# Patient Record
Sex: MALE | Race: WHITE | NOT HISPANIC OR LATINO | Employment: PART TIME | URBAN - METROPOLITAN AREA
[De-identification: names, ages, dates, MRNs, and addresses within clinical notes are randomized per-mention and may not be internally consistent; named-entity substitution may affect disease eponyms.]

---

## 2017-01-02 ENCOUNTER — APPOINTMENT (OUTPATIENT)
Dept: LAB | Facility: MEDICAL CENTER | Age: 67
End: 2017-01-02
Payer: MEDICARE

## 2017-01-02 DIAGNOSIS — C61 MALIGNANT NEOPLASM OF PROSTATE (HCC): ICD-10-CM

## 2017-01-02 DIAGNOSIS — C79.51 SECONDARY MALIGNANT NEOPLASM OF BONE (HCC): ICD-10-CM

## 2017-01-02 DIAGNOSIS — C64.2 MALIGNANT NEOPLASM OF LEFT KIDNEY, EXCEPT RENAL PELVIS (HCC): ICD-10-CM

## 2017-01-02 DIAGNOSIS — C78.00 SECONDARY MALIGNANT NEOPLASM OF LUNG (HCC): ICD-10-CM

## 2017-01-02 LAB
ALBUMIN SERPL BCP-MCNC: 3.2 G/DL (ref 3.5–5)
ALP SERPL-CCNC: 75 U/L (ref 46–116)
ALT SERPL W P-5'-P-CCNC: 24 U/L (ref 12–78)
ANION GAP SERPL CALCULATED.3IONS-SCNC: 6 MMOL/L (ref 4–13)
AST SERPL W P-5'-P-CCNC: 16 U/L (ref 5–45)
BASOPHILS # BLD AUTO: 0.01 THOUSANDS/ΜL (ref 0–0.1)
BASOPHILS NFR BLD AUTO: 0 % (ref 0–1)
BILIRUB SERPL-MCNC: 0.49 MG/DL (ref 0.2–1)
BUN SERPL-MCNC: 16 MG/DL (ref 5–25)
CALCIUM SERPL-MCNC: 9.8 MG/DL (ref 8.3–10.1)
CHLORIDE SERPL-SCNC: 107 MMOL/L (ref 100–108)
CO2 SERPL-SCNC: 30 MMOL/L (ref 21–32)
CREAT SERPL-MCNC: 1.25 MG/DL (ref 0.6–1.3)
EOSINOPHIL # BLD AUTO: 0.17 THOUSAND/ΜL (ref 0–0.61)
EOSINOPHIL NFR BLD AUTO: 2 % (ref 0–6)
ERYTHROCYTE [DISTWIDTH] IN BLOOD BY AUTOMATED COUNT: 16.4 % (ref 11.6–15.1)
GFR SERPL CREATININE-BSD FRML MDRD: 57.6 ML/MIN/1.73SQ M
GLUCOSE SERPL-MCNC: 102 MG/DL (ref 65–140)
HCT VFR BLD AUTO: 45 % (ref 36.5–49.3)
HGB BLD-MCNC: 14.6 G/DL (ref 12–17)
LYMPHOCYTES # BLD AUTO: 4.74 THOUSANDS/ΜL (ref 0.6–4.47)
LYMPHOCYTES NFR BLD AUTO: 49 % (ref 14–44)
MCH RBC QN AUTO: 31.8 PG (ref 26.8–34.3)
MCHC RBC AUTO-ENTMCNC: 32.4 G/DL (ref 31.4–37.4)
MCV RBC AUTO: 98 FL (ref 82–98)
MONOCYTES # BLD AUTO: 0.54 THOUSAND/ΜL (ref 0.17–1.22)
MONOCYTES NFR BLD AUTO: 6 % (ref 4–12)
NEUTROPHILS # BLD AUTO: 4.06 THOUSANDS/ΜL (ref 1.85–7.62)
NEUTS SEG NFR BLD AUTO: 43 % (ref 43–75)
NRBC BLD AUTO-RTO: 0 /100 WBCS
PLATELET # BLD AUTO: 186 THOUSANDS/UL (ref 149–390)
PMV BLD AUTO: 10.2 FL (ref 8.9–12.7)
POTASSIUM SERPL-SCNC: 3.6 MMOL/L (ref 3.5–5.3)
PROT SERPL-MCNC: 6.9 G/DL (ref 6.4–8.2)
RBC # BLD AUTO: 4.59 MILLION/UL (ref 3.88–5.62)
SODIUM SERPL-SCNC: 143 MMOL/L (ref 136–145)
WBC # BLD AUTO: 9.53 THOUSAND/UL (ref 4.31–10.16)

## 2017-01-02 PROCEDURE — 80053 COMPREHEN METABOLIC PANEL: CPT

## 2017-01-02 PROCEDURE — 36415 COLL VENOUS BLD VENIPUNCTURE: CPT

## 2017-01-02 PROCEDURE — 85025 COMPLETE CBC W/AUTO DIFF WBC: CPT

## 2017-01-26 ENCOUNTER — APPOINTMENT (OUTPATIENT)
Dept: LAB | Facility: MEDICAL CENTER | Age: 67
End: 2017-01-26
Payer: MEDICARE

## 2017-01-26 ENCOUNTER — TRANSCRIBE ORDERS (OUTPATIENT)
Dept: ADMINISTRATIVE | Facility: HOSPITAL | Age: 67
End: 2017-01-26

## 2017-01-26 DIAGNOSIS — C78.00 SECONDARY MALIGNANT NEOPLASM OF LUNG (HCC): ICD-10-CM

## 2017-01-26 DIAGNOSIS — C79.51 SECONDARY MALIGNANT NEOPLASM OF BONE (HCC): ICD-10-CM

## 2017-01-26 DIAGNOSIS — C64.2 MALIGNANT NEOPLASM OF LEFT KIDNEY, EXCEPT RENAL PELVIS (HCC): ICD-10-CM

## 2017-01-26 DIAGNOSIS — C61 MALIGNANT NEOPLASM OF PROSTATE (HCC): ICD-10-CM

## 2017-01-26 LAB
ALBUMIN SERPL BCP-MCNC: 3.3 G/DL (ref 3.5–5)
ALP SERPL-CCNC: 85 U/L (ref 46–116)
ALT SERPL W P-5'-P-CCNC: 23 U/L (ref 12–78)
ANION GAP SERPL CALCULATED.3IONS-SCNC: 5 MMOL/L (ref 4–13)
AST SERPL W P-5'-P-CCNC: 13 U/L (ref 5–45)
BASOPHILS # BLD AUTO: 0.03 THOUSANDS/ΜL (ref 0–0.1)
BASOPHILS NFR BLD AUTO: 0 % (ref 0–1)
BILIRUB SERPL-MCNC: 0.37 MG/DL (ref 0.2–1)
BUN SERPL-MCNC: 14 MG/DL (ref 5–25)
CALCIUM SERPL-MCNC: 9.9 MG/DL (ref 8.3–10.1)
CHLORIDE SERPL-SCNC: 106 MMOL/L (ref 100–108)
CO2 SERPL-SCNC: 32 MMOL/L (ref 21–32)
CREAT SERPL-MCNC: 1.17 MG/DL (ref 0.6–1.3)
EOSINOPHIL # BLD AUTO: 0.25 THOUSAND/ΜL (ref 0–0.61)
EOSINOPHIL NFR BLD AUTO: 2 % (ref 0–6)
ERYTHROCYTE [DISTWIDTH] IN BLOOD BY AUTOMATED COUNT: 15.4 % (ref 11.6–15.1)
GFR SERPL CREATININE-BSD FRML MDRD: >60 ML/MIN/1.73SQ M
GLUCOSE SERPL-MCNC: 82 MG/DL (ref 65–140)
HCT VFR BLD AUTO: 46.2 % (ref 36.5–49.3)
HGB BLD-MCNC: 14.8 G/DL (ref 12–17)
LYMPHOCYTES # BLD AUTO: 6.02 THOUSANDS/ΜL (ref 0.6–4.47)
LYMPHOCYTES NFR BLD AUTO: 45 % (ref 14–44)
MCH RBC QN AUTO: 31.8 PG (ref 26.8–34.3)
MCHC RBC AUTO-ENTMCNC: 32 G/DL (ref 31.4–37.4)
MCV RBC AUTO: 99 FL (ref 82–98)
MONOCYTES # BLD AUTO: 0.99 THOUSAND/ΜL (ref 0.17–1.22)
MONOCYTES NFR BLD AUTO: 7 % (ref 4–12)
NEUTROPHILS # BLD AUTO: 6.13 THOUSANDS/ΜL (ref 1.85–7.62)
NEUTS SEG NFR BLD AUTO: 46 % (ref 43–75)
NRBC BLD AUTO-RTO: 0 /100 WBCS
PLATELET # BLD AUTO: 221 THOUSANDS/UL (ref 149–390)
PMV BLD AUTO: 10.3 FL (ref 8.9–12.7)
POTASSIUM SERPL-SCNC: 4.4 MMOL/L (ref 3.5–5.3)
PROT SERPL-MCNC: 7.4 G/DL (ref 6.4–8.2)
RBC # BLD AUTO: 4.66 MILLION/UL (ref 3.88–5.62)
SODIUM SERPL-SCNC: 143 MMOL/L (ref 136–145)
WBC # BLD AUTO: 13.46 THOUSAND/UL (ref 4.31–10.16)

## 2017-01-26 PROCEDURE — 80053 COMPREHEN METABOLIC PANEL: CPT

## 2017-01-26 PROCEDURE — 36415 COLL VENOUS BLD VENIPUNCTURE: CPT

## 2017-01-26 PROCEDURE — 85025 COMPLETE CBC W/AUTO DIFF WBC: CPT

## 2017-02-03 ENCOUNTER — ALLSCRIPTS OFFICE VISIT (OUTPATIENT)
Dept: OTHER | Facility: OTHER | Age: 67
End: 2017-02-03

## 2017-02-08 ENCOUNTER — GENERIC CONVERSION - ENCOUNTER (OUTPATIENT)
Dept: OTHER | Facility: OTHER | Age: 67
End: 2017-02-08

## 2017-02-27 DIAGNOSIS — C79.51 SECONDARY MALIGNANT NEOPLASM OF BONE (HCC): ICD-10-CM

## 2017-02-27 DIAGNOSIS — C64.2 MALIGNANT NEOPLASM OF LEFT KIDNEY, EXCEPT RENAL PELVIS (HCC): ICD-10-CM

## 2017-02-27 DIAGNOSIS — C78.00 SECONDARY MALIGNANT NEOPLASM OF LUNG (HCC): ICD-10-CM

## 2017-02-27 DIAGNOSIS — C61 MALIGNANT NEOPLASM OF PROSTATE (HCC): ICD-10-CM

## 2017-03-01 ENCOUNTER — GENERIC CONVERSION - ENCOUNTER (OUTPATIENT)
Dept: OTHER | Facility: OTHER | Age: 67
End: 2017-03-01

## 2017-03-23 ENCOUNTER — TRANSCRIBE ORDERS (OUTPATIENT)
Dept: ADMINISTRATIVE | Facility: HOSPITAL | Age: 67
End: 2017-03-23

## 2017-03-23 ENCOUNTER — LAB (OUTPATIENT)
Dept: LAB | Facility: MEDICAL CENTER | Age: 67
End: 2017-03-23
Payer: MEDICARE

## 2017-03-23 DIAGNOSIS — Z11.59 SCREENING EXAMINATION FOR POLIOMYELITIS: ICD-10-CM

## 2017-03-23 DIAGNOSIS — C64.2 MALIGNANT NEOPLASM OF LEFT KIDNEY, EXCEPT RENAL PELVIS (HCC): ICD-10-CM

## 2017-03-23 DIAGNOSIS — C61 MALIGNANT NEOPLASM OF PROSTATE (HCC): ICD-10-CM

## 2017-03-23 DIAGNOSIS — C78.00 SECONDARY MALIGNANT NEOPLASM OF LUNG (HCC): ICD-10-CM

## 2017-03-23 DIAGNOSIS — C79.51 SECONDARY MALIGNANT NEOPLASM OF BONE (HCC): ICD-10-CM

## 2017-03-23 DIAGNOSIS — Z11.59 SCREENING EXAMINATION FOR POLIOMYELITIS: Primary | ICD-10-CM

## 2017-03-23 LAB
ALBUMIN SERPL BCP-MCNC: 3.2 G/DL (ref 3.5–5)
ALP SERPL-CCNC: 110 U/L (ref 46–116)
ALT SERPL W P-5'-P-CCNC: 22 U/L (ref 12–78)
ANION GAP SERPL CALCULATED.3IONS-SCNC: 8 MMOL/L (ref 4–13)
AST SERPL W P-5'-P-CCNC: 17 U/L (ref 5–45)
BASOPHILS # BLD AUTO: 0.02 THOUSANDS/ΜL (ref 0–0.1)
BASOPHILS NFR BLD AUTO: 0 % (ref 0–1)
BILIRUB SERPL-MCNC: 0.89 MG/DL (ref 0.2–1)
BUN SERPL-MCNC: 19 MG/DL (ref 5–25)
CALCIUM SERPL-MCNC: 9.6 MG/DL (ref 8.3–10.1)
CHLORIDE SERPL-SCNC: 107 MMOL/L (ref 100–108)
CO2 SERPL-SCNC: 28 MMOL/L (ref 21–32)
CREAT SERPL-MCNC: 1.22 MG/DL (ref 0.6–1.3)
EOSINOPHIL # BLD AUTO: 0.15 THOUSAND/ΜL (ref 0–0.61)
EOSINOPHIL NFR BLD AUTO: 1 % (ref 0–6)
ERYTHROCYTE [DISTWIDTH] IN BLOOD BY AUTOMATED COUNT: 13.9 % (ref 11.6–15.1)
GFR SERPL CREATININE-BSD FRML MDRD: 59.2 ML/MIN/1.73SQ M
GLUCOSE P FAST SERPL-MCNC: 63 MG/DL (ref 65–99)
HCT VFR BLD AUTO: 47.9 % (ref 36.5–49.3)
HGB BLD-MCNC: 15.5 G/DL (ref 12–17)
LYMPHOCYTES # BLD AUTO: 5.3 THOUSANDS/ΜL (ref 0.6–4.47)
LYMPHOCYTES NFR BLD AUTO: 40 % (ref 14–44)
MCH RBC QN AUTO: 29.4 PG (ref 26.8–34.3)
MCHC RBC AUTO-ENTMCNC: 32.4 G/DL (ref 31.4–37.4)
MCV RBC AUTO: 91 FL (ref 82–98)
MONOCYTES # BLD AUTO: 0.91 THOUSAND/ΜL (ref 0.17–1.22)
MONOCYTES NFR BLD AUTO: 7 % (ref 4–12)
NEUTROPHILS # BLD AUTO: 6.92 THOUSANDS/ΜL (ref 1.85–7.62)
NEUTS SEG NFR BLD AUTO: 52 % (ref 43–75)
NRBC BLD AUTO-RTO: 0 /100 WBCS
PLATELET # BLD AUTO: 177 THOUSANDS/UL (ref 149–390)
PMV BLD AUTO: 10.8 FL (ref 8.9–12.7)
POTASSIUM SERPL-SCNC: 3.3 MMOL/L (ref 3.5–5.3)
PROT SERPL-MCNC: 7.3 G/DL (ref 6.4–8.2)
RBC # BLD AUTO: 5.28 MILLION/UL (ref 3.88–5.62)
SODIUM SERPL-SCNC: 143 MMOL/L (ref 136–145)
WBC # BLD AUTO: 13.32 THOUSAND/UL (ref 4.31–10.16)

## 2017-03-23 PROCEDURE — 80053 COMPREHEN METABOLIC PANEL: CPT

## 2017-03-23 PROCEDURE — 85025 COMPLETE CBC W/AUTO DIFF WBC: CPT

## 2017-03-23 PROCEDURE — 36415 COLL VENOUS BLD VENIPUNCTURE: CPT

## 2017-03-23 PROCEDURE — 86803 HEPATITIS C AB TEST: CPT

## 2017-03-24 ENCOUNTER — ALLSCRIPTS OFFICE VISIT (OUTPATIENT)
Dept: OTHER | Facility: OTHER | Age: 67
End: 2017-03-24

## 2017-03-24 LAB — HCV AB SER QL: NORMAL

## 2017-03-27 ENCOUNTER — APPOINTMENT (EMERGENCY)
Dept: RADIOLOGY | Facility: HOSPITAL | Age: 67
End: 2017-03-27
Payer: MEDICARE

## 2017-03-27 ENCOUNTER — HOSPITAL ENCOUNTER (EMERGENCY)
Facility: HOSPITAL | Age: 67
Discharge: HOME | End: 2017-03-27
Attending: EMERGENCY MEDICINE
Payer: MEDICARE

## 2017-03-27 VITALS
HEART RATE: 105 BPM | TEMPERATURE: 99.4 F | DIASTOLIC BLOOD PRESSURE: 88 MMHG | WEIGHT: 315 LBS | SYSTOLIC BLOOD PRESSURE: 172 MMHG | RESPIRATION RATE: 18 BRPM | OXYGEN SATURATION: 95 %

## 2017-03-27 DIAGNOSIS — M79.631 RIGHT FOREARM PAIN: Primary | ICD-10-CM

## 2017-03-27 DIAGNOSIS — S52.209A ULNAR SHAFT FRACTURE: ICD-10-CM

## 2017-03-27 DIAGNOSIS — M89.9 BONE LESION: ICD-10-CM

## 2017-03-27 PROCEDURE — 73110 X-RAY EXAM OF WRIST: CPT

## 2017-03-27 PROCEDURE — 99283 EMERGENCY DEPT VISIT LOW MDM: CPT

## 2017-03-27 RX ORDER — ACETAMINOPHEN 325 MG/1
650 TABLET ORAL ONCE
Status: COMPLETED | OUTPATIENT
Start: 2017-03-27 | End: 2017-03-27

## 2017-03-27 RX ORDER — OXYCODONE HYDROCHLORIDE AND ACETAMINOPHEN 5; 325 MG/1; MG/1
1 TABLET ORAL EVERY 8 HOURS PRN
Qty: 15 TABLET | Refills: 0 | Status: SHIPPED | OUTPATIENT
Start: 2017-03-27 | End: 2017-04-01

## 2017-03-27 RX ORDER — IBUPROFEN 200 MG
400 TABLET ORAL ONCE
Status: DISCONTINUED | OUTPATIENT
Start: 2017-03-27 | End: 2017-03-27

## 2017-03-27 RX ORDER — OXYCODONE HYDROCHLORIDE AND ACETAMINOPHEN 5; 325 MG/1; MG/1
1 TABLET ORAL ONCE
Status: COMPLETED | OUTPATIENT
Start: 2017-03-27 | End: 2017-03-27

## 2017-03-27 RX ADMIN — ACETAMINOPHEN 650 MG: 325 TABLET, FILM COATED ORAL at 20:28

## 2017-03-27 RX ADMIN — OXYCODONE HYDROCHLORIDE AND ACETAMINOPHEN 1 TABLET: 5; 325 TABLET ORAL at 23:16

## 2017-03-28 ENCOUNTER — GENERIC CONVERSION - ENCOUNTER (OUTPATIENT)
Dept: OTHER | Facility: OTHER | Age: 67
End: 2017-03-28

## 2017-03-29 ENCOUNTER — GENERIC CONVERSION - ENCOUNTER (OUTPATIENT)
Dept: OTHER | Facility: OTHER | Age: 67
End: 2017-03-29

## 2017-03-30 ENCOUNTER — GENERIC CONVERSION - ENCOUNTER (OUTPATIENT)
Dept: OTHER | Facility: OTHER | Age: 67
End: 2017-03-30

## 2017-03-30 ENCOUNTER — HOSPITAL ENCOUNTER (OUTPATIENT)
Dept: RADIOLOGY | Facility: HOSPITAL | Age: 67
Discharge: HOME | End: 2017-03-30
Attending: INTERNAL MEDICINE
Payer: MEDICARE

## 2017-03-30 DIAGNOSIS — C64.2 MALIGNANT NEOPLASM OF LEFT KIDNEY, EXCEPT RENAL PELVIS (HCC): ICD-10-CM

## 2017-03-30 DIAGNOSIS — C78.00 SECONDARY MALIGNANT NEOPLASM OF LUNG (HCC): ICD-10-CM

## 2017-03-30 DIAGNOSIS — C61 MALIGNANT NEOPLASM OF PROSTATE (HCC): ICD-10-CM

## 2017-03-30 DIAGNOSIS — C79.51 SECONDARY MALIGNANT NEOPLASM OF BONE (HCC): ICD-10-CM

## 2017-03-30 PROCEDURE — 73090 X-RAY EXAM OF FOREARM: CPT

## 2017-03-30 PROCEDURE — 73552 X-RAY EXAM OF FEMUR 2/>: CPT

## 2017-03-30 PROCEDURE — 73060 X-RAY EXAM OF HUMERUS: CPT

## 2017-03-30 PROCEDURE — 73590 X-RAY EXAM OF LOWER LEG: CPT

## 2017-04-03 ENCOUNTER — GENERIC CONVERSION - ENCOUNTER (OUTPATIENT)
Dept: OTHER | Facility: OTHER | Age: 67
End: 2017-04-03

## 2017-04-10 ENCOUNTER — APPOINTMENT (OUTPATIENT)
Dept: RADIATION ONCOLOGY | Facility: CLINIC | Age: 67
End: 2017-04-10
Attending: RADIOLOGY
Payer: MEDICARE

## 2017-04-10 ENCOUNTER — APPOINTMENT (OUTPATIENT)
Dept: LAB | Facility: MEDICAL CENTER | Age: 67
End: 2017-04-10
Payer: MEDICARE

## 2017-04-10 ENCOUNTER — HOSPITAL ENCOUNTER (OUTPATIENT)
Dept: RADIOLOGY | Facility: MEDICAL CENTER | Age: 67
Discharge: HOME | End: 2017-04-10
Payer: MEDICARE

## 2017-04-10 ENCOUNTER — TRANSCRIBE ORDERS (OUTPATIENT)
Dept: ADMINISTRATIVE | Facility: HOSPITAL | Age: 67
End: 2017-04-10

## 2017-04-10 ENCOUNTER — GENERIC CONVERSION - ENCOUNTER (OUTPATIENT)
Dept: OTHER | Facility: OTHER | Age: 67
End: 2017-04-10

## 2017-04-10 DIAGNOSIS — C64.2 MALIGNANT NEOPLASM OF LEFT KIDNEY, EXCEPT RENAL PELVIS (HCC): ICD-10-CM

## 2017-04-10 DIAGNOSIS — M84.531G: Primary | ICD-10-CM

## 2017-04-10 DIAGNOSIS — M84.531G: ICD-10-CM

## 2017-04-10 DIAGNOSIS — C61 MALIGNANT NEOPLASM OF PROSTATE (HCC): ICD-10-CM

## 2017-04-10 LAB
ALBUMIN SERPL BCP-MCNC: 3.2 G/DL (ref 3.5–5)
ALP SERPL-CCNC: 101 U/L (ref 46–116)
ALT SERPL W P-5'-P-CCNC: 21 U/L (ref 12–78)
ANION GAP SERPL CALCULATED.3IONS-SCNC: 7 MMOL/L (ref 4–13)
APTT PPP: 33 SECONDS (ref 24–36)
AST SERPL W P-5'-P-CCNC: 15 U/L (ref 5–45)
BASOPHILS # BLD AUTO: 0.01 THOUSANDS/ΜL (ref 0–0.1)
BASOPHILS NFR BLD AUTO: 0 % (ref 0–1)
BILIRUB SERPL-MCNC: 0.84 MG/DL (ref 0.2–1)
BUN SERPL-MCNC: 15 MG/DL (ref 5–25)
CALCIUM SERPL-MCNC: 9.7 MG/DL (ref 8.3–10.1)
CHLORIDE SERPL-SCNC: 103 MMOL/L (ref 100–108)
CO2 SERPL-SCNC: 29 MMOL/L (ref 21–32)
CREAT SERPL-MCNC: 1.37 MG/DL (ref 0.6–1.3)
EOSINOPHIL # BLD AUTO: 0.14 THOUSAND/ΜL (ref 0–0.61)
EOSINOPHIL NFR BLD AUTO: 1 % (ref 0–6)
ERYTHROCYTE [DISTWIDTH] IN BLOOD BY AUTOMATED COUNT: 13.7 % (ref 11.6–15.1)
GFR SERPL CREATININE-BSD FRML MDRD: 51.8 ML/MIN/1.73SQ M
GLUCOSE P FAST SERPL-MCNC: 102 MG/DL (ref 65–99)
HCT VFR BLD AUTO: 47.3 % (ref 36.5–49.3)
HGB BLD-MCNC: 15.4 G/DL (ref 12–17)
INR PPP: 0.95 (ref 0.86–1.16)
LYMPHOCYTES # BLD AUTO: 3.62 THOUSANDS/ΜL (ref 0.6–4.47)
LYMPHOCYTES NFR BLD AUTO: 33 % (ref 14–44)
MCH RBC QN AUTO: 29.1 PG (ref 26.8–34.3)
MCHC RBC AUTO-ENTMCNC: 32.6 G/DL (ref 31.4–37.4)
MCV RBC AUTO: 89 FL (ref 82–98)
MONOCYTES # BLD AUTO: 0.72 THOUSAND/ΜL (ref 0.17–1.22)
MONOCYTES NFR BLD AUTO: 7 % (ref 4–12)
NEUTROPHILS # BLD AUTO: 6.34 THOUSANDS/ΜL (ref 1.85–7.62)
NEUTS SEG NFR BLD AUTO: 59 % (ref 43–75)
NRBC BLD AUTO-RTO: 0 /100 WBCS
PLATELET # BLD AUTO: 228 THOUSANDS/UL (ref 149–390)
PMV BLD AUTO: 11 FL (ref 8.9–12.7)
POTASSIUM SERPL-SCNC: 3.9 MMOL/L (ref 3.5–5.3)
PROT SERPL-MCNC: 7.5 G/DL (ref 6.4–8.2)
PROTHROMBIN TIME: 12.8 SECONDS (ref 12–14.3)
RBC # BLD AUTO: 5.3 MILLION/UL (ref 3.88–5.62)
SODIUM SERPL-SCNC: 139 MMOL/L (ref 136–145)
WBC # BLD AUTO: 10.86 THOUSAND/UL (ref 4.31–10.16)

## 2017-04-10 PROCEDURE — 85025 COMPLETE CBC W/AUTO DIFF WBC: CPT

## 2017-04-10 PROCEDURE — 85730 THROMBOPLASTIN TIME PARTIAL: CPT

## 2017-04-10 PROCEDURE — 36415 COLL VENOUS BLD VENIPUNCTURE: CPT

## 2017-04-10 PROCEDURE — 99214 OFFICE O/P EST MOD 30 MIN: CPT | Performed by: RADIOLOGY

## 2017-04-10 PROCEDURE — 85610 PROTHROMBIN TIME: CPT

## 2017-04-10 PROCEDURE — 71020 HB CHEST X-RAY 2VW FRONTAL&LATL: CPT

## 2017-04-10 PROCEDURE — 80053 COMPREHEN METABOLIC PANEL: CPT

## 2017-04-11 ENCOUNTER — GENERIC CONVERSION - ENCOUNTER (OUTPATIENT)
Dept: OTHER | Facility: OTHER | Age: 67
End: 2017-04-11

## 2017-04-14 ENCOUNTER — LAB CONVERSION - ENCOUNTER (OUTPATIENT)
Dept: OTHER | Facility: OTHER | Age: 67
End: 2017-04-14

## 2017-04-17 DIAGNOSIS — C64.2 MALIGNANT NEOPLASM OF LEFT KIDNEY, EXCEPT RENAL PELVIS (HCC): ICD-10-CM

## 2017-04-17 DIAGNOSIS — C78.00 SECONDARY MALIGNANT NEOPLASM OF LUNG (HCC): ICD-10-CM

## 2017-04-17 DIAGNOSIS — C61 MALIGNANT NEOPLASM OF PROSTATE (HCC): ICD-10-CM

## 2017-04-17 DIAGNOSIS — R53.83 OTHER FATIGUE: ICD-10-CM

## 2017-04-17 DIAGNOSIS — C79.51 SECONDARY MALIGNANT NEOPLASM OF BONE (HCC): ICD-10-CM

## 2017-04-17 DIAGNOSIS — M79.10 MYALGIA: ICD-10-CM

## 2017-04-24 ENCOUNTER — GENERIC CONVERSION - ENCOUNTER (OUTPATIENT)
Dept: OTHER | Facility: OTHER | Age: 67
End: 2017-04-24

## 2017-05-01 ENCOUNTER — GENERIC CONVERSION - ENCOUNTER (OUTPATIENT)
Dept: OTHER | Facility: OTHER | Age: 67
End: 2017-05-01

## 2017-05-05 ENCOUNTER — ALLSCRIPTS OFFICE VISIT (OUTPATIENT)
Dept: OTHER | Facility: OTHER | Age: 67
End: 2017-05-05

## 2017-05-05 ENCOUNTER — TRANSCRIBE ORDERS (OUTPATIENT)
Dept: ADMINISTRATIVE | Facility: HOSPITAL | Age: 67
End: 2017-05-05

## 2017-05-05 DIAGNOSIS — C78.00 MALIGNANT NEOPLASM METASTATIC TO LUNG, UNSPECIFIED LATERALITY (HCC): Primary | ICD-10-CM

## 2017-05-08 ENCOUNTER — APPOINTMENT (OUTPATIENT)
Dept: RADIATION ONCOLOGY | Facility: CLINIC | Age: 67
End: 2017-05-08
Attending: RADIOLOGY
Payer: MEDICARE

## 2017-05-08 PROCEDURE — 77334 RADIATION TREATMENT AID(S): CPT | Performed by: RADIOLOGY

## 2017-05-08 PROCEDURE — 77290 THER RAD SIMULAJ FIELD CPLX: CPT | Performed by: RADIOLOGY

## 2017-05-11 ENCOUNTER — GENERIC CONVERSION - ENCOUNTER (OUTPATIENT)
Dept: OTHER | Facility: OTHER | Age: 67
End: 2017-05-11

## 2017-05-11 PROCEDURE — 77307 TELETHX ISODOSE PLAN CPLX: CPT | Performed by: RADIOLOGY

## 2017-05-11 PROCEDURE — 77334 RADIATION TREATMENT AID(S): CPT | Performed by: RADIOLOGY

## 2017-05-11 PROCEDURE — 77300 RADIATION THERAPY DOSE PLAN: CPT | Performed by: RADIOLOGY

## 2017-05-16 PROCEDURE — 77280 THER RAD SIMULAJ FIELD SMPL: CPT | Performed by: RADIOLOGY

## 2017-05-17 PROCEDURE — 77331 SPECIAL RADIATION DOSIMETRY: CPT | Performed by: RADIOLOGY

## 2017-05-17 PROCEDURE — 77412 RADIATION TX DELIVERY LVL 3: CPT | Performed by: RADIOLOGY

## 2017-05-17 PROCEDURE — 77417 THER RADIOLOGY PORT IMAGE(S): CPT | Performed by: RADIOLOGY

## 2017-05-18 PROCEDURE — 77412 RADIATION TX DELIVERY LVL 3: CPT | Performed by: RADIOLOGY

## 2017-05-18 PROCEDURE — 77417 THER RADIOLOGY PORT IMAGE(S): CPT | Performed by: RADIOLOGY

## 2017-05-19 PROCEDURE — 77387 GUIDANCE FOR RADJ TX DLVR: CPT | Performed by: RADIOLOGY

## 2017-05-19 PROCEDURE — 77412 RADIATION TX DELIVERY LVL 3: CPT | Performed by: RADIOLOGY

## 2017-05-22 PROCEDURE — 77387 GUIDANCE FOR RADJ TX DLVR: CPT | Performed by: RADIOLOGY

## 2017-05-22 PROCEDURE — 77412 RADIATION TX DELIVERY LVL 3: CPT | Performed by: RADIOLOGY

## 2017-05-23 PROCEDURE — 77387 GUIDANCE FOR RADJ TX DLVR: CPT | Performed by: RADIOLOGY

## 2017-05-23 PROCEDURE — 77336 RADIATION PHYSICS CONSULT: CPT | Performed by: RADIOLOGY

## 2017-05-23 PROCEDURE — 77412 RADIATION TX DELIVERY LVL 3: CPT | Performed by: RADIOLOGY

## 2017-05-24 ENCOUNTER — TRANSCRIBE ORDERS (OUTPATIENT)
Dept: RADIATION ONCOLOGY | Facility: CLINIC | Age: 67
End: 2017-05-24

## 2017-05-24 ENCOUNTER — APPOINTMENT (OUTPATIENT)
Dept: LAB | Facility: CLINIC | Age: 67
End: 2017-05-24
Attending: RADIOLOGY
Payer: MEDICARE

## 2017-05-24 DIAGNOSIS — C64.2 MALIGNANT NEOPLASM OF LEFT KIDNEY, EXCEPT RENAL PELVIS (HCC): Primary | ICD-10-CM

## 2017-05-24 DIAGNOSIS — C64.2 MALIGNANT NEOPLASM OF LEFT KIDNEY, EXCEPT RENAL PELVIS (HCC): ICD-10-CM

## 2017-05-24 LAB
ERYTHROCYTE [DISTWIDTH] IN BLOOD BY AUTOMATED COUNT: 17.3 % (ref 11.6–15.1)
GRANULOCYTES NFR BLD AUTO: 70.2 % (ref 47–80)
GRANULOCYTES NFR BLD: 7.9 THOUSAND/ΜL (ref 1.85–7.82)
HCT VFR BLD AUTO: 45.2 % (ref 36.5–49.3)
HGB BLD-MCNC: 14.7 G/DL (ref 12–17)
LYMPHOCYTES # BLD AUTO: 3.1 THOUSANDS/ΜL (ref 0.6–4.47)
LYMPHOCYTES NFR BLD AUTO: 28 % (ref 14–44)
MCH RBC QN AUTO: 27.5 PG (ref 26.8–34.3)
MCHC RBC AUTO-ENTMCNC: 32.6 G/DL (ref 31.4–37.4)
MCV RBC AUTO: 85 FL (ref 82–98)
MONOCYTES # BLD AUTO: 0.2 THOUSAND/ΜL (ref 0.17–1.22)
MONOCYTES NFR BLD AUTO: 2 % (ref 4–12)
PLATELET # BLD AUTO: 215 THOUSANDS/UL (ref 149–390)
PMV BLD AUTO: 7.7 FL (ref 8.9–12.7)
RBC # BLD AUTO: 5.35 MILLION/UL (ref 3.88–5.62)
WBC # BLD AUTO: 11.3 THOUSAND/UL (ref 4.31–10.16)
WBC NRBC COR # BLD: 11.3 THOUSAND/UL (ref 4.31–10.16)

## 2017-05-24 PROCEDURE — 77412 RADIATION TX DELIVERY LVL 3: CPT | Performed by: RADIOLOGY

## 2017-05-24 PROCEDURE — 36415 COLL VENOUS BLD VENIPUNCTURE: CPT

## 2017-05-24 PROCEDURE — 77387 GUIDANCE FOR RADJ TX DLVR: CPT | Performed by: RADIOLOGY

## 2017-05-24 PROCEDURE — 85025 COMPLETE CBC W/AUTO DIFF WBC: CPT

## 2017-05-24 PROCEDURE — 77417 THER RADIOLOGY PORT IMAGE(S): CPT | Performed by: RADIOLOGY

## 2017-05-25 PROCEDURE — 77412 RADIATION TX DELIVERY LVL 3: CPT | Performed by: RADIOLOGY

## 2017-05-25 PROCEDURE — 77387 GUIDANCE FOR RADJ TX DLVR: CPT | Performed by: RADIOLOGY

## 2017-05-26 PROCEDURE — 77387 GUIDANCE FOR RADJ TX DLVR: CPT | Performed by: RADIOLOGY

## 2017-05-26 PROCEDURE — 77412 RADIATION TX DELIVERY LVL 3: CPT | Performed by: RADIOLOGY

## 2017-05-30 PROCEDURE — 77412 RADIATION TX DELIVERY LVL 3: CPT | Performed by: RADIOLOGY

## 2017-05-30 PROCEDURE — 77387 GUIDANCE FOR RADJ TX DLVR: CPT | Performed by: RADIOLOGY

## 2017-05-31 PROCEDURE — 77387 GUIDANCE FOR RADJ TX DLVR: CPT | Performed by: RADIOLOGY

## 2017-05-31 PROCEDURE — 77336 RADIATION PHYSICS CONSULT: CPT | Performed by: RADIOLOGY

## 2017-05-31 PROCEDURE — 77412 RADIATION TX DELIVERY LVL 3: CPT | Performed by: RADIOLOGY

## 2017-06-09 ENCOUNTER — HOSPITAL ENCOUNTER (OUTPATIENT)
Dept: CT IMAGING | Facility: HOSPITAL | Age: 67
Discharge: HOME | End: 2017-06-09
Attending: INTERNAL MEDICINE
Payer: MEDICARE

## 2017-06-09 DIAGNOSIS — C79.51 SECONDARY MALIGNANT NEOPLASM OF BONE (HCC): ICD-10-CM

## 2017-06-09 DIAGNOSIS — C78.00 SECONDARY MALIGNANT NEOPLASM OF LUNG (HCC): ICD-10-CM

## 2017-06-09 DIAGNOSIS — C61 MALIGNANT NEOPLASM OF PROSTATE (HCC): ICD-10-CM

## 2017-06-09 DIAGNOSIS — C64.2 MALIGNANT NEOPLASM OF LEFT KIDNEY, EXCEPT RENAL PELVIS (HCC): ICD-10-CM

## 2017-06-09 DIAGNOSIS — D49.2 NEOPLASM OF UNSPECIFIED BEHAVIOR OF BONE, SOFT TISSUE, AND SKIN: ICD-10-CM

## 2017-06-09 PROCEDURE — 71250 CT THORAX DX C-: CPT

## 2017-06-12 DIAGNOSIS — C78.00 SECONDARY MALIGNANT NEOPLASM OF LUNG (HCC): ICD-10-CM

## 2017-06-12 DIAGNOSIS — D49.2 NEOPLASM OF UNSPECIFIED BEHAVIOR OF BONE, SOFT TISSUE, AND SKIN: ICD-10-CM

## 2017-06-12 DIAGNOSIS — C61 MALIGNANT NEOPLASM OF PROSTATE (HCC): ICD-10-CM

## 2017-06-12 DIAGNOSIS — C79.51 SECONDARY MALIGNANT NEOPLASM OF BONE (HCC): ICD-10-CM

## 2017-06-12 DIAGNOSIS — C64.2 MALIGNANT NEOPLASM OF LEFT KIDNEY, EXCEPT RENAL PELVIS (HCC): ICD-10-CM

## 2017-06-15 ENCOUNTER — TRANSCRIBE ORDERS (OUTPATIENT)
Dept: ADMINISTRATIVE | Facility: HOSPITAL | Age: 67
End: 2017-06-15

## 2017-06-15 ENCOUNTER — APPOINTMENT (OUTPATIENT)
Dept: RADIOLOGY | Facility: MEDICAL CENTER | Age: 67
End: 2017-06-15
Payer: MEDICARE

## 2017-06-15 ENCOUNTER — APPOINTMENT (OUTPATIENT)
Dept: LAB | Facility: MEDICAL CENTER | Age: 67
End: 2017-06-15
Payer: MEDICARE

## 2017-06-15 DIAGNOSIS — M84.531G: ICD-10-CM

## 2017-06-15 DIAGNOSIS — M84.531G: Primary | ICD-10-CM

## 2017-06-15 PROCEDURE — 73090 X-RAY EXAM OF FOREARM: CPT

## 2017-06-15 PROCEDURE — 36415 COLL VENOUS BLD VENIPUNCTURE: CPT | Performed by: INTERNAL MEDICINE

## 2017-06-15 PROCEDURE — 80053 COMPREHEN METABOLIC PANEL: CPT | Performed by: INTERNAL MEDICINE

## 2017-06-15 PROCEDURE — 83615 LACTATE (LD) (LDH) ENZYME: CPT | Performed by: INTERNAL MEDICINE

## 2017-06-15 PROCEDURE — 85025 COMPLETE CBC W/AUTO DIFF WBC: CPT | Performed by: INTERNAL MEDICINE

## 2017-06-16 ENCOUNTER — ALLSCRIPTS OFFICE VISIT (OUTPATIENT)
Dept: OTHER | Facility: OTHER | Age: 67
End: 2017-06-16

## 2017-07-06 ENCOUNTER — APPOINTMENT (OUTPATIENT)
Dept: LAB | Facility: MEDICAL CENTER | Age: 67
End: 2017-07-06
Payer: MEDICARE

## 2017-07-06 DIAGNOSIS — C78.00 SECONDARY MALIGNANT NEOPLASM OF LUNG (HCC): ICD-10-CM

## 2017-07-06 DIAGNOSIS — D49.2 NEOPLASM OF UNSPECIFIED BEHAVIOR OF BONE, SOFT TISSUE, AND SKIN: ICD-10-CM

## 2017-07-06 DIAGNOSIS — C64.2 MALIGNANT NEOPLASM OF LEFT KIDNEY, EXCEPT RENAL PELVIS (HCC): ICD-10-CM

## 2017-07-06 DIAGNOSIS — C61 MALIGNANT NEOPLASM OF PROSTATE (HCC): ICD-10-CM

## 2017-07-06 DIAGNOSIS — C79.51 SECONDARY MALIGNANT NEOPLASM OF BONE (HCC): ICD-10-CM

## 2017-07-06 LAB
ALBUMIN SERPL BCP-MCNC: 3.4 G/DL (ref 3.5–5)
ALP SERPL-CCNC: 89 U/L (ref 46–116)
ALT SERPL W P-5'-P-CCNC: 29 U/L (ref 12–78)
ANION GAP SERPL CALCULATED.3IONS-SCNC: 5 MMOL/L (ref 4–13)
AST SERPL W P-5'-P-CCNC: 38 U/L (ref 5–45)
BASOPHILS # BLD AUTO: 0.02 THOUSANDS/ΜL (ref 0–0.1)
BASOPHILS NFR BLD AUTO: 0 % (ref 0–1)
BILIRUB SERPL-MCNC: 1.02 MG/DL (ref 0.2–1)
BUN SERPL-MCNC: 19 MG/DL (ref 5–25)
CALCIUM SERPL-MCNC: 9.2 MG/DL (ref 8.3–10.1)
CHLORIDE SERPL-SCNC: 107 MMOL/L (ref 100–108)
CO2 SERPL-SCNC: 28 MMOL/L (ref 21–32)
CREAT SERPL-MCNC: 1.19 MG/DL (ref 0.6–1.3)
EOSINOPHIL # BLD AUTO: 0.14 THOUSAND/ΜL (ref 0–0.61)
EOSINOPHIL NFR BLD AUTO: 1 % (ref 0–6)
ERYTHROCYTE [DISTWIDTH] IN BLOOD BY AUTOMATED COUNT: 16.8 % (ref 11.6–15.1)
GFR SERPL CREATININE-BSD FRML MDRD: >60 ML/MIN/1.73SQ M
GLUCOSE P FAST SERPL-MCNC: 79 MG/DL (ref 65–99)
HCT VFR BLD AUTO: 47.6 % (ref 36.5–49.3)
HGB BLD-MCNC: 15.5 G/DL (ref 12–17)
LYMPHOCYTES # BLD AUTO: 4.31 THOUSANDS/ΜL (ref 0.6–4.47)
LYMPHOCYTES NFR BLD AUTO: 36 % (ref 14–44)
MCH RBC QN AUTO: 27.6 PG (ref 26.8–34.3)
MCHC RBC AUTO-ENTMCNC: 32.6 G/DL (ref 31.4–37.4)
MCV RBC AUTO: 85 FL (ref 82–98)
MONOCYTES # BLD AUTO: 0.7 THOUSAND/ΜL (ref 0.17–1.22)
MONOCYTES NFR BLD AUTO: 6 % (ref 4–12)
NEUTROPHILS # BLD AUTO: 6.75 THOUSANDS/ΜL (ref 1.85–7.62)
NEUTS SEG NFR BLD AUTO: 57 % (ref 43–75)
NRBC BLD AUTO-RTO: 0 /100 WBCS
PLATELET # BLD AUTO: 170 THOUSANDS/UL (ref 149–390)
PMV BLD AUTO: 10.4 FL (ref 8.9–12.7)
POTASSIUM SERPL-SCNC: 3.6 MMOL/L (ref 3.5–5.3)
PROT SERPL-MCNC: 7 G/DL (ref 6.4–8.2)
RBC # BLD AUTO: 5.62 MILLION/UL (ref 3.88–5.62)
SODIUM SERPL-SCNC: 140 MMOL/L (ref 136–145)
WBC # BLD AUTO: 11.94 THOUSAND/UL (ref 4.31–10.16)

## 2017-07-06 PROCEDURE — 85025 COMPLETE CBC W/AUTO DIFF WBC: CPT

## 2017-07-06 PROCEDURE — 80053 COMPREHEN METABOLIC PANEL: CPT

## 2017-07-06 PROCEDURE — 36415 COLL VENOUS BLD VENIPUNCTURE: CPT

## 2017-07-10 ENCOUNTER — GENERIC CONVERSION - ENCOUNTER (OUTPATIENT)
Dept: OTHER | Facility: OTHER | Age: 67
End: 2017-07-10

## 2017-07-12 ENCOUNTER — APPOINTMENT (OUTPATIENT)
Dept: RADIATION ONCOLOGY | Facility: CLINIC | Age: 67
End: 2017-07-12
Attending: RADIOLOGY
Payer: MEDICARE

## 2017-07-12 PROCEDURE — 99214 OFFICE O/P EST MOD 30 MIN: CPT | Performed by: RADIOLOGY

## 2017-07-19 ENCOUNTER — APPOINTMENT (OUTPATIENT)
Dept: RADIATION ONCOLOGY | Facility: CLINIC | Age: 67
End: 2017-07-19
Attending: RADIOLOGY
Payer: MEDICARE

## 2017-07-19 PROCEDURE — 77290 THER RAD SIMULAJ FIELD CPLX: CPT | Performed by: RADIOLOGY

## 2017-07-24 DIAGNOSIS — C78.00 SECONDARY MALIGNANT NEOPLASM OF LUNG (HCC): ICD-10-CM

## 2017-07-24 DIAGNOSIS — C61 MALIGNANT NEOPLASM OF PROSTATE (HCC): ICD-10-CM

## 2017-07-24 DIAGNOSIS — C64.2 MALIGNANT NEOPLASM OF LEFT KIDNEY, EXCEPT RENAL PELVIS (HCC): ICD-10-CM

## 2017-07-24 DIAGNOSIS — D49.2 NEOPLASM OF UNSPECIFIED BEHAVIOR OF BONE, SOFT TISSUE, AND SKIN: ICD-10-CM

## 2017-07-24 DIAGNOSIS — C79.51 SECONDARY MALIGNANT NEOPLASM OF BONE (HCC): ICD-10-CM

## 2017-07-26 PROCEDURE — 77387 GUIDANCE FOR RADJ TX DLVR: CPT | Performed by: RADIOLOGY

## 2017-07-26 PROCEDURE — 77295 3-D RADIOTHERAPY PLAN: CPT | Performed by: RADIOLOGY

## 2017-07-26 PROCEDURE — 77300 RADIATION THERAPY DOSE PLAN: CPT | Performed by: RADIOLOGY

## 2017-07-26 PROCEDURE — 77334 RADIATION TREATMENT AID(S): CPT | Performed by: RADIOLOGY

## 2017-07-26 PROCEDURE — 77412 RADIATION TX DELIVERY LVL 3: CPT | Performed by: RADIOLOGY

## 2017-07-27 PROCEDURE — 77412 RADIATION TX DELIVERY LVL 3: CPT | Performed by: RADIOLOGY

## 2017-07-27 PROCEDURE — 77387 GUIDANCE FOR RADJ TX DLVR: CPT | Performed by: RADIOLOGY

## 2017-07-27 PROCEDURE — 77331 SPECIAL RADIATION DOSIMETRY: CPT | Performed by: RADIOLOGY

## 2017-07-28 ENCOUNTER — ALLSCRIPTS OFFICE VISIT (OUTPATIENT)
Dept: OTHER | Facility: OTHER | Age: 67
End: 2017-07-28

## 2017-07-28 ENCOUNTER — TRANSCRIBE ORDERS (OUTPATIENT)
Dept: ADMINISTRATIVE | Facility: HOSPITAL | Age: 67
End: 2017-07-28

## 2017-07-28 DIAGNOSIS — C61 MALIGNANT NEOPLASM OF PROSTATE (HCC): Primary | ICD-10-CM

## 2017-07-28 PROCEDURE — 77387 GUIDANCE FOR RADJ TX DLVR: CPT | Performed by: RADIOLOGY

## 2017-07-28 PROCEDURE — 77412 RADIATION TX DELIVERY LVL 3: CPT | Performed by: RADIOLOGY

## 2017-07-31 PROCEDURE — 77412 RADIATION TX DELIVERY LVL 3: CPT | Performed by: RADIOLOGY

## 2017-07-31 PROCEDURE — 77387 GUIDANCE FOR RADJ TX DLVR: CPT | Performed by: RADIOLOGY

## 2017-08-01 ENCOUNTER — APPOINTMENT (OUTPATIENT)
Dept: RADIATION ONCOLOGY | Facility: CLINIC | Age: 67
End: 2017-08-01
Attending: RADIOLOGY
Payer: MEDICARE

## 2017-08-01 PROCEDURE — 77412 RADIATION TX DELIVERY LVL 3: CPT | Performed by: RADIOLOGY

## 2017-08-01 PROCEDURE — 77387 GUIDANCE FOR RADJ TX DLVR: CPT | Performed by: RADIOLOGY

## 2017-08-01 PROCEDURE — 77336 RADIATION PHYSICS CONSULT: CPT | Performed by: RADIOLOGY

## 2017-08-02 PROCEDURE — 77387 GUIDANCE FOR RADJ TX DLVR: CPT | Performed by: RADIOLOGY

## 2017-08-02 PROCEDURE — 77412 RADIATION TX DELIVERY LVL 3: CPT | Performed by: RADIOLOGY

## 2017-08-03 PROCEDURE — 77412 RADIATION TX DELIVERY LVL 3: CPT | Performed by: RADIOLOGY

## 2017-08-03 PROCEDURE — 77387 GUIDANCE FOR RADJ TX DLVR: CPT | Performed by: RADIOLOGY

## 2017-08-15 ENCOUNTER — APPOINTMENT (OUTPATIENT)
Dept: LAB | Facility: MEDICAL CENTER | Age: 67
End: 2017-08-15
Payer: MEDICARE

## 2017-08-15 DIAGNOSIS — C79.51 SECONDARY MALIGNANT NEOPLASM OF BONE (HCC): ICD-10-CM

## 2017-08-15 DIAGNOSIS — D49.2 NEOPLASM OF UNSPECIFIED BEHAVIOR OF BONE, SOFT TISSUE, AND SKIN: ICD-10-CM

## 2017-08-15 DIAGNOSIS — C64.2 MALIGNANT NEOPLASM OF LEFT KIDNEY, EXCEPT RENAL PELVIS (HCC): ICD-10-CM

## 2017-08-15 DIAGNOSIS — C78.00 SECONDARY MALIGNANT NEOPLASM OF LUNG (HCC): ICD-10-CM

## 2017-08-15 DIAGNOSIS — C61 MALIGNANT NEOPLASM OF PROSTATE (HCC): ICD-10-CM

## 2017-08-15 LAB
ALBUMIN SERPL BCP-MCNC: 3.2 G/DL (ref 3.5–5)
ALP SERPL-CCNC: 94 U/L (ref 46–116)
ALT SERPL W P-5'-P-CCNC: 17 U/L (ref 12–78)
ANION GAP SERPL CALCULATED.3IONS-SCNC: 7 MMOL/L (ref 4–13)
AST SERPL W P-5'-P-CCNC: 18 U/L (ref 5–45)
BASOPHILS # BLD AUTO: 0.01 THOUSANDS/ΜL (ref 0–0.1)
BASOPHILS NFR BLD AUTO: 0 % (ref 0–1)
BILIRUB SERPL-MCNC: 0.71 MG/DL (ref 0.2–1)
BUN SERPL-MCNC: 16 MG/DL (ref 5–25)
CALCIUM SERPL-MCNC: 9.8 MG/DL (ref 8.3–10.1)
CHLORIDE SERPL-SCNC: 106 MMOL/L (ref 100–108)
CO2 SERPL-SCNC: 28 MMOL/L (ref 21–32)
CREAT SERPL-MCNC: 1.17 MG/DL (ref 0.6–1.3)
EOSINOPHIL # BLD AUTO: 0.12 THOUSAND/ΜL (ref 0–0.61)
EOSINOPHIL NFR BLD AUTO: 1 % (ref 0–6)
ERYTHROCYTE [DISTWIDTH] IN BLOOD BY AUTOMATED COUNT: 15.9 % (ref 11.6–15.1)
GFR SERPL CREATININE-BSD FRML MDRD: 64 ML/MIN/1.73SQ M
GLUCOSE SERPL-MCNC: 91 MG/DL (ref 65–140)
HCT VFR BLD AUTO: 47.4 % (ref 36.5–49.3)
HGB BLD-MCNC: 15.8 G/DL (ref 12–17)
LDH SERPL-CCNC: 187 U/L (ref 81–234)
LYMPHOCYTES # BLD AUTO: 3.17 THOUSANDS/ΜL (ref 0.6–4.47)
LYMPHOCYTES NFR BLD AUTO: 30 % (ref 14–44)
MCH RBC QN AUTO: 28.8 PG (ref 26.8–34.3)
MCHC RBC AUTO-ENTMCNC: 33.3 G/DL (ref 31.4–37.4)
MCV RBC AUTO: 87 FL (ref 82–98)
MONOCYTES # BLD AUTO: 0.77 THOUSAND/ΜL (ref 0.17–1.22)
MONOCYTES NFR BLD AUTO: 7 % (ref 4–12)
NEUTROPHILS # BLD AUTO: 6.43 THOUSANDS/ΜL (ref 1.85–7.62)
NEUTS SEG NFR BLD AUTO: 62 % (ref 43–75)
NRBC BLD AUTO-RTO: 0 /100 WBCS
PLATELET # BLD AUTO: 165 THOUSANDS/UL (ref 149–390)
PMV BLD AUTO: 10.7 FL (ref 8.9–12.7)
POTASSIUM SERPL-SCNC: 3.6 MMOL/L (ref 3.5–5.3)
PROT SERPL-MCNC: 7.2 G/DL (ref 6.4–8.2)
RBC # BLD AUTO: 5.48 MILLION/UL (ref 3.88–5.62)
SODIUM SERPL-SCNC: 141 MMOL/L (ref 136–145)
WBC # BLD AUTO: 10.52 THOUSAND/UL (ref 4.31–10.16)

## 2017-08-15 PROCEDURE — 83615 LACTATE (LD) (LDH) ENZYME: CPT

## 2017-08-15 PROCEDURE — 36415 COLL VENOUS BLD VENIPUNCTURE: CPT

## 2017-08-15 PROCEDURE — 80053 COMPREHEN METABOLIC PANEL: CPT

## 2017-08-15 PROCEDURE — 85025 COMPLETE CBC W/AUTO DIFF WBC: CPT

## 2017-08-28 DIAGNOSIS — C78.00 SECONDARY MALIGNANT NEOPLASM OF LUNG (HCC): ICD-10-CM

## 2017-08-28 DIAGNOSIS — C61 MALIGNANT NEOPLASM OF PROSTATE (HCC): ICD-10-CM

## 2017-08-28 DIAGNOSIS — C64.2 MALIGNANT NEOPLASM OF LEFT KIDNEY, EXCEPT RENAL PELVIS (HCC): ICD-10-CM

## 2017-08-28 DIAGNOSIS — D49.2 NEOPLASM OF UNSPECIFIED BEHAVIOR OF BONE, SOFT TISSUE, AND SKIN: ICD-10-CM

## 2017-08-28 DIAGNOSIS — C79.51 SECONDARY MALIGNANT NEOPLASM OF BONE (HCC): ICD-10-CM

## 2017-09-05 ENCOUNTER — HOSPITAL ENCOUNTER (OUTPATIENT)
Dept: CT IMAGING | Facility: HOSPITAL | Age: 67
Discharge: HOME | End: 2017-09-05
Attending: INTERNAL MEDICINE
Payer: MEDICARE

## 2017-09-05 DIAGNOSIS — C79.51 SECONDARY MALIGNANT NEOPLASM OF BONE (HCC): ICD-10-CM

## 2017-09-05 DIAGNOSIS — C61 MALIGNANT NEOPLASM OF PROSTATE (HCC): ICD-10-CM

## 2017-09-05 DIAGNOSIS — D49.2 NEOPLASM OF UNSPECIFIED BEHAVIOR OF BONE, SOFT TISSUE, AND SKIN: ICD-10-CM

## 2017-09-05 DIAGNOSIS — C78.00 SECONDARY MALIGNANT NEOPLASM OF LUNG (HCC): ICD-10-CM

## 2017-09-05 DIAGNOSIS — C64.2 MALIGNANT NEOPLASM OF LEFT KIDNEY, EXCEPT RENAL PELVIS (HCC): ICD-10-CM

## 2017-09-05 PROCEDURE — 74177 CT ABD & PELVIS W/CONTRAST: CPT

## 2017-09-05 PROCEDURE — 71260 CT THORAX DX C+: CPT

## 2017-09-05 RX ADMIN — IODIXANOL 100 ML: 320 INJECTION, SOLUTION INTRAVASCULAR at 18:56

## 2017-09-12 ENCOUNTER — ALLSCRIPTS OFFICE VISIT (OUTPATIENT)
Dept: OTHER | Facility: OTHER | Age: 67
End: 2017-09-12

## 2017-09-13 ENCOUNTER — APPOINTMENT (OUTPATIENT)
Dept: RADIATION ONCOLOGY | Facility: CLINIC | Age: 67
End: 2017-09-13
Attending: RADIOLOGY
Payer: MEDICARE

## 2017-09-13 ENCOUNTER — GENERIC CONVERSION - ENCOUNTER (OUTPATIENT)
Dept: OTHER | Facility: OTHER | Age: 67
End: 2017-09-13

## 2017-09-13 PROCEDURE — 99214 OFFICE O/P EST MOD 30 MIN: CPT | Performed by: RADIOLOGY

## 2017-09-27 ENCOUNTER — GENERIC CONVERSION - ENCOUNTER (OUTPATIENT)
Dept: OTHER | Facility: OTHER | Age: 67
End: 2017-09-27

## 2017-09-27 ENCOUNTER — TRANSCRIBE ORDERS (OUTPATIENT)
Dept: ADMINISTRATIVE | Facility: HOSPITAL | Age: 67
End: 2017-09-27

## 2017-09-27 ENCOUNTER — ALLSCRIPTS OFFICE VISIT (OUTPATIENT)
Dept: OTHER | Facility: OTHER | Age: 67
End: 2017-09-27

## 2017-09-27 ENCOUNTER — TRANSCRIBE ORDERS (OUTPATIENT)
Dept: RADIATION ONCOLOGY | Facility: HOSPITAL | Age: 67
End: 2017-09-27

## 2017-09-27 ENCOUNTER — APPOINTMENT (OUTPATIENT)
Dept: RADIATION ONCOLOGY | Facility: HOSPITAL | Age: 67
End: 2017-09-27
Attending: RADIOLOGY
Payer: MEDICARE

## 2017-09-27 DIAGNOSIS — C79.52 SECONDARY MALIGNANT NEOPLASM OF BONE AND BONE MARROW (HCC): Primary | ICD-10-CM

## 2017-09-27 DIAGNOSIS — C79.51 SECONDARY MALIGNANT NEOPLASM OF BONE AND BONE MARROW (HCC): Primary | ICD-10-CM

## 2017-09-27 PROCEDURE — 99215 OFFICE O/P EST HI 40 MIN: CPT | Performed by: RADIOLOGY

## 2017-10-03 ENCOUNTER — APPOINTMENT (OUTPATIENT)
Dept: RADIATION ONCOLOGY | Facility: HOSPITAL | Age: 67
End: 2017-10-03
Attending: RADIOLOGY
Payer: MEDICARE

## 2017-10-03 ENCOUNTER — GENERIC CONVERSION - ENCOUNTER (OUTPATIENT)
Dept: OTHER | Facility: OTHER | Age: 67
End: 2017-10-03

## 2017-10-03 PROCEDURE — 77334 RADIATION TREATMENT AID(S): CPT | Performed by: RADIOLOGY

## 2017-10-04 ENCOUNTER — HOSPITAL ENCOUNTER (OUTPATIENT)
Dept: RADIOLOGY | Facility: HOSPITAL | Age: 67
Discharge: HOME | End: 2017-10-04
Attending: RADIOLOGY
Payer: MEDICARE

## 2017-10-04 ENCOUNTER — HOSPITAL ENCOUNTER (OUTPATIENT)
Dept: RADIOLOGY | Facility: HOSPITAL | Age: 67
Setting detail: RADIATION/ONCOLOGY SERIES
Discharge: HOME | End: 2017-10-04
Attending: RADIOLOGY
Payer: MEDICARE

## 2017-10-04 ENCOUNTER — HOSPITAL ENCOUNTER (OUTPATIENT)
Dept: RADIOLOGY | Facility: HOSPITAL | Age: 67
Discharge: HOME | End: 2017-10-04
Attending: RADIOLOGY | Admitting: RADIOLOGY
Payer: MEDICARE

## 2017-10-04 VITALS
SYSTOLIC BLOOD PRESSURE: 153 MMHG | HEART RATE: 86 BPM | RESPIRATION RATE: 20 BRPM | DIASTOLIC BLOOD PRESSURE: 93 MMHG | BODY MASS INDEX: 43.67 KG/M2 | TEMPERATURE: 99.4 F | OXYGEN SATURATION: 93 % | WEIGHT: 305 LBS | HEIGHT: 70 IN

## 2017-10-04 DIAGNOSIS — C79.51 SECONDARY MALIGNANT NEOPLASM OF BONE AND BONE MARROW (HCC): ICD-10-CM

## 2017-10-04 DIAGNOSIS — C79.52 SECONDARY MALIGNANT NEOPLASM OF BONE AND BONE MARROW (HCC): ICD-10-CM

## 2017-10-04 DIAGNOSIS — C79.51 SECONDARY MALIGNANT NEOPLASM OF BONE (HCC): ICD-10-CM

## 2017-10-04 LAB
APTT PPP: 32 SECONDS (ref 23–35)
INR PPP: 0.91 (ref 0.86–1.16)
PLATELET # BLD AUTO: 172 THOUSANDS/UL (ref 149–390)
PMV BLD AUTO: 10.3 FL (ref 8.9–12.7)
PROTHROMBIN TIME: 12.2 SECONDS (ref 12.1–14.4)

## 2017-10-04 PROCEDURE — 77014 HB CT SCAN FOR THERAPY GUIDE: CPT

## 2017-10-04 PROCEDURE — 72128 CT CHEST SPINE W/O DYE: CPT

## 2017-10-04 PROCEDURE — 85049 AUTOMATED PLATELET COUNT: CPT | Performed by: PHYSICIAN ASSISTANT

## 2017-10-04 PROCEDURE — 85730 THROMBOPLASTIN TIME PARTIAL: CPT | Performed by: PHYSICIAN ASSISTANT

## 2017-10-04 PROCEDURE — 62303 MYELOGRAPHY LUMBAR INJECTION: CPT

## 2017-10-04 PROCEDURE — 85610 PROTHROMBIN TIME: CPT | Performed by: PHYSICIAN ASSISTANT

## 2017-10-04 RX ORDER — CHOLECALCIFEROL (VITAMIN D3) 125 MCG
2000 CAPSULE ORAL DAILY
COMMUNITY
End: 2018-01-12

## 2017-10-04 RX ORDER — ACETAMINOPHEN 325 MG/1
650 TABLET ORAL EVERY 4 HOURS PRN
Status: DISCONTINUED | OUTPATIENT
Start: 2017-10-04 | End: 2017-10-05 | Stop reason: HOSPADM

## 2017-10-04 RX ORDER — TERAZOSIN 5 MG/1
5 CAPSULE ORAL
COMMUNITY
End: 2018-01-12

## 2017-10-04 RX ORDER — AMLODIPINE BESYLATE 5 MG/1
5 TABLET ORAL DAILY
COMMUNITY

## 2017-10-04 RX ORDER — SODIUM CHLORIDE 9 MG/ML
25 INJECTION, SOLUTION INTRAVENOUS CONTINUOUS
Status: DISCONTINUED | OUTPATIENT
Start: 2017-10-04 | End: 2017-10-05 | Stop reason: HOSPADM

## 2017-10-04 RX ORDER — LISINOPRIL AND HYDROCHLOROTHIAZIDE 20; 12.5 MG/1; MG/1
1 TABLET ORAL 2 TIMES DAILY
COMMUNITY

## 2017-10-04 RX ORDER — GABAPENTIN 100 MG/1
300 CAPSULE ORAL 3 TIMES DAILY
COMMUNITY

## 2017-10-04 RX ADMIN — SODIUM CHLORIDE 25 ML/HR: 0.9 INJECTION, SOLUTION INTRAVENOUS at 12:59

## 2017-10-04 RX ADMIN — IOHEXOL 13 ML: 240 INJECTION, SOLUTION INTRATHECAL; INTRAVASCULAR; INTRAVENOUS; ORAL at 14:00

## 2017-10-04 NOTE — DISCHARGE INSTRUCTIONS
Myelogram   WHAT YOU NEED TO KNOW:   Myelogram, also called myelography, is a procedure that uses an x-ray to examine your spinal canal  Contrast dye is used to help healthcare providers see your nerves, bones, or spinal cord more clearly  DISCHARGE INSTRUCTIONS:   Follow up with your healthcare provider or specialist as directed:  Write down your questions so you remember to ask them during your visits  Drink liquids as directed:  Liquids will help flush the contrast dye out of your body  Ask how much liquid to drink each day, and which liquids to drink  Some foods, such as soup and fruit, also provide liquid  Contact your healthcare provider or specialist if:   · You have bleeding or a discharge coming from where the needle was put into your back  · You have severe neck or back pain  · You have a headache or nausea that does not go away with rest and medicine  · You feel anxious or irritable  · You have questions or concerns about your condition or care  Seek care immediately or call 911 if:   · You have a stiff neck or have trouble thinking clearly  · You have numbness, tingling, or weakness anywhere below your waist     · You have a severe headache that does not get better  · You have a fever  © 2017 [employer]  Information is for End User's use only and may not be sold, redistributed or otherwise used for commercial purposes  All illustrations and images included in CareNotes® are the copyrighted property of [employer]  or Isaiah Sánchez  The above information is an  only  It is not intended as medical advice for individual conditions or treatments  Talk to your doctor, nurse or pharmacist before following any medical regimen to see if it is safe and effective for you

## 2017-10-05 ENCOUNTER — TRANSCRIBE ORDERS (OUTPATIENT)
Dept: ADMINISTRATIVE | Facility: HOSPITAL | Age: 67
End: 2017-10-05

## 2017-10-05 ENCOUNTER — HOSPITAL ENCOUNTER (OUTPATIENT)
Dept: MRI IMAGING | Facility: HOSPITAL | Age: 67
Discharge: HOME | End: 2017-10-05
Attending: RADIOLOGY
Payer: MEDICARE

## 2017-10-05 DIAGNOSIS — C79.51 SECONDARY MALIGNANT NEOPLASM OF BONE AND BONE MARROW (HCC): Primary | ICD-10-CM

## 2017-10-05 DIAGNOSIS — C79.52 SECONDARY MALIGNANT NEOPLASM OF BONE AND BONE MARROW (HCC): ICD-10-CM

## 2017-10-05 DIAGNOSIS — C79.52 SECONDARY MALIGNANT NEOPLASM OF BONE AND BONE MARROW (HCC): Primary | ICD-10-CM

## 2017-10-05 DIAGNOSIS — C79.51 SECONDARY MALIGNANT NEOPLASM OF BONE AND BONE MARROW (HCC): ICD-10-CM

## 2017-10-09 ENCOUNTER — HOSPITAL ENCOUNTER (OUTPATIENT)
Dept: RADIOLOGY | Facility: HOSPITAL | Age: 67
Discharge: HOME | End: 2017-10-09
Attending: RADIOLOGY
Payer: MEDICARE

## 2017-10-09 DIAGNOSIS — C61 MALIGNANT NEOPLASM OF PROSTATE (HCC): ICD-10-CM

## 2017-10-09 DIAGNOSIS — C64.2 MALIGNANT NEOPLASM OF LEFT KIDNEY, EXCEPT RENAL PELVIS (HCC): ICD-10-CM

## 2017-10-09 DIAGNOSIS — C79.51 SECONDARY MALIGNANT NEOPLASM OF BONE AND BONE MARROW (HCC): ICD-10-CM

## 2017-10-09 DIAGNOSIS — C79.52 SECONDARY MALIGNANT NEOPLASM OF BONE AND BONE MARROW (HCC): ICD-10-CM

## 2017-10-09 DIAGNOSIS — C78.00 SECONDARY MALIGNANT NEOPLASM OF LUNG (HCC): ICD-10-CM

## 2017-10-09 DIAGNOSIS — C79.51 SECONDARY MALIGNANT NEOPLASM OF BONE (HCC): ICD-10-CM

## 2017-10-09 DIAGNOSIS — D49.2 NEOPLASM OF UNSPECIFIED BEHAVIOR OF BONE, SOFT TISSUE, AND SKIN: ICD-10-CM

## 2017-10-09 PROCEDURE — 72146 MRI CHEST SPINE W/O DYE: CPT

## 2017-10-10 PROCEDURE — 77370 RADIATION PHYSICS CONSULT: CPT | Performed by: RADIOLOGY

## 2017-10-16 PROCEDURE — 77334 RADIATION TREATMENT AID(S): CPT | Performed by: RADIOLOGY

## 2017-10-16 PROCEDURE — 77370 RADIATION PHYSICS CONSULT: CPT | Performed by: RADIOLOGY

## 2017-10-16 PROCEDURE — 77300 RADIATION THERAPY DOSE PLAN: CPT | Performed by: RADIOLOGY

## 2017-10-16 PROCEDURE — 77295 3-D RADIOTHERAPY PLAN: CPT | Performed by: RADIOLOGY

## 2017-10-18 ENCOUNTER — OFFICE VISIT (OUTPATIENT)
Dept: RADIATION ONCOLOGY | Facility: HOSPITAL | Age: 67
End: 2017-10-18
Attending: RADIOLOGY
Payer: MEDICARE

## 2017-10-18 DIAGNOSIS — C79.51 SECONDARY MALIGNANT NEOPLASM OF BONE (HCC): ICD-10-CM

## 2017-10-18 PROCEDURE — 77373 STRTCTC BDY RAD THER TX DLVR: CPT | Performed by: RADIOLOGY

## 2017-10-18 NOTE — PROGRESS NOTES
27-year-old gentleman with known metastatic renal cell CA  Serial imaging demonstrates enlarging T6 metastatic lesion despite systemic therapy  Patient continues to deny any significant back pain  He denies pain, weakness in his legs and no change in his longstanding peripheral neuropathy  He denies any difficulties with bowel bladder function  On examination he has full power in lower extremities  No tenderness to palpation along midline thoracic and lumbar spine  Full range of motion on flexion-extension without pain  Explained to patient that while the T6 lesion appears to be asymptomatic at present, it could result in neurological compromise and mechanical back pain should continue to progress  Risk benefits and alternatives to spinal stereotactic radiotherapy were reviewed in detail with the patient  The goal is to prevent progression of disease at T6 sub does not become symptomatic  Risk of the procedure including, but not limited to vertebral body fracture and spinal myelopathy were reviewed in detail  All his questions were answered to his satisfaction  He would like to proceed with planned SBRT to T6 vertebral body

## 2017-10-18 NOTE — OP NOTE
OPERATIVE REPORT    SURGERY DATE: Oct 18 2017    PATIENT NAME: Kellee Amin  : 1950  MRN: [id number]  PROCEDURE DATE: 10/18/2017    Stereotactic Body Radiotherapy (SBRT) Operative Note    Preop Diagnosis: T6 metastasis from renal cell CA    Postop Diagnosis: Same    Procedure Details: Stereotactic Body Radiotherapy for T6 metastasis    Surgeon: Yokasta Allen MD     Assistants: none    No qualified resident was available to assist with this case  Radiation Oncologist(s): Barbra Quiroz MD    Estimated Blood Loss:  None           Specimens: None    Drains: None           Total IV Fluids: None              Findings: As above  Complications:  None    Anesthesia: None      INDICATIONS    75 y o  male with a history of metastatic renal cell CA  I personally had a discussion of risks, benefits, and alternatives of various treatment options with the patient  Options discussed included but were not limited to surgery, radiation therapy chemotherapy alone, and combinations of the above  Risks specific to SBRT were reviewed, including but not limited to cord myelopathy, vertebral body fracture/collapse, etc  The patient wished to proceed with SBRT, and consent was obtained  DETAILS OF PROCEDURE    The patient presented to the outpatient area of the Department of Radiation Oncology where a body fix immobilization device was created  The patient then underwent a CT with myelogram while immobilized in the body fix device  The patient was then released from the Department      The patients stereotactic CT and preoperative MRI scans were fused in the SBRT planning software, which was used to develop the SBRT plan  Inverse planning was used with certain organs at risk (OARs), such as the spinal cord      The SBRT prescription called for a dose of 30 Gray to be delivered to the PTV in 5 fractions   The PTV was created by expanding the CTV (volume of the T6 vertebral body) by 2 mm except where it incurred organs at risk  The CTV was generated by expanding the GTV, to include contiguous bony structures, utilizing spine SRT guidelines  The GTV and CTV were contoured by myself and the Radiation Oncologist  The SBRT plan utilized the mini-multileaf columnator on the TrueBeam machine at [facility]       When the final treatment plan had been developed and approved by myself, the radiation oncologist and physicist, the patient returned to the Department for their first frameless SBRT treatment       The patient was positioned on the treatment couch  The patient was immobilized in their body fix device  kV and then cone beam CT imaging was used to align the patient  Once the radiation oncologist, physicist and I agreed the patient was in correct position, the fields were treated sequentially without complications       When the first SBRT treatment had been delivered, the patient was recovered from the treatment room, scheduled for their remaining SBRT treatments, and was discharged from the department  There was no blood loss and no specimen        SIGNATURE: Yasmani Medrano MD  DATE: 10/18/2017   TIME: 12:40 PM

## 2017-10-20 PROCEDURE — 77373 STRTCTC BDY RAD THER TX DLVR: CPT | Performed by: RADIOLOGY

## 2017-10-23 PROCEDURE — 77373 STRTCTC BDY RAD THER TX DLVR: CPT | Performed by: RADIOLOGY

## 2017-10-25 PROCEDURE — 77373 STRTCTC BDY RAD THER TX DLVR: CPT | Performed by: RADIOLOGY

## 2017-10-26 ENCOUNTER — TRANSCRIBE ORDERS (OUTPATIENT)
Dept: ADMINISTRATIVE | Facility: HOSPITAL | Age: 67
End: 2017-10-26

## 2017-10-26 ENCOUNTER — APPOINTMENT (OUTPATIENT)
Dept: LAB | Facility: MEDICAL CENTER | Age: 67
End: 2017-10-26
Payer: MEDICARE

## 2017-10-26 DIAGNOSIS — C61 MALIGNANT NEOPLASM OF PROSTATE (HCC): ICD-10-CM

## 2017-10-26 DIAGNOSIS — C79.51 SECONDARY MALIGNANT NEOPLASM OF BONE (HCC): ICD-10-CM

## 2017-10-26 DIAGNOSIS — C64.2 MALIGNANT NEOPLASM OF LEFT KIDNEY, EXCEPT RENAL PELVIS (HCC): ICD-10-CM

## 2017-10-26 DIAGNOSIS — C78.00 SECONDARY MALIGNANT NEOPLASM OF LUNG (HCC): ICD-10-CM

## 2017-10-26 DIAGNOSIS — D49.2 NEOPLASM OF UNSPECIFIED BEHAVIOR OF BONE, SOFT TISSUE, AND SKIN: ICD-10-CM

## 2017-10-26 DIAGNOSIS — C61 MALIGNANT NEOPLASM OF PROSTATE (HCC): Primary | ICD-10-CM

## 2017-10-26 LAB
ALBUMIN SERPL BCP-MCNC: 3.1 G/DL (ref 3.5–5)
ALP SERPL-CCNC: 88 U/L (ref 46–116)
ALT SERPL W P-5'-P-CCNC: 17 U/L (ref 12–78)
ANION GAP SERPL CALCULATED.3IONS-SCNC: 6 MMOL/L (ref 4–13)
AST SERPL W P-5'-P-CCNC: 17 U/L (ref 5–45)
BASOPHILS # BLD AUTO: 0.01 THOUSANDS/ΜL (ref 0–0.1)
BASOPHILS NFR BLD AUTO: 0 % (ref 0–1)
BILIRUB SERPL-MCNC: 0.61 MG/DL (ref 0.2–1)
BUN SERPL-MCNC: 12 MG/DL (ref 5–25)
CALCIUM SERPL-MCNC: 9.8 MG/DL (ref 8.3–10.1)
CHLORIDE SERPL-SCNC: 103 MMOL/L (ref 100–108)
CO2 SERPL-SCNC: 33 MMOL/L (ref 21–32)
CREAT SERPL-MCNC: 1.28 MG/DL (ref 0.6–1.3)
EOSINOPHIL # BLD AUTO: 0.15 THOUSAND/ΜL (ref 0–0.61)
EOSINOPHIL NFR BLD AUTO: 2 % (ref 0–6)
ERYTHROCYTE [DISTWIDTH] IN BLOOD BY AUTOMATED COUNT: 15.4 % (ref 11.6–15.1)
GFR SERPL CREATININE-BSD FRML MDRD: 57 ML/MIN/1.73SQ M
GLUCOSE SERPL-MCNC: 89 MG/DL (ref 65–140)
HCT VFR BLD AUTO: 50.4 % (ref 36.5–49.3)
HGB BLD-MCNC: 16.8 G/DL (ref 12–17)
LYMPHOCYTES # BLD AUTO: 3.18 THOUSANDS/ΜL (ref 0.6–4.47)
LYMPHOCYTES NFR BLD AUTO: 35 % (ref 14–44)
MCH RBC QN AUTO: 28.6 PG (ref 26.8–34.3)
MCHC RBC AUTO-ENTMCNC: 33.3 G/DL (ref 31.4–37.4)
MCV RBC AUTO: 86 FL (ref 82–98)
MONOCYTES # BLD AUTO: 0.62 THOUSAND/ΜL (ref 0.17–1.22)
MONOCYTES NFR BLD AUTO: 7 % (ref 4–12)
NEUTROPHILS # BLD AUTO: 5.13 THOUSANDS/ΜL (ref 1.85–7.62)
NEUTS SEG NFR BLD AUTO: 56 % (ref 43–75)
NRBC BLD AUTO-RTO: 0 /100 WBCS
PLATELET # BLD AUTO: 160 THOUSANDS/UL (ref 149–390)
PMV BLD AUTO: 10.5 FL (ref 8.9–12.7)
POTASSIUM SERPL-SCNC: 4.1 MMOL/L (ref 3.5–5.3)
PROT SERPL-MCNC: 7.5 G/DL (ref 6.4–8.2)
RBC # BLD AUTO: 5.87 MILLION/UL (ref 3.88–5.62)
SODIUM SERPL-SCNC: 142 MMOL/L (ref 136–145)
WBC # BLD AUTO: 9.11 THOUSAND/UL (ref 4.31–10.16)

## 2017-10-26 PROCEDURE — 85025 COMPLETE CBC W/AUTO DIFF WBC: CPT

## 2017-10-26 PROCEDURE — 36415 COLL VENOUS BLD VENIPUNCTURE: CPT

## 2017-10-26 PROCEDURE — 80053 COMPREHEN METABOLIC PANEL: CPT

## 2017-10-26 NOTE — PROGRESS NOTES
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)    Plan  Adenocarcinoma of prostate, Bone metastasis, Clear cell adenocarcinoma of left kidney,  Metastatic malignant neoplasm to lung    · From  Inlyta 5 MG Oral Tablet Take 1 tablet twice daily To Inlyta 1 MG Oral  Tablet TAKE 4 TABLET Twice daily   Rx By: Iman Stewart; Dispense: 30 Days ; #:240 Tablet; Refill: 1;For: Adenocarcinoma of prostate, Bone metastasis, Clear cell adenocarcinoma of left kidney, Metastatic malignant neoplasm to lung; NINA = N; Print Rx  Adenocarcinoma of prostate, Bone metastasis, Clear cell adenocarcinoma of left kidney,  Metastatic malignant neoplasm to lung, Neoplasm of right ulna    · (1) CBC/PLT/DIFF; Status:Active; Requested NMF:[id number];    Perform:[facility]; ENM:[id number]; Ordered; For:Adenocarcinoma of prostate, Bone metastasis, Clear cell adenocarcinoma of left kidney, Metastatic malignant neoplasm to lung, Neoplasm of right ulna; Ordered By:Ashli Sheldon Muse;   · (1) CBC/PLT/DIFF; Status:Active; Requested for:[id number];    Perform:[facility]; Due:60Ado3577; Ordered; For:Adenocarcinoma of prostate, Bone metastasis, Clear cell adenocarcinoma of left kidney, Metastatic malignant neoplasm to lung, Neoplasm of right ulna; Ordered By:Ashli Sheldon Muse;   · (1) COMPREHENSIVE METABOLIC PANEL; Status:Active; Requested TMS:[id number];    Perform:[facility]; BSP:[id number]; Ordered; For:Adenocarcinoma of prostate, Bone metastasis, Clear cell adenocarcinoma of left kidney, Metastatic malignant neoplasm to lung, Neoplasm of right ulna; Ordered By:GiAshli burnham Muse;   · (1) COMPREHENSIVE METABOLIC PANEL; Status:Active;  Requested for:92Aka6857;    Perform:[facility]; Due:25Tvj8749; Ordered; For:Adenocarcinoma of prostate, Bone metastasis, Clear cell adenocarcinoma of left kidney, Metastatic malignant neoplasm to lung, Neoplasm of right ulna; Ordered By:Alexis Sheldon;   · Follow-up visit in 6 weeks Evaluation and Treatment  Follow-up  Status: Complete  Done:  17IVA3786 10:36AM   Ordered; For: Adenocarcinoma of prostate, Bone metastasis, Clear cell adenocarcinoma of left kidney, Metastatic malignant neoplasm to lung, Neoplasm of right ulna; Ordered By: Celso Mckeon Performed:  Due: 63CCO1336; Last Updated By: Cecy Norris; 9/12/2017 10:38:05 AM    Discussion/Summary  Discussion Summary:   Harshad Huffman remains clinically asymptomatic of bilateral lung metastasis from metastatic clear-cell carcinoma of left kidney origin  He received 13 courses of Sutent from March 22, 2015 until December 2016  However, progression in size of lung nodules was noted on CT scan of the chest December 2, 2016 and enlargement of destructive lesion of the posterior right seventh rib  Axitinib 5 mg twice daily was initiated on March 8, 2017 and then the patient decreased the axitinib to 5 mg once daily on his own on March 19, 2017 in view of diffuse myalgias and excessive fatigue  Axitinib 3 mg twice a day was resumed on March 24, 2017 but then withheld for surgery of a pathologic fracture involving the mid right ulna on April 14, 2017  Axitinib was resumed on May 16, 2017 and then increased back to 5 mg twice daily as of June 16, 2017  In view of mild abdominal discomfort and intermittent semi-formed movement bowel movements about 2 per day, the axitinib is to be decreased to 4 mg twice a day  patient is to follow-up with Dr Carmen Ruvalcaba for potential SBRT to the new 1 8 cm lytic metastasis of the T6 vertebral body    and his son and daughter-in-law who were with him in the office today are aware to seek medical attention for cough, shortness of breath, nosebleeds, easy bruising, reduced appetite, loose bowel movements, progressive posterior neck pain, progressive right mid posterior chest pain, paresthesias of the upper extremities, difficulty with ambulation, easy fatigue, or if other new problems arise  Otherwise, I plan to see him again in 6 weeks  Counseling Documentation With Imm: The patient was counseled regarding diagnostic results,-- instructions for management,-- impressions  total time of encounter was 25 minutes-- and-- 12 5 minutes was spent counseling  Chief Complaint  Chief Complaint Free Text Note Form: Chelle Ceja was seen in followup today in regards to advanced kidney cancer with lung metastasis first noted in July 2012  History of Present Illness  HPI: He has been taking axitinib 5 mg twice daily since June 16, 2017  He has noted mid abdominal discomfort and rumbling intermittently and about 2 semi-formed bowel movements per day  SBRT to the T7 metastatic lesion is being considered  Previous Therapy:   1  Bilateral lung nodules, which have increased in size over the past 19 months compared to July 2012 and not present on CT scan in 2009, 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 noted on wedge resection on November 18, 2013  Sutent 13 courses from March 22, 2015 until December 2016  Pathologic fracture to the mid right ulna on April 3, 2017 and then surgery with Dr Iglesia Knight, orthopedic oncologist at [facility] on April 14, 2017 and then postoperative radiotherapy to the right ulna 3000 cGy in 10 fractions from May 17, 2017 to May 31, 2017  XRT to the right posterior ribs 2800 cGy in 7 fractions from July 26, 2017 to August 3, 2017  Left nephrectomy in August 2007 for renal cell carcinoma clear cell type, grade II-III, T1Nx  Prostate adenocarcinoma, Clarkton score 3+4=7, involving both lobes of the prostate gland, pT2c pN0 , no metastasis to three left pelvic lymph nodes  Current Therapy: Axitinib initiated March 8, 2017     Interval History: history: 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   Review of Systems  Complete-Male:   Constitutional: He has been feeling well  ENT: no nosebleeds  Cardiovascular: no chest pain-- and-- no extremity edema  Respiratory: no shortness of breath-- and-- no cough  Gastrointestinal: His appetite has been good  , but-- no abdominal pain,-- no constipation-- and-- no diarrhea  Musculoskeletal: There is arthritis of the knees bilaterally  He denies back pain  , but-- as noted in HPI  Integumentary: no rashes  Neurological: There is a chronic left foot drop , but-- no headache  Psychiatric: no emotional problems  Hematologic/Lymphatic: no tendency for easy bruising  Active Problems  1  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) (C61)   2  Arthritis of left knee (716 96) (M17 12)   3  Bone metastasis (198 5) (C79 51)   4  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)   5  Fatigue (780 79) (R53 83)   6  Metastatic malignant neoplasm to lung (197 0) (C78 00)   7  Multinodular goiter (nontoxic) (241 1) (E04 2)   8  Myalgia (729 1) (M79 1)   9  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)   10  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)    Past Medical History  1  History of radiation therapy (V15 3) (Z92 3)   2  History of Posterior neck pain (723 1) (M54 2)    Surgical History  1  History of Knee Replacement   2  History of Lung Surgery   3  History of Nephrectomy Left   4  History of Prostatectomy Radical    Family History  Mother    1  Family history of Borderline diabetes (790 29) (R73 03)    Social History   · Alcohol use   · Alcohol use   · Former smoker (K36 99) (L70 763)   · Former smoker (V15 82) (S17 7)    Current Meds   1  AmLODIPine Besylate 5 MG Oral Tablet; Therapy: 49SIN3236 to (Evaluate:17Nov2014) Recorded   2  Gabapentin 300 MG Oral Capsule; Therapy: 42SPE4278 to (Evaluate:17Nov2014) Recorded   3   Inlyta 5 MG Oral Tablet; Take 1 tablet twice daily; Therapy: 69Zgt6366 to (Evaluate:07Cms0024); Last Rx:20Zov8571 Ordered   4  Klor-Con M20 20 MEQ Oral Tablet Extended Release; TAKE 1 TABLET DAILY; Therapy: (Recorded:10Apr2017) to Recorded   5  Lisinopril-Hydrochlorothiazide 20-12 5 MG Oral Tablet; Therapy: 14LOP8874 to (Evaluate:17Nov2014) Recorded   6  Oxycodone-Acetaminophen 5-325 MG Oral Tablet; TAKE 1 TABLET EVERY 6 HOURS AS   NEEDED FOR PAIN;   Therapy: 90BYR4524 to (Evaluate:29Apr2017); Last Rx:30Mar2017 Ordered   7  Terazosin HCl - 5 MG Oral Capsule; TAKE 1 CAPSULE AT BEDTIME NIGHTLY; Therapy: (Recorded:10Apr2017) to Recorded   8  Vitamin D3 2000 UNIT Oral Capsule; TAKE 1 CAPSULE Daily Recorded    Allergies  1  No Known Drug Allergies    Vitals  Vital Signs    Recorded: 12Sep2017 09:52AM   Temperature 97 9 F   Heart Rate 102   Respiration 18   Systolic 046   Diastolic 86   Height 5 ft 9 in   Weight 307 lb    BMI Calculated 45 34   BSA Calculated 2 48   O2 Saturation 93   Pain Scale 0     Physical Exam    Constitutional He appears well  Eyes EOMI  Ears, Nose, Mouth, and Throat Oropharynx is clear  Pulmonary   Auscultation of lungs: Clear to auscultation, equal breath sounds bilaterally, no wheezes, no rales, no rhonci  Cardiovascular Heart has regular rate and rhythm  -- No lower extremity edema  Abdomen   Abdomen: Non-tender, no masses  Liver and spleen: No hepatomegaly or splenomegaly  Lymphatic   Palpation of lymph nodes in neck: No lymphadenopathy  -- No axillary or inguinal lymphadenopathy  Musculoskeletal   Gait and station: Normal  -- The right forearm surgical site is well-healed  Skin No skin rash  Neurologic Neurological is grossly intact other than left foot drop     Psychiatric   Orientation to person, place and time: Normal     Mood and affect: Normal         ECOG 1       Results/Data  * CT CHEST ABDOMEN PELVIS W CONTRAST 05Sep2017 05:59PM Nafisa Cure Order Number: [id number] - Patient Instructions: ASHWIN JULIENMIKETIGRE   [address]     Test Name Result Flag Reference   CT CHEST ABDOMEN PELVIS W CONTRAST (Report)     CT CHEST, ABDOMEN AND PELVIS WITH IV CONTRAST     INDICATION: History of left renal cancer, prostate cancer, lung and bone metastasis     COMPARISON: 6/9/2017, 12/2/2016, and 6/22/2016     TECHNIQUE: CT examination of the chest, abdomen and pelvis was performed  Reformatted images were created in axial, sagittal, and coronal planes  Radiation dose length product (DLP) for this visit: 2348 mGy-cm   This examination, like all CT scans performed in the [facility], was performed utilizing techniques to minimize radiation dose exposure, including the use of iterative    reconstruction and automated exposure control  IV Contrast: 100 mL of iodixanol (VISIPAQUE)      Enteric Contrast: Enteric contrast was administered  FINDINGS:     CHEST     LUNGS: There is a right lower lobe metastasis measuring 1 1 x 1 3 cm, previously 1 6 x 2 0 cm  There is a left lower lobe nodule measuring 8 mm, previously 14 mm  There is a right upper lobe nodule measuring 1 cm on series 3, image 19, previously 13    mm  PLEURA: There is a pleural-based mass in the medial right upper lobe measuring 1 9 x 2 0 cm, previously 2 4 x 2 5 cm  There is a posterior right apical pleural-based nodule measuring 0 9 x 1 1 cm, previously 1 0 x 1 1 cm  There is an adjacent    irregular lung density measuring 6 mm, stable  HEART/GREAT VESSELS: Unremarkable for patient's age  MEDIASTINUM AND CHERRIE: Unremarkable  CHEST WALL AND LOWER NECK:    Multiple thyroid nodules are identified bilaterally  ABDOMEN     LIVER/BILIARY TREE: There is hepatic steatosis  GALLBLADDER: No calcified gallstones  No pericholecystic inflammatory change  SPLEEN: Unremarkable  PANCREAS: There is a stable subcentimeter pancreatic cyst along the pancreatic tail       ADRENAL GLANDS: There is stable thickening of the left adrenal gland  The right adrenal gland is unremarkable  KIDNEYS/URETERS: The patient is status post left nephrectomy  The right kidney is unremarkable  STOMACH AND BOWEL: Unremarkable  APPENDIX: No findings to suggest appendicitis  ABDOMINOPELVIC CAVITY: No ascites or free intraperitoneal air  No lymphadenopathy  VESSELS: Unremarkable for patient's age  PELVIS     REPRODUCTIVE ORGANS: Unremarkable for patient's age  URINARY BLADDER: Unremarkable  ABDOMINAL WALL/INGUINAL REGIONS: Unremarkable  OSSEOUS STRUCTURES: There is an expansile, destructive lesion in the right posterior 7th rib with soft tissue lesion measuring 2 9 x 4 1 cm, previously 3 3 x 4 4 cm  A smaller lesion is seen in the lateral 8th rib measuring 1 4 x 2 2 cm, previously 1 2   x 1 9 cm  There is a lytic lesion in the T6 vertebral body, increased in size from the prior study measuring 1 3 x 1 8 cm, previously 0 9 x 1 3 cm per There is a lytic lesion in the right iliac bone, new from the previous CT of the abdomen and pelvis    in 2016  IMPRESSION:     1  Improved pulmonary metastasis and expansile lesion in the posterior right 7th rib  2  Lytic lesions in the right lateral 8th rib in T6 vertebral body have mildly increased in size  A right iliac lytic lesion is from the previous CT in 2016  3  Multiple thyroid nodules       4  Stable subcentimeter pancreatic tail cyst              Workstation performed: [id number]     Signed by:   Martha Benoit MD   9/6/17     (1) CBC/PLT/DIFF [id number] 05:55PM Dipti Wilson Order Number: [id number]     Test Name Result Flag Reference   WBC COUNT 10 52 Thousand/uL H 4 31-10 16   RBC COUNT 5 48 Million/uL  3 88-5 62   HEMOGLOBIN 15 8 g/dL  12 0-17 0   HEMATOCRIT 47 4 %  36 5-49 3   MCV 87 fL  82-98   MCH 28 8 pg  26 8-34 3   MCHC 33 3 g/dL  31 4-37 4   RDW 15 9 % H 11 6-15 1   MPV 10 7 fL  8 9-12 7 PLATELET COUNT 233 Thousands/uL  149-390   nRBC AUTOMATED 0 /100 WBCs     NEUTROPHILS RELATIVE PERCENT 62 %  43-75   LYMPHOCYTES RELATIVE PERCENT 30 %  14-44   MONOCYTES RELATIVE PERCENT 7 %  4-12   EOSINOPHILS RELATIVE PERCENT 1 %  0-6   BASOPHILS RELATIVE PERCENT 0 %  0-1   NEUTROPHILS ABSOLUTE COUNT 6 43 Thousands/? ??L  1 85-7 62   LYMPHOCYTES ABSOLUTE COUNT 3 17 Thousands/? ??L  0 60-4 47   MONOCYTES ABSOLUTE COUNT 0 77 Thousand/? ??L  0 17-1 22   EOSINOPHILS ABSOLUTE COUNT 0 12 Thousand/? ??L  0 00-0 61   BASOPHILS ABSOLUTE COUNT 0 01 Thousands/? ??L  0 00-0 10     (1) COMPREHENSIVE METABOLIC PANEL [id number] 65:45XD Dipti Salvador Order Number: [id number]     Test Name Result Flag Reference   GLUCOSE,RANDM 91 mg/dL     If the patient is fasting, the ADA then defines impaired fasting glucose as > 100 mg/dL and diabetes as > or equal to 123 mg/dL  Specimen collection should occur prior to Sulfasalazine administration due to the potential for falsely depressed results  Specimen collection should occur prior to Sulfapyridine administration due to the potential for falsely elevated results  SODIUM 141 mmol/L  136-145   POTASSIUM 3 6 mmol/L  3 5-5 3   CHLORIDE 106 mmol/L  100-108   CARBON DIOXIDE 28 mmol/L  21-32   ANION GAP (CALC) 7 mmol/L  4-13   BLOOD UREA NITROGEN 16 mg/dL  5-25   CREATININE 1 17 mg/dL  0 60-1 30   Standardized to IDMS reference method   CALCIUM 9 8 mg/dL  8 3-10 1   BILI, TOTAL 0 71 mg/dL  0 20-1 00   ALK PHOSPHATAS 94 U/L     ALT (SGPT) 17 U/L  12-78   Specimen collection should occur prior to Sulfasalazine and/or Sulfapyridine administration due to the potential for falsely depressed results  AST(SGOT) 18 U/L  5-45   Specimen collection should occur prior to Sulfasalazine administration due to the potential for falsely depressed results     ALBUMIN 3 2 g/dL L 3 5-5 0   TOTAL PROTEIN 7 2 g/dL  6 4-8 2   eGFR 64 ml/min/1 73sq m     National Kidney Disease Education Program recommendations are as follows:  GFR calculation is accurate only with a steady state creatinine  Chronic Kidney disease less than 60 ml/min/1 73 sq  meters  Kidney failure less than 15 ml/min/1 73 sq  meters  (1) LD (LDH) [id number] 05:55PM Li Pham Order Number: [id number]     Test Name Result Flag Reference   LD (LDH) 187 U/L       (1) COMPREHENSIVE METABOLIC PANEL [id number] 12:62GK Li Pham Order Number: [id number]     Test Name Result Flag Reference   SODIUM 140 mmol/L  136-145   POTASSIUM 3 6 mmol/L  3 5-5 3   CHLORIDE 107 mmol/L  100-108   CARBON DIOXIDE 28 mmol/L  21-32   ANION GAP (CALC) 5 mmol/L  4-13   BLOOD UREA NITROGEN 19 mg/dL  5-25   CREATININE 1 19 mg/dL  0 60-1 30   Standardized to IDMS reference method   CALCIUM 9 2 mg/dL  8 3-10 1   BILI, TOTAL 1 02 mg/dL H 0 20-1 00   ALK PHOSPHATAS 89 U/L     ALT (SGPT) 29 U/L  12-78   AST(SGOT) 38 U/L  5-45   ALBUMIN 3 4 g/dL L 3 5-5 0   TOTAL PROTEIN 7 0 g/dL  6 4-8 2   eGFR Non-African American      >60 0 ml/min/1 73sq m   [facility] Disease Education Program recommendations are as follows:  GFR calculation is accurate only with a steady state creatinine  Chronic Kidney disease less than 60 ml/min/1 73 sq  meters  Kidney failure less than 15 ml/min/1 73 sq  meters     GLUCOSE FASTING 79 mg/dL  65-99     (1) CBC/PLT/DIFF [id number] 09:52AM Sathish Petties     Test Name Result Flag Reference   WBC COUNT 11 94 Thousand/uL H 4 31-10 16   RBC COUNT 5 62 Million/uL  3 88-5 62   HEMOGLOBIN 15 5 g/dL  12 0-17 0   HEMATOCRIT 47 6 %  36 5-49 3   MCV 85 fL  82-98   MCH 27 6 pg  26 8-34 3   MCHC 32 6 g/dL  31 4-37 4   RDW 16 8 % H 11 6-15 1   MPV 10 4 fL  8 9-12 7   PLATELET COUNT 377 Thousands/uL  149-390   nRBC AUTOMATED 0 /100 WBCs     NEUTROPHILS RELATIVE PERCENT 57 %  43-75   LYMPHOCYTES RELATIVE PERCENT 36 %  14-44   MONOCYTES RELATIVE PERCENT 6 %  4-12   EOSINOPHILS RELATIVE PERCENT 1 %  0-6   BASOPHILS RELATIVE PERCENT 0 %  0-1   NEUTROPHILS ABSOLUTE COUNT 6 75 Thousands/? ??L  1 85-7 62   LYMPHOCYTES ABSOLUTE COUNT 4 31 Thousands/? ??L  0 60-4 47   MONOCYTES ABSOLUTE COUNT 0 70 Thousand/? ??L  0 17-1 22   EOSINOPHILS ABSOLUTE COUNT 0 14 Thousand/? ??L  0 00-0 61   BASOPHILS ABSOLUTE COUNT 0 02 Thousands/? ??L  0 00-0 10     (1) CBC/PLT/DIFF [id number] 08:09AM Nafisa Cure Order Number: [id number]     Test Name Result Flag Reference   WBC COUNT 9 63 Thousand/uL  4 31-10 16   RBC COUNT 5 40 Million/uL  3 88-5 62   HEMOGLOBIN 14 8 g/dL  12 0-17 0   HEMATOCRIT 46 0 %  36 5-49 3   MCV 85 fL  82-98   MCH 27 4 pg  26 8-34 3   MCHC 32 2 g/dL  31 4-37 4   RDW 16 1 % H 11 6-15 1   MPV 10 7 fL  8 9-12 7   PLATELET COUNT 643 Thousands/uL  149-390   nRBC AUTOMATED 0 /100 WBCs     NEUTROPHILS RELATIVE PERCENT 55 %  43-75   LYMPHOCYTES RELATIVE PERCENT 38 %  14-44   MONOCYTES RELATIVE PERCENT 5 %  4-12   EOSINOPHILS RELATIVE PERCENT 2 %  0-6   BASOPHILS RELATIVE PERCENT 0 %  0-1   NEUTROPHILS ABSOLUTE COUNT 5 22 Thousands/? ??L  1 85-7 62   LYMPHOCYTES ABSOLUTE COUNT 3 70 Thousands/? ??L  0 60-4 47   MONOCYTES ABSOLUTE COUNT 0 50 Thousand/? ??L  0 17-1 22   EOSINOPHILS ABSOLUTE COUNT 0 17 Thousand/? ??L  0 00-0 61   BASOPHILS ABSOLUTE COUNT 0 02 Thousands/? ??L  0 00-0 10     (1) COMPREHENSIVE METABOLIC PANEL [id number] 85:89MD Nafisa Cure Order Number: [id number]     Test Name Result Flag Reference   SODIUM 142 mmol/L  136-145   POTASSIUM 3 5 mmol/L  3 5-5 3   CHLORIDE 106 mmol/L  100-108   CARBON DIOXIDE 28 mmol/L  21-32   ANION GAP (CALC) 8 mmol/L  4-13   BLOOD UREA NITROGEN 13 mg/dL  5-25   CREATININE 1 12 mg/dL  0 60-1 30   Standardized to IDMS reference method   CALCIUM 9 7 mg/dL  8 3-10 1   BILI, TOTAL 0 70 mg/dL  0 20-1 00   ALK PHOSPHATAS 88 U/L     ALT (SGPT) 18 U/L  12-78   AST(SGOT) 20 U/L  5-45   ALBUMIN 3 4 g/dL L 3 5-5 0   TOTAL PROTEIN 6 9 g/dL 6  4-8 2   eGFR Non-African American      >60 0 ml/min/1 73sq m   [facility] Disease Education Program recommendations are as follows:  GFR calculation is accurate only with a steady state creatinine  Chronic Kidney disease less than 60 ml/min/1 73 sq  meters  Kidney failure less than 15 ml/min/1 73 sq  meters  GLUCOSE FASTING 118 mg/dL H 65-99     (1) LD (LDH) [id number] 08:09AM Don Lather Order Number: [id number]     Test Name Result Flag Reference   LD (LDH) 189 U/L       * CT CHEST WO CONTRAST [id number] 07:40AM Don Lather Order Number: [id number]    - Patient Instructions: [place]   [address]     Test Name Result Flag Reference   CT CHEST WO CONTRAST (Report)     CT CHEST WITHOUT IV CONTRAST     INDICATION: C64 2: Malignant neoplasm of left kidney, except renal pelvis   C61: Malignant neoplasm of prostate   C78 00: Secondary malignant neoplasm of unspecified lung   C79 51: Secondary malignant neoplasm of bone   D49 2: Neoplasm of unspecified behavior of bone, soft tissue, and skin  History taken directly from the electronic ordering system  COMPARISON: Chest CT from 12/2/2016  TECHNIQUE: CT examination of the chest was performed without intravenous contrast  Reformatted images were created in axial, sagittal, and coronal planes  Radiation dose length product (DLP) for this visit: 339 mGy-cm   This examination, like all CT scans performed in the [facility], was performed utilizing techniques to minimize radiation dose exposure, including the use of iterative    reconstruction and automated exposure control  FINDINGS:     LUNGS: Again seen are multiple pulmonary nodules consistent with metastatic disease  Largest is in the right upper lobe, currently measuring 2 7 x 2 3 x 3 1 cm, unchanged  All of the other smaller nodules are also unchanged  There are no new nodules  PLEURA: Unremarkable  HEART/GREAT VESSELS: Unremarkable for patient's age  MEDIASTINUM AND CHERRIE: Unremarkable  CHEST WALL AND LOWER NECK: Unchanged small densely calcified right thyroid nodule and diffuse enlargement of right thyroid gland  VISUALIZED STRUCTURES IN THE UPPER ABDOMEN: Diffuse thickening of the left adrenal gland is also unchanged  OSSEOUS STRUCTURES: Again seen is a lytic metastasis of the right 7th rib, with cortical destruction  This has enlarged, currently 4 9 x 3 2 x 3 1 cm, previously 3 1 x 2 6 x 2 3 cm  (Series 2 image 30 )      There is also an enlarging right lateral 8th rib lytic metastasis with pathologic fracture, currently 2 1 x 1 1 x 1 7 cm (series 2 image 46) previously barely visible at 1 cm in diameter  There is also a new 1 3 cm diameter lytic metastasis in the T6 vertebral body (series 2 image 27  IMPRESSION:     Compared to 12/2/2016, the pulmonary nodular metastatic disease is unchanged  There has been, however progression of bony metastasis, with pre-existing right 7th rib fracture much bigger, a now much more noticeable right lateral 8th rib fracture with pathologic fracture, and also a new T6 vertebral body metastasis  Workstation performed: [id number]     Signed by:   Cherie Osullivan MD   6/12/17     Results Form:   Results   Radiology CT scan of the chest, abdomen and pelvis without contrast from June 22, 2016: Multiple lung nodules are again seen with mild interval progression compared to October 22, 2015, there is a new lytic lesion of the right posterior seventh rib with healing pathologic fracture, no definite evidence of metastasis in the abdomen or pelvis  of the chest without contrast from December 2, 2016: There are stable number but increase in size of bilateral tumor nodules compared to June 22, 2016, the largest in the superior segment of the right lower lobe measures 2 9 x 2 7 x 3 3 cm, previously 2 5 x 2 2 x 2 8 cm, in the right lower lobe 1 6 x 1 5 cm, previously 1 4 x 1 5 cm and in the left lower lobe 1 5 x 1 2 cm, previously 1 4 x 1 1 cm, there is enlargement of the destructive lesion in the posterior right seventh rib, and stable nodular hyperplasia of the left adrenal gland  enhanced CT of the cervical spine from November 16, 2016: There is multilevel cervical spondylitic degenerative change most pronounced at C5-C6 and C6-C7  of the right wrist from March 28, 2017: There is a 25 x 9 mm osteolytic lesion in the cortex of the distal ulna diaphysis bridging a long segment of cortex consistent with a pathologic fracture  of the chest without contrast from June 9, 2017: There are pulmonary nodules consistent with metastatic disease, in the right upper lobe 2 7 x 2 3 x 3 1 cm is unchanged and all other smaller nodules are unchanged, mediastinum and blanca are unremarkable, a lytic metastasis of the right seventh rib with cortical destruction has enlarged to 4 9 x 3 2 x 3 1 cm, previously 3 1 x 2 6 x 2 3 cm in December 2016, an enlarging right lateral eighth rib lytic metastasis with pathologic fracture 2 1 x 1 1 x 1 7 centimeters was barely visible at 1 cm previously, and there is a new 1 3 cm lytic metastasis of T6 vertebral body  CT scan of the chest, abdomen and pelvis from July 5, 2017: Right lower lobe lung metastasis 1 1 x 1 3 cm, previously 1 6 x 2 0 cm, left lower lobe nodule 8 mm, previously 14 mm, right upper lobe nodule 1 cm, previously 13 mm, right upper lobe pleural based mass 1 9 x 2 0 cm, previously 2 4 x 2 5 cm, right apical nodule 0 9 x 1 1 cm, previously 1 0 x 1 1 cm, and adjacent lung density 6 mm is stable, mediastinum and blanca are unremarkable, there is expansile just Dr  lesion in the right posterior seventh rib the soft tissue lesion measuring 2 9 x 4 1 cm, previously 3 3 x 4 4 cm, and in the lateral eighth rib 1 4 x 2 2 cm previously 1 2 x 1 9 cm, lytic lesion in the sixth vertebral body has increased to 1 3 x 1 8 cm's, previously 0 9 x 1 3 cm, lytic lesion in the right iliac bone is new compared to CT scan of 2016  Lab Results From August 15, 2017: WBC is 10 52, hemoglobin 15 8, platelets 434, creatinine 1 17, AST 18, ALT 17, alkaline phosphatase 94, bili 0 71,   Future Appointments    Date/Time Provider Specialty Site   10/27/2017 09:40 AM MONA Galicia   Hematology Oncology [facility]   09/27/2017 09:00 AM Juanjose Mckeon MD PhD Neurosurgery [address] [place] [address]     Signatures   Electronically signed by : MONA Valente ; Sep 17 2017  5:11PM EST                       (Author)

## 2017-10-27 ENCOUNTER — GENERIC CONVERSION - ENCOUNTER (OUTPATIENT)
Dept: OTHER | Facility: OTHER | Age: 67
End: 2017-10-27

## 2017-10-27 PROCEDURE — 77373 STRTCTC BDY RAD THER TX DLVR: CPT | Performed by: RADIOLOGY

## 2017-10-27 PROCEDURE — 77336 RADIATION PHYSICS CONSULT: CPT | Performed by: RADIOLOGY

## 2017-11-01 ENCOUNTER — HOSPITAL ENCOUNTER (OUTPATIENT)
Dept: RADIOLOGY | Facility: HOSPITAL | Age: 67
Discharge: HOME | End: 2017-11-01
Attending: RADIOLOGY
Payer: MEDICARE

## 2017-11-01 DIAGNOSIS — C79.51 SECONDARY MALIGNANT NEOPLASM OF BONE (HCC): ICD-10-CM

## 2017-11-01 PROCEDURE — 73090 X-RAY EXAM OF FOREARM: CPT

## 2017-11-23 DIAGNOSIS — D49.2 NEOPLASM OF UNSPECIFIED BEHAVIOR OF BONE, SOFT TISSUE, AND SKIN: ICD-10-CM

## 2017-11-23 DIAGNOSIS — C64.2 MALIGNANT NEOPLASM OF LEFT KIDNEY, EXCEPT RENAL PELVIS (HCC): ICD-10-CM

## 2017-11-23 DIAGNOSIS — C78.00 SECONDARY MALIGNANT NEOPLASM OF LUNG (HCC): ICD-10-CM

## 2017-11-23 DIAGNOSIS — C61 MALIGNANT NEOPLASM OF PROSTATE (HCC): ICD-10-CM

## 2017-11-23 DIAGNOSIS — C79.51 SECONDARY MALIGNANT NEOPLASM OF BONE (HCC): ICD-10-CM

## 2017-12-01 ENCOUNTER — APPOINTMENT (OUTPATIENT)
Dept: LAB | Facility: MEDICAL CENTER | Age: 67
End: 2017-12-01
Payer: MEDICARE

## 2017-12-01 ENCOUNTER — TRANSCRIBE ORDERS (OUTPATIENT)
Dept: ADMINISTRATIVE | Facility: HOSPITAL | Age: 67
End: 2017-12-01

## 2017-12-01 DIAGNOSIS — C61 MALIGNANT NEOPLASM OF PROSTATE (HCC): ICD-10-CM

## 2017-12-01 DIAGNOSIS — C79.51 SECONDARY MALIGNANT NEOPLASM OF BONE (HCC): ICD-10-CM

## 2017-12-01 DIAGNOSIS — C78.00 SECONDARY MALIGNANT NEOPLASM OF LUNG (HCC): ICD-10-CM

## 2017-12-01 DIAGNOSIS — D49.2 NEOPLASM OF UNSPECIFIED BEHAVIOR OF BONE, SOFT TISSUE, AND SKIN: ICD-10-CM

## 2017-12-01 DIAGNOSIS — C64.2 MALIGNANT NEOPLASM OF LEFT KIDNEY, EXCEPT RENAL PELVIS (HCC): ICD-10-CM

## 2017-12-01 LAB
BASOPHILS # BLD AUTO: 0.01 THOUSANDS/ΜL (ref 0–0.1)
BASOPHILS NFR BLD AUTO: 0 % (ref 0–1)
EOSINOPHIL # BLD AUTO: 0.21 THOUSAND/ΜL (ref 0–0.61)
EOSINOPHIL NFR BLD AUTO: 2 % (ref 0–6)
ERYTHROCYTE [DISTWIDTH] IN BLOOD BY AUTOMATED COUNT: 16.2 % (ref 11.6–15.1)
HCT VFR BLD AUTO: 50.1 % (ref 36.5–49.3)
HGB BLD-MCNC: 16.5 G/DL (ref 12–17)
LYMPHOCYTES # BLD AUTO: 3.71 THOUSANDS/ΜL (ref 0.6–4.47)
LYMPHOCYTES NFR BLD AUTO: 36 % (ref 14–44)
MCH RBC QN AUTO: 28.7 PG (ref 26.8–34.3)
MCHC RBC AUTO-ENTMCNC: 32.9 G/DL (ref 31.4–37.4)
MCV RBC AUTO: 87 FL (ref 82–98)
MONOCYTES # BLD AUTO: 0.56 THOUSAND/ΜL (ref 0.17–1.22)
MONOCYTES NFR BLD AUTO: 6 % (ref 4–12)
NEUTROPHILS # BLD AUTO: 5.72 THOUSANDS/ΜL (ref 1.85–7.62)
NEUTS SEG NFR BLD AUTO: 56 % (ref 43–75)
NRBC BLD AUTO-RTO: 0 /100 WBCS
PLATELET # BLD AUTO: 210 THOUSANDS/UL (ref 149–390)
PMV BLD AUTO: 11.1 FL (ref 8.9–12.7)
RBC # BLD AUTO: 5.74 MILLION/UL (ref 3.88–5.62)
WBC # BLD AUTO: 10.23 THOUSAND/UL (ref 4.31–10.16)

## 2017-12-01 PROCEDURE — 85025 COMPLETE CBC W/AUTO DIFF WBC: CPT

## 2017-12-01 PROCEDURE — 36415 COLL VENOUS BLD VENIPUNCTURE: CPT

## 2017-12-20 ENCOUNTER — HOSPITAL ENCOUNTER (OUTPATIENT)
Dept: CT IMAGING | Facility: HOSPITAL | Age: 67
Discharge: HOME | End: 2017-12-20
Attending: INTERNAL MEDICINE
Payer: MEDICARE

## 2017-12-20 DIAGNOSIS — C79.51 SECONDARY MALIGNANT NEOPLASM OF BONE (HCC): ICD-10-CM

## 2017-12-20 DIAGNOSIS — C64.2 MALIGNANT NEOPLASM OF LEFT KIDNEY, EXCEPT RENAL PELVIS (HCC): ICD-10-CM

## 2017-12-20 DIAGNOSIS — D49.2 NEOPLASM OF UNSPECIFIED BEHAVIOR OF BONE, SOFT TISSUE, AND SKIN: ICD-10-CM

## 2017-12-20 DIAGNOSIS — C61 MALIGNANT NEOPLASM OF PROSTATE (HCC): ICD-10-CM

## 2017-12-20 DIAGNOSIS — C78.00 SECONDARY MALIGNANT NEOPLASM OF LUNG (HCC): ICD-10-CM

## 2017-12-20 PROCEDURE — 71250 CT THORAX DX C-: CPT

## 2017-12-20 PROCEDURE — 74176 CT ABD & PELVIS W/O CONTRAST: CPT

## 2017-12-21 ENCOUNTER — HOSPITAL ENCOUNTER (OUTPATIENT)
Dept: RADIOLOGY | Facility: HOSPITAL | Age: 67
Discharge: HOME | End: 2017-12-21
Attending: RADIOLOGY
Payer: MEDICARE

## 2017-12-21 DIAGNOSIS — C79.51 SECONDARY MALIGNANT NEOPLASM OF BONE (HCC): ICD-10-CM

## 2017-12-21 PROCEDURE — 72146 MRI CHEST SPINE W/O DYE: CPT

## 2017-12-22 ENCOUNTER — APPOINTMENT (OUTPATIENT)
Dept: LAB | Facility: MEDICAL CENTER | Age: 67
End: 2017-12-22
Payer: MEDICARE

## 2017-12-22 DIAGNOSIS — C61 MALIGNANT NEOPLASM OF PROSTATE (HCC): ICD-10-CM

## 2017-12-22 DIAGNOSIS — C64.2 MALIGNANT NEOPLASM OF LEFT KIDNEY, EXCEPT RENAL PELVIS (HCC): ICD-10-CM

## 2017-12-22 DIAGNOSIS — D49.2 NEOPLASM OF UNSPECIFIED BEHAVIOR OF BONE, SOFT TISSUE, AND SKIN: ICD-10-CM

## 2017-12-22 DIAGNOSIS — C78.00 SECONDARY MALIGNANT NEOPLASM OF LUNG (HCC): ICD-10-CM

## 2017-12-22 DIAGNOSIS — C79.51 SECONDARY MALIGNANT NEOPLASM OF BONE (HCC): ICD-10-CM

## 2017-12-22 LAB
ALBUMIN SERPL BCP-MCNC: 2.9 G/DL (ref 3.5–5)
ALP SERPL-CCNC: 71 U/L (ref 46–116)
ALT SERPL W P-5'-P-CCNC: 11 U/L (ref 12–78)
ANION GAP SERPL CALCULATED.3IONS-SCNC: 4 MMOL/L (ref 4–13)
AST SERPL W P-5'-P-CCNC: 13 U/L (ref 5–45)
BASOPHILS # BLD AUTO: 0.01 THOUSANDS/ΜL (ref 0–0.1)
BASOPHILS NFR BLD AUTO: 0 % (ref 0–1)
BILIRUB SERPL-MCNC: 0.92 MG/DL (ref 0.2–1)
BUN SERPL-MCNC: 19 MG/DL (ref 5–25)
CALCIUM ALBUM COR SERPL-MCNC: 11.2 MG/DL (ref 8.3–10.1)
CALCIUM SERPL-MCNC: 10.3 MG/DL (ref 8.3–10.1)
CHLORIDE SERPL-SCNC: 104 MMOL/L (ref 100–108)
CO2 SERPL-SCNC: 32 MMOL/L (ref 21–32)
CREAT SERPL-MCNC: 1.42 MG/DL (ref 0.6–1.3)
EOSINOPHIL # BLD AUTO: 0.16 THOUSAND/ΜL (ref 0–0.61)
EOSINOPHIL NFR BLD AUTO: 1 % (ref 0–6)
ERYTHROCYTE [DISTWIDTH] IN BLOOD BY AUTOMATED COUNT: 16.9 % (ref 11.6–15.1)
GFR SERPL CREATININE-BSD FRML MDRD: 50 ML/MIN/1.73SQ M
GLUCOSE P FAST SERPL-MCNC: 90 MG/DL (ref 65–99)
HCT VFR BLD AUTO: 49.4 % (ref 36.5–49.3)
HGB BLD-MCNC: 15.8 G/DL (ref 12–17)
LDH SERPL-CCNC: 143 U/L (ref 81–234)
LYMPHOCYTES # BLD AUTO: 3.8 THOUSANDS/ΜL (ref 0.6–4.47)
LYMPHOCYTES NFR BLD AUTO: 33 % (ref 14–44)
MCH RBC QN AUTO: 28.8 PG (ref 26.8–34.3)
MCHC RBC AUTO-ENTMCNC: 32 G/DL (ref 31.4–37.4)
MCV RBC AUTO: 90 FL (ref 82–98)
MONOCYTES # BLD AUTO: 0.6 THOUSAND/ΜL (ref 0.17–1.22)
MONOCYTES NFR BLD AUTO: 5 % (ref 4–12)
NEUTROPHILS # BLD AUTO: 6.86 THOUSANDS/ΜL (ref 1.85–7.62)
NEUTS SEG NFR BLD AUTO: 61 % (ref 43–75)
NRBC BLD AUTO-RTO: 0 /100 WBCS
PLATELET # BLD AUTO: 215 THOUSANDS/UL (ref 149–390)
PMV BLD AUTO: 10.9 FL (ref 8.9–12.7)
POTASSIUM SERPL-SCNC: 3.6 MMOL/L (ref 3.5–5.3)
PROT SERPL-MCNC: 7 G/DL (ref 6.4–8.2)
PSA SERPL-MCNC: <0.1 NG/ML (ref 0–4)
RBC # BLD AUTO: 5.48 MILLION/UL (ref 3.88–5.62)
SODIUM SERPL-SCNC: 140 MMOL/L (ref 136–145)
WBC # BLD AUTO: 11.46 THOUSAND/UL (ref 4.31–10.16)

## 2017-12-22 PROCEDURE — 80053 COMPREHEN METABOLIC PANEL: CPT

## 2017-12-22 PROCEDURE — 83615 LACTATE (LD) (LDH) ENZYME: CPT

## 2017-12-22 PROCEDURE — 84153 ASSAY OF PSA TOTAL: CPT

## 2017-12-22 PROCEDURE — 85025 COMPLETE CBC W/AUTO DIFF WBC: CPT

## 2017-12-22 PROCEDURE — 36415 COLL VENOUS BLD VENIPUNCTURE: CPT

## 2017-12-27 ENCOUNTER — APPOINTMENT (OUTPATIENT)
Dept: RADIATION ONCOLOGY | Facility: HOSPITAL | Age: 67
End: 2017-12-27
Attending: RADIOLOGY
Payer: MEDICARE

## 2017-12-27 ENCOUNTER — ALLSCRIPTS OFFICE VISIT (OUTPATIENT)
Dept: OTHER | Facility: OTHER | Age: 67
End: 2017-12-27

## 2017-12-27 ENCOUNTER — TRANSCRIBE ORDERS (OUTPATIENT)
Dept: ADMINISTRATIVE | Facility: HOSPITAL | Age: 67
End: 2017-12-27

## 2017-12-27 ENCOUNTER — GENERIC CONVERSION - ENCOUNTER (OUTPATIENT)
Dept: OTHER | Facility: OTHER | Age: 67
End: 2017-12-27

## 2017-12-27 DIAGNOSIS — C79.51 SECONDARY MALIGNANT NEOPLASM OF BONE AND BONE MARROW (HCC): Primary | ICD-10-CM

## 2017-12-27 DIAGNOSIS — C79.51 SECONDARY MALIGNANT NEOPLASM OF BONE (HCC): ICD-10-CM

## 2017-12-27 DIAGNOSIS — C78.00 SECONDARY MALIGNANT NEOPLASM OF LUNG (HCC): ICD-10-CM

## 2017-12-27 DIAGNOSIS — C61 MALIGNANT NEOPLASM OF PROSTATE (HCC): ICD-10-CM

## 2017-12-27 DIAGNOSIS — C79.52 SECONDARY MALIGNANT NEOPLASM OF BONE AND BONE MARROW (HCC): Primary | ICD-10-CM

## 2017-12-27 DIAGNOSIS — C64.2 MALIGNANT NEOPLASM OF LEFT KIDNEY, EXCEPT RENAL PELVIS (HCC): ICD-10-CM

## 2017-12-27 PROCEDURE — 99214 OFFICE O/P EST MOD 30 MIN: CPT | Performed by: RADIOLOGY

## 2017-12-28 NOTE — PROGRESS NOTES
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)    Plan   Spine metastasis    · (1) BUN; Status:Active; Requested for:18Bfk9994;    Perform:Located within [facility]; CVV:29TOC0220; Ordered; For:Spine metastasis; Ordered By:Sukh Benites;   · (1) CREATININE; Status:Active; Requested for:24Eay0085;    Perform:Located within [facility]; OWS:50PRE2599; Ordered; For:Spine metastasis; Ordered By:Sukh Benites;   · * MRI THORACIC SPINE W WO CONTRAST; Status:Need Information - Financial    Authorization; Requested for:[id number];    Perform:[facility]; RMT:[id number]; Ordered; For:Spine metastasis; Ordered By:Sukh Benites;   · Follow-up visit in 3 months Evaluation and Treatment  Follow-up  Status: Hold For -    Scheduling  Requested for: 86Xlm2142   Ordered; For: Spine metastasis; Ordered By: Kristy Duggan Performed:  Due: 10DMP0607    Discussion/Summary      78year old male with metastatic renal cell CA  Has followed with [facility], with some RT treatments (e g  to a rib lesion) with Dr Richard Scott  CT imaging showed a progressive lesion at T6, despite systemic therapy  2 months s/p SBRT to T6  MRI shows stability  denies profound interscapular back pain  seen in conjunction with Dr Gonzalez Boehringer  is for f/u MRI Tspine in 3 months for surveillance  I have ordered this study  1 000, VAS 80; KPS 80, ECOG 0-1,  Chief Complaint   Patient presents today for 2 month POV s/p SBRT with new MRI T-spine      Post-Op   Tumors of the Spinal Column: The patient is being seen for a routine clinic follow-up of a tumor of the spinal column  He was referred by an oncologist  (Dr Richard Scott and Dr Catalina Barraza) The tumor is located at an extradural site, in the vertebral body  The tumor is presumed to be a metastasis from renal  Past treatment has included radiation therapy to the spinal site and chemotherapy   (SBRT to T6 10/17/17) Symptoms:  urinary incontinence, but-- no neck pain,-- no upper extremity pain,-- no lower extremity pain,-- no urinary retention-- and-- no bowel incontinence--       The patient presents with complaints of occasional episodes of bilateral lower back pain, described as aching, non-radiating Episodes started about 30 years ago (only after sleeping, lower back pain/discomfort until moving around for a while)  The patient presents with complaints of sensory changes (neuropathy in feet, hands , and finger tips)      The patient presents with complaints of bilateral lower extremitiy localized weakness  The patient presents with complaints of gait disturbance (uses cane, has drop foot L) Current treatment includes chemotherapy  (axxitinib 5 mg 2 daily will reduce to 4 mg once he runs out 5 mg pills) Pertinent medical history:  known metastatic disease-- and-- renal , lung mets bone mets  The patient is currently able to do activities of daily living without limitations  (renal cell CA)      Review of Systems        Constitutional: decreased appetite, but-- no fever,-- not feeling poorly,-- no recent weight gain,-- no chills-- and-- no recent weight loss--       The patient presents with complaints of not feeling tired (resolved)  Eyes: No complaints of eye pain, no red eyes, no discharge from eyes, no itchy eyes  ENT: L ear blocks up, comes and goes, started after RT  Cardiovascular: lower extremity edema-- and-- HTN, but-- the heart rate was not slow,-- no chest pain,-- the heart rate was not fast-- and-- no palpitations  Respiratory: lung  The patient presents with complaints of diarrhea (uses ammonium prn)  Genitourinary: incontinence-- and-- renal mets , wears pads--       The patient presents with complaints of nocturia (Q 2-3 hrs)  Musculoskeletal: limb swelling-- and-- right rib pain s/p RT , bl knee replacements--       The patient presents with complaints of bilateral hand arthralgias  The patient presents with complaints of a rash (skin CA)  Neurological: neuropathy in feet , hands and fingertips, some unstable balance, but-- no headache,-- no confusion,-- no dizziness,-- no convulsions-- and-- no fainting  Endocrine: thyroid nodules, but-- No complaints of proptosis, no hot flashes, no muscle weakness, no erectile dysfunction, no deepening of the voice, no feelings of weakness  Hematologic/Lymphatic: skin tears easily, but-- no tendency for easy bleeding-- and-- no tendency for easy bruising  Active Problems   1  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) (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)   2  Arthritis of left knee (716 96) (M17 12)   3  Bone metastasis (198 5) (C79 51)   4  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)   5  Fatigue (780 79) (R53 83)   6  Metastatic malignant neoplasm to lung (197 0) (C78 00)   7  Multinodular goiter (nontoxic) (241 1) (E04 2)   8  Myalgia (729 1) (M79 1)   9  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)   10  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)   11  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) (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 721)    Social History    · Alcohol use   · Alcohol use   · Former smoker (U78 67) (Q79 557)   · Former smoker (V15 82) (L85 663)    Current Meds    1  AmLODIPine Besylate 5 MG Oral Tablet; TAKE 1 TABLET DAILY; Therapy: 53POJ2229 to (Evaluate:90Kge4985) Recorded   2  Gabapentin 300 MG Oral Capsule; TAKE 1 CAPSULE 3 TIMES DAILY; Therapy: 92XVA0172 to Recorded   3  Inlyta 1 MG Oral Tablet; TAKE 4 TABLET Twice daily; Therapy: 07Zgx5242 to (Evaluate:31Rnn4437); Last Rx:33Jsl6494 Ordered   4  Klor-Con M20 20 MEQ Oral Tablet Extended Release; TAKE 1 TABLET DAILY; Therapy: (Recorded:27Hid6296) to Recorded   5  Lisinopril-Hydrochlorothiazide 20-12 5 MG Oral Tablet; TAKE 1 TABLET DAILY; Therapy: 11VAU0178 to (Evaluate:26Lck4321) Recorded   6  Oxycodone-Acetaminophen 5-325 MG Oral Tablet; TAKE 1 TABLET EVERY 6 HOURS AS     NEEDED FOR PAIN;     Therapy: 82QAJ4113 to (Evaluate:29Apr2017);  Last Rx:30Mar2017 Ordered   7  Terazosin HCl - 5 MG Oral Capsule; TAKE 1 CAPSULE AT BEDTIME NIGHTLY; Therapy: (Recorded:26Iwh3173) to Recorded   8  Tylenol 325 MG Oral Tablet; TAKE 1 TO 2 TABLETS EVERY 6 HOURS AS NEEDED; Therapy: (Recorded:31Hsu7452) to Recorded   9  Vitamin D3 2000 UNIT Oral Capsule; TAKE 1 CAPSULE Daily Recorded    Allergies   1  No Known Drug Allergies    Vitals    Recorded: 29Lbo4527 09:25AM   Temperature 97 7 F   Heart Rate 100   Respiration 20   Systolic 539   Diastolic 72   Height 5 ft 10 in   Weight 288 lb 6 oz   BMI Calculated 41 38   BSA Calculated 2 44   Pain Scale 0     Results/Data        MRI Review MRI TSpine as below personally reviewed, as was report  * MRI THORACIC SPINE WO CONTRAST 21Dec2017 08:47AM [facility] Order Number: [id number]      Performing Comments: Please include stacked axial T2   - Patient Instructions: To schedule this appointment, please contact Central Scheduling at [phone]  Test Name Result Flag Reference   MRI THORACIC SPINE [address] (Report)     MRI THORACIC SPINE WITHOUT CONTRAST           INDICATION: Osseous metastasis  COMPARISON: 10/9/2017  TECHNIQUE: Sagittal T1, sagittal T2, sagittal inversion recovery, axial T2, axial 2D MERGE  IMAGE QUALITY: Diagnostic  FINDINGS:           ALIGNMENT: No subluxation  No scoliosis  Slight smooth exaggeration of the thoracic kyphosis  No acute compression fracture  MARROW SIGNAL: Heterogeneous marrow signal within the mid thoracic spine  There are multiple hyperintensities on T1-weighted imaging consistent with hemangiomas including T2 and T3 hemangioma  The T4, T5 and T6 vertebral bodies demonstrate more       heterogeneous signal which is hypointense on T1-weighted imaging and hyperintense on inversion recovery imaging consistent with osseous metastasis   The majority of the T4 vertebral body is involved anteriorly with marrow edema extending into the pedicles, right greater than left  At T5 there is marrow edema within the left posterior lateral aspect of the vertebral body extending into the pedicle and facet  At T6 there is marrow edema within the posterior aspect of the vertebral body extending       into the right pedicle  These findings are similar to the prior examination  There is also a hyperintense heterogeneous lesion within the right posterior lateral aspect of T8 and a lesion within the T10 transverse process on the right, both of which       are grossly unchanged  THORACIC CORD: Normal signal within the thoracic cord  PARAVERTEBRAL SOFT TISSUES: Right paraspinal mass at the T4 level grossly unchanged in size and configuration best seen on series 9 image 13 measuring approximately 2 5 x 2 5 cm without extension into the neural foramen  Partially visualized expansile       mass involving the 6th posterior lateral rib  THORACIC DEGENERATIVE CHANGE: No disc herniation  No canal stenosis or foraminal narrowing  No cord compression  IMPRESSION:           Stable osseous metastatic disease within the thoracic spine without pathologic compression fracture or extension of tumor into the epidural space  Stable paraspinal metastasis  Workstation performed: [id number]           Signed by:      Edel Sebastian DO      12/22/17      * CT CHEST ABDOMEN PELVIS WO CONTRAST 43Rqg1688 09:14AM [facility] Order Number: [id number]       - Patient Instructions: ASHWIN MCKAY      [address]      Test Name Result Flag Reference   CT CHEST ABDOMEN PELVIS WO CONTRAST (Report)     CT CHEST, ABDOMEN AND PELVIS WITH IV CONTRAST           INDICATION: History of left renal cancer, prostate cancer, lung and bone metastasis  Status post left defect  COMPARISON: 9/5/2017           TECHNIQUE: CT examination of the chest, abdomen and pelvis was performed  Reformatted images were created in axial, sagittal, and coronal planes  Radiation dose length product (DLP) for this visit: [phone] mGy-cm   This examination, like all CT scans performed in the [facility], was performed utilizing techniques to minimize radiation dose exposure, including the use of iterative       reconstruction and automated exposure control  IV Contrast: None  Enteric Contrast: Enteric contrast was administered  FINDINGS:           CHEST           LUNGS: Stable right lower lobe metastasis measuring 1 2 x 1 1 cm, previously 1 3 x 1 1 cm  Stable left lower lobe nodule measuring 8 mm  Stable right upper lobe nodule measuring 1 cm on image 3/23  No new nodule  PLEURA: There is a pleural-based mass in the medial right upper lobe measuring 2 4 x 2 2 cm, previously 1 9 x 2 0 cm  Stable posterior left apical pleural-based nodule measuring 0 9 x 1 1 cm and a stable 6 mm adjacent nodule  HEART/GREAT VESSELS: Unremarkable for patient's age  MEDIASTINUM AND CHERRIE: Unremarkable  CHEST WALL AND LOWER NECK:    Multiple stable thyroid nodules are identified bilaterally  ABDOMEN           LIVER/BILIARY TREE: Unremarkable  GALLBLADDER: No calcified gallstones  No pericholecystic inflammatory change  SPLEEN: Unremarkable  PANCREAS: There is a stable subcentimeter pancreatic cyst along the pancreatic tail  ADRENAL GLANDS: Stable thickening of the left adrenal gland  The right adrenal gland is unremarkable  KIDNEYS/URETERS: The patient is status post left nephrectomy  The right kidney is stable with a large interpolar simple renal cyst measuring 7 5 cm  STOMACH AND BOWEL: There is colonic diverticulosis without evidence of acute diverticulitis  APPENDIX: No findings to suggest appendicitis             ABDOMINOPELVIC CAVITY: No ascites or free intraperitoneal air  No lymphadenopathy  VESSELS: Unremarkable for patient's age  PELVIS           REPRODUCTIVE ORGANS: Unremarkable for patient's age  URINARY BLADDER: Unremarkable  ABDOMINAL WALL/INGUINAL REGIONS: Unremarkable  OSSEOUS STRUCTURES: Stable expansile, destructive lesion in the right posterior 7th rib with soft tissue lesion measuring 2 9 x 4 1 cm  A stable smaller lesion is seen in the lateral 8th rib measuring 1 4 x 2 2 cm  Stable lytic lesion in the T6       vertebral body  Enlarging lytic lesion in the right iliac bone measuring 4 2 x 2 1 cm (previous 3 8 x 2 1 cm with destruction of the anterior cortex on the current study  IMPRESSION:           1  Pulmonary nodules are stable with the exception of a pleural-based right upper lobe nodule which now measures 2 4 x 2 2 cm (previous 1 9 x 2 0 cm)  2  Stable lytic bone lesions involving the right 7th and 8th ribs as well as the T6 vertebral body  The right iliac lesion has increased in size  3  Stable bilateral thyroid nodules  4  Stable subcentimeter pancreatic tail cyst            5  Stable thickening of the left adrenal gland  Workstation performed: [id number]           Signed by: Gorge Valdivia MD      12/26/17      Future Appointments      Date/Time Provider Specialty Site   12/29/2017 09:40 AM MONA Lott   Hematology Oncology [facility]     Signatures    Electronically signed by : Ezio Toribio MD PhD; Dec 27 2017 10:02AM EST                       (Author)

## 2017-12-29 ENCOUNTER — ALLSCRIPTS OFFICE VISIT (OUTPATIENT)
Dept: OTHER | Facility: OTHER | Age: 67
End: 2017-12-29

## 2018-01-01 ENCOUNTER — GENERIC CONVERSION - ENCOUNTER (OUTPATIENT)
Dept: OTHER | Facility: OTHER | Age: 68
End: 2018-01-01

## 2018-01-01 NOTE — PROGRESS NOTES
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)   2  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) (C61)   3  Bone metastasis (198 5) (C79 51)   4  Metastatic malignant neoplasm to lung (197 0) (C78 00)    Plan   Adenocarcinoma of prostate, Bone metastasis, Clear cell adenocarcinoma of left kidney,    Metastatic malignant neoplasm to lung    · Inlyta 1 MG Oral Tablet; TAKE 4 TABLET Twice daily   Rx By: Raza Croft; Dispense: 30 Days ; #:240 Tablet; Refill: 1;For: Adenocarcinoma of prostate, Bone metastasis, Clear cell adenocarcinoma of left kidney, Metastatic malignant neoplasm to lung; NINA = N; Print Rx; Last Updated By: Rosalind Irvin; 12/29/2017 3:34:03 PM   · (1) CBC/PLT/DIFF; Status:Active; Requested for:19Feb2018; Perform:[facility]; ZMY:[id number];ECXFQID; For:Adenocarcinoma of prostate, Bone metastasis, Clear cell adenocarcinoma of left kidney, Metastatic malignant neoplasm to lung; Ordered By:Anand Sheldon;   · (1) CBC/PLT/DIFF; Status:Active; Requested for:22Jan2018; Perform:[facility]; CYS:[id number];CFFECWZ; For:Adenocarcinoma of prostate, Bone metastasis, Clear cell adenocarcinoma of left kidney, Metastatic malignant neoplasm to lung; Ordered By:Anand Sheldon;   · (1) COMPREHENSIVE METABOLIC PANEL; Status:Active; Requested for:19Feb2018; Perform:[facility]; IMB:[id number];JGWQRWR; For:Adenocarcinoma of prostate, Bone metastasis, Clear cell adenocarcinoma of left kidney, Metastatic malignant neoplasm to lung; Ordered By:Anand Sheldon;   · (1) COMPREHENSIVE METABOLIC PANEL; Status:Active; Requested for:22Jan2018; Perform:[facility]; DEE:[id number];GFYHHOB; For:Adenocarcinoma of prostate, Bone metastasis, Clear cell adenocarcinoma of left kidney, Metastatic malignant neoplasm to lung; Ordered By:Garth Sheldon;   · Follow-up visit in 2 months Evaluation and Treatment  Follow-up  Status: Complete     Done: 54EMQ5019 09: 20AM   Ordered; For: Adenocarcinoma of prostate, Bone metastasis, Clear cell adenocarcinoma of left kidney, Metastatic malignant neoplasm to lung; Ordered By: Myesha Mckoy Performed:  Due: 24TPJ3242; Last Updated By: Durwin Kanner; 12/29/2017 11:11:14 AM    Discussion/Summary   Discussion Summary:    Garlan Osgood remains clinically asymptomatic of bilateral lung metastasis from metastatic clear-cell carcinoma of left kidney origin  He received 13 courses of Sutent from March 22, 2015 until December 2016  However, progression in size of lung nodules was noted on CT scan of the chest December 2, 2016 and enlargement of destructive lesion of the posterior right seventh rib  Axitinib was initiated on March 8, 2017, currently at 4 mg twice daily  On CT of the chest, abdomen and pelvis without contrast from December 20, 2017 there is evidence of disease progression i e  a right upper lobe pleural based nodule 2 4 x 2 2 cm, previously 1 9 x 2 0 cm, and the right iliac lesion has increased in size to 4 2 x 2 1 cm, previously 3 8 x 2 1 cm  Furthermore, there is concern regarding mild hypercalcemia i e  calcium 10 3 and mild increase in azotemia i e  creatinine 1 42  Accordingly, a change in his systemic therapy is recommended  I recommended a change to nivolumab (NCCN category 1, preferred), the purpose, means administration, potential risks were reviewed and the patient was given information regarding nivolumab  Other options include cabozantinib (category 1, preferred), lenvatinib plus everolimus (category 1) or single agent everolimus (category 2A)  After discussion the patient indicated that he will think over the nivolumab and other options, however, at this time he prefers to remain on axitinib  Furthermore, the patient declines increase in the axitinib dose  left a message to speak with Dr Fabi Jacobs as to potential radiation therapy to the asymptomatic progressive right iliac metastasis  reabsorption inhibitor therapy is recommended to decrease risk of bone pain and fracture related to bone metastasis as well as to prevent progression of hypercalcemia  This patient is not an ideal candidate for zoledronic acid, and therefore, denosumab is recommended  patient and his daughter-in-law Gwyn Oneil who was with him in the office today are aware to seek medical attention for cough, shortness of breath, nosebleeds, easy bruising, reduced appetite, loose bowel movements, progressive posterior neck pain, progressive right mid posterior chest pain, paresthesias of the upper extremities, difficulty with ambulation, easy fatigue, or if other new problems arise  Otherwise, I plan to see him again in 2 months  Counseling Documentation With Imm: The patient was counseled regarding diagnostic results,-- instructions for management,-- impressions  total time of encounter was 25 minutes-- and-- 12 5 minutes was spent counseling  Chief Complaint   Chief Complaint Free Text Note Form: Marquis Quiñonez was seen in followup today in regards to advanced kidney cancer with lung metastasis first noted in July 2012  History of Present Illness   HPI: He has been taking axitinib 4 mg twice daily  He is feeling well  He denies mouth soreness or loose bowel movements  He denies back pain other than mild chronic lower back stiffness on 1st arising  Previous Therapy:    1  Bilateral lung nodules, which have increased in size over the past 19 months compared to July 2012 and not present on CT scan in 2009, 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 noted on wedge resection on November 18, 2013  Sutent 13 courses from March 22, 2015 until December 2016   Pathologic fracture to the mid right ulna on April 3, 2017 and then surgery with Dr Paul Venegas, orthopedic oncologist at [facility] on April 14, 2017 and then postoperative radiotherapy to the right ulna 3000 cGy in 10 fractions from May 17, 2017 to May 31, 2017  XRT to the right posterior ribs 2800 cGy in 7 fractions from July 26, 2017 to August 3, 2017  SBRT to the T6 metastatic lesion completed October 27, 2017  Left nephrectomy in August 2007 for renal cell carcinoma clear cell type, grade II-III, T1Nx  Prostate adenocarcinoma, Russ score 3+4=7, involving both lobes of the prostate gland, pT2c pN0 , no metastasis to three left pelvic lymph nodes  Current Therapy: Axitinib initiated March 8, 2017, currently 4 mg b i d  Interval History:  history: 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   Review of Systems   Complete-Male:      Constitutional: He has been feeling well  ENT: no nosebleeds  Cardiovascular: no chest pain-- and-- no extremity edema  Respiratory: no shortness of breath-- and-- no cough  Gastrointestinal: His appetite is good  , but-- no abdominal pain,-- no constipation-- and-- no diarrhea  Musculoskeletal: There is arthritis of the knees bilaterally  He denies back pain  , but-- as noted in HPI  Integumentary: no rashes  Neurological: There is a chronic left foot drop , but-- no headache  Psychiatric: no emotional problems  Hematologic/Lymphatic: no tendency for easy bruising  Active Problems   1  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) (C61)   2  Arthritis of left knee (716 96) (M17 12)   3  Bone metastasis (198 5) (C79 51)   4  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)   5  Fatigue (780 79) (R53 83)   6  Metastatic malignant neoplasm to lung (197 0) (C78 00)   7  Multinodular goiter (nontoxic) (241 1) (E04 2)   8  Myalgia (729 1) (M79 1)   9  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)   10  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)   11  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)    Past Medical History   1  History of radiation therapy (V15 3) (Z92 3)   2   History of Posterior neck pain (723 1) (M54 2)    Surgical History 1  History of Knee Replacement   2  History of Lung Surgery   3  History of Nephrectomy Left   4  History of Prostatectomy Radical    Family History   Mother    1  Family history of Borderline diabetes (790 29) (R73 03)    Social History    · Alcohol use   · Alcohol use   · Former smoker (T80 88) (F50 736)   · Former smoker (V15 82) (D15 213)    Current Meds    1  AmLODIPine Besylate 5 MG Oral Tablet; TAKE 1 TABLET DAILY; Therapy: 96HVD3118 to (Evaluate:71Xqr5687) Recorded   2  Gabapentin 300 MG Oral Capsule; TAKE 1 CAPSULE 3 TIMES DAILY; Therapy: 61OFD9821 to Recorded   3  Inlyta 1 MG Oral Tablet; TAKE 4 TABLET Twice daily; Therapy: 41Bbt5928 to (Evaluate:12Nov2017); Last Rx:55Oqy4489 Ordered   4  Lisinopril-Hydrochlorothiazide 20-12 5 MG Oral Tablet; TAKE 1 TABLET DAILY; Therapy: 99FFF0581 to (Evaluate:12Edp6322) Recorded   5  Tylenol 325 MG Oral Tablet; TAKE 1 TO 2 TABLETS EVERY 6 HOURS AS NEEDED; Therapy: (Recorded:75Ckj1228) to Recorded    Allergies   1  No Known Drug Allergies    Vitals   Vital Signs    Recorded: 23CZG2117 10:08AM   Temperature 97 1 F   Heart Rate 91   Respiration 20   Systolic 499   Diastolic 98   Height 5 ft 10 in   Weight 289 lb    BMI Calculated 41 47   BSA Calculated 2 44   O2 Saturation 92     Physical Exam        Constitutional He appears well  Eyes EOMI  Ears, Nose, Mouth, and Throat Oropharynx is clear  Pulmonary      Auscultation of lungs: Clear to auscultation, equal breath sounds bilaterally, no wheezes, no rales, no rhonci  Cardiovascular Heart has regular rate and rhythm  -- No lower extremity edema  Abdomen      Abdomen: Non-tender, no masses  Liver and spleen: No hepatomegaly or splenomegaly  Lymphatic      Palpation of lymph nodes in neck: No lymphadenopathy  -- No axillary or inguinal lymphadenopathy  Musculoskeletal He has been using a cane to assist in balance  Skin No skin rash        Neurologic Neurological is grossly intact other than left foot drop  Psychiatric      Orientation to person, place and time: Normal        Mood and affect: Normal            ECOG 1       Results/Data   Results Form:    Results      Pathology       Radiology CT scan of the chest, abdomen and pelvis without contrast from June 22, 2016: Multiple lung nodules are again seen with mild interval progression compared to October 22, 2015, there is a new lytic lesion of the right posterior seventh rib with healing pathologic fracture, no definite evidence of metastasis in the abdomen or pelvis  of the chest without contrast from December 2, 2016: There are stable number but increase in size of bilateral tumor nodules compared to June 22, 2016, the largest in the superior segment of the right lower lobe measures 2 9 x 2 7 x 3 3 cm, previously 2 5 x 2 2 x 2 8 cm, in the right lower lobe 1 6 x 1 5 cm, previously 1 4 x 1 5 cm and in the left lower lobe 1 5 x 1 2 cm, previously 1 4 x 1 1 cm, there is enlargement of the destructive lesion in the posterior right seventh rib, and stable nodular hyperplasia of the left adrenal gland  enhanced CT of the cervical spine from November 16, 2016: There is multilevel cervical spondylitic degenerative change most pronounced at C5-C6 and C6-C7  of the right wrist from March 28, 2017: There is a 25 x 9 mm osteolytic lesion in the cortex of the distal ulna diaphysis bridging a long segment of cortex consistent with a pathologic fracture  of the chest without contrast from June 9, 2017:  There are pulmonary nodules consistent with metastatic disease, in the right upper lobe 2 7 x 2 3 x 3 1 cm is unchanged and all other smaller nodules are unchanged, mediastinum and blanca are unremarkable, a lytic metastasis of the right seventh rib with cortical destruction has enlarged to 4 9 x 3 2 x 3 1 cm, previously 3 1 x 2 6 x 2 3 cm in December 2016, an enlarging right lateral eighth rib lytic metastasis with pathologic fracture 2 1 x 1 1 x 1 7 centimeters was barely visible at 1 cm previously, and there is a new 1 3 cm lytic metastasis of T6 vertebral body  CT scan of the chest, abdomen and pelvis from July 5, 2017: Right lower lobe lung metastasis 1 1 x 1 3 cm, previously 1 6 x 2 0 cm, left lower lobe nodule 8 mm, previously 14 mm, right upper lobe nodule 1 cm, previously 13 mm, right upper lobe pleural based mass 1 9 x 2 0 cm, previously 2 4 x 2 5 cm, right apical nodule 0 9 x 1 1 cm, previously 1 0 x 1 1 cm, and adjacent lung density 6 mm is stable, mediastinum and blanca are unremarkable, there is expansile just Dr  lesion in the right posterior seventh rib the soft tissue lesion measuring 2 9 x 4 1 cm, previously 3 3 x 4 4 cm, and in the lateral eighth rib 1 4 x 2 2 cm previously 1 2 x 1 9 cm, lytic lesion in the sixth vertebral body has increased to 1 3 x 1 8 cm's, previously 0 9 x 1 3 cm, lytic lesion in the right iliac bone is new compared to CT scan of 2016  of the thoracic spine without contrast from October 9, 2017: There is a 3 cm right T4 paraspinal mass and a 5 cm destructive lesion at the right T8 posterior rib, metastasis affecting T4, T6 vertebral bodies and right T8 and T9 transverse process  of the thoracic spine without contrast from October 4, 2017: There are lytic lesions involving inferior margin at T4 and posterior margin at T6  of the right forearm from November 1, 2017: Unchanged alignment of the ulnar shaft fracture post ORIF compared to Corinne 15, 2017   of the chest, abdomen and pelvis without contrast from December 20, 2017: Pulmonary nodules are stable with the exception of a right upper lobe pleural based nodule 2 4 x 2 2 cm, previously 1 9 x 2 0 cm, there is a stable 7 5 cm right kidney interpolar simple renal cyst, stable lytic bone lesions of the right T7 and T8 ribs T6 vertebral body, the right iliac lesion has increased in size to 4 2 x 2 1 cm, previously 3 8 x 2 1 cm, stable subcentimeter pancreatic tail cyst and stable thickening of the left adrenal gland compared to September 5, 2017  of the thoracic spine without contrast from December 21, 2017: There is stable osseous metastatic disease at T4, T5, T6, T a and T10 vertebral bodies without pathologic compression fracture or extension of tumor into the epidural space  Lab Results From October 4, 2017: PT INR is 0 91 and APTT is 32 seconds December 22, 2017: WBC 11 46, hemoglobin 15 8, platelets 631, creatinine 1 42, calcium 10 3, bili 0 92, alk-phos 71, AST 13, ALT 11, PSA < 0 1, LD is 143 ()  Future Appointments      Date/Time Provider Specialty Site   03/02/2018 09:20 AM Roddy Goodell, M D   Hematology Oncology [facility]   03/28/2018 09:00 AM Ishmael Miner MD PhD Neurosurgery [address] [place] [address]     Signatures    Electronically signed by : MONA Layton ; Dec 31 2017 11:51PM EST                       (Author)

## 2018-01-10 ENCOUNTER — APPOINTMENT (OUTPATIENT)
Dept: LAB | Facility: MEDICAL CENTER | Age: 68
End: 2018-01-10
Payer: MEDICARE

## 2018-01-10 ENCOUNTER — TRANSCRIBE ORDERS (OUTPATIENT)
Dept: ADMINISTRATIVE | Facility: HOSPITAL | Age: 68
End: 2018-01-10

## 2018-01-10 DIAGNOSIS — C79.51 SECONDARY MALIGNANT NEOPLASM OF BONE (HCC): ICD-10-CM

## 2018-01-10 DIAGNOSIS — C64.2 MALIGNANT NEOPLASM OF LEFT KIDNEY, EXCEPT RENAL PELVIS (HCC): ICD-10-CM

## 2018-01-10 DIAGNOSIS — C61 MALIGNANT NEOPLASM OF PROSTATE (HCC): ICD-10-CM

## 2018-01-10 DIAGNOSIS — D49.2 NEOPLASM OF UNSPECIFIED BEHAVIOR OF BONE, SOFT TISSUE, AND SKIN: ICD-10-CM

## 2018-01-10 DIAGNOSIS — C78.00 SECONDARY MALIGNANT NEOPLASM OF LUNG (HCC): ICD-10-CM

## 2018-01-10 LAB
ALBUMIN SERPL BCP-MCNC: 3.2 G/DL (ref 3.5–5)
ALP SERPL-CCNC: 82 U/L (ref 46–116)
ALT SERPL W P-5'-P-CCNC: 14 U/L (ref 12–78)
ANION GAP SERPL CALCULATED.3IONS-SCNC: 7 MMOL/L (ref 4–13)
AST SERPL W P-5'-P-CCNC: 10 U/L (ref 5–45)
BASOPHILS # BLD AUTO: 0.01 THOUSANDS/ΜL (ref 0–0.1)
BASOPHILS NFR BLD AUTO: 0 % (ref 0–1)
BILIRUB SERPL-MCNC: 0.63 MG/DL (ref 0.2–1)
BUN SERPL-MCNC: 15 MG/DL (ref 5–25)
CALCIUM ALBUM COR SERPL-MCNC: 11.1 MG/DL (ref 8.3–10.1)
CALCIUM SERPL-MCNC: 10.5 MG/DL (ref 8.3–10.1)
CHLORIDE SERPL-SCNC: 105 MMOL/L (ref 100–108)
CO2 SERPL-SCNC: 29 MMOL/L (ref 21–32)
CREAT SERPL-MCNC: 1.14 MG/DL (ref 0.6–1.3)
EOSINOPHIL # BLD AUTO: 0.17 THOUSAND/ΜL (ref 0–0.61)
EOSINOPHIL NFR BLD AUTO: 2 % (ref 0–6)
ERYTHROCYTE [DISTWIDTH] IN BLOOD BY AUTOMATED COUNT: 15.5 % (ref 11.6–15.1)
GFR SERPL CREATININE-BSD FRML MDRD: 66 ML/MIN/1.73SQ M
GLUCOSE SERPL-MCNC: 69 MG/DL (ref 65–140)
HCT VFR BLD AUTO: 48 % (ref 36.5–49.3)
HGB BLD-MCNC: 15.9 G/DL (ref 12–17)
LYMPHOCYTES # BLD AUTO: 3.79 THOUSANDS/ΜL (ref 0.6–4.47)
LYMPHOCYTES NFR BLD AUTO: 33 % (ref 14–44)
MCH RBC QN AUTO: 29.4 PG (ref 26.8–34.3)
MCHC RBC AUTO-ENTMCNC: 33.1 G/DL (ref 31.4–37.4)
MCV RBC AUTO: 89 FL (ref 82–98)
MONOCYTES # BLD AUTO: 0.72 THOUSAND/ΜL (ref 0.17–1.22)
MONOCYTES NFR BLD AUTO: 6 % (ref 4–12)
NEUTROPHILS # BLD AUTO: 6.71 THOUSANDS/ΜL (ref 1.85–7.62)
NEUTS SEG NFR BLD AUTO: 59 % (ref 43–75)
NRBC BLD AUTO-RTO: 0 /100 WBCS
PLATELET # BLD AUTO: 207 THOUSANDS/UL (ref 149–390)
PMV BLD AUTO: 10.4 FL (ref 8.9–12.7)
POTASSIUM SERPL-SCNC: 3.8 MMOL/L (ref 3.5–5.3)
PROT SERPL-MCNC: 7.7 G/DL (ref 6.4–8.2)
RBC # BLD AUTO: 5.4 MILLION/UL (ref 3.88–5.62)
SODIUM SERPL-SCNC: 141 MMOL/L (ref 136–145)
WBC # BLD AUTO: 11.44 THOUSAND/UL (ref 4.31–10.16)

## 2018-01-10 PROCEDURE — 80053 COMPREHEN METABOLIC PANEL: CPT

## 2018-01-10 PROCEDURE — 85025 COMPLETE CBC W/AUTO DIFF WBC: CPT

## 2018-01-10 PROCEDURE — 36415 COLL VENOUS BLD VENIPUNCTURE: CPT

## 2018-01-10 NOTE — MISCELLANEOUS
Message     Recorded as Task   Date: 03/30/2017 11:20 AM, Created By: Amara Almazan   Task Name: Call Back   Assigned To: Elsi Douglas   Regarding Patient: Lisle Frankel, Status: Active   Comment:    Alina Adams - 30 Mar 2017 11:20 AM     TASK CREATED  Patient called is asking will Dr Monique Richardson prescribe pain medication for him  Elsi Douglas - 30 Mar 2017 11:57 AM     TASK EDITED  Called and spoke with patient regarding his recent pathological fracture and need for prescribed pain medication  Patient made aware that Dr Monique Richardson is out the office today but would be willing to prescribe him Percocet 5-325mg on Friday  Patient is going to try his PCP and if they won't prescribe he will let our office know  Active Problems    1  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) (C61)   2  Arthritis of left knee (716 96) (M17 12)   3  Bone metastasis (198 5) (C79 51)   4  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)   5  Fatigue (780 79) (R53 83)   6  Metastatic malignant neoplasm to lung (197 0) (C78 00)   7  Multinodular goiter (nontoxic) (241 1) (E04 2)   8  Myalgia (729 1) (M79 1)   9  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) (V18 859)    Current Meds   1  AmLODIPine Besylate 5 MG Oral Tablet; Therapy: 92YIW8703 to (Evaluate:17Nov2014) Recorded   2  Gabapentin 300 MG Oral Capsule; Therapy: 45XDM2772 to (Evaluate:17Nov2014) Recorded   3  Inlyta 1 MG Oral Tablet; TAKE 3 TABLET Twice daily; Therapy: 04Apq8341 to (Evaluate:99Cdt2116); Last Rx:24Mar2017 Ordered   4  Lisinopril-Hydrochlorothiazide 20-12 5 MG Oral Tablet; Therapy: 54PBM2474 to (Evaluate:17Nov2014) Recorded   5  Oxycodone-Acetaminophen 5-325 MG Oral Tablet; TAKE 1 TABLET EVERY 6 HOURS   AS NEEDED FOR PAIN;   Therapy: 33PCR7730 to (Evaluate:08Jan2017); Last Rx:42Uvc6213 Ordered    Allergies    1  No Known Drug Allergies    Signatures   Electronically signed by :  Reuben Keane RN; Mar 30 2017 11:58AM EST (Author)

## 2018-01-10 NOTE — MISCELLANEOUS
Message     Recorded as Task   Date: 04/11/2017 12:27 PM, Created By: Kriss Snyder   Task Name: Follow Up   Assigned To: Gabo Abebe   Regarding Patient: Tari Hylton, Status: Active   CommentRobert Mendosa - 11 Apr 2017 12:27 PM     TASK CREATED  Caller: Self; General Medical Question; [phone] (Home); [phone] (Work)  SHOULD PT STOP HIS AXITINIB BEFORE HIS SURG THIS [address] [place] [address] 3 - 11 Apr 2017 1:09 PM     TASK EDITED  Called and left a voice message on patient's cell phone in regards to stopping his axitinib before his ulna fixation surgery on Friday  Per Dr Gio Peguero, patient should hold his axitinib 36 hrs before his surgery  Active Problems    1  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) (C61)   2  Arthritis of left knee (716 96) (M17 12)   3  Bone metastasis (198 5) (C79 51)   4  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)   5  Fatigue (780 79) (R53 83)   6  Metastatic malignant neoplasm to lung (197 0) (C78 00)   7  Multinodular goiter (nontoxic) (241 1) (E04 2)   8  Myalgia (729 1) (M79 1)   9  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) (C68 536)    Current Meds   1  AmLODIPine Besylate 5 MG Oral Tablet; Therapy: 78LTK9710 to (Evaluate:17Nov2014) Recorded   2  Gabapentin 300 MG Oral Capsule; Therapy: 22SPK9010 to (Evaluate:17Nov2014) Recorded   3  Inlyta 1 MG Oral Tablet; TAKE 3 TABLET Twice daily; Therapy: 55Ybf2149 to (Evaluate:46Xym4271); Last Rx:24Mar2017 Ordered   4  Klor-Con M20 20 MEQ Oral Tablet Extended Release; TAKE 1 TABLET DAILY; Therapy: (Recorded:48Fvo3648) to Recorded   5  Lisinopril-Hydrochlorothiazide 20-12 5 MG Oral Tablet; Therapy: 03ZVF1223 to (Evaluate:17Nov2014) Recorded   6  Oxycodone-Acetaminophen 5-325 MG Oral Tablet; TAKE 1 TABLET EVERY 6 HOURS   AS NEEDED FOR PAIN;   Therapy: 31BWO5496 to (Evaluate:29Apr2017); Last Rx:30Mar2017 Ordered   7   Terazosin HCl - 5 MG Oral Capsule; TAKE 1 CAPSULE AT BEDTIME NIGHTLY; Therapy: (Recorded:31Ixs3601) to Recorded    Allergies    1  No Known Drug Allergies    Signatures   Electronically signed by :  Maria De Jesus Martinez RN; Apr 11 2017  1:09PM EST                       (Author)

## 2018-01-12 ENCOUNTER — HOSPITAL ENCOUNTER (OUTPATIENT)
Dept: INFUSION CENTER | Facility: CLINIC | Age: 68
Discharge: HOME | End: 2018-01-14
Payer: MEDICARE

## 2018-01-12 VITALS
RESPIRATION RATE: 18 BRPM | DIASTOLIC BLOOD PRESSURE: 88 MMHG | HEART RATE: 84 BPM | SYSTOLIC BLOOD PRESSURE: 157 MMHG | TEMPERATURE: 96.9 F

## 2018-01-12 PROCEDURE — 96401 CHEMO ANTI-NEOPL SQ/IM: CPT

## 2018-01-12 RX ADMIN — DENOSUMAB 120 MG: 120 INJECTION SUBCUTANEOUS at 14:16

## 2018-01-12 NOTE — MISCELLANEOUS
Message   Recorded as Task   Date: 03/29/2017 09:59 AM, Created By: Robby Riley   Task Name: Call Back   Assigned To: Gabo Abebe   Regarding Patient: Jose De Jesus Sosa, Status: Active   CommentAlto Sheridan - 29 Mar 2017 9:59 AM     TASK CREATED  Caller: Self; Personal; [phone] (Home); [phone] (Work)  patient would like to know if Dr Mark Busch recommend for him to have any other testing or scanning done to detect cancer in his bones  If you have any further questions or need to reach the patient you can reach him at the number provided  Mary Arreola - 29 Mar 2017 1:33 PM     TASK EDITED  Dr Mark Busch prefers the patient have x-rays of B/L upper and lower extremities instead of a NM bone scan  Discussed this with patient  Patient will be going to [facility] for the x-rays  Scripts were faxed  Active Problems    1  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) (C61)   2  Arthritis of left knee (716 96) (M17 12)   3  Bone metastasis (198 5) (C79 51)   4  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)   5  Fatigue (780 79) (R53 83)   6  Metastatic malignant neoplasm to lung (197 0) (C78 00)   7  Multinodular goiter (nontoxic) (241 1) (E04 2)   8  Myalgia (729 1) (M79 1)   9  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) (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 163)    Current Meds   1  AmLODIPine Besylate 5 MG Oral Tablet; Therapy: 71TQP7904 to (Evaluate:17Nov2014) Recorded   2  Gabapentin 300 MG Oral Capsule; Therapy: 06EDR8258 to (Evaluate:17Nov2014) Recorded   3  Inlyta 1 MG Oral Tablet; TAKE 3 TABLET Twice daily; Therapy: 57Icx3373 to (Evaluate:07Pqf0574); Last Rx:24Mar2017 Ordered   4  Lisinopril-Hydrochlorothiazide 20-12 5 MG Oral Tablet; Therapy: 26SPW6739 to (Evaluate:17Nov2014) Recorded   5  Oxycodone-Acetaminophen 5-325 MG Oral Tablet; TAKE 1 TABLET EVERY 6 HOURS   AS NEEDED FOR PAIN;   Therapy: 05SOC8752 to (Evaluate:08Jan2017);  Last Rx:20Vci4065 Ordered    Allergies    1  No Known Drug Allergies    Plan  Adenocarcinoma of prostate, Bone metastasis, Clear cell adenocarcinoma of left kidney,  Metastatic malignant neoplasm to lung    · * XR FOREARM 2 VIEW LEFT; Status:Active - Retrospective By Protocol Authorization; Requested for:29Mar2017;    · * XR FOREARM 2 VIEW RIGHT; Status:Active - Retrospective By Protocol Authorization; Requested for:29Mar2017;     Signatures   Electronically signed by :  Hiren Jorge RN; Mar 29 2017  1:34PM EST                       (Author)

## 2018-01-12 NOTE — PROGRESS NOTES
Discussion/Summary  Social Work-Discussion Summary [facility]: Patient is being seen for a distress screenning assessment  LSW reviewed pt's distress thermometer completed by pt on 10/03/2017 in rad onc  Pt rated their distress a 0/10 and denied psychosocial problems  Based on pt's score, a social work follow up would not be indicated  If pt expresses psychosocial needs with their oncology care, LSW is available to provide cancer care counseling        Signatures   Electronically signed by : DAVIS Chan; Oct  9 2017  2:17PM EST                       (Author)

## 2018-01-13 VITALS
BODY MASS INDEX: 45.32 KG/M2 | OXYGEN SATURATION: 92 % | DIASTOLIC BLOOD PRESSURE: 110 MMHG | RESPIRATION RATE: 18 BRPM | WEIGHT: 306 LBS | HEIGHT: 69 IN | TEMPERATURE: 98 F | SYSTOLIC BLOOD PRESSURE: 170 MMHG | HEART RATE: 100 BPM

## 2018-01-13 VITALS
SYSTOLIC BLOOD PRESSURE: 146 MMHG | HEIGHT: 69 IN | WEIGHT: 309.01 LBS | TEMPERATURE: 96.3 F | OXYGEN SATURATION: 92 % | HEART RATE: 92 BPM | BODY MASS INDEX: 45.77 KG/M2 | DIASTOLIC BLOOD PRESSURE: 90 MMHG | RESPIRATION RATE: 16 BRPM

## 2018-01-13 VITALS
DIASTOLIC BLOOD PRESSURE: 94 MMHG | HEART RATE: 111 BPM | BODY MASS INDEX: 46.65 KG/M2 | SYSTOLIC BLOOD PRESSURE: 160 MMHG | WEIGHT: 315 LBS | OXYGEN SATURATION: 93 % | RESPIRATION RATE: 18 BRPM | HEIGHT: 69 IN | TEMPERATURE: 98.2 F

## 2018-01-13 VITALS
SYSTOLIC BLOOD PRESSURE: 148 MMHG | OXYGEN SATURATION: 93 % | HEART RATE: 92 BPM | BODY MASS INDEX: 45.65 KG/M2 | WEIGHT: 308.2 LBS | HEIGHT: 69 IN | RESPIRATION RATE: 18 BRPM | DIASTOLIC BLOOD PRESSURE: 84 MMHG

## 2018-01-13 NOTE — PROGRESS NOTES
Discussion/Summary  Social Work-Discussion Summary [facility]: Patient is being seen for a distress screenning assessment  Received and reviewed Pt's Distress Thermometer completed by Pt on 5/8/17 in the [address] [place] [address] Floor Oncology office  Pt rated his distress at a 1/10 and named nervousness and worry as emotional problems  Based upon Pt's low distress score, a social work follow up is not indicated  If Pt expresses psychosocial needs, Cancer Counselor is available to provide counseling and intervention         Signatures   Electronically signed by : Latha Avendano, [address]; May 11 2017  9:43AM EST                       (Author)

## 2018-01-13 NOTE — MISCELLANEOUS
Message  I spoke to Dr Gerry Collins by telephone today [phone] and reviewed Rosario Jimenez's case  A pathologic fracture of the distal right ulna diaphysis is noted on right wrist x-rays of March 27, 2017, an osteolytic lesion measures 25 x 9 mm bridging a long segment of the cortex  The patient has been managed with a soft cast thus far  Dr Victoria Pantoja noted that a plate could be utilized for orthopedic stabilization but questioned whether the patient would be better served by an orthopedic oncologist who might debulk the lesion and replace the defect with bone cement  I also reviewed this case with Dr Lillie Osler by telephone who recommended radiotherapy to the osteolytic lesion  I spoke to the patient and reviewed my discussions with Dr Blas Zimmerman and with Dr Subhash Singh  The patient continues to have pain of the right wrist aggravated by movement  I suggested that he follow-up with Dr Blas Zimmerman in regards to potential orthopedic stabilization or else referral to an orthopedic oncologist at the recommendation of Dr Blas Zimmerman  Also, he is to be evaluated by Dr Subhash Singh or associate for potential palliative radiotherapy, however, radiation therapy is not to be issued until a decision is made as to orthopedic stabilization  In addition he is to eventually be placed on zoledronic acid or denosumab to reduce risk of bone pain and fracture  The patient is to call for progressive right wrist/forearm pain, difficulty with movements of the right hand, or if other new problems arise  Plan  Bone metastasis, Clear cell adenocarcinoma of left kidney    · 2 - Kimberly GREY, [facility]  Radiation Oncology Physician Referral  25 x 9 mm osteolytic  lesion in the cortex of the distal right ulna diaphysis    Status: Active  Requested for:  [id number]  Care Summary provided  : Yes    Signatures   Electronically signed by : MONA Valente ; Mar 28 2017  6:00PM EST                       (Author)

## 2018-01-13 NOTE — MISCELLANEOUS
Message   Recorded as Task   Date: 02/08/2017 04:19 PM, Created By: Wally Joya   Task Name: Miscellaneous   Assigned To: Lalla Seip   Regarding Patient: Malka Carver, Status: Active   CommentHailee Arnett - 08 Feb 2017 4:19 PM     Leatha Garcia is calling to update you about Inlyta they received everything they are going to forward it to [employer] to check if there is funding  if so they will contact the patient and let him know  If not they send it back to [facility] [employer] and they will put him in the patient assistant program  just an Sumit Sovereign  If you have any questions please call [phone]        Active Problems    1  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) (C61)   2  Arthritis of left knee (716 96) (M19 90)   3  Bone metastasis (198 5) (C79 51)   4  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)   5  Metastatic malignant neoplasm to lung (197 0) (C78 00)   6  Multinodular goiter (nontoxic) (241 1) (E04 2)   7  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)    Current Meds   1  AmLODIPine Besylate 5 MG Oral Tablet; Therapy: 48DPD9821 to (Evaluate:17Nov2014) Recorded   2  Gabapentin 300 MG Oral Capsule; Therapy: 23ODL6608 to (Evaluate:17Nov2014) Recorded   3  Inlyta 5 MG Oral Tablet; Take 1 tablet twice daily; Therapy: 68Fqs6890 to (Evaluate:22Jan2017); Last Rx:00Jnt8521 Ordered   4  Lisinopril-Hydrochlorothiazide 20-12 5 MG Oral Tablet; Therapy: 21KZW3685 to (Evaluate:17Nov2014) Recorded   5  Oxycodone-Acetaminophen 5-325 MG Oral Tablet; TAKE 1 TABLET EVERY 6 HOURS   AS NEEDED FOR PAIN;   Therapy: 52OTL1229 to (Evaluate:08Jan2017); Last Rx:26Chs6623 Ordered    Allergies    1  No Known Drug Allergies    Signatures   Electronically signed by :  Cole Cortes RN; Feb 8 2017  4:23PM EST                       (Author)

## 2018-01-13 NOTE — MISCELLANEOUS
Message   Recorded as Task   Date: 04/14/2016 11:05 AM, Created By: Deborah Cisneros   Task Name: Call Back   Assigned To: Danielle Power   Regarding Patient: Andry Weinberg, Status: Active   Comment:    Gaye Fall - 14 Apr 2016 11:05 AM     TASK CREATED  Caller: Heather Mercer, Other; General Medical Question; [phone]  Heather Mercer called from [facility]  about the clearance for pt's knee surgery  She needs more information, please call her back  Danielle Power - 14 Apr 2016 11:35 AM     TASK EDITED  Called and obtained lab work from 4/5/16 from [facility]  Dr Ki Gordon to review tomorrow  Danielle Power - 14 Apr 2016 11:36 AM     TASK EDITED  Medical clearance needs to be faxed to [phone]  Active Problems    1  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) (C61)   2  Arthritis of left knee (716 96) (M19 90)   3  Cancer with pulmonary metastases (197 0) (C78 00)   4  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)    Current Meds   1  AmLODIPine Besylate 5 MG Oral Tablet; Therapy: 97MLZ7782 to (Evaluate:17Nov2014) Recorded   2  Gabapentin 300 MG Oral Capsule; Therapy: 71MEZ6300 to (Evaluate:17Nov2014) Recorded   3  Lisinopril-Hydrochlorothiazide 20-12 5 MG Oral Tablet; Therapy: 51CDC6469 to (Evaluate:17Nov2014) Recorded   4  Sutent 25 MG Oral Capsule; TAKE 1 CAPSULE DAILY FOR 4 WEEKS, THEN OFF 2   WEEKS; Therapy: 21Apr2015 to (Evaluate:03Jun2016); Last Rx:08Apr2016 Ordered    Allergies    1  No Known Drug Allergies    Signatures   Electronically signed by :  Sukhwinder Lynch RN; Apr 14 2016 11:36AM EST                       (Author)

## 2018-01-14 VITALS
DIASTOLIC BLOOD PRESSURE: 96 MMHG | BODY MASS INDEX: 46.65 KG/M2 | SYSTOLIC BLOOD PRESSURE: 146 MMHG | OXYGEN SATURATION: 94 % | HEART RATE: 96 BPM | WEIGHT: 315 LBS | RESPIRATION RATE: 18 BRPM | HEIGHT: 69 IN | TEMPERATURE: 97.1 F

## 2018-01-14 VITALS
HEIGHT: 69 IN | HEART RATE: 102 BPM | DIASTOLIC BLOOD PRESSURE: 86 MMHG | OXYGEN SATURATION: 93 % | WEIGHT: 307 LBS | SYSTOLIC BLOOD PRESSURE: 142 MMHG | RESPIRATION RATE: 18 BRPM | BODY MASS INDEX: 45.47 KG/M2 | TEMPERATURE: 97.9 F

## 2018-01-14 VITALS
OXYGEN SATURATION: 94 % | SYSTOLIC BLOOD PRESSURE: 150 MMHG | TEMPERATURE: 97 F | HEART RATE: 97 BPM | BODY MASS INDEX: 43.25 KG/M2 | RESPIRATION RATE: 16 BRPM | DIASTOLIC BLOOD PRESSURE: 80 MMHG | HEIGHT: 70 IN | WEIGHT: 302.13 LBS

## 2018-01-14 VITALS
HEART RATE: 97 BPM | TEMPERATURE: 97 F | OXYGEN SATURATION: 94 % | DIASTOLIC BLOOD PRESSURE: 80 MMHG | BODY MASS INDEX: 43.25 KG/M2 | SYSTOLIC BLOOD PRESSURE: 150 MMHG | RESPIRATION RATE: 16 BRPM | HEIGHT: 70 IN | WEIGHT: 302.13 LBS

## 2018-01-14 VITALS
OXYGEN SATURATION: 90 % | HEART RATE: 11 BPM | RESPIRATION RATE: 18 BRPM | WEIGHT: 315 LBS | DIASTOLIC BLOOD PRESSURE: 80 MMHG | BODY MASS INDEX: 46.65 KG/M2 | HEIGHT: 69 IN | SYSTOLIC BLOOD PRESSURE: 142 MMHG

## 2018-01-14 VITALS
HEIGHT: 69 IN | BODY MASS INDEX: 46.3 KG/M2 | OXYGEN SATURATION: 96 % | HEART RATE: 92 BPM | SYSTOLIC BLOOD PRESSURE: 142 MMHG | WEIGHT: 312.6 LBS | DIASTOLIC BLOOD PRESSURE: 90 MMHG

## 2018-01-14 VITALS
OXYGEN SATURATION: 94 % | RESPIRATION RATE: 18 BRPM | TEMPERATURE: 97.3 F | DIASTOLIC BLOOD PRESSURE: 100 MMHG | SYSTOLIC BLOOD PRESSURE: 144 MMHG | BODY MASS INDEX: 46.21 KG/M2 | HEIGHT: 69 IN | WEIGHT: 312 LBS | HEART RATE: 96 BPM

## 2018-01-15 NOTE — MISCELLANEOUS
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)   5  Metastatic malignant neoplasm to lung (197 0) (C78 00)   6  Multinodular goiter (nontoxic) (241 1) (E04 2)   7  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) (J78 022)    Current Meds   1  AmLODIPine Besylate 5 MG Oral Tablet; Therapy: 79WEU1829 to (Evaluate:17Nov2014) Recorded   2  Gabapentin 300 MG Oral Capsule; Therapy: 96YVO2972 to (Evaluate:17Nov2014) Recorded   3  Inlyta 5 MG Oral Tablet; Take 1 tablet twice daily; Therapy: 95Zuy8009 to (Evaluate:22Jan2017); Last Rx:00San8152 Ordered   4  Lisinopril-Hydrochlorothiazide 20-12 5 MG Oral Tablet; Therapy: 31PBF2178 to (Evaluate:17Nov2014) Recorded   5  Oxycodone-Acetaminophen 5-325 MG Oral Tablet; TAKE 1 TABLET EVERY 6 HOURS   AS NEEDED FOR PAIN;   Therapy: 30YPX2664 to (Evaluate:08Jan2017); Last Rx:48Bcz9020 Ordered    Allergies    1  No Known Drug Allergies    Signatures   Electronically signed by :  Carolyn Tipton RN; Mar  1 2017  9:18AM EST                       (Author)

## 2018-01-15 NOTE — RESULT NOTES
Message  A note to Dr Johanna Hernández: Laboratory reports from April 5, 2016 were reviewed  Specifically, WBC 8 7, hemoglobin 15 3, platelets 439, creatinine 1 39, calcium 10 5, PT INR is 0 895, APTT is 29 7 seconds  At this time there is no contraindication from a hematological viewpoint for proceeding with left knee surgical revision as previously planned for April 19, 2016 , However, I would recommend repeating BMP preoperatively to be certain that the serum calcium is not rising noting that previously the serum calcium was in normal range  Thank you      Plan  Arthritis of left knee, Cancer with pulmonary metastases, Clear cell adenocarcinoma of  left kidney    · (1) BASIC METABOLIC PROFILE; Status:Active;  Requested for:15Apr2016;

## 2018-01-16 NOTE — MISCELLANEOUS
Message   Recorded as Task   Date: 03/24/2016 10:30 AM, Created By: Sunday Silence   Task Name: Call Back   Assigned To: Marylou Cooks   Regarding Patient: Ashkan Hoover, Status: Active   Comment:   Renetta Flower - 24 Mar 2016 10:30 AM    TASK Brook Craven from Dr Deandre Patel office called asking if this patient can have a Total knee revision  patient currently taking Sutent  His last day before his rest is april 7th  They want to do the surgery on April 19th  Is 12 days long enough to do the surgery after stopping chemo  please call Dr Deandre Patel office back at [phone]  Mary Arreola - 25 Mar 2016 8:48 AM    TASK EDITED  Lina Allen and spoke with RN from Dr Deandre Patel office  Pt is to discontinue his sutent this Sunday, 3/27/16  Pt is to not resume taking his sutent till a month following the surgery (which would currently be May 19, 2016)  Dr Deandre hwang requires a medical clearance for the surgery from Dr Catalina Barraza  Pt was scheduled for a surgical clearance appointment on April 8, 2016 at 2pm  Called pt and informed him about stopping the sutent on Sunday and about his upcoming appt with Dr Catalina Barraza  Fax for Dr Deandre Patel office is [phone]        1 Amended By: Marylou Cooks; Mar 28 2016 4:18 PM EST    Active Problems   1  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) (C61)  2  Arthritis of left knee (716 96) (M19 90)  3  Cancer with pulmonary metastases (197 0) (C78 00)  4  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)    Current Meds  1  AmLODIPine Besylate 5 MG Oral Tablet; Therapy: 64LHZ1819 to (Evaluate:17Nov2014) Recorded  2  Gabapentin 300 MG Oral Capsule; Therapy: 72LXI4110 to (Evaluate:17Nov2014) Recorded  3  Lisinopril-Hydrochlorothiazide 20-12 5 MG Oral Tablet; Therapy: 40DXI9004 to (Evaluate:17Nov2014) Recorded  4  Sutent 25 MG Oral Capsule; TAKE 1 CAPSULE DAILY FOR 4 WEEKS, THEN OFF 2   WEEKS; Therapy: 96Ziz7321 to (Evaluate:29Apr2016);  Last SV:68ZKM0793 Ordered    Allergies   1  No Known Drug Allergies    Signatures   Electronically signed by :  Dion Reza RN; Mar 28 2016  4:18PM EST                       (Author)

## 2018-01-22 VITALS
WEIGHT: 298.38 LBS | BODY MASS INDEX: 42.72 KG/M2 | TEMPERATURE: 97.1 F | HEART RATE: 101 BPM | DIASTOLIC BLOOD PRESSURE: 100 MMHG | HEIGHT: 70 IN | SYSTOLIC BLOOD PRESSURE: 184 MMHG | OXYGEN SATURATION: 94 % | RESPIRATION RATE: 16 BRPM

## 2018-01-22 VITALS
RESPIRATION RATE: 20 BRPM | HEART RATE: 100 BPM | HEIGHT: 70 IN | BODY MASS INDEX: 41.29 KG/M2 | SYSTOLIC BLOOD PRESSURE: 140 MMHG | WEIGHT: 288.38 LBS | TEMPERATURE: 97.7 F | DIASTOLIC BLOOD PRESSURE: 72 MMHG

## 2018-01-23 VITALS
HEIGHT: 70 IN | HEART RATE: 91 BPM | RESPIRATION RATE: 20 BRPM | WEIGHT: 289 LBS | DIASTOLIC BLOOD PRESSURE: 98 MMHG | TEMPERATURE: 97.1 F | SYSTOLIC BLOOD PRESSURE: 146 MMHG | BODY MASS INDEX: 41.37 KG/M2 | OXYGEN SATURATION: 92 %

## 2018-01-23 VITALS
WEIGHT: 288.38 LBS | HEIGHT: 70 IN | BODY MASS INDEX: 41.29 KG/M2 | OXYGEN SATURATION: 96 % | RESPIRATION RATE: 20 BRPM | TEMPERATURE: 97.7 F | HEART RATE: 100 BPM

## 2018-01-23 NOTE — MISCELLANEOUS
Message   Recorded as Task   Date: 01/08/2018 10:29 AM, Created By: Rosemarie Hare   Task Name: Call Back   Assigned To: Mahi Lynn   Regarding Patient: Gianni Leigh, Status: Active   CommentJohannyazar Pa - 08 Jan 2018 10:29 AM     TASK CREATED  Caller: Self; [phone] (Home)  Pt has questions about his xjeva - does not think he got b/w scripts when he saw Dr Katarzyna Guan last    Call from pt   injection scheduled on 1 12 18 Lab work to be done 1 10 18  Active Problems    1  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) (C61)   2  Arthritis of left knee (716 96) (M17 12)   3  Bone metastasis (198 5) (C79 51)   4  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)   5  Fatigue (780 79) (R53 83)   6  Metastatic malignant neoplasm to lung (197 0) (C78 00)   7  Multinodular goiter (nontoxic) (241 1) (E04 2)   8  Myalgia (729 1) (M79 1)   9  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)   10  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)   11  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) (K41 895)    Current Meds   1  AmLODIPine Besylate 5 MG Oral Tablet; TAKE 1 TABLET DAILY; Therapy: 84SAZ4802 to (Evaluate:71Ceb5867) Recorded   2  Gabapentin 300 MG Oral Capsule; TAKE 1 CAPSULE 3 TIMES DAILY; Therapy: 82VSK9589 to Recorded   3  Inlyta 1 MG Oral Tablet; TAKE 4 TABLET Twice daily; Therapy: 97Ggi0862 to (Evaluate:12Jxf1321); Last Rx:32Scz9925; Status: ACTIVE -   Retrospective By Protocol Authorization Ordered   4  Lisinopril-Hydrochlorothiazide 20-12 5 MG Oral Tablet; TAKE 1 TABLET DAILY; Therapy: 27DIB0729 to (Evaluate:72Eyh5731) Recorded   5  Tylenol 325 MG Oral Tablet; TAKE 1 TO 2 TABLETS EVERY 6 HOURS AS NEEDED; Therapy: (Recorded:48Hyk3198) to Recorded    Allergies    1   No Known Drug Allergies    Signatures   Electronically signed by : Mariah Nair, ; Jan 8 2018 10:58AM EST                       (Author)

## 2018-01-23 NOTE — MISCELLANEOUS
Message   Recorded as Task   Date: 12/27/2017 09:19 AM, Created By: Willie Chang   Task Name: Call Back   Assigned To: Mahi Lynn   Regarding Patient: Rosena Saint, Status: Active   Comment:    Sheyla Galeas - 27 Dec 2359 2:30 AM     TASK CREATED  significant findings    ct of chest abd and pelvis from 12/20     reta [phone]   [facility] - 27 Dec 2017 10:16 AM     TASK REASSIGNED: Previously Assigned To Mary Arreola   Pt has Dr Grace Serrano appt  12 29 2017      Active Problems    1  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) (C61)   2  Arthritis of left knee (716 96) (M17 12)   3  Bone metastasis (198 5) (C79 51)   4  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)   5  Fatigue (780 79) (R53 83)   6  Metastatic malignant neoplasm to lung (197 0) (C78 00)   7  Multinodular goiter (nontoxic) (241 1) (E04 2)   8  Myalgia (729 1) (M79 1)   9  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)   10  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)   11  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) (X25 132)    Current Meds   1  AmLODIPine Besylate 5 MG Oral Tablet; TAKE 1 TABLET DAILY; Therapy: 35GJE5473 to (Evaluate:26Ejw6634) Recorded   2  Gabapentin 300 MG Oral Capsule; TAKE 1 CAPSULE 3 TIMES DAILY; Therapy: 27LSJ2558 to Recorded   3  Inlyta 1 MG Oral Tablet; TAKE 4 TABLET Twice daily; Therapy: 36Bxe0599 to (Evaluate:76Xif2718); Last Rx:67Kgv3909 Ordered   4  Lisinopril-Hydrochlorothiazide 20-12 5 MG Oral Tablet; TAKE 1 TABLET DAILY; Therapy: 82ASA4301 to (Evaluate:79Icq2743) Recorded   5  Tylenol 325 MG Oral Tablet; TAKE 1 TO 2 TABLETS EVERY 6 HOURS AS NEEDED; Therapy: (Recorded:94Drn6777) to Recorded    Allergies    1  No Known Drug Allergies    Plan  Adenocarcinoma of prostate, Bone metastasis, Clear cell adenocarcinoma of left kidney,  Metastatic malignant neoplasm to lung    · (1) CBC/PLT/DIFF; Status:Active; Requested for:00Ltz8460;    · (1) CBC/PLT/DIFF; Status:Active;  Requested for:22Jan2018;    · (1) COMPREHENSIVE METABOLIC PANEL; Status:Active; Requested for:17Kix0619;    · (1) COMPREHENSIVE METABOLIC PANEL; Status:Active;  Requested for:69Tpk3595;    · Follow-up visit in 2 months Evaluation and Treatment  Follow-up  Status: Complete   Done: 83RIL4429 09:20AM    Signatures   Electronically signed by : Gibson Armijo, ; Dec 29 2017 11:25AM EST                       (Author)

## 2018-02-07 ENCOUNTER — APPOINTMENT (OUTPATIENT)
Dept: LAB | Facility: MEDICAL CENTER | Age: 68
End: 2018-02-07
Payer: MEDICARE

## 2018-02-07 DIAGNOSIS — C79.51 SECONDARY MALIGNANT NEOPLASM OF BONE (HCC): ICD-10-CM

## 2018-02-07 DIAGNOSIS — C64.2 MALIGNANT NEOPLASM OF LEFT KIDNEY, EXCEPT RENAL PELVIS (HCC): ICD-10-CM

## 2018-02-07 DIAGNOSIS — C61 MALIGNANT NEOPLASM OF PROSTATE (HCC): ICD-10-CM

## 2018-02-07 DIAGNOSIS — C78.00 SECONDARY MALIGNANT NEOPLASM OF LUNG (HCC): ICD-10-CM

## 2018-02-07 LAB
ALBUMIN SERPL BCP-MCNC: 3.1 G/DL (ref 3.5–5)
ALP SERPL-CCNC: 80 U/L (ref 46–116)
ALT SERPL W P-5'-P-CCNC: 14 U/L (ref 12–78)
ANION GAP SERPL CALCULATED.3IONS-SCNC: 5 MMOL/L (ref 4–13)
AST SERPL W P-5'-P-CCNC: 15 U/L (ref 5–45)
BASOPHILS # BLD AUTO: 0.02 THOUSANDS/ΜL (ref 0–0.1)
BASOPHILS NFR BLD AUTO: 0 % (ref 0–1)
BILIRUB SERPL-MCNC: 0.68 MG/DL (ref 0.2–1)
BUN SERPL-MCNC: 21 MG/DL (ref 5–25)
CALCIUM SERPL-MCNC: 10 MG/DL (ref 8.3–10.1)
CHLORIDE SERPL-SCNC: 102 MMOL/L (ref 100–108)
CO2 SERPL-SCNC: 30 MMOL/L (ref 21–32)
CREAT SERPL-MCNC: 1.42 MG/DL (ref 0.6–1.3)
EOSINOPHIL # BLD AUTO: 0.25 THOUSAND/ΜL (ref 0–0.61)
EOSINOPHIL NFR BLD AUTO: 2 % (ref 0–6)
ERYTHROCYTE [DISTWIDTH] IN BLOOD BY AUTOMATED COUNT: 15.5 % (ref 11.6–15.1)
GFR SERPL CREATININE-BSD FRML MDRD: 50 ML/MIN/1.73SQ M
GLUCOSE SERPL-MCNC: 85 MG/DL (ref 65–140)
HCT VFR BLD AUTO: 49.6 % (ref 36.5–49.3)
HGB BLD-MCNC: 16.2 G/DL (ref 12–17)
LYMPHOCYTES # BLD AUTO: 4.5 THOUSANDS/ΜL (ref 0.6–4.47)
LYMPHOCYTES NFR BLD AUTO: 35 % (ref 14–44)
MCH RBC QN AUTO: 29.3 PG (ref 26.8–34.3)
MCHC RBC AUTO-ENTMCNC: 32.7 G/DL (ref 31.4–37.4)
MCV RBC AUTO: 90 FL (ref 82–98)
MONOCYTES # BLD AUTO: 0.82 THOUSAND/ΜL (ref 0.17–1.22)
MONOCYTES NFR BLD AUTO: 6 % (ref 4–12)
NEUTROPHILS # BLD AUTO: 7.32 THOUSANDS/ΜL (ref 1.85–7.62)
NEUTS SEG NFR BLD AUTO: 57 % (ref 43–75)
NRBC BLD AUTO-RTO: 0 /100 WBCS
PLATELET # BLD AUTO: 232 THOUSANDS/UL (ref 149–390)
PMV BLD AUTO: 10.5 FL (ref 8.9–12.7)
POTASSIUM SERPL-SCNC: 3.5 MMOL/L (ref 3.5–5.3)
PROT SERPL-MCNC: 7.5 G/DL (ref 6.4–8.2)
RBC # BLD AUTO: 5.53 MILLION/UL (ref 3.88–5.62)
SODIUM SERPL-SCNC: 137 MMOL/L (ref 136–145)
WBC # BLD AUTO: 12.93 THOUSAND/UL (ref 4.31–10.16)

## 2018-02-07 PROCEDURE — 36415 COLL VENOUS BLD VENIPUNCTURE: CPT

## 2018-02-07 PROCEDURE — 85025 COMPLETE CBC W/AUTO DIFF WBC: CPT

## 2018-02-07 PROCEDURE — 80053 COMPREHEN METABOLIC PANEL: CPT

## 2018-02-09 ENCOUNTER — HOSPITAL ENCOUNTER (OUTPATIENT)
Dept: INFUSION CENTER | Facility: CLINIC | Age: 68
Discharge: HOME | End: 2018-02-09
Payer: MEDICARE

## 2018-02-09 DIAGNOSIS — C79.51 BONE METASTASIS (HCC): Primary | ICD-10-CM

## 2018-02-09 PROCEDURE — 96401 CHEMO ANTI-NEOPL SQ/IM: CPT

## 2018-02-09 RX ADMIN — DENOSUMAB 120 MG: 120 INJECTION SUBCUTANEOUS at 10:59

## 2018-02-09 NOTE — PROGRESS NOTES
Pt without complaint, denies any jaw discomfort, any recent or upcoming dental proceudures  Pt's serum Ca=10 0  Xgeva admininstered as ordered, pt tolerated well, left unit in stable condition, AVS provided

## 2018-02-19 DIAGNOSIS — C78.00 SECONDARY MALIGNANT NEOPLASM OF LUNG (HCC): ICD-10-CM

## 2018-02-19 DIAGNOSIS — C61 MALIGNANT NEOPLASM OF PROSTATE (HCC): ICD-10-CM

## 2018-02-19 DIAGNOSIS — C64.2 MALIGNANT NEOPLASM OF LEFT KIDNEY, EXCEPT RENAL PELVIS (HCC): ICD-10-CM

## 2018-02-19 DIAGNOSIS — C79.51 SECONDARY MALIGNANT NEOPLASM OF BONE (HCC): ICD-10-CM

## 2018-03-02 ENCOUNTER — OFFICE VISIT (OUTPATIENT)
Dept: HEMATOLOGY ONCOLOGY | Facility: CLINIC | Age: 68
End: 2018-03-02
Payer: MEDICARE

## 2018-03-02 VITALS
BODY MASS INDEX: 41.49 KG/M2 | WEIGHT: 289.8 LBS | OXYGEN SATURATION: 97 % | HEART RATE: 92 BPM | DIASTOLIC BLOOD PRESSURE: 100 MMHG | TEMPERATURE: 97.3 F | SYSTOLIC BLOOD PRESSURE: 150 MMHG | HEIGHT: 70 IN

## 2018-03-02 DIAGNOSIS — C78.00 MALIGNANT NEOPLASM METASTATIC TO LUNG, UNSPECIFIED LATERALITY (HCC): ICD-10-CM

## 2018-03-02 DIAGNOSIS — C64.2 CLEAR CELL ADENOCARCINOMA OF KIDNEY, LEFT (HCC): ICD-10-CM

## 2018-03-02 DIAGNOSIS — C79.51 BONE METASTASES (HCC): Primary | ICD-10-CM

## 2018-03-02 PROBLEM — C64.9 CLEAR CELL CARCINOMA OF KIDNEY (HCC): Status: ACTIVE | Noted: 2018-03-02

## 2018-03-02 PROBLEM — C64.9 CLEAR CELL CARCINOMA OF KIDNEY (HCC): Status: RESOLVED | Noted: 2018-03-02 | Resolved: 2018-03-02

## 2018-03-02 PROCEDURE — 99214 OFFICE O/P EST MOD 30 MIN: CPT | Performed by: INTERNAL MEDICINE

## 2018-03-02 NOTE — PROGRESS NOTES
March 2, 2018    Kojo Romero    He has been feeling well  He notes runny nose frequently and bothersome ear stuffiness intermittently and has been using pseudoephedrine with an antihistamine p r n  he has about 2-3 loose bowel movements per day after meals and uses about 1 dose of loperamide per day  He has mild low back pain  MRI scan of the lumbar spine has been scheduled for March 23, 2018 as per [address]  He has  long-term arthritic discomfort of the knees  He has chronic left footdrop  Hematology/Oncology History:    Previous Therapy:      1  Bilateral lung nodules or 1st noted July 2012 (not present on CT scan in 2009), 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 noted on wedge resection on November 18, 2013  Sutent 13 courses from March 22, 2015 until December 2016  Pathologic fracture to the mid right ulna on April 3, 2017 and then surgery with Dr Coral Roth, orthopedic oncologist at [facility] on April 14, 2017 and then postoperative radiotherapy to the right ulna 3000 cGy in 10 fractions from May 17, 2017 to May 31, 2017  XRT to the right posterior ribs 2800 cGy in 7 fractions from July 26, 2017 to August 3, 2017  SBRT to the T6 metastatic lesion completed October 27, 2017      2  Left nephrectomy in August 2007 for renal cell carcinoma clear cell type, grade II-III, T1Nx  3  Prostate adenocarcinoma, Russ score 3+4=7, involving both lobes of the prostate gland, pT2c pN0 , no metastasis to three left pelvic lymph nodes  Current Therapy:     Axitinib initiated March 8, 2017, currently 4 mg b i d  Denosumab 60 mg subcutaneously monthly initiated January 12, 2018, 2 doses thus far  Physical Examination:    Blood pressure 150/100, pulse 92, temperature (!) 97 3 °F (36 3 °C), temperature source Tympanic, height 5' 10" (1 778 m), weight 131 kg (289 lb 12 8 oz), SpO2 97 %  Body surface area is 2 44 meters squared  He appears well  EOMI    Oropharynx is clear   No lymphadenopathy of the neck or axilla  Lungs are clear bilaterally  Heart has regular rate and rhythm  Abdomen is obese  No inguinal lymphadenopathy  No lower extremity edema  No skin rash  Neurological is grossly intact other than left foot drop  The patient uses a cane to assist in balance  Oriented x3, normal mood and affect  ECOG 1    Laboratory:    From February 7, 2018:  Creatinine 1 42, calcium 10 0, AST 15, ALT 14, alk-phos 80, bili 0 68, WBC 12 93, hemoglobin 16 2, platelets 615  Assessment/Plan:    Clemente Ochoa remains clinically asymptomatic of bilateral lung metastasis from metastatic clear-cell carcinoma of left kidney origin  For progression of lung nodules on CT scan of the chest December 2, 2016 and enlargement of destructive lesion of the posterior right seventh rib, axitinib was initiated on March 8, 2017, currently at 4 mg twice daily  On CT of the chest, abdomen and pelvis without contrast of December 20, 2017 there is evidence of further disease progression i e  a right upper lobe pleural based nodule 2 4 x 2 2 cm, previously 1 9 x 2 0 cm, and the right iliac lesion has increased in size to 4 2 x 2 1 cm, previously 3 8 x 2 1 cm  Furthermore, there is concern regarding mild hypercalcemia i e  calcium 10 3 and mild increase in azotemia  Accordingly, a change in systemic therapy is recommended  Specifically, I recommended nivolumab (NCCN category 1, preferred), the purpose, means administration, potential risks were reviewed and the patient was given information regarding nivolumab  Other options include cabozantinib (category 1, preferred), lenvatinib plus everolimus (category 1) or single agent everolimus (category 2A)  After discussion the patient indicated that he will think over the nivolumab and other options, however, at this time he prefers to remain on axitinib  Furthermore, the patient declines increase in the axitinib dose    A follow-up CT scan of the chest, abdomen pelvis without contrast is planned in 2 months  In view of intermittent bothersome ear stuffiness (which is probably unrelated to the advanced renal cell carcinoma) CT of the sinuses is to be done at the same time at the patient's preference  Calcium reabsorption inhibitor therapy is recommended to decrease risk of bone pain and fracture related to bone metastasis as well as to prevent progression of hypercalcemia  As patient was not an ideal candidate for zoledronic acid, denosumab was recommended, he has received 2 doses of denosumab 60 mg subcutaneously monthly thus far, the 3rd dose is scheduled for March 9, 2018 and the 4th dose for April 6, 2018  The patient and his son and daughter-in-law Ashley Lu who was with him in the office today are aware to seek medical attention for cough, shortness of breath, nosebleeds, easy bruising, reduced appetite, loose bowel movements, progressive posterior neck pain, progressive right mid posterior chest pain, paresthesias of the upper extremities, difficulty with ambulation, easy fatigue, or if other new problems arise  Otherwise, I plan to see him again in 2 months

## 2018-03-02 NOTE — PATIENT INSTRUCTIONS
The patient and his son and daughter-in-law Kaci Corral who was with him in the office today are aware to seek medical attention for cough, shortness of breath, nosebleeds, easy bruising, reduced appetite, loose bowel movements, progressive posterior neck pain, progressive right mid posterior chest pain, paresthesias of the upper extremities, difficulty with ambulation, easy fatigue, or if other new problems arise

## 2018-03-08 ENCOUNTER — APPOINTMENT (OUTPATIENT)
Dept: LAB | Facility: MEDICAL CENTER | Age: 68
End: 2018-03-08
Payer: MEDICARE

## 2018-03-08 LAB
ALBUMIN SERPL BCP-MCNC: 3.4 G/DL (ref 3.5–5)
ALP SERPL-CCNC: 77 U/L (ref 46–116)
ALT SERPL W P-5'-P-CCNC: 16 U/L (ref 12–78)
ANION GAP SERPL CALCULATED.3IONS-SCNC: 5 MMOL/L (ref 4–13)
AST SERPL W P-5'-P-CCNC: 15 U/L (ref 5–45)
BASOPHILS # BLD AUTO: 0.02 THOUSANDS/ΜL (ref 0–0.1)
BASOPHILS NFR BLD AUTO: 0 % (ref 0–1)
BILIRUB SERPL-MCNC: 0.9 MG/DL (ref 0.2–1)
BUN SERPL-MCNC: 18 MG/DL (ref 5–25)
CALCIUM SERPL-MCNC: 9.7 MG/DL (ref 8.3–10.1)
CHLORIDE SERPL-SCNC: 103 MMOL/L (ref 100–108)
CO2 SERPL-SCNC: 32 MMOL/L (ref 21–32)
CREAT SERPL-MCNC: 1.19 MG/DL (ref 0.6–1.3)
EOSINOPHIL # BLD AUTO: 0.15 THOUSAND/ΜL (ref 0–0.61)
EOSINOPHIL NFR BLD AUTO: 1 % (ref 0–6)
ERYTHROCYTE [DISTWIDTH] IN BLOOD BY AUTOMATED COUNT: 14.9 % (ref 11.6–15.1)
GFR SERPL CREATININE-BSD FRML MDRD: 62 ML/MIN/1.73SQ M
GLUCOSE P FAST SERPL-MCNC: 66 MG/DL (ref 65–99)
HCT VFR BLD AUTO: 49.9 % (ref 36.5–49.3)
HGB BLD-MCNC: 16.6 G/DL (ref 12–17)
LYMPHOCYTES # BLD AUTO: 4.32 THOUSANDS/ΜL (ref 0.6–4.47)
LYMPHOCYTES NFR BLD AUTO: 36 % (ref 14–44)
MCH RBC QN AUTO: 29.3 PG (ref 26.8–34.3)
MCHC RBC AUTO-ENTMCNC: 33.3 G/DL (ref 31.4–37.4)
MCV RBC AUTO: 88 FL (ref 82–98)
MONOCYTES # BLD AUTO: 0.84 THOUSAND/ΜL (ref 0.17–1.22)
MONOCYTES NFR BLD AUTO: 7 % (ref 4–12)
NEUTROPHILS # BLD AUTO: 6.5 THOUSANDS/ΜL (ref 1.85–7.62)
NEUTS SEG NFR BLD AUTO: 56 % (ref 43–75)
NRBC BLD AUTO-RTO: 0 /100 WBCS
PLATELET # BLD AUTO: 204 THOUSANDS/UL (ref 149–390)
PMV BLD AUTO: 10.1 FL (ref 8.9–12.7)
POTASSIUM SERPL-SCNC: 3.5 MMOL/L (ref 3.5–5.3)
PROT SERPL-MCNC: 7.8 G/DL (ref 6.4–8.2)
RBC # BLD AUTO: 5.66 MILLION/UL (ref 3.88–5.62)
SODIUM SERPL-SCNC: 140 MMOL/L (ref 136–145)
WBC # BLD AUTO: 11.86 THOUSAND/UL (ref 4.31–10.16)

## 2018-03-08 PROCEDURE — 85025 COMPLETE CBC W/AUTO DIFF WBC: CPT | Performed by: INTERNAL MEDICINE

## 2018-03-08 PROCEDURE — 36415 COLL VENOUS BLD VENIPUNCTURE: CPT | Performed by: INTERNAL MEDICINE

## 2018-03-08 PROCEDURE — 80053 COMPREHEN METABOLIC PANEL: CPT | Performed by: INTERNAL MEDICINE

## 2018-03-09 ENCOUNTER — HOSPITAL ENCOUNTER (OUTPATIENT)
Dept: INFUSION CENTER | Facility: CLINIC | Age: 68
Discharge: HOME | End: 2018-03-09
Payer: MEDICARE

## 2018-03-09 VITALS
HEART RATE: 95 BPM | RESPIRATION RATE: 18 BRPM | SYSTOLIC BLOOD PRESSURE: 176 MMHG | DIASTOLIC BLOOD PRESSURE: 103 MMHG | TEMPERATURE: 96.8 F

## 2018-03-09 PROCEDURE — 96401 CHEMO ANTI-NEOPL SQ/IM: CPT

## 2018-03-09 RX ADMIN — DENOSUMAB 120 MG: 120 INJECTION SUBCUTANEOUS at 10:45

## 2018-03-09 NOTE — PROGRESS NOTES
Confirmed with Shanel Chaidez RN/Dr Sheldon that pt is to receive 120mg Xgeva monthly  Pt's calcium level 9 7 on 3/8/18  Pt received xgeva injection in left arm without complications  Pt's blood pressure is elevated, but pt reports that he did not take his blood pressure medications yet this morning  Pt asymptomatic and discharged in stable condition  AVS provided

## 2018-03-15 ENCOUNTER — HOSPITAL ENCOUNTER (OUTPATIENT)
Dept: RADIOLOGY | Facility: HOSPITAL | Age: 68
Discharge: HOME | End: 2018-03-15
Attending: RADIOLOGY
Payer: MEDICARE

## 2018-03-15 PROCEDURE — 73090 X-RAY EXAM OF FOREARM: CPT

## 2018-03-23 ENCOUNTER — HOSPITAL ENCOUNTER (OUTPATIENT)
Dept: RADIOLOGY | Facility: HOSPITAL | Age: 68
Discharge: HOME | End: 2018-03-23
Attending: NEUROLOGICAL SURGERY
Payer: MEDICARE

## 2018-03-23 DIAGNOSIS — C79.51 SECONDARY MALIGNANT NEOPLASM OF BONE (HCC): ICD-10-CM

## 2018-03-23 PROCEDURE — 72157 MRI CHEST SPINE W/O & W/DYE: CPT

## 2018-03-23 PROCEDURE — A9585 GADOBUTROL INJECTION: HCPCS | Performed by: NEUROLOGICAL SURGERY

## 2018-03-23 RX ADMIN — GADOBUTROL 10 ML: 604.72 INJECTION INTRAVENOUS at 12:07

## 2018-03-28 ENCOUNTER — RADIATION ONCOLOGY FOLLOW-UP (OUTPATIENT)
Dept: RADIATION ONCOLOGY | Facility: HOSPITAL | Age: 68
End: 2018-03-28

## 2018-03-28 ENCOUNTER — RADIATION ONCOLOGY FOLLOW-UP (OUTPATIENT)
Dept: RADIATION ONCOLOGY | Facility: MEDICAL CENTER | Age: 68
End: 2018-03-28

## 2018-03-28 ENCOUNTER — RADIATION ONCOLOGY FOLLOW-UP (OUTPATIENT)
Dept: RADIATION ONCOLOGY | Facility: HOSPITAL | Age: 68
End: 2018-03-28
Attending: RADIOLOGY
Payer: MEDICARE

## 2018-03-28 VITALS
WEIGHT: 295.8 LBS | DIASTOLIC BLOOD PRESSURE: 80 MMHG | HEART RATE: 89 BPM | SYSTOLIC BLOOD PRESSURE: 125 MMHG | BODY MASS INDEX: 42.44 KG/M2 | TEMPERATURE: 98.7 F | RESPIRATION RATE: 16 BRPM

## 2018-03-28 DIAGNOSIS — C79.51 BONE METASTASES (HCC): Primary | ICD-10-CM

## 2018-03-28 PROCEDURE — 99214 OFFICE O/P EST MOD 30 MIN: CPT | Performed by: RADIOLOGY

## 2018-03-28 NOTE — PROGRESS NOTES
Follow-Up - Radiation Oncology   John Paul Joseph 1950 75 y o  male [id number]      History of Present Illness   No matching staging information was found for the patient  HPI: John Paul Joseph returns today for routine scheduled follow-up visit approximately 5 months since completion of stereotactic radiotherapy to his T6 vertebral body  He remains on systemic therapy with axitinib and denosumab  Overall he feels well and denies any progressive back pain or new focal musculoskeletal aches or pains  No new neurologic symptoms  Historical Information   Previous Oncology History:   Oncology History    Last seen 12/27/17    3/2/18 Dr Bennie Klein  Continue Axitinib (initiated March 8, 2017), currently 4 mg b i d     Denosumab 60 mg subcutaneously monthly initiated January 12, 2018, 2 doses thus far    3/23/18 MRI thoracic spine  MARROW SIGNAL:    Again noted are foci of marrow replacement involving T4, T5 and T6  Notably, there has been interval increase T1 hypointensities hyperintensity within the left lateral aspect of T6 as it extends into the pedicle, see series 6 image 5 of today's examination versus series 6 image 5 of the prior examination  No extra osseous extension is appreciated  There remains metastatic lesions involving T8 and the posterior elements of T10, these are unchanged from prior examination          Bone metastases ([address] )    6/22/2016 Initial Diagnosis     Bone metastases ([address] )         3/8/2017 -  Chemotherapy     Axitinib 4 mg b i d        Denosumab 60 mg subcutaneously monthly initiated January 12, 2018 5/17/2017 - 5/31/2017 Radiation     palliative radiation therapy following ORIF of right ulna for metastatic renal cell carcinoma to 3000cGy         7/26/2017 - 8/3/2017 Radiation     palliative radiation therapy for metastatic renal carcinoma to the right posterior seventh rib with bone and soft tissue metastases to 2800cGy         10/18/2017 - 10/27/2017 Radiation stereotactic radiation therapy to a T6 vertebral body metastasis to 3000cGy              Past Medical History:   Diagnosis Date    Arthritis     Cancer ([address] )     prostate - mets to bone and lung    Hyperlipidemia     Hypertension       Past Surgical History:   Procedure Laterality Date    DISTAL ULNA EXCISION Right     excision of tumor and orif with cement and screws    JOINT REPLACEMENT Bilateral     tkr's    LUNG SURGERY Right     exc of nodules    NEPHRECTOMY Left        Social History   History   Alcohol Use    Yes     Comment: occasional use     History   Drug Use No     History   Smoking Status    Former Smoker    Packs/day: 1 00    Types: Cigarettes    Quit date: 3/28/2003   Smokeless Tobacco    Never Used         Meds/Allergies     Current Outpatient Prescriptions:     amLODIPine (NORVASC) 5 mg tablet, Take 5 mg by mouth 2 (two) times a day  , Disp: , Rfl:     Axitinib (INLYTA) 1 MG TABS, Take 4 tablets by mouth 2 (two) times a day, Disp: , Rfl:     gabapentin (NEURONTIN) 300 mg capsule, Take 100 mg by mouth 3 (three) times a day, Disp: , Rfl:     lisinopril-hydrochlorothiazide (PRINZIDE,ZESTORETIC) 20-12 5 MG per tablet, Take 1 tablet by mouth 2 (two) times a day  , Disp: , Rfl:   No Known Allergies      Review of Systems   Constitutional: Negative  HENT: Positive for congestion  Occasional clogging of his left ear  Eyes: Negative  Respiratory: Negative  Cardiovascular: Negative  Gastrointestinal: Negative  Genitourinary: Negative  Musculoskeletal: Positive for back pain  Skin: Negative  Neurological: Negative  Hematological: Negative  Psychiatric/Behavioral: Negative  Objective   There were no vitals taken for this visit  Karnofsky: 80 - Normal activity with effort; some signs or symptoms of disease     Physical Exam  The patient presents today no apparent distress  Sclera anicteric  Cranial nerves grossly within normal limits  Normal respiratory effort  Ambulates with the use of a cane  Slight limp  Recent Results (from the past 672 hour(s))   CBC and differential    Collection Time: 03/08/18 12:27 PM   Result Value Ref Range    WBC 11 86 (H) 4 31 - 10 16 Thousand/uL    RBC 5 66 (H) 3 88 - 5 62 Million/uL    Hemoglobin 16 6 12 0 - 17 0 g/dL    Hematocrit 49 9 (H) 36 5 - 49 3 %    MCV 88 82 - 98 fL    MCH 29 3 26 8 - 34 3 pg    MCHC 33 3 31 4 - 37 4 g/dL    RDW 14 9 11 6 - 15 1 %    MPV 10 1 8 9 - 12 7 fL    Platelets 479 909 - 854 Thousands/uL    nRBC 0 /100 WBCs    Neutrophils Relative 56 43 - 75 %    Lymphocytes Relative 36 14 - 44 %    Monocytes Relative 7 4 - 12 %    Eosinophils Relative 1 0 - 6 %    Basophils Relative 0 0 - 1 %    Neutrophils Absolute 6 50 1 85 - 7 62 Thousands/µL    Lymphocytes Absolute 4 32 0 60 - 4 47 Thousands/µL    Monocytes Absolute 0 84 0 17 - 1 22 Thousand/µL    Eosinophils Absolute 0 15 0 00 - 0 61 Thousand/µL    Basophils Absolute 0 02 0 00 - 0 10 Thousands/µL   Comprehensive metabolic panel    Collection Time: 03/08/18 12:27 PM   Result Value Ref Range    Sodium 140 136 - 145 mmol/L    Potassium 3 5 3 5 - 5 3 mmol/L    Chloride 103 100 - 108 mmol/L    CO2 32 21 - 32 mmol/L    Anion Gap 5 4 - 13 mmol/L    BUN 18 5 - 25 mg/dL    Creatinine 1 19 0 60 - 1 30 mg/dL    Glucose, Fasting 66 65 - 99 mg/dL    Calcium 9 7 8 3 - 10 1 mg/dL    AST 15 5 - 45 U/L    ALT 16 12 - 78 U/L    Alkaline Phosphatase 77 46 - 116 U/L    Total Protein 7 8 6 4 - 8 2 g/dL    Albumin 3 4 (L) 3 5 - 5 0 g/dL    Total Bilirubin 0 90 0 20 - 1 00 mg/dL    eGFR 62 ml/min/1 73sq m         Xr Forearm 2 Vw Right    Result Date: 3/20/2018  Narrative: RIGHT FOREARM INDICATION:   C79 51: Secondary malignant neoplasm of bone  COMPARISON:  Right forearm radiographs 11/1/2017 VIEWS:  2 IMAGES:  2 FINDINGS: Prior open reduction internal fixation of a mid ulnar pathologic fracture  Minimal progressive callus formation    Fracture line still visible though less distinct  Hardware is intact  No new fracture  Mild radiocarpal degenerative osteoarthritis  Posterior olecranon insertional enthesopathy or calcific tendinitis  Mild dorsal dorsal ulnar soft tissue swelling unchanged  No lytic or blastic lesions are seen  Impression: Prior ORIF of mid ulnar pathologic fracture  Minimal progressive callus formation  Fracture line still visible though less distinct  Hardware is intact  No new fracture  Workstation performed: [id number]     Mri Thoracic Spine W Wo Contrast    Result Date: 3/23/2018  Narrative: MRI THORACIC SPINE WITH AND WITHOUT CONTRAST INDICATION: C79 51: Secondary malignant neoplasm of bone  History taken directly from the electronic ordering system  COMPARISON:  MRI performed on 12/21/2017 TECHNIQUE:  Sagittal T1, sagittal T2, sagittal inversion recovery, axial T2,  axial 2D MERGE  Sagittal and axial T1 postcontrast  IV Contrast:  10 mL of gadobutrol injection (MULTI-DOSE) IMAGE QUALITY:  Diagnostic  FINDINGS: ALIGNMENT:  The overall alignment appears maintained  MARROW SIGNAL:  Again noted are foci of marrow replacement involving T4, T5 and T6  Notably, there has been interval increase T1 hypointensities hyperintensity within the left lateral aspect of T6 as it extends into the pedicle, see series 6 image 5 of today's examination versus series 6 image 5 of the prior examination  No extra osseous extension is appreciated  There remains metastatic lesions involving T8 and the posterior elements of T10, these are unchanged from prior examination  THORACIC CORD:  No cord signal abnormality or pathologic enhancement  PREVERTEBRAL AND PARASPINAL SOFT TISSUES:   Unchanged is the right paraspinal mass at the level of T3-T4 as well as right ribs  THORACIC DEGENERATIVE CHANGE:  No osseous canal foraminal stenosis  POSTCONTRAST:  No abnormal enhancement       Impression: Slight progression of the T6 metastasis along the left lateral aspect (series 6 image 5) and compared to the prior  Remainder of metastatic lesions are stable  No extraosseous extension  Workstation performed: [id number]           Assessment/Plan      Delmy Rucker has done well in follow-up  His most recent MRI is suggestion of slight progression of disease in the T6 vertebral body and left pedicle  This was reviewed earlier today at the Neuro-Oncology working group with both Radiology and Neurosurgery  It is possible that these changes are treatment related but progression remains a possibility  There are no new lesions and his non treated vertebral body lesions all appear grossly stable as well  There is no impending nerve compression or cord compression  No pathologic fracture  At this time the plan will be to simply repeat an MRI of the thoracic spine in 3 months  Of course, should the patient develop new symptoms in the mid back, he will contact us sooner and the MRI would be performed sooner  He will return to Neuro-Oncology clinic following his next MRI

## 2018-03-28 NOTE — PROGRESS NOTES
Evan Manrique  1950  [id number]    Radiation Oncology Follow Up  [facility]      Oncology History    Last seen 12/27/17    3/2/18 Dr Lenny Menezes  Continue Axitinib (initiated March 8, 2017), currently 4 mg b i d     Denosumab 60 mg subcutaneously monthly initiated January 12, 2018, 2 doses thus far    3/23/18 MRI thoracic spine  MARROW SIGNAL:    Again noted are foci of marrow replacement involving T4, T5 and T6  Notably, there has been interval increase T1 hypointensities hyperintensity within the left lateral aspect of T6 as it extends into the pedicle, see series 6 image 5 of today's examination versus series 6 image 5 of the prior examination  No extra osseous extension is appreciated  There remains metastatic lesions involving T8 and the posterior elements of T10, these are unchanged from prior examination  Bone metastases ([address] )    6/22/2016 Initial Diagnosis     Bone metastases ([address] )         3/8/2017 -  Chemotherapy     Axitinib 4 mg b i d        Denosumab 60 mg subcutaneously monthly initiated January 12, 2018 5/17/2017 - 5/31/2017 Radiation     palliative radiation therapy following ORIF of right ulna for metastatic renal cell carcinoma to 3000cGy         7/26/2017 - 8/3/2017 Radiation     palliative radiation therapy for metastatic renal carcinoma to the right posterior seventh rib with bone and soft tissue metastases to 2800cGy         10/18/2017 - 10/27/2017 Radiation     stereotactic radiation therapy to a T6 vertebral body metastasis to 3000cGy            Patient Active Problem List   Diagnosis    Clear cell adenocarcinoma of kidney, left (HCC)    Bone metastases ([address] )    Lung metastases ([address] )    No matching staging information was found for the patient  Past Medical History:   Diagnosis Date    Arthritis     Cancer [facility])     prostate - mets to bone and lung    Hyperlipidemia     Hypertension      Social History     Social History    Marital status:       Spouse name: N/A    Number of children: N/A    Years of education: N/A     Occupational History    Not on file  Social History Main Topics    Smoking status: Former Smoker     Packs/day: 1 00     Types: Cigarettes     Quit date: 3/28/2003    Smokeless tobacco: Never Used    Alcohol use Yes      Comment: occasional use    Drug use: No    Sexual activity: Not on file     Other Topics Concern    Not on file     Social History Narrative    No narrative on file     No family history on file  Past Surgical History:   Procedure Laterality Date    DISTAL ULNA EXCISION Right     excision of tumor and orif with cement and screws    JOINT REPLACEMENT Bilateral     tkr's    LUNG SURGERY Right     exc of nodules    NEPHRECTOMY Left        Current Outpatient Prescriptions:     amLODIPine (NORVASC) 5 mg tablet, Take 5 mg by mouth 2 (two) times a day  , Disp: , Rfl:     Axitinib (INLYTA) 1 MG TABS, Take 4 tablets by mouth 2 (two) times a day, Disp: , Rfl:     gabapentin (NEURONTIN) 300 mg capsule, Take 100 mg by mouth 3 (three) times a day, Disp: , Rfl:     lisinopril-hydrochlorothiazide (PRINZIDE,ZESTORETIC) 20-12 5 MG per tablet, Take 1 tablet by mouth 2 (two) times a day  , Disp: , Rfl:   No Known Allergies    Review of Systems:  Review of Systems   Constitutional: Negative  HENT: Positive for postnasal drip  Left ear feels like it's not clearing/popping   Eyes: Negative  Respiratory: Negative  Cardiovascular: Leg swelling: Since knee surgery approx  2 years ago  Gastrointestinal: Positive for diarrhea  Gas pains  Endocrine: Negative  Genitourinary: Negative  Musculoskeletal: Positive for back pain (stiffness)  Skin: Positive for rash (Skin cancer, on arms, per patient  )  Allergic/Immunologic: Negative  Neurological: Positive for numbness (Feet)  Psychiatric/Behavioral: Negative          Physical Exam:  Physical Exam    Imaging:Xr Forearm 2 Vw Right    Result Date: 3/20/2018  Narrative: RIGHT FOREARM INDICATION:   C79 51: Secondary malignant neoplasm of bone  COMPARISON:  Right forearm radiographs 11/1/2017 VIEWS:  2 IMAGES:  2 FINDINGS: Prior open reduction internal fixation of a mid ulnar pathologic fracture  Minimal progressive callus formation  Fracture line still visible though less distinct  Hardware is intact  No new fracture  Mild radiocarpal degenerative osteoarthritis  Posterior olecranon insertional enthesopathy or calcific tendinitis  Mild dorsal dorsal ulnar soft tissue swelling unchanged  No lytic or blastic lesions are seen  Impression: Prior ORIF of mid ulnar pathologic fracture  Minimal progressive callus formation  Fracture line still visible though less distinct  Hardware is intact  No new fracture  Workstation performed: [id number]     Mri Thoracic Spine W Wo Contrast    Result Date: 3/23/2018  Narrative: MRI THORACIC SPINE WITH AND WITHOUT CONTRAST INDICATION: C79 51: Secondary malignant neoplasm of bone  History taken directly from the electronic ordering system  COMPARISON:  MRI performed on 12/21/2017 TECHNIQUE:  Sagittal T1, sagittal T2, sagittal inversion recovery, axial T2,  axial 2D MERGE  Sagittal and axial T1 postcontrast  IV Contrast:  10 mL of gadobutrol injection (MULTI-DOSE) IMAGE QUALITY:  Diagnostic  FINDINGS: ALIGNMENT:  The overall alignment appears maintained  MARROW SIGNAL:  Again noted are foci of marrow replacement involving T4, T5 and T6  Notably, there has been interval increase T1 hypointensities hyperintensity within the left lateral aspect of T6 as it extends into the pedicle, see series 6 image 5 of today's examination versus series 6 image 5 of the prior examination  No extra osseous extension is appreciated  There remains metastatic lesions involving T8 and the posterior elements of T10, these are unchanged from prior examination  THORACIC CORD:  No cord signal abnormality or pathologic enhancement   PREVERTEBRAL AND PARASPINAL SOFT TISSUES:   Unchanged is the right paraspinal mass at the level of T3-T4 as well as right ribs  THORACIC DEGENERATIVE CHANGE:  No osseous canal foraminal stenosis  POSTCONTRAST:  No abnormal enhancement  Impression: Slight progression of the T6 metastasis along the left lateral aspect (series 6 image 5) and compared to the prior  Remainder of metastatic lesions are stable  No extraosseous extension   Workstation performed: [id number]         LABS:    CBC  Diff   Lab Results   Component Value Date/Time    WBC 11 86 (H) 03/08/2018 12:27 PM    WBC 8 90 12/10/2015 02:58 PM    HGB 16 6 03/08/2018 12:27 PM    HGB 13 6 12/10/2015 02:58 PM    HCT 49 9 (H) 03/08/2018 12:27 PM    HCT 41 6 12/10/2015 02:58 PM    RBC 5 66 (H) 03/08/2018 12:27 PM    RBC 4 16 12/10/2015 02:58 PM    MCV 88 03/08/2018 12:27 PM     (H) 12/10/2015 02:58 PM    MCHC 33 3 03/08/2018 12:27 PM    MCHC 32 7 12/10/2015 02:58 PM    MCH 29 3 03/08/2018 12:27 PM    MCH 32 7 12/10/2015 02:58 PM    RDW 14 9 03/08/2018 12:27 PM    RDW 15 3 (H) 12/10/2015 02:58 PM    MPV 10 1 03/08/2018 12:27 PM    MPV 10 2 12/10/2015 02:58 PM    Lab Results   Component Value Date/Time    LYMPHSABS 4 32 03/08/2018 12:27 PM    LYMPHSABS 3 92 12/10/2015 02:58 PM    EOSABS 0 15 03/08/2018 12:27 PM    EOSABS 0 18 12/10/2015 02:58 PM    MONOSABS 0 84 03/08/2018 12:27 PM    MONOSABS 0 62 12/10/2015 02:58 PM    BASOSABS 0 02 03/08/2018 12:27 PM    BASOSABS 0 01 12/10/2015 02:58 PM        Basic Metabolic Profile    Lab Results   Component Value Date/Time    CO2 32 03/08/2018 12:27 PM    CO2 28 12/10/2015 02:58 PM    ANIONGAP 5 03/08/2018 12:27 PM    ANIONGAP 7 12/10/2015 02:58 PM    Lab Results   Component Value Date/Time    BUN 18 03/08/2018 12:27 PM    BUN 19 12/10/2015 02:58 PM    CREATININE 1 19 03/08/2018 12:27 PM    CREATININE 1 25 12/10/2015 02:58 PM    GLUCOSE 85 02/07/2018 02:03 PM    GLUCOSE 102 12/10/2015 02:58 PM        Other Labs: Discussion/Summary:

## 2018-04-04 ENCOUNTER — TRANSCRIBE ORDERS (OUTPATIENT)
Dept: ADMINISTRATIVE | Facility: HOSPITAL | Age: 68
End: 2018-04-04

## 2018-04-04 ENCOUNTER — APPOINTMENT (OUTPATIENT)
Dept: LAB | Facility: MEDICAL CENTER | Age: 68
End: 2018-04-04
Payer: MEDICARE

## 2018-04-04 PROCEDURE — 85025 COMPLETE CBC W/AUTO DIFF WBC: CPT | Performed by: INTERNAL MEDICINE

## 2018-04-04 PROCEDURE — 36415 COLL VENOUS BLD VENIPUNCTURE: CPT | Performed by: INTERNAL MEDICINE

## 2018-04-04 PROCEDURE — 80053 COMPREHEN METABOLIC PANEL: CPT | Performed by: INTERNAL MEDICINE

## 2018-04-06 ENCOUNTER — HOSPITAL ENCOUNTER (OUTPATIENT)
Dept: INFUSION CENTER | Facility: CLINIC | Age: 68
Discharge: HOME | End: 2018-04-06
Payer: MEDICARE

## 2018-04-06 VITALS
TEMPERATURE: 96.7 F | SYSTOLIC BLOOD PRESSURE: 150 MMHG | DIASTOLIC BLOOD PRESSURE: 97 MMHG | HEART RATE: 72 BPM | RESPIRATION RATE: 18 BRPM

## 2018-04-06 PROCEDURE — 96401 CHEMO ANTI-NEOPL SQ/IM: CPT

## 2018-04-06 RX ADMIN — DENOSUMAB 120 MG: 120 INJECTION SUBCUTANEOUS at 11:08

## 2018-04-06 NOTE — PLAN OF CARE

## 2018-05-01 ENCOUNTER — APPOINTMENT (OUTPATIENT)
Dept: LAB | Facility: MEDICAL CENTER | Age: 68
End: 2018-05-01
Payer: MEDICARE

## 2018-05-04 ENCOUNTER — HOSPITAL ENCOUNTER (OUTPATIENT)
Dept: INFUSION CENTER | Facility: CLINIC | Age: 68
Discharge: HOME | End: 2018-05-04
Payer: MEDICARE

## 2018-05-04 ENCOUNTER — HOSPITAL ENCOUNTER (OUTPATIENT)
Dept: CT IMAGING | Facility: HOSPITAL | Age: 68
Discharge: HOME | End: 2018-05-04
Attending: INTERNAL MEDICINE
Payer: MEDICARE

## 2018-05-04 VITALS
RESPIRATION RATE: 18 BRPM | DIASTOLIC BLOOD PRESSURE: 84 MMHG | SYSTOLIC BLOOD PRESSURE: 153 MMHG | TEMPERATURE: 97.3 F | HEART RATE: 80 BPM

## 2018-05-04 DIAGNOSIS — C79.51 BONE METASTASES (HCC): ICD-10-CM

## 2018-05-04 DIAGNOSIS — C78.00 MALIGNANT NEOPLASM METASTATIC TO LUNG, UNSPECIFIED LATERALITY (HCC): ICD-10-CM

## 2018-05-04 DIAGNOSIS — C64.2 CLEAR CELL ADENOCARCINOMA OF KIDNEY, LEFT (HCC): ICD-10-CM

## 2018-05-04 PROCEDURE — 71250 CT THORAX DX C-: CPT

## 2018-05-04 PROCEDURE — 74176 CT ABD & PELVIS W/O CONTRAST: CPT

## 2018-05-04 PROCEDURE — 70486 CT MAXILLOFACIAL W/O DYE: CPT

## 2018-05-04 PROCEDURE — 96402 CHEMO HORMON ANTINEOPL SQ/IM: CPT

## 2018-05-04 RX ADMIN — DENOSUMAB 120 MG: 120 INJECTION SUBCUTANEOUS at 10:17

## 2018-05-04 NOTE — PLAN OF CARE
Problem: Potential for Falls  Goal: Patient will remain free of falls  INTERVENTIONS:  - Assess patient frequently for physical needs  -  Identify cognitive and physical deficits and behaviors that affect risk of falls    -  [place] fall precautions as indicated by assessment   - Educate patient/family on patient safety including physical limitations  - Instruct patient to call for assistance with activity based on assessment  - Modify environment to reduce risk of injury  - Consider OT/PT consult to assist with strengthening/mobility   Outcome: Progressing

## 2018-05-04 NOTE — PROGRESS NOTES
Calcium level 10 1  Pt tolerated xgeva injection in left arm without complications  Discharged in stable condition and is aware of next appointment  AVS provided

## 2018-05-11 ENCOUNTER — OFFICE VISIT (OUTPATIENT)
Dept: HEMATOLOGY ONCOLOGY | Facility: CLINIC | Age: 68
End: 2018-05-11
Payer: MEDICARE

## 2018-05-11 VITALS
SYSTOLIC BLOOD PRESSURE: 142 MMHG | HEART RATE: 78 BPM | WEIGHT: 292 LBS | BODY MASS INDEX: 41.8 KG/M2 | RESPIRATION RATE: 20 BRPM | HEIGHT: 70 IN | DIASTOLIC BLOOD PRESSURE: 96 MMHG | OXYGEN SATURATION: 98 % | TEMPERATURE: 97.9 F

## 2018-05-11 DIAGNOSIS — C64.2 CLEAR CELL ADENOCARCINOMA OF KIDNEY, LEFT (HCC): ICD-10-CM

## 2018-05-11 DIAGNOSIS — C79.51 BONE METASTASES (HCC): ICD-10-CM

## 2018-05-11 DIAGNOSIS — C78.00 MALIGNANT NEOPLASM METASTATIC TO LUNG, UNSPECIFIED LATERALITY (HCC): Primary | ICD-10-CM

## 2018-05-11 PROCEDURE — 99214 OFFICE O/P EST MOD 30 MIN: CPT | Performed by: INTERNAL MEDICINE

## 2018-05-11 RX ORDER — MELATONIN
1000 DAILY
COMMUNITY

## 2018-05-11 RX ORDER — TRAMADOL HYDROCHLORIDE 50 MG/1
50 TABLET ORAL EVERY 6 HOURS PRN
Qty: 30 TABLET | Refills: 2 | Status: SHIPPED | OUTPATIENT
Start: 2018-05-11 | End: 2018-08-24 | Stop reason: SDUPTHER

## 2018-05-11 NOTE — PROGRESS NOTES
May 11, 2018    Kaycee Bess    He has been feeling well  He experiences about 1-2 loose bowel movements per day usually after meals and has been using 1 dose of loperamide per day  Posterior neck discomfort has been less bothersome in the last 2 months  He notes mild mid to lower back discomfort  He is scheduled to follow-up with Dr Padilla Oreilly with MRI of the thoracic spine in June 2018  ROS:  He has chronic runny nose and years stuffiness bilaterally  He denies nose bleeds  He denies cough or dyspnea  He denies abdominal pain  He has long-term chronic arthritic discomfort of the knees  He denies rash  He denies headache  He has chronic left footdrop  Hematology/Oncology History:    Previous Therapy:       1  Bilateral lung nodules or 1st noted July 2012 (not present on CT scan in 2009), 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 noted on wedge resection on November 18, 2013  Sutent 13 courses from March 22, 2015 until December 2016  Pathologic fracture to the mid right ulna on April 3, 2017 and then surgery with Dr Paul Venegas, orthopedic oncologist at [facility] on April 14, 2017 and then postoperative radiotherapy to the right ulna 3000 cGy in 10 fractions from May 17, 2017 to May 31, 2017  XRT to the right posterior ribs 2800 cGy in 7 fractions from July 26, 2017 to August 3, 2017  SBRT to the T6 metastatic lesion completed October 27, 2017       2  Left nephrectomy in August 2007 for renal cell carcinoma clear cell type, grade II-III, T1Nx        3  Prostate adenocarcinoma, Russ score 3+4=7, involving both lobes of the prostate gland, pT2c pN0 , no metastasis to three left pelvic lymph nodes        Current Therapy:      Axitinib initiated March 8, 2017, currently 4 mg b i d        Denosumab 60 mg subcutaneously monthly initiated January 12, 2018, 4 doses thus far  Physical Examination:    Blood pressure 142/96, pulse 78, temperature 97 9 °F (36 6 °C), resp   rate 20, height 5' 9 6" (1 768 m), weight 132 kg (292 lb), SpO2 98 %  Body surface area is 2 44 meters squared  He appears well  EOMI  Oropharynx is clear  No lymphadenopathy of the neck or axilla  Lungs are clear bilaterally  Heart has regular rate and rhythm  Abdomen is obese  No inguinal lymphadenopathy  No lower extremity edema  No skin rash  Neurological is grossly intact other than left foot drop  The patient uses a cane to assist in balance  Oriented x3, normal mood and affect  ECOG 1    Laboratory:    From May 1, 2018:  Creatinine 1 19, calcium is 10 1, AST 16, ALT 13, alk-phos 67, bili 0 59, WBC 10 58, hemoglobin 15 8, platelets 206  CT of the sinuses without contrast from May 4, 2018: There is mild nonobstructive mucosal thickening of bilateral ethmoid cells and right maxillary sinus  CT of the chest, abdomen pelvis without contrast from May 4, 2018: The right lower lobe nodule 2 0 x 2 1 cm, previously 1 2 x 1 1 cm, left lower lobe 8-9 mm nodule and left upper lobe 9 mm nodule are stable compared to December 20, 2017, the right 7th and 8th rib lesions, right iliac bone and T6 vertebral body metastatic lesions are stable  Assessment/Plan:    Alex Rivera remains clinically asymptomatic of bilateral lung metastasis from metastatic clear-cell carcinoma of left kidney origin  For progression of lung nodules on CT scan of the chest December 2, 2016 and enlargement of destructive bone metastasis, axitinib was initiated on March 8, 2017  On CT scan of the chest, abdomen pelvis May 4, 2017 there are stable lung nodules and bone metastasis compared to December 20, 2017 other than an enlarging right lower lobe nodule, 2 0 x 1 0 cm, previously 1 2 x 1 1 cm  Furthermore, there is concern regarding mild hypercalcemia i e  calcium 10 1  Accordingly, a change in systemic therapy is recommended    Specifically, I recommended nivolumab (NCCN category 1, preferred), the purpose, means administration, potential risks were again reviewed and the patient was given information regarding nivolumab  Other options include cabozantinib (category 1, preferred), lenvatinib plus everolimus (category 1) or single agent everolimus (category 2A)  After discussion the patient indicated that he prefers to remain on axitinib  He is agreeable to increasing the accident of dose to 5 mg twice daily       Calcium reabsorption inhibitor therapy is recommended to decrease risk of bone pain and fracture related to bone metastasis as well as to prevent progressive hypercalcemia  Denosumab 60 mg subcutaneously monthly is to be continued      The patient and his son and daughter-in-law Med Stephenson who was with him in the office today are aware to seek medical attention for cough, shortness of breath, nosebleeds, easy bruising, reduced appetite, loose bowel movements, progressive posterior neck pain, progressive right mid posterior chest pain, paresthesias of the upper extremities, difficulty with ambulation, easy fatigue, or if other new problems arise    Otherwise, I plan to see him again in 2 months

## 2018-05-13 NOTE — PATIENT INSTRUCTIONS
The patient and his son and daughter-in-law Bobo Roth who was with him in the office today are aware to seek medical attention for cough, shortness of breath, nosebleeds, easy bruising, reduced appetite, loose bowel movements, progressive posterior neck pain, progressive right mid posterior chest pain, paresthesias of the upper extremities, difficulty with ambulation, easy fatigue, or if other new problems arise

## 2018-05-14 ENCOUNTER — DOCUMENTATION (OUTPATIENT)
Dept: HEMATOLOGY ONCOLOGY | Facility: CLINIC | Age: 68
End: 2018-05-14

## 2018-05-14 NOTE — PROGRESS NOTES
Received notification from clinical on 5/11/2018 for Inlyta 1 mg 4 tabs twice daily  Submitted for authorization through cover my meds  5/14/2018 received approval and letter asking for more information  Called [facility] @ 8:16 ([phone]) spoke with annette FELIX who stated that since there is an rPabhakar Navarro (# [id number]) on file, we don't need to worry about the letter asking for more information  She stated auth number is the same as the call ref #  [facility] @ 8:21 spoke with [facility] who stated that only 180 tablets per month are approved and the letter is still needed because they need to know why the pt needs 240 per month  She transferred me over to [address] [place] [address] who asked why the pt needs 8 tablets per day  I explained that he has been on this medication and taking 8 tablets per day since March 2017  She stated since the pt has been on the quanity since March 2017, this has been approved  She stated a new letter will be sent    Received updated approval letter from [facility]  Called them @ 9:07 ([phone]) spoke with Jonh Oscar stated Prabhakar Navarro #[id number] is valid from 2/13/2018 through 5/14/2019  She then transferred me to Eliot Cole who transferred me to Bridgette PATEL  Per Bridgette, pt can use any in network retail pharmacy if they carry this drug  If it needs to be mailed to his house or if it can only be obtained by a specialty pharmacy, then he must use [facility]      Notified clinical and financial

## 2018-05-31 ENCOUNTER — APPOINTMENT (OUTPATIENT)
Dept: LAB | Facility: MEDICAL CENTER | Age: 68
End: 2018-05-31
Payer: MEDICARE

## 2018-06-01 ENCOUNTER — HOSPITAL ENCOUNTER (OUTPATIENT)
Dept: INFUSION CENTER | Facility: CLINIC | Age: 68
Discharge: HOME | End: 2018-06-01
Payer: MEDICARE

## 2018-06-01 VITALS
SYSTOLIC BLOOD PRESSURE: 157 MMHG | RESPIRATION RATE: 18 BRPM | TEMPERATURE: 98.8 F | HEART RATE: 84 BPM | DIASTOLIC BLOOD PRESSURE: 97 MMHG

## 2018-06-01 PROCEDURE — 96401 CHEMO ANTI-NEOPL SQ/IM: CPT

## 2018-06-01 RX ADMIN — DENOSUMAB 120 MG: 120 INJECTION SUBCUTANEOUS at 10:52

## 2018-06-11 ENCOUNTER — TELEPHONE (OUTPATIENT)
Dept: HEMATOLOGY ONCOLOGY | Facility: CLINIC | Age: 68
End: 2018-06-11

## 2018-06-11 NOTE — TELEPHONE ENCOUNTER
PT STILL HASN'T HEARD FROM [facility] OR OUR OFFICE ABOUT HIS MEDICATION AND HOW MUCH IT WILL COST HIM AND HE SAID IT IS DUE TO BE DELIVERD TOMORROW   WOULD LIKE TO SPEAK TO YOU

## 2018-06-11 NOTE — TELEPHONE ENCOUNTER
Spoke w/Dieter  Medication shipment was canceled per pt  call to [facility] today  He is aware that [place] is out of the office today  He has left another voicemail for [place] to call him asap  My understanding is [place] is out of the office until 6 12 18  Pt will call if med issue is not resolved by 1200 tomorrow

## 2018-06-11 NOTE — TELEPHONE ENCOUNTER
Sent second email today to Padmini/Suzi post my 6 1 18 email following pt's office visit  Left pt a voicemail w/information above

## 2018-06-12 ENCOUNTER — DOCUMENTATION (OUTPATIENT)
Dept: HEMATOLOGY ONCOLOGY | Facility: CLINIC | Age: 68
End: 2018-06-12

## 2018-06-12 NOTE — PROGRESS NOTES
Received notification from clinical on 6/11/2018 that the order for Inlyta was cancelled  Called [facility] specialty pharmacy @ 74 :97 ([phone]) spoke with Kinjal Erazo who stated on 6/4/2018 the pt cancelled the order due to the copay being $3341 00  If we want them to supply the medication, we would have to send a new rx (fax number [phone]) attention pharmacy  Received notification from financial the pt is choosing to use [facility] because the copay is only $996 96  Pt is also receiving assistance  Clinical notified

## 2018-06-25 ENCOUNTER — HOSPITAL ENCOUNTER (OUTPATIENT)
Dept: RADIOLOGY | Facility: HOSPITAL | Age: 68
Discharge: HOME | End: 2018-06-25
Attending: RADIOLOGY
Payer: MEDICARE

## 2018-06-25 DIAGNOSIS — C79.51 BONE METASTASES (HCC): ICD-10-CM

## 2018-06-25 PROCEDURE — 72146 MRI CHEST SPINE W/O DYE: CPT

## 2018-06-27 ENCOUNTER — APPOINTMENT (OUTPATIENT)
Dept: LAB | Facility: MEDICAL CENTER | Age: 68
End: 2018-06-27
Payer: MEDICARE

## 2018-06-28 ENCOUNTER — HOSPITAL ENCOUNTER (OUTPATIENT)
Dept: INFUSION CENTER | Facility: CLINIC | Age: 68
Discharge: HOME | End: 2018-06-28
Payer: MEDICARE

## 2018-06-28 VITALS
HEART RATE: 82 BPM | TEMPERATURE: 98.8 F | SYSTOLIC BLOOD PRESSURE: 155 MMHG | DIASTOLIC BLOOD PRESSURE: 102 MMHG | RESPIRATION RATE: 18 BRPM

## 2018-06-28 PROCEDURE — 96401 CHEMO ANTI-NEOPL SQ/IM: CPT

## 2018-06-28 RX ADMIN — DENOSUMAB 120 MG: 120 INJECTION SUBCUTANEOUS at 13:32

## 2018-07-09 DIAGNOSIS — C64.2 CLEAR CELL ADENOCARCINOMA OF KIDNEY, LEFT (HCC): ICD-10-CM

## 2018-07-09 DIAGNOSIS — C78.00 MALIGNANT NEOPLASM METASTATIC TO LUNG, UNSPECIFIED LATERALITY (HCC): ICD-10-CM

## 2018-07-09 DIAGNOSIS — C79.51 BONE METASTASES (HCC): ICD-10-CM

## 2018-07-09 RX ORDER — AXITINIB 1 MG/1
TABLET, FILM COATED ORAL
Qty: 240 TABLET | Refills: 0 | Status: CANCELLED | OUTPATIENT
Start: 2018-07-09

## 2018-07-11 ENCOUNTER — RADIATION ONCOLOGY FOLLOW-UP (OUTPATIENT)
Dept: RADIATION ONCOLOGY | Facility: HOSPITAL | Age: 68
End: 2018-07-11
Attending: RADIOLOGY

## 2018-07-11 ENCOUNTER — OFFICE VISIT (OUTPATIENT)
Dept: NEUROSURGERY | Facility: CLINIC | Age: 68
End: 2018-07-11
Payer: MEDICARE

## 2018-07-11 VITALS
HEART RATE: 96 BPM | TEMPERATURE: 98.3 F | OXYGEN SATURATION: 91 % | RESPIRATION RATE: 16 BRPM | SYSTOLIC BLOOD PRESSURE: 130 MMHG | DIASTOLIC BLOOD PRESSURE: 72 MMHG | WEIGHT: 299 LBS | BODY MASS INDEX: 44.28 KG/M2 | HEIGHT: 69 IN

## 2018-07-11 DIAGNOSIS — C79.51 BONE METASTASES (HCC): Primary | ICD-10-CM

## 2018-07-11 DIAGNOSIS — C79.40: Primary | ICD-10-CM

## 2018-07-11 DIAGNOSIS — C79.51 SPINE METASTASIS (HCC): ICD-10-CM

## 2018-07-11 PROCEDURE — 99213 OFFICE O/P EST LOW 20 MIN: CPT | Performed by: NEUROLOGICAL SURGERY

## 2018-07-11 RX ORDER — LOPERAMIDE HYDROCHLORIDE 2 MG/1
2 CAPSULE ORAL 4 TIMES DAILY PRN
COMMUNITY

## 2018-07-11 RX ORDER — TROLAMINE SALICYLATE 10 G/100G
1 CREAM TOPICAL AS NEEDED
COMMUNITY

## 2018-07-11 RX ORDER — DIPHENHYDRAMINE HCL 25 MG
25 TABLET ORAL
COMMUNITY

## 2018-07-11 NOTE — PROGRESS NOTES
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  Grade 2-3/4  T1      / Prostate biopsies - Russ 3+4=7       Radical prostatectomy - NO RADIATION or HRT 3+4=7; 50% of gland; perineural invasion present  pT2N0      / Wedge resection right lower lobe of lung   nodules of clear cell carcinoma      / Started Sutent       CT chest, abdomen and pelvis :     Mild interval progression of pulmonary metastasis    New lytic lesion in the right posterior 7th rib with healing pathologic fracture    Stable bilateral thyroid nodules    No definite evidence of metastasis in the abdomen or pelvis  Â 5  Stable left adrenal gland nodular thickening   CT chest :   Â Â      Stable number but increase in size of bilateral pulmonary nodules consistent with metastatic disease, the largest in the superior segment right lower lobe measuring 2 9 x 2 7 x 3 3 cm    Enlargement of destructive lesion within the posterior right 7th rib    Stable nodular hyperplasia of the left adrenal gland       / Follow up with Dr Ki Gordon on axitinib          To ER after reaching in pocket for phone and feeling a crack in right forearm      / X ray right wrist :   Â  25 x 9 mm osteolytic lesion is noted in the cortex of the distal ulna diaphysis bridging a long segment of the cortex (pathological fracture)  :   Â  fracture of ulnar osteolytic metastasis     / X ray left tibia and fibula :     Lucency at the bone cement interface of the tibial plateau   This may be due to hardware loosening versus less likely infection    Additional lucency along the anterior aspect of the tibial long stem component may be due to similar etiology  However, this lucency is more rounded in appearance, an therefore underlying metastatic lesion is not entirely excluded       Â 3/28/17 Dr Carley Sandoval spoke with Dr Cesario Weaver stabilization or orthopedic oncologist might debulk and replace defect with bone cement  Dr El Gisel recommended RT  Dr Reina Alvarado at [facility] last Monday  Plan is surgery, support and cementing on 4/14/17  Bone scan ordered  RT after surgery  Will see Dr Reina Alvarado prior to and after RT      /27/17 completed SBRT T6    Interval History:  Last seen 3/28/18      5/4/18 CT Sinuses: Mild nonobstructive mucosal thickening bilateral ethmoid cells and right maxillary sinus  Mild posterior nasopharyngeal soft tissue asymmetry      5/4/18 CT C/A/P:   1   Lung and pleural nodules appear stable with the exception of a right lower lobe nodule now measuring 2 0 x 1 0 cm, previously 1 2 x 1 1 cm   2   Stable osseous metastatic lesions right 7th and 8th ribs, right iliac bone and T6 vertebral body  3   Stable postoperative change after left nephrectomy without residual recurrent reniform tissue in this region  4   Stable left adrenal nodular thickening      5/11/18 Dr Carley Sandoval  Continue Axitinib (initiated March 8, 2017), currently 4 mg b i d     Denosumab 60 mg subcutaneously monthly initiated January 12, 2018, 2 doses thus far  He notes mild mid to lower back discomfort       6/25/18 MRI T-spine: Persistent metastatic foci at T4, T5, T6, T8 and T10, essentially unchanged  Right paraspinal mass at T3-4 and right rib lesion stable    He states he has lower back pain which is intermittent    He feels this primarily on the on awakening in the morning however can happen intermittently during the course of the day      Screening  Tobacco  Current tobacco user: no  If yes, brief counseling provided: No     Hypertension  Hypertension screening performed: yes  Normotensive:  no  If no, referred to PCP: yes     Depression Screening  Screened for depression using PHQ-2: yes     Screened for depression using PHQ-9:  no  Screening positive or negative:  negative  If score >4, was any of the following actions taken? Additional evaluation for depression, suicide risk assesment, referral to PCP or psychiatry, medication started:  no     Advanced Care Planning for Patients >65 years  Advanced Care Planning Discussed:  no  Patient named surrogate decision maker or care plan in chart: no    Historical Information      Bone metastases ([facility] )    6/22/2016 Initial Diagnosis     Bone metastases ([facility] )         3/8/2017 -  Chemotherapy     Axitinib 4 mg b i d        Denosumab 60 mg subcutaneously monthly initiated January 12, 2018 5/17/2017 - 5/31/2017 Radiation     palliative radiation therapy following ORIF of right ulna for metastatic renal cell carcinoma to 3000cGy         7/26/2017 - 8/3/2017 Radiation     palliative radiation therapy for metastatic renal carcinoma to the right posterior seventh rib with bone and soft tissue metastases to 2800cGy         10/18/2017 - 10/27/2017 Radiation     stereotactic radiation therapy to a T6 vertebral body metastasis to 3000cGy              Past Medical History:   Diagnosis Date    Arthritis     Cancer ([facility]     prostate - mets to bone and lung    Hyperlipidemia     Hypertension      Past Surgical History:   Procedure Laterality Date    DISTAL ULNA EXCISION Right     excision of tumor and orif with cement and screws    JOINT REPLACEMENT Bilateral     tkr's    LUNG SURGERY Right     exc of nodules    NEPHRECTOMY Left        History reviewed  No pertinent family history      Social History   History   Alcohol Use    Yes     Comment: occasional use     History   Drug Use No     History   Smoking Status    Former Smoker    Packs/day: 1 00    Types: Cigarettes    Quit date: 3/28/2003   Smokeless Tobacco    Never Used         Meds/Allergies     Current Outpatient Prescriptions:     amLODIPine (NORVASC) 5 mg tablet, Take 5 mg by mouth 2 (two) times a day  , Disp: , Rfl:     Axitinib (INLYTA) 1 MG TABS, Take 4 tablets (4 mg total) by mouth 2 (two) times a day, Disp: 240 tablet, Rfl: 0    cholecalciferol (VITAMIN D3) 1,000 units tablet, Take 1,000 Units by mouth daily, Disp: , Rfl:     diphenhydrAMINE (BENADRYL) 25 mg tablet, Take 25 mg by mouth daily at bedtime as needed for itching, Disp: , Rfl:     gabapentin (NEURONTIN) 300 mg capsule, Take 100 mg by mouth 3 (three) times a day, Disp: , Rfl:     lisinopril-hydrochlorothiazide (PRINZIDE,ZESTORETIC) 20-12 5 MG per tablet, Take 1 tablet by mouth 2 (two) times a day  , Disp: , Rfl:     loperamide (IMODIUM) 2 mg capsule, Take 2 mg by mouth 4 (four) times a day as needed for diarrhea, Disp: , Rfl:     traMADol (ULTRAM) 50 mg tablet, Take 1 tablet (50 mg total) by mouth every 6 (six) hours as needed for moderate pain, Disp: 30 tablet, Rfl: 2    trolamine salicylate (ASPERCREME) 10 % cream, Apply 1 application topically as needed for muscle/joint pain, Disp: , Rfl:   No Known Allergies      Review of Systems  Constitutional: Positive for fatigue (rated 3-5 varies)  HENT: Positive for postnasal drip (benadryl helps, this causes coughing hs)  Eyes: Negative  Respiratory: Positive for cough (cough due to PND)  Cardiovascular: Negative  Gastrointestinal: Positive for diarrhea (diet related due top Inylata  )  Endocrine: Negative  Genitourinary:        Nocturia 2-4   Musculoskeletal: Positive for back pain  Skin:        eczema   Allergic/Immunologic: Negative  Neurological: Negative  Hematological: Negative  Psychiatric/Behavioral: Positive for sleep disturbance (per pt is due to his mattress, and post his wife's death in the past 4 yrs)               OBJECTIVE:   /72 (BP Location: Left arm) Pulse 96   Temp 98 3 °F (36 8 °C)   Resp 16   Ht 5' 9" (1 753 m)   Wt 136 kg (299 lb)   SpO2 91%   BMI 44 15 kg/m²   Pain Assessment:  2  Performance Status: ECOG/Zubrod/WHO: 1 - Symptomatic but completely ambulatory    Physical Exam   He is conversing appropriately  Cardiac regular rhythm  Lungs clear  Abdomen soft nontender obese  He has no spinal tenderness to palpation  His muscle strength symmetric bilaterally  His bilateral lower extremity edema (chronic) he is ambulating independently without assistive device  RESULTS  Lab Results    Chemistry        Component Value Date/Time     06/27/2018 0940     12/10/2015 1458    K 4 2 06/27/2018 0940    K 3 7 12/10/2015 1458     06/27/2018 0940     (H) 12/10/2015 1458    CO2 24 06/27/2018 0940    CO2 28 12/10/2015 1458    BUN 14 06/27/2018 0940    BUN 19 12/10/2015 1458    CREATININE 1 05 06/27/2018 0940    CREATININE 1 25 12/10/2015 1458        Component Value Date/Time    CALCIUM 9 5 06/27/2018 0940    CALCIUM 9 9 12/10/2015 1458    ALKPHOS 65 06/27/2018 0940    ALKPHOS 66 12/10/2015 1458    AST 24 06/27/2018 0940    AST 20 12/10/2015 1458    ALT 20 06/27/2018 0940    ALT 28 12/10/2015 1458    BILITOT 0 90 06/27/2018 0940    BILITOT 0 71 12/10/2015 1458            Lab Results   Component Value Date    WBC 7 65 06/27/2018    HGB 16 1 06/27/2018    HCT 51 3 (H) 06/27/2018    MCV 89 06/27/2018     06/27/2018         Imaging Studies  Mri Thoracic Spine Wo Contrast    Addendum Date: 7/11/2018 Addendum:   ADDENDUM: Slight enlargement of right transverse process metastatic lesion at the T10 level measuring approximately 1 7 cm compared with approximately 1 5 cm on the prior study   *    Result Date: 7/11/2018  Narrative: MRI THORACIC SPINE WITHOUT CONTRAST INDICATION: 53-year-old male, renal cell malignancy, bone metastases COMPARISON:  3/23/2018 MRI TECHNIQUE:  Sagittal T1, sagittal T2, sagittal inversion recovery, axial T2, axial 2D MERGE  IMAGE QUALITY: Diagnostic  FINDINGS: ALIGNMENT: Normal alignment of the thoracic spine  No compression fracture  No subluxation  No scoliosis  MARROW SIGNAL:  Multiple foci of abnormal marrow signal intensity consistent with metastatic tumor appear unchanged as follows: Lesions are present at T4, T5 and T6  Lesions at T8 and posterior elements T10 again noted  Findings appear stable  No new metastases are confirmed  THORACIC CORD: Normal signal within the thoracic cord  PARAVERTEBRAL SOFT TISSUES: Right paraspinal mass at the T3-4 level and right rib lesion again evident and unchanged  Partially visualized multiple right renal cysts  Status post left nephrectomy  THORACIC DEGENERATIVE CHANGE: Multilevel degenerative spondylosis  No evidence of spinal canal encroachment     Impression: Persistent metastatic foci at T4, T5, T6, T8 and T10, essentially unchanged Right paraspinal mass at T3-4 and right rib lesion stable Workstation performed: [id number]         Pathology:    Final Diagnosis   A  Lung, right lower lobe of lung wedge [facility], [id number] on [facility]18/2013): - Metastatic renal cell carcinoma (two foci)      B  Prostate, radical prostectomy with left pelvic lymph nodes, (31 slides [facility]; collected on       10/12/2013): - Prostatic adenocarcinoma, acinar, not otherwise specified      - Please see synoptic report below       1  Specimen identification:       - Procedure: prostatectomy and left pelvic lymph nodes     - Prostate size and weight: 4 5 x 4 0 x 3 9 cm, 33 grams     - Lymph node sampling: yes  2    Tumor     - Histologic type: prostatic adenocarcinoma, acinar, not otherwise specified     - Histologic Grade: [place] Pattern:     * primary pattern: 3     * secondary pattern: 4     * tertiary pattern:  N/A     * total Russ score: 7    - Tumor quantitation: 15-20% of prostatic tissue   -  Tumor involves both right and left lobes, anterior and posterior    - Extraprostatic extension (pT3a):  Present, right and left posterior    - Seminal vesicle invasion (invasion of muscular wall required): not identified  3  Margins:     - Apical: negative     - Bladder neck: positive (left base)     - Anterior: negative     - Lateral: positive (left and right)     - Postero-lateral (neurovascular bundle): negative     - Posterior:  positive (left posterior)  4  Treatment effect on carcinoma: N/A  5  Lymph-vascular invasion: not identified  6  Perineural invasion: present  7  Regional lymph nodes (pN0): Three benign left pelvic lymph nodes negative for metastatic carcinoma  8  Additional pathologic findings: high-grade prostatic intraepithelial neoplasia (PIN):  9  Ancillary studies: none  10  7th Ed AJCC tumor stage/prognostic group:  Stage III  -pT3a, pN0, pMX, Berino's score 7         ASSESSMENT  1  Bone metastases [facility])  Radiation Simulation Treatment     Cancer Staging  No matching staging information was found for the patient  PLAN/DISCUSSION  Orders Placed This Encounter   Procedures    Radiation Simulation Treatment          Nayan Fine is a 75y o  year old male with metastatic renal cell carcinoma  He returns to Neuro-Oncology Clinic today  We have reviewed his MRI of the spine in our Neuro-Oncology rounds along with the neuroradiologist today  He has several lesions in the thoracic spine which have remained stable however the T10 transverse process shows progression  We discussed either close observation versus treatment utilizing SBRT approach  In light of its location as well as disruption of the cortex it was elected for him to pursue treatment  We reviewed SBRT with body fix immobilization  The plan will be to treat over 5 treatment fractions total dose 3000 cGy given at 600 cGy per fraction on an every-other-day basis    The possible acute as well as long-term side effects including effects to his surrounding structures including esophagus and spinal cord were reviewed with him  He is agreeable to the above outlined plan  Ela Malik MD  6/78/6458,6:29 PM      Portions of the record may have been created with voice recognition software   Occasional wrong word or "sound a like" substitutions may have occurred due to the inherent limitations of voice recognition software   Read the chart carefully and recognize, using context, where substitutions have occurred

## 2018-07-11 NOTE — PROGRESS NOTES
[place]   1950   Mr Evie Matthews is a 75 y o  male       Chief Complaint   Patient presents with    Follow-up       Cancer Staging  No matching staging information was found for the patient  Oncology History    Last seen 3/28/18     5/4/18 CT Sinuses: Mild nonobstructive mucosal thickening bilateral ethmoid cells and right maxillary sinus  Mild posterior nasopharyngeal soft tissue asymmetry  5/4/18 CT C/A/P:   1  Lung and pleural nodules appear stable with the exception of a right lower lobe nodule now measuring 2 0 x 1 0 cm, previously 1 2 x 1 1 cm   2   Stable osseous metastatic lesions right 7th and 8th ribs, right iliac bone and T6 vertebral body  3   Stable postoperative change after left nephrectomy without residual recurrent reniform tissue in this region  4   Stable left adrenal nodular thickening     5/11/18 Dr Donell Banegas  Continue Axitinib (initiated March 8, 2017), currently 4 mg b i d     Denosumab 60 mg subcutaneously monthly initiated January 12, 2018, 2 doses thus far  He notes mild mid to lower back discomfort  6/25/18 MRI T-spine: Persistent metastatic foci at T4, T5, T6, T8 and T10, essentially unchanged  Right paraspinal mass at T3-4 and right rib lesion stable            Bone metastases ([address] )    6/22/2016 Initial Diagnosis     Bone metastases ([address] )         3/8/2017 -  Chemotherapy     Axitinib 4 mg b i d        Denosumab 60 mg subcutaneously monthly initiated January 12, 2018 5/17/2017 - 5/31/2017 Radiation     palliative radiation therapy following ORIF of right ulna for metastatic renal cell carcinoma to 3000cGy         7/26/2017 - 8/3/2017 Radiation     palliative radiation therapy for metastatic renal carcinoma to the right posterior seventh rib with bone and soft tissue metastases to 2800cGy         10/18/2017 - 10/27/2017 Radiation     stereotactic radiation therapy to a T6 vertebral body metastasis to 3000cGy        Last seen 3/28/18     5/4/18 CT Sinuses: Mild nonobstructive mucosal thickening bilateral ethmoid cells and right maxillary sinus  Mild posterior nasopharyngeal soft tissue asymmetry  5/4/18 CT C/A/P:   1  Lung and pleural nodules appear stable with the exception of a right lower lobe nodule now measuring 2 0 x 1 0 cm, previously 1 2 x 1 1 cm   2   Stable osseous metastatic lesions right 7th and 8th ribs, right iliac bone and T6 vertebral body  3   Stable postoperative change after left nephrectomy without residual recurrent reniform tissue in this region  4   Stable left adrenal nodular thickening     5/11/18 Dr Monique Richardson  Continue Axitinib (initiated March 8, 2017), currently 4 mg b i d     Denosumab 60 mg subcutaneously monthly initiated January 12, 2018, 2 doses thus far  He notes mild mid to lower back discomfort  6/25/18 MRI T-spine: Persistent metastatic foci at T4, T5, T6, T8 and T10, essentially unchanged  Right paraspinal mass at T3-4 and right rib lesion stable  Screening  Tobacco  Current tobacco user: no  If yes, brief counseling provided: No    Hypertension  Hypertension screening performed: yes  Normotensive:  no  If no, referred to PCP: yes    Depression Screening  Screened for depression using PHQ-2: yes    Screened for depression using PHQ-9:  no  Screening positive or negative:  negative  If score >4, was any of the following actions taken?    Additional evaluation for depression, suicide risk assesment, referral to PCP or psychiatry, medication started:  no    Advanced Care Planning for Patients >65 years  Advanced Care Planning Discussed:  no  Patient named surrogate decision maker or care plan in chart: no      Health Maintenance   Topic Date Due    Depression Screening PHQ-9  1950    CRC Screening: Colonoscopy  1950    DTaP,Tdap,and Td Vaccines (1 - Tdap) 10/06/1970    Fall Risk  10/06/2014    ABDOMINAL AORTIC ANEURYSM (AAA) SCREEN  10/06/2014    PNEUMOCOCCAL POLYSACCHARIDE VACCINE AGE 72 AND OVER  10/06/2014    GLAUCOMA SCREENING 72 + YR  10/06/2016    INFLUENZA VACCINE  09/01/2018    Hepatitis C Screening  Completed       Patient Active Problem List   Diagnosis    [facility] cell adenocarcinoma [facility]    [facility]    Lung metastases ([facility] )     Past Medical History:   Diagnosis Date    Arthritis     Cancer [facility]     prostate - mets to bone and lung    Hyperlipidemia     Hypertension      Past Surgical History:   Procedure Laterality Date    DISTAL ULNA EXCISION Right     excision of tumor and orif with cement and screws    JOINT REPLACEMENT Bilateral     tkr's    LUNG SURGERY Right     exc of nodules    NEPHRECTOMY Left      History reviewed  No pertinent family history  Social History     Social History    Marital status:      Spouse name: N/A    Number of children: N/A    Years of education: N/A     Occupational History    Not on file       Social History Main Topics    Smoking status: Former Smoker     Packs/day: 1 00     Types: Cigarettes     Quit date: 3/28/2003    Smokeless tobacco: Never Used    Alcohol use Yes      Comment: occasional use    Drug use: No    Sexual activity: Not on file     Other Topics Concern    Not on file     Social History Narrative    No narrative on file       Current Outpatient Prescriptions:     amLODIPine (NORVASC) 5 mg tablet, Take 5 mg by mouth 2 (two) times a day  , Disp: , Rfl:     Axitinib (INLYTA) 1 MG TABS, Take 4 tablets (4 mg total) by mouth 2 (two) times a day, Disp: 240 tablet, Rfl: 0    cholecalciferol (VITAMIN D3) 1,000 units tablet, Take 1,000 Units by mouth daily, Disp: , Rfl:     diphenhydrAMINE (BENADRYL) 25 mg tablet, Take 25 mg by mouth daily at bedtime as needed for itching, Disp: , Rfl:     gabapentin (NEURONTIN) 300 mg capsule, Take 100 mg by mouth 3 (three) times a day, Disp: , Rfl:     lisinopril-hydrochlorothiazide (PRINZIDE,ZESTORETIC) 20-12 5 MG per tablet, Take 1 tablet by mouth 2 (two) times a day  , Disp: , Rfl:     loperamide (IMODIUM) 2 mg capsule, Take 2 mg by mouth 4 (four) times a day as needed for diarrhea, Disp: , Rfl:     traMADol (ULTRAM) 50 mg tablet, Take 1 tablet (50 mg total) by mouth every 6 (six) hours as needed for moderate pain, Disp: 30 tablet, Rfl: 2    trolamine salicylate (ASPERCREME) 10 % cream, Apply 1 application topically as needed for muscle/joint pain, Disp: , Rfl:   No Known Allergies    Review of Systems:  Review of Systems   Constitutional: Positive for fatigue (rated 3-5 varies)  HENT: Positive for postnasal drip (benadryl helps, this causes coughing hs)  Eyes: Negative  Respiratory: Positive for cough (cough due to PND)  Cardiovascular: Negative  Gastrointestinal: Positive for diarrhea (diet related due top Inylata  )  Endocrine: Negative  Genitourinary:        Nocturia 2-4   Musculoskeletal: Positive for back pain  Skin:        eczema   Allergic/Immunologic: Negative  Neurological: Negative  Hematological: Negative  Psychiatric/Behavioral: Positive for sleep disturbance (per pt is due to his mattress, and post his wife's death in the past 4 yrs)  Vitals:    07/11/18 0900   BP: 130/72   BP Location: Left arm   Pulse: 96   Resp: 16   Temp: 98 3 °F (36 8 °C)   SpO2: 91%   Weight: 136 kg (299 lb)   Height: 5' 9" (1 753 m)            Imaging:Mri Thoracic Spine Wo Contrast    Result Date: 6/26/2018  Narrative: MRI THORACIC SPINE WITHOUT CONTRAST INDICATION: 59-year-old male, renal cell malignancy, bone metastases COMPARISON:  3/23/2018 MRI TECHNIQUE:  Sagittal T1, sagittal T2, sagittal inversion recovery, axial T2,  axial 2D MERGE  IMAGE QUALITY: Diagnostic  FINDINGS: ALIGNMENT: Normal alignment of the thoracic spine  No compression fracture  No subluxation  No scoliosis   MARROW SIGNAL:  Multiple foci of abnormal marrow signal intensity consistent with metastatic tumor appear unchanged as follows: Lesions are present at T4, T5 and T6  Lesions at T8 and posterior elements T10 again noted  Findings appear stable  No new metastases are confirmed  THORACIC CORD: Normal signal within the thoracic cord  PARAVERTEBRAL SOFT TISSUES: Right paraspinal mass at the T3-4 level and right rib lesion again evident and unchanged  Partially visualized multiple right renal cysts  Status post left nephrectomy  THORACIC DEGENERATIVE CHANGE: Multilevel degenerative spondylosis   No evidence of spinal canal encroachment     Impression: Persistent metastatic foci at T4, T5, T6, T8 and T10, essentially unchanged Right paraspinal mass at T3-4 and right rib lesion stable Workstation performed: [id number]

## 2018-07-11 NOTE — PROGRESS NOTES
Shannan Jimenez    No diagnosis found  Cancer Staging  No matching staging information was found for the patient  Oncology History    Last seen 3/28/18     5/4/18 CT Sinuses: Mild nonobstructive mucosal thickening bilateral ethmoid cells and right maxillary sinus  Mild posterior nasopharyngeal soft tissue asymmetry  5/4/18 CT C/A/P:   1  Lung and pleural nodules appear stable with the exception of a right lower lobe nodule now measuring 2 0 x 1 0 cm, previously 1 2 x 1 1 cm   2   Stable osseous metastatic lesions right 7th and 8th ribs, right iliac bone and T6 vertebral body  3   Stable postoperative change after left nephrectomy without residual recurrent reniform tissue in this region  4   Stable left adrenal nodular thickening     5/11/18 Dr Marisela Landin  Continue Axitinib (initiated March 8, 2017), currently 4 mg b i d     Denosumab 60 mg subcutaneously monthly initiated January 12, 2018, 2 doses thus far  He notes mild mid to lower back discomfort  6/25/18 MRI T-spine: Persistent metastatic foci at T4, T5, T6, T8 and T10, essentially unchanged  Right paraspinal mass at T3-4 and right rib lesion stable            Bone metastases ([address] )    6/22/2016 Initial Diagnosis     Bone metastases ([address] )         3/8/2017 -  Chemotherapy     Axitinib 4 mg b i d        Denosumab 60 mg subcutaneously monthly initiated January 12, 2018 5/17/2017 - 5/31/2017 Radiation     palliative radiation therapy following ORIF of right ulna for metastatic renal cell carcinoma to 3000cGy         7/26/2017 - 8/3/2017 Radiation     palliative radiation therapy for metastatic renal carcinoma to the right posterior seventh rib with bone and soft tissue metastases to 2800cGy         10/18/2017 - 10/27/2017 Radiation     stereotactic radiation therapy to a T6 vertebral body metastasis to 3000cGy            Interval History:***    GYN History:***    Patient Active Problem List   Diagnosis    [facility]    Bone metastases [facility] )    Lung metastases ([facility] )     Past Medical History:   Diagnosis Date    Arthritis     Cancer ([facility] )     prostate - mets to bone and lung    Hyperlipidemia     Hypertension      Past Surgical History:   Procedure Laterality Date    DISTAL ULNA EXCISION Right     excision of tumor and orif with cement and screws    JOINT REPLACEMENT Bilateral     tkr's    LUNG SURGERY Right     exc of nodules    NEPHRECTOMY Left      History reviewed  No pertinent family history  Social History     Social History    Marital status:      Spouse name: N/A    Number of children: N/A    Years of education: N/A     Occupational History    Not on file       Social History Main Topics    Smoking status: Former Smoker     Packs/day: 1 00     Types: Cigarettes     Quit date: 3/28/2003    Smokeless tobacco: Never Used    Alcohol use Yes      Comment: occasional use    Drug use: No    Sexual activity: Not on file     Other Topics Concern    Not on file     Social History Narrative    No narrative on file       Current Outpatient Prescriptions:     amLODIPine (NORVASC) 5 mg tablet, Take 5 mg by mouth 2 (two) times a day  , Disp: , Rfl:     Axitinib (INLYTA) 1 MG TABS, Take 4 tablets (4 mg total) by mouth 2 (two) times a day, Disp: 240 tablet, Rfl: 0    cholecalciferol (VITAMIN D3) 1,000 units tablet, Take 1,000 Units by mouth daily, Disp: , Rfl:     gabapentin (NEURONTIN) 300 mg capsule, Take 100 mg by mouth 3 (three) times a day, Disp: , Rfl:     lisinopril-hydrochlorothiazide (PRINZIDE,ZESTORETIC) 20-12 5 MG per tablet, Take 1 tablet by mouth 2 (two) times a day  , Disp: , Rfl:     traMADol (ULTRAM) 50 mg tablet, Take 1 tablet (50 mg total) by mouth every 6 (six) hours as needed for moderate pain, Disp: 30 tablet, Rfl: 2    trolamine salicylate (ASPERCREME) 10 % cream, Apply 1 application topically as needed for muscle/joint pain, Disp: , Rfl:   No Known Allergies    Review of Systems:  Review of Systems    Vitals:    07/11/18 0900   BP: 130/72   BP Location: Left arm   Pulse: 96   Resp: 16   Temp: 98 3 °F (36 8 °C)   SpO2: 91%   Weight: 136 kg (299 lb)   Height: 5' 9" (1 753 m)       Imaging:Mri Thoracic Spine Wo Contrast    Result Date: 6/26/2018  Narrative: MRI THORACIC SPINE WITHOUT CONTRAST INDICATION: 74-year-old male, renal cell malignancy, bone metastases COMPARISON:  3/23/2018 MRI TECHNIQUE:  Sagittal T1, sagittal T2, sagittal inversion recovery, axial T2,  axial 2D MERGE  IMAGE QUALITY: Diagnostic  FINDINGS: ALIGNMENT: Normal alignment of the thoracic spine  No compression fracture  No subluxation  No scoliosis  MARROW SIGNAL:  Multiple foci of abnormal marrow signal intensity consistent with metastatic tumor appear unchanged as follows: Lesions are present at T4, T5 and T6  Lesions at T8 and posterior elements T10 again noted  Findings appear stable  No new metastases are confirmed  THORACIC CORD: Normal signal within the thoracic cord  PARAVERTEBRAL SOFT TISSUES: Right paraspinal mass at the T3-4 level and right rib lesion again evident and unchanged  Partially visualized multiple right renal cysts  Status post left nephrectomy  THORACIC DEGENERATIVE CHANGE: Multilevel degenerative spondylosis   No evidence of spinal canal encroachment     Impression: Persistent metastatic foci at T4, T5, T6, T8 and T10, essentially unchanged Right paraspinal mass at T3-4 and right rib lesion stable Workstation performed: [id number]       Teaching:***

## 2018-07-11 NOTE — PROGRESS NOTES
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, metastatic to unspecified nervous system structure [facility])    Spine metastasis [facility])          Discussion:    68-year-old male with history of metastatic renal cell carcinoma  Follows with Dr Carmel Jackson  Had previous RT with Dr Arely Rose to remove lesion etc     CT imaging demonstrated progressive lesion at T6 despite systemic therapy  T6 lesion was ES cc grade 0  Was treated with S RT 10/2017  He has done well and had surveillance imaging since  His new MRI scan of the thoracic spine done 6/25/18 was initially read as stable  However, on review at [address] [place] [address] today, with neuro Radiology, the right T10 pedicle and transverse process disease is thought to be progressive  Dr Luiz Menezes will add an addendum to the report to that effect etc     Dr Hilario Mom saw the patient and offered SBR T  The patient would like to proceed  I saw the patient as well  We discussed risks benefits alternatives of this approach  He would like to proceed  I obtained consent  He will undergo simulation on Friday, and plan for SBRT on 7/18/18  CHIEF COMPLAINT    No chief complaint on file  Metastatic Renal Cell CA  HISTORY    History of Present Illness     75y o  year old male      HPI    See Discussion    REVIEW OF SYSTEMS    Review of Systems      Meds/Allergies     Current Outpatient Prescriptions   Medication Sig Dispense Refill    amLODIPine (NORVASC) 5 mg tablet Take 5 mg by mouth 2 (two) times a day        Axitinib (INLYTA) 1 MG TABS Take 4 tablets (4 mg total) by mouth 2 (two) times a day 240 tablet 0    cholecalciferol (VITAMIN D3) 1,000 units tablet Take 1,000 Units by mouth daily      diphenhydrAMINE (BENADRYL) 25 mg tablet Take 25 mg by mouth daily at bedtime as needed for itching      gabapentin (NEURONTIN) 300 mg capsule Take 100 mg by mouth 3 (three) times a day      lisinopril-hydrochlorothiazide (PRINZIDE,ZESTORETIC) 20-12 5 MG per tablet Take 1 tablet by mouth 2 (two) times a day        loperamide (IMODIUM) 2 mg capsule Take 2 mg by mouth 4 (four) times a day as needed for diarrhea      traMADol (ULTRAM) 50 mg tablet Take 1 tablet (50 mg total) by mouth every 6 (six) hours as needed for moderate pain 30 tablet 2    trolamine salicylate (ASPERCREME) 10 % cream Apply 1 application topically as needed for muscle/joint pain       No current facility-administered medications for this visit  No Known Allergies    PAST HISTORY    Past Medical History:   Diagnosis Date    Arthritis     Cancer [facility] )     prostate - mets to bone and lung    Hyperlipidemia     Hypertension        Past Surgical History:   Procedure Laterality Date    DISTAL ULNA EXCISION Right     excision of tumor and orif with cement and screws    JOINT REPLACEMENT Bilateral     tkr's    LUNG SURGERY Right     exc of nodules    NEPHRECTOMY Left        Social History   Substance Use Topics    Smoking status: Former Smoker     Packs/day: 1 00     Types: Cigarettes     Quit date: 3/28/2003    Smokeless tobacco: Never Used    Alcohol use Yes      Comment: occasional use       No family history on file  Above history personally reviewed  EXAM    Vitals: There were no vitals taken for this visit  ,There is no height or weight on file to calculate BMI  Physical Exam   Constitutional: He is oriented to person, place, and time  He appears well-developed  HENT:   Head: Normocephalic and atraumatic  Eyes: No scleral icterus  Neck: Neck supple  Cardiovascular: Normal rate  Pulmonary/Chest: Effort normal    Abdominal: Normal appearance  Neurological: He is alert and oriented to person, place, and time  He has normal strength  No sensory deficit  Skin: Skin is warm and dry  Psychiatric: He has a normal mood and affect   His speech is normal and behavior is normal        Neurologic Exam     Mental Status   Oriented to person, place, and time  Attention: normal    Speech: speech is normal   Level of consciousness: alert    Cranial Nerves     CN VII   Facial expression full, symmetric  Motor Exam   Muscle bulk: normal  Overall muscle tone: normal    Strength   Strength 5/5 throughout  Moves all extremities, grossly normal     Gait, Coordination, and Reflexes     Tremor   Resting tremor: absent  Intention tremor: absent  Action tremor: absent  No aids, favors left leg,         MEDICAL DECISION MAKING    Imaging Studies:     Mri Thoracic Spine Wo Contrast    Result Date: 6/26/2018  Narrative: MRI THORACIC SPINE WITHOUT CONTRAST INDICATION: 59-year-old male, renal cell malignancy, bone metastases COMPARISON:  3/23/2018 MRI TECHNIQUE:  Sagittal T1, sagittal T2, sagittal inversion recovery, axial T2,  axial 2D MERGE  IMAGE QUALITY: Diagnostic  FINDINGS: ALIGNMENT: Normal alignment of the thoracic spine  No compression fracture  No subluxation  No scoliosis  MARROW SIGNAL:  Multiple foci of abnormal marrow signal intensity consistent with metastatic tumor appear unchanged as follows: Lesions are present at T4, T5 and T6  Lesions at T8 and posterior elements T10 again noted  Findings appear stable  No new metastases are confirmed  THORACIC CORD: Normal signal within the thoracic cord  PARAVERTEBRAL SOFT TISSUES: Right paraspinal mass at the T3-4 level and right rib lesion again evident and unchanged  Partially visualized multiple right renal cysts  Status post left nephrectomy  THORACIC DEGENERATIVE CHANGE: Multilevel degenerative spondylosis   No evidence of spinal canal encroachment     Impression: Persistent metastatic foci at T4, T5, T6, T8 and T10, essentially unchanged Right paraspinal mass at T3-4 and right rib lesion stable Workstation performed: [id number]     Note:  Review in [facility] decided that right T10 transverse process and pedicle involvement has progressed  Dr Rivero Minus will place an addendum on the report  I have personally reviewed pertinent reports  and I have personally reviewed pertinent films in PACS with a Radiologist  at [facility], with Dr Kirstin Treviño

## 2018-07-13 ENCOUNTER — APPOINTMENT (OUTPATIENT)
Dept: RADIATION ONCOLOGY | Facility: HOSPITAL | Age: 68
End: 2018-07-13
Attending: RADIOLOGY
Payer: MEDICARE

## 2018-07-13 PROCEDURE — 77370 RADIATION PHYSICS CONSULT: CPT | Performed by: RADIOLOGY

## 2018-07-13 PROCEDURE — 77470 SPECIAL RADIATION TREATMENT: CPT | Performed by: RADIOLOGY

## 2018-07-13 PROCEDURE — 77334 RADIATION TREATMENT AID(S): CPT | Performed by: RADIOLOGY

## 2018-07-16 ENCOUNTER — TELEPHONE (OUTPATIENT)
Dept: HEMATOLOGY ONCOLOGY | Facility: CLINIC | Age: 68
End: 2018-07-16

## 2018-07-16 ENCOUNTER — DOCUMENTATION (OUTPATIENT)
Dept: RADIATION ONCOLOGY | Facility: HOSPITAL | Age: 68
End: 2018-07-16

## 2018-07-16 DIAGNOSIS — C79.51 SECONDARY MALIGNANT NEOPLASM OF BONE AND BONE MARROW (HCC): Primary | ICD-10-CM

## 2018-07-16 DIAGNOSIS — C79.52 SECONDARY MALIGNANT NEOPLASM OF BONE AND BONE MARROW (HCC): Primary | ICD-10-CM

## 2018-07-16 NOTE — PROGRESS NOTES
Spoke with the pt after reviewing his medical record and Distress Thermometer upon which he rated his distress level of 0 with loss of interest in usual activities, pain and tingling in hands/feet  Pt discussed his HPI including his pain situation which he is unsure as to whether it is attributed to a spinal lesion  He lost his wife to breast cancer 4 years ago and this seems to have influenced his approach devorah self-advocacy  I reviewed my role with him and provided  Him with my contact info  Offered ongoing support to pt

## 2018-07-17 PROCEDURE — 77334 RADIATION TREATMENT AID(S): CPT | Performed by: RADIOLOGY

## 2018-07-17 PROCEDURE — 77295 3-D RADIOTHERAPY PLAN: CPT | Performed by: RADIOLOGY

## 2018-07-17 PROCEDURE — 77370 RADIATION PHYSICS CONSULT: CPT | Performed by: RADIOLOGY

## 2018-07-17 PROCEDURE — 77300 RADIATION THERAPY DOSE PLAN: CPT | Performed by: RADIOLOGY

## 2018-07-17 NOTE — TELEPHONE ENCOUNTER
Spoke w/pt on 7 10/has OV on 7 20 18  Review medication plan at [address] [place] [address] Rd -renew Rx accordingly

## 2018-07-18 ENCOUNTER — OFFICE VISIT (OUTPATIENT)
Dept: NEUROSURGERY | Facility: CLINIC | Age: 68
End: 2018-07-18
Payer: MEDICARE

## 2018-07-18 DIAGNOSIS — C79.51 SPINE METASTASIS (HCC): ICD-10-CM

## 2018-07-18 DIAGNOSIS — C79.40: Primary | ICD-10-CM

## 2018-07-18 PROCEDURE — 63620 SRS SPINAL LESION: CPT | Performed by: NEUROLOGICAL SURGERY

## 2018-07-18 PROCEDURE — 77373 STRTCTC BDY RAD THER TX DLVR: CPT | Performed by: RADIOLOGY

## 2018-07-18 NOTE — PROGRESS NOTES
PATIENT NAME: Tina High  : 1950  MRN: [id number]  PROCEDURE DATE: 2018    Stereotactic Body Radiotherapy (SBRT) Operative Note    Preop Diagnosis: spine metastasis    Postop Diagnosis: same    Procedure Details: Stereotactic Body Radiotherapy for T10 metastasis    Surgeon: Maria Del Carmen Harris MD, PhD     Assistants: none    No qualified resident was available to assist with this case  Radiation Oncologist(s): Erica Harris    Estimated Blood Loss:  None           Specimens: None    Drains: None           Total IV Fluids: None              Findings: As above  Complications:  None    Anesthesia: None      INDICATIONS    50-year-old male with history of metastatic renal cell carcinoma  Follows with Dr Natanael Fowler  Had previous RT with Dr Keith Pac      CT imaging demonstrated progressive lesion at T6 despite systemic therapy  T6 lesion was ES cc grade 0  Was treated with S RT 10/2017      He has done well and had surveillance imaging since  His new MRI scan of the thoracic spine done 18 was initially read as stable  However, on review at [facility] , with neuro Radiology, the right T10 pedicle and transverse process disease is thought to be progressive  Dr Marta Claudio will add an addendum to the report to that effect etc      Dr Sobeida Rowell saw the patient and offered SBR T  The patient would like to proceed  I saw the patient as well  We discussed risks benefits alternatives of this approach  He would like to proceed  I obtained consent  DETAILS OF PROCEDURE    The patient presented to the outpatient area of the Department of Radiation Oncology where a body fix immobilization device was created  The patient then underwent a CT without myelogram while immobilized in the body fix device  The patient was then released from the Department      The patients stereotactic CT and preoperative MRI scans were fused in the SBRT planning software, which was used to develop the SBRT plan    Inverse planning was used with certain organs at risk (OARs), such as the spinal cord      The SBRT prescription called for a dose of 30 Gray to be delivered to the PTV in 5 fractions  The PTV was created by expanding the CTV by 2 mm except where it incurred organs at risk  The CTV was generated by expanding the GTV, to include contiguous bony structures, utilizing spine SRT guidelines  The GTV and CTV were contoured by myself and the Radiation Oncologist  The SBRT plan utilized the mini-multileaf columnator on the [employer] machine at the [facility]       When the final treatment plan had been developed and approved by myself, the radiation oncologist and physicist, the patient returned to the Department for their first frameless SBRT treatment       The patient was positioned on the treatment couch  The patient was immobilized in their body fix device  kV and then cone beam CT imaging was used to align the patient  Once the radiation oncologist, physicist and I agreed the patient was in correct position, the fields were treated sequentially without complications       When the first SBRT treatment had been delivered, the patient was recovered from the treatment room, scheduled for their remaining SBRT treatments, and was discharged from the department  There was no blood loss and no specimen        SIGNATURE: Jaya Franz MD, PhD  DATE: 7/18/2018   TIME: 3:07 PM

## 2018-07-20 ENCOUNTER — OFFICE VISIT (OUTPATIENT)
Dept: HEMATOLOGY ONCOLOGY | Facility: CLINIC | Age: 68
End: 2018-07-20
Payer: MEDICARE

## 2018-07-20 ENCOUNTER — DOCUMENTATION (OUTPATIENT)
Dept: HEMATOLOGY ONCOLOGY | Facility: CLINIC | Age: 68
End: 2018-07-20

## 2018-07-20 VITALS
RESPIRATION RATE: 17 BRPM | SYSTOLIC BLOOD PRESSURE: 144 MMHG | HEART RATE: 80 BPM | WEIGHT: 285.4 LBS | BODY MASS INDEX: 42.27 KG/M2 | OXYGEN SATURATION: 93 % | DIASTOLIC BLOOD PRESSURE: 82 MMHG | TEMPERATURE: 97.2 F | HEIGHT: 69 IN

## 2018-07-20 DIAGNOSIS — C79.51 SPINE METASTASIS (HCC): ICD-10-CM

## 2018-07-20 DIAGNOSIS — C78.00 MALIGNANT NEOPLASM METASTATIC TO LUNG, UNSPECIFIED LATERALITY (HCC): ICD-10-CM

## 2018-07-20 DIAGNOSIS — C64.2 CLEAR CELL ADENOCARCINOMA OF KIDNEY, LEFT (HCC): Primary | ICD-10-CM

## 2018-07-20 DIAGNOSIS — C79.51 BONE METASTASES (HCC): ICD-10-CM

## 2018-07-20 PROCEDURE — 77373 STRTCTC BDY RAD THER TX DLVR: CPT | Performed by: RADIOLOGY

## 2018-07-20 PROCEDURE — 99214 OFFICE O/P EST MOD 30 MIN: CPT | Performed by: INTERNAL MEDICINE

## 2018-07-20 NOTE — PROGRESS NOTES
Received notification from clinical on 7/120/2018 that the dosage for inlyta will be changed to 5 mg  The pt has Silver script rx insurance  ID #[id number]    Submitted for auth through cover my  Meds  Received response back stating no Mandy Mixer is needed since the pt already has access to the medication  Notified clinical and [facility]  Requested to be notified if there is an issue with a claim going through for the 5 mg

## 2018-07-20 NOTE — PROGRESS NOTES
7/20/2018    Ed Reasons    He is currently undergoing Stereotactic Body Radiotherapy for T10 metastasis, 600 centigray every other day x5 fractions, he has received 2 fractions thus far  He has been feeling well  He experiences about 1-2 loose bowel movements per day usually after meals and has been using 1 dose of loperamide per day  Posterior neck discomfort has resolved in the past 2 months  He notes mild mid to lower back discomfort      Review of systems:      He has chronic runny nose and years stuffiness bilaterally  He denies nose bleeds  He denies exertional chest pain  He denies cough or dyspnea  He denies abdominal pain  He has long-term chronic arthritic discomfort of the knees  He denies rash  He denies headache  He has chronic left footdrop  He denies emotional problems  Hematology/Oncology History:     1  Bilateral lung nodules or 1st noted July 2012 (not present on CT scan in 2009), 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 noted on wedge resection on November 18, 2013  Sutent 13 courses from March 22, 2015 until December 2016  Pathologic fracture to the mid right ulna on April 3, 2017 and then surgery with Dr Adryan Strange, orthopedic oncologist at [facility] on April 14, 2017 and then postoperative radiotherapy to the right ulna 3000 cGy in 10 fractions from May 17, 2017 to May 31, 2017  XRT to the right posterior ribs 2800 cGy in 7 fractions from July 26, 2017 to August 3, 2017  SBRT to the T6 metastatic lesion completed October 27, 2017       2  Left nephrectomy in August 2007 for renal cell carcinoma clear cell type, grade II-III, T1Nx        3   Prostate adenocarcinoma, Somerset score 3+4=7, involving both lobes of the prostate gland, pT2c pN0 , no metastasis to three left pelvic lymph nodes        Current Therapy:      Axitinib initiated March 8, 2017, currently 4 mg b i d        Denosumab 60 mg subcutaneously monthly initiated January 12, 2018, 4 doses thus far  Physical Examination:    Blood pressure 144/82, pulse 80, temperature (!) 97 2 °F (36 2 °C), temperature source Tympanic, resp  rate 17, height 5' 9" (1 753 m), weight 129 kg (285 lb 6 4 oz), SpO2 93 %  Body surface area is 2 4 meters squared  He appears well  EOMI   Oropharynx is clear   No lymphadenopathy of the neck  No axillary lymphadenopathy     Lungs are clear bilaterally     Heart has regular rate and rhythm  Liver and spleen are not palpable due to body habitus  No inguinal lymphadenopathy     No lower extremity edema  No skin rash     Neurological is grossly intact other than left foot drop   The patient uses a cane to assist in balance     Oriented x3, normal mood and affect      ECOG 1    Laboratory:    From June 27, 2018:  Creatinine 1 05, calcium 9 5, bili 0 9, alk-phos 65, AST 24, ALT 20, WBC 7 65, hemoglobin 16 1, platelets 691  MRI of the thoracic spine without contrast from June 25, 2018:  Metastatic phos a at T4, T5, T6, T8, and T10 are unchanged, the right paraspinal mass at T3-4 and right rib lesion are stable, there is normal signal the within the thoracic cord  Assessment/Plan:    Niki West remains clinically asymptomatic of bilateral lung metastasis from metastatic clear-cell carcinoma of left kidney origin  For progression of lung nodules on CT scan of the chest December 2, 2016 and enlargement of destructive bone metastasis, axitinib was initiated on March 8, 2017  On CT scan of the chest, abdomen pelvis May 4, 2017 there are stable lung nodules and bone metastasis compared to December 20, 2017 other than an enlarging right lower lobe nodule, 2 0 x 1 0 cm, previously 1 2 x 1 1 cm  Furthermore, there was concern regarding mild hypercalcemia i e  calcium 10 1  Accordingly, a change in systemic therapy to nivolumab (NCCN category 1, preferred) was recommended   Other options include cabozantinib (category 1, preferred), lenvatinib plus everolimus (category 1) or single agent everolimus (category 2A)  After discussion the patient indicated that he prefers to remain on axitinib  He is agreeable to increasing the accident of dose to 5 mg twice daily  A follow-up noncontrast enhanced CT scan of the chest, abdome, and pelvis in 2 months is planned      Calcium reabsorption inhibitor therapy is recommended to decrease risk of bone pain and fracture related to bone metastasis as well as to prevent recurrence hypercalcemia   Denosumab 60 mg subcutaneously monthly is to be continued      The patient and his son Cheko Cloud who was with him in the office today are aware to seek medical attention for cough, shortness of breath, nosebleeds, easy bruising, reduced appetite, loose bowel movements, recurrence of posterior neck pain, progressive right mid posterior chest pain, paresthesias of the upper extremities, difficulty with ambulation, easy fatigue, or if other new problems arise   Otherwise, I plan to see him again in 2 months

## 2018-07-23 PROCEDURE — 77373 STRTCTC BDY RAD THER TX DLVR: CPT | Performed by: RADIOLOGY

## 2018-07-25 ENCOUNTER — APPOINTMENT (OUTPATIENT)
Dept: LAB | Facility: MEDICAL CENTER | Age: 68
End: 2018-07-25
Payer: MEDICARE

## 2018-07-25 DIAGNOSIS — C79.51 SECONDARY MALIGNANT NEOPLASM OF BONE AND BONE MARROW (HCC): ICD-10-CM

## 2018-07-25 DIAGNOSIS — C64.2 CLEAR CELL ADENOCARCINOMA OF KIDNEY, LEFT (HCC): ICD-10-CM

## 2018-07-25 DIAGNOSIS — C79.52 SECONDARY MALIGNANT NEOPLASM OF BONE AND BONE MARROW (HCC): ICD-10-CM

## 2018-07-25 LAB
ALBUMIN SERPL BCP-MCNC: 3.3 G/DL (ref 3.5–5)
ALP SERPL-CCNC: 69 U/L (ref 46–116)
ALT SERPL W P-5'-P-CCNC: 19 U/L (ref 12–78)
ANION GAP SERPL CALCULATED.3IONS-SCNC: 5 MMOL/L (ref 4–13)
AST SERPL W P-5'-P-CCNC: 18 U/L (ref 5–45)
BASOPHILS # BLD AUTO: 0.02 THOUSANDS/ΜL (ref 0–0.1)
BASOPHILS NFR BLD AUTO: 0 % (ref 0–1)
BILIRUB SERPL-MCNC: 0.92 MG/DL (ref 0.2–1)
BUN SERPL-MCNC: 14 MG/DL (ref 5–25)
CALCIUM SERPL-MCNC: 9.8 MG/DL (ref 8.3–10.1)
CHLORIDE SERPL-SCNC: 102 MMOL/L (ref 100–108)
CO2 SERPL-SCNC: 30 MMOL/L (ref 21–32)
CREAT SERPL-MCNC: 1.26 MG/DL (ref 0.6–1.3)
EOSINOPHIL # BLD AUTO: 0.18 THOUSAND/ΜL (ref 0–0.61)
EOSINOPHIL NFR BLD AUTO: 2 % (ref 0–6)
ERYTHROCYTE [DISTWIDTH] IN BLOOD BY AUTOMATED COUNT: 16.2 % (ref 11.6–15.1)
GFR SERPL CREATININE-BSD FRML MDRD: 58 ML/MIN/1.73SQ M
GLUCOSE SERPL-MCNC: 108 MG/DL (ref 65–140)
HCT VFR BLD AUTO: 51.5 % (ref 36.5–49.3)
HGB BLD-MCNC: 16 G/DL (ref 12–17)
IMM GRANULOCYTES # BLD AUTO: 0.01 THOUSAND/UL (ref 0–0.2)
IMM GRANULOCYTES NFR BLD AUTO: 0 % (ref 0–2)
LYMPHOCYTES # BLD AUTO: 2.34 THOUSANDS/ΜL (ref 0.6–4.47)
LYMPHOCYTES NFR BLD AUTO: 28 % (ref 14–44)
MCH RBC QN AUTO: 27.7 PG (ref 26.8–34.3)
MCHC RBC AUTO-ENTMCNC: 31.1 G/DL (ref 31.4–37.4)
MCV RBC AUTO: 89 FL (ref 82–98)
MONOCYTES # BLD AUTO: 0.49 THOUSAND/ΜL (ref 0.17–1.22)
MONOCYTES NFR BLD AUTO: 6 % (ref 4–12)
NEUTROPHILS # BLD AUTO: 5.2 THOUSANDS/ΜL (ref 1.85–7.62)
NEUTS SEG NFR BLD AUTO: 64 % (ref 43–75)
NRBC BLD AUTO-RTO: 0 /100 WBCS
PLATELET # BLD AUTO: 210 THOUSANDS/UL (ref 149–390)
PMV BLD AUTO: 10.2 FL (ref 8.9–12.7)
POTASSIUM SERPL-SCNC: 3.6 MMOL/L (ref 3.5–5.3)
PROT SERPL-MCNC: 7.7 G/DL (ref 6.4–8.2)
RBC # BLD AUTO: 5.78 MILLION/UL (ref 3.88–5.62)
SODIUM SERPL-SCNC: 137 MMOL/L (ref 136–145)
WBC # BLD AUTO: 8.24 THOUSAND/UL (ref 4.31–10.16)

## 2018-07-25 PROCEDURE — 77373 STRTCTC BDY RAD THER TX DLVR: CPT | Performed by: RADIOLOGY

## 2018-07-25 PROCEDURE — 85025 COMPLETE CBC W/AUTO DIFF WBC: CPT

## 2018-07-25 PROCEDURE — 80053 COMPREHEN METABOLIC PANEL: CPT

## 2018-07-25 PROCEDURE — 36415 COLL VENOUS BLD VENIPUNCTURE: CPT

## 2018-07-26 ENCOUNTER — HOSPITAL ENCOUNTER (OUTPATIENT)
Dept: INFUSION CENTER | Facility: CLINIC | Age: 68
Discharge: HOME | End: 2018-07-26
Payer: MEDICARE

## 2018-07-26 VITALS
DIASTOLIC BLOOD PRESSURE: 87 MMHG | HEART RATE: 85 BPM | TEMPERATURE: 97.4 F | RESPIRATION RATE: 18 BRPM | SYSTOLIC BLOOD PRESSURE: 146 MMHG

## 2018-07-26 PROCEDURE — 96401 CHEMO ANTI-NEOPL SQ/IM: CPT

## 2018-07-26 RX ADMIN — DENOSUMAB 120 MG: 120 INJECTION SUBCUTANEOUS at 13:49

## 2018-07-27 PROCEDURE — 77373 STRTCTC BDY RAD THER TX DLVR: CPT | Performed by: RADIOLOGY

## 2018-07-27 PROCEDURE — 77336 RADIATION PHYSICS CONSULT: CPT | Performed by: RADIOLOGY

## 2018-08-22 ENCOUNTER — APPOINTMENT (OUTPATIENT)
Dept: LAB | Facility: MEDICAL CENTER | Age: 68
End: 2018-08-22
Payer: MEDICARE

## 2018-08-22 ENCOUNTER — TELEPHONE (OUTPATIENT)
Dept: HEMATOLOGY ONCOLOGY | Facility: CLINIC | Age: 68
End: 2018-08-22

## 2018-08-22 DIAGNOSIS — C79.51 BONE METASTASES (HCC): ICD-10-CM

## 2018-08-22 DIAGNOSIS — C64.2 CLEAR CELL ADENOCARCINOMA OF KIDNEY, LEFT (HCC): ICD-10-CM

## 2018-08-22 DIAGNOSIS — C78.00 MALIGNANT NEOPLASM METASTATIC TO LUNG, UNSPECIFIED LATERALITY (HCC): ICD-10-CM

## 2018-08-22 LAB
ALBUMIN SERPL BCP-MCNC: 3.3 G/DL (ref 3.5–5)
ALP SERPL-CCNC: 65 U/L (ref 46–116)
ALT SERPL W P-5'-P-CCNC: 20 U/L (ref 12–78)
ANION GAP SERPL CALCULATED.3IONS-SCNC: 6 MMOL/L (ref 4–13)
AST SERPL W P-5'-P-CCNC: 19 U/L (ref 5–45)
BASOPHILS # BLD AUTO: 0.03 THOUSANDS/ΜL (ref 0–0.1)
BASOPHILS NFR BLD AUTO: 0 % (ref 0–1)
BILIRUB SERPL-MCNC: 0.96 MG/DL (ref 0.2–1)
BUN SERPL-MCNC: 12 MG/DL (ref 5–25)
CALCIUM ALBUM COR SERPL-MCNC: 10.8 MG/DL (ref 8.3–10.1)
CALCIUM SERPL-MCNC: 10.2 MG/DL (ref 8.3–10.1)
CHLORIDE SERPL-SCNC: 102 MMOL/L (ref 100–108)
CO2 SERPL-SCNC: 31 MMOL/L (ref 21–32)
CREAT SERPL-MCNC: 1.15 MG/DL (ref 0.6–1.3)
EOSINOPHIL # BLD AUTO: 0.12 THOUSAND/ΜL (ref 0–0.61)
EOSINOPHIL NFR BLD AUTO: 1 % (ref 0–6)
ERYTHROCYTE [DISTWIDTH] IN BLOOD BY AUTOMATED COUNT: 17.3 % (ref 11.6–15.1)
GFR SERPL CREATININE-BSD FRML MDRD: 65 ML/MIN/1.73SQ M
GLUCOSE SERPL-MCNC: 77 MG/DL (ref 65–140)
HCT VFR BLD AUTO: 52 % (ref 36.5–49.3)
HGB BLD-MCNC: 16.1 G/DL (ref 12–17)
IMM GRANULOCYTES # BLD AUTO: 0.02 THOUSAND/UL (ref 0–0.2)
IMM GRANULOCYTES NFR BLD AUTO: 0 % (ref 0–2)
LDH SERPL-CCNC: 168 U/L (ref 81–234)
LYMPHOCYTES # BLD AUTO: 2.46 THOUSANDS/ΜL (ref 0.6–4.47)
LYMPHOCYTES NFR BLD AUTO: 27 % (ref 14–44)
MCH RBC QN AUTO: 28 PG (ref 26.8–34.3)
MCHC RBC AUTO-ENTMCNC: 31 G/DL (ref 31.4–37.4)
MCV RBC AUTO: 90 FL (ref 82–98)
MONOCYTES # BLD AUTO: 0.7 THOUSAND/ΜL (ref 0.17–1.22)
MONOCYTES NFR BLD AUTO: 8 % (ref 4–12)
NEUTROPHILS # BLD AUTO: 5.7 THOUSANDS/ΜL (ref 1.85–7.62)
NEUTS SEG NFR BLD AUTO: 64 % (ref 43–75)
NRBC BLD AUTO-RTO: 0 /100 WBCS
PLATELET # BLD AUTO: 216 THOUSANDS/UL (ref 149–390)
PMV BLD AUTO: 11.1 FL (ref 8.9–12.7)
POTASSIUM SERPL-SCNC: 4.2 MMOL/L (ref 3.5–5.3)
PROT SERPL-MCNC: 7.6 G/DL (ref 6.4–8.2)
RBC # BLD AUTO: 5.75 MILLION/UL (ref 3.88–5.62)
SODIUM SERPL-SCNC: 139 MMOL/L (ref 136–145)
WBC # BLD AUTO: 9.03 THOUSAND/UL (ref 4.31–10.16)

## 2018-08-22 PROCEDURE — 85025 COMPLETE CBC W/AUTO DIFF WBC: CPT

## 2018-08-22 PROCEDURE — 36415 COLL VENOUS BLD VENIPUNCTURE: CPT

## 2018-08-22 PROCEDURE — 83615 LACTATE (LD) (LDH) ENZYME: CPT

## 2018-08-22 PROCEDURE — 80053 COMPREHEN METABOLIC PANEL: CPT

## 2018-08-22 RX ORDER — TRAMADOL HYDROCHLORIDE 50 MG/1
50 TABLET ORAL EVERY 6 HOURS PRN
Qty: 30 TABLET | Refills: 0 | Status: CANCELLED | OUTPATIENT
Start: 2018-08-22

## 2018-08-23 ENCOUNTER — HOSPITAL ENCOUNTER (OUTPATIENT)
Dept: INFUSION CENTER | Facility: CLINIC | Age: 68
Discharge: HOME | End: 2018-08-23
Payer: MEDICARE

## 2018-08-23 PROCEDURE — 96401 CHEMO ANTI-NEOPL SQ/IM: CPT

## 2018-08-23 RX ADMIN — DENOSUMAB 120 MG: 120 INJECTION SUBCUTANEOUS at 13:43

## 2018-08-23 NOTE — PROGRESS NOTES
April 1, 2018      Josh Ferrara MD  [address] [facility] [address] [place] [address]           [facility]  [address] [facility] [address] [place] [address]  Phone: [phone]  Fax: [phone]          Patient: Cindy Escobedo   MR Number: [id number]   YOB: 1951   Date of Visit: 3/7/2018       Dear Dr. Josh Ferrara:    Thank you for referring Cindy Escobedo to me for evaluation. Attached you will find relevant portions of my assessment and plan of care.    If you have questions, please do not hesitate to call me. I look forward to following Cindy Escobedo along with you.    Sincerely,    Wesley Ernst, DO    Enclosure  CC:  No Recipients    If you would like to receive this communication electronically, please contact [email or web address] or [phone] to request more information on [facility] access.    For providers and/or their staff who would like to refer a patient to [facility], please contact us through our one-stop-shop provider referral line, [facility], at [phone].    If you feel you have received this communication in error or would no longer like to receive these types of communications, please e-mail [email or web address]          Calcium 10 2 on 8/22/18  Patient offers no complaints  Xgeva given as ordered  Pt is aware of all future appointments   Refused AVS

## 2018-08-24 DIAGNOSIS — C78.00 MALIGNANT NEOPLASM METASTATIC TO LUNG, UNSPECIFIED LATERALITY (HCC): ICD-10-CM

## 2018-08-24 DIAGNOSIS — C64.2 CLEAR CELL ADENOCARCINOMA OF KIDNEY, LEFT (HCC): ICD-10-CM

## 2018-08-24 DIAGNOSIS — C79.51 BONE METASTASES (HCC): ICD-10-CM

## 2018-08-24 RX ORDER — TRAMADOL HYDROCHLORIDE 50 MG/1
50 TABLET ORAL EVERY 6 HOURS PRN
Qty: 30 TABLET | Refills: 0 | Status: SHIPPED | OUTPATIENT
Start: 2018-08-24 | End: 2018-08-24 | Stop reason: SDUPTHER

## 2018-08-24 RX ORDER — TRAMADOL HYDROCHLORIDE 50 MG/1
TABLET ORAL
Qty: 30 TABLET | Refills: 2 | Status: CANCELLED | OUTPATIENT
Start: 2018-08-24

## 2018-08-24 RX ORDER — TRAMADOL HYDROCHLORIDE 50 MG/1
50 TABLET ORAL EVERY 6 HOURS PRN
Qty: 30 TABLET | Refills: 0 | Status: SHIPPED | OUTPATIENT
Start: 2018-08-24 | End: 2019-01-18 | Stop reason: SDUPTHER

## 2018-09-14 ENCOUNTER — HOSPITAL ENCOUNTER (OUTPATIENT)
Dept: CT IMAGING | Facility: HOSPITAL | Age: 68
Discharge: HOME | End: 2018-09-14
Attending: INTERNAL MEDICINE
Payer: MEDICARE

## 2018-09-14 DIAGNOSIS — C79.51 SPINE METASTASIS (HCC): ICD-10-CM

## 2018-09-14 DIAGNOSIS — C78.00 MALIGNANT NEOPLASM METASTATIC TO LUNG, UNSPECIFIED LATERALITY (HCC): ICD-10-CM

## 2018-09-14 DIAGNOSIS — C64.2 CLEAR CELL ADENOCARCINOMA OF KIDNEY, LEFT (HCC): ICD-10-CM

## 2018-09-14 DIAGNOSIS — C79.51 BONE METASTASES (HCC): ICD-10-CM

## 2018-09-14 PROCEDURE — 74176 CT ABD & PELVIS W/O CONTRAST: CPT

## 2018-09-14 PROCEDURE — 71250 CT THORAX DX C-: CPT

## 2018-09-18 ENCOUNTER — APPOINTMENT (OUTPATIENT)
Dept: LAB | Facility: MEDICAL CENTER | Age: 68
End: 2018-09-18
Payer: MEDICARE

## 2018-09-18 DIAGNOSIS — C61 MALIGNANT NEOPLASM OF PROSTATE (HCC): ICD-10-CM

## 2018-09-18 DIAGNOSIS — C79.51 BONE METASTASES (HCC): ICD-10-CM

## 2018-09-18 DIAGNOSIS — C78.00 SECONDARY MALIGNANT NEOPLASM OF LUNG (HCC): ICD-10-CM

## 2018-09-18 DIAGNOSIS — C79.51 SECONDARY MALIGNANT NEOPLASM OF BONE (HCC): ICD-10-CM

## 2018-09-18 DIAGNOSIS — C64.2 MALIGNANT NEOPLASM OF LEFT KIDNEY, EXCEPT RENAL PELVIS (HCC): ICD-10-CM

## 2018-09-18 LAB
ALBUMIN SERPL BCP-MCNC: 3 G/DL (ref 3.5–5)
ALP SERPL-CCNC: 66 U/L (ref 46–116)
ALT SERPL W P-5'-P-CCNC: 15 U/L (ref 12–78)
ANION GAP SERPL CALCULATED.3IONS-SCNC: 5 MMOL/L (ref 4–13)
AST SERPL W P-5'-P-CCNC: 17 U/L (ref 5–45)
BASOPHILS # BLD AUTO: 0.03 THOUSANDS/ΜL (ref 0–0.1)
BASOPHILS NFR BLD AUTO: 0 % (ref 0–1)
BILIRUB SERPL-MCNC: 1.01 MG/DL (ref 0.2–1)
BUN SERPL-MCNC: 17 MG/DL (ref 5–25)
CALCIUM SERPL-MCNC: 9.9 MG/DL (ref 8.3–10.1)
CHLORIDE SERPL-SCNC: 102 MMOL/L (ref 100–108)
CO2 SERPL-SCNC: 32 MMOL/L (ref 21–32)
CREAT SERPL-MCNC: 1.13 MG/DL (ref 0.6–1.3)
EOSINOPHIL # BLD AUTO: 0.14 THOUSAND/ΜL (ref 0–0.61)
EOSINOPHIL NFR BLD AUTO: 2 % (ref 0–6)
ERYTHROCYTE [DISTWIDTH] IN BLOOD BY AUTOMATED COUNT: 16.4 % (ref 11.6–15.1)
GFR SERPL CREATININE-BSD FRML MDRD: 66 ML/MIN/1.73SQ M
GLUCOSE P FAST SERPL-MCNC: 86 MG/DL (ref 65–99)
HCT VFR BLD AUTO: 51.2 % (ref 36.5–49.3)
HGB BLD-MCNC: 15.8 G/DL (ref 12–17)
IMM GRANULOCYTES # BLD AUTO: 0.03 THOUSAND/UL (ref 0–0.2)
IMM GRANULOCYTES NFR BLD AUTO: 0 % (ref 0–2)
LYMPHOCYTES # BLD AUTO: 2.41 THOUSANDS/ΜL (ref 0.6–4.47)
LYMPHOCYTES NFR BLD AUTO: 26 % (ref 14–44)
MCH RBC QN AUTO: 28.3 PG (ref 26.8–34.3)
MCHC RBC AUTO-ENTMCNC: 30.9 G/DL (ref 31.4–37.4)
MCV RBC AUTO: 92 FL (ref 82–98)
MONOCYTES # BLD AUTO: 0.51 THOUSAND/ΜL (ref 0.17–1.22)
MONOCYTES NFR BLD AUTO: 6 % (ref 4–12)
NEUTROPHILS # BLD AUTO: 6.12 THOUSANDS/ΜL (ref 1.85–7.62)
NEUTS SEG NFR BLD AUTO: 66 % (ref 43–75)
NRBC BLD AUTO-RTO: 0 /100 WBCS
PLATELET # BLD AUTO: 214 THOUSANDS/UL (ref 149–390)
PMV BLD AUTO: 10.7 FL (ref 8.9–12.7)
POTASSIUM SERPL-SCNC: 4.4 MMOL/L (ref 3.5–5.3)
PROT SERPL-MCNC: 7.4 G/DL (ref 6.4–8.2)
RBC # BLD AUTO: 5.58 MILLION/UL (ref 3.88–5.62)
SODIUM SERPL-SCNC: 139 MMOL/L (ref 136–145)
WBC # BLD AUTO: 9.24 THOUSAND/UL (ref 4.31–10.16)

## 2018-09-18 PROCEDURE — 80053 COMPREHEN METABOLIC PANEL: CPT

## 2018-09-18 PROCEDURE — 85025 COMPLETE CBC W/AUTO DIFF WBC: CPT

## 2018-09-18 PROCEDURE — 36415 COLL VENOUS BLD VENIPUNCTURE: CPT

## 2018-09-21 ENCOUNTER — OFFICE VISIT (OUTPATIENT)
Dept: HEMATOLOGY ONCOLOGY | Facility: CLINIC | Age: 68
End: 2018-09-21
Payer: MEDICARE

## 2018-09-21 VITALS
OXYGEN SATURATION: 96 % | BODY MASS INDEX: 42.36 KG/M2 | RESPIRATION RATE: 18 BRPM | HEART RATE: 82 BPM | WEIGHT: 286 LBS | DIASTOLIC BLOOD PRESSURE: 96 MMHG | SYSTOLIC BLOOD PRESSURE: 142 MMHG | TEMPERATURE: 97.6 F | HEIGHT: 69 IN

## 2018-09-21 DIAGNOSIS — C79.51 SPINE METASTASIS (HCC): ICD-10-CM

## 2018-09-21 DIAGNOSIS — C78.00 MALIGNANT NEOPLASM METASTATIC TO LUNG, UNSPECIFIED LATERALITY (HCC): Primary | ICD-10-CM

## 2018-09-21 DIAGNOSIS — C64.2 CLEAR CELL ADENOCARCINOMA OF KIDNEY, LEFT (HCC): ICD-10-CM

## 2018-09-21 DIAGNOSIS — C79.51 BONE METASTASES (HCC): ICD-10-CM

## 2018-09-21 PROCEDURE — 99214 OFFICE O/P EST MOD 30 MIN: CPT | Performed by: INTERNAL MEDICINE

## 2018-09-21 NOTE — PROGRESS NOTES
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 noted on wedge resection on November 18, 2013  Sutent 13 courses from March 22, 2015 until December 2016       Pathologic fracture to the mid right ulna on April 3, 2017 and then surgery with Dr Antonio Palomino, orthopedic oncologist at [facility] on April 14, 2017 and then postoperative radiotherapy to the right ulna 3000 cGy in 10 fractions from May 17, 2017 to May 31, 2017       XRT to the right posterior ribs 2800 cGy in 7 fractions from July 26, 2017 to August 3, 2017       SBRT to the T6 metastatic lesion completed October 27, 2017       2  Left nephrectomy in August 2007 for renal cell carcinoma clear cell type, grade II-III, T1Nx        3  Prostate adenocarcinoma, Russ score 3+4=7, involving both lobes of the prostate gland, pT2c pN0 , no metastasis to three left pelvic lymph nodes        Current Therapy:      Axitinib initiated March 8, 2017, currently 5 mg b i d        Denosumab 60 mg subcutaneously monthly initiated January 12, 2018, 6 doses thus far  Review of systems:      He has chronic runny nose and sinus stuffiness bilaterally   He denies nose bleeds  He denies exertional chest pain     He denies cough or dyspnea     He denies abdominal pain  He has mid to lower back discomfort     He denies rash     He denies headache   He has chronic numbness of the feet  He has chronic left footdrop  He denies emotional problems  Physical Examination:    Blood pressure 142/96, pulse 82, temperature 97 6 °F (36 4 °C), resp   rate 18, height 5' 9 2" (1 758 m), weight 130 kg (286 lb), SpO2 96 %  Body surface area is 2 41 meters squared  He appears well  EOMI   Oropharynx is clear   No lymphadenopathy of the neck  No axillary lymphadenopathy     Lungs are clear bilaterally     Heart has regular rate and rhythm  Liver and spleen are not palpable due to body habitus  No inguinal lymphadenopathy     No lower extremity edema  No skin rash     Neurological is grossly intact other than left foot drop   The patient uses a cane to assist in balance     Oriented x3, normal mood and affect      ECOG 1     Laboratory:    From September 18, 2018:  Creatinine 1 13, calcium 9 9, AST 17, ALT 15, alk-phos 66, bili 1 01, WBC 9 24, hemoglobin 15 8, platelets 629  CT scan of the chest, abdomen pelvis without contrast from September 19, 2018: There are several pulmonary metastasis, most are unchanged compared to May 4, 2018, the largest in the right upper lobe is 2 8 x 2 8 cm and in the right lower lobe 1 5 x 2 4 cm, some are mildly increased in size, multiple bone metastasis are stable, no evidence of new metastasis in the chest, abdomen pelvis or included skeleton  Assessment/Plan:    Garlan Osgood remains clinically asymptomatic of bilateral lung metastasis from metastatic clear-cell carcinoma of left kidney origin  Axitinib was initiated on March 8, 2017 for progression of lung nodules on CT scan of the chest December 2, 2016 and enlargement of destructive bone metastasis,    On CT of the chest, abdomen pelvis of September 19, 2018 there is further progression of several lung nodules and stable bone metastasis  Furthermore, there was concern regarding mild hypercalcemia i e  calcium 10 1, calcium has normalized on the denosumab   Accordingly, a change in systemic therapy to nivolumab (NCCN category 1, preferred) is recommended   Other options include cabozantinib (category 1, preferred), lenvatinib plus everolimus (category 1) or single agent everolimus (category 2A)  After discussion the patient indicated that he prefers to remain on axitinib 5 mg b i d  noting that the accident and has been fairly well tolerated and disease progression has been modest      Calcium reabsorption inhibitor therapy is recommended to decrease risk of bone pain and fracture related to bone metastasis as well as to prevent recurrence hypercalcemia  Denosumab 60 mg subcutaneously monthly is to be continued  The patient and his son Pita Acuna who was with him in the office today are aware to seek medical attention for cough, shortness of breath, nosebleeds, easy bruising, reduced appetite, loose bowel movements, recurrence of posterior neck pain, progressive right mid posterior chest pain, paresthesias of the upper extremities, difficulty with ambulation, easy fatigue, or if other new problems arise   Otherwise, plan to see him again in 8 weeks

## 2018-09-26 ENCOUNTER — RADIATION ONCOLOGY FOLLOW-UP (OUTPATIENT)
Dept: RADIATION ONCOLOGY | Facility: HOSPITAL | Age: 68
End: 2018-09-26
Attending: STUDENT IN AN ORGANIZED HEALTH CARE EDUCATION/TRAINING PROGRAM

## 2018-09-26 ENCOUNTER — OFFICE VISIT (OUTPATIENT)
Dept: NEUROSURGERY | Facility: CLINIC | Age: 68
End: 2018-09-26

## 2018-09-26 VITALS
SYSTOLIC BLOOD PRESSURE: 143 MMHG | TEMPERATURE: 97.6 F | HEIGHT: 69 IN | WEIGHT: 289.4 LBS | BODY MASS INDEX: 42.86 KG/M2 | RESPIRATION RATE: 18 BRPM | DIASTOLIC BLOOD PRESSURE: 77 MMHG | HEART RATE: 70 BPM

## 2018-09-26 VITALS
RESPIRATION RATE: 18 BRPM | WEIGHT: 289.4 LBS | TEMPERATURE: 97.6 F | BODY MASS INDEX: 42.86 KG/M2 | HEIGHT: 69 IN | HEART RATE: 70 BPM | DIASTOLIC BLOOD PRESSURE: 77 MMHG | SYSTOLIC BLOOD PRESSURE: 143 MMHG

## 2018-09-26 DIAGNOSIS — C79.40: ICD-10-CM

## 2018-09-26 DIAGNOSIS — C79.51 SPINE METASTASIS (HCC): Primary | ICD-10-CM

## 2018-09-26 DIAGNOSIS — C79.40: Primary | ICD-10-CM

## 2018-09-26 DIAGNOSIS — C64.9 METASTATIC RENAL CELL CARCINOMA, UNSPECIFIED LATERALITY (HCC): ICD-10-CM

## 2018-09-26 PROCEDURE — 99024 POSTOP FOLLOW-UP VISIT: CPT | Performed by: NEUROLOGICAL SURGERY

## 2018-09-26 RX ORDER — NAPROXEN SODIUM 220 MG
220 TABLET ORAL AS NEEDED
COMMUNITY

## 2018-09-26 NOTE — PROGRESS NOTES
Follow-up - Radiation Oncology   Karma Silva 1950 75 y o  male [id number]      History of Present Illness   Cancer Staging  No matching staging information was found for the patient  Karma Silva is a 75y o  year old male with a history of of metastatic renal cell carcinoma, who returns for routine follow-up of SB RT to T10 vertebral body completed on July 27, 2018  Interval History:    7/11/18 Last seen      9/19/18 CT C/A/P- 1   Several pulmonary metastases as described in detail above   Most unchanged since 5/4/2018, some mildly increased in size  2   Multiple bone metastases, unchanged  3   No evidence of new metastasis in the chest, abdomen, pelvis or included portions of the skeleton      9/21/18 Xgeva injection      9/21/18 Med Onc Dr Cole Cheung- axitinib 5 mg b i d  and he has vague abdominal discomfort intermittently which has been relieved with Pepto-Bismol   He notes mid to lower back discomfort for which he has been taking naproxen 220 mg 2 tablets daily or tramadol 50 mg daily      Overall patient states that he has been feeling well, denies any weakness, numbness, tingling, or new onset pain               Historical Information      Bone metastases ([address] )    6/22/2016 Initial Diagnosis     Bone metastases ([address] )         3/8/2017 -  Chemotherapy     Axitinib 4 mg b i d        Denosumab 60 mg subcutaneously monthly initiated January 12, 2018 5/17/2017 - 5/31/2017 Radiation     palliative radiation therapy following ORIF of right ulna for metastatic renal cell carcinoma to 3000cGy         7/26/2017 - 8/3/2017 Radiation     palliative radiation therapy for metastatic renal carcinoma to the right posterior seventh rib with bone and soft tissue metastases to 2800cGy         10/18/2017 - 10/27/2017 Radiation     stereotactic radiation therapy to a T6 vertebral body metastasis to 3000cGy         7/18/2018 - 7/27/2018 Radiation     Plan ID Energy Fractions Dose per Fraction (cGy) Dose Correction (cGy) Total Dose Delivered (cGy) Elapsed Days   SBRT T10 6X 5 / 5 600 0 3,000 9                 Past Medical History:   Diagnosis Date    Arthritis     Cancer ([facility] [address] )     prostate - mets to bone and lung    History of radiation therapy 07/27/2018    SBRT to T10 7/18-7/27/2018    Hyperlipidemia     Hypertension      Past Surgical History:   Procedure Laterality Date    DISTAL ULNA EXCISION Right     excision of tumor and orif with cement and screws    JOINT REPLACEMENT Bilateral     tkr's    LUNG SURGERY Right     exc of nodules    NEPHRECTOMY Left        Social History   History   Alcohol Use    Yes     Comment: occasional use     History   Drug Use No     History   Smoking Status    Former Smoker    Packs/day: 1 00    Types: Cigarettes    Quit date: 3/28/2003   Smokeless Tobacco    Never Used         Meds/Allergies     Current Outpatient Prescriptions:     amLODIPine (NORVASC) 5 mg tablet, Take 5 mg by mouth daily  , Disp: , Rfl:     Axitinib 1 MG TABS, Take 5 tablets (5 mg total) by mouth 2 (two) times a day, Disp: 300 tablet, Rfl: 1    cholecalciferol (VITAMIN D3) 1,000 units tablet, Take 1,000 Units by mouth daily, Disp: , Rfl:     diphenhydrAMINE (BENADRYL) 25 mg tablet, Take 25 mg by mouth daily at bedtime as needed for itching, Disp: , Rfl:     gabapentin (NEURONTIN) 300 mg capsule, Take 100 mg by mouth 3 (three) times a day, Disp: , Rfl:     lisinopril-hydrochlorothiazide (PRINZIDE,ZESTORETIC) 20-12 5 MG per tablet, Take 1 tablet by mouth 2 (two) times a day  , Disp: , Rfl:     loperamide (IMODIUM) 2 mg capsule, Take 2 mg by mouth 4 (four) times a day as needed for diarrhea, Disp: , Rfl:     naproxen sodium (ALEVE) 220 MG tablet, Take 220 mg by mouth as needed for mild pain, Disp: , Rfl:     traMADol (ULTRAM) 50 mg tablet, Take 1 tablet (50 mg total) by mouth every 6 (six) hours as needed for moderate pain, Disp: 30 tablet, Rfl: 0    trolamine salicylate (ASPERCREME) 10 % cream, Apply 1 application topically as needed for muscle/joint pain, Disp: , Rfl:   No Known Allergies      Review of Systems  Constitutional: Positive for fatigue (Chronic, moderate)  HENT: Negative  Eyes: Negative  Respiratory: Negative  Cardiovascular: Negative  Gastrointestinal: Positive for diarrhea (Loose stool due to medication)  Genitourinary:        Nocturia 2x/night   Musculoskeletal: Positive for back pain (Lower back) and gait problem (Uses cane for stabilitiy)  Left drop foot   Skin: Negative  Allergic/Immunologic: Negative  Neurological: Positive for numbness (Finger tips and feet)  Negative for dizziness and headaches  Hematological: Bruises/bleeds easily  Psychiatric/Behavioral: Negative  OBJECTIVE:   /77 (BP Location: Left arm, Patient Position: Sitting, Cuff Size: Standard)   Pulse 70   Temp 97 6 °F (36 4 °C) (Tympanic)   Resp 18   Ht 5' 9" (1 753 m)   Wt 131 kg (289 lb 6 4 oz)   BMI 42 74 kg/m²   Pain Assessment:  1  ECOG/Zubrod/WHO: 0 - Asymptomatic    Physical Exam     GENERAL:  Appears stated age, in no apparent distress  Alert and oriented  HEENT:  Normocephalic, atraumatic   extraocular muscles intact  Oral mucosa moist   PULMONARY:  Respirations unlabored  CARDIOVASCULAR:  Regular rate and rhythm, no murmurs gallops or rubs  ABDOMEN:  Soft, nondistended, nontender to palpation  No hepatosplenomegaly    NEUROLOGIC:  No focal deficits noted            RESULTS    Lab Results:   Recent Results (from the past 672 hour(s))   CBC and differential    Collection Time: 09/18/18  9:20 AM   Result Value Ref Range    WBC 9 24 4 31 - 10 16 Thousand/uL    RBC 5 58 3 88 - 5 62 Million/uL    Hemoglobin 15 8 12 0 - 17 0 g/dL    Hematocrit 51 2 (H) 36 5 - 49 3 %    MCV 92 82 - 98 fL    MCH 28 3 26 8 - 34 3 pg    MCHC 30 9 (L) 31 4 - 37 4 g/dL    RDW 16 4 (H) 11 6 - 15 1 %    MPV 10 7 8 9 - 12 7 fL    Platelets 958 538 - 912 Thousands/uL    nRBC 0 /100 WBCs    Neutrophils Relative 66 43 - 75 %    Immat GRANS % 0 0 - 2 %    Lymphocytes Relative 26 14 - 44 %    Monocytes Relative 6 4 - 12 %    Eosinophils Relative 2 0 - 6 %    Basophils Relative 0 0 - 1 %    Neutrophils Absolute 6 12 1 85 - 7 62 Thousands/µL    Immature Grans Absolute 0 03 0 00 - 0 20 Thousand/uL    Lymphocytes Absolute 2 41 0 60 - 4 47 Thousands/µL    Monocytes Absolute 0 51 0 17 - 1 22 Thousand/µL    Eosinophils Absolute 0 14 0 00 - 0 61 Thousand/µL    Basophils Absolute 0 03 0 00 - 0 10 Thousands/µL   Comprehensive metabolic panel    Collection Time: 09/18/18  9:20 AM   Result Value Ref Range    Sodium 139 136 - 145 mmol/L    Potassium 4 4 3 5 - 5 3 mmol/L    Chloride 102 100 - 108 mmol/L    CO2 32 21 - 32 mmol/L    ANION GAP 5 4 - 13 mmol/L    BUN 17 5 - 25 mg/dL    Creatinine 1 13 0 60 - 1 30 mg/dL    Glucose, Fasting 86 65 - 99 mg/dL    Calcium 9 9 8 3 - 10 1 mg/dL    AST 17 5 - 45 U/L    ALT 15 12 - 78 U/L    Alkaline Phosphatase 66 46 - 116 U/L    Total Protein 7 4 6 4 - 8 2 g/dL    Albumin 3 0 (L) 3 5 - 5 0 g/dL    Total Bilirubin 1 01 (H) 0 20 - 1 00 mg/dL    eGFR 66 ml/min/1 73sq m       Imaging Studies:Ct Chest Abdomen Pelvis Wo Contrast    Result Date: 9/19/2018  Narrative: CT CHEST, ABDOMEN AND PELVIS WITHOUT IV CONTRAST INDICATION:   C78 00: Secondary malignant neoplasm of unspecified lung C79 51: Secondary malignant neoplasm of bone C79 51: Secondary malignant neoplasm of bone C64 2: Malignant neoplasm of left kidney, except renal pelvis  Renal cell carcinoma, metastatic to lung and bone  History of prostate cancer  Recently completed stereotactic radiotherapy to T10 metastasis COMPARISON:  CT from May 4, 2018 and earlier CTs dating back to June 22, 2016  TECHNIQUE: CT examination of the chest, abdomen and pelvis was performed without intravenous contrast   Axial, sagittal, and coronal 2D reformatted images were created from the source data and submitted for interpretation  Radiation dose length product (DLP) for this visit:  2003 mGy-cm   This examination, like all CT scans performed in the [facility], was performed utilizing techniques to minimize radiation dose exposure, including the use of iterative reconstruction and automated exposure control  Enteric contrast was administered  The absence of intravenous contrast limits assessment of the thoracic and abdominal pelvic viscera  FINDINGS:   CHEST LUNGS:  Several pulmonary nodules, presumably metastases: 1  Series 5, image 19: Left upper lobe apex, 1 0 x 1 4 cm, unchanged  2   Image 21: Left upper lobe apex, 0 6 x 0 9 cm, unchanged  3   Image 33: Left upper lobe, 0 4 x 0 5, unchanged  4   Image 39: Right upper lobe, 2 8 x 2 8 cm pleural-based, with adjacent pleural thickening, slightly enlarged  5   Image 44: Left upper lobe, 1 2 x 1 5 cm, enlarged  6   Image 80: Left lower lobe, 1 1 x 1 3 cm, unchanged  7   Image 87: Right lower lobe, 1 5 x 2 4 cm, enlarged  No new nodules  Stable patchy consolidation and fibrosis with traction bronchiectasis in the right lower lobe, at the site of prior surgery  PLEURA:  Localized pleural thickening, presumably tumor involvement, adjacent to the right upper lobe nodule described above  No pleural effusions  HEART/GREAT VESSELS:  Heart mildly enlarged  Central pulmonary arteries dilated with a main pulmonary artery diameter 3 4 cm  Ascending aorta top normal in size with a diameter 3 9 cm  MEDIASTINUM AND CHERRIE: No lymphadenopathy or mass  Esophagus unremarkable  Trachea and main stem bronchi normal  CHEST WALL AND LOWER NECK:   No lymphadenopathy or mass  Stable calcified nodule in the right thyroid lobe  ABDOMEN LIVER/BILIARY TREE:  Unremarkable  GALLBLADDER:  No calcified gallstones  No pericholecystic inflammatory change  SPLEEN:  Unremarkable  PANCREAS:  Unremarkable  ADRENAL GLANDS:  2 0 x 3 4 cm left adrenal mass, unchanged    Small right adrenal nodules unchanged since 2016  KIDNEYS/URETERS:  Prior left nephrectomy  Right renal cysts unchanged  No evidence of new right renal mass  No hydronephrosis or calculus  STOMACH AND BOWEL:  Stomach and small intestine unremarkable  Diverticulosis in the descending colon and sigmoid without evidence of diverticulitis  APPENDIX:  No findings to suggest appendicitis  ABDOMINOPELVIC CAVITY: No lymphadenopathy or mass  No ascites  No extraluminal gas  VESSELS:  Unremarkable for patient's age  PELVIS REPRODUCTIVE ORGANS:  Post prostatectomy  URINARY BLADDER:  Unremarkable  ABDOMINAL WALL/INGUINAL REGIONS:  Small fat-containing umbilical hernia  OSSEOUS STRUCTURES:  Multiple osteolytic metastases, the largest involving the right 7th rib additional metastases in the right 8th rib, T4 and T6 vertebral bodies right transverse process of T10 and right ilium (with extension into the right iliacus muscle), little change since the last CT  Prior surgery at L3-L4 and L4-L5 with what appear to be X-stop devices  Impression: m 1  Several pulmonary metastases as described in detail above  Most unchanged since 5/4/2018, some mildly increased in size  2   Multiple bone metastases, unchanged  3   No evidence of new metastasis in the chest, abdomen, pelvis or included portions of the skeleton  Workstation performed: [id number]           Assessment/Plan:  No orders of the defined types were placed in this encounter  Britany Bond is a 75y o  year old male with metastatic renal cancer status post recent treatment with SBRT to T10 completed July 27, 2018  This patient was seen in conjunction with Dr Santiago Gaytan  It was advised that as patient is not symptomatic at this time, we would follow up with the patient in 1 month time with MRI of the spine, for routine follow-up  In the interim, patient was advised of symptoms of fracture for which he should contact us immediately            Duane Yates MD  9/26/2018,2:09 PM    Portions of the record may have been created with voice recognition software   Occasional wrong word or "sound a like" substitutions may have occurred due to the inherent limitations of voice recognition software   Read the chart carefully and recognize, using context, where substitutions have occurred

## 2018-09-26 NOTE — PROGRESS NOTES
Imelda Vásquez  1950   Mr Azam Choi is a 75 y o  male       Chief Complaint   Patient presents with    Follow-up     Cancer Staging  No matching staging information was found for the patient  Bone [address] ([facility] [address] )    6/22/2016 Initial Diagnosis     Bone metastases ([facility] [address] )         3/8/2017 -  Chemotherapy     Axitinib 4 mg b i d        Denosumab 60 mg subcutaneously monthly initiated January 12, 2018 5/17/2017 - 5/31/2017 Radiation     palliative radiation therapy following ORIF of right ulna for metastatic renal cell carcinoma to 3000cGy         7/26/2017 - 8/3/2017 Radiation     palliative radiation therapy for metastatic renal carcinoma to the right posterior seventh rib with bone and soft tissue metastases to 2800cGy         10/18/2017 - 10/27/2017 Radiation     stereotactic radiation therapy to a T6 vertebral body metastasis to 3000cGy         7/18/2018 - 7/27/2018 Radiation     Plan ID Energy Fractions Dose per Fraction (cGy) Dose Correction (cGy) Total Dose Delivered (cGy) Elapsed Days   SBRT T10 6X 5 / 5 600 0 3,000 9             Clinical Trial: no    Interval History  7/11/18 Last seen  9/19/18 CT C/A/P- 1  Several pulmonary metastases as described in detail above  Most unchanged since 5/4/2018, some mildly increased in size  2   Multiple bone metastases, unchanged  3   No evidence of new metastasis in the chest, abdomen, pelvis or included portions of the skeleton  9/21/18 Xgeva injection  9/21/18 Med Onc Dr Marian Dallas- axitinib 5 mg b i d  and he has vague abdominal discomfort intermittently which has been relieved with Pepto-Bismol  He notes mid to lower back discomfort for which he has been taking naproxen 220 mg 2 tablets daily or tramadol 50 mg daily      Screening  Tobacco  Current tobacco user: no  If yes, brief counseling provided: NA    Hypertension  Hypertension screening performed: yes  Normotensive:  no  If no, referred to PCP: n/a    Depression Screening  Screened for depression using PHQ-2: yes    Screened for depression using PHQ-9:  no  Screening positive or negative:  negative  If score >4, was any of the following actions taken? Additional evaluation for depression, suicide risk assesment, referral to PCP or psychiatry, medication started:  n/a    Advanced Care Planning for Patients >65 years  Advanced Care Planning Discussed:  yes  Patient named surrogate decision maker or care plan in chart: yes    [unfilled]  Health Maintenance   Topic Date Due    CRC Screening: Colonoscopy  1950    DTaP,Tdap,and Td Vaccines (1 - Tdap) 10/06/1970    Fall Risk  10/06/2014    Pneumococcal PPSV23/PCV13 65+ Years / High and Highest Risk (2 of 2 - PCV13) 08/15/2017    INFLUENZA VACCINE  09/01/2018    Depression Screening PHQ  07/11/2019       Patient Active Problem List   Diagnosis    Clear cell adenocarcinoma of kidney, left (HCC)    [facility] ([facility] [address] [facility]    [facility]    Spine metastasis ([facility] [address] )    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 [facility])     Past Medical History:   Diagnosis Date    Arthritis     Cancer ([facility] [address] )     prostate - mets to bone and lung    History of radiation therapy 07/27/2018    SBRT to T10 7/18-7/27/2018    Hyperlipidemia     Hypertension      Past Surgical History:   Procedure Laterality Date    DISTAL ULNA EXCISION Right     excision of tumor and orif with cement and screws    JOINT REPLACEMENT Bilateral     tkr's    LUNG SURGERY Right     exc of nodules    NEPHRECTOMY Left      Family History   Problem Relation Age of Onset    No Known Problems Mother     No Known Problems Father      Social History     Social History    Marital status:       Spouse name: N/A    Number of children: 1    Years of education: 15     Occupational History    retired      P/T      Social History Main Topics    Smoking status: Former Smoker     Packs/day: 1 00     Types: Cigarettes     Quit date: 3/28/2003    Smokeless tobacco: Never Used    Alcohol use Yes      Comment: occasional use    Drug use: No    Sexual activity: Not on file     Other Topics Concern    Not on file     Social History Narrative    Lives at home alone       Current Outpatient Prescriptions:     amLODIPine (NORVASC) 5 mg tablet, Take 5 mg by mouth daily  , Disp: , Rfl:     Axitinib 1 MG TABS, Take 5 tablets (5 mg total) by mouth 2 (two) times a day, Disp: 300 tablet, Rfl: 1    cholecalciferol (VITAMIN D3) 1,000 units tablet, Take 1,000 Units by mouth daily, Disp: , Rfl:     diphenhydrAMINE (BENADRYL) 25 mg tablet, Take 25 mg by mouth daily at bedtime as needed for itching, Disp: , Rfl:     gabapentin (NEURONTIN) 300 mg capsule, Take 100 mg by mouth 3 (three) times a day, Disp: , Rfl:     lisinopril-hydrochlorothiazide (PRINZIDE,ZESTORETIC) 20-12 5 MG per tablet, Take 1 tablet by mouth 2 (two) times a day  , Disp: , Rfl:     loperamide (IMODIUM) 2 mg capsule, Take 2 mg by mouth 4 (four) times a day as needed for diarrhea, Disp: , Rfl:     traMADol (ULTRAM) 50 mg tablet, Take 1 tablet (50 mg total) by mouth every 6 (six) hours as needed for moderate pain, Disp: 30 tablet, Rfl: 0    trolamine salicylate (ASPERCREME) 10 % cream, Apply 1 application topically as needed for muscle/joint pain, Disp: , Rfl:   No Known Allergies    Review of Systems:  Review of Systems   Constitutional: Positive for fatigue (Chronic, moderate)  HENT: Negative  Eyes: Negative  Respiratory: Negative  Cardiovascular: Negative  Gastrointestinal: Positive for diarrhea (Loose stool due to medication)  Genitourinary:        Nocturia 2x/night   Musculoskeletal: Positive for back pain (Lower back) and gait problem (Uses cane for stabilitiy)  Left drop foot   Skin: Negative  Allergic/Immunologic: Negative  Neurological: Positive for numbness (Finger tips and feet)  Negative for dizziness and headaches  Hematological: Bruises/bleeds easily  Psychiatric/Behavioral: Negative  Vitals:    09/26/18 1309   BP: 143/77   BP Location: Left arm   Patient Position: Sitting   Cuff Size: Standard   Pulse: 70   Resp: 18   Temp: 97 6 °F (36 4 °C)   TempSrc: Tympanic   Weight: 131 kg (289 lb 6 4 oz)   Height: 5' 9" (1 753 m)       Pain Score:   1    Imaging:Ct Chest Abdomen Pelvis Wo Contrast    Result Date: 9/19/2018  Narrative: CT CHEST, ABDOMEN AND PELVIS WITHOUT IV CONTRAST INDICATION:   C78 00: Secondary malignant neoplasm of unspecified lung C79 51: Secondary malignant neoplasm of bone C79 51: Secondary malignant neoplasm of bone C64 2: Malignant neoplasm of left kidney, except renal pelvis  Renal cell carcinoma, metastatic to lung and bone  History of prostate cancer  Recently completed stereotactic radiotherapy to T10 metastasis COMPARISON:  CT from May 4, 2018 and earlier CTs dating back to June 22, 2016  TECHNIQUE: CT examination of the chest, abdomen and pelvis was performed without intravenous contrast   Axial, sagittal, and coronal 2D reformatted images were created from the source data and submitted for interpretation  Radiation dose length product (DLP) for this visit:  9141 mGy-cm   This examination, like all CT scans performed in the [facility], was performed utilizing techniques to minimize radiation dose exposure, including the use of iterative reconstruction and automated exposure control  Enteric contrast was administered  The absence of intravenous contrast limits assessment of the thoracic and abdominal pelvic viscera  FINDINGS:   CHEST LUNGS:  Several pulmonary nodules, presumably metastases: 1  Series 5, image 19: Left upper lobe apex, 1 0 x 1 4 cm, unchanged  2   Image 21: Left upper lobe apex, 0 6 x 0 9 cm, unchanged  3   Image 33: Left upper lobe, 0 4 x 0 5, unchanged   4   Image 39: Right upper lobe, 2 8 x 2 8 cm pleural-based, with adjacent pleural thickening, slightly enlarged  5   Image 44: Left upper lobe, 1 2 x 1 5 cm, enlarged  6   Image 80: Left lower lobe, 1 1 x 1 3 cm, unchanged  7   Image 87: Right lower lobe, 1 5 x 2 4 cm, enlarged  No new nodules  Stable patchy consolidation and fibrosis with traction bronchiectasis in the right lower lobe, at the site of prior surgery  PLEURA:  Localized pleural thickening, presumably tumor involvement, adjacent to the right upper lobe nodule described above  No pleural effusions  HEART/GREAT VESSELS:  Heart mildly enlarged  Central pulmonary arteries dilated with a main pulmonary artery diameter 3 4 cm  Ascending aorta top normal in size with a diameter 3 9 cm  MEDIASTINUM AND CHERRIE: No lymphadenopathy or mass  Esophagus unremarkable  Trachea and main stem bronchi normal  CHEST WALL AND LOWER NECK:   No lymphadenopathy or mass  Stable calcified nodule in the right thyroid lobe  ABDOMEN LIVER/BILIARY TREE:  Unremarkable  GALLBLADDER:  No calcified gallstones  No pericholecystic inflammatory change  SPLEEN:  Unremarkable  PANCREAS:  Unremarkable  ADRENAL GLANDS:  2 0 x 3 4 cm left adrenal mass, unchanged  Small right adrenal nodules unchanged since 2016  KIDNEYS/URETERS:  Prior left nephrectomy  Right renal cysts unchanged  No evidence of new right renal mass  No hydronephrosis or calculus  STOMACH AND BOWEL:  Stomach and small intestine unremarkable  Diverticulosis in the descending colon and sigmoid without evidence of diverticulitis  APPENDIX:  No findings to suggest appendicitis  ABDOMINOPELVIC CAVITY: No lymphadenopathy or mass  No ascites  No extraluminal gas  VESSELS:  Unremarkable for patient's age  PELVIS REPRODUCTIVE ORGANS:  Post prostatectomy  URINARY BLADDER:  Unremarkable  ABDOMINAL WALL/INGUINAL REGIONS:  Small fat-containing umbilical hernia   OSSEOUS STRUCTURES:  Multiple osteolytic metastases, the largest involving the right 7th rib additional metastases in the right 8th rib, T4 and T6 vertebral bodies right transverse process of T10 and right ilium (with extension into the right iliacus muscle), little change since the last CT  Prior surgery at L3-L4 and L4-L5 with what appear to be X-stop devices  Impression: m 1  Several pulmonary metastases as described in detail above  Most unchanged since 5/4/2018, some mildly increased in size  2   Multiple bone metastases, unchanged  3   No evidence of new metastasis in the chest, abdomen, pelvis or included portions of the skeleton   Workstation performed: [id number]

## 2018-09-26 NOTE — PROGRESS NOTES
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, metastatic to unspecified nervous system structure [facility])    Metastatic renal cell carcinoma, unspecified laterality [facility])          Discussion:    75year old male with metastatic renal cell CA  Underwent SBRT to T6 lesion 10/2017  On follow-up surveillance imaging, his T10 lesion increased in size and began to approach the spinal canal, so this was treated with SBRT 7/2018  He presents today for his 2 month POV with no new imaging  States he has minimal LBP, 1/10  Uses a stand cane at times - has a left foot drop of long standing and is s/p X-Stop in 2 lumbar locations  We discussed F/U, and NCCN guidelines for that - f/u TSpine MRI 1-3 months from tx, so we'll plan for an MRI in 1 month and see him back after that  CHIEF COMPLAINT    Chief Complaint   Patient presents with    Post-op     2 month POV s/p SBRT to T10       HISTORY    History of Present Illness     75y o  year old male     HPI    See Discussion    REVIEW OF SYSTEMS    Review of Systems   Constitutional: Positive for fatigue (moderate chronic)  HENT: Negative  Eyes: Negative  Respiratory: Negative  Cardiovascular: Negative  Gastrointestinal: Positive for diarrhea (loose stools most of the time)  Endocrine: Negative  Genitourinary:        Nocturia 2x   Musculoskeletal: Positive for back pain (LBP) and gait problem (uses standard cane for stability, left drop foot)  Skin: Positive for wound (hands)  Allergic/Immunologic: Negative  Neurological: Positive for numbness (hands and feet)  Negative for dizziness and headaches  Hematological: Bruises/bleeds easily  Psychiatric/Behavioral: Negative            Meds/Allergies Current Outpatient Prescriptions   Medication Sig Dispense Refill    amLODIPine (NORVASC) 5 mg tablet Take 5 mg by mouth daily        Axitinib 1 MG TABS Take 5 tablets (5 mg total) by mouth 2 (two) times a day 300 tablet 1    cholecalciferol (VITAMIN D3) 1,000 units tablet Take 1,000 Units by mouth daily      diphenhydrAMINE (BENADRYL) 25 mg tablet Take 25 mg by mouth daily at bedtime as needed for itching      gabapentin (NEURONTIN) 300 mg capsule Take 100 mg by mouth 3 (three) times a day      lisinopril-hydrochlorothiazide (PRINZIDE,ZESTORETIC) 20-12 5 MG per tablet Take 1 tablet by mouth 2 (two) times a day        loperamide (IMODIUM) 2 mg capsule Take 2 mg by mouth 4 (four) times a day as needed for diarrhea      traMADol (ULTRAM) 50 mg tablet Take 1 tablet (50 mg total) by mouth every 6 (six) hours as needed for moderate pain 30 tablet 0    trolamine salicylate (ASPERCREME) 10 % cream Apply 1 application topically as needed for muscle/joint pain      naproxen sodium (ALEVE) 220 MG tablet Take 220 mg by mouth as needed for mild pain       No current facility-administered medications for this visit          No Known Allergies    PAST HISTORY    Past Medical History:   Diagnosis Date    Arthritis     Cancer ([facility] [address] )     prostate - mets to bone and lung    History of radiation therapy 07/27/2018    SBRT to T10 7/18-7/27/2018    Hyperlipidemia     Hypertension        Past Surgical History:   Procedure Laterality Date    DISTAL ULNA EXCISION Right     excision of tumor and orif with cement and screws    JOINT REPLACEMENT Bilateral     tkr's    LUNG SURGERY Right     exc of nodules    NEPHRECTOMY Left        Social History   Substance Use Topics    Smoking status: Former Smoker     Packs/day: 1 00     Types: Cigarettes     Quit date: 3/28/2003    Smokeless tobacco: Never Used    Alcohol use Yes      Comment: occasional use       Family History   Problem Relation Age of Onset    No Known Problems Mother     No Known Problems Father          Above history personally reviewed  EXAM    Vitals:Blood pressure 143/77, pulse 70, temperature 97 6 °F (36 4 °C), temperature source Tympanic, resp  rate 18, height 5' 9" (1 753 m), weight 131 kg (289 lb 6 4 oz)  ,Body mass index is 42 74 kg/m²  Physical Exam    Neurologic Exam      MEDICAL DECISION MAKING    Imaging Studies:     Ct Chest Abdomen Pelvis Wo Contrast    Result Date: 9/19/2018  Narrative: CT CHEST, ABDOMEN AND PELVIS WITHOUT IV CONTRAST INDICATION:   C78 00: Secondary malignant neoplasm of unspecified lung C79 51: Secondary malignant neoplasm of bone C79 51: Secondary malignant neoplasm of bone C64 2: Malignant neoplasm of left kidney, except renal pelvis  Renal cell carcinoma, metastatic to lung and bone  History of prostate cancer  Recently completed stereotactic radiotherapy to T10 metastasis COMPARISON:  CT from May 4, 2018 and earlier CTs dating back to June 22, 2016  TECHNIQUE: CT examination of the chest, abdomen and pelvis was performed without intravenous contrast   Axial, sagittal, and coronal 2D reformatted images were created from the source data and submitted for interpretation  Radiation dose length product (DLP) for this visit:  6008 mGy-cm   This examination, like all CT scans performed in the [facility], was performed utilizing techniques to minimize radiation dose exposure, including the use of iterative reconstruction and automated exposure control  Enteric contrast was administered  The absence of intravenous contrast limits assessment of the thoracic and abdominal pelvic viscera  FINDINGS:   CHEST LUNGS:  Several pulmonary nodules, presumably metastases: 1  Series 5, image 19: Left upper lobe apex, 1 0 x 1 4 cm, unchanged  2   Image 21: Left upper lobe apex, 0 6 x 0 9 cm, unchanged  3   Image 33: Left upper lobe, 0 4 x 0 5, unchanged   4   Image 39: Right upper lobe, 2 8 x 2 8 cm pleural-based, with adjacent pleural thickening, slightly enlarged  5   Image 44: Left upper lobe, 1 2 x 1 5 cm, enlarged  6   Image 80: Left lower lobe, 1 1 x 1 3 cm, unchanged  7   Image 87: Right lower lobe, 1 5 x 2 4 cm, enlarged  No new nodules  Stable patchy consolidation and fibrosis with traction bronchiectasis in the right lower lobe, at the site of prior surgery  PLEURA:  Localized pleural thickening, presumably tumor involvement, adjacent to the right upper lobe nodule described above  No pleural effusions  HEART/GREAT VESSELS:  Heart mildly enlarged  Central pulmonary arteries dilated with a main pulmonary artery diameter 3 4 cm  Ascending aorta top normal in size with a diameter 3 9 cm  MEDIASTINUM AND CHERRIE: No lymphadenopathy or mass  Esophagus unremarkable  Trachea and main stem bronchi normal  CHEST WALL AND LOWER NECK:   No lymphadenopathy or mass  Stable calcified nodule in the right thyroid lobe  ABDOMEN LIVER/BILIARY TREE:  Unremarkable  GALLBLADDER:  No calcified gallstones  No pericholecystic inflammatory change  SPLEEN:  Unremarkable  PANCREAS:  Unremarkable  ADRENAL GLANDS:  2 0 x 3 4 cm left adrenal mass, unchanged  Small right adrenal nodules unchanged since 2016  KIDNEYS/URETERS:  Prior left nephrectomy  Right renal cysts unchanged  No evidence of new right renal mass  No hydronephrosis or calculus  STOMACH AND BOWEL:  Stomach and small intestine unremarkable  Diverticulosis in the descending colon and sigmoid without evidence of diverticulitis  APPENDIX:  No findings to suggest appendicitis  ABDOMINOPELVIC CAVITY: No lymphadenopathy or mass  No ascites  No extraluminal gas  VESSELS:  Unremarkable for patient's age  PELVIS REPRODUCTIVE ORGANS:  Post prostatectomy  URINARY BLADDER:  Unremarkable  ABDOMINAL WALL/INGUINAL REGIONS:  Small fat-containing umbilical hernia   OSSEOUS STRUCTURES:  Multiple osteolytic metastases, the largest involving the right 7th rib additional metastases in the right 8th rib, T4 and T6 vertebral bodies right transverse process of T10 and right ilium (with extension into the right iliacus muscle), little change since the last CT  Prior surgery at L3-L4 and L4-L5 with what appear to be X-stop devices  Impression: m 1  Several pulmonary metastases as described in detail above  Most unchanged since 5/4/2018, some mildly increased in size  2   Multiple bone metastases, unchanged  3   No evidence of new metastasis in the chest, abdomen, pelvis or included portions of the skeleton  Workstation performed: [id number]       I have personally reviewed pertinent reports     and I have personally reviewed pertinent films in PACS

## 2018-10-01 ENCOUNTER — HOSPITAL ENCOUNTER (OUTPATIENT)
Dept: INFUSION CENTER | Facility: CLINIC | Age: 68
Discharge: HOME | End: 2018-10-01
Payer: MEDICARE

## 2018-10-01 VITALS — TEMPERATURE: 96.9 F | HEART RATE: 76 BPM

## 2018-10-01 PROCEDURE — 96401 CHEMO ANTI-NEOPL SQ/IM: CPT

## 2018-10-01 RX ADMIN — DENOSUMAB 120 MG: 120 INJECTION SUBCUTANEOUS at 13:27

## 2018-10-01 NOTE — PLAN OF CARE
Problem: Potential for Falls  Goal: Patient will remain free of falls  INTERVENTIONS:  - Assess patient frequently for physical needs  -  Identify cognitive and physical deficits and behaviors that affect risk of falls    -  Noble fall precautions as indicated by assessment   - Educate patient/family on patient safety including physical limitations  - Instruct patient to call for assistance with activity based on assessment  - Modify environment to reduce risk of injury  - Consider OT/PT consult to assist with strengthening/mobility   Outcome: Progressing

## 2018-10-01 NOTE — PROGRESS NOTES
Xgeva 120mg given as ordered in R arm, tolerated well, Ca 9 9, will return for next xgeva as scheduled

## 2018-10-18 ENCOUNTER — HOSPITAL ENCOUNTER (OUTPATIENT)
Dept: INFUSION CENTER | Facility: CLINIC | Age: 68
End: 2018-10-18

## 2018-10-19 ENCOUNTER — HOSPITAL ENCOUNTER (OUTPATIENT)
Dept: RADIOLOGY | Facility: HOSPITAL | Age: 68
Discharge: HOME | End: 2018-10-19
Attending: NEUROLOGICAL SURGERY
Payer: MEDICARE

## 2018-10-19 DIAGNOSIS — C79.51 SPINE METASTASIS (HCC): ICD-10-CM

## 2018-10-19 PROCEDURE — 72157 MRI CHEST SPINE W/O & W/DYE: CPT

## 2018-10-19 PROCEDURE — A9585 GADOBUTROL INJECTION: HCPCS | Performed by: NEUROLOGICAL SURGERY

## 2018-10-19 RX ADMIN — GADOBUTROL 13 ML: 604.72 INJECTION INTRAVENOUS at 10:41

## 2018-10-24 ENCOUNTER — APPOINTMENT (OUTPATIENT)
Dept: LAB | Facility: MEDICAL CENTER | Age: 68
End: 2018-10-24
Payer: MEDICARE

## 2018-10-24 ENCOUNTER — OFFICE VISIT (OUTPATIENT)
Dept: NEUROSURGERY | Facility: CLINIC | Age: 68
End: 2018-10-24
Payer: MEDICARE

## 2018-10-24 ENCOUNTER — RADIATION ONCOLOGY FOLLOW-UP (OUTPATIENT)
Dept: RADIATION ONCOLOGY | Facility: HOSPITAL | Age: 68
End: 2018-10-24
Attending: STUDENT IN AN ORGANIZED HEALTH CARE EDUCATION/TRAINING PROGRAM

## 2018-10-24 VITALS
SYSTOLIC BLOOD PRESSURE: 160 MMHG | RESPIRATION RATE: 16 BRPM | HEIGHT: 69 IN | TEMPERATURE: 97.8 F | DIASTOLIC BLOOD PRESSURE: 96 MMHG | OXYGEN SATURATION: 97 % | BODY MASS INDEX: 42.86 KG/M2 | WEIGHT: 289.4 LBS | HEART RATE: 75 BPM

## 2018-10-24 VITALS
TEMPERATURE: 97.8 F | RESPIRATION RATE: 16 BRPM | HEART RATE: 75 BPM | BODY MASS INDEX: 42.86 KG/M2 | SYSTOLIC BLOOD PRESSURE: 160 MMHG | HEIGHT: 69 IN | DIASTOLIC BLOOD PRESSURE: 96 MMHG | WEIGHT: 289.4 LBS

## 2018-10-24 DIAGNOSIS — C78.00 MALIGNANT NEOPLASM METASTATIC TO LUNG, UNSPECIFIED LATERALITY (HCC): ICD-10-CM

## 2018-10-24 DIAGNOSIS — C79.40: Primary | ICD-10-CM

## 2018-10-24 DIAGNOSIS — C79.51 SPINE METASTASIS (HCC): ICD-10-CM

## 2018-10-24 DIAGNOSIS — C79.51 SECONDARY MALIGNANT NEOPLASM OF BONE (HCC): ICD-10-CM

## 2018-10-24 DIAGNOSIS — C79.51 BONE METASTASES (HCC): ICD-10-CM

## 2018-10-24 DIAGNOSIS — C64.2 CLEAR CELL ADENOCARCINOMA OF KIDNEY, LEFT (HCC): ICD-10-CM

## 2018-10-24 LAB
ALBUMIN SERPL BCP-MCNC: 3 G/DL (ref 3.5–5)
ALP SERPL-CCNC: 63 U/L (ref 46–116)
ALT SERPL W P-5'-P-CCNC: 20 U/L (ref 12–78)
ANION GAP SERPL CALCULATED.3IONS-SCNC: 7 MMOL/L (ref 4–13)
AST SERPL W P-5'-P-CCNC: 20 U/L (ref 5–45)
BASOPHILS # BLD AUTO: 0.01 THOUSANDS/ΜL (ref 0–0.1)
BASOPHILS NFR BLD AUTO: 0 % (ref 0–1)
BILIRUB SERPL-MCNC: 0.7 MG/DL (ref 0.2–1)
BUN SERPL-MCNC: 13 MG/DL (ref 5–25)
CALCIUM SERPL-MCNC: 9 MG/DL (ref 8.3–10.1)
CHLORIDE SERPL-SCNC: 103 MMOL/L (ref 100–108)
CO2 SERPL-SCNC: 27 MMOL/L (ref 21–32)
CREAT SERPL-MCNC: 1.18 MG/DL (ref 0.6–1.3)
EOSINOPHIL # BLD AUTO: 0.13 THOUSAND/ΜL (ref 0–0.61)
EOSINOPHIL NFR BLD AUTO: 2 % (ref 0–6)
ERYTHROCYTE [DISTWIDTH] IN BLOOD BY AUTOMATED COUNT: 15.5 % (ref 11.6–15.1)
GFR SERPL CREATININE-BSD FRML MDRD: 63 ML/MIN/1.73SQ M
GLUCOSE SERPL-MCNC: 107 MG/DL (ref 65–140)
HCT VFR BLD AUTO: 50.5 % (ref 36.5–49.3)
HGB BLD-MCNC: 15.5 G/DL (ref 12–17)
IMM GRANULOCYTES # BLD AUTO: 0.01 THOUSAND/UL (ref 0–0.2)
IMM GRANULOCYTES NFR BLD AUTO: 0 % (ref 0–2)
LDH SERPL-CCNC: 194 U/L (ref 81–234)
LYMPHOCYTES # BLD AUTO: 2.02 THOUSANDS/ΜL (ref 0.6–4.47)
LYMPHOCYTES NFR BLD AUTO: 25 % (ref 14–44)
MCH RBC QN AUTO: 27.9 PG (ref 26.8–34.3)
MCHC RBC AUTO-ENTMCNC: 30.7 G/DL (ref 31.4–37.4)
MCV RBC AUTO: 91 FL (ref 82–98)
MONOCYTES # BLD AUTO: 0.53 THOUSAND/ΜL (ref 0.17–1.22)
MONOCYTES NFR BLD AUTO: 7 % (ref 4–12)
NEUTROPHILS # BLD AUTO: 5.32 THOUSANDS/ΜL (ref 1.85–7.62)
NEUTS SEG NFR BLD AUTO: 66 % (ref 43–75)
NRBC BLD AUTO-RTO: 0 /100 WBCS
PLATELET # BLD AUTO: 196 THOUSANDS/UL (ref 149–390)
PMV BLD AUTO: 10.3 FL (ref 8.9–12.7)
POTASSIUM SERPL-SCNC: 3.5 MMOL/L (ref 3.5–5.3)
PROT SERPL-MCNC: 6.7 G/DL (ref 6.4–8.2)
RBC # BLD AUTO: 5.55 MILLION/UL (ref 3.88–5.62)
SODIUM SERPL-SCNC: 137 MMOL/L (ref 136–145)
WBC # BLD AUTO: 8.02 THOUSAND/UL (ref 4.31–10.16)

## 2018-10-24 PROCEDURE — 85025 COMPLETE CBC W/AUTO DIFF WBC: CPT

## 2018-10-24 PROCEDURE — 80053 COMPREHEN METABOLIC PANEL: CPT

## 2018-10-24 PROCEDURE — 36415 COLL VENOUS BLD VENIPUNCTURE: CPT

## 2018-10-24 PROCEDURE — 99212 OFFICE O/P EST SF 10 MIN: CPT | Performed by: NEUROLOGICAL SURGERY

## 2018-10-24 PROCEDURE — 83615 LACTATE (LD) (LDH) ENZYME: CPT

## 2018-10-24 NOTE — PROGRESS NOTES
Brain Sean  1950   Mr Arti Hernandez is a 70 y o  male       Chief Complaint   Patient presents with    Follow-up   Cancer Staging  No matching staging information was found for the patient  Bone metastases ([address] )    6/22/2016 Initial Diagnosis     Bone metastases ([address] )         3/8/2017 -  Chemotherapy     Axitinib 4 mg b i d        Denosumab 60 mg subcutaneously monthly initiated January 12, 2018 5/17/2017 - 5/31/2017 Radiation     palliative radiation therapy following ORIF of right ulna for metastatic renal cell carcinoma to 3000cGy         7/26/2017 - 8/3/2017 Radiation     palliative radiation therapy for metastatic renal carcinoma to the right posterior seventh rib with bone and soft tissue metastases to 2800cGy         10/18/2017 - 10/27/2017 Radiation     stereotactic radiation therapy to a T6 vertebral body metastasis to 3000cGy         7/18/2018 - 7/27/2018 Radiation     Stereotactic radiation to T10 metastasis to 3000 cGy in 5 fractions            Clinical Trial: no    Interval History  Last seen on 9/26/18    10/1/18 Xgeva    10/19/18 MRI thoracic spine  PREVERTEBRAL AND PARASPINAL SOFT TISSUES:   There is a lobulated right paraspinal mass identified at the T4 level without extension into the neural foramen  This mass currently measures 3 4 x 3 2 cm on series 8 image 14, increasing in size compared to the prior examination where it measured 2 8 x 2 8 cm  IMPRESSION:  Stable metastatic lesions within T4 and T6  The other thoracic lesions are suggestive of hemangiomas and also unchanged      There is a paraspinal mass at the T4 level which is increasing in size and demonstrates homogeneous enhancement      Expansile lytic and destructive lesion involving the right posterior lateral 7th rib again identified       11/23/18 Dr Raina Leyden follow up    Screening  Tobacco  Current tobacco user: no  If yes, brief counseling provided: No    Hypertension  Hypertension screening performed: yes  Normotensive:  no  If no, referred to PCP: no    Depression Screening  Screened for depression using PHQ-2: yes    Screened for depression using PHQ-9:  no  Screening positive or negative:  negative  If score >4, was any of the following actions taken? Additional evaluation for depression, suicide risk assesment, referral to PCP or psychiatry, medication started:  no    Advanced Care Planning for Patients >65 years  Advanced Care Planning Discussed:  yes  Patient named surrogate decision maker or care plan in chart: yes    [unfilled]  Health Maintenance   Topic Date Due    CRC Screening: Colonoscopy  1950    DTaP,Tdap,and Td Vaccines (1 - Tdap) 10/06/1970    Fall Risk  10/06/2014    Pneumococcal PPSV23/PCV13 65+ Years / High and Highest Risk (2 of 2 - PCV13) 08/15/2017    INFLUENZA VACCINE  07/01/2018    Depression Screening PHQ  09/26/2019       Patient Active Problem List   Diagnosis    Clear cell adenocarcinoma of kidney, left (HCC)    Bone metastases ([address] )    Lung metastases ([address] )    Spine metastasis ([address] )    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 ([address] )    Metastatic renal cell carcinoma ([address] )     Past Medical History:   Diagnosis Date    Arthritis     Cancer ([address] )     prostate - mets to bone and lung    History of radiation therapy 07/27/2018    SBRT to T10 7/18-7/27/2018    Hyperlipidemia     Hypertension      Past Surgical History:   Procedure Laterality Date    DISTAL ULNA EXCISION Right     excision of tumor and orif with cement and screws    JOINT REPLACEMENT Bilateral     tkr's    LUNG SURGERY Right     exc of nodules    NEPHRECTOMY Left      Family History   Problem Relation Age of Onset    No Known Problems Mother     No Known Problems Father      Social History     Social History    Marital status:       Spouse name: N/A    Number of children: 1    Years of education: 15     Occupational History    retired      P/T  Social History Main Topics    Smoking status: Former Smoker     Packs/day: 1 00     Types: Cigarettes     Quit date: 3/28/2003    Smokeless tobacco: Never Used    Alcohol use Yes      Comment: occasional use    Drug use: No    Sexual activity: Not on file     Other Topics Concern    Not on file     Social History Narrative    Lives at home alone       Current Outpatient Prescriptions:     amLODIPine (NORVASC) 5 mg tablet, Take 5 mg by mouth daily  , Disp: , Rfl:     Axitinib 1 MG TABS, Take 5 tablets (5 mg total) by mouth 2 (two) times a day, Disp: 300 tablet, Rfl: 1    cholecalciferol (VITAMIN D3) 1,000 units tablet, Take 1,000 Units by mouth daily, Disp: , Rfl:     diphenhydrAMINE (BENADRYL) 25 mg tablet, Take 25 mg by mouth daily at bedtime as needed for itching, Disp: , Rfl:     gabapentin (NEURONTIN) 100 mg capsule, Take 200 mg by mouth 3 (three) times a day  , Disp: , Rfl:     lisinopril-hydrochlorothiazide (PRINZIDE,ZESTORETIC) 20-12 5 MG per tablet, Take 1 tablet by mouth 2 (two) times a day  , Disp: , Rfl:     loperamide (IMODIUM) 2 mg capsule, Take 2 mg by mouth 4 (four) times a day as needed for diarrhea, Disp: , Rfl:     naproxen sodium (ALEVE) 220 MG tablet, Take 220 mg by mouth as needed for mild pain, Disp: , Rfl:     traMADol (ULTRAM) 50 mg tablet, Take 1 tablet (50 mg total) by mouth every 6 (six) hours as needed for moderate pain, Disp: 30 tablet, Rfl: 0    trolamine salicylate (ASPERCREME) 10 % cream, Apply 1 application topically as needed for muscle/joint pain, Disp: , Rfl:   No Known Allergies    Review of Systems:  Review of Systems   Constitutional: Positive for fatigue (Mild)  HENT: Positive for postnasal drip  Eyes: Negative  Respiratory: Negative  Cardiovascular: Positive for leg swelling  Gastrointestinal: Negative  Endocrine: Negative      Genitourinary:        Nocturia 2x/night   Musculoskeletal: Positive for arthralgias, back pain (Lower, stable), gait problem (Left drop foot), neck pain (Intermittent) and neck stiffness  Skin: Positive for rash (Hands)  Allergic/Immunologic: Negative  Neurological: Positive for numbness (Feet and finger tips)  Hematological: Bruises/bleeds easily  Psychiatric/Behavioral: Negative  Vitals:    10/24/18 0942   BP: 160/96   BP Location: Left arm   Patient Position: Sitting   Cuff Size: Large   Pulse: 75   Resp: 16   Temp: 97 8 °F (36 6 °C)   TempSrc: Oral   SpO2: 97%   Weight: 131 kg (289 lb 6 4 oz)   Height: 5' 9" (1 753 m)       Pain Score: 0-No pain    Imaging:Mri Thoracic Spine With And Without Contrast    Result Date: 10/19/2018  Narrative: MRI THORACIC SPINE WITH AND WITHOUT CONTRAST INDICATION: C79 51: Secondary malignant neoplasm of bone  COMPARISON:  6/25/2018 is the most recent of several prior examinations  TECHNIQUE:  Sagittal T1, sagittal T2, sagittal inversion recovery, axial T2,  axial 2D MERGE  Sagittal and axial T1 postcontrast  IV Contrast:  13 mL of gadobutrol injection (MULTI-DOSE) IMAGE QUALITY:  Diagnostic  FINDINGS: ALIGNMENT:  No subluxation or significant scoliosis  Slight loss of height of T4 is unchanged compared to prior examination  No acute compression fracture  MARROW SIGNAL:  There are numerous marrow replacing lesions identified  Stable lesion within the T3 vertebral body inferiorly which is primarily hyperintense on T1 and T2-weighted imaging suggesting hemangioma  A similar lesion is identified within T8  The T4 and T6 lesions seen on the prior examination are hypointense on T1-weighted imaging  There is some sclerosis peripheral to the lesion at the T4 level  These are unchanged in size and configuration without extraosseous extension  THORACIC CORD:  Normal signal within the thoracic cord  PREVERTEBRAL AND PARASPINAL SOFT TISSUES:   There is a lobulated right paraspinal mass identified at the T4 level without extension into the neural foramen    This mass currently measures 3 4 x 3 2 cm on series 8 image 14, increasing in size compared to the prior examination where it measured 2 8 x 2 8 cm  Partially visualized expansile lytic and destructive lesion involving the right posterior lateral 7th rib with adjacent consolidation in the right lung  THORACIC DEGENERATIVE CHANGE:  No discrete disc herniation, canal stenosis or foraminal narrowing  POSTCONTRAST:  No abnormal enhancement within the central canal   Both T4 and T6 lesions demonstrate enhancement centrally  The paraspinal mass at the T4 level demonstrates diffuse homogeneous enhancement  Impression: Stable metastatic lesions within T4 and T6  The other thoracic lesions are suggestive of hemangiomas and also unchanged  There is a paraspinal mass at the T4 level which is increasing in size and demonstrates homogeneous enhancement  Expansile lytic and destructive lesion involving the right posterior lateral 7th rib again identified   Workstation performed: [id number]     Teaching

## 2018-10-24 NOTE — PROGRESS NOTES
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, metastatic to unspecified nervous system structure [facility])    Bone metastases ([facility] [address] )    Spine metastasis ([facility] [address] )    Clear cell adenocarcinoma of kidney, left ([facility] [address] )          Discussion:    S/p SBRT T6 lesion 10/2017, then SRBR to T10 lesion 7/2018 for renal cell CA  Both remain stable  However, paraspinal mass at T4 is progressing  10' specifically spent discussing, counselling on this lesion  Patient seen in conjunction with Dr Allison Downey  We offered SBRT to this lesion  R/B/A discussed  He would like to proceed, written consent obtained  Metrics:     [id number] 1 000, VAS 90, KPS , ECOG 0-1  CHIEF COMPLAINT    Chief Complaint   Patient presents with    Follow-up     1 month f/u with new MRI       HISTORY    History of Present Illness     70y o  year old male     HPI    See Discussion    REVIEW OF SYSTEMS    Review of Systems   Constitutional: Positive for fatigue (mild)  HENT: Positive for postnasal drip  Eyes:        Eye surgery scheduled   Respiratory: Negative  Cardiovascular: Positive for leg swelling  Gastrointestinal: Negative  Endocrine: Negative  Genitourinary:        Nocturia 2x   Musculoskeletal: Positive for arthralgias, back pain (LBP), gait problem (standard cane, left drop foot), neck pain (occasional) and neck stiffness (occasional)  Skin: Positive for wound (hands)  Allergic/Immunologic: Negative  Neurological: Positive for numbness (hands and feet)  Hematological: Negative  Psychiatric/Behavioral: Negative            Meds/Allergies     Current Outpatient Prescriptions   Medication Sig Dispense Refill    amLODIPine (NORVASC) 5 mg tablet Take 5 mg by mouth daily        Axitinib 1 MG TABS Take 5 tablets (5 mg total) by mouth 2 (two) times a day 300 tablet 1    cholecalciferol (VITAMIN D3) 1,000 units tablet Take 1,000 Units by mouth daily      diphenhydrAMINE (BENADRYL) 25 mg tablet Take 25 mg by mouth daily at bedtime as needed for itching      gabapentin (NEURONTIN) 100 mg capsule Take 200 mg by mouth 3 (three) times a day        lisinopril-hydrochlorothiazide (PRINZIDE,ZESTORETIC) 20-12 5 MG per tablet Take 1 tablet by mouth 2 (two) times a day        loperamide (IMODIUM) 2 mg capsule Take 2 mg by mouth 4 (four) times a day as needed for diarrhea      naproxen sodium (ALEVE) 220 MG tablet Take 220 mg by mouth as needed for mild pain      traMADol (ULTRAM) 50 mg tablet Take 1 tablet (50 mg total) by mouth every 6 (six) hours as needed for moderate pain 30 tablet 0    trolamine salicylate (ASPERCREME) 10 % cream Apply 1 application topically as needed for muscle/joint pain       No current facility-administered medications for this visit  No Known Allergies    PAST HISTORY    Past Medical History:   Diagnosis Date    Arthritis     Cancer ([facility] [address] )     prostate - mets to bone and lung    History of radiation therapy 07/27/2018    SBRT to T10 7/18-7/27/2018    Hyperlipidemia     Hypertension        Past Surgical History:   Procedure Laterality Date    DISTAL ULNA EXCISION Right     excision of tumor and orif with cement and screws    JOINT REPLACEMENT Bilateral     tkr's    LUNG SURGERY Right     exc of nodules    NEPHRECTOMY Left        Social History   Substance Use Topics    Smoking status: Former Smoker     Packs/day: 1 00     Types: Cigarettes     Quit date: 3/28/2003    Smokeless tobacco: Never Used    Alcohol use Yes      Comment: occasional use       Family History   Problem Relation Age of Onset    No Known Problems Mother     No Known Problems Father          Above history personally reviewed  EXAM    Vitals: There were no vitals taken for this visit  ,There is no height or weight on file to calculate BMI       Physical Exam    Neurologic Exam      MEDICAL DECISION MAKING    Imaging Studies:     Mri Thoracic Spine With And Without Contrast    Result Date: 10/19/2018  Narrative: MRI THORACIC SPINE WITH AND WITHOUT CONTRAST INDICATION: C79 51: Secondary malignant neoplasm of bone  COMPARISON:  6/25/2018 is the most recent of several prior examinations  TECHNIQUE:  Sagittal T1, sagittal T2, sagittal inversion recovery, axial T2,  axial 2D MERGE  Sagittal and axial T1 postcontrast  IV Contrast:  13 mL of gadobutrol injection (MULTI-DOSE) IMAGE QUALITY:  Diagnostic  FINDINGS: ALIGNMENT:  No subluxation or significant scoliosis  Slight loss of height of T4 is unchanged compared to prior examination  No acute compression fracture  MARROW SIGNAL:  There are numerous marrow replacing lesions identified  Stable lesion within the T3 vertebral body inferiorly which is primarily hyperintense on T1 and T2-weighted imaging suggesting hemangioma  A similar lesion is identified within T8  The T4 and T6 lesions seen on the prior examination are hypointense on T1-weighted imaging  There is some sclerosis peripheral to the lesion at the T4 level  These are unchanged in size and configuration without extraosseous extension  THORACIC CORD:  Normal signal within the thoracic cord  PREVERTEBRAL AND PARASPINAL SOFT TISSUES:   There is a lobulated right paraspinal mass identified at the T4 level without extension into the neural foramen  This mass currently measures 3 4 x 3 2 cm on series 8 image 14, increasing in size compared to the prior examination where it measured 2 8 x 2 8 cm  Partially visualized expansile lytic and destructive lesion involving the right posterior lateral 7th rib with adjacent consolidation in the right lung  THORACIC DEGENERATIVE CHANGE:  No discrete disc herniation, canal stenosis or foraminal narrowing   POSTCONTRAST:  No abnormal enhancement within the central canal   Both T4 and T6 lesions demonstrate enhancement centrally  The paraspinal mass at the T4 level demonstrates diffuse homogeneous enhancement  Impression: Stable metastatic lesions within T4 and T6  The other thoracic lesions are suggestive of hemangiomas and also unchanged  There is a paraspinal mass at the T4 level which is increasing in size and demonstrates homogeneous enhancement  Expansile lytic and destructive lesion involving the right posterior lateral 7th rib again identified  Workstation performed: [id number]       I have personally reviewed pertinent reports     and I have personally reviewed pertinent films in PACS

## 2018-10-24 NOTE — PROGRESS NOTES
Follow-up - Radiation Oncology   Claire Taylor 1950 70 y o  male [id number]      History of Present Illness   Cancer Staging  No matching staging information was found for the patient      Claire Taylor is a 70y o  year old male       Interval History:  Last seen on 9/26/18     10/1/18 [facility]     10/19/18 MRI thoracic spine  PREVERTEBRAL AND PARASPINAL SOFT TISSUES:   There is a lobulated right paraspinal mass identified at the T4 level without extension into the neural foramen   This mass currently measures 3 4 x 3 2 cm on series 8 image 14, increasing in size compared to the prior examination where it measured 2 8 x 2 8 cm       IMPRESSION:  Stable metastatic lesions within T4 and T6   The other thoracic lesions are suggestive of hemangiomas and also unchanged      There is a paraspinal mass at the T4 level which is increasing in size and demonstrates homogeneous enhancement      Expansile lytic and destructive lesion involving the right posterior lateral 7th rib again identified       11/23/18 Dr Waldemar Gama follow up        Historical Information      Bone metastases ([address] )    6/22/2016 Initial Diagnosis     Bone metastases ([address] )         3/8/2017 -  Chemotherapy     Axitinib 4 mg b i d        Denosumab 60 mg subcutaneously monthly initiated January 12, 2018 5/17/2017 - 5/31/2017 Radiation     palliative radiation therapy following ORIF of right ulna for metastatic renal cell carcinoma to 3000cGy         7/26/2017 - 8/3/2017 Radiation     palliative radiation therapy for metastatic renal carcinoma to the right posterior seventh rib with bone and soft tissue metastases to 2800cGy         10/18/2017 - 10/27/2017 Radiation     stereotactic radiation therapy to a T6 vertebral body metastasis to 3000cGy         7/18/2018 - 7/27/2018 Radiation     Stereotactic radiation to T10 metastasis to 3000 cGy in 5 fractions            Past Medical History:   Diagnosis Date    Arthritis     Cancer ([address] ) prostate - mets to bone and lung    History of radiation therapy 07/27/2018    SBRT to T10 7/18-7/27/2018    Hyperlipidemia     Hypertension      Past Surgical History:   Procedure Laterality Date    DISTAL ULNA EXCISION Right     excision of tumor and orif with cement and screws    JOINT REPLACEMENT Bilateral     tkr's    LUNG SURGERY Right     exc of nodules    NEPHRECTOMY Left        Social History   History   Alcohol Use    Yes     Comment: occasional use     History   Drug Use No     History   Smoking Status    Former Smoker    Packs/day: 1 00    Types: Cigarettes    Quit date: 3/28/2003   Smokeless Tobacco    Never Used         Meds/Allergies     Current Outpatient Prescriptions:     amLODIPine (NORVASC) 5 mg tablet, Take 5 mg by mouth daily  , Disp: , Rfl:     Axitinib 1 MG TABS, Take 5 tablets (5 mg total) by mouth 2 (two) times a day, Disp: 300 tablet, Rfl: 1    cholecalciferol (VITAMIN D3) 1,000 units tablet, Take 1,000 Units by mouth daily, Disp: , Rfl:     diphenhydrAMINE (BENADRYL) 25 mg tablet, Take 25 mg by mouth daily at bedtime as needed for itching, Disp: , Rfl:     gabapentin (NEURONTIN) 100 mg capsule, Take 200 mg by mouth 3 (three) times a day  , Disp: , Rfl:     lisinopril-hydrochlorothiazide (PRINZIDE,ZESTORETIC) 20-12 5 MG per tablet, Take 1 tablet by mouth 2 (two) times a day  , Disp: , Rfl:     loperamide (IMODIUM) 2 mg capsule, Take 2 mg by mouth 4 (four) times a day as needed for diarrhea, Disp: , Rfl:     naproxen sodium (ALEVE) 220 MG tablet, Take 220 mg by mouth as needed for mild pain, Disp: , Rfl:     traMADol (ULTRAM) 50 mg tablet, Take 1 tablet (50 mg total) by mouth every 6 (six) hours as needed for moderate pain, Disp: 30 tablet, Rfl: 0    trolamine salicylate (ASPERCREME) 10 % cream, Apply 1 application topically as needed for muscle/joint pain, Disp: , Rfl:   No Known Allergies      Review of Systems   Constitutional: Positive for fatigue (Mild)     HENT: Positive for postnasal drip  Eyes: Negative  Respiratory: Negative  Cardiovascular: Positive for leg swelling  Gastrointestinal: Negative  Endocrine: Negative  Genitourinary:        Nocturia 2x/night   Musculoskeletal: Positive for arthralgias, back pain (Lower, stable), gait problem (Left drop foot), neck pain (Intermittent) and neck stiffness  Skin: Positive for rash (Hands)  Allergic/Immunologic: Negative  Neurological: Positive for numbness (Feet and finger tips)  Hematological: Bruises/bleeds easily  Psychiatric/Behavioral: Negative              OBJECTIVE:   /96 (BP Location: Left arm, Patient Position: Sitting, Cuff Size: Large)   Pulse 75   Temp 97 8 °F (36 6 °C) (Oral)   Resp 16   Ht 5' 9" (1 753 m)   Wt 131 kg (289 lb 6 4 oz)   SpO2 97%   BMI 42 74 kg/m²   Pain Assessment:  0  ECOG/Zubrod/WHO: 0 - Asymptomatic    Physical Exam   GENERAL:  Appears stated age, in no apparent distress  Alert and oriented  HEENT:  Normocephalic, atraumatic   extraocular muscles intact  Oral mucosa moist   LYMPHATICS:  No cervical, supraclavicular, or infraclavicular lymphadenopathy noted bilaterally  PULMONARY:  Respirations unlabored  CARDIOVASCULAR:  Regular rate  ABDOMEN:  Soft, nondistended, nontender to palpation  No hepatosplenomegaly  NEUROLOGIC:  No focal deficits noted            RESULTS    Lab Results: No results found for this or any previous visit (from the past 672 hour(s))  Imaging Studies:Mri Thoracic Spine With And Without Contrast    Result Date: 10/19/2018  Narrative: MRI THORACIC SPINE WITH AND WITHOUT CONTRAST INDICATION: C79 51: Secondary malignant neoplasm of bone  COMPARISON:  6/25/2018 is the most recent of several prior examinations  TECHNIQUE:  Sagittal T1, sagittal T2, sagittal inversion recovery, axial T2,  axial 2D MERGE  Sagittal and axial T1 postcontrast  IV Contrast:  13 mL of gadobutrol injection (MULTI-DOSE) IMAGE QUALITY:  Diagnostic   FINDINGS: ALIGNMENT:  No subluxation or significant scoliosis  Slight loss of height of T4 is unchanged compared to prior examination  No acute compression fracture  MARROW SIGNAL:  There are numerous marrow replacing lesions identified  Stable lesion within the T3 vertebral body inferiorly which is primarily hyperintense on T1 and T2-weighted imaging suggesting hemangioma  A similar lesion is identified within T8  The T4 and T6 lesions seen on the prior examination are hypointense on T1-weighted imaging  There is some sclerosis peripheral to the lesion at the T4 level  These are unchanged in size and configuration without extraosseous extension  THORACIC CORD:  Normal signal within the thoracic cord  PREVERTEBRAL AND PARASPINAL SOFT TISSUES:   There is a lobulated right paraspinal mass identified at the T4 level without extension into the neural foramen  This mass currently measures 3 4 x 3 2 cm on series 8 image 14, increasing in size compared to the prior examination where it measured 2 8 x 2 8 cm  Partially visualized expansile lytic and destructive lesion involving the right posterior lateral 7th rib with adjacent consolidation in the right lung  THORACIC DEGENERATIVE CHANGE:  No discrete disc herniation, canal stenosis or foraminal narrowing  POSTCONTRAST:  No abnormal enhancement within the central canal   Both T4 and T6 lesions demonstrate enhancement centrally  The paraspinal mass at the T4 level demonstrates diffuse homogeneous enhancement  Impression: Stable metastatic lesions within T4 and T6  The other thoracic lesions are suggestive of hemangiomas and also unchanged  There is a paraspinal mass at the T4 level which is increasing in size and demonstrates homogeneous enhancement  Expansile lytic and destructive lesion involving the right posterior lateral 7th rib again identified   Workstation performed: [id number]           Assessment/Plan:  No orders of the defined types were placed in this encounter  Lori Murray is a 70y o  year old male with metastatic renal cancer, status post SB RT to T6 in October 2017, followed by SB RT to T10 in July 2018, which are both stable  However paraspinal mass at T4 shows progression  Patient was seen in conjunction with Dr Sofia Liriano, with the discussion for possible treatment with SB RT, given disease elsewhere is stable, and further progression could lead to neurologic compromise  I reviewed the previous treatment plans, and explained that I could likely treat with the same dose and fractionation from previous SB RT treatment, however would need to determine the overlap at time of planning, and may need to decrease dose per fraction if there is significant overlap  The logistics of treatment and expected side effects were reviewed and after this discussion we agreed to the following:    PLAN:  SB RT TO T4 PARASPINAL MASS, informed consent obtained with plan for patient to return for CT simulation  Bradley Richter MD  10/24/2018,10:45 AM    Portions of the record may have been created with voice recognition software   Occasional wrong word or "sound a like" substitutions may have occurred due to the inherent limitations of voice recognition software   Read the chart carefully and recognize, using context, where substitutions have occurred

## 2018-11-01 ENCOUNTER — APPOINTMENT (OUTPATIENT)
Dept: RADIATION ONCOLOGY | Facility: HOSPITAL | Age: 68
End: 2018-11-01
Attending: STUDENT IN AN ORGANIZED HEALTH CARE EDUCATION/TRAINING PROGRAM
Payer: MEDICARE

## 2018-11-06 ENCOUNTER — RADIATION THERAPY TREATMENT (OUTPATIENT)
Dept: RADIATION ONCOLOGY | Facility: HOSPITAL | Age: 68
End: 2018-11-06
Attending: STUDENT IN AN ORGANIZED HEALTH CARE EDUCATION/TRAINING PROGRAM
Payer: MEDICARE

## 2018-11-06 PROCEDURE — 77370 RADIATION PHYSICS CONSULT: CPT | Performed by: STUDENT IN AN ORGANIZED HEALTH CARE EDUCATION/TRAINING PROGRAM

## 2018-11-06 PROCEDURE — 77334 RADIATION TREATMENT AID(S): CPT | Performed by: STUDENT IN AN ORGANIZED HEALTH CARE EDUCATION/TRAINING PROGRAM

## 2018-11-06 PROCEDURE — 77470 SPECIAL RADIATION TREATMENT: CPT | Performed by: STUDENT IN AN ORGANIZED HEALTH CARE EDUCATION/TRAINING PROGRAM

## 2018-11-12 PROCEDURE — 77334 RADIATION TREATMENT AID(S): CPT | Performed by: STUDENT IN AN ORGANIZED HEALTH CARE EDUCATION/TRAINING PROGRAM

## 2018-11-12 PROCEDURE — 77295 3-D RADIOTHERAPY PLAN: CPT | Performed by: STUDENT IN AN ORGANIZED HEALTH CARE EDUCATION/TRAINING PROGRAM

## 2018-11-12 PROCEDURE — 77300 RADIATION THERAPY DOSE PLAN: CPT | Performed by: STUDENT IN AN ORGANIZED HEALTH CARE EDUCATION/TRAINING PROGRAM

## 2018-11-13 ENCOUNTER — APPOINTMENT (OUTPATIENT)
Dept: LAB | Facility: MEDICAL CENTER | Age: 68
End: 2018-11-13
Payer: MEDICARE

## 2018-11-13 DIAGNOSIS — C34.90 MALIGNANT NEOPLASM OF LUNG, UNSPECIFIED LATERALITY, UNSPECIFIED PART OF LUNG (HCC): Primary | ICD-10-CM

## 2018-11-13 DIAGNOSIS — C34.90 MALIGNANT NEOPLASM OF LUNG, UNSPECIFIED LATERALITY, UNSPECIFIED PART OF LUNG (HCC): ICD-10-CM

## 2018-11-13 LAB
ALBUMIN SERPL BCP-MCNC: 3.1 G/DL (ref 3.5–5)
ALP SERPL-CCNC: 64 U/L (ref 46–116)
ALT SERPL W P-5'-P-CCNC: 16 U/L (ref 12–78)
ANION GAP SERPL CALCULATED.3IONS-SCNC: 5 MMOL/L (ref 4–13)
AST SERPL W P-5'-P-CCNC: 17 U/L (ref 5–45)
BASOPHILS # BLD AUTO: 0.02 THOUSANDS/ΜL (ref 0–0.1)
BASOPHILS NFR BLD AUTO: 0 % (ref 0–1)
BILIRUB SERPL-MCNC: 0.82 MG/DL (ref 0.2–1)
BUN SERPL-MCNC: 15 MG/DL (ref 5–25)
CALCIUM SERPL-MCNC: 9 MG/DL (ref 8.3–10.1)
CHLORIDE SERPL-SCNC: 102 MMOL/L (ref 100–108)
CO2 SERPL-SCNC: 29 MMOL/L (ref 21–32)
CREAT SERPL-MCNC: 1.15 MG/DL (ref 0.6–1.3)
EOSINOPHIL # BLD AUTO: 0.12 THOUSAND/ΜL (ref 0–0.61)
EOSINOPHIL NFR BLD AUTO: 1 % (ref 0–6)
ERYTHROCYTE [DISTWIDTH] IN BLOOD BY AUTOMATED COUNT: 16 % (ref 11.6–15.1)
GFR SERPL CREATININE-BSD FRML MDRD: 65 ML/MIN/1.73SQ M
GLUCOSE SERPL-MCNC: 69 MG/DL (ref 65–140)
HCT VFR BLD AUTO: 52.1 % (ref 36.5–49.3)
HGB BLD-MCNC: 16 G/DL (ref 12–17)
IMM GRANULOCYTES # BLD AUTO: 0.03 THOUSAND/UL (ref 0–0.2)
IMM GRANULOCYTES NFR BLD AUTO: 0 % (ref 0–2)
LYMPHOCYTES # BLD AUTO: 2.9 THOUSANDS/ΜL (ref 0.6–4.47)
LYMPHOCYTES NFR BLD AUTO: 32 % (ref 14–44)
MCH RBC QN AUTO: 27.6 PG (ref 26.8–34.3)
MCHC RBC AUTO-ENTMCNC: 30.7 G/DL (ref 31.4–37.4)
MCV RBC AUTO: 90 FL (ref 82–98)
MONOCYTES # BLD AUTO: 0.65 THOUSAND/ΜL (ref 0.17–1.22)
MONOCYTES NFR BLD AUTO: 7 % (ref 4–12)
NEUTROPHILS # BLD AUTO: 5.27 THOUSANDS/ΜL (ref 1.85–7.62)
NEUTS SEG NFR BLD AUTO: 60 % (ref 43–75)
NRBC BLD AUTO-RTO: 0 /100 WBCS
PLATELET # BLD AUTO: 213 THOUSANDS/UL (ref 149–390)
PMV BLD AUTO: 11 FL (ref 8.9–12.7)
POTASSIUM SERPL-SCNC: 3.5 MMOL/L (ref 3.5–5.3)
PROT SERPL-MCNC: 6.9 G/DL (ref 6.4–8.2)
RBC # BLD AUTO: 5.8 MILLION/UL (ref 3.88–5.62)
SODIUM SERPL-SCNC: 136 MMOL/L (ref 136–145)
WBC # BLD AUTO: 8.99 THOUSAND/UL (ref 4.31–10.16)

## 2018-11-13 PROCEDURE — 85025 COMPLETE CBC W/AUTO DIFF WBC: CPT

## 2018-11-13 PROCEDURE — 36415 COLL VENOUS BLD VENIPUNCTURE: CPT

## 2018-11-13 PROCEDURE — 80053 COMPREHEN METABOLIC PANEL: CPT

## 2018-11-14 ENCOUNTER — PROCEDURE VISIT (OUTPATIENT)
Dept: NEUROSURGERY | Facility: CLINIC | Age: 68
End: 2018-11-14
Payer: MEDICARE

## 2018-11-14 DIAGNOSIS — C79.40: Primary | ICD-10-CM

## 2018-11-14 DIAGNOSIS — C79.51 SPINE METASTASIS (HCC): ICD-10-CM

## 2018-11-14 PROCEDURE — 77280 THER RAD SIMULAJ FIELD SMPL: CPT | Performed by: STUDENT IN AN ORGANIZED HEALTH CARE EDUCATION/TRAINING PROGRAM

## 2018-11-14 PROCEDURE — 63620 SRS SPINAL LESION: CPT | Performed by: NEUROLOGICAL SURGERY

## 2018-11-14 PROCEDURE — 77373 STRTCTC BDY RAD THER TX DLVR: CPT | Performed by: STUDENT IN AN ORGANIZED HEALTH CARE EDUCATION/TRAINING PROGRAM

## 2018-11-14 NOTE — PROGRESS NOTES
PATIENT NAME: Wendy Ortiz  : 1950  MRN: [id number]  PROCEDURE DATE: 2018    Stereotactic Body Radiotherapy (SBRT) Operative Note    Preop Diagnosis: T4 paraspinal metastasis    Postop Diagnosis: same    Procedure Details: Stereotactic Body Radiotherapy for T4 paraspinal metastasis from  [address]    Surgeon: Ariane Sargent MD, PhD     Assistants: none    No qualified resident was available to assist with this case  Radiation Oncologist(s): Dr Pricilla Dawn    Estimated Blood Loss:  None           Specimens: None    Drains: None           Total IV Fluids: None              Findings: As above  Complications:  None    Anesthesia: None      INDICATIONS    70 y o  male with a history of RCC  S/p SBRT T6 lesion 10/2017, then SRBT to T10 lesion 2018 for renal cell CA mets  Both remain stable       However, paraspinal mass at T4 is progressing  Patient seen in conjunction with Dr Pricilla Dawn  We offered SBRT to this lesion  R/B/A discussed  He would like to proceed, written consent obtained  I personally had a discussion of risks, benefits, and alternatives of various treatment options with the patient  Options discussed included but were not limited to surgery, radiation therapy including variations thereof, such as a cEBRT, SBRT, etc , chemotherapy alone, and combinations of the above  Risks specific to SBRT were reviewed, including but not limited to cord myelopathy, vertebral body fracture/collapse, etc  The patient wished to proceed with SBRT, and consent was obtained  DETAILS OF PROCEDURE    The patient presented to the outpatient area of the Department of Radiation Oncology where a body fix immobilization device was created  The patient then underwent a CT without myelogram while immobilized in the body fix device   The patient was then released from the Department      The patients stereotactic CT and preoperative MRI scans were fused in the SBRT planning software, which was used to develop the SBRT plan  Inverse planning was used with certain organs at risk (OARs), such as the spinal cord      The SBRT prescription called for a dose of 30 Gray to be delivered to the PTV in 5 fractions  The PTV was created by expanding the CTV by 2 mm except where it incurred organs at risk  The CTV was generated by expanding the GTV, to include contiguous bony structures, utilizing spine SRT guidelines  The GTV and CTV were contoured by myself and the Radiation Oncologist  The SBRT plan utilized the mini-multileaf columnator on the [employer] machine at the [facility]       When the final treatment plan had been developed and approved by myself, the radiation oncologist and physicist, the patient returned to the Department for their first frameless SBRT treatment       The patient was positioned on the treatment couch  The patient was immobilized in their body fix device  kV and then cone beam CT imaging was used to align the patient  Once the radiation oncologist, physicist and I agreed the patient was in correct position, the fields were treated sequentially without complications       When the first SBRT treatment had been delivered, the patient was recovered from the treatment room, scheduled for their remaining SBRT treatments, and was discharged from the department  There was no blood loss and no specimen        SIGNATURE: Tamiko Swann MD, PhD  DATE: 11/14/2018   TIME: 12:35 PM

## 2018-11-16 PROCEDURE — 77373 STRTCTC BDY RAD THER TX DLVR: CPT | Performed by: STUDENT IN AN ORGANIZED HEALTH CARE EDUCATION/TRAINING PROGRAM

## 2018-11-19 PROCEDURE — 77373 STRTCTC BDY RAD THER TX DLVR: CPT | Performed by: STUDENT IN AN ORGANIZED HEALTH CARE EDUCATION/TRAINING PROGRAM

## 2018-11-21 DIAGNOSIS — C78.00 MALIGNANT NEOPLASM METASTATIC TO LUNG, UNSPECIFIED LATERALITY (HCC): ICD-10-CM

## 2018-11-21 PROCEDURE — 77373 STRTCTC BDY RAD THER TX DLVR: CPT | Performed by: STUDENT IN AN ORGANIZED HEALTH CARE EDUCATION/TRAINING PROGRAM

## 2018-11-23 ENCOUNTER — OFFICE VISIT (OUTPATIENT)
Dept: HEMATOLOGY ONCOLOGY | Facility: CLINIC | Age: 68
End: 2018-11-23
Payer: MEDICARE

## 2018-11-23 VITALS
SYSTOLIC BLOOD PRESSURE: 170 MMHG | DIASTOLIC BLOOD PRESSURE: 104 MMHG | HEIGHT: 69 IN | OXYGEN SATURATION: 97 % | RESPIRATION RATE: 20 BRPM | WEIGHT: 291 LBS | HEART RATE: 95 BPM | BODY MASS INDEX: 43.1 KG/M2 | TEMPERATURE: 97.4 F

## 2018-11-23 DIAGNOSIS — C78.00 MALIGNANT NEOPLASM METASTATIC TO LUNG, UNSPECIFIED LATERALITY (HCC): ICD-10-CM

## 2018-11-23 DIAGNOSIS — C79.51 SPINE METASTASIS (HCC): ICD-10-CM

## 2018-11-23 DIAGNOSIS — C64.9 METASTATIC RENAL CELL CARCINOMA, UNSPECIFIED LATERALITY (HCC): Primary | ICD-10-CM

## 2018-11-23 DIAGNOSIS — C79.51 SPINE METASTASIS (HCC): Primary | ICD-10-CM

## 2018-11-23 DIAGNOSIS — C79.51 BONE METASTASES (HCC): ICD-10-CM

## 2018-11-23 PROCEDURE — 77373 STRTCTC BDY RAD THER TX DLVR: CPT | Performed by: STUDENT IN AN ORGANIZED HEALTH CARE EDUCATION/TRAINING PROGRAM

## 2018-11-23 PROCEDURE — 99214 OFFICE O/P EST MOD 30 MIN: CPT | Performed by: INTERNAL MEDICINE

## 2018-11-23 PROCEDURE — 77336 RADIATION PHYSICS CONSULT: CPT | Performed by: STUDENT IN AN ORGANIZED HEALTH CARE EDUCATION/TRAINING PROGRAM

## 2018-11-23 NOTE — PROGRESS NOTES
11/23/2018    Lizet Richardson    He has been undergoing stereotactic body radiotherapy to the T4 paraspinal mass every other day for 5 fractions at the direction of Dr Michelle Mg and Dr Julissa Turner, the last fraction is planned for later today  Axitinib 5 mg b i d  has been well tolerated  He has vague abdominal discomfort intermittently and infrequent loose bowel movements  Hematology/Oncology History:    1  Bilateral lung nodules or 1st noted July 2012 (not present on CT scan in 2009), 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 noted on wedge resection on November 18, 2013  Sutent 13 courses from March 22, 2015 until December 2016       Pathologic fracture to the mid right ulna on April 3, 2017 and then surgery with Dr Ritika Freeman, orthopedic oncologist at [facility] on April 14, 2017 and then postoperative radiotherapy to the right ulna 3000 cGy in 10 fractions from May 17, 2017 to May 31, 2017       XRT to the right posterior ribs 2800 cGy in 7 fractions from July 26, 2017 to August 3, 2017       SBRT to the T6 metastatic lesion completed October 27, 2017       2  Left nephrectomy in August 2007 for renal cell carcinoma clear cell type, grade II-III, T1Nx        3  Prostate adenocarcinoma, Geronimo score 3+4=7, involving both lobes of the prostate gland, pT2c pN0 , no metastasis to three left pelvic lymph nodes        Current Therapy:      Axitinib initiated March 8, 2017, currently 5 mg b i d        Denosumab 120 mg subcutaneously monthly initiated January 12, 2018, 10 doses thus far  SP RT to T4 paraspinal mass is be completed today  Review of systems:      He has chronic runny nose and sinus stuffiness bilaterally   He denies nose bleeds  He denies exertional chest pain     He denies cough or dyspnea     He denies abdominal pain  He has mid to lower back discomfort     He denies rash     He denies headache   He has chronic numbness of the feet   He has chronic left footdrop  He denies emotional problems  Physical Examination:    Blood pressure (!) 170/104, pulse 95, temperature (!) 97 4 °F (36 3 °C), resp  rate 20, height 5' 9 2" (1 758 m), weight 132 kg (291 lb), SpO2 97 %  Body surface area is 2 43 meters squared         He appears well  EOMI   Oropharynx is clear   No lymphadenopathy of the neck  No axillary lymphadenopathy     Lungs are clear bilaterally     Heart has regular rate and rhythm  Liver and spleen are not palpable due to body habitus   No inguinal lymphadenopathy     No lower extremity edema  No skin rash     Neurological is grossly intact other than left foot drop   The patient uses a cane to assist in balance     Oriented x3, normal mood and affect      ECOG 1    Laboratory:    From October 24, 2018:   ()    From November 13, 2018:  Creatinine is 1 15, calcium 9 0, AST 17, ALT 16, bili 0 82, alk-phos 64, WBC 8 99, hemoglobin 16 0, platelets 965    MRI of the thoracic spine with and without contrast from October 19, 2018: There are stable lesions in T3, T4 T6, and T8 compared to June 25, 2018, however, the right paraspinal mass at T4 measures 3 4 x 3 2 cm, previously 2 8 x 2 8 cm, expansile lytic lesion of right posterior lateral 7th rib is again identified  Assessment/Plan:    Fely Najera remains clinically asymptomatic of bilateral lung metastasis from metastatic clear-cell carcinoma of left kidney origin  Axitinib was initiated on March 8, 2017 for progression of lung nodules on CT scan of the chest December 2, 2016 and enlargement of destructive bone metastasis  On CT of the chest, abdomen pelvis of September 19, 2018 there was further progression of several lung nodules and stable bone metastasis   Accordingly, a change in systemic therapy to nivolumab (NCCN category 1, preferred) was recommended   Other options include cabozantinib (category 1, preferred), lenvatinib plus everolimus (category 1) or single agent everolimus (category 2A)  After discussion the patient indicated that he prefers to remain on axitinib 5 mg b i d  noting that the axitinib has been fairly well tolerated and disease progression has been modest   The patient is in agreement as to close monitoring with a contrast enhanced CT scan of the chest, abdomen and pelvis in 8 weeks      Calcium reabsorption inhibitor therapy is recommended to decrease risk of bone pain and fracture related to bone metastasis as well as to prevent recurrence hypercalcemia  Denosumab 120 mg subcutaneously monthly is to be continued, the 11th dose is scheduled for November 26, 2018     The patient is aware to seek medical attention for cough, shortness of breath, nosebleeds, easy bruising, reduced appetite, loose bowel movements, recurrence of posterior neck pain, progressive right mid posterior chest pain, paresthesias of the upper extremities, difficulty with ambulation, easy fatigue, or if other new problems arise   Otherwise, plan to see him again in 8 weeks

## 2018-11-26 ENCOUNTER — HOSPITAL ENCOUNTER (OUTPATIENT)
Dept: INFUSION CENTER | Facility: CLINIC | Age: 68
Discharge: HOME | End: 2018-11-26
Payer: MEDICARE

## 2018-11-26 VITALS
RESPIRATION RATE: 18 BRPM | HEART RATE: 96 BPM | SYSTOLIC BLOOD PRESSURE: 184 MMHG | DIASTOLIC BLOOD PRESSURE: 123 MMHG | TEMPERATURE: 98.4 F

## 2018-11-26 PROCEDURE — 96401 CHEMO ANTI-NEOPL SQ/IM: CPT

## 2018-11-26 RX ADMIN — DENOSUMAB 120 MG: 120 INJECTION SUBCUTANEOUS at 09:46

## 2018-11-26 NOTE — PROGRESS NOTES
Presented today for xgeva injection, xgeva 120mg given as ordered in L arm, tolerated well  Will return in 4 weeks for next injection

## 2018-12-21 ENCOUNTER — APPOINTMENT (OUTPATIENT)
Dept: LAB | Facility: MEDICAL CENTER | Age: 68
End: 2018-12-21
Payer: MEDICARE

## 2018-12-21 LAB
ALBUMIN SERPL BCP-MCNC: 3.3 G/DL (ref 3.5–5)
ALP SERPL-CCNC: 67 U/L (ref 46–116)
ALT SERPL W P-5'-P-CCNC: 17 U/L (ref 12–78)
ANION GAP SERPL CALCULATED.3IONS-SCNC: 6 MMOL/L (ref 4–13)
AST SERPL W P-5'-P-CCNC: 18 U/L (ref 5–45)
BASOPHILS # BLD AUTO: 0.02 THOUSANDS/ΜL (ref 0–0.1)
BASOPHILS NFR BLD AUTO: 0 % (ref 0–1)
BILIRUB SERPL-MCNC: 0.71 MG/DL (ref 0.2–1)
BUN SERPL-MCNC: 22 MG/DL (ref 5–25)
CALCIUM SERPL-MCNC: 10.1 MG/DL (ref 8.3–10.1)
CHLORIDE SERPL-SCNC: 102 MMOL/L (ref 100–108)
CO2 SERPL-SCNC: 30 MMOL/L (ref 21–32)
CREAT SERPL-MCNC: 1.26 MG/DL (ref 0.6–1.3)
EOSINOPHIL # BLD AUTO: 0.18 THOUSAND/ΜL (ref 0–0.61)
EOSINOPHIL NFR BLD AUTO: 3 % (ref 0–6)
ERYTHROCYTE [DISTWIDTH] IN BLOOD BY AUTOMATED COUNT: 16.5 % (ref 11.6–15.1)
GFR SERPL CREATININE-BSD FRML MDRD: 58 ML/MIN/1.73SQ M
GLUCOSE P FAST SERPL-MCNC: 88 MG/DL (ref 65–99)
HCT VFR BLD AUTO: 54.9 % (ref 36.5–49.3)
HGB BLD-MCNC: 17.1 G/DL (ref 12–17)
IMM GRANULOCYTES # BLD AUTO: 0.01 THOUSAND/UL (ref 0–0.2)
IMM GRANULOCYTES NFR BLD AUTO: 0 % (ref 0–2)
LDH SERPL-CCNC: 208 U/L (ref 81–234)
LYMPHOCYTES # BLD AUTO: 1.77 THOUSANDS/ΜL (ref 0.6–4.47)
LYMPHOCYTES NFR BLD AUTO: 25 % (ref 14–44)
MCH RBC QN AUTO: 27.4 PG (ref 26.8–34.3)
MCHC RBC AUTO-ENTMCNC: 31.1 G/DL (ref 31.4–37.4)
MCV RBC AUTO: 88 FL (ref 82–98)
MONOCYTES # BLD AUTO: 0.46 THOUSAND/ΜL (ref 0.17–1.22)
MONOCYTES NFR BLD AUTO: 7 % (ref 4–12)
NEUTROPHILS # BLD AUTO: 4.57 THOUSANDS/ΜL (ref 1.85–7.62)
NEUTS SEG NFR BLD AUTO: 65 % (ref 43–75)
NRBC BLD AUTO-RTO: 0 /100 WBCS
PLATELET # BLD AUTO: 168 THOUSANDS/UL (ref 149–390)
PMV BLD AUTO: 11 FL (ref 8.9–12.7)
POTASSIUM SERPL-SCNC: 4.1 MMOL/L (ref 3.5–5.3)
PROT SERPL-MCNC: 7.3 G/DL (ref 6.4–8.2)
RBC # BLD AUTO: 6.23 MILLION/UL (ref 3.88–5.62)
SODIUM SERPL-SCNC: 138 MMOL/L (ref 136–145)
WBC # BLD AUTO: 7.01 THOUSAND/UL (ref 4.31–10.16)

## 2018-12-21 PROCEDURE — 85025 COMPLETE CBC W/AUTO DIFF WBC: CPT | Performed by: INTERNAL MEDICINE

## 2018-12-21 PROCEDURE — 80053 COMPREHEN METABOLIC PANEL: CPT | Performed by: INTERNAL MEDICINE

## 2018-12-21 PROCEDURE — 83615 LACTATE (LD) (LDH) ENZYME: CPT | Performed by: INTERNAL MEDICINE

## 2018-12-21 PROCEDURE — 36415 COLL VENOUS BLD VENIPUNCTURE: CPT | Performed by: INTERNAL MEDICINE

## 2019-01-07 ENCOUNTER — HOSPITAL ENCOUNTER (OUTPATIENT)
Dept: INFUSION CENTER | Facility: CLINIC | Age: 69
Discharge: HOME | End: 2019-01-07
Payer: MEDICARE

## 2019-01-07 VITALS
SYSTOLIC BLOOD PRESSURE: 170 MMHG | RESPIRATION RATE: 18 BRPM | DIASTOLIC BLOOD PRESSURE: 90 MMHG | HEART RATE: 68 BPM | TEMPERATURE: 95.8 F

## 2019-01-07 PROCEDURE — 96401 CHEMO ANTI-NEOPL SQ/IM: CPT

## 2019-01-07 RX ADMIN — DENOSUMAB 120 MG: 120 INJECTION SUBCUTANEOUS at 11:12

## 2019-01-11 ENCOUNTER — HOSPITAL ENCOUNTER (OUTPATIENT)
Dept: CT IMAGING | Facility: HOSPITAL | Age: 69
Discharge: HOME | End: 2019-01-11
Attending: INTERNAL MEDICINE
Payer: MEDICARE

## 2019-01-11 DIAGNOSIS — C64.9 METASTATIC RENAL CELL CARCINOMA, UNSPECIFIED LATERALITY (HCC): ICD-10-CM

## 2019-01-11 DIAGNOSIS — C78.00 MALIGNANT NEOPLASM METASTATIC TO LUNG, UNSPECIFIED LATERALITY (HCC): ICD-10-CM

## 2019-01-11 DIAGNOSIS — C79.51 SPINE METASTASIS (HCC): ICD-10-CM

## 2019-01-11 DIAGNOSIS — C79.51 BONE METASTASES (HCC): ICD-10-CM

## 2019-01-11 PROCEDURE — 74176 CT ABD & PELVIS W/O CONTRAST: CPT

## 2019-01-11 PROCEDURE — 71250 CT THORAX DX C-: CPT

## 2019-01-18 ENCOUNTER — OFFICE VISIT (OUTPATIENT)
Dept: HEMATOLOGY ONCOLOGY | Facility: CLINIC | Age: 69
End: 2019-01-18
Payer: MEDICARE

## 2019-01-18 VITALS
DIASTOLIC BLOOD PRESSURE: 90 MMHG | WEIGHT: 286 LBS | RESPIRATION RATE: 18 BRPM | HEART RATE: 83 BPM | HEIGHT: 69 IN | BODY MASS INDEX: 42.36 KG/M2 | TEMPERATURE: 97 F | SYSTOLIC BLOOD PRESSURE: 140 MMHG | OXYGEN SATURATION: 95 %

## 2019-01-18 DIAGNOSIS — C64.2 CLEAR CELL ADENOCARCINOMA OF KIDNEY, LEFT (HCC): ICD-10-CM

## 2019-01-18 DIAGNOSIS — C78.00 MALIGNANT NEOPLASM METASTATIC TO LUNG, UNSPECIFIED LATERALITY (HCC): ICD-10-CM

## 2019-01-18 DIAGNOSIS — C79.51 SPINE METASTASIS (HCC): ICD-10-CM

## 2019-01-18 DIAGNOSIS — C64.9 METASTATIC RENAL CELL CARCINOMA, UNSPECIFIED LATERALITY (HCC): Primary | ICD-10-CM

## 2019-01-18 DIAGNOSIS — C79.51 BONE METASTASES (HCC): ICD-10-CM

## 2019-01-18 PROCEDURE — 99214 OFFICE O/P EST MOD 30 MIN: CPT | Performed by: INTERNAL MEDICINE

## 2019-01-18 RX ORDER — TRAMADOL HYDROCHLORIDE 50 MG/1
50 TABLET ORAL EVERY 6 HOURS PRN
Qty: 60 TABLET | Refills: 2 | Status: SHIPPED | OUTPATIENT
Start: 2019-01-18 | End: 2019-05-10 | Stop reason: SDUPTHER

## 2019-01-18 NOTE — PROGRESS NOTES
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 noted on wedge resection on November 18, 2013  Sutent 13 courses from March 22, 2015 until December 2016       Pathologic fracture to the mid right ulna on April 3, 2017 and then surgery with Dr Alex Spivey, orthopedic oncologist at [facility] on April 14, 2017 and then postoperative radiotherapy to the right ulna 3000 cGy in 10 fractions from May 17, 2017 to May 31, 2017       XRT to the right posterior ribs 2800 cGy in 7 fractions from July 26, 2017 to August 3, 2017       SBRT to the T6 metastatic lesion completed October 27, 2017  SBRT to the T4 paraspinal mass 3000 cGy in 5 fractions completed November 23, 2018       2  Left nephrectomy in August 2007 for renal cell carcinoma clear cell type, grade II-III, T1Nx        3  Prostate adenocarcinoma, [place] score 3+4=7, involving both lobes of the prostate gland, pT2c pN0 , no metastasis to three left pelvic lymph nodes        Current Therapy:      Axitinib initiated March 8, 2017, currently 5 mg b i d       Denosumab 120 mg SQ monthly initiated January 12, 2018, 12 doses thus far, most recently on January 7, 2019    Review of systems:      He has chronic runny nose and sinus stuffiness bilaterally   He denies nose bleeds  He denies exertional chest pain     He denies cough or dyspnea     He denies abdominal pain  He has mid to lower back discomfort     He denies rash     He denies headache   He has chronic numbness of the feet  He has chronic left footdrop  He denies emotional problems      Physical Examination:    Blood pressure 140/90, pulse 83, temperature (!) 97 °F (36 1 °C), resp  rate 18, height 5' 9" (1 753 m), weight 130 kg (286 lb), SpO2 95 %  Body surface area is 2 41 meters squared  He appears well  EOMI   Oropharynx is clear   No lymphadenopathy of the neck  No axillary lymphadenopathy     Lungs are clear bilaterally     Heart has regular rate and rhythm  Liver and spleen are not palpable due to body habitus   No inguinal lymphadenopathy     No lower extremity edema  There is vitiligo and dry scaly skin  Neurological is grossly intact other than left foot drop   The patient uses a cane to assist in balance     Oriented x3, normal mood and affect      ECOG 1    Laboratory:    From December 21, 2018:  Creatinine 1 26, calcium 10 1, AST 18, ALT 17, alk-phos 67, bili 0 71,  (), WBC 7 01, hemoglobin 17 1 with MCV 88, platelets 366  Noncontrast enhanced CT scan of the chest, abdomen and pelvis from January 15, 2019: There is a stable right thyroid nodule, multiple pulmonary nodules, a few are slightly smaller compared to the prior study of September 14, 2018, no new lung nodules, liver, spleen, pancreas are unremarkable, there is a stable 3 4 cm left adrenal mass and stable subcentimeter nodularity of the right adrenal, no abdominal pelvic lymphadenopathy, mixed lytic and sclerotic expansile bone lesions of right 7th rib, 8th rib, T4, T6, T10, left ilium, and right ilium are grossly stable  Assessment/Plan:    Valiant Shoulder remains clinically asymptomatic of bilateral lung metastasis from metastatic clear-cell carcinoma of left kidney origin  Axitinib was initiated on March 8, 2017 for progression of lung nodules and enlargement of destructive bone metastasis   On CT of the chest, abdomen pelvis of September 19, 2018 there was further progression of several lung nodules and stable bone metastasis   Accordingly, a change in systemic therapy to nivolumab (NCCN category 1, preferred) was recommended  Other options include cabozantinib (category 1, preferred), lenvatinib plus everolimus (category 1) or single agent everolimus (category 2A)  After discussion at that time the patient indicated that he prefers to remain on axitinib noting that the axitinib had been fairly well tolerated and disease progression was modest  On follow-up CT scan of chest abdomen pelvis of January 15, 2019 the lung and bone metastasis remain stable, and again, the patient prefers to remain axitinib currently at 5 mg b i d      Calcium reabsorption inhibitor therapy is recommended to decrease risk of bone pain and fracture related to bone metastasis as well as to prevent recurrence hypercalcemia  Denosumab 120 mg subcutaneously monthly      The patient and his son Shashank Santos who was with him in the office today are aware to seek medical attention for cough, shortness of breath, nosebleeds, easy bruising, reduced appetite, loose bowel movements, recurrence of posterior neck pain, right mid posterior chest pain, low back pain, paresthesias of the upper extremities, difficulty with ambulation, easy fatigue, or if other new problems arise    Otherwise, plan to see him again in 8 weeks

## 2019-01-18 NOTE — PATIENT INSTRUCTIONS
The patient and his son who was with him in the office today are aware to seek medical attention for cough, shortness of breath, nosebleeds, easy bruising, reduced appetite, loose bowel movements, recurrence of posterior neck pain, right mid posterior chest pain, low back pain, paresthesias of the upper extremities, difficulty with ambulation, easy fatigue, or if other new problems arise

## 2019-01-25 ENCOUNTER — HOSPITAL ENCOUNTER (OUTPATIENT)
Dept: RADIOLOGY | Facility: HOSPITAL | Age: 69
Discharge: HOME | End: 2019-01-25
Attending: RADIOLOGY
Payer: MEDICARE

## 2019-01-25 DIAGNOSIS — C79.51 SPINE METASTASIS (HCC): ICD-10-CM

## 2019-01-25 PROCEDURE — 72146 MRI CHEST SPINE W/O DYE: CPT

## 2019-01-30 ENCOUNTER — APPOINTMENT (OUTPATIENT)
Dept: LAB | Facility: MEDICAL CENTER | Age: 69
End: 2019-01-30
Payer: MEDICARE

## 2019-01-30 ENCOUNTER — RADIATION ONCOLOGY FOLLOW-UP (OUTPATIENT)
Dept: RADIATION ONCOLOGY | Facility: HOSPITAL | Age: 69
End: 2019-01-30
Attending: RADIOLOGY

## 2019-01-30 ENCOUNTER — OFFICE VISIT (OUTPATIENT)
Dept: NEUROSURGERY | Facility: CLINIC | Age: 69
End: 2019-01-30

## 2019-01-30 VITALS
WEIGHT: 283.6 LBS | SYSTOLIC BLOOD PRESSURE: 150 MMHG | HEIGHT: 69 IN | HEART RATE: 94 BPM | DIASTOLIC BLOOD PRESSURE: 80 MMHG | RESPIRATION RATE: 16 BRPM | TEMPERATURE: 97.7 F | BODY MASS INDEX: 42 KG/M2

## 2019-01-30 VITALS
WEIGHT: 283.6 LBS | BODY MASS INDEX: 42 KG/M2 | TEMPERATURE: 97.7 F | DIASTOLIC BLOOD PRESSURE: 80 MMHG | OXYGEN SATURATION: 93 % | SYSTOLIC BLOOD PRESSURE: 150 MMHG | RESPIRATION RATE: 16 BRPM | HEART RATE: 94 BPM | HEIGHT: 69 IN

## 2019-01-30 DIAGNOSIS — C79.51 SPINE METASTASIS (HCC): Primary | ICD-10-CM

## 2019-01-30 DIAGNOSIS — C64.9 METASTATIC RENAL CELL CARCINOMA, UNSPECIFIED LATERALITY (HCC): ICD-10-CM

## 2019-01-30 PROCEDURE — 99024 POSTOP FOLLOW-UP VISIT: CPT | Performed by: NEUROLOGICAL SURGERY

## 2019-01-30 NOTE — PROGRESS NOTES
Follow-up - Radiation Oncology   Horacio Belcher 1950 70 y o  male [id number]      History of Present Illness   Cancer Staging  No matching staging information was found for the patient  Horacio Belcher is a 70y o  year old male with a history of metastatic renal cell carcinoma      Interval History:  Janett Pavon a 75 y  o  year old male with a history of of metastatic renal cell carcinoma  Patient presents today at [facility] for his 2 month follow up post completion of radiation 11/23/18 1/11/19 CT C/A/P- Multiple pulmonary nodules, a few of which are slightly smaller when compared to previous exam - as detailed above    Multiple stable bone metastases    No new metastatic lesions are seen   "  1/18/19 [facility] Dr Kwaku Martell- On follow-up CT scan of chest abdomen pelvis of January 15, 2019 the lung and bone metastasis remain stable, and again, the patient prefers to remain axitinib currently at 5 mg b i d    Denosumab 120 mg subcutaneously monthly "     1/26/19 MRI Thoracic Spine-Stable MR appearance of the thoracic spine   Multifocal metastatic disease is stable, affecting the T4 and T6 vertebral bodies   Right T10 transverse process   Right T4 paraspinal mass stable   Known right T6 rib lesion is not completely evaluated on   this study   No intraspinal tumor or pathologic fracture          He denies any pain or difficulty swallowing      Historical Information      Bone metastases ([address] )    6/22/2016 Initial Diagnosis     Bone metastases ([address] )         3/8/2017 -  Chemotherapy     Axitinib 4 mg b i d        Denosumab 60 mg subcutaneously monthly initiated January 12, 2018 5/17/2017 - 5/31/2017 Radiation     palliative radiation therapy following ORIF of right ulna for metastatic renal cell carcinoma to 3000cGy         7/26/2017 - 8/3/2017 Radiation     palliative radiation therapy for metastatic renal carcinoma to the right posterior seventh rib with bone and soft tissue metastases to 2800cGy         10/18/2017 - 10/27/2017 Radiation     stereotactic radiation therapy to a T6 vertebral body metastasis to 3000cGy         7/18/2018 - 7/27/2018 Radiation     Stereotactic radiation to T10 metastasis to 3000 cGy in 5 fractions         11/14/2018 - 11/23/2018 Radiation     Treatments:  Course: C5 SBRT    Plan ID Energy Fractions Dose per Fraction (cGy) Dose Correction (cGy) Total Dose Delivered (cGy) Elapsed Days   SBRT Para T4 6X 5 / 5 600 0 3,000 9      Treatment Dates:  11/14/2018 - 11/23/2018                 Past Medical History:   Diagnosis Date    Arthritis     Cancer [facility])     prostate - mets to bone and lung    History of radiation therapy 07/27/2018    SBRT to T10 7/18-7/27/2018    Hyperlipidemia     Hypertension      Past Surgical History:   Procedure Laterality Date    DISTAL ULNA EXCISION Right     excision of tumor and orif with cement and screws    JOINT REPLACEMENT Bilateral     tkr's    LUNG SURGERY Right     exc of nodules    NEPHRECTOMY Left        Social History   History   Alcohol Use    Yes     Comment: occasional use     History   Drug Use No     History   Smoking Status    Former Smoker    Packs/day: 1 00    Types: Cigarettes    Quit date: 3/28/2003   Smokeless Tobacco    Never Used         Meds/Allergies     Current Outpatient Prescriptions:     amLODIPine (NORVASC) 5 mg tablet, Take 5 mg by mouth daily  , Disp: , Rfl:     Axitinib 1 MG TABS, Take 5 tablets (5 mg total) by mouth 2 (two) times a day, Disp: 300 tablet, Rfl: 1    cholecalciferol (VITAMIN D3) 1,000 units tablet, Take 1,000 Units by mouth daily, Disp: , Rfl:     diphenhydrAMINE (BENADRYL) 25 mg tablet, Take 25 mg by mouth daily at bedtime as needed for itching, Disp: , Rfl:     gabapentin (NEURONTIN) 100 mg capsule, Take 200 mg by mouth 3 (three) times a day  , Disp: , Rfl:     lisinopril-hydrochlorothiazide (PRINZIDE,ZESTORETIC) 20-12 5 MG per tablet, Take 1 tablet by mouth 2 (two) times a day  , Disp: , Rfl:     loperamide (IMODIUM) 2 mg capsule, Take 2 mg by mouth 4 (four) times a day as needed for diarrhea, Disp: , Rfl:     naproxen sodium (ALEVE) 220 MG tablet, Take 220 mg by mouth as needed for mild pain, Disp: , Rfl:     traMADol (ULTRAM) 50 mg tablet, Take 1 tablet (50 mg total) by mouth every 6 (six) hours as needed for moderate pain, Disp: 60 tablet, Rfl: 2    trolamine salicylate (ASPERCREME) 10 % cream, Apply 1 application topically as needed for muscle/joint pain, Disp: , Rfl:   No Known Allergies      Review of Systems     Constitutional: Positive for fatigue (5/10 )  HENT: Negative  Eyes: Negative  Respiratory: Negative  Cardiovascular: Negative  Gastrointestinal: Positive for diarrhea (Daily  2 episodes daily  )  Endocrine: Negative  Genitourinary:        Nocturia x 1  Musculoskeletal: Positive for back pain (Middle to lower back pain 5/10 in the AM  and throughout the day  Denies pain at present time ) and gait problem (Single point cane  Balance remains the same  )  Left foot drop  Reports he started with mid back pain after his second treatment to T4  Prior to that he only had lower back pain  Skin:        Psoriasis  Allergic/Immunologic: Negative  Neurological: Positive for numbness (Bilateral finger tips and feet  )  Hematological: Bruises/bleeds easily  Psychiatric/Behavioral: Negative  OBJECTIVE:   /80 (BP Location: Left arm, Patient Position: Sitting, Cuff Size: Standard)   Pulse 94   Temp 97 7 °F (36 5 °C) (Temporal)   Resp 16   Ht 5' 9" (1 753 m)   Wt 129 kg (283 lb 9 6 oz)   SpO2 93%   BMI 41 88 kg/m²   Pain Assessment:  0  ECOG/Zubrod/WHO: 1 - Symptomatic but completely ambulatory    Physical Exam   The skin in the radiated field on his back is well-healed no significant erythema or desquamation noted  No spinal tenderness  He is ambulating independently  He is conversing appropriately          RESULTS    Lab Results: No results found for this or any previous visit (from the past 672 hour(s))  Imaging Studies:Ct Chest Abdomen Pelvis Wo Contrast    Result Date: 1/15/2019  Narrative: CT CHEST, ABDOMEN AND PELVIS WITHOUT IV CONTRAST INDICATION:   C78 00: Secondary malignant neoplasm of unspecified lung C79 51: Secondary malignant neoplasm of bone C79 51: Secondary malignant neoplasm of bone C64 9: Malignant neoplasm of unspecified kidney, except renal pelvis  COMPARISON:  September 14, 2018 TECHNIQUE: CT examination of the chest, abdomen and pelvis was performed without intravenous contrast   Axial, sagittal, and coronal 2D reformatted images were created from the source data and submitted for interpretation  Radiation dose length product (DLP) for this visit:  1477 mGy-cm   This examination, like all CT scans performed in the [place], was performed utilizing techniques to minimize radiation dose exposure, including the use of iterative reconstruction and automated exposure control  Enteric contrast was administered  FINDINGS: CHEST LUNGS:  There is no tracheal or endobronchial lesion  Numerous Pulmonary metastases, representative examples as follows: 1  Left upper lobe apex: 1 0 x 1 4 cm image 3/13  Previously 1 0 x 1 4 cm Image 5/19  2   Left upper lobe apex: 0 6 x 0 9 cm image 3/14  Previously 0 6 x 0 9 cm image 5/21  3   Left upper lobe: 0 4 x 0 5 cm image 3/27  Previously 0 4 x 0 5 cm image 5/33  4   Right upper lobe pleural-based: 2 8 x 2 6 cm image 3/33  Previously 2 8 x 2 8 cm image 5/39 5  Left upper lobe: 1 1 x 1 3 cm image 3/38  Previously 1 2 x 1 5 cm image 5/44  6   Left lower lobe: 0 8 x 1 1 cm image 3/68  Previously 1 1 x 1 3 cm image 5/80  7   Right lower lobe: 1 5 x 2 4 cm image 9/14  Previously 1 5 x 2 4 cm image 5/87  No new nodules  Stable patchy consolidation and fibrosis with traction bronchiectasis in the right lower lobe, at the site of prior surgery   PLEURA: Unchanged pleural thickening right upper lobe posteromedially adjacent to a large nodule, presumably related to tumor invasion  No significant pleural effusion  HEART/GREAT VESSELS:  Cardiac size within normal limits  No pericardial effusion  MEDIASTINUM AND CHERRIE:  Stable  Gastroesophageal reflux  CHEST WALL AND LOWER NECK:   Stable right thyroid nodule  ABDOMEN LIVER/BILIARY TREE:  Unremarkable  GALLBLADDER:  No calcified gallstones  No pericholecystic inflammatory change  SPLEEN:  Unremarkable  PANCREAS:  Unremarkable  ADRENAL GLANDS:  Stable 3 4 cm left adrenal mass  Stable subcentimeter nodularity right adrenal  KIDNEYS/URETERS:  Prior left nephrectomy  Right side renal cysts appear stable  STOMACH AND BOWEL:  Unremarkable  APPENDIX:  No findings to suggest appendicitis  ABDOMINOPELVIC CAVITY:  No ascites or free intraperitoneal air  No lymphadenopathy  VESSELS:  Unremarkable for patient's age  PELVIS REPRODUCTIVE ORGANS:  Unremarkable for patient's age  URINARY BLADDER:  Unremarkable  ABDOMINAL WALL/INGUINAL REGIONS:  Bilateral fat-containing inguinal canal hernias  Small fat-containing umbilical hernia  Small fat-containing ventral hernia left of midline just above the level the umbilicus  Small fat-containing ventral hernia at the level of the anterior abdominal wall incision  OSSEOUS STRUCTURES:  Mixed lytic and sclerotic expansile bone metastases, largest in the right 7th rib are grossly stable (8th rib, T4, T6, T10, left ilium, and right ilium)  Impression: Multiple pulmonary nodules, a few of which are slightly smaller when compared to previous exam - as detailed above  Multiple stable bone metastases  No new metastatic lesions are seen  Workstation performed: [id number]     Mri Thoracic Spine Wo Contrast    Result Date: 1/26/2019  Narrative: MRI THORACIC SPINE WITHOUT CONTRAST INDICATION: C79 51: Secondary malignant neoplasm of bone    Renal cell, metastatic disease COMPARISON:  MR 10/19/2018 TECHNIQUE:  Sagittal T1, sagittal T2, sagittal inversion recovery, axial T2,  axial 2D MERGE  IMAGE QUALITY: Diagnostic  FINDINGS: ALIGNMENT: Normal alignment of the thoracic spine  No compression fracture  No subluxation  No scoliosis  MARROW SIGNAL:  Marrow signal changes in T4, T6, right T7 rib and right T8 transverse process, consistent with metastatic disease are reidentified, stable in appearance  No pathologic fracture, no extraosseous intraspinal tumor  THORACIC CORD: Normal signal within the thoracic cord  PARAVERTEBRAL SOFT TISSUES:  Stable right T4 level paraspinal mass, approximately 32 mm in greatest linear dimension  T7 THORACIC DEGENERATIVE CHANGE: No disc herniation, canal stenosis or foraminal narrowing  No degenerative changes  Impression: Stable MR appearance of the thoracic spine  Multifocal metastatic disease is stable, affecting the T4 and T6 vertebral bodies  Right T10 transverse process  Right T4 paraspinal mass stable  Known right T6 rib lesion is not completely evaluated on this study  No intraspinal tumor or pathologic fracture  Workstation performed: [id number]           Assessment/Plan:  No orders of the defined types were placed in this encounter  Evan Manrique is a 70y o  year old male is 2 months status post SRT to the T4 region  Overall he has tolerated treatment well  He was seen in conjunction with Dr Mix Ink today in Neuro-Oncology Clinic  We have ordered follow-up MRI of the thoracic spine in 3 months and he will return for follow-up visit thereafter    He remains on his systemic regimen under Dr Agustin Baker MD  1/30/2019,11:31 AM    Portions of the record may have been created with voice recognition software   Occasional wrong word or "sound a like" substitutions may have occurred due to the inherent limitations of voice recognition software   Read the chart carefully and recognize, using context, where substitutions have occurred

## 2019-01-30 NOTE — PROGRESS NOTES
Natali Sol  1950   Mr Electa Kehr is a 70 y o  male       Chief Complaint   Patient presents with    Follow-up       Cancer Staging  No matching staging information was found for the patient  Bone metastases ([address] )    6/22/2016 Initial Diagnosis     Bone metastases ([address] )         3/8/2017 -  Chemotherapy     Axitinib 4 mg b i d        Denosumab 60 mg subcutaneously monthly initiated January 12, 2018 5/17/2017 - 5/31/2017 Radiation     palliative radiation therapy following ORIF of right ulna for metastatic renal cell carcinoma to 3000cGy         7/26/2017 - 8/3/2017 Radiation     palliative radiation therapy for metastatic renal carcinoma to the right posterior seventh rib with bone and soft tissue metastases to 2800cGy         10/18/2017 - 10/27/2017 Radiation     stereotactic radiation therapy to a T6 vertebral body metastasis to 3000cGy         7/18/2018 - 7/27/2018 Radiation     Stereotactic radiation to T10 metastasis to 3000 cGy in 5 fractions         11/14/2018 - 11/23/2018 Radiation     Treatments:  Course: C5 SBRT    Plan ID Energy Fractions Dose per Fraction (cGy) Dose Correction (cGy) Total Dose Delivered (cGy) Elapsed Days   SBRT Para T4 6X 5 / 5 600 0 3,000 9      Treatment Dates:  11/14/2018 - 11/23/2018  Clinical Trial: no        Interval History: Natali Sol is a 75y o  year old male with a history of of metastatic renal cell carcinoma  Patient presents today at [facility] for his 2 month follow up post completion of radiation 11/23/18 1/11/19 CT C/A/P- Multiple pulmonary nodules, a few of which are slightly smaller when compared to previous exam - as detailed above    Multiple stable bone metastases    No new metastatic lesions are seen   "  1/18/19 [facility] Dr Rina Benavides- On follow-up CT scan of chest abdomen pelvis of January 15, 2019 the lung and bone metastasis remain stable, and again, the patient prefers to remain axitinib currently at 5 mg b i d  Denosumab 120 mg subcutaneously monthly "    1/26/19 MRI Thoracic Spine-Stable MR appearance of the thoracic spine  Multifocal metastatic disease is stable, affecting the T4 and T6 vertebral bodies  Right T10 transverse process  Right T4 paraspinal mass stable  Known right T6 rib lesion is not completely evaluated on   this study  No intraspinal tumor or pathologic fracture  Screening  Tobacco  Current tobacco user: no  If yes, brief counseling provided: NA    Hypertension  Hypertension screening performed: yes  Normotensive:  no  If no, referred to PCP: yes    Depression Screening  Screened for depression using PHQ-2: yes    Screened for depression using PHQ-9:  no  Screening positive or negative:  negative  If score >4, was any of the following actions taken?    Additional evaluation for depression, suicide risk assesment, referral to PCP or psychiatry, medication started:  [id number] [facility] for Patients >65 years  Advanced Care Planning Discussed:  no  Patient named surrogate decision maker or care plan in chart: no      Health Maintenance   Topic Date Due    Medicare Annual Wellness Visit (AWV)  1950    CRC Screening: Colonoscopy  1950    DTaP,Tdap,and Td Vaccines (1 - Tdap) 10/06/1970    Fall Risk  10/06/2014    Pneumococcal PPSV23/PCV13 65+ Years / High and Highest Risk (2 of 2 - PCV13) 08/15/2017    INFLUENZA VACCINE  07/01/2018    Depression Screening PHQ  10/24/2019    Hepatitis C Screening  Completed       Patient Active Problem List   Diagnosis    Clear cell adenocarcinoma of kidney, left ([address] )    Bone metastases ([address] )    Lung metastases ([address] )    Spine metastasis ([address] )    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 ([address] )    Metastatic renal cell carcinoma ([address] )     Past Medical History:   Diagnosis Date    Arthritis     Cancer ([address] )     prostate - mets to bone and lung    History of radiation therapy 07/27/2018    SBRT to T10 7/18-7/27/2018  Hyperlipidemia     Hypertension      Past Surgical History:   Procedure Laterality Date    DISTAL ULNA EXCISION Right     excision of tumor and orif with cement and screws    JOINT REPLACEMENT Bilateral     tkr's    LUNG SURGERY Right     exc of nodules    NEPHRECTOMY Left      Family History   Problem Relation Age of Onset    No Known Problems Mother     No Known Problems Father      Social History     Social History    Marital status:       Spouse name: N/A    Number of children: 1    Years of education: 15     Occupational History    retired      P/T      Social History Main Topics    Smoking status: Former Smoker     Packs/day: 1 00     Types: Cigarettes     Quit date: 3/28/2003    Smokeless tobacco: Never Used    Alcohol use Yes      Comment: occasional use    Drug use: No    Sexual activity: Not on file     Other Topics Concern    Not on file     Social History Narrative    Lives at home alone       Current Outpatient Prescriptions:     amLODIPine (NORVASC) 5 mg tablet, Take 5 mg by mouth daily  , Disp: , Rfl:     Axitinib 1 MG TABS, Take 5 tablets (5 mg total) by mouth 2 (two) times a day, Disp: 300 tablet, Rfl: 1    cholecalciferol (VITAMIN D3) 1,000 units tablet, Take 1,000 Units by mouth daily, Disp: , Rfl:     diphenhydrAMINE (BENADRYL) 25 mg tablet, Take 25 mg by mouth daily at bedtime as needed for itching, Disp: , Rfl:     gabapentin (NEURONTIN) 100 mg capsule, Take 200 mg by mouth 3 (three) times a day  , Disp: , Rfl:     lisinopril-hydrochlorothiazide (PRINZIDE,ZESTORETIC) 20-12 5 MG per tablet, Take 1 tablet by mouth 2 (two) times a day  , Disp: , Rfl:     loperamide (IMODIUM) 2 mg capsule, Take 2 mg by mouth 4 (four) times a day as needed for diarrhea, Disp: , Rfl:     naproxen sodium (ALEVE) 220 MG tablet, Take 220 mg by mouth as needed for mild pain, Disp: , Rfl:     traMADol (ULTRAM) 50 mg tablet, Take 1 tablet (50 mg total) by mouth every 6 (six) hours as needed for moderate pain, Disp: 60 tablet, Rfl: 2    trolamine salicylate (ASPERCREME) 10 % cream, Apply 1 application topically as needed for muscle/joint pain, Disp: , Rfl:   No Known Allergies    Review of Systems:  Review of Systems   Constitutional: Positive for fatigue (5/10 )  HENT: Negative  Eyes: Negative  Respiratory: Negative  Cardiovascular: Negative  Gastrointestinal: Positive for diarrhea (Daily  2 episodes daily  )  Endocrine: Negative  Genitourinary:        Nocturia x 1  Musculoskeletal: Positive for back pain (Middle to lower back pain 5/10 in the AM  and throughout the day  Denies pain at present time ) and gait problem (Single point cane  Balance remains the same  )  Left foot drop  Reports he started with mid back pain after his second treatment to T4  Prior to that he only had lower back pain  Skin:        Psoriasis  Allergic/Immunologic: Negative  Neurological: Positive for numbness (Bilateral finger tips and feet  )  Hematological: Bruises/bleeds easily  Psychiatric/Behavioral: Negative  Vitals:    01/30/19 0939   BP: 150/80   BP Location: Left arm   Patient Position: Sitting   Cuff Size: Standard   Pulse: 94   Resp: 16   Temp: 97 7 °F (36 5 °C)   TempSrc: Temporal   SpO2: 93%   Weight: 129 kg (283 lb 9 6 oz)   Height: 5' 9" (1 753 m)       Pain Score:   5    Imaging:Ct Chest Abdomen Pelvis Wo Contrast    Result Date: 1/15/2019  Narrative: CT CHEST, ABDOMEN AND PELVIS WITHOUT IV CONTRAST INDICATION:   C78 00: Secondary malignant neoplasm of unspecified lung C79 51: Secondary malignant neoplasm of bone C79 51: Secondary malignant neoplasm of bone C64 9: Malignant neoplasm of unspecified kidney, except renal pelvis   COMPARISON:  September 14, 2018 TECHNIQUE: CT examination of the chest, abdomen and pelvis was performed without intravenous contrast   Axial, sagittal, and coronal 2D reformatted images were created from the source data and submitted for interpretation  Radiation dose length product (DLP) for this visit:  1477 mGy-cm   This examination, like all CT scans performed in the [facility], was performed utilizing techniques to minimize radiation dose exposure, including the use of iterative reconstruction and automated exposure control  Enteric contrast was administered  FINDINGS: CHEST LUNGS:  There is no tracheal or endobronchial lesion  Numerous Pulmonary metastases, representative examples as follows: 1  Left upper lobe apex: 1 0 x 1 4 cm image 3/13  Previously 1 0 x 1 4 cm Image 5/19  2   Left upper lobe apex: 0 6 x 0 9 cm image 3/14  Previously 0 6 x 0 9 cm image 5/21  3   Left upper lobe: 0 4 x 0 5 cm image 3/27  Previously 0 4 x 0 5 cm image 5/33  4   Right upper lobe pleural-based: 2 8 x 2 6 cm image 3/33  Previously 2 8 x 2 8 cm image 5/39 5  Left upper lobe: 1 1 x 1 3 cm image 3/38  Previously 1 2 x 1 5 cm image 5/44  6   Left lower lobe: 0 8 x 1 1 cm image 3/68  Previously 1 1 x 1 3 cm image 5/80  7   Right lower lobe: 1 5 x 2 4 cm image 9/14  Previously 1 5 x 2 4 cm image 5/87  No new nodules  Stable patchy consolidation and fibrosis with traction bronchiectasis in the right lower lobe, at the site of prior surgery  PLEURA:  Unchanged pleural thickening right upper lobe posteromedially adjacent to a large nodule, presumably related to tumor invasion  No significant pleural effusion  HEART/GREAT VESSELS:  Cardiac size within normal limits  No pericardial effusion  MEDIASTINUM AND CHERRIE:  Stable  Gastroesophageal reflux  CHEST WALL AND LOWER NECK:   Stable right thyroid nodule  ABDOMEN LIVER/BILIARY TREE:  Unremarkable  GALLBLADDER:  No calcified gallstones  No pericholecystic inflammatory change  SPLEEN:  Unremarkable  PANCREAS:  Unremarkable  ADRENAL GLANDS:  Stable 3 4 cm left adrenal mass  Stable subcentimeter nodularity right adrenal  KIDNEYS/URETERS:  Prior left nephrectomy    Right side renal cysts appear stable  STOMACH AND BOWEL:  Unremarkable  APPENDIX:  No findings to suggest appendicitis  ABDOMINOPELVIC CAVITY:  No ascites or free intraperitoneal air  No lymphadenopathy  VESSELS:  Unremarkable for patient's age  PELVIS REPRODUCTIVE ORGANS:  Unremarkable for patient's age  URINARY BLADDER:  Unremarkable  ABDOMINAL WALL/INGUINAL REGIONS:  Bilateral fat-containing inguinal canal hernias  Small fat-containing umbilical hernia  Small fat-containing ventral hernia left of midline just above the level the umbilicus  Small fat-containing ventral hernia at the level of the anterior abdominal wall incision  OSSEOUS STRUCTURES:  Mixed lytic and sclerotic expansile bone metastases, largest in the right 7th rib are grossly stable (8th rib, T4, T6, T10, left ilium, and right ilium)  Impression: Multiple pulmonary nodules, a few of which are slightly smaller when compared to previous exam - as detailed above  Multiple stable bone metastases  No new metastatic lesions are seen  Workstation performed: [id number]     Mri Thoracic Spine Wo Contrast    Result Date: 1/26/2019  Narrative: MRI THORACIC SPINE WITHOUT CONTRAST INDICATION: C79 51: Secondary malignant neoplasm of bone  Renal cell, metastatic disease COMPARISON:  MR 10/19/2018 TECHNIQUE:  Sagittal T1, sagittal T2, sagittal inversion recovery, axial T2,  axial 2D MERGE  IMAGE QUALITY: Diagnostic  FINDINGS: ALIGNMENT: Normal alignment of the thoracic spine  No compression fracture  No subluxation  No scoliosis  MARROW SIGNAL:  Marrow signal changes in T4, T6, right T7 rib and right T8 transverse process, consistent with metastatic disease are reidentified, stable in appearance  No pathologic fracture, no extraosseous intraspinal tumor  THORACIC CORD: Normal signal within the thoracic cord  PARAVERTEBRAL SOFT TISSUES:  Stable right T4 level paraspinal mass, approximately 32 mm in greatest linear dimension    T7 THORACIC DEGENERATIVE CHANGE: No disc herniation, canal stenosis or foraminal narrowing  No degenerative changes  Impression: Stable MR appearance of the thoracic spine  Multifocal metastatic disease is stable, affecting the T4 and T6 vertebral bodies  Right T10 transverse process  Right T4 paraspinal mass stable  Known right T6 rib lesion is not completely evaluated on this study  No intraspinal tumor or pathologic fracture   Workstation performed: [id number]       Teaching

## 2019-01-30 NOTE — PROGRESS NOTES
Neurosurgery Office Note  Reba Jacobs 70 y o  male MRN: [id number]      Assessment/Plan      Diagnoses and all orders for this visit:    Spine metastasis [facility])  -     MRI thoracic spine with and without contrast; Future    Metastatic renal cell carcinoma, unspecified laterality ([address] )  -     MRI thoracic spine with and without contrast; Future          Discussion:    70 y o  male with a history of RCC       S/p SBRT T6 lesion 10/2017, then SRBT to T10 lesion 7/2018, and most recently SBRT to T4 lesion 11/2018 for renal cell CA mets  All are stable on most recent MRI 1/2019  No issues with dysphagea/swalllowing, or skin issues after RT  Suggest f/u per [facility] guidelines with MRI TSpine in 3-4 months - ordered  Intermittent low to mid back pain  None today  Takes prn Alleve or tramadol  Metrics: EQ5D5L 1 000, VAS 85, KPS , ECOG 0-1  CHIEF COMPLAINT    Chief Complaint   Patient presents with    Post-op     2 month POV s/p SBRT with new MRI       HISTORY    History of Present Illness     70y o  year old male     HPI    See Discussion    REVIEW OF SYSTEMS    Review of Systems   Constitutional: Positive for fatigue  HENT: Negative  Eyes: Negative  Respiratory: Negative  Cardiovascular: Negative  Gastrointestinal: Positive for diarrhea  Endocrine: Negative  Genitourinary:        Nocturia 1x   Musculoskeletal: Positive for back pain (mid to low) and gait problem (using standard cane, left drop foot)  Skin:        Psoriasis   Allergic/Immunologic: Negative  Neurological: Positive for numbness (B/L hands and feet)  Hematological: Bruises/bleeds easily  Psychiatric/Behavioral: Negative            Meds/Allergies     Current Outpatient Prescriptions   Medication Sig Dispense Refill    amLODIPine (NORVASC) 5 mg tablet Take 5 mg by mouth daily        Axitinib 1 MG TABS Take 5 tablets (5 mg total) by mouth 2 (two) times a day 300 tablet 1    cholecalciferol (VITAMIN D3) 1,000 units tablet Take 1,000 Units by mouth daily      diphenhydrAMINE (BENADRYL) 25 mg tablet Take 25 mg by mouth daily at bedtime as needed for itching      gabapentin (NEURONTIN) 100 mg capsule Take 200 mg by mouth 3 (three) times a day        lisinopril-hydrochlorothiazide (PRINZIDE,ZESTORETIC) 20-12 5 MG per tablet Take 1 tablet by mouth 2 (two) times a day        loperamide (IMODIUM) 2 mg capsule Take 2 mg by mouth 4 (four) times a day as needed for diarrhea      naproxen sodium (ALEVE) 220 MG tablet Take 220 mg by mouth as needed for mild pain      traMADol (ULTRAM) 50 mg tablet Take 1 tablet (50 mg total) by mouth every 6 (six) hours as needed for moderate pain 60 tablet 2    trolamine salicylate (ASPERCREME) 10 % cream Apply 1 application topically as needed for muscle/joint pain       No current facility-administered medications for this visit  No Known Allergies    PAST HISTORY    Past Medical History:   Diagnosis Date    Arthritis     Cancer ([facility] [address] )     prostate - mets to bone and lung    History of radiation therapy 07/27/2018    SBRT to T10 7/18-7/27/2018    Hyperlipidemia     Hypertension        Past Surgical History:   Procedure Laterality Date    DISTAL ULNA EXCISION Right     excision of tumor and orif with cement and screws    JOINT REPLACEMENT Bilateral     tkr's    LUNG SURGERY Right     exc of nodules    NEPHRECTOMY Left        Social History   Substance Use Topics    Smoking status: Former Smoker     Packs/day: 1 00     Types: Cigarettes     Quit date: 3/28/2003    Smokeless tobacco: Never Used    Alcohol use Yes      Comment: occasional use       Family History   Problem Relation Age of Onset    No Known Problems Mother     No Known Problems Father          Above history personally reviewed  EXAM    Vitals:Blood pressure 150/80, pulse 94, temperature 97 7 °F (36 5 °C), temperature source Temporal, resp   rate 16, height 5' 9" (1 753 m), weight 129 kg (283 lb 9 6 oz)  ,Body mass index is 41 88 kg/m²  Physical Exam    Neurologic Exam      MEDICAL DECISION MAKING    Imaging Studies:     Ct Chest Abdomen Pelvis Wo Contrast    Result Date: 1/15/2019  Narrative: CT CHEST, ABDOMEN AND PELVIS WITHOUT IV CONTRAST INDICATION:   C78 00: Secondary malignant neoplasm of unspecified lung C79 51: Secondary malignant neoplasm of bone C79 51: Secondary malignant neoplasm of bone C64 9: Malignant neoplasm of unspecified kidney, except renal pelvis  COMPARISON:  September 14, 2018 TECHNIQUE: CT examination of the chest, abdomen and pelvis was performed without intravenous contrast   Axial, sagittal, and coronal 2D reformatted images were created from the source data and submitted for interpretation  Radiation dose length product (DLP) for this visit:  1477 mGy-cm   This examination, like all CT scans performed in the [facility], was performed utilizing techniques to minimize radiation dose exposure, including the use of iterative reconstruction and automated exposure control  Enteric contrast was administered  FINDINGS: CHEST LUNGS:  There is no tracheal or endobronchial lesion  Numerous Pulmonary metastases, representative examples as follows: 1  Left upper lobe apex: 1 0 x 1 4 cm image 3/13  Previously 1 0 x 1 4 cm Image 5/19  2   Left upper lobe apex: 0 6 x 0 9 cm image 3/14  Previously 0 6 x 0 9 cm image 5/21  3   Left upper lobe: 0 4 x 0 5 cm image 3/27  Previously 0 4 x 0 5 cm image 5/33  4   Right upper lobe pleural-based: 2 8 x 2 6 cm image 3/33  Previously 2 8 x 2 8 cm image 5/39 5  Left upper lobe: 1 1 x 1 3 cm image 3/38  Previously 1 2 x 1 5 cm image 5/44  6   Left lower lobe: 0 8 x 1 1 cm image 3/68  Previously 1 1 x 1 3 cm image 5/80  7   Right lower lobe: 1 5 x 2 4 cm image 9/14  Previously 1 5 x 2 4 cm image 5/87  No new nodules    Stable patchy consolidation and fibrosis with traction bronchiectasis in the right lower lobe, at the site of prior surgery  PLEURA:  Unchanged pleural thickening right upper lobe posteromedially adjacent to a large nodule, presumably related to tumor invasion  No significant pleural effusion  HEART/GREAT VESSELS:  Cardiac size within normal limits  No pericardial effusion  MEDIASTINUM AND CHERRIE:  Stable  Gastroesophageal reflux  CHEST WALL AND LOWER NECK:   Stable right thyroid nodule  ABDOMEN LIVER/BILIARY TREE:  Unremarkable  GALLBLADDER:  No calcified gallstones  No pericholecystic inflammatory change  SPLEEN:  Unremarkable  PANCREAS:  Unremarkable  ADRENAL GLANDS:  Stable 3 4 cm left adrenal mass  Stable subcentimeter nodularity right adrenal  KIDNEYS/URETERS:  Prior left nephrectomy  Right side renal cysts appear stable  STOMACH AND BOWEL:  Unremarkable  APPENDIX:  No findings to suggest appendicitis  ABDOMINOPELVIC CAVITY:  No ascites or free intraperitoneal air  No lymphadenopathy  VESSELS:  Unremarkable for patient's age  PELVIS REPRODUCTIVE ORGANS:  Unremarkable for patient's age  URINARY BLADDER:  Unremarkable  ABDOMINAL WALL/INGUINAL REGIONS:  Bilateral fat-containing inguinal canal hernias  Small fat-containing umbilical hernia  Small fat-containing ventral hernia left of midline just above the level the umbilicus  Small fat-containing ventral hernia at the level of the anterior abdominal wall incision  OSSEOUS STRUCTURES:  Mixed lytic and sclerotic expansile bone metastases, largest in the right 7th rib are grossly stable (8th rib, T4, T6, T10, left ilium, and right ilium)  Impression: Multiple pulmonary nodules, a few of which are slightly smaller when compared to previous exam - as detailed above  Multiple stable bone metastases  No new metastatic lesions are seen  Workstation performed: [id number]     Mri Thoracic Spine Wo Contrast    Result Date: 1/26/2019  Narrative: MRI THORACIC SPINE WITHOUT CONTRAST INDICATION: C79 51: Secondary malignant neoplasm of bone    Renal cell, metastatic disease COMPARISON:  MR 10/19/2018 TECHNIQUE:  Sagittal T1, sagittal T2, sagittal inversion recovery, axial T2,  axial 2D MERGE  IMAGE QUALITY: Diagnostic  FINDINGS: ALIGNMENT: Normal alignment of the thoracic spine  No compression fracture  No subluxation  No scoliosis  MARROW SIGNAL:  Marrow signal changes in T4, T6, right T7 rib and right T8 transverse process, consistent with metastatic disease are reidentified, stable in appearance  No pathologic fracture, no extraosseous intraspinal tumor  THORACIC CORD: Normal signal within the thoracic cord  PARAVERTEBRAL SOFT TISSUES:  Stable right T4 level paraspinal mass, approximately 32 mm in greatest linear dimension  T7 THORACIC DEGENERATIVE CHANGE: No disc herniation, canal stenosis or foraminal narrowing  No degenerative changes  Impression: Stable MR appearance of the thoracic spine  Multifocal metastatic disease is stable, affecting the T4 and T6 vertebral bodies  Right T10 transverse process  Right T4 paraspinal mass stable  Known right T6 rib lesion is not completely evaluated on this study  No intraspinal tumor or pathologic fracture  Workstation performed: [id number]       I have personally reviewed pertinent reports     and I have personally reviewed pertinent films in PACS

## 2019-02-11 ENCOUNTER — HOSPITAL ENCOUNTER (OUTPATIENT)
Dept: INFUSION CENTER | Facility: CLINIC | Age: 69
Discharge: HOME | End: 2019-02-11
Payer: MEDICARE

## 2019-02-11 PROCEDURE — 96401 CHEMO ANTI-NEOPL SQ/IM: CPT

## 2019-02-11 RX ADMIN — DENOSUMAB 120 MG: 120 INJECTION SUBCUTANEOUS at 10:26

## 2019-02-11 NOTE — PLAN OF CARE
Problem: Knowledge Deficit  Goal: Patient/family/caregiver demonstrates understanding of disease process, treatment plan, medications, and discharge instructions  Description  Complete learning assessment and assess knowledge base    Interventions:  - Provide teaching at level of understanding  - Provide teaching via preferred learning methods  Outcome: Progressing

## 2019-02-11 NOTE — PROGRESS NOTES
Presented today for xgeva injection, xgeva 120mg given as ordered in R arm, tolerated well  Serum Ca=9 4 on 1/30/18  Will return in 4 weeks for next injection

## 2019-03-06 ENCOUNTER — APPOINTMENT (OUTPATIENT)
Dept: LAB | Facility: MEDICAL CENTER | Age: 69
End: 2019-03-06
Payer: MEDICARE

## 2019-03-06 ENCOUNTER — TRANSCRIBE ORDERS (OUTPATIENT)
Dept: ADMINISTRATIVE | Facility: HOSPITAL | Age: 69
End: 2019-03-06

## 2019-03-06 DIAGNOSIS — E78.5 HYPERLIPIDEMIA, UNSPECIFIED HYPERLIPIDEMIA TYPE: ICD-10-CM

## 2019-03-06 DIAGNOSIS — I10 ESSENTIAL HYPERTENSION, MALIGNANT: ICD-10-CM

## 2019-03-06 DIAGNOSIS — I10 ESSENTIAL HYPERTENSION, MALIGNANT: Primary | ICD-10-CM

## 2019-03-06 LAB
CHOLEST SERPL-MCNC: 223 MG/DL (ref 50–200)
HDLC SERPL-MCNC: 39 MG/DL (ref 40–60)
LDLC SERPL CALC-MCNC: 151 MG/DL (ref 0–100)
NONHDLC SERPL-MCNC: 184 MG/DL
TRIGL SERPL-MCNC: 165 MG/DL
TSH SERPL DL<=0.05 MIU/L-ACNC: 3.88 UIU/ML (ref 0.36–3.74)

## 2019-03-06 PROCEDURE — 84443 ASSAY THYROID STIM HORMONE: CPT

## 2019-03-06 PROCEDURE — 80061 LIPID PANEL: CPT

## 2019-03-11 ENCOUNTER — HOSPITAL ENCOUNTER (OUTPATIENT)
Dept: INFUSION CENTER | Facility: CLINIC | Age: 69
Discharge: HOME | End: 2019-03-11
Payer: MEDICARE

## 2019-03-11 VITALS — DIASTOLIC BLOOD PRESSURE: 81 MMHG | HEART RATE: 90 BPM | RESPIRATION RATE: 22 BRPM | SYSTOLIC BLOOD PRESSURE: 128 MMHG

## 2019-03-11 PROCEDURE — 96401 CHEMO ANTI-NEOPL SQ/IM: CPT

## 2019-03-11 RX ADMIN — DENOSUMAB 120 MG: 120 INJECTION SUBCUTANEOUS at 11:36

## 2019-03-11 NOTE — PROGRESS NOTES
Calcium = 9 3  Pt was given xgeva as ordered in the left upper arm    Pt was also provided with future appt and AVS

## 2019-03-15 ENCOUNTER — OFFICE VISIT (OUTPATIENT)
Dept: HEMATOLOGY ONCOLOGY | Facility: CLINIC | Age: 69
End: 2019-03-15
Payer: MEDICARE

## 2019-03-15 VITALS
HEART RATE: 84 BPM | HEIGHT: 69 IN | WEIGHT: 281.8 LBS | OXYGEN SATURATION: 96 % | DIASTOLIC BLOOD PRESSURE: 86 MMHG | BODY MASS INDEX: 41.74 KG/M2 | TEMPERATURE: 97 F | RESPIRATION RATE: 18 BRPM | SYSTOLIC BLOOD PRESSURE: 136 MMHG

## 2019-03-15 DIAGNOSIS — C79.51 BONE METASTASES (HCC): ICD-10-CM

## 2019-03-15 DIAGNOSIS — C79.51 SPINE METASTASIS (HCC): ICD-10-CM

## 2019-03-15 DIAGNOSIS — C78.00 MALIGNANT NEOPLASM METASTATIC TO LUNG, UNSPECIFIED LATERALITY (HCC): Primary | ICD-10-CM

## 2019-03-15 PROCEDURE — 99214 OFFICE O/P EST MOD 30 MIN: CPT | Performed by: INTERNAL MEDICINE

## 2019-03-15 NOTE — PROGRESS NOTES
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 noted on wedge resection on November 18, 2013  Sutent 13 courses from March 22, 2015 until December 2016       Pathologic fracture to the mid right ulna on April 3, 2017 and then surgery with Dr Angy Peterson, orthopedic oncologist at [facility] on April 14, 2017 and then postoperative radiotherapy to the right ulna 3000 cGy in 10 fractions from May 17, 2017 to May 31, 2017       XRT to the right posterior ribs 2800 cGy in 7 fractions from July 26, 2017 to August 3, 2017       SBRT to the T6 metastatic lesion completed October 27, 2017       SBRT to the T4 paraspinal mass 3000 cGy in 5 fractions completed November 23, 2018       2  Left nephrectomy in August 2007 for renal cell carcinoma clear cell type, grade II-III, T1Nx        3  Prostate adenocarcinoma, [place] score 3+4=7, involving both lobes of the prostate gland, pT2c pN0 , no metastasis to three left pelvic lymph nodes        Current Therapy:      Axitinib initiated March 8, 2017, currently 5 mg b i d       Review of systems:      He feels well  He denies chills or sweats  He has chronic runny nose and sinus stuffiness bilaterally   He denies nose bleeds  He denies exertional chest pain     He denies cough or dyspnea     He denies abdominal pain  He has mid to lower back discomfort     He denies rash     He denies headache   He has chronic numbness of the feet  He has chronic left footdrop  He denies emotional problems  Physical Examination:    Blood pressure 136/86, pulse 84, temperature (!) 97 °F (36 1 °C), resp  rate 18, height 5' 9" (1 753 m), weight 128 kg (281 lb 12 8 oz), SpO2 96 %  Body surface area is 2 39 meters squared  He appears well  EOMI   Oropharynx is clear   No lymphadenopathy of the neck  No axillary lymphadenopathy     Lungs are clear bilaterally     Heart has regular rate and rhythm  Liver and spleen are not palpable due to body habitus   No inguinal lymphadenopathy     There is mild chronic distal left lower extremity edema, no right lower extremity edema  There is vitiligo and dry scaly skin  Neurological is grossly intact other than left foot drop   The patient uses a cane to assist in balance     Oriented x3, normal mood and affect      ECOG 1    Laboratory:    From March 6, 2019:  Creatinine 1 13, calcium 9 3, AST 20, ALT 15, alk-phos 73, bili 1 03, WBC 8 36, hemoglobin 13 6, platelets 519,  (), TSH is 3 880 (0 358-3 740)  MRI of the thoracic spine without contrast from January 25, 2019:  Multifocal metastatic disease is stable affecting T4, T6, right T7 and right T8 transverse process, no pathologic fracture or extraosseous intraspinal tumor  Assessment/Plan:    Thomasina Mcardle remains clinically asymptomatic of bilateral lung metastasis from metastatic clear-cell carcinoma of left kidney origin  Axitinib was initiated on March 8, 2017 for progression of lung nodules and enlargement of destructive bone metastasis  On CT of the chest, abdomen pelvis of September 19, 2018 there was further progression of several lung nodules and stable bone metastasis   Accordingly, a change in systemic therapy to nivolumab (NCCN category 1, preferred) was recommended  Other options include cabozantinib (category 1, preferred), lenvatinib plus everolimus (category 1) or single agent everolimus (category 2A)  At that time the patient preferred to remain on the axitinib  Subsequently, on follow-up CT scan of chest abdomen pelvis of January 15, 2019 the lung and bone metastasis remained stable  The patient prefers to remain on axitinib noting that the axitinib had been fairly well tolerated and disease progression has been modest  The plan is accident in 5 mg b i d  A follow-up MRI of the thoracic spine on April 26, 2019 is planned, A follow-up noncontrast enhanced CT of the chest abdomen pelvis in 2 months is planned      Calcium reabsorption inhibitor therapy is recommended to decrease risk of bone pain and fracture related to bone metastasis as well as to prevent recurrence hypercalcemia  Denosumab 120 mg subcutaneously monthly is to be continued  The patient is aware to seek medical attention for cough, shortness of breath, nosebleeds, easy bruising, reduced appetite, loose bowel movements, recurrence of posterior neck pain, right mid posterior chest pain, low back pain, paresthesias of the upper extremities, difficulty with ambulation, easy fatigue, or if other new problems arise     Otherwise, plan to see him again in 8 weeks

## 2019-03-15 NOTE — PATIENT INSTRUCTIONS
The patient is aware to seek medical attention for cough, shortness of breath, nosebleeds, easy bruising, reduced appetite, loose bowel movements, recurrence of posterior neck pain, right mid posterior chest pain, low back pain, paresthesias of the upper extremities, difficulty with ambulation, easy fatigue, or if other new problems arise

## 2019-04-05 ENCOUNTER — APPOINTMENT (OUTPATIENT)
Dept: LAB | Facility: MEDICAL CENTER | Age: 69
End: 2019-04-05
Payer: MEDICARE

## 2019-04-08 ENCOUNTER — HOSPITAL ENCOUNTER (OUTPATIENT)
Dept: INFUSION CENTER | Facility: CLINIC | Age: 69
Discharge: HOME | End: 2019-04-08
Payer: MEDICARE

## 2019-04-08 VITALS
RESPIRATION RATE: 18 BRPM | SYSTOLIC BLOOD PRESSURE: 122 MMHG | TEMPERATURE: 96.8 F | HEART RATE: 84 BPM | DIASTOLIC BLOOD PRESSURE: 74 MMHG

## 2019-04-08 PROCEDURE — 96401 CHEMO ANTI-NEOPL SQ/IM: CPT

## 2019-04-08 RX ADMIN — DENOSUMAB 120 MG: 120 INJECTION SUBCUTANEOUS at 12:20

## 2019-04-08 NOTE — PROGRESS NOTES
Patient arrived on unit for Xgeva  Denies any discomforts  Denies any dental issues  Calcium- 9 1  Tolerated Xgeva injection in RA  Aware of next appointment   AVS given to patient

## 2019-04-10 ENCOUNTER — TELEPHONE (OUTPATIENT)
Dept: HEMATOLOGY ONCOLOGY | Facility: CLINIC | Age: 69
End: 2019-04-10

## 2019-04-12 ENCOUNTER — DOCUMENTATION (OUTPATIENT)
Dept: HEMATOLOGY ONCOLOGY | Facility: CLINIC | Age: 69
End: 2019-04-12

## 2019-04-26 ENCOUNTER — HOSPITAL ENCOUNTER (OUTPATIENT)
Dept: RADIOLOGY | Facility: HOSPITAL | Age: 69
Discharge: HOME | End: 2019-04-26
Attending: NEUROLOGICAL SURGERY
Payer: MEDICARE

## 2019-04-26 DIAGNOSIS — C64.9 METASTATIC RENAL CELL CARCINOMA, UNSPECIFIED LATERALITY (HCC): ICD-10-CM

## 2019-04-26 DIAGNOSIS — C79.51 SPINE METASTASIS (HCC): ICD-10-CM

## 2019-04-26 PROCEDURE — 72157 MRI CHEST SPINE W/O & W/DYE: CPT

## 2019-04-26 PROCEDURE — A9585 GADOBUTROL INJECTION: HCPCS | Performed by: NEUROLOGICAL SURGERY

## 2019-04-26 RX ADMIN — GADOBUTROL 12 ML: 604.72 INJECTION INTRAVENOUS at 09:42

## 2019-05-01 ENCOUNTER — OFFICE VISIT (OUTPATIENT)
Dept: NEUROSURGERY | Facility: CLINIC | Age: 69
End: 2019-05-01
Payer: MEDICARE

## 2019-05-01 ENCOUNTER — APPOINTMENT (OUTPATIENT)
Dept: RADIATION ONCOLOGY | Facility: HOSPITAL | Age: 69
End: 2019-05-01
Attending: RADIOLOGY
Payer: MEDICARE

## 2019-05-01 ENCOUNTER — HOSPITAL ENCOUNTER (OUTPATIENT)
Dept: CT IMAGING | Facility: HOSPITAL | Age: 69
Discharge: HOME | End: 2019-05-01
Attending: INTERNAL MEDICINE
Payer: MEDICARE

## 2019-05-01 VITALS
SYSTOLIC BLOOD PRESSURE: 154 MMHG | HEIGHT: 69 IN | RESPIRATION RATE: 16 BRPM | DIASTOLIC BLOOD PRESSURE: 90 MMHG | HEART RATE: 80 BPM | BODY MASS INDEX: 41.26 KG/M2 | TEMPERATURE: 97.4 F | WEIGHT: 278.6 LBS

## 2019-05-01 DIAGNOSIS — C64.9 METASTATIC RENAL CELL CARCINOMA, UNSPECIFIED LATERALITY (HCC): ICD-10-CM

## 2019-05-01 DIAGNOSIS — C79.51 SPINE METASTASIS (HCC): Primary | ICD-10-CM

## 2019-05-01 DIAGNOSIS — C79.51 SPINE METASTASIS (HCC): ICD-10-CM

## 2019-05-01 DIAGNOSIS — C78.00 MALIGNANT NEOPLASM METASTATIC TO LUNG, UNSPECIFIED LATERALITY (HCC): ICD-10-CM

## 2019-05-01 DIAGNOSIS — C79.51 BONE METASTASES (HCC): ICD-10-CM

## 2019-05-01 PROCEDURE — 71250 CT THORAX DX C-: CPT

## 2019-05-01 PROCEDURE — 99214 OFFICE O/P EST MOD 30 MIN: CPT | Performed by: NEUROLOGICAL SURGERY

## 2019-05-01 PROCEDURE — 74176 CT ABD & PELVIS W/O CONTRAST: CPT

## 2019-05-06 ENCOUNTER — DOCUMENTATION (OUTPATIENT)
Dept: HEMATOLOGY ONCOLOGY | Facility: CLINIC | Age: 69
End: 2019-05-06

## 2019-05-08 ENCOUNTER — APPOINTMENT (OUTPATIENT)
Dept: LAB | Facility: MEDICAL CENTER | Age: 69
End: 2019-05-08
Payer: MEDICARE

## 2019-05-08 DIAGNOSIS — C78.00 MALIGNANT NEOPLASM METASTATIC TO LUNG, UNSPECIFIED LATERALITY (HCC): ICD-10-CM

## 2019-05-08 DIAGNOSIS — C79.51 BONE METASTASES (HCC): ICD-10-CM

## 2019-05-08 LAB — LDH SERPL-CCNC: 229 U/L (ref 81–234)

## 2019-05-08 PROCEDURE — 83615 LACTATE (LD) (LDH) ENZYME: CPT

## 2019-05-10 ENCOUNTER — OFFICE VISIT (OUTPATIENT)
Dept: HEMATOLOGY ONCOLOGY | Facility: CLINIC | Age: 69
End: 2019-05-10
Payer: MEDICARE

## 2019-05-10 ENCOUNTER — HOSPITAL ENCOUNTER (OUTPATIENT)
Dept: INFUSION CENTER | Facility: CLINIC | Age: 69
Discharge: HOME | End: 2019-05-10
Payer: MEDICARE

## 2019-05-10 VITALS
HEART RATE: 76 BPM | RESPIRATION RATE: 18 BRPM | DIASTOLIC BLOOD PRESSURE: 86 MMHG | TEMPERATURE: 96.9 F | SYSTOLIC BLOOD PRESSURE: 140 MMHG

## 2019-05-10 VITALS
HEIGHT: 69 IN | BODY MASS INDEX: 41.32 KG/M2 | TEMPERATURE: 96.9 F | WEIGHT: 279 LBS | RESPIRATION RATE: 18 BRPM | DIASTOLIC BLOOD PRESSURE: 86 MMHG | HEART RATE: 76 BPM | OXYGEN SATURATION: 97 % | SYSTOLIC BLOOD PRESSURE: 140 MMHG

## 2019-05-10 DIAGNOSIS — C64.2 CLEAR CELL ADENOCARCINOMA OF KIDNEY, LEFT (HCC): Primary | ICD-10-CM

## 2019-05-10 DIAGNOSIS — C79.51 BONE METASTASES (HCC): ICD-10-CM

## 2019-05-10 DIAGNOSIS — C79.51 SPINE METASTASIS (HCC): ICD-10-CM

## 2019-05-10 DIAGNOSIS — C78.00 MALIGNANT NEOPLASM METASTATIC TO LUNG, UNSPECIFIED LATERALITY (HCC): ICD-10-CM

## 2019-05-10 PROCEDURE — 99214 OFFICE O/P EST MOD 30 MIN: CPT | Performed by: INTERNAL MEDICINE

## 2019-05-10 PROCEDURE — 96401 CHEMO ANTI-NEOPL SQ/IM: CPT

## 2019-05-10 RX ORDER — TRAMADOL HYDROCHLORIDE 50 MG/1
50 TABLET ORAL EVERY 6 HOURS PRN
Qty: 60 TABLET | Refills: 2 | Status: SHIPPED | OUTPATIENT
Start: 2019-05-10 | End: 2019-09-20 | Stop reason: SDUPTHER

## 2019-05-10 RX ADMIN — DENOSUMAB 120 MG: 120 INJECTION SUBCUTANEOUS at 11:10

## 2019-05-10 NOTE — PROGRESS NOTES
Xgeva given SQ in [facility]  Pt  Tolerated well  Future appointments reviewed    Declined AVS   Labs printed for patient at his request

## 2019-05-10 NOTE — PLAN OF CARE
Problem: PAIN - ADULT  Goal: Verbalizes/displays adequate comfort level or baseline comfort level  Description  Interventions:  - Encourage patient to monitor pain and request assistance  - Assess pain using appropriate pain scale  - Administer analgesics based on type and severity of pain and evaluate response  - Implement non-pharmacological measures as appropriate and evaluate response  - Consider cultural and social influences on pain and pain management  - Notify physician/advanced practitioner if interventions unsuccessful or patient reports new pain  Outcome: Progressing     Problem: INFECTION - ADULT  Goal: Absence or prevention of progression during hospitalization  Description  INTERVENTIONS:  - Assess and monitor for signs and symptoms of infection  - Monitor lab/diagnostic results  - Monitor all insertion sites, i e  indwelling lines, tubes, and drains  - Monitor endotracheal (as able) and nasal secretions for changes in amount and color  - [place] appropriate cooling/warming therapies per order  - Administer medications as ordered  - Instruct and encourage patient and family to use good hand hygiene technique  - Identify and instruct in appropriate isolation precautions for identified infection/condition  Outcome: Progressing  Goal: Absence of fever/infection during neutropenic period  Description  INTERVENTIONS:  - Monitor WBC  - Implement neutropenic guidelines  Outcome: Progressing     Problem: Knowledge Deficit  Goal: Patient/family/caregiver demonstrates understanding of disease process, treatment plan, medications, and discharge instructions  Description  Complete learning assessment and assess knowledge base    Interventions:  - Provide teaching at level of understanding  - Provide teaching via preferred learning methods  Outcome: Progressing

## 2019-07-05 DIAGNOSIS — C64.9 METASTATIC RENAL CELL CARCINOMA, UNSPECIFIED LATERALITY (HCC): Primary | ICD-10-CM

## 2019-07-09 ENCOUNTER — OFFICE VISIT (OUTPATIENT)
Dept: HEMATOLOGY ONCOLOGY | Facility: CLINIC | Age: 69
End: 2019-07-09
Payer: MEDICARE

## 2019-07-09 ENCOUNTER — APPOINTMENT (OUTPATIENT)
Dept: LAB | Facility: MEDICAL CENTER | Age: 69
End: 2019-07-09
Payer: MEDICARE

## 2019-07-09 ENCOUNTER — HOSPITAL ENCOUNTER (OUTPATIENT)
Dept: INFUSION CENTER | Facility: CLINIC | Age: 69
Discharge: HOME | End: 2019-07-09
Payer: MEDICARE

## 2019-07-09 VITALS
HEIGHT: 69 IN | RESPIRATION RATE: 18 BRPM | TEMPERATURE: 97.2 F | DIASTOLIC BLOOD PRESSURE: 82 MMHG | SYSTOLIC BLOOD PRESSURE: 136 MMHG | WEIGHT: 274.8 LBS | OXYGEN SATURATION: 98 % | HEART RATE: 82 BPM | BODY MASS INDEX: 40.7 KG/M2

## 2019-07-09 DIAGNOSIS — C79.40: ICD-10-CM

## 2019-07-09 DIAGNOSIS — C79.51 SPINE METASTASIS (HCC): ICD-10-CM

## 2019-07-09 DIAGNOSIS — C79.51 BONE METASTASES (HCC): ICD-10-CM

## 2019-07-09 DIAGNOSIS — C64.2 CLEAR CELL ADENOCARCINOMA OF KIDNEY, LEFT (HCC): Primary | ICD-10-CM

## 2019-07-09 DIAGNOSIS — C78.00 MALIGNANT NEOPLASM METASTATIC TO LUNG, UNSPECIFIED LATERALITY (HCC): ICD-10-CM

## 2019-07-09 PROCEDURE — 99214 OFFICE O/P EST MOD 30 MIN: CPT | Performed by: INTERNAL MEDICINE

## 2019-07-09 NOTE — PROGRESS NOTES
7/9/2019    Mara Lobo    He has been using 2 doses of naproxen 220 mg per day and 3-4 doses of tramadol 50 mg per day for control of pain of the mid to lower back  Right hip pain has diminished ever since he received a corticosteroid injection into the right hip in May 2019  He uses a cane to assist in balance when walking on uneven surfaces, otherwise, he has been walking independently  He continues to use 1-2 doses of loperamide 2 mg per day at most to to prevent axitinib related loose bowel movements  Hematology/Oncology History:    1  Bilateral lung nodules or 1st noted July 2012 (not present on CT scan in 2009), 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 noted on wedge resection on November 18, 2013  Sutent 13 courses from March 22, 2015 until December 2016       Pathologic fracture to the mid right ulna on April 3, 2017 and then surgery with Dr Cirilo Johns, orthopedic oncologist at [facility] on April 14, 2017 and then postoperative radiotherapy to the right ulna 3000 cGy in 10 fractions from May 17, 2017 to May 31, 2017       XRT to the right posterior ribs 2800 cGy in 7 fractions from July 26, 2017 to August 3, 2017       SBRT to the T6 metastatic lesion completed October 27, 2017       SBRT to the T4 paraspinal mass 3000 cGy in 5 fractions completed November 23, 2018       2  Left nephrectomy in August 2007 for renal cell carcinoma clear cell type, grade II-III, T1Nx        3  Prostate adenocarcinoma, [place] score 3+4=7, involving both lobes of the prostate gland, pT2c pN0 , no metastasis to three left pelvic lymph nodes     Current Therapy:      Axitinib initiated March 8, 2017, currently 5 mg b i d        Denosumab 120 mg SQ monthly initiated January 2018, decreased to 120 mg SQ every 8 weeks as of May 2019  Review of systems:      He feels well   He denies chills or sweats  He has chronic runny nose and sinus stuffiness bilaterally   He denies nose bleeds    He denies exertional chest pain     He denies cough or dyspnea     GI:  See HPI  Musculoskeletal:  See HPI    He denies rash     He denies headache   He has chronic numbness of the feet  He has chronic left footdrop  He denies emotional problems  Physical Examination:    Blood pressure 136/82, pulse 82, temperature (!) 97 2 °F (36 2 °C), resp  rate 18, height 5' 9" (1 753 m), weight 125 kg (274 lb 12 8 oz), SpO2 98 %  Body surface area is 2 37 meters squared  He appears well  EOMI   Oropharynx is clear   No lymphadenopathy of the neck  No axillary lymphadenopathy     Lungs are clear bilaterally     Heart has regular rate and rhythm  Liver and spleen are not palpable due to body habitus   No inguinal lymphadenopathy     There is trace distal lower extremity edema, left greater than right  There is vitiligo and dry scaly skin  Neurological is grossly intact other than left foot drop  Oriented x3, normal mood and affect      ECOG 1    Laboratory:    From July 9, 2019:  WBC 8 24, hemoglobin 14 7, platelets 212, creatinine 1 21, calcium 10 0, AST 18, ALT 20, alk-phos <10, bili 0 77    Assessment/Plan:    Kelsie Pires remains clinically asymptomatic of bilateral lung metastasis from metastatic clear-cell carcinoma of left kidney origin  Axitinib was initiated on March 8, 2017 for progression of lung nodules and destructive bone metastasis  The patient prefers to remain on axitinib noting that the axitinib had been fairly well tolerated and overall metastatic disease progression has been modest  The plan is to continue axitinib in 5 mg b i d       Calcium reabsorption inhibitor therapy is recommended to decrease risk of bone pain and fracture related to bone metastasis as well as to prevent recurrence hypercalcemia   Denosumab 120 mg subcutaneously is to be given today and continued every 8 weeks      The patient is aware to seek medical attention for cough, shortness of breath, nosebleeds, easy bruising, reduced appetite, loose bowel movements, recurrence of posterior neck pain, right mid posterior chest pain, low back pain, paresthesias of the upper extremities, difficulty with ambulation, easy fatigue, or if other new problems arise   Otherwise, plan to see him again in 8 weeks  Today's office visit required 25 minutes, over 50% of the time was utilized to review diagnostic tests, impressions and recommendations

## 2019-07-12 ENCOUNTER — HOSPITAL ENCOUNTER (OUTPATIENT)
Dept: INFUSION CENTER | Facility: CLINIC | Age: 69
Discharge: HOME | End: 2019-07-12
Payer: MEDICARE

## 2019-07-12 DIAGNOSIS — C79.51 BONE METASTASES (HCC): Primary | ICD-10-CM

## 2019-07-12 DIAGNOSIS — C64.9 METASTATIC RENAL CELL CARCINOMA, UNSPECIFIED LATERALITY (HCC): ICD-10-CM

## 2019-07-12 PROCEDURE — 96401 CHEMO ANTI-NEOPL SQ/IM: CPT

## 2019-07-12 RX ADMIN — DENOSUMAB 120 MG: 120 INJECTION SUBCUTANEOUS at 09:35

## 2019-07-12 NOTE — PROGRESS NOTES
Pt presents to unit without complaint  Serum Ca=10, creat clearance=61  Pt denies any jaw discomfort, recent or upcoming dental procedures  Xgeva administered as ordered and tolerated well  Pt left unit in stable condition without question or concern, AVS provided

## 2019-09-03 ENCOUNTER — HOSPITAL ENCOUNTER (OUTPATIENT)
Dept: INFUSION CENTER | Facility: CLINIC | Age: 69
End: 2019-09-03

## 2019-09-06 ENCOUNTER — HOSPITAL ENCOUNTER (OUTPATIENT)
Dept: RADIOLOGY | Facility: HOSPITAL | Age: 69
Discharge: HOME | End: 2019-09-06
Attending: NEUROLOGICAL SURGERY
Payer: MEDICARE

## 2019-09-06 DIAGNOSIS — C79.51 SPINE METASTASIS (HCC): ICD-10-CM

## 2019-09-06 PROCEDURE — A9585 GADOBUTROL INJECTION: HCPCS | Performed by: NEUROLOGICAL SURGERY

## 2019-09-06 PROCEDURE — 72157 MRI CHEST SPINE W/O & W/DYE: CPT

## 2019-09-06 RX ADMIN — GADOBUTROL 13 ML: 604.72 INJECTION INTRAVENOUS at 08:53

## 2019-09-11 ENCOUNTER — OFFICE VISIT (OUTPATIENT)
Dept: NEUROSURGERY | Facility: CLINIC | Age: 69
End: 2019-09-11
Payer: MEDICARE

## 2019-09-11 VITALS
HEART RATE: 77 BPM | BODY MASS INDEX: 40.67 KG/M2 | SYSTOLIC BLOOD PRESSURE: 132 MMHG | RESPIRATION RATE: 18 BRPM | TEMPERATURE: 97.4 F | DIASTOLIC BLOOD PRESSURE: 79 MMHG | WEIGHT: 274.6 LBS | HEIGHT: 69 IN

## 2019-09-11 DIAGNOSIS — C79.51 SPINE METASTASIS (HCC): Primary | ICD-10-CM

## 2019-09-11 DIAGNOSIS — C64.9 METASTATIC RENAL CELL CARCINOMA, UNSPECIFIED LATERALITY (HCC): ICD-10-CM

## 2019-09-11 PROCEDURE — 99213 OFFICE O/P EST LOW 20 MIN: CPT | Performed by: NEUROLOGICAL SURGERY

## 2019-09-11 NOTE — PROGRESS NOTES
Neurosurgery Office Note  Micaela Serrano 70 y o  male MRN: [id number]      Assessment/Plan      Diagnoses and all orders for this visit:    Spine metastasis [facility])  -     MRI thoracic spine with and without contrast; Future    Metastatic renal cell carcinoma, unspecified laterality [facility])          Discussion:    70 y o  male with a history of RCC       S/p SBRT T6 lesion 10/2017, then SRBT to T10 lesion 7/2018, and most recently SBRT to T4 lesion 11/2018 for renal cell CA mets  All are stable on most recent MRI 9/2019  No new lesions  Suggest f/u per NCCN guidelines with MRI TSpine in 3-4 months - ordered  Intermittent low to mid back pain,   That he feels is somewhat worsened from prior  He said this at last visit  It is worst on waking, loosened up as he begins to move  He has no difficulty getting into bed, or suggestion of mechanical pain  No evidence of progressive spine disease  Should this worsen, he will contact us, we may well refer him to pain management  Has expects to have a CT scan of chest abd & pelvis soon for systemic surveillance (not yet scheduled)  Metrics: [id number] [id number], 0 827 - unchanged; VAS 80, KPS , ECOG 0-1  CHIEF COMPLAINT    Chief Complaint   Patient presents with    Follow-up     4 month follow up with new MRI       HISTORY    History of Present Illness     70y o  year old male     HPI    See Discussion    REVIEW OF SYSTEMS    Review of Systems   Constitutional: Positive for fatigue  HENT: Negative  Eyes: Negative  Respiratory: Negative  Cardiovascular: Negative  Gastrointestinal: Positive for diarrhea  Endocrine: Negative  Genitourinary:        Nocturia 1x   Musculoskeletal: Positive for back pain (mid to upper) and gait problem (using standard cane, left drop foot, unsteady balance)  Skin: Positive for wound (left hand, wound / scratch from dog)  Psoriasis   Allergic/Immunologic: Negative      Neurological: Positive for numbness (B/L fingertips and feet)  Hematological: Bruises/bleeds easily  Psychiatric/Behavioral: Negative  Meds/Allergies     Current Outpatient Medications   Medication Sig Dispense Refill    amLODIPine (NORVASC) 5 mg tablet Take 5 mg by mouth daily        Axitinib 1 MG TABS Take 5 tablets (5 mg total) by mouth 2 (two) times a day 300 tablet 1    cholecalciferol (VITAMIN D3) 1,000 units tablet Take 1,000 Units by mouth daily      diphenhydrAMINE (BENADRYL) 25 mg tablet Take 25 mg by mouth daily at bedtime as needed for itching      gabapentin (NEURONTIN) 100 mg capsule Take 300 mg by mouth 3 (three) times a day       lisinopril-hydrochlorothiazide (PRINZIDE,ZESTORETIC) 20-12 5 MG per tablet Take 1 tablet by mouth 2 (two) times a day        loperamide (IMODIUM) 2 mg capsule Take 2 mg by mouth 4 (four) times a day as needed for diarrhea      naproxen sodium (ALEVE) 220 MG tablet Take 220 mg by mouth as needed for mild pain      traMADol (ULTRAM) 50 mg tablet Take 1 tablet (50 mg total) by mouth every 6 (six) hours as needed for moderate pain 60 tablet 2    trolamine salicylate (ASPERCREME) 10 % cream Apply 1 application topically as needed for muscle/joint pain       No current facility-administered medications for this visit          No Known Allergies    PAST HISTORY    Past Medical History:   Diagnosis Date    Arthritis     Cancer ([facility] [address] )     prostate - mets to bone and lung    History of radiation therapy 07/27/2018    SBRT to T10 7/18-7/27/2018    Hyperlipidemia     Hypertension        Past Surgical History:   Procedure Laterality Date    DISTAL ULNA EXCISION Right     excision of tumor and orif with cement and screws    JOINT REPLACEMENT Bilateral     tkr's    LUNG SURGERY Right     exc of nodules    NEPHRECTOMY Left        Social History     Tobacco Use    Smoking status: Former Smoker     Packs/day: 1 00     Types: Cigarettes     Last attempt to quit: 3/28/2003     Years since quitting: 16 4    Smokeless tobacco: Never Used   Substance Use Topics    Alcohol use: Yes     Comment: occasional use    Drug use: No       Family History   Problem Relation Age of Onset    No Known Problems Mother     No Known Problems Father          Above history personally reviewed  EXAM    Vitals:Blood pressure 132/79, pulse 77, temperature (!) 97 4 °F (36 3 °C), temperature source Tympanic, resp  rate 18, height 5' 9" (1 753 m), weight 125 kg (274 lb 9 6 oz)  ,Body mass index is 40 55 kg/m²  Physical Exam   Constitutional: He is oriented to person, place, and time  He appears well-developed  Obese   HENT:   Head: Normocephalic and atraumatic  Eyes: No scleral icterus  Neck: Neck supple  Cardiovascular: Normal rate  Pulmonary/Chest: Effort normal    Abdominal: Normal appearance  Neurological: He is alert and oriented to person, place, and time  No sensory deficit  Skin: Skin is warm and dry  Psychiatric: He has a normal mood and affect  His speech is normal and behavior is normal    Vitals reviewed  Neurologic Exam     Mental Status   Oriented to person, place, and time  Attention: normal    Speech: speech is normal   Level of consciousness: alert    Cranial Nerves     CN VII   Facial expression full, symmetric  Motor Exam   Muscle bulk: normal  Overall muscle tone: normal  Moves all extremities, grossly normal     Gait, Coordination, and Reflexes     Tremor   Resting tremor: absent  Intention tremor: absent  Action tremor: absent  No standard cane for today's visit         MEDICAL DECISION MAKING    Imaging Studies:     Mri Thoracic Spine With And Without Contrast    Result Date: 9/9/2019  Narrative: MRI THORACIC SPINE WITH AND WITHOUT CONTRAST INDICATION: C79 51: Secondary malignant neoplasm of bone  COMPARISON:  4/26/2019 TECHNIQUE:  Sagittal T1, sagittal T2, sagittal inversion recovery, axial T2,  axial 2D MERGE    Sagittal and axial T1 postcontrast  IV Contrast:  13 mL of gadobutrol injection (MULTI-DOSE) IMAGE QUALITY:  Diagnostic  FINDINGS: ALIGNMENT:  Normal alignment of the thoracic spine  No compression fracture  No subluxation  No evidence of scoliosis  MARROW SIGNAL:  There are multiple marrow replacing lesions scattered within the thoracic spine  There are marrow replacing lesions within the posterior aspects of T4 and T6  Small lesion within the T5 articular facet  There is a marrow replacing lesion within the T3 vertebral body which is likely a hemangioma  There is a small marrow replacing lesion within the right T10 pedicle and articular facet extending into the spinous process consistent with metastasis, unchanged  THORACIC CORD:  Normal signal within the thoracic cord  PREVERTEBRAL AND PARASPINAL SOFT TISSUES:   Bilateral adrenal masses are identified suggestive of metastasis  There is a paraspinal mass identified within the right midlung field is unchanged from prior exam  THORACIC DEGENERATIVE CHANGE:  Minor thoracic degenerative change without disc herniation, canal stenosis or foraminal narrowing  No cord compression  POSTCONTRAST:  Heterogeneous enhancement of the T4, T6 osseous metastasis  Impression: Stable osseous metastatic disease primarily involving T4 and T10  Additional smaller posterior element metastasis are seen which appear stable  Stable paraspinal mass at the T4-5 level  Workstation performed: [id number]       I have personally reviewed pertinent reports

## 2019-09-16 ENCOUNTER — APPOINTMENT (OUTPATIENT)
Dept: LAB | Facility: MEDICAL CENTER | Age: 69
End: 2019-09-16
Payer: MEDICARE

## 2019-09-16 DIAGNOSIS — C64.9 METASTATIC RENAL CELL CARCINOMA, UNSPECIFIED LATERALITY (HCC): ICD-10-CM

## 2019-09-16 DIAGNOSIS — C79.51 BONE METASTASES (HCC): ICD-10-CM

## 2019-09-16 LAB
ALBUMIN SERPL BCP-MCNC: 3.4 G/DL (ref 3.5–5)
ALP SERPL-CCNC: 83 U/L (ref 46–116)
ALT SERPL W P-5'-P-CCNC: 12 U/L (ref 12–78)
ANION GAP SERPL CALCULATED.3IONS-SCNC: 8 MMOL/L (ref 4–13)
AST SERPL W P-5'-P-CCNC: 10 U/L (ref 5–45)
BASOPHILS # BLD AUTO: 0.02 THOUSANDS/ΜL (ref 0–0.1)
BASOPHILS NFR BLD AUTO: 0 % (ref 0–1)
BILIRUB SERPL-MCNC: 0.67 MG/DL (ref 0.2–1)
BUN SERPL-MCNC: 21 MG/DL (ref 5–25)
CALCIUM SERPL-MCNC: 10.1 MG/DL (ref 8.3–10.1)
CHLORIDE SERPL-SCNC: 110 MMOL/L (ref 100–108)
CO2 SERPL-SCNC: 26 MMOL/L (ref 21–32)
CREAT SERPL-MCNC: 1.5 MG/DL (ref 0.6–1.3)
EOSINOPHIL # BLD AUTO: 0.11 THOUSAND/ΜL (ref 0–0.61)
EOSINOPHIL NFR BLD AUTO: 1 % (ref 0–6)
ERYTHROCYTE [DISTWIDTH] IN BLOOD BY AUTOMATED COUNT: 16.2 % (ref 11.6–15.1)
GFR SERPL CREATININE-BSD FRML MDRD: 47 ML/MIN/1.73SQ M
GLUCOSE SERPL-MCNC: 94 MG/DL (ref 65–140)
HCT VFR BLD AUTO: 45.7 % (ref 36.5–49.3)
HGB BLD-MCNC: 13.9 G/DL (ref 12–17)
IMM GRANULOCYTES # BLD AUTO: 0.03 THOUSAND/UL (ref 0–0.2)
IMM GRANULOCYTES NFR BLD AUTO: 0 % (ref 0–2)
LYMPHOCYTES # BLD AUTO: 2.82 THOUSANDS/ΜL (ref 0.6–4.47)
LYMPHOCYTES NFR BLD AUTO: 25 % (ref 14–44)
MCH RBC QN AUTO: 27.9 PG (ref 26.8–34.3)
MCHC RBC AUTO-ENTMCNC: 30.4 G/DL (ref 31.4–37.4)
MCV RBC AUTO: 92 FL (ref 82–98)
MONOCYTES # BLD AUTO: 0.76 THOUSAND/ΜL (ref 0.17–1.22)
MONOCYTES NFR BLD AUTO: 7 % (ref 4–12)
NEUTROPHILS # BLD AUTO: 7.63 THOUSANDS/ΜL (ref 1.85–7.62)
NEUTS SEG NFR BLD AUTO: 67 % (ref 43–75)
NRBC BLD AUTO-RTO: 0 /100 WBCS
PLATELET # BLD AUTO: 248 THOUSANDS/UL (ref 149–390)
PMV BLD AUTO: 10.2 FL (ref 8.9–12.7)
POTASSIUM SERPL-SCNC: 3.8 MMOL/L (ref 3.5–5.3)
PROT SERPL-MCNC: 6.9 G/DL (ref 6.4–8.2)
RBC # BLD AUTO: 4.99 MILLION/UL (ref 3.88–5.62)
SODIUM SERPL-SCNC: 144 MMOL/L (ref 136–145)
WBC # BLD AUTO: 11.37 THOUSAND/UL (ref 4.31–10.16)

## 2019-09-16 PROCEDURE — 85025 COMPLETE CBC W/AUTO DIFF WBC: CPT | Performed by: INTERNAL MEDICINE

## 2019-09-16 PROCEDURE — 80053 COMPREHEN METABOLIC PANEL: CPT | Performed by: INTERNAL MEDICINE

## 2019-09-16 PROCEDURE — 36415 COLL VENOUS BLD VENIPUNCTURE: CPT | Performed by: INTERNAL MEDICINE

## 2019-09-17 DIAGNOSIS — C64.9 METASTATIC RENAL CELL CARCINOMA, UNSPECIFIED LATERALITY (HCC): Primary | ICD-10-CM

## 2019-09-20 ENCOUNTER — OFFICE VISIT (OUTPATIENT)
Dept: HEMATOLOGY ONCOLOGY | Facility: CLINIC | Age: 69
End: 2019-09-20
Payer: MEDICARE

## 2019-09-20 ENCOUNTER — HOSPITAL ENCOUNTER (OUTPATIENT)
Dept: INFUSION CENTER | Facility: CLINIC | Age: 69
Discharge: HOME | End: 2019-09-20
Payer: MEDICARE

## 2019-09-20 VITALS
BODY MASS INDEX: 40.82 KG/M2 | HEART RATE: 87 BPM | SYSTOLIC BLOOD PRESSURE: 132 MMHG | HEIGHT: 69 IN | DIASTOLIC BLOOD PRESSURE: 80 MMHG | RESPIRATION RATE: 16 BRPM | WEIGHT: 275.57 LBS | TEMPERATURE: 97.3 F

## 2019-09-20 VITALS
HEART RATE: 87 BPM | RESPIRATION RATE: 18 BRPM | TEMPERATURE: 97.6 F | HEIGHT: 69 IN | BODY MASS INDEX: 40.88 KG/M2 | SYSTOLIC BLOOD PRESSURE: 140 MMHG | DIASTOLIC BLOOD PRESSURE: 80 MMHG | OXYGEN SATURATION: 96 % | WEIGHT: 276 LBS

## 2019-09-20 DIAGNOSIS — C64.9 METASTATIC RENAL CELL CARCINOMA, UNSPECIFIED LATERALITY (HCC): Primary | ICD-10-CM

## 2019-09-20 DIAGNOSIS — C79.51 SPINE METASTASIS (HCC): ICD-10-CM

## 2019-09-20 DIAGNOSIS — R79.89 AZOTEMIA: ICD-10-CM

## 2019-09-20 DIAGNOSIS — Z90.79 HX OF PROSTATECTOMY: ICD-10-CM

## 2019-09-20 DIAGNOSIS — C79.51 BONE METASTASES (HCC): ICD-10-CM

## 2019-09-20 DIAGNOSIS — Z85.46 HISTORY OF PROSTATE CANCER: ICD-10-CM

## 2019-09-20 DIAGNOSIS — C78.00 MALIGNANT NEOPLASM METASTATIC TO LUNG, UNSPECIFIED LATERALITY (HCC): ICD-10-CM

## 2019-09-20 DIAGNOSIS — C64.2 CLEAR CELL ADENOCARCINOMA OF KIDNEY, LEFT (HCC): ICD-10-CM

## 2019-09-20 PROCEDURE — 96401 CHEMO ANTI-NEOPL SQ/IM: CPT

## 2019-09-20 PROCEDURE — 99214 OFFICE O/P EST MOD 30 MIN: CPT | Performed by: INTERNAL MEDICINE

## 2019-09-20 RX ORDER — TRAMADOL HYDROCHLORIDE 50 MG/1
50 TABLET ORAL EVERY 6 HOURS PRN
Qty: 60 TABLET | Refills: 2 | Status: SHIPPED | OUTPATIENT
Start: 2019-09-20 | End: 2020-01-24 | Stop reason: SDUPTHER

## 2019-09-20 RX ADMIN — DENOSUMAB 120 MG: 120 INJECTION SUBCUTANEOUS at 15:38

## 2019-09-20 NOTE — PROGRESS NOTES
Patient arrived on unit for Xgeva  Spoke with Thaddeus Dubon RN with Dr Mk Madison as patient was to be seen by Dr Mk Madison, as per order, prior to coming for injection  Thaddeus Dubon made Dr Mk Madison aware of above and cleared patient to get injection prior to physician visit  Patient denies any dental issues/procedures/pain  Creatinine clearance 60 7ml/min  Calcium- 10 1  Patient tolerated injection in [place]  Patient going up to see Dr Mk Madison now  Further infusion appointments to be scheduled after f/u visit with Dr Mk Madison

## 2019-09-20 NOTE — PROGRESS NOTES
9/20/2019    Pati Jimenez    His appetite has been fair  He has up to 2-3 bowel movements per day for which has been effectively controlled with 1 dose of loperamide 2 mg per day at most, typically he has 1 formed bowel movement per day  He has been using 2 doses of naproxen 220 mg per day and 1-2 doses of tramadol 50 mg per day for control of pain of the mid to lower back  Right hip pain has not recurred since he received a corticosteroid injection into the right hip in May 2019  He uses a cane to assist in balance when walking on on even surfaces, otherwise, he walks independently  Hematology/Oncology History:    1  Bilateral lung nodules or 1st noted July 2012 (not present on CT scan in 2009), 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 noted on wedge resection on November 18, 2013  Sutent 13 courses from March 22, 2015 until December 2016       Pathologic fracture to the mid right ulna on April 3, 2017 and then surgery with Dr Teresa Duran, orthopedic oncologist at [facility] on April 14, 2017 and then postoperative radiotherapy to the right ulna 3000 cGy in 10 fractions from May 17, 2017 to May 31, 2017       XRT to the right posterior ribs 2800 cGy in 7 fractions from July 26, 2017 to August 3, 2017       SBRT to the T6 metastatic lesion completed October 27, 2017       SBRT to the T4 paraspinal mass 3000 cGy in 5 fractions completed November 23, 2018       2  Left nephrectomy in August 2007 for renal cell carcinoma clear cell type, grade II-III, T1Nx        3  Prostate adenocarcinoma, Marion score 3+4=7, involving both lobes of the prostate gland, pT2c pN0 , no metastasis to three left pelvic lymph nodes       Current Therapy:      Axitinib initiated March 8, 2017, currently 5 mg b i d        Denosumab 120 mg SQ monthly initiated January 2018, decreased to 120 mg SQ every 8 weeks as of May 2019, most recent dose September 20, 2019      Review of systems:      He feels well  Jackelin Knight denies chills or sweats  He has chronic runny nose and sinus stuffiness bilaterally   He denies nose bleeds  He denies exertional chest pain     He denies cough or dyspnea     GI:  See HPI  Musculoskeletal:  See HPI    He denies rash     He denies headache   He has chronic numbness of the feet  He has chronic left footdrop  He denies emotional problems  Physical Examination:    Blood pressure 140/80, pulse 87, temperature 97 6 °F (36 4 °C), resp  rate 18, height 5' 9" (1 753 m), weight 125 kg (276 lb), SpO2 96 %  Body surface area is 2 37 meters squared  He appears well  EOMI   Oropharynx is clear   No lymphadenopathy of the neck  No axillary lymphadenopathy     Lungs are clear bilaterally     Heart has regular rate and rhythm  Liver and spleen are not palpable due to body habitus   No inguinal lymphadenopathy     There is trace distal lower extremity edema, left greater than right  There is vitiligo and dry scaly skin  Neurological is grossly intact other than left foot drop  Oriented x3, normal mood and affect      ECOG 1    Laboratory:    From September 16, 2019:  Creatinine 1 50, calcium 10 1, AST 10, ALT 12, alk-phos 83, bili 0 67, WBC 11 37, hemoglobin 13 9, platelets 633    MRI of the thoracic spine with and without contrast from September 6, 2019: There are stable osseous metastasis involving T4 and T10 and additional smaller posterior element metastasis which are stable compared to April 26, 2019, stable paraspinal mass at T4-5 level    Assessment/Plan:    Alex Rivera remains clinically asymptomatic of bilateral lung metastasis from metastatic clear-cell carcinoma of left kidney origin  Axitinib was initiated on March 8, 2017 for progression of lung nodules and destructive bone metastasis  Options for further management as discussed on September 19, 2018 were again reviewed    The patient prefers to remain on axitinib noting that the axitinib had been fairly well tolerated and overall metastatic disease progression has been modest  The plan is axitinib in 5 mg b i d  A follow-up noncontrast enhanced CT of the chest, abdomen pelvis in 8 weeks is planned to assess response to the axitinib      Calcium reabsorption inhibitor therapy is recommended to decrease risk of bone pain and fracture related to bone metastasis as well as to prevent recurrence hypercalcemia  Denosumab 120 mg subcutaneously is to be given today and then decreased to an every 8 week basis to reduce risk of long-term complications of this medication including osteonecrosis of jaw and atypical femoral fracture  In view of progressive azotemia a follow-up BMP is to obtain early next week and ultrasound of the kidneys and bladder to assess for potential obstructive uropathy      The patient is aware to seek medical attention for cough, shortness of breath, nosebleeds, easy bruising, reduced appetite, loose bowel movements, recurrence of posterior neck pain, right mid posterior chest pain, low back pain, paresthesias of the upper extremities, difficulty with ambulation, easy fatigue, or if other new problems arise     Otherwise, plan to see him again in 8 weeks  Today's office visit required 25 minutes, over 50% of the time utilized to review diagnostic tests, impressions and recommendations and to order denosumab therapy

## 2019-09-23 ENCOUNTER — APPOINTMENT (OUTPATIENT)
Dept: LAB | Facility: HOSPITAL | Age: 69
End: 2019-09-23
Attending: INTERNAL MEDICINE
Payer: MEDICARE

## 2019-09-23 ENCOUNTER — HOSPITAL ENCOUNTER (OUTPATIENT)
Dept: ULTRASOUND IMAGING | Facility: HOSPITAL | Age: 69
Discharge: HOME | End: 2019-09-23
Attending: INTERNAL MEDICINE
Payer: MEDICARE

## 2019-09-23 DIAGNOSIS — C64.9 METASTATIC RENAL CELL CARCINOMA, UNSPECIFIED LATERALITY (HCC): ICD-10-CM

## 2019-09-23 DIAGNOSIS — Z90.79 HX OF PROSTATECTOMY: ICD-10-CM

## 2019-09-23 DIAGNOSIS — R79.89 AZOTEMIA: ICD-10-CM

## 2019-09-23 LAB
ANION GAP SERPL CALCULATED.3IONS-SCNC: 9 MMOL/L (ref 4–13)
BUN SERPL-MCNC: 19 MG/DL (ref 5–25)
CALCIUM SERPL-MCNC: 9.3 MG/DL (ref 8.3–10.1)
CHLORIDE SERPL-SCNC: 106 MMOL/L (ref 100–108)
CO2 SERPL-SCNC: 27 MMOL/L (ref 21–32)
CREAT SERPL-MCNC: 1.2 MG/DL (ref 0.6–1.3)
GFR SERPL CREATININE-BSD FRML MDRD: 61 ML/MIN/1.73SQ M
GLUCOSE P FAST SERPL-MCNC: 72 MG/DL (ref 65–99)
POTASSIUM SERPL-SCNC: 3.9 MMOL/L (ref 3.5–5.3)
PSA SERPL-MCNC: <0.1 NG/ML (ref 0–4)
SODIUM SERPL-SCNC: 142 MMOL/L (ref 136–145)

## 2019-09-23 PROCEDURE — 84153 ASSAY OF PSA TOTAL: CPT

## 2019-09-23 PROCEDURE — 76770 US EXAM ABDO BACK WALL COMP: CPT

## 2019-09-23 PROCEDURE — 80048 BASIC METABOLIC PNL TOTAL CA: CPT

## 2019-09-23 PROCEDURE — 36415 COLL VENOUS BLD VENIPUNCTURE: CPT

## 2019-10-28 ENCOUNTER — APPOINTMENT (OUTPATIENT)
Dept: LAB | Facility: MEDICAL CENTER | Age: 69
End: 2019-10-28
Payer: MEDICARE

## 2019-10-28 DIAGNOSIS — C78.00 MALIGNANT NEOPLASM METASTATIC TO LUNG, UNSPECIFIED LATERALITY (HCC): ICD-10-CM

## 2019-10-28 DIAGNOSIS — C64.2 CLEAR CELL ADENOCARCINOMA OF KIDNEY, LEFT (HCC): ICD-10-CM

## 2019-10-28 DIAGNOSIS — C79.51 BONE METASTASES (HCC): ICD-10-CM

## 2019-10-28 DIAGNOSIS — C64.9 METASTATIC RENAL CELL CARCINOMA, UNSPECIFIED LATERALITY (HCC): ICD-10-CM

## 2019-10-28 DIAGNOSIS — C79.51 SPINE METASTASIS (HCC): ICD-10-CM

## 2019-10-28 LAB
ALBUMIN SERPL BCP-MCNC: 3.5 G/DL (ref 3.5–5)
ALP SERPL-CCNC: 82 U/L (ref 46–116)
ALT SERPL W P-5'-P-CCNC: 14 U/L (ref 12–78)
ANION GAP SERPL CALCULATED.3IONS-SCNC: 9 MMOL/L (ref 4–13)
AST SERPL W P-5'-P-CCNC: 16 U/L (ref 5–45)
BASOPHILS # BLD AUTO: 0.01 THOUSANDS/ΜL (ref 0–0.1)
BASOPHILS NFR BLD AUTO: 0 % (ref 0–1)
BILIRUB SERPL-MCNC: 0.82 MG/DL (ref 0.2–1)
BUN SERPL-MCNC: 18 MG/DL (ref 5–25)
CALCIUM SERPL-MCNC: 9.4 MG/DL (ref 8.3–10.1)
CHLORIDE SERPL-SCNC: 103 MMOL/L (ref 100–108)
CO2 SERPL-SCNC: 26 MMOL/L (ref 21–32)
CREAT SERPL-MCNC: 1.16 MG/DL (ref 0.6–1.3)
EOSINOPHIL # BLD AUTO: 0.11 THOUSAND/ΜL (ref 0–0.61)
EOSINOPHIL NFR BLD AUTO: 1 % (ref 0–6)
ERYTHROCYTE [DISTWIDTH] IN BLOOD BY AUTOMATED COUNT: 16.4 % (ref 11.6–15.1)
GFR SERPL CREATININE-BSD FRML MDRD: 63 ML/MIN/1.73SQ M
GLUCOSE P FAST SERPL-MCNC: 91 MG/DL (ref 65–99)
HCT VFR BLD AUTO: 52.5 % (ref 36.5–49.3)
HGB BLD-MCNC: 16.2 G/DL (ref 12–17)
IMM GRANULOCYTES # BLD AUTO: 0.02 THOUSAND/UL (ref 0–0.2)
IMM GRANULOCYTES NFR BLD AUTO: 0 % (ref 0–2)
LDH SERPL-CCNC: 184 U/L (ref 81–234)
LYMPHOCYTES # BLD AUTO: 2.85 THOUSANDS/ΜL (ref 0.6–4.47)
LYMPHOCYTES NFR BLD AUTO: 32 % (ref 14–44)
MCH RBC QN AUTO: 27.7 PG (ref 26.8–34.3)
MCHC RBC AUTO-ENTMCNC: 30.9 G/DL (ref 31.4–37.4)
MCV RBC AUTO: 90 FL (ref 82–98)
MONOCYTES # BLD AUTO: 0.59 THOUSAND/ΜL (ref 0.17–1.22)
MONOCYTES NFR BLD AUTO: 7 % (ref 4–12)
NEUTROPHILS # BLD AUTO: 5.32 THOUSANDS/ΜL (ref 1.85–7.62)
NEUTS SEG NFR BLD AUTO: 60 % (ref 43–75)
NRBC BLD AUTO-RTO: 0 /100 WBCS
PLATELET # BLD AUTO: 210 THOUSANDS/UL (ref 149–390)
PMV BLD AUTO: 10.4 FL (ref 8.9–12.7)
POTASSIUM SERPL-SCNC: 3.4 MMOL/L (ref 3.5–5.3)
PROT SERPL-MCNC: 7.1 G/DL (ref 6.4–8.2)
RBC # BLD AUTO: 5.84 MILLION/UL (ref 3.88–5.62)
SODIUM SERPL-SCNC: 138 MMOL/L (ref 136–145)
WBC # BLD AUTO: 8.9 THOUSAND/UL (ref 4.31–10.16)

## 2019-10-28 PROCEDURE — 85025 COMPLETE CBC W/AUTO DIFF WBC: CPT

## 2019-10-28 PROCEDURE — 83615 LACTATE (LD) (LDH) ENZYME: CPT

## 2019-10-28 PROCEDURE — 36415 COLL VENOUS BLD VENIPUNCTURE: CPT

## 2019-10-28 PROCEDURE — 80053 COMPREHEN METABOLIC PANEL: CPT

## 2019-10-29 ENCOUNTER — OFFICE VISIT (OUTPATIENT)
Dept: HEMATOLOGY ONCOLOGY | Facility: CLINIC | Age: 69
End: 2019-10-29
Payer: MEDICARE

## 2019-10-29 VITALS
DIASTOLIC BLOOD PRESSURE: 82 MMHG | SYSTOLIC BLOOD PRESSURE: 124 MMHG | TEMPERATURE: 97.1 F | WEIGHT: 278 LBS | HEART RATE: 81 BPM | HEIGHT: 69 IN | RESPIRATION RATE: 18 BRPM | BODY MASS INDEX: 41.18 KG/M2 | OXYGEN SATURATION: 97 %

## 2019-10-29 DIAGNOSIS — R79.89 AZOTEMIA: ICD-10-CM

## 2019-10-29 DIAGNOSIS — C64.9 METASTATIC RENAL CELL CARCINOMA, UNSPECIFIED LATERALITY (HCC): Primary | ICD-10-CM

## 2019-10-29 DIAGNOSIS — C79.51 BONE METASTASES (HCC): ICD-10-CM

## 2019-10-29 DIAGNOSIS — C79.51 SPINE METASTASIS (HCC): ICD-10-CM

## 2019-10-29 PROCEDURE — 99212 OFFICE O/P EST SF 10 MIN: CPT | Performed by: INTERNAL MEDICINE

## 2019-10-29 NOTE — PROGRESS NOTES
10/29/2019    Terrell sHubeltranvivek    His appetite has been fair  He has up to 2-3 bowel movements per day for which has been effectively controlled with 1 dose of loperamide 2 mg per day at most, typically he has 1 formed bowel movement per day  He has been using about 4 doses of tramadol 50 mg per day for control of pain of the mid to lower back  Naproxen has been discontinued in view of associated hypertension and azotemia  Right hip pain has not recurred since he received a corticosteroid injection into the right hip in May 2019  He uses a cane to assist in balance when walking on on even surfaces, otherwise, he walks independently  Hematology/Oncology History:    1  Bilateral lung nodules or 1st noted July 2012 (not present on CT scan in 2009), 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 noted on wedge resection on November 18, 2013  Sutent 13 courses from March 22, 2015 until December 2016       Pathologic fracture to the mid right ulna on April 3, 2017 and then surgery with Dr Watson Eubanks, orthopedic oncologist at [facility] on April 14, 2017 and then postoperative radiotherapy to the right ulna 3000 cGy in 10 fractions from May 17, 2017 to May 31, 2017       XRT to the right posterior ribs 2800 cGy in 7 fractions from July 26, 2017 to August 3, 2017       SBRT to the T6 metastatic lesion completed October 27, 2017       SBRT to the T4 paraspinal mass 3000 cGy in 5 fractions completed November 23, 2018       2  Left nephrectomy in August 2007 for renal cell carcinoma clear cell type, grade II-III, T1Nx        3   Prostate adenocarcinoma, Russ score 3+4=7, involving both lobes of the prostate gland, pT2c pN0 , no metastasis to three left pelvic lymph nodes        Current Therapy:      Axitinib initiated March 8, 2017, currently 5 mg b i d        Denosumab 120 mg SQ monthly initiated January 2018, decreased to 120 mg SQ every 8 weeks as of May 2019, most recent dose September     Physical Examination:    Blood pressure 124/82, pulse 81, temperature (!) 97 1 °F (36 2 °C), resp  rate 18, height 5' 9" (1 753 m), weight 126 kg (278 lb), SpO2 97 %  Body surface area is 2 38 meters squared  He appears well  EOMI   Oropharynx is clear   No lymphadenopathy of the neck  No axillary lymphadenopathy     Lungs are clear bilaterally     Heart has regular rate and rhythm  Liver and spleen are not palpable due to body habitus   No inguinal lymphadenopathy     There is trace distal lower extremity edema, left greater than right  There is vitiligo and dry scaly skin  Neurological is grossly intact other than left foot drop  Oriented x3, normal mood and affect      ECOG 1    Laboratory:    From September 16, 2019:  Creatinine 1 50    From September 23, 2019:  PSA is< 0 1, creatinine 1 2, calcium 9 3    From October 28, 2019:  WBC is 8 90, hemoglobin 16 2, platelets 791, creatinine 1 16, calcium 9 4, AST 16, ALT 14, alk-phos 82, bili 0 82,     Ultrasound of the kidney and bladder is from September 23, 2019:  Left kidney is surgically absent, there is a 6 2 cm cyst arising exophytically from the upper pole of the right kidney, no hydronephrosis, no calculi, no focal thickening or masses of the bladder    Assessment/Plan:    Jorge Perez remains clinically asymptomatic of bilateral lung metastasis from metastatic clear-cell carcinoma of left kidney origin   Axitinib was initiated on March 8, 2017 for progression of lung nodules and destructive bone metastasis   Options for further management as discussed on September 19, 2018 were again reviewed  Ysabel galvan on axitinib noting that the axitinib had been fairly well tolerated and overall metastatic disease progression has been modest  The plan is axitinib in 5 mg b i d  A follow-up noncontrast enhanced CT of the chest, abdomen pelvis on November 7, 2019 is planned to assess response to the axitinib      Calcium reabsorption inhibitor therapy is recommended to decrease risk of bone pain and fracture related to bone metastasis as well as to prevent recurrence hypercalcemia  Denosumab 120 mg subcutaneously is to be given on an every 8 week basis to reduce risk of long-term complications of this medication including osteonecrosis of jaw and atypical femoral fracture  The next dose of denosumab 120 mg subcutaneously is planned for November 15, 2019     Most likely transient azotemia noted on September 16, 2019 i e  creatinine 1 50 was related to contrast enhanced MRI of the spine on September 6, 2019  Fortunately, creatinine has returned to the baseline range of 1 1-1 2  Naproxen is to be avoided to reduce risk of associated azotemia      The patient is aware to seek medical attention for cough, shortness of breath, nosebleeds, easy bruising, reduced appetite, loose bowel movements, recurrence of posterior neck pain, right mid posterior chest pain, low back pain, paresthesias of the upper extremities, difficulty with ambulation, easy fatigue, or if other new problems arise     Otherwise, plan to see him again on November 15, 2019 as previously planned

## 2019-10-31 PROBLEM — R79.89 AZOTEMIA: Status: ACTIVE | Noted: 2019-10-31

## 2019-11-07 ENCOUNTER — HOSPITAL ENCOUNTER (OUTPATIENT)
Dept: CT IMAGING | Facility: HOSPITAL | Age: 69
Discharge: HOME | End: 2019-11-07
Attending: INTERNAL MEDICINE
Payer: MEDICARE

## 2019-11-07 DIAGNOSIS — C64.9 METASTATIC RENAL CELL CARCINOMA, UNSPECIFIED LATERALITY (HCC): ICD-10-CM

## 2019-11-07 DIAGNOSIS — C78.00 MALIGNANT NEOPLASM METASTATIC TO LUNG, UNSPECIFIED LATERALITY (HCC): ICD-10-CM

## 2019-11-07 DIAGNOSIS — C79.51 SPINE METASTASIS (HCC): ICD-10-CM

## 2019-11-07 PROCEDURE — 74176 CT ABD & PELVIS W/O CONTRAST: CPT

## 2019-11-07 PROCEDURE — 71250 CT THORAX DX C-: CPT

## 2019-11-12 DIAGNOSIS — C64.9 METASTATIC RENAL CELL CARCINOMA, UNSPECIFIED LATERALITY (HCC): Primary | ICD-10-CM

## 2019-11-15 ENCOUNTER — OFFICE VISIT (OUTPATIENT)
Dept: HEMATOLOGY ONCOLOGY | Facility: CLINIC | Age: 69
End: 2019-11-15
Payer: MEDICARE

## 2019-11-15 ENCOUNTER — HOSPITAL ENCOUNTER (OUTPATIENT)
Dept: INFUSION CENTER | Facility: CLINIC | Age: 69
Discharge: HOME | End: 2019-11-15
Payer: MEDICARE

## 2019-11-15 VITALS
SYSTOLIC BLOOD PRESSURE: 120 MMHG | HEART RATE: 75 BPM | WEIGHT: 279 LBS | DIASTOLIC BLOOD PRESSURE: 64 MMHG | RESPIRATION RATE: 16 BRPM | OXYGEN SATURATION: 98 % | TEMPERATURE: 97.5 F | HEIGHT: 69 IN | BODY MASS INDEX: 41.32 KG/M2

## 2019-11-15 DIAGNOSIS — C64.9 METASTATIC RENAL CELL CARCINOMA, UNSPECIFIED LATERALITY (HCC): Primary | ICD-10-CM

## 2019-11-15 DIAGNOSIS — C79.51 BONE METASTASES (HCC): ICD-10-CM

## 2019-11-15 DIAGNOSIS — C78.00 MALIGNANT NEOPLASM METASTATIC TO LUNG, UNSPECIFIED LATERALITY (HCC): ICD-10-CM

## 2019-11-15 PROCEDURE — 96401 CHEMO ANTI-NEOPL SQ/IM: CPT

## 2019-11-15 PROCEDURE — 99214 OFFICE O/P EST MOD 30 MIN: CPT | Performed by: INTERNAL MEDICINE

## 2019-11-15 RX ADMIN — DENOSUMAB 120 MG: 120 INJECTION SUBCUTANEOUS at 15:03

## 2019-11-15 NOTE — PROGRESS NOTES
Xgeva given as ordered  Pt offers no complaints  Pt is aware of all future appointments   Refused AVS

## 2019-11-15 NOTE — PLAN OF CARE
Problem: Potential for Falls  Goal: Patient will remain free of falls  Description  INTERVENTIONS:  - Assess patient frequently for physical needs  -  Identify cognitive and physical deficits and behaviors that affect risk of falls    -  Zanoni fall precautions as indicated by assessment   - Educate patient/family on patient safety including physical limitations  - Instruct patient to call for assistance with activity based on assessment  - Modify environment to reduce risk of injury  - Consider OT/PT consult to assist with strengthening/mobility  Outcome: Progressing

## 2019-11-15 NOTE — PROGRESS NOTES
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 noted on wedge resection on November 18, 2013  Sutent 13 courses from March 22, 2015 until December 2016       Pathologic fracture to the mid right ulna on April 3, 2017 and then surgery with Dr Allison Vargas, orthopedic oncologist at [facility] on April 14, 2017 and then postoperative radiotherapy to the right ulna 3000 cGy in 10 fractions from May 17, 2017 to May 31, 2017       XRT to the right posterior ribs 2800 cGy in 7 fractions from July 26, 2017 to August 3, 2017       SBRT to the T6 metastatic lesion completed October 27, 2017       SBRT to the T4 paraspinal mass 3000 cGy in 5 fractions completed November 23, 2018       2  Left nephrectomy in August 2007 for renal cell carcinoma clear cell type, grade II-III, T1Nx        3  Prostate adenocarcinoma, Russ score 3+4=7, involving both lobes of the prostate gland, pT2c pN0 , no metastasis to three left pelvic lymph nodes        Current Therapy:      Axitinib initiated March 8, 2017, currently 5 mg b i d        Denosumab 120 mg SQ monthly initiated January 2018, decreased to 120 mg SQ every 8 weeks as of May 2019, most recently September 20, 2019    Review of systems:      He feels well   He denies chills or sweats  He has chronic runny nose and sinus stuffiness bilaterally   He denies nose bleeds    He denies exertional chest pain     He denies cough or dyspnea     GI:  See HPI  Musculoskeletal:  See HPI    He denies rash     He denies headache   He has chronic numbness of the feet  He has chronic left footdrop  He denies emotional problems  Physical Examination:    Blood pressure 120/64, pulse 75, temperature 97 5 °F (36 4 °C), temperature source Tympanic, resp  rate 16, height 5' 9" (1 753 m), weight 127 kg (279 lb), SpO2 98 %  Body surface area is 2 38 meters squared  He appears well  EOMI   Oropharynx is clear   No lymphadenopathy of the neck  No axillary lymphadenopathy     Lungs are clear bilaterally     Heart has regular rate and rhythm  Liver and spleen are not palpable due to body habitus   No inguinal lymphadenopathy     There is trace distal lower extremity edema, left greater than right  There is vitiligo and dry scaly skin  Neurological is grossly intact other than left foot drop  Oriented x3, normal mood and affect      ECOG 1    Laboratory:    From October 28, 2019:  Creatinine 1 16, calcium 9 4, AST 16, ALT 14, alk-phos 82, bili 0 82, , WBC 8 90, hemoglobin 16 2, platelets 494  Noncontrast enhanced CT of the chest, abdomen and pelvis from November 7, 2019:  Some of the bilateral pulmonary nodules are stable, FLEX 1 1 x 1 4 cm, are LL 1 5 x 2 3 cm, while others have increased in size, FLEX 0 7 cm, previously 0 4 cm, and a new 4 mm RUL nodule, enlarging 1 5 cm soft tissue density of the inferior aspect of left nephrectomy and enlarging right adrenal nodule 1 6 x 2 7 cm, previously 1 2 x 1 7 cm, stable left adrenal 3 3 cm nodule, stable osseous metastasis involving right 7th and 8th ribs, T4, T6, and bilateral iliac bones    Assessment/Plan:    Corinna Dejesus remains clinically asymptomatic of bilateral lung metastasis from metastatic clear-cell carcinoma of left kidney origin  Axitinib was initiated on March 8, 2017 for progression of lung nodules and destructive bone metastasis   There has been gradually progressive disease in the lungs, right adrenal nodule, and soft tissue mass of the inferior aspect of left nephrectomy as documented on CT imaging of November 7, 2019  According to NCCN guidelines version 2 2020 for kidney cancer, subsequent therapy for clear histology includes cabozantinib (category 1), nivolumab (category , preferred), ipilimumab and nivolumab (category 2A, preferred), lenvatinib plus everolimus (category 1), axitinib plus pembrolizumab (category 2A), and other category 2A and category 3 options  Specifically, I have recommended either to change to cabozantinib or nivolumab or addition of pembrolizumab to axitinib  At this time the patient prefers to remain on single agent axitinib noting that the axitinib had been fairly well tolerated and overall metastatic disease progression has been modest  The plan is axitinib in 5 mg b i d       Calcium reabsorption inhibitor therapy is recommended to decrease risk of bone pain and fracture related to bone metastasis as well as to prevent recurrence hypercalcemia  Denosumab 120 mg subcutaneously is to be given today and continued every 8 week basis to reduce risk of long-term complications of this medication including osteonecrosis of jaw and atypical femoral fracture      The patient is aware to seek medical attention for cough, shortness of breath, nosebleeds, easy bruising, reduced appetite, loose bowel movements, recurrence of posterior neck pain, right mid posterior chest pain, low back pain, paresthesias of the upper extremities, difficulty with ambulation, easy fatigue, or if other new problems arise     Otherwise, plan to see him again in 8 weeks      Today's office visit required 25 minutes, over 50% of the time utilized to review diagnostic tests, impressions and recommendations and to order denosumab therapy      I spoke to patient's son Priscila Mckee by telephone [phone] on November 17, 2019 and reviewed diagnostic tests, impressions and recommendations as noted during the office visit of November 15, 2019  Paula Victoria noted that he would discuss the options for change in therapy and my recommendation to add pembrolizumab to axitinib with his father when he sees him this evening  He will call and let us know their decision

## 2019-11-20 ENCOUNTER — DOCUMENTATION (OUTPATIENT)
Dept: HEMATOLOGY ONCOLOGY | Facility: CLINIC | Age: 69
End: 2019-11-20

## 2019-11-20 NOTE — PROGRESS NOTES
RENEWAL OF FUNDS:      Patient enrolled in [facility] [place]     ID# [id number]  BIN# [id number]  N# [id number]  [facility]# [id number]    EFF 5-24-19  THRU 11-20-20    Email sent to group, Diplomat, patient notified

## 2020-01-04 NOTE — PATIENT INSTRUCTIONS
The patient and his son Eduard Johnson who was with him in the office today are aware to seek medical attention for cough, shortness of breath, nosebleeds, easy bruising, reduced appetite, loose bowel movements, recurrence of posterior neck pain, progressive right mid posterior chest pain, paresthesias of the upper extremities, difficulty with ambulation, easy fatigue, or if other new problems arise 
Non-intractable vomiting, presence of nausea not specified, unspecified vomiting type

## 2020-01-06 ENCOUNTER — HOSPITAL ENCOUNTER (OUTPATIENT)
Dept: RADIOLOGY | Facility: HOSPITAL | Age: 70
Discharge: HOME | End: 2020-01-06
Attending: NEUROLOGICAL SURGERY
Payer: MEDICARE

## 2020-01-06 ENCOUNTER — APPOINTMENT (OUTPATIENT)
Dept: LAB | Facility: MEDICAL CENTER | Age: 70
End: 2020-01-06
Payer: MEDICARE

## 2020-01-06 DIAGNOSIS — C79.51 SPINE METASTASIS (HCC): ICD-10-CM

## 2020-01-06 DIAGNOSIS — C64.9 METASTATIC RENAL CELL CARCINOMA, UNSPECIFIED LATERALITY (HCC): ICD-10-CM

## 2020-01-06 DIAGNOSIS — C79.51 BONE METASTASES (HCC): ICD-10-CM

## 2020-01-06 LAB
ALBUMIN SERPL BCP-MCNC: 2.9 G/DL (ref 3.5–5)
ALP SERPL-CCNC: 64 U/L (ref 46–116)
ALT SERPL W P-5'-P-CCNC: 13 U/L (ref 12–78)
ANION GAP SERPL CALCULATED.3IONS-SCNC: 5 MMOL/L (ref 4–13)
AST SERPL W P-5'-P-CCNC: 13 U/L (ref 5–45)
BASOPHILS # BLD AUTO: 0.02 THOUSANDS/ΜL (ref 0–0.1)
BASOPHILS NFR BLD AUTO: 0 % (ref 0–1)
BILIRUB SERPL-MCNC: 0.67 MG/DL (ref 0.2–1)
BUN SERPL-MCNC: 22 MG/DL (ref 5–25)
CALCIUM SERPL-MCNC: 9.4 MG/DL (ref 8.3–10.1)
CHLORIDE SERPL-SCNC: 107 MMOL/L (ref 100–108)
CO2 SERPL-SCNC: 28 MMOL/L (ref 21–32)
CREAT SERPL-MCNC: 1.29 MG/DL (ref 0.6–1.3)
EOSINOPHIL # BLD AUTO: 0.18 THOUSAND/ΜL (ref 0–0.61)
EOSINOPHIL NFR BLD AUTO: 2 % (ref 0–6)
ERYTHROCYTE [DISTWIDTH] IN BLOOD BY AUTOMATED COUNT: 16.5 % (ref 11.6–15.1)
GFR SERPL CREATININE-BSD FRML MDRD: 56 ML/MIN/1.73SQ M
GLUCOSE SERPL-MCNC: 99 MG/DL (ref 65–140)
HCT VFR BLD AUTO: 47.6 % (ref 36.5–49.3)
HGB BLD-MCNC: 14.4 G/DL (ref 12–17)
IMM GRANULOCYTES # BLD AUTO: 0.01 THOUSAND/UL (ref 0–0.2)
IMM GRANULOCYTES NFR BLD AUTO: 0 % (ref 0–2)
LYMPHOCYTES # BLD AUTO: 1.93 THOUSANDS/ΜL (ref 0.6–4.47)
LYMPHOCYTES NFR BLD AUTO: 23 % (ref 14–44)
MCH RBC QN AUTO: 27.1 PG (ref 26.8–34.3)
MCHC RBC AUTO-ENTMCNC: 30.3 G/DL (ref 31.4–37.4)
MCV RBC AUTO: 90 FL (ref 82–98)
MONOCYTES # BLD AUTO: 0.49 THOUSAND/ΜL (ref 0.17–1.22)
MONOCYTES NFR BLD AUTO: 6 % (ref 4–12)
NEUTROPHILS # BLD AUTO: 5.83 THOUSANDS/ΜL (ref 1.85–7.62)
NEUTS SEG NFR BLD AUTO: 69 % (ref 43–75)
NRBC BLD AUTO-RTO: 0 /100 WBCS
PLATELET # BLD AUTO: 242 THOUSANDS/UL (ref 149–390)
PMV BLD AUTO: 10.7 FL (ref 8.9–12.7)
POTASSIUM SERPL-SCNC: 3.9 MMOL/L (ref 3.5–5.3)
PROT SERPL-MCNC: 6.9 G/DL (ref 6.4–8.2)
RBC # BLD AUTO: 5.31 MILLION/UL (ref 3.88–5.62)
SODIUM SERPL-SCNC: 140 MMOL/L (ref 136–145)
WBC # BLD AUTO: 8.46 THOUSAND/UL (ref 4.31–10.16)

## 2020-01-06 PROCEDURE — 80053 COMPREHEN METABOLIC PANEL: CPT | Performed by: INTERNAL MEDICINE

## 2020-01-06 PROCEDURE — 85025 COMPLETE CBC W/AUTO DIFF WBC: CPT | Performed by: INTERNAL MEDICINE

## 2020-01-06 PROCEDURE — 36415 COLL VENOUS BLD VENIPUNCTURE: CPT | Performed by: INTERNAL MEDICINE

## 2020-01-06 PROCEDURE — A9585 GADOBUTROL INJECTION: HCPCS | Performed by: NEUROLOGICAL SURGERY

## 2020-01-06 PROCEDURE — 72157 MRI CHEST SPINE W/O & W/DYE: CPT

## 2020-01-06 RX ADMIN — GADOBUTROL 14 ML: 604.72 INJECTION INTRAVENOUS at 09:49

## 2020-01-08 ENCOUNTER — OFFICE VISIT (OUTPATIENT)
Dept: NEUROSURGERY | Facility: CLINIC | Age: 70
End: 2020-01-08
Payer: MEDICARE

## 2020-01-08 VITALS
WEIGHT: 277 LBS | HEIGHT: 69 IN | DIASTOLIC BLOOD PRESSURE: 76 MMHG | BODY MASS INDEX: 41.03 KG/M2 | SYSTOLIC BLOOD PRESSURE: 125 MMHG | TEMPERATURE: 97.7 F | HEART RATE: 83 BPM | RESPIRATION RATE: 16 BRPM

## 2020-01-08 DIAGNOSIS — C79.51 SPINE METASTASIS (HCC): Primary | ICD-10-CM

## 2020-01-08 DIAGNOSIS — C64.9 METASTATIC RENAL CELL CARCINOMA, UNSPECIFIED LATERALITY (HCC): ICD-10-CM

## 2020-01-08 PROCEDURE — 99214 OFFICE O/P EST MOD 30 MIN: CPT | Performed by: NEUROLOGICAL SURGERY

## 2020-01-08 NOTE — PROGRESS NOTES
Neurosurgery Office Note  Nick Poag 78 y o  male MRN: [id number]      Assessment/Plan      Diagnoses and all orders for this visit:    Spine metastasis [facility])    Metastatic renal cell carcinoma, unspecified laterality ([facility] [address] )    Other orders  -     Cancel: MRI thoracic spine with and without contrast; Future          Discussion:     78 y o  male with a history of RCC       S/p SBRT T6 lesion 10/2017, then SRBT to T10 lesion 7/2018, and most recently SBRT to T4 lesion 11/2018 for renal cell CA mets  All are stable on most recent MRI  01/2020  No new lesions  Suggest f/u per NCCN guidelines 'as clinically indicated'  I have discussed surveillance imaging with the patient, and we have agreed to utilize  His surveillance CT scans to monitor his spine, and less otherwise clinically indicated  As such, I will see him next after his next staging CT scan  This has not yet been scheduled  As such, other the patient will contact us once that is scheduled, or our Neuro-Oncology purse navigator will monitor and schedule him to see us once that study is completed  Metrics: [id number]; VAS 80, KPS , ECOG 0-1  CHIEF COMPLAINT    Chief Complaint   Patient presents with    Follow-up     4 month follow up with new MRI T-spine       HISTORY    History of Present Illness     78y o  year old male     HPI    See Discussion    REVIEW OF SYSTEMS    Review of Systems   Constitutional: Positive for fatigue (a little bit better)  HENT: Negative  Eyes: Negative  Respiratory: Negative  Cardiovascular: Negative  Gastrointestinal: Positive for diarrhea  Endocrine: Negative  Genitourinary:        Nocturia 1-3x, depending on water intake   Musculoskeletal: Positive for back pain (waist to mid) and gait problem (using standard cane, left drop foot, unsteady balance)  Skin:        Psoriasis   Allergic/Immunologic: Negative  Neurological: Positive for numbness (B/L fingertips and feet)  Hematological: Negative  Psychiatric/Behavioral: Negative  Meds/Allergies     Current Outpatient Medications   Medication Sig Dispense Refill    amLODIPine (NORVASC) 5 mg tablet Take 5 mg by mouth daily        axitinib 1 MG TABS Take 5 tablets (5 mg total) by mouth 2 (two) times a day 300 tablet 1    cholecalciferol (VITAMIN D3) 1,000 units tablet Take 1,000 Units by mouth daily      gabapentin (NEURONTIN) 100 mg capsule Take 300 mg by mouth 3 (three) times a day       lisinopril-hydrochlorothiazide (PRINZIDE,ZESTORETIC) 20-12 5 MG per tablet Take 1 tablet by mouth 2 (two) times a day        loperamide (IMODIUM) 2 mg capsule Take 2 mg by mouth 4 (four) times a day as needed for diarrhea      naproxen sodium (ALEVE) 220 MG tablet Take 220 mg by mouth as needed for mild pain      traMADol (ULTRAM) 50 mg tablet Take 1 tablet (50 mg total) by mouth every 6 (six) hours as needed for moderate pain 60 tablet 2    trolamine salicylate (ASPERCREME) 10 % cream Apply 1 application topically as needed for muscle/joint pain      diphenhydrAMINE (BENADRYL) 25 mg tablet Take 25 mg by mouth daily at bedtime as needed for itching       No current facility-administered medications for this visit          No Known Allergies    PAST HISTORY    Past Medical History:   Diagnosis Date    Arthritis     Cancer ([facility] [address] )     prostate - mets to bone and lung    History of radiation therapy 2018    SBRT to T10 -2018    Hyperlipidemia     Hypertension        Past Surgical History:   Procedure Laterality Date    DISTAL ULNA EXCISION Right     excision of tumor and orif with cement and screws    JOINT REPLACEMENT Bilateral     tkr's    LUNG SURGERY Right     exc of nodules    NEPHRECTOMY Left        Social History     Tobacco Use    Smoking status: Former Smoker     Packs/day: 1 00     Types: Cigarettes     Last attempt to quit: 3/28/2003     Years since quittin 7    Smokeless tobacco: Never Used Substance Use Topics    Alcohol use: Yes     Comment: occasional use    Drug use: No       Family History   Problem Relation Age of Onset    No Known Problems Mother     No Known Problems Father          The following portions of the patient's history were reviewed in this encounter and updated as appropriate: Past medical, surgical, family, and social history, as well as medications, allergies, and review of systems  EXAM    Vitals:Blood pressure 125/76, pulse 83, temperature 97 7 °F (36 5 °C), temperature source Tympanic, resp  rate 16, height 5' 9" (1 753 m), weight 126 kg (277 lb)  ,Body mass index is 40 91 kg/m²  Physical Exam   Constitutional: He is oriented to person, place, and time  He appears well-developed  HENT:   Head: Normocephalic and atraumatic  Eyes: No scleral icterus  Neck: Neck supple  Cardiovascular: Normal rate  Pulmonary/Chest: Effort normal    Abdominal: Normal appearance  Neurological: He is alert and oriented to person, place, and time  No sensory deficit  Skin: Skin is warm and dry  Psychiatric: He has a normal mood and affect  His speech is normal and behavior is normal    Vitals reviewed  Neurologic Exam     Mental Status   Oriented to person, place, and time  Attention: normal    Speech: speech is normal   Level of consciousness: alert    Cranial Nerves     CN VII   Facial expression full, symmetric  Motor Exam   Muscle bulk: normal  Overall muscle tone: normal  Moves all extremities, grossly normal     Gait, Coordination, and Reflexes     Tremor   Resting tremor: absent  Intention tremor: absent  Action tremor: absent    Standard cane         MEDICAL DECISION MAKING    Imaging Studies:     Mri Thoracic Spine With And Without Contrast    Result Date: 1/7/2020  Narrative: MRI THORACIC SPINE WITH AND WITHOUT CONTRAST INDICATION: C79 51: Secondary malignant neoplasm of bone   COMPARISON:  MR 9/6/2019 TECHNIQUE:  Sagittal T1, sagittal T2, sagittal inversion recovery, axial T2,  axial 2D MERGE  Sagittal and axial T1 postcontrast  IV Contrast:  14 mL of gadobutrol injection (MULTI-DOSE) IMAGE QUALITY:  Diagnostic  FINDINGS: ALIGNMENT:  Normal alignment of the thoracic spine  No compression fracture  No subluxation  No evidence of scoliosis  MARROW SIGNAL:  Osseous metastases reidentified at the T4 and T6 level  No evidence for pathologic fracture  There is also posterior element disease on the right at T10 affecting the transverse process and to a lesser degree posterior pedicle  Far lateral paraspinal mass not included on this exam, present at the T7-T8 level  The paraspinal lesion at the T4 level may be slightly smaller  THORACIC CORD:  Normal signal within the thoracic cord  Conus terminates at the L1-L2 level, no evidence for intraspinal disease  PREVERTEBRAL AND PARASPINAL SOFT TISSUES:   Stable bilateral adrenal masses  Incompletely evaluated the right renal masses consistent with cysts  THORACIC DEGENERATIVE CHANGE:  No compressive disc herniation, canal stenosis or foraminal narrowing  No compressive degenerative changes  POSTCONTRAST:  Heterogeneous enhancement of the T4, T6 and T10 metastases  Impression: Stable MR appearance of the thoracic spine  Metastases to the bodies of T4, T6, and T10 right transverse process  Right T4 level paraspinal mass  Known Posterior right T7-T8 level rib mass  Workstation performed: [id number]       I have personally reviewed pertinent reports     and I have personally reviewed pertinent films in PACS

## 2020-01-10 ENCOUNTER — HOSPITAL ENCOUNTER (OUTPATIENT)
Dept: INFUSION CENTER | Facility: CLINIC | Age: 70
Discharge: HOME | End: 2020-01-10
Payer: MEDICARE

## 2020-01-10 VITALS
WEIGHT: 277.34 LBS | RESPIRATION RATE: 18 BRPM | HEART RATE: 98 BPM | DIASTOLIC BLOOD PRESSURE: 66 MMHG | SYSTOLIC BLOOD PRESSURE: 107 MMHG | TEMPERATURE: 98.3 F | BODY MASS INDEX: 41.08 KG/M2 | HEIGHT: 69 IN

## 2020-01-10 DIAGNOSIS — C79.51 BONE METASTASES (HCC): Primary | ICD-10-CM

## 2020-01-10 DIAGNOSIS — C64.9 METASTATIC RENAL CELL CARCINOMA, UNSPECIFIED LATERALITY (HCC): ICD-10-CM

## 2020-01-10 PROCEDURE — 96401 CHEMO ANTI-NEOPL SQ/IM: CPT

## 2020-01-10 RX ADMIN — DENOSUMAB 120 MG: 120 INJECTION SUBCUTANEOUS at 14:15

## 2020-01-10 NOTE — PROGRESS NOTES
Patient arrived to the unit and denies dental complications  Patient's Ca level is 9 4 and his crcl is 1 29  Patient tolerated his xgeva injection in his left arm well without complications

## 2020-01-10 NOTE — PLAN OF CARE
Problem: SAFETY ADULT  Goal: Patient will remain free of falls  Description  INTERVENTIONS:  - Assess patient frequently for physical needs  -  Identify cognitive and physical deficits and behaviors that affect risk of falls    -  Forbes fall precautions as indicated by assessment   - Educate patient/family on patient safety including physical limitations  - Instruct patient to call for assistance with activity based on assessment  - Modify environment to reduce risk of injury  - Consider OT/PT consult to assist with strengthening/mobility  Outcome: Progressing

## 2020-01-24 ENCOUNTER — TELEPHONE (OUTPATIENT)
Dept: HEMATOLOGY ONCOLOGY | Facility: CLINIC | Age: 70
End: 2020-01-24

## 2020-01-24 ENCOUNTER — OFFICE VISIT (OUTPATIENT)
Dept: HEMATOLOGY ONCOLOGY | Facility: CLINIC | Age: 70
End: 2020-01-24
Payer: MEDICARE

## 2020-01-24 VITALS
DIASTOLIC BLOOD PRESSURE: 72 MMHG | OXYGEN SATURATION: 95 % | BODY MASS INDEX: 45.48 KG/M2 | SYSTOLIC BLOOD PRESSURE: 122 MMHG | RESPIRATION RATE: 18 BRPM | HEART RATE: 77 BPM | HEIGHT: 66 IN | TEMPERATURE: 97.2 F | WEIGHT: 283 LBS

## 2020-01-24 DIAGNOSIS — C78.00 MALIGNANT NEOPLASM METASTATIC TO LUNG, UNSPECIFIED LATERALITY (HCC): ICD-10-CM

## 2020-01-24 DIAGNOSIS — C79.51 SPINE METASTASIS (HCC): ICD-10-CM

## 2020-01-24 DIAGNOSIS — C64.2 CLEAR CELL ADENOCARCINOMA OF KIDNEY, LEFT (HCC): Primary | ICD-10-CM

## 2020-01-24 DIAGNOSIS — C79.51 BONE METASTASES (HCC): ICD-10-CM

## 2020-01-24 PROCEDURE — 99214 OFFICE O/P EST MOD 30 MIN: CPT | Performed by: INTERNAL MEDICINE

## 2020-01-24 RX ORDER — SODIUM CHLORIDE 9 MG/ML
20 INJECTION, SOLUTION INTRAVENOUS ONCE
Status: CANCELLED | OUTPATIENT
Start: 2020-01-31

## 2020-01-24 RX ORDER — TRAMADOL HYDROCHLORIDE 50 MG/1
50 TABLET ORAL EVERY 6 HOURS PRN
Qty: 60 TABLET | Refills: 3 | Status: SHIPPED | OUTPATIENT
Start: 2020-01-24 | End: 2020-06-01 | Stop reason: SDUPTHER

## 2020-01-24 NOTE — TELEPHONE ENCOUNTER
Patient called stating he needs a referral faxed to Anisa Travis at [facility]  Office phone # is [phone]   Patient did not have fax #

## 2020-01-24 NOTE — PROGRESS NOTES
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 noted on wedge resection on November 18, 2013  Sutent 13 courses from March 22, 2015 until December 2016       Pathologic fracture to the mid right ulna on April 3, 2017 and then surgery with Dr Sean Romero, orthopedic oncologist at [facility] on April 14, 2017 and then postoperative radiotherapy to the right ulna 3000 cGy in 10 fractions from May 17, 2017 to May 31, 2017       XRT to the right posterior ribs 2800 cGy in 7 fractions from July 26, 2017 to August 3, 2017       SBRT to the T6 metastatic lesion completed October 27, 2017       SBRT to the T4 paraspinal mass 3000 cGy in 5 fractions completed November 23, 2018       2  Left nephrectomy in August 2007 for renal cell carcinoma clear cell type, grade II-III, T1Nx        3  Prostate adenocarcinoma, Russ score 3+4=7, involving both lobes of the prostate gland, pT2c pN0 , no metastasis to three left pelvic lymph nodes        Current Therapy:      Axitinib initiated March 8, 2017, currently 5 mg b i d        Denosumab 120 mg SQ monthly initiated January 2018, decreased to 120 mg SQ every 8 weeks as of May 2019, most recently January 10, 2020  Review of systems:      He feels well   He denies chills or sweats  He has chronic runny nose and sinus stuffiness bilaterally   He denies nose bleeds    He denies exertional chest pain     He denies cough or dyspnea     GI:  See HPI  Musculoskeletal:  See HPI    He denies rash     He denies headache   He has chronic numbness of the feet  He has chronic left footdrop  He denies emotional problems  Physical Examination:    Blood pressure 122/72, pulse 77, temperature (!) 97 2 °F (36 2 °C), resp  rate 18, height 5' 6" (1 676 m), weight 128 kg (283 lb), SpO2 95 %  Body surface area is 2 32 meters squared  He appears well  EOMI   Oropharynx is clear   No lymphadenopathy of the neck  No axillary lymphadenopathy     Lungs are clear bilaterally     Heart has regular rate and rhythm  Liver and spleen are not palpable due to body habitus   No inguinal lymphadenopathy     There is trace distal lower extremity edema, left greater than right  There is vitiligo and dry scaly skin  There is a 1 cm nodular non pigmented lesion of the left volar area concerning for a skin tumor  Neurological is grossly intact other than left foot drop  Oriented x3, normal mood and affect      ECOG 1    Laboratory:    From January 6, 2020:  Creatinine is 1 29, calcium 9 4, AST 13, ALT 13, alk-phos 64, bili 0 67, WBC 8 46, hemoglobin 14 4, platelets 702    MRI of the thoracic spine with and without contrast from January 6, 2020:  Metastasis to T4, T6 bodies and T10 right transverse process are stable and right T4 paraspinal mass is stable and posterior right T7-T8 rib mass is stable  Assessment/Plan:    Mele Port remains clinically asymptomatic of bilateral lung metastasis from metastatic clear-cell carcinoma of left kidney origin  Axitinib was initiated on March 8, 2017 for progression of lung nodules and destructive bone metastasis  There has been gradually progressive disease in the lungs, right adrenal nodule, and soft tissue mass of the inferior aspect of left nephrectomy as documented on CT imaging of November 7, 2019   According to NCCN guidelines version 2 2020 for kidney cancer, subsequent therapy for clear histology includes cabozantinib (category 1), nivolumab (category , preferred), ipilimumab and nivolumab (category 2A, preferred), lenvatinib plus everolimus (category 1), axitinib plus pembrolizumab (category 2A), and other category 2A and category 3 options  Specifically, I have recommended either to change to cabozantinib or nivolumab or addition of pembrolizumab to axitinib  The patient is in favor of addition of pembrolizumab to the axitinib  The purpose, means extraction potential risk of pembrolizumab and axitinib were reviewed  The patient was given information regarding these agents  Written informed consent was obtained  The plan is pembrolizumab 200 mg IV every 3 weeks in addition to axitinib 5 mg b i d   Treatment is to begin in the near future      Calcium reabsorption inhibitor therapy is recommended to decrease risk of bone pain and fracture related to bone metastasis as well as to prevent recurrence hypercalcemia  Denosumab 120 mg subcutaneously was last given January 10, 2020 and is to be  continued every 8 week basis  The patient is to be evaluated by Dr Anisa Sanon in regards to the left hand volar area skin lesion      The patient and his son Silvestre Tristan and his daughter-in-law who were with him in the office today are aware to seek medical attention for cough, shortness of breath, nosebleeds, easy bruising, reduced appetite, loose bowel movements especially 3 or more loose bowel movements in 1 day, rash, recurrence of posterior neck pain, right mid posterior chest pain, low back pain, paresthesias of the upper extremities, difficulty with ambulation, easy fatigue, or if other new problems arise     Otherwise, plan to see him again in 8 weeks      Today's office visit required 25 minutes, over 50% of the time utilized to review diagnostic tests, impressions and recommendations and to order pembrolizumab treatment and denosumab therapy

## 2020-01-28 ENCOUNTER — TELEPHONE (OUTPATIENT)
Dept: HEMATOLOGY ONCOLOGY | Facility: CLINIC | Age: 70
End: 2020-01-28

## 2020-01-28 DIAGNOSIS — C64.2 CLEAR CELL ADENOCARCINOMA OF KIDNEY, LEFT (HCC): Primary | ICD-10-CM

## 2020-01-28 NOTE — TELEPHONE ENCOUNTER
Patient called requesting a call from [facility]  He stated that he would like to know if he will need blood work prior to his 1/31 1:00pm infusion treatment  Best call back [phone]

## 2020-01-28 NOTE — TELEPHONE ENCOUNTER
Called pt--CBC, CMP, LD, TSH to be drawn prior to first infusion (Keytruda) due 1 31 20  Pt  Carlos Stewart understanding information above

## 2020-01-29 ENCOUNTER — TELEPHONE (OUTPATIENT)
Dept: INFUSION CENTER | Facility: HOSPITAL | Age: 70
End: 2020-01-29

## 2020-01-29 ENCOUNTER — APPOINTMENT (OUTPATIENT)
Dept: LAB | Facility: MEDICAL CENTER | Age: 70
End: 2020-01-29
Payer: MEDICARE

## 2020-01-29 DIAGNOSIS — C78.00 MALIGNANT NEOPLASM METASTATIC TO LUNG, UNSPECIFIED LATERALITY (HCC): ICD-10-CM

## 2020-01-29 DIAGNOSIS — C79.51 SPINE METASTASIS (HCC): ICD-10-CM

## 2020-01-29 DIAGNOSIS — C64.2 CLEAR CELL ADENOCARCINOMA OF KIDNEY, LEFT (HCC): ICD-10-CM

## 2020-01-29 DIAGNOSIS — C64.2 CLEAR CELL ADENOCARCINOMA OF KIDNEY, LEFT (HCC): Primary | ICD-10-CM

## 2020-01-29 DIAGNOSIS — C79.51 BONE METASTASES (HCC): ICD-10-CM

## 2020-01-29 LAB
LDH SERPL-CCNC: 167 U/L (ref 81–234)
TSH SERPL DL<=0.05 MIU/L-ACNC: 1.21 UIU/ML (ref 0.36–3.74)

## 2020-01-29 PROCEDURE — 84443 ASSAY THYROID STIM HORMONE: CPT

## 2020-01-29 PROCEDURE — 83615 LACTATE (LD) (LDH) ENZYME: CPT

## 2020-01-31 ENCOUNTER — HOSPITAL ENCOUNTER (OUTPATIENT)
Dept: INFUSION CENTER | Facility: CLINIC | Age: 70
Discharge: HOME | End: 2020-01-31
Payer: MEDICARE

## 2020-01-31 VITALS
HEART RATE: 89 BPM | HEIGHT: 66 IN | WEIGHT: 280.31 LBS | BODY MASS INDEX: 45.05 KG/M2 | SYSTOLIC BLOOD PRESSURE: 130 MMHG | RESPIRATION RATE: 18 BRPM | TEMPERATURE: 97.2 F | DIASTOLIC BLOOD PRESSURE: 74 MMHG

## 2020-01-31 DIAGNOSIS — C79.51 SPINE METASTASIS (HCC): ICD-10-CM

## 2020-01-31 DIAGNOSIS — C78.00 MALIGNANT NEOPLASM METASTATIC TO LUNG, UNSPECIFIED LATERALITY (HCC): ICD-10-CM

## 2020-01-31 DIAGNOSIS — C79.51 BONE METASTASES (HCC): Primary | ICD-10-CM

## 2020-01-31 DIAGNOSIS — C64.2 CLEAR CELL ADENOCARCINOMA OF KIDNEY, LEFT (HCC): ICD-10-CM

## 2020-01-31 PROCEDURE — 96413 CHEMO IV INFUSION 1 HR: CPT

## 2020-01-31 RX ORDER — SODIUM CHLORIDE 9 MG/ML
20 INJECTION, SOLUTION INTRAVENOUS ONCE
Status: COMPLETED | OUTPATIENT
Start: 2020-01-31 | End: 2020-01-31

## 2020-01-31 RX ADMIN — SODIUM CHLORIDE 200 MG: 9 INJECTION, SOLUTION INTRAVENOUS at 14:03

## 2020-01-31 RX ADMIN — SODIUM CHLORIDE 20 ML/HR: 0.9 INJECTION, SOLUTION INTRAVENOUS at 13:30

## 2020-01-31 NOTE — PROGRESS NOTES
Pt  Denied new symptoms or concerns today  Labs reviewed  Consent obtained from office  Keytruda tolerated  Well without adverse event  Post instructions provided  Pt  And son verbalized understanding  AVS reviewed and given for future reference

## 2020-02-18 DIAGNOSIS — C79.51 BONE METASTASES (HCC): ICD-10-CM

## 2020-02-18 DIAGNOSIS — C64.2 CLEAR CELL ADENOCARCINOMA OF KIDNEY, LEFT (HCC): Primary | ICD-10-CM

## 2020-02-18 DIAGNOSIS — C78.00 MALIGNANT NEOPLASM METASTATIC TO LUNG, UNSPECIFIED LATERALITY (HCC): ICD-10-CM

## 2020-02-18 DIAGNOSIS — C79.51 SPINE METASTASIS (HCC): ICD-10-CM

## 2020-02-18 RX ORDER — SODIUM CHLORIDE 9 MG/ML
20 INJECTION, SOLUTION INTRAVENOUS ONCE
Status: CANCELLED | OUTPATIENT
Start: 2020-02-21

## 2020-02-19 ENCOUNTER — APPOINTMENT (OUTPATIENT)
Dept: LAB | Facility: MEDICAL CENTER | Age: 70
End: 2020-02-19
Payer: MEDICARE

## 2020-02-19 DIAGNOSIS — C78.00 MALIGNANT NEOPLASM METASTATIC TO LUNG, UNSPECIFIED LATERALITY (HCC): ICD-10-CM

## 2020-02-19 DIAGNOSIS — C79.51 SPINE METASTASIS (HCC): ICD-10-CM

## 2020-02-19 DIAGNOSIS — C79.51 BONE METASTASES (HCC): ICD-10-CM

## 2020-02-19 DIAGNOSIS — C64.2 CLEAR CELL ADENOCARCINOMA OF KIDNEY, LEFT (HCC): ICD-10-CM

## 2020-02-19 LAB
ALBUMIN SERPL BCP-MCNC: 3.3 G/DL (ref 3.5–5)
ALP SERPL-CCNC: 75 U/L (ref 46–116)
ALT SERPL W P-5'-P-CCNC: 14 U/L (ref 12–78)
ANION GAP SERPL CALCULATED.3IONS-SCNC: 5 MMOL/L (ref 4–13)
AST SERPL W P-5'-P-CCNC: 14 U/L (ref 5–45)
BASOPHILS # BLD AUTO: 0.02 THOUSANDS/ΜL (ref 0–0.1)
BASOPHILS NFR BLD AUTO: 0 % (ref 0–1)
BILIRUB SERPL-MCNC: 0.74 MG/DL (ref 0.2–1)
BUN SERPL-MCNC: 22 MG/DL (ref 5–25)
CALCIUM SERPL-MCNC: 9.6 MG/DL (ref 8.3–10.1)
CHLORIDE SERPL-SCNC: 108 MMOL/L (ref 100–108)
CO2 SERPL-SCNC: 30 MMOL/L (ref 21–32)
CREAT SERPL-MCNC: 1.21 MG/DL (ref 0.6–1.3)
EOSINOPHIL # BLD AUTO: 0.15 THOUSAND/ΜL (ref 0–0.61)
EOSINOPHIL NFR BLD AUTO: 2 % (ref 0–6)
ERYTHROCYTE [DISTWIDTH] IN BLOOD BY AUTOMATED COUNT: 16 % (ref 11.6–15.1)
GFR SERPL CREATININE-BSD FRML MDRD: 60 ML/MIN/1.73SQ M
GLUCOSE P FAST SERPL-MCNC: 63 MG/DL (ref 65–99)
HCT VFR BLD AUTO: 48.1 % (ref 36.5–49.3)
HGB BLD-MCNC: 14.7 G/DL (ref 12–17)
IMM GRANULOCYTES # BLD AUTO: 0.01 THOUSAND/UL (ref 0–0.2)
IMM GRANULOCYTES NFR BLD AUTO: 0 % (ref 0–2)
LYMPHOCYTES # BLD AUTO: 2.54 THOUSANDS/ΜL (ref 0.6–4.47)
LYMPHOCYTES NFR BLD AUTO: 32 % (ref 14–44)
MCH RBC QN AUTO: 27 PG (ref 26.8–34.3)
MCHC RBC AUTO-ENTMCNC: 30.6 G/DL (ref 31.4–37.4)
MCV RBC AUTO: 88 FL (ref 82–98)
MONOCYTES # BLD AUTO: 0.63 THOUSAND/ΜL (ref 0.17–1.22)
MONOCYTES NFR BLD AUTO: 8 % (ref 4–12)
NEUTROPHILS # BLD AUTO: 4.49 THOUSANDS/ΜL (ref 1.85–7.62)
NEUTS SEG NFR BLD AUTO: 58 % (ref 43–75)
NRBC BLD AUTO-RTO: 0 /100 WBCS
PLATELET # BLD AUTO: 229 THOUSANDS/UL (ref 149–390)
PMV BLD AUTO: 10.2 FL (ref 8.9–12.7)
POTASSIUM SERPL-SCNC: 3.8 MMOL/L (ref 3.5–5.3)
PROT SERPL-MCNC: 7.5 G/DL (ref 6.4–8.2)
RBC # BLD AUTO: 5.45 MILLION/UL (ref 3.88–5.62)
SODIUM SERPL-SCNC: 143 MMOL/L (ref 136–145)
TSH SERPL DL<=0.05 MIU/L-ACNC: 1.93 UIU/ML (ref 0.36–3.74)
WBC # BLD AUTO: 7.84 THOUSAND/UL (ref 4.31–10.16)

## 2020-02-19 PROCEDURE — 36415 COLL VENOUS BLD VENIPUNCTURE: CPT

## 2020-02-19 PROCEDURE — 80053 COMPREHEN METABOLIC PANEL: CPT

## 2020-02-19 PROCEDURE — 85025 COMPLETE CBC W/AUTO DIFF WBC: CPT

## 2020-02-19 PROCEDURE — 84443 ASSAY THYROID STIM HORMONE: CPT

## 2020-02-21 ENCOUNTER — HOSPITAL ENCOUNTER (OUTPATIENT)
Dept: INFUSION CENTER | Facility: CLINIC | Age: 70
Discharge: HOME | End: 2020-02-21
Payer: MEDICARE

## 2020-02-21 VITALS
RESPIRATION RATE: 20 BRPM | TEMPERATURE: 97.5 F | SYSTOLIC BLOOD PRESSURE: 148 MMHG | HEART RATE: 93 BPM | DIASTOLIC BLOOD PRESSURE: 91 MMHG

## 2020-02-21 DIAGNOSIS — C79.51 BONE METASTASES (HCC): Primary | ICD-10-CM

## 2020-02-21 DIAGNOSIS — C78.00 MALIGNANT NEOPLASM METASTATIC TO LUNG, UNSPECIFIED LATERALITY (HCC): ICD-10-CM

## 2020-02-21 DIAGNOSIS — C64.2 CLEAR CELL ADENOCARCINOMA OF KIDNEY, LEFT (HCC): ICD-10-CM

## 2020-02-21 DIAGNOSIS — C79.51 SPINE METASTASIS (HCC): ICD-10-CM

## 2020-02-21 PROCEDURE — 96413 CHEMO IV INFUSION 1 HR: CPT

## 2020-02-21 RX ORDER — SODIUM CHLORIDE 9 MG/ML
20 INJECTION, SOLUTION INTRAVENOUS ONCE
Status: COMPLETED | OUTPATIENT
Start: 2020-02-21 | End: 2020-02-21

## 2020-02-21 RX ADMIN — SODIUM CHLORIDE 20 ML/HR: 9 INJECTION, SOLUTION INTRAVENOUS at 13:22

## 2020-02-21 RX ADMIN — SODIUM CHLORIDE 200 MG: 9 INJECTION, SOLUTION INTRAVENOUS at 13:52

## 2020-03-11 ENCOUNTER — APPOINTMENT (OUTPATIENT)
Dept: LAB | Facility: MEDICAL CENTER | Age: 70
End: 2020-03-11
Payer: MEDICARE

## 2020-03-11 DIAGNOSIS — C78.00 MALIGNANT NEOPLASM METASTATIC TO LUNG, UNSPECIFIED LATERALITY (HCC): ICD-10-CM

## 2020-03-11 LAB
ALBUMIN SERPL BCP-MCNC: 3.4 G/DL (ref 3.5–5)
ALP SERPL-CCNC: 72 U/L (ref 46–116)
ALT SERPL W P-5'-P-CCNC: 32 U/L (ref 12–78)
ANION GAP SERPL CALCULATED.3IONS-SCNC: 6 MMOL/L (ref 4–13)
AST SERPL W P-5'-P-CCNC: 64 U/L (ref 5–45)
BASOPHILS # BLD AUTO: 0.02 THOUSANDS/ΜL (ref 0–0.1)
BASOPHILS NFR BLD AUTO: 0 % (ref 0–1)
BILIRUB SERPL-MCNC: 0.86 MG/DL (ref 0.2–1)
BUN SERPL-MCNC: 18 MG/DL (ref 5–25)
CALCIUM SERPL-MCNC: 9.5 MG/DL (ref 8.3–10.1)
CHLORIDE SERPL-SCNC: 110 MMOL/L (ref 100–108)
CO2 SERPL-SCNC: 28 MMOL/L (ref 21–32)
CREAT SERPL-MCNC: 1.19 MG/DL (ref 0.6–1.3)
EOSINOPHIL # BLD AUTO: 0.16 THOUSAND/ΜL (ref 0–0.61)
EOSINOPHIL NFR BLD AUTO: 2 % (ref 0–6)
ERYTHROCYTE [DISTWIDTH] IN BLOOD BY AUTOMATED COUNT: 16.2 % (ref 11.6–15.1)
GFR SERPL CREATININE-BSD FRML MDRD: 62 ML/MIN/1.73SQ M
GLUCOSE P FAST SERPL-MCNC: 124 MG/DL (ref 65–99)
HCT VFR BLD AUTO: 46.7 % (ref 36.5–49.3)
HGB BLD-MCNC: 14.4 G/DL (ref 12–17)
IMM GRANULOCYTES # BLD AUTO: 0.01 THOUSAND/UL (ref 0–0.2)
IMM GRANULOCYTES NFR BLD AUTO: 0 % (ref 0–2)
LYMPHOCYTES # BLD AUTO: 2.27 THOUSANDS/ΜL (ref 0.6–4.47)
LYMPHOCYTES NFR BLD AUTO: 27 % (ref 14–44)
MCH RBC QN AUTO: 27.6 PG (ref 26.8–34.3)
MCHC RBC AUTO-ENTMCNC: 30.8 G/DL (ref 31.4–37.4)
MCV RBC AUTO: 90 FL (ref 82–98)
MONOCYTES # BLD AUTO: 0.53 THOUSAND/ΜL (ref 0.17–1.22)
MONOCYTES NFR BLD AUTO: 6 % (ref 4–12)
NEUTROPHILS # BLD AUTO: 5.43 THOUSANDS/ΜL (ref 1.85–7.62)
NEUTS SEG NFR BLD AUTO: 65 % (ref 43–75)
NRBC BLD AUTO-RTO: 0 /100 WBCS
PLATELET # BLD AUTO: 209 THOUSANDS/UL (ref 149–390)
PMV BLD AUTO: 10.5 FL (ref 8.9–12.7)
POTASSIUM SERPL-SCNC: 3.7 MMOL/L (ref 3.5–5.3)
PROT SERPL-MCNC: 6.6 G/DL (ref 6.4–8.2)
RBC # BLD AUTO: 5.22 MILLION/UL (ref 3.88–5.62)
SODIUM SERPL-SCNC: 144 MMOL/L (ref 136–145)
WBC # BLD AUTO: 8.42 THOUSAND/UL (ref 4.31–10.16)

## 2020-03-11 PROCEDURE — 36415 COLL VENOUS BLD VENIPUNCTURE: CPT

## 2020-03-11 PROCEDURE — 80053 COMPREHEN METABOLIC PANEL: CPT

## 2020-03-11 PROCEDURE — 85025 COMPLETE CBC W/AUTO DIFF WBC: CPT

## 2020-03-12 DIAGNOSIS — C78.00 MALIGNANT NEOPLASM METASTATIC TO LUNG, UNSPECIFIED LATERALITY (HCC): ICD-10-CM

## 2020-03-12 DIAGNOSIS — C79.51 BONE METASTASES (HCC): ICD-10-CM

## 2020-03-12 DIAGNOSIS — C64.2 CLEAR CELL ADENOCARCINOMA OF KIDNEY, LEFT (HCC): ICD-10-CM

## 2020-03-12 DIAGNOSIS — C79.51 SPINE METASTASIS (HCC): ICD-10-CM

## 2020-03-12 RX ORDER — SODIUM CHLORIDE 9 MG/ML
20 INJECTION, SOLUTION INTRAVENOUS ONCE
Status: CANCELLED | OUTPATIENT
Start: 2020-03-13

## 2020-03-13 ENCOUNTER — OFFICE VISIT (OUTPATIENT)
Dept: HEMATOLOGY ONCOLOGY | Facility: CLINIC | Age: 70
End: 2020-03-13
Payer: MEDICARE

## 2020-03-13 ENCOUNTER — HOSPITAL ENCOUNTER (OUTPATIENT)
Dept: INFUSION CENTER | Facility: CLINIC | Age: 70
Discharge: HOME | End: 2020-03-13
Payer: MEDICARE

## 2020-03-13 VITALS
SYSTOLIC BLOOD PRESSURE: 112 MMHG | HEART RATE: 91 BPM | TEMPERATURE: 97.2 F | RESPIRATION RATE: 16 BRPM | DIASTOLIC BLOOD PRESSURE: 80 MMHG | WEIGHT: 280 LBS | OXYGEN SATURATION: 97 % | HEIGHT: 66 IN | BODY MASS INDEX: 45 KG/M2

## 2020-03-13 VITALS
HEART RATE: 91 BPM | BODY MASS INDEX: 45.33 KG/M2 | WEIGHT: 280.87 LBS | RESPIRATION RATE: 18 BRPM | SYSTOLIC BLOOD PRESSURE: 112 MMHG | DIASTOLIC BLOOD PRESSURE: 80 MMHG | TEMPERATURE: 97.2 F

## 2020-03-13 DIAGNOSIS — C79.51 BONE METASTASES (HCC): ICD-10-CM

## 2020-03-13 DIAGNOSIS — C78.00 MALIGNANT NEOPLASM METASTATIC TO LUNG, UNSPECIFIED LATERALITY (HCC): ICD-10-CM

## 2020-03-13 DIAGNOSIS — C79.51 BONE METASTASES (HCC): Primary | ICD-10-CM

## 2020-03-13 DIAGNOSIS — C64.2 CLEAR CELL ADENOCARCINOMA OF KIDNEY, LEFT (HCC): ICD-10-CM

## 2020-03-13 DIAGNOSIS — C79.51 SPINE METASTASIS (HCC): ICD-10-CM

## 2020-03-13 DIAGNOSIS — C64.9 METASTATIC RENAL CELL CARCINOMA, UNSPECIFIED LATERALITY (HCC): ICD-10-CM

## 2020-03-13 LAB — TSH SERPL DL<=0.05 MIU/L-ACNC: 1.69 UIU/ML (ref 0.36–3.74)

## 2020-03-13 PROCEDURE — 99215 OFFICE O/P EST HI 40 MIN: CPT | Performed by: INTERNAL MEDICINE

## 2020-03-13 PROCEDURE — 96413 CHEMO IV INFUSION 1 HR: CPT

## 2020-03-13 PROCEDURE — 84443 ASSAY THYROID STIM HORMONE: CPT | Performed by: INTERNAL MEDICINE

## 2020-03-13 PROCEDURE — 96401 CHEMO ANTI-NEOPL SQ/IM: CPT

## 2020-03-13 RX ORDER — SODIUM CHLORIDE 9 MG/ML
20 INJECTION, SOLUTION INTRAVENOUS ONCE
Status: CANCELLED | OUTPATIENT
Start: 2020-04-03

## 2020-03-13 RX ORDER — SODIUM CHLORIDE 9 MG/ML
20 INJECTION, SOLUTION INTRAVENOUS ONCE
Status: CANCELLED | OUTPATIENT
Start: 2020-04-23

## 2020-03-13 RX ORDER — SODIUM CHLORIDE 9 MG/ML
20 INJECTION, SOLUTION INTRAVENOUS ONCE
Status: COMPLETED | OUTPATIENT
Start: 2020-03-13 | End: 2020-03-13

## 2020-03-13 RX ADMIN — DENOSUMAB 120 MG: 120 INJECTION SUBCUTANEOUS at 15:24

## 2020-03-13 RX ADMIN — SODIUM CHLORIDE 200 MG: 9 INJECTION, SOLUTION INTRAVENOUS at 14:37

## 2020-03-13 RX ADMIN — SODIUM CHLORIDE 20 ML/HR: 0.9 INJECTION, SOLUTION INTRAVENOUS at 14:24

## 2020-03-13 NOTE — PROGRESS NOTES
Patient arrived on unit after seeing Dr Jesus Skelton  Denies any present discomforts  Cleared for treatment today  Tolerated Keytruda  Patient denies any dental issues/pain/upcoming procedures  Tolerated Xgeva in [address]  Aware  of next appointment   AVS given to patient

## 2020-03-13 NOTE — PROGRESS NOTES
Hematology / Oncology Outpatient Follow Up Note    Tatum Arias 78 y o  male :1950 DNR:[id number]         Date:  3/13/2020    Assessment / Plan:  A 76-year-old gentleman who was diagnosed with localized renal cell carcinoma in 2007  He developed metastatic disease in   He was previously on sunitinib  Since 2016, he has been on axitinib on which he developed progressive disease in 2019  Therefore, Dr Mark Jeffrey added pembrolizumab  He has no autoimmune related toxicity  It is yet to be determined further tumor response  We discussed the further management of metastatic renal cell carcinoma  Since his disease progressed on axitinib, I think it is reasonable to discontinue  He is in agreement  I recommended him to continue with pembrolizumab monotherapy every 3 weeks  I am going to schedule CT scan of chest abdomen pelvis for disease evaluation in 6 weeks followed by office visit  We also discussed natural course of metastatic renal cell carcinoma which is treatable but not curable condition  He has full , and clear understanding on this  All the patient and his son's questions were answered to their satisfaction  Subjective:     HPI:          Interval History:  A 76-year-old gentleman who has history of left nephrectomy for renal cell carcinoma in 2007  He was well until  when he was found to have lung metastasis  He was watched until 2015 when he started sunitinib until 2016  Since then, he has been on axitinib which he is still on  In 2019, CT scan showed progression of tumor in the renal bed as well as some mixed response was lung metastasis  Therefore, Dr Mark Jeffrey at added pembrolizumab  He presents today for 3rd cycle of pembrolizumab  He has chronic diarrhea on axitinib  He has no or immune related toxicity from pembrolizumab  He has no respiratory symptoms    Has chronic neuropathy even before they pembrolizumab was started  He is maintaining his weight  He has chronic low back pain which is well controlled  His performance status is 0 to 1/4 on the ECOG scale  Objective:     Primary Diagnosis:    1  Localized renal cell carcinoma T1 NX M0 grade 2-3 diagnosed in August 2007  2  Metastatic renal cell carcinoma, diagnosed in 2013  Cancer Staging:  Cancer Staging  No matching staging information was found for the patient  Previous Hematologic/ Oncologic Treatment:     1  Sunitinib from March 2015 through December 2016  Current Hematologic/ Oncologic Treatment:      1  Axitinib since December 2016      2  Pembrolizumab since November 2019  Disease Status:     Not evaluated at this time  Test Results:    Pathology:        Radiology:    CT scan of chest abdomen pelvis in November 2019 showed mixed response of lung metastasis  Progression of tumor at left renal bed  Laboratory:    See below  Normal TSH  Physical Exam:      General Appearance:    Alert, oriented        Eyes:    PERRL   Ears:    Normal external ear canals, both ears   Nose:   Nares normal, septum midline   Throat:   Mucosa moist  Pharynx without injection  Neck:   Supple       Lungs:     Clear to auscultation bilaterally   Chest Wall:    No tenderness or deformity    Heart:    Regular rate and rhythm       Abdomen:     Soft, non-tender, bowel sounds +, no organomegaly           Extremities:   Extremities no cyanosis or edema       Skin:   no rash or icterus  Lymph nodes:   Cervical, supraclavicular, and axillary nodes normal   Neurologic:   CNII-XII intact, normal strength, sensation and reflexes     Throughout          Breast exam:   NA         ROS: Review of Systems   Gastrointestinal:        Diarrhea   Neurological:        Neuropathy   All other systems reviewed and are negative  Imaging: No results found        Labs:   Lab Results   Component Value Date    WBC 8 42 03/11/2020    HGB 14 4 03/11/2020    HCT 46 7 03/11/2020    MCV 90 03/11/2020     03/11/2020     Lab Results   Component Value Date     12/10/2015    K 3 7 03/11/2020     (H) 03/11/2020    CO2 28 03/11/2020    ANIONGAP 7 12/10/2015    BUN 18 03/11/2020    CREATININE 1 19 03/11/2020    GLUCOSE 102 12/10/2015    GLUF 124 (H) 03/11/2020    CALCIUM 9 5 03/11/2020    CORRECTEDCA 10 8 (H) 08/22/2018    AST 64 (H) 03/11/2020    ALT 32 03/11/2020    ALKPHOS 72 03/11/2020    PROT 6 9 12/10/2015    BILITOT 0 71 12/10/2015    EGFR 62 03/11/2020         Lab Results   Component Value Date     01/29/2020       Lab Results   Component Value Date    PSA <0 1 09/23/2019    PSA <0 1 12/22/2017    PSA <0 1 12/10/2015       Current Medications: Reviewed  Allergies: Reviewed  PMH/FH/SH:  Reviewed      Vital Sign:    Body surface area is 2 31 meters squared      Wt Readings from Last 3 Encounters:   03/13/20 127 kg (280 lb)   01/31/20 127 kg (280 lb 5 oz)   01/24/20 128 kg (283 lb)        Temp Readings from Last 3 Encounters:   03/13/20 (!) 97 2 °F (36 2 °C) (Tympanic)   02/21/20 97 5 °F (36 4 °C) (Tympanic)   01/31/20 (!) 97 2 °F (36 2 °C) (Tympanic)        BP Readings from Last 3 Encounters:   03/13/20 112/80   02/21/20 148/91   01/31/20 130/74         Pulse Readings from Last 3 Encounters:   03/13/20 91   02/21/20 93   01/31/20 89     @LASTSAO2(3)@

## 2020-03-16 ENCOUNTER — TELEPHONE (OUTPATIENT)
Dept: NEUROSURGERY | Facility: CLINIC | Age: 70
End: 2020-03-16

## 2020-03-16 NOTE — TELEPHONE ENCOUNTER
Dr Minnie Maxwell requested to see this patient after the CT C/A/P is completed  Patient is scheduled for the CT C/A/P on 4/20/20  Called patient  Scheduled him for a follow up at [facility] on 4/22/20  Patient was very appreciative

## 2020-03-30 ENCOUNTER — DOCUMENTATION (OUTPATIENT)
Dept: HEMATOLOGY ONCOLOGY | Facility: CLINIC | Age: 70
End: 2020-03-30

## 2020-03-30 NOTE — PROGRESS NOTES
Second fax to [facility] was discontinued on 3 13 20/OV-Dr Carr  Transfer of care Dr Milo Essex to Dr Cassie Walsh appt

## 2020-04-01 ENCOUNTER — APPOINTMENT (OUTPATIENT)
Dept: LAB | Facility: MEDICAL CENTER | Age: 70
End: 2020-04-01
Payer: MEDICARE

## 2020-04-01 DIAGNOSIS — C79.51 BONE METASTASES (HCC): ICD-10-CM

## 2020-04-01 DIAGNOSIS — C64.2 CLEAR CELL ADENOCARCINOMA OF KIDNEY, LEFT (HCC): ICD-10-CM

## 2020-04-01 DIAGNOSIS — C79.51 SPINE METASTASIS (HCC): ICD-10-CM

## 2020-04-01 DIAGNOSIS — C78.00 MALIGNANT NEOPLASM METASTATIC TO LUNG, UNSPECIFIED LATERALITY (HCC): ICD-10-CM

## 2020-04-01 LAB
ALBUMIN SERPL BCP-MCNC: 3.3 G/DL (ref 3.5–5)
ALP SERPL-CCNC: 79 U/L (ref 46–116)
ALT SERPL W P-5'-P-CCNC: 62 U/L (ref 12–78)
ANION GAP SERPL CALCULATED.3IONS-SCNC: 3 MMOL/L (ref 4–13)
AST SERPL W P-5'-P-CCNC: 68 U/L (ref 5–45)
BASOPHILS # BLD AUTO: 0.03 THOUSANDS/ΜL (ref 0–0.1)
BASOPHILS NFR BLD AUTO: 0 % (ref 0–1)
BILIRUB SERPL-MCNC: 0.87 MG/DL (ref 0.2–1)
BUN SERPL-MCNC: 24 MG/DL (ref 5–25)
CALCIUM SERPL-MCNC: 9.9 MG/DL (ref 8.3–10.1)
CHLORIDE SERPL-SCNC: 106 MMOL/L (ref 100–108)
CO2 SERPL-SCNC: 30 MMOL/L (ref 21–32)
CREAT SERPL-MCNC: 1.13 MG/DL (ref 0.6–1.3)
EOSINOPHIL # BLD AUTO: 0.07 THOUSAND/ΜL (ref 0–0.61)
EOSINOPHIL NFR BLD AUTO: 1 % (ref 0–6)
ERYTHROCYTE [DISTWIDTH] IN BLOOD BY AUTOMATED COUNT: 15.8 % (ref 11.6–15.1)
GFR SERPL CREATININE-BSD FRML MDRD: 65 ML/MIN/1.73SQ M
GLUCOSE SERPL-MCNC: 96 MG/DL (ref 65–140)
HCT VFR BLD AUTO: 51.8 % (ref 36.5–49.3)
HGB BLD-MCNC: 15.9 G/DL (ref 12–17)
IMM GRANULOCYTES # BLD AUTO: 0.02 THOUSAND/UL (ref 0–0.2)
IMM GRANULOCYTES NFR BLD AUTO: 0 % (ref 0–2)
LYMPHOCYTES # BLD AUTO: 2.89 THOUSANDS/ΜL (ref 0.6–4.47)
LYMPHOCYTES NFR BLD AUTO: 34 % (ref 14–44)
MCH RBC QN AUTO: 27.1 PG (ref 26.8–34.3)
MCHC RBC AUTO-ENTMCNC: 30.7 G/DL (ref 31.4–37.4)
MCV RBC AUTO: 88 FL (ref 82–98)
MONOCYTES # BLD AUTO: 0.61 THOUSAND/ΜL (ref 0.17–1.22)
MONOCYTES NFR BLD AUTO: 7 % (ref 4–12)
NEUTROPHILS # BLD AUTO: 4.83 THOUSANDS/ΜL (ref 1.85–7.62)
NEUTS SEG NFR BLD AUTO: 58 % (ref 43–75)
NRBC BLD AUTO-RTO: 0 /100 WBCS
PLATELET # BLD AUTO: 262 THOUSANDS/UL (ref 149–390)
PMV BLD AUTO: 10.3 FL (ref 8.9–12.7)
POTASSIUM SERPL-SCNC: 4 MMOL/L (ref 3.5–5.3)
PROT SERPL-MCNC: 7.6 G/DL (ref 6.4–8.2)
RBC # BLD AUTO: 5.86 MILLION/UL (ref 3.88–5.62)
SODIUM SERPL-SCNC: 139 MMOL/L (ref 136–145)
TSH SERPL DL<=0.05 MIU/L-ACNC: 1.34 UIU/ML (ref 0.36–3.74)
WBC # BLD AUTO: 8.45 THOUSAND/UL (ref 4.31–10.16)

## 2020-04-01 PROCEDURE — 85025 COMPLETE CBC W/AUTO DIFF WBC: CPT

## 2020-04-01 PROCEDURE — 36415 COLL VENOUS BLD VENIPUNCTURE: CPT

## 2020-04-01 PROCEDURE — 80053 COMPREHEN METABOLIC PANEL: CPT

## 2020-04-01 PROCEDURE — 84443 ASSAY THYROID STIM HORMONE: CPT

## 2020-04-03 ENCOUNTER — HOSPITAL ENCOUNTER (OUTPATIENT)
Dept: INFUSION CENTER | Facility: CLINIC | Age: 70
Discharge: HOME | End: 2020-04-03
Payer: MEDICARE

## 2020-04-03 VITALS
HEART RATE: 86 BPM | RESPIRATION RATE: 18 BRPM | TEMPERATURE: 98.3 F | DIASTOLIC BLOOD PRESSURE: 85 MMHG | SYSTOLIC BLOOD PRESSURE: 135 MMHG

## 2020-04-03 DIAGNOSIS — C64.2 CLEAR CELL ADENOCARCINOMA OF KIDNEY, LEFT (HCC): ICD-10-CM

## 2020-04-03 DIAGNOSIS — C79.51 SPINE METASTASIS (HCC): ICD-10-CM

## 2020-04-03 DIAGNOSIS — C78.00 MALIGNANT NEOPLASM METASTATIC TO LUNG, UNSPECIFIED LATERALITY (HCC): ICD-10-CM

## 2020-04-03 DIAGNOSIS — C79.51 BONE METASTASES (HCC): Primary | ICD-10-CM

## 2020-04-03 PROCEDURE — 96413 CHEMO IV INFUSION 1 HR: CPT

## 2020-04-03 RX ORDER — SODIUM CHLORIDE 9 MG/ML
20 INJECTION, SOLUTION INTRAVENOUS ONCE
Status: COMPLETED | OUTPATIENT
Start: 2020-04-03 | End: 2020-04-03

## 2020-04-03 RX ADMIN — SODIUM CHLORIDE 20 ML/HR: 0.9 INJECTION, SOLUTION INTRAVENOUS at 11:20

## 2020-04-03 RX ADMIN — SODIUM CHLORIDE 200 MG: 9 INJECTION, SOLUTION INTRAVENOUS at 11:21

## 2020-04-16 ENCOUNTER — TELEPHONE (OUTPATIENT)
Dept: HEMATOLOGY ONCOLOGY | Facility: CLINIC | Age: 70
End: 2020-04-16

## 2020-04-20 ENCOUNTER — HOSPITAL ENCOUNTER (OUTPATIENT)
Dept: CT IMAGING | Facility: HOSPITAL | Age: 70
Discharge: HOME | End: 2020-04-20
Attending: INTERNAL MEDICINE
Payer: MEDICARE

## 2020-04-20 DIAGNOSIS — C79.51 SPINE METASTASIS (HCC): ICD-10-CM

## 2020-04-20 DIAGNOSIS — C78.00 MALIGNANT NEOPLASM METASTATIC TO LUNG, UNSPECIFIED LATERALITY (HCC): ICD-10-CM

## 2020-04-20 DIAGNOSIS — C79.51 BONE METASTASES (HCC): ICD-10-CM

## 2020-04-20 DIAGNOSIS — C64.2 CLEAR CELL ADENOCARCINOMA OF KIDNEY, LEFT (HCC): ICD-10-CM

## 2020-04-20 PROCEDURE — 74176 CT ABD & PELVIS W/O CONTRAST: CPT

## 2020-04-20 PROCEDURE — 71250 CT THORAX DX C-: CPT

## 2020-04-21 ENCOUNTER — APPOINTMENT (OUTPATIENT)
Dept: LAB | Facility: MEDICAL CENTER | Age: 70
End: 2020-04-21
Payer: MEDICARE

## 2020-04-21 DIAGNOSIS — C78.00 MALIGNANT NEOPLASM METASTATIC TO LUNG, UNSPECIFIED LATERALITY (HCC): ICD-10-CM

## 2020-04-21 DIAGNOSIS — C79.51 BONE METASTASES (HCC): ICD-10-CM

## 2020-04-21 DIAGNOSIS — C64.2 CLEAR CELL ADENOCARCINOMA OF KIDNEY, LEFT (HCC): ICD-10-CM

## 2020-04-21 DIAGNOSIS — C79.51 SPINE METASTASIS (HCC): ICD-10-CM

## 2020-04-21 PROCEDURE — 36415 COLL VENOUS BLD VENIPUNCTURE: CPT

## 2020-04-22 ENCOUNTER — TRANSCRIBE ORDERS (OUTPATIENT)
Dept: LAB | Facility: HOSPITAL | Age: 70
End: 2020-04-22

## 2020-04-22 ENCOUNTER — TELEMEDICINE (OUTPATIENT)
Dept: RADIATION ONCOLOGY | Facility: HOSPITAL | Age: 70
End: 2020-04-22
Attending: RADIOLOGY
Payer: MEDICARE

## 2020-04-22 VITALS — BODY MASS INDEX: 45.33 KG/M2 | HEIGHT: 66 IN

## 2020-04-22 DIAGNOSIS — C79.51 SPINE METASTASIS (HCC): ICD-10-CM

## 2020-04-22 DIAGNOSIS — C78.00 MALIGNANT NEOPLASM METASTATIC TO LUNG, UNSPECIFIED LATERALITY (HCC): Primary | ICD-10-CM

## 2020-04-22 DIAGNOSIS — C79.51 BONE METASTASES (HCC): Primary | ICD-10-CM

## 2020-04-22 DIAGNOSIS — C64.2 CLEAR CELL ADENOCARCINOMA OF KIDNEY, LEFT (HCC): ICD-10-CM

## 2020-04-22 DIAGNOSIS — C79.51 BONE METASTASES (HCC): ICD-10-CM

## 2020-04-22 PROCEDURE — 99211 OFF/OP EST MAY X REQ PHY/QHP: CPT | Performed by: RADIOLOGY

## 2020-04-23 ENCOUNTER — OFFICE VISIT (OUTPATIENT)
Dept: HEMATOLOGY ONCOLOGY | Facility: CLINIC | Age: 70
End: 2020-04-23
Payer: MEDICARE

## 2020-04-23 ENCOUNTER — HOSPITAL ENCOUNTER (OUTPATIENT)
Dept: INFUSION CENTER | Facility: CLINIC | Age: 70
Discharge: HOME | End: 2020-04-23

## 2020-04-23 ENCOUNTER — DOCUMENTATION (OUTPATIENT)
Dept: HEMATOLOGY ONCOLOGY | Facility: CLINIC | Age: 70
End: 2020-04-23

## 2020-04-23 VITALS
WEIGHT: 275 LBS | HEIGHT: 66 IN | OXYGEN SATURATION: 93 % | TEMPERATURE: 97.2 F | DIASTOLIC BLOOD PRESSURE: 70 MMHG | HEART RATE: 106 BPM | RESPIRATION RATE: 18 BRPM | BODY MASS INDEX: 44.2 KG/M2 | SYSTOLIC BLOOD PRESSURE: 138 MMHG

## 2020-04-23 DIAGNOSIS — C64.2 RENAL CELL CARCINOMA OF LEFT KIDNEY METASTATIC TO OTHER SITE (HCC): ICD-10-CM

## 2020-04-23 DIAGNOSIS — C79.51 BONE METASTASES (HCC): ICD-10-CM

## 2020-04-23 DIAGNOSIS — C78.00 MALIGNANT NEOPLASM METASTATIC TO LUNG, UNSPECIFIED LATERALITY (HCC): Primary | ICD-10-CM

## 2020-04-23 PROCEDURE — 99215 OFFICE O/P EST HI 40 MIN: CPT | Performed by: INTERNAL MEDICINE

## 2020-04-24 ENCOUNTER — HOSPITAL ENCOUNTER (OUTPATIENT)
Dept: INFUSION CENTER | Facility: CLINIC | Age: 70
End: 2020-04-24

## 2020-04-27 ENCOUNTER — DOCUMENTATION (OUTPATIENT)
Dept: HEMATOLOGY ONCOLOGY | Facility: CLINIC | Age: 70
End: 2020-04-27

## 2020-05-29 ENCOUNTER — APPOINTMENT (OUTPATIENT)
Dept: LAB | Facility: MEDICAL CENTER | Age: 70
End: 2020-05-29
Payer: MEDICARE

## 2020-05-29 DIAGNOSIS — C79.51 BONE METASTASES (HCC): ICD-10-CM

## 2020-05-29 DIAGNOSIS — C64.2 RENAL CELL CARCINOMA OF LEFT KIDNEY METASTATIC TO OTHER SITE (HCC): ICD-10-CM

## 2020-05-29 DIAGNOSIS — C78.00 MALIGNANT NEOPLASM METASTATIC TO LUNG, UNSPECIFIED LATERALITY (HCC): ICD-10-CM

## 2020-05-29 LAB
ALBUMIN SERPL BCP-MCNC: 2.9 G/DL (ref 3.5–5)
ALP SERPL-CCNC: 83 U/L (ref 46–116)
ALT SERPL W P-5'-P-CCNC: 25 U/L (ref 12–78)
ANION GAP SERPL CALCULATED.3IONS-SCNC: 5 MMOL/L (ref 4–13)
AST SERPL W P-5'-P-CCNC: 29 U/L (ref 5–45)
BASOPHILS # BLD AUTO: 0.01 THOUSANDS/ΜL (ref 0–0.1)
BASOPHILS NFR BLD AUTO: 0 % (ref 0–1)
BILIRUB SERPL-MCNC: 0.69 MG/DL (ref 0.2–1)
BUN SERPL-MCNC: 17 MG/DL (ref 5–25)
CALCIUM SERPL-MCNC: 9.1 MG/DL (ref 8.3–10.1)
CHLORIDE SERPL-SCNC: 104 MMOL/L (ref 100–108)
CO2 SERPL-SCNC: 29 MMOL/L (ref 21–32)
CREAT SERPL-MCNC: 1.14 MG/DL (ref 0.6–1.3)
EOSINOPHIL # BLD AUTO: 0.1 THOUSAND/ΜL (ref 0–0.61)
EOSINOPHIL NFR BLD AUTO: 2 % (ref 0–6)
ERYTHROCYTE [DISTWIDTH] IN BLOOD BY AUTOMATED COUNT: 17.2 % (ref 11.6–15.1)
GFR SERPL CREATININE-BSD FRML MDRD: 65 ML/MIN/1.73SQ M
GLUCOSE P FAST SERPL-MCNC: 81 MG/DL (ref 65–99)
HCT VFR BLD AUTO: 46.5 % (ref 36.5–49.3)
HGB BLD-MCNC: 14.7 G/DL (ref 12–17)
IMM GRANULOCYTES # BLD AUTO: 0.01 THOUSAND/UL (ref 0–0.2)
IMM GRANULOCYTES NFR BLD AUTO: 0 % (ref 0–2)
LYMPHOCYTES # BLD AUTO: 1.92 THOUSANDS/ΜL (ref 0.6–4.47)
LYMPHOCYTES NFR BLD AUTO: 33 % (ref 14–44)
MCH RBC QN AUTO: 27.5 PG (ref 26.8–34.3)
MCHC RBC AUTO-ENTMCNC: 31.6 G/DL (ref 31.4–37.4)
MCV RBC AUTO: 87 FL (ref 82–98)
MONOCYTES # BLD AUTO: 0.3 THOUSAND/ΜL (ref 0.17–1.22)
MONOCYTES NFR BLD AUTO: 5 % (ref 4–12)
NEUTROPHILS # BLD AUTO: 3.53 THOUSANDS/ΜL (ref 1.85–7.62)
NEUTS SEG NFR BLD AUTO: 60 % (ref 43–75)
NRBC BLD AUTO-RTO: 0 /100 WBCS
PLATELET # BLD AUTO: 148 THOUSANDS/UL (ref 149–390)
PMV BLD AUTO: 11.1 FL (ref 8.9–12.7)
POTASSIUM SERPL-SCNC: 3.6 MMOL/L (ref 3.5–5.3)
PROT SERPL-MCNC: 6.7 G/DL (ref 6.4–8.2)
RBC # BLD AUTO: 5.34 MILLION/UL (ref 3.88–5.62)
SODIUM SERPL-SCNC: 138 MMOL/L (ref 136–145)
WBC # BLD AUTO: 5.87 THOUSAND/UL (ref 4.31–10.16)

## 2020-05-29 PROCEDURE — 85025 COMPLETE CBC W/AUTO DIFF WBC: CPT

## 2020-05-29 PROCEDURE — 36415 COLL VENOUS BLD VENIPUNCTURE: CPT

## 2020-05-29 PROCEDURE — 80053 COMPREHEN METABOLIC PANEL: CPT

## 2020-06-01 ENCOUNTER — OFFICE VISIT (OUTPATIENT)
Dept: HEMATOLOGY ONCOLOGY | Facility: CLINIC | Age: 70
End: 2020-06-01
Payer: MEDICARE

## 2020-06-01 VITALS
RESPIRATION RATE: 18 BRPM | OXYGEN SATURATION: 90 % | DIASTOLIC BLOOD PRESSURE: 90 MMHG | HEART RATE: 76 BPM | TEMPERATURE: 96.9 F | HEIGHT: 66 IN | WEIGHT: 269.8 LBS | BODY MASS INDEX: 43.36 KG/M2 | SYSTOLIC BLOOD PRESSURE: 158 MMHG

## 2020-06-01 DIAGNOSIS — C64.2 CLEAR CELL ADENOCARCINOMA OF KIDNEY, LEFT (HCC): ICD-10-CM

## 2020-06-01 DIAGNOSIS — C78.00 MALIGNANT NEOPLASM METASTATIC TO LUNG, UNSPECIFIED LATERALITY (HCC): ICD-10-CM

## 2020-06-01 DIAGNOSIS — C79.51 BONE METASTASES (HCC): ICD-10-CM

## 2020-06-01 DIAGNOSIS — C64.2 RENAL CELL CARCINOMA OF LEFT KIDNEY METASTATIC TO OTHER SITE (HCC): Primary | ICD-10-CM

## 2020-06-01 PROCEDURE — 99214 OFFICE O/P EST MOD 30 MIN: CPT | Performed by: INTERNAL MEDICINE

## 2020-06-01 RX ORDER — SIMVASTATIN 40 MG
40 TABLET ORAL
COMMUNITY
End: 2021-10-22 | Stop reason: ALTCHOICE

## 2020-06-01 RX ORDER — HYDROCHLOROTHIAZIDE 12.5 MG/1
CAPSULE, GELATIN COATED ORAL
COMMUNITY
End: 2020-06-01 | Stop reason: SDUPTHER

## 2020-06-01 RX ORDER — TRAMADOL HYDROCHLORIDE 50 MG/1
50 TABLET ORAL EVERY 6 HOURS PRN
Qty: 60 TABLET | Refills: 3 | Status: SHIPPED | OUTPATIENT
Start: 2020-06-01 | End: 2020-12-09 | Stop reason: SDUPTHER

## 2020-06-09 ENCOUNTER — TELEPHONE (OUTPATIENT)
Dept: HEMATOLOGY ONCOLOGY | Facility: CLINIC | Age: 70
End: 2020-06-09

## 2020-06-09 DIAGNOSIS — K13.79 MOUTH SORES: Primary | ICD-10-CM

## 2020-06-12 ENCOUNTER — TELEPHONE (OUTPATIENT)
Dept: HEMATOLOGY ONCOLOGY | Facility: CLINIC | Age: 70
End: 2020-06-12

## 2020-06-15 ENCOUNTER — HOSPITAL ENCOUNTER (OUTPATIENT)
Dept: INFUSION CENTER | Facility: CLINIC | Age: 70
Discharge: HOME | End: 2020-06-15

## 2020-07-14 ENCOUNTER — APPOINTMENT (EMERGENCY)
Dept: CT IMAGING | Facility: HOSPITAL | Age: 70
End: 2020-07-14
Payer: MEDICARE

## 2020-07-14 ENCOUNTER — HOSPITAL ENCOUNTER (EMERGENCY)
Facility: HOSPITAL | Age: 70
Discharge: HOME | End: 2020-07-14
Attending: EMERGENCY MEDICINE | Admitting: EMERGENCY MEDICINE
Payer: MEDICARE

## 2020-07-14 VITALS
RESPIRATION RATE: 18 BRPM | OXYGEN SATURATION: 94 % | HEART RATE: 95 BPM | SYSTOLIC BLOOD PRESSURE: 148 MMHG | DIASTOLIC BLOOD PRESSURE: 89 MMHG | TEMPERATURE: 98.4 F

## 2020-07-14 DIAGNOSIS — S01.81XA LACERATION OF FOREHEAD, INITIAL ENCOUNTER: ICD-10-CM
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
## 2020-07-14 PROCEDURE — 90715 TDAP VACCINE 7 YRS/> IM: CPT | Performed by: NURSE PRACTITIONER

## 2020-07-14 PROCEDURE — 72125 CT NECK SPINE W/O DYE: CPT

## 2020-07-14 PROCEDURE — 70450 CT HEAD/BRAIN W/O DYE: CPT

## 2020-07-14 PROCEDURE — 99284 EMERGENCY DEPT VISIT MOD MDM: CPT | Performed by: NURSE PRACTITIONER

## 2020-07-14 PROCEDURE — 99283 EMERGENCY DEPT VISIT LOW MDM: CPT

## 2020-07-14 PROCEDURE — 90471 IMMUNIZATION ADMIN: CPT

## 2020-07-14 PROCEDURE — 12013 RPR F/E/E/N/L/M 2.6-5.0 CM: CPT | Performed by: NURSE PRACTITIONER

## 2020-07-14 RX ORDER — GINSENG 100 MG
1 CAPSULE ORAL ONCE
Status: COMPLETED | OUTPATIENT
Start: 2020-07-14 | End: 2020-07-14

## 2020-07-14 RX ORDER — LIDOCAINE HYDROCHLORIDE AND EPINEPHRINE BITARTRATE 20; .01 MG/ML; MG/ML
10 INJECTION, SOLUTION SUBCUTANEOUS ONCE
Status: COMPLETED | OUTPATIENT
Start: 2020-07-14 | End: 2020-07-14

## 2020-07-14 RX ADMIN — TETANUS TOXOID, REDUCED DIPHTHERIA TOXOID AND ACELLULAR PERTUSSIS VACCINE, ADSORBED 0.5 ML: 5; 2.5; 8; 8; 2.5 SUSPENSION INTRAMUSCULAR at 21:26

## 2020-07-14 RX ADMIN — BACITRACIN ZINC 1 LARGE APPLICATION: 500 OINTMENT TOPICAL at 22:00

## 2020-07-14 RX ADMIN — LIDOCAINE HYDROCHLORIDE,EPINEPHRINE BITARTRATE 10 ML: 20; .01 INJECTION, SOLUTION INFILTRATION; PERINEURAL at 21:20

## 2020-07-15 NOTE — ED PROVIDER NOTES
History  Chief Complaint   Patient presents with    Head Laceration     Head lac L forehead after losing footing on cement floor  Pt believes he is up to date on tetanus shot  - LOC, - thinners     This is a 78year old male who states has left drop foot and states that his left foot caught on the cement and he fell face first hitting his left forehead sustaining a laceration  He denies any other pain  He state he drove here  Denies LOC or headache  Unknown Td  History provided by:  Medical records and patient   used: No    Head Laceration   Location:  Head/neck  Head/neck laceration location: forehead   Length:  3  Quality: avulsion and jagged    Bleeding: controlled    Injury mechanism: glasses   Foreign body present:  No foreign bodies  Tetanus status:  Unknown      Prior to Admission Medications   Prescriptions Last Dose Informant Patient Reported? Taking?    al mag oxide-diphenhydramine-lidocaine viscous (MAGIC MOUTHWASH) 1:1:1 suspension   No No   Sig: Swish and spit 10 mL every 4 (four) hours as needed for mouth pain or discomfort   amLODIPine (NORVASC) 5 mg tablet  Self Yes No   Sig: Take 5 mg by mouth daily     cabozantinib (Cabometyx) 60 mg TABS   No No   Sig: Take 1 tablet (60 mg total) by mouth daily   cholecalciferol (VITAMIN D3) 1,000 units tablet  Self Yes No   Sig: Take 1,000 Units by mouth daily   diphenhydrAMINE (BENADRYL) 25 mg tablet  Self Yes No   Sig: Take 25 mg by mouth daily at bedtime as needed for itching   gabapentin (NEURONTIN) 100 mg capsule  Self Yes No   Sig: Take 300 mg by mouth 3 (three) times a day    lisinopril-hydrochlorothiazide (PRINZIDE,ZESTORETIC) 20-12 5 MG per tablet  Self Yes No   Sig: Take 1 tablet by mouth 2 (two) times a day     loperamide (IMODIUM) 2 mg capsule  Self Yes No   Sig: Take 2 mg by mouth 4 (four) times a day as needed for diarrhea   naproxen sodium (ALEVE) 220 MG tablet  Self Yes No   Sig: Take 220 mg by mouth as needed for mild pain   simvastatin (ZOCOR) 40 mg tablet   Yes No   Sig: Take 40 mg by mouth   traMADol (ULTRAM) 50 mg tablet   No No   Sig: Take 1 tablet (50 mg total) by mouth every 6 (six) hours as needed for moderate pain   trolamine salicylate (ASPERCREME) 10 % cream  Self Yes No   Sig: Apply 1 application topically as needed for muscle/joint pain      Facility-Administered Medications: None       Past Medical History:   Diagnosis Date    Arthritis     Cancer ([facility] [address] )     prostate - mets to bone and lung    History of radiation therapy 2018    SBRT to T10 -2018    Hyperlipidemia     Hypertension        Past Surgical History:   Procedure Laterality Date    DISTAL ULNA EXCISION Right     excision of tumor and orif with cement and screws    JOINT REPLACEMENT Bilateral     tkr's    LUNG SURGERY Right     exc of nodules    NEPHRECTOMY Left        Family History   Problem Relation Age of Onset    No Known Problems Mother     No Known Problems Father      I have reviewed and agree with the history as documented  E-Cigarette/Vaping     E-Cigarette/Vaping Substances     Social History     Tobacco Use    Smoking status: Former Smoker     Packs/day: 1 00     Types: Cigarettes     Last attempt to quit: 3/28/2003     Years since quittin 3    Smokeless tobacco: Never Used   Substance Use Topics    Alcohol use: Not Currently     Comment: occasional use    Drug use: No       Review of Systems   Skin: Positive for wound  All other systems reviewed and are negative  Physical Exam  Physical Exam   Constitutional: He is oriented to person, place, and time  He appears well-developed and well-nourished  No distress  HENT:   Head:       Eyes: Pupils are equal, round, and reactive to light  EOM are normal    Neck: Normal range of motion  Neck supple  Cardiovascular: Normal rate  Pulmonary/Chest: Effort normal    Musculoskeletal: Normal range of motion     Neurological: He is alert and oriented to person, place, and time  He has normal strength  He displays no atrophy, no tremor and normal reflexes  No cranial nerve deficit or sensory deficit  He exhibits normal muscle tone  Coordination normal  GCS eye subscore is 4  GCS verbal subscore is 5  GCS motor subscore is 6  Skin: Skin is warm and dry  Capillary refill takes less than 2 seconds  He is not diaphoretic  Psychiatric: He has a normal mood and affect  His behavior is normal  Judgment and thought content normal    Nursing note and vitals reviewed  Vital Signs  ED Triage Vitals   Temperature Pulse Respirations Blood Pressure SpO2   07/14/20 2049 07/14/20 2049 07/14/20 2049 07/14/20 2049 07/14/20 2049   98 4 °F (36 9 °C) (!) 114 18 148/89 94 %      Temp Source Heart Rate Source Patient Position - Orthostatic VS BP Location FiO2 (%)   07/14/20 2049 07/14/20 2049 07/14/20 2049 07/14/20 2049 --   Temporal Monitor Sitting Right arm       Pain Score       07/14/20 2111       6           Vitals:    07/14/20 2049 07/14/20 2159   BP: 148/89    Pulse: (!) 114 95   Patient Position - Orthostatic VS: Sitting          Visual Acuity  Visual Acuity      Most Recent Value   L Pupil Size (mm)  3   R Pupil Size (mm)  3          ED Medications  Medications   tetanus-diphtheria-acellular pertussis (BOOSTRIX) IM injection 0 5 mL (0 5 mL Intramuscular Given 7/14/20 2126)   lidocaine-epinephrine (XYLOCAINE/EPINEPHRINE) 2 %-1:100,000 injection 10 mL (10 mL Infiltration Given 7/14/20 2120)   bacitracin topical ointment 1 large application (1 large application Topical Given 7/14/20 2200)       Diagnostic Studies  Results Reviewed     None                 CT head without contrast   Final Result by Hugh Scott MD (07/14 2143)      1  No acute intracranial abnormality  2   Small left frontal scalp laceration/hematoma without a calvarial fracture        Workstation performed: [id number]         CT cervical spine without contrast   Final Result by Hugh Scott MD (29/48 2148)      No cervical spine fracture or traumatic malalignment  Workstation performed: [id number]                    Procedures  Laceration repair  Date/Time: 7/14/2020 9:40 PM  Performed by: IRENE Reyna  Authorized by: IRENE Reyna   Consent: The procedure was performed in an emergent situation  Verbal consent obtained  Written consent obtained  Risks and benefits: risks, benefits and alternatives were discussed  Consent given by: patient  Patient understanding: patient states understanding of the procedure being performed  Patient consent: the patient's understanding of the procedure matches consent given  Procedure consent: procedure consent matches procedure scheduled  Relevant documents: relevant documents present and verified  Test results: test results available and properly labeled  Site marked: the operative site was marked  Radiology Images displayed and confirmed  If images not available, report reviewed: imaging studies available  Required items: required blood products, implants, devices, and special equipment available  Patient identity confirmed: verbally with patient, arm band and hospital-assigned identification number  Time out: Immediately prior to procedure a "time out" was called to verify the correct patient, procedure, equipment, support staff and site/side marked as required  Body area: head/neck  Location details: left eyebrow  Laceration length: 3 cm  Foreign bodies: no foreign bodies  Tendon involvement: none  Nerve involvement: none  Vascular damage: no  Anesthesia: local infiltration    Anesthesia:  Local Anesthetic: lidocaine 2% with epinephrine    Sedation:  Patient sedated: no      Wound Dehiscence:  Superficial Wound Dehiscence: simple closure      Procedure Details:  Preparation: Patient was prepped and draped in the usual sterile fashion    Irrigation solution: saline  Irrigation method: syringe  Amount of cleaning: standard  Debridement: none  Degree of undermining: none  Skin closure: 5-0 nylon  Number of sutures: 9  Technique: simple  Approximation: close  Approximation difficulty: simple  Dressing: antibiotic ointment  Patient tolerance: Patient tolerated the procedure well with no immediate complications               ED Course  ED Course as of Jul 14 2207   Tue Jul 14, 2020 2151 FINDINGS: CT c spine      ALIGNMENT:  Normal alignment of the cervical spine  No subluxation      VERTEBRAL BODIES:  No fracture      DEGENERATIVE CHANGES:  Mild multilevel cervical degenerative changes are noted without critical central canal stenosis      PREVERTEBRAL AND PARASPINAL SOFT TISSUES:  Unremarkable      THORACIC INLET:  Normal      IMPRESSION:     No cervical spine fracture or traumatic malalignment  2151 FINDINGS:     PARENCHYMA:  No intracranial mass, mass effect or midline shift  No CT signs of acute infarction  No acute parenchymal hemorrhage      VENTRICLES AND EXTRA-AXIAL SPACES:  Normal for the patient's age      VISUALIZED ORBITS AND PARANASAL SINUSES:  Unremarkable      CALVARIUM AND EXTRACRANIAL SOFT TISSUES:  Small left frontal scalp laceration/hematoma without a calvarial fracture      IMPRESSION:     1  No acute intracranial abnormality        2   Small left frontal scalp laceration/hematoma without a calvarial fracture          2151 Ct results reviewed and discussed with pt   CT's negative  Lac repair completed  Pt verbalizes understanding of dc instructions and follow up           US AUDIT      Most Recent Value   Initial Alcohol Screen: US AUDIT-C    1  How often do you have a drink containing alcohol? 1 Filed at: 07/14/2020 2050   2  How many drinks containing alcohol do you have on a typical day you are drinking? 1 Filed at: 07/14/2020 2050   3b  FEMALE Any Age, or MALE 65+: How often do you have 4 or more drinks on one occassion?   0 Filed at: 07/14/2020 2050   Audit-C Score  2 Filed at: 07/14/2020 2050                  BANG/DAST-10 Most Recent Value   How many times in the past year have you    Used an illegal drug or used a prescription medication for non-medical reasons? Never Filed at: 07/14/2020 2050                                [facility]  Number of Diagnoses or Management Options  Diagnosis management comments: Fall on to cement  Laceration left forehead (appears cut by glasses)    R/O fracture, ICH    Plan  CT head  Td  Laceration repair        Amount and/or Complexity of Data Reviewed  Tests in the radiology section of CPT®: ordered and reviewed  Review and summarize past medical records: yes          Disposition  Final diagnoses:   Fall, initial encounter   Laceration of forehead, initial encounter     Time reflects when diagnosis was documented in both MDM as applicable and the Disposition within this note     Time User Action Codes Description Comment    7/14/2020  9:53 PM James Casarez Add [W19  PAHV] Fall, initial encounter     7/14/2020  9:53 PM James Casarez Add [S01 [id number]] Laceration of forehead, initial encounter       ED Disposition     ED Disposition Condition Date/Time Comment    Discharge Stable Tu Jul 14, 2020  9:52 PM +Dieter Jimenez discharge to home/self care              Follow-up Information     Follow up With Specialties Details Why Contact Info Additional [address] [facility] D Wild Henderson MD Geriatric Medicine Schedule an appointment as soon as possible for a visit in 2 days may have sutures removed in 7-10 days 0 [facility] Emergency Department Emergency Medicine  If symptoms worsen [address] [facility]  [phone] [place] ED, [address], [place], [facility]          Discharge Medication List as of 7/14/2020  9:54 PM      CONTINUE these medications which have NOT CHANGED    Details   al mag oxide-diphenhydramine-lidocaine viscous (MAGIC MOUTHWASH) 1:1:1 suspension Swish and spit 10 mL every 4 (four) hours as needed for mouth pain or discomfort, Starting Tue 6/9/2020, Normal      amLODIPine (NORVASC) 5 mg tablet Take 5 mg by mouth daily  , Historical Med      cabozantinib (Cabometyx) 60 mg TABS Take 1 tablet (60 mg total) by mouth daily, Starting Thu 4/23/2020, Print      cholecalciferol (VITAMIN D3) 1,000 units tablet Take 1,000 Units by mouth daily, Historical Med      diphenhydrAMINE (BENADRYL) 25 mg tablet Take 25 mg by mouth daily at bedtime as needed for itching, Historical Med      gabapentin (NEURONTIN) 100 mg capsule Take 300 mg by mouth 3 (three) times a day , Historical Med      lisinopril-hydrochlorothiazide (PRINZIDE,ZESTORETIC) 20-12 5 MG per tablet Take 1 tablet by mouth 2 (two) times a day  , Historical Med      loperamide (IMODIUM) 2 mg capsule Take 2 mg by mouth 4 (four) times a day as needed for diarrhea, Historical Med      naproxen sodium (ALEVE) 220 MG tablet Take 220 mg by mouth as needed for mild pain, Historical Med      simvastatin (ZOCOR) 40 mg tablet Take 40 mg by mouth, Historical Med      traMADol (ULTRAM) 50 mg tablet Take 1 tablet (50 mg total) by mouth every 6 (six) hours as needed for moderate pain, Starting Mon 6/1/2020, Normal      trolamine salicylate (ASPERCREME) 10 % cream Apply 1 application topically as needed for muscle/joint pain, Historical Med           No discharge procedures on file      PDMP Review       Value Time User    PDMP Reviewed  Yes 1/24/2020  8:54 AM Cornel Thacker MD          ED Provider  Electronically Signed by           Zena Mayo  07/14/20 6382

## 2020-07-15 NOTE — DISCHARGE INSTRUCTIONS
You will need to have your sutures removed in 7-10 days  You are to apply bacitracin x 3 days   You are to monitor for signs or symptoms of infection, drainage, pain, fevers - return to ED  You may return to ED, go to [facility] or see your PCP for suture removal

## 2020-07-20 DIAGNOSIS — C64.2 RENAL CELL CARCINOMA OF LEFT KIDNEY METASTATIC TO OTHER SITE (HCC): ICD-10-CM

## 2020-07-20 DIAGNOSIS — C79.51 BONE METASTASES (HCC): ICD-10-CM

## 2020-07-20 DIAGNOSIS — C78.00 MALIGNANT NEOPLASM METASTATIC TO LUNG, UNSPECIFIED LATERALITY (HCC): ICD-10-CM

## 2020-07-20 NOTE — TELEPHONE ENCOUNTER
Patient would like to know whether he needs to continue taking his script of cabozantinib     And if so he will need a refill

## 2020-07-20 NOTE — TELEPHONE ENCOUNTER
Spoke with Dr Paula Quiroz and he would like for patient to continue with his mediation every other day  Script to be sent  Patient verbalized understanding and agreed to plan

## 2020-07-20 NOTE — TELEPHONE ENCOUNTER
Patient called to report he is doing  Cabometyx every other day  Reports symptoms have mostly resolved  Only reports mild burning sensations in fingertips  Denies open areas  Reports underside of fingers are having some peeling  Patient will need a refill on cabozantinib (Cabometyx) 60 mg TABS however Dr Keiko Caceres would like to prescribe drug  Patient will need a call back to review medication instructions per Dr Keiko Caceres  Patient only has 5 pills left      Dieter's call back #[phone] (M)call first  If no answer call [phone] (H)

## 2020-07-23 ENCOUNTER — TELEPHONE (OUTPATIENT)
Dept: HEMATOLOGY ONCOLOGY | Facility: CLINIC | Age: 70
End: 2020-07-23

## 2020-07-23 NOTE — TELEPHONE ENCOUNTER
Message relayed to patient via voice message and to return call to [facility] with questions

## 2020-07-23 NOTE — TELEPHONE ENCOUNTER
Patient is doing cabozantinib (Cabometyx) 60 mg TABS Sig: Take 1 tablet (60 mg total) by mouth every other day  Patient reported yesterday he started with mouth sore on tip of his tongue and lower inside of lip  Patient's finder tips and feet are starting with continuous burning  He noticed burning symptoms last night  Denies redness, peeling, or open rhina in hands and feet  Patient is using magic mouth wash with relief for a short period of time  Patient is using lotion every morning to hands and feet  Patient wanted to report above symptoms and see what further Cabometyx instructions Dr Cassandria Lombard would recommend  Please review Dr Cassandria Lombard  "Oncology Nurse Triage" can be tasked with recommendation and we can contact patient if you like

## 2020-07-23 NOTE — TELEPHONE ENCOUNTER
Dr Bridgett Meza stated that if these symptoms are tolerable, he would like for him to continue taking the medication every other day  If this is not tolerable, then patient may take a 7 day break

## 2020-07-28 ENCOUNTER — HOSPITAL ENCOUNTER (OUTPATIENT)
Dept: CT IMAGING | Facility: HOSPITAL | Age: 70
Discharge: HOME | End: 2020-07-28
Attending: INTERNAL MEDICINE
Payer: MEDICARE

## 2020-07-28 DIAGNOSIS — C64.2 RENAL CELL CARCINOMA OF LEFT KIDNEY METASTATIC TO OTHER SITE (HCC): ICD-10-CM

## 2020-07-28 PROCEDURE — 71260 CT THORAX DX C+: CPT

## 2020-07-28 PROCEDURE — 74177 CT ABD & PELVIS W/CONTRAST: CPT

## 2020-07-28 RX ADMIN — IOHEXOL 100 ML: 350 INJECTION, SOLUTION INTRAVENOUS at 18:53

## 2020-07-29 ENCOUNTER — APPOINTMENT (OUTPATIENT)
Dept: LAB | Facility: MEDICAL CENTER | Age: 70
End: 2020-07-29
Payer: MEDICARE

## 2020-07-29 DIAGNOSIS — C79.51 SPINE METASTASIS (HCC): ICD-10-CM

## 2020-07-29 DIAGNOSIS — C78.00 MALIGNANT NEOPLASM METASTATIC TO LUNG, UNSPECIFIED LATERALITY (HCC): ICD-10-CM

## 2020-07-29 DIAGNOSIS — C64.2 CLEAR CELL ADENOCARCINOMA OF KIDNEY, LEFT (HCC): ICD-10-CM

## 2020-07-29 DIAGNOSIS — C79.51 BONE METASTASES (HCC): ICD-10-CM

## 2020-07-29 DIAGNOSIS — C64.2 RENAL CELL CARCINOMA OF LEFT KIDNEY METASTATIC TO OTHER SITE (HCC): ICD-10-CM

## 2020-07-29 LAB
ALBUMIN SERPL BCP-MCNC: 3.1 G/DL (ref 3.5–5)
ALP SERPL-CCNC: 75 U/L (ref 46–116)
ALT SERPL W P-5'-P-CCNC: 18 U/L (ref 12–78)
ANION GAP SERPL CALCULATED.3IONS-SCNC: 3 MMOL/L (ref 4–13)
AST SERPL W P-5'-P-CCNC: 25 U/L (ref 5–45)
BASOPHILS # BLD AUTO: 0.01 THOUSANDS/ΜL (ref 0–0.1)
BASOPHILS NFR BLD AUTO: 0 % (ref 0–1)
BILIRUB SERPL-MCNC: 0.97 MG/DL (ref 0.2–1)
BUN SERPL-MCNC: 23 MG/DL (ref 5–25)
CALCIUM SERPL-MCNC: 9.4 MG/DL (ref 8.3–10.1)
CHLORIDE SERPL-SCNC: 105 MMOL/L (ref 100–108)
CO2 SERPL-SCNC: 30 MMOL/L (ref 21–32)
CREAT SERPL-MCNC: 1.35 MG/DL (ref 0.6–1.3)
EOSINOPHIL # BLD AUTO: 0 THOUSAND/ΜL (ref 0–0.61)
EOSINOPHIL NFR BLD AUTO: 0 % (ref 0–6)
ERYTHROCYTE [DISTWIDTH] IN BLOOD BY AUTOMATED COUNT: 20.3 % (ref 11.6–15.1)
GFR SERPL CREATININE-BSD FRML MDRD: 53 ML/MIN/1.73SQ M
GLUCOSE P FAST SERPL-MCNC: 80 MG/DL (ref 65–99)
HCT VFR BLD AUTO: 43.1 % (ref 36.5–49.3)
HGB BLD-MCNC: 13.9 G/DL (ref 12–17)
IMM GRANULOCYTES # BLD AUTO: 0 THOUSAND/UL (ref 0–0.2)
IMM GRANULOCYTES NFR BLD AUTO: 0 % (ref 0–2)
LYMPHOCYTES # BLD AUTO: 1.29 THOUSANDS/ΜL (ref 0.6–4.47)
LYMPHOCYTES NFR BLD AUTO: 42 % (ref 14–44)
MCH RBC QN AUTO: 30 PG (ref 26.8–34.3)
MCHC RBC AUTO-ENTMCNC: 32.3 G/DL (ref 31.4–37.4)
MCV RBC AUTO: 93 FL (ref 82–98)
MONOCYTES # BLD AUTO: 0.37 THOUSAND/ΜL (ref 0.17–1.22)
MONOCYTES NFR BLD AUTO: 12 % (ref 4–12)
NEUTROPHILS # BLD AUTO: 1.42 THOUSANDS/ΜL (ref 1.85–7.62)
NEUTS SEG NFR BLD AUTO: 46 % (ref 43–75)
NRBC BLD AUTO-RTO: 0 /100 WBCS
PLATELET # BLD AUTO: 149 THOUSANDS/UL (ref 149–390)
PMV BLD AUTO: 10.7 FL (ref 8.9–12.7)
POTASSIUM SERPL-SCNC: 3.7 MMOL/L (ref 3.5–5.3)
PROT SERPL-MCNC: 6.8 G/DL (ref 6.4–8.2)
RBC # BLD AUTO: 4.64 MILLION/UL (ref 3.88–5.62)
SODIUM SERPL-SCNC: 138 MMOL/L (ref 136–145)
TSH SERPL DL<=0.05 MIU/L-ACNC: 3.7 UIU/ML (ref 0.36–3.74)
WBC # BLD AUTO: 3.09 THOUSAND/UL (ref 4.31–10.16)

## 2020-07-29 PROCEDURE — 80053 COMPREHEN METABOLIC PANEL: CPT

## 2020-07-29 PROCEDURE — 85025 COMPLETE CBC W/AUTO DIFF WBC: CPT

## 2020-07-29 PROCEDURE — 36415 COLL VENOUS BLD VENIPUNCTURE: CPT

## 2020-07-29 PROCEDURE — 84443 ASSAY THYROID STIM HORMONE: CPT

## 2020-08-03 ENCOUNTER — HOSPITAL ENCOUNTER (OUTPATIENT)
Dept: INFUSION CENTER | Facility: CLINIC | Age: 70
Discharge: HOME | End: 2020-08-03
Payer: MEDICARE

## 2020-08-03 ENCOUNTER — OFFICE VISIT (OUTPATIENT)
Dept: HEMATOLOGY ONCOLOGY | Facility: CLINIC | Age: 70
End: 2020-08-03
Payer: MEDICARE

## 2020-08-03 VITALS
SYSTOLIC BLOOD PRESSURE: 115 MMHG | RESPIRATION RATE: 18 BRPM | DIASTOLIC BLOOD PRESSURE: 75 MMHG | HEART RATE: 96 BPM | TEMPERATURE: 98.6 F

## 2020-08-03 VITALS
HEIGHT: 66 IN | TEMPERATURE: 98.3 F | OXYGEN SATURATION: 90 % | SYSTOLIC BLOOD PRESSURE: 128 MMHG | RESPIRATION RATE: 18 BRPM | HEART RATE: 102 BPM | BODY MASS INDEX: 41.78 KG/M2 | WEIGHT: 260 LBS | DIASTOLIC BLOOD PRESSURE: 80 MMHG

## 2020-08-03 DIAGNOSIS — C78.00 MALIGNANT NEOPLASM METASTATIC TO LUNG, UNSPECIFIED LATERALITY (HCC): ICD-10-CM

## 2020-08-03 DIAGNOSIS — C79.51 BONE METASTASES (HCC): ICD-10-CM

## 2020-08-03 DIAGNOSIS — C64.2 CLEAR CELL ADENOCARCINOMA OF KIDNEY, LEFT (HCC): Primary | ICD-10-CM

## 2020-08-03 DIAGNOSIS — C79.51 BONE METASTASES (HCC): Primary | ICD-10-CM

## 2020-08-03 PROCEDURE — 96401 CHEMO ANTI-NEOPL SQ/IM: CPT

## 2020-08-03 PROCEDURE — 99214 OFFICE O/P EST MOD 30 MIN: CPT | Performed by: INTERNAL MEDICINE

## 2020-08-03 RX ADMIN — DENOSUMAB 120 MG: 120 INJECTION SUBCUTANEOUS at 15:01

## 2020-08-03 NOTE — PROGRESS NOTES
Hematology / Oncology Outpatient Follow Up Note    Filomena Kelley 78 y o  male :1950 [facility]:[id number]         Date:  8/3/2020    Assessment / Plan:  A 58-year-old gentleman who was diagnosed with localized renal cell carcinoma in 2007  He developed metastatic disease in   He was previously on sunitinib as well as axitinib  Most recently, he was on combination of axitinib and pembrolizumab under the care of Dr Barbi Tapia  His disease progressed on this regimen  Therefore, since early May 2020, he has been on cabozantinib with expected side effects  However, he discontinued cabozantinib 2 weeks ago due to the hand-foot reaction  His recent CT scan showed partial response  We discussed further treatment options  I recommended him to restart cabozantinib 40 mg daily  At the beginning, he may try 40 mg every other day for a few weeks  If he tolerate 40 mg every other day, he may go up to 40 mg daily  I will put him back on denosumab every 3 months  I will see him again in 2 months with CBC, CMP and TSH  He and his daughter are in agreement with my recommendations        Subjective:      HPI:             Interval History:  A 58-year-old gentleman who has history of left nephrectomy for renal cell carcinoma in 2007  He was well until  when he was found to have lung metastasis   He was watched until 2015 when he started sunitinib until 2016  Since then, he has been on axitinib which he is still on   In 2019, CT scan showed progression of tumor in the renal bed as well as some mixed response was lung metastasis   Therefore, Dr Barbi Tapia at [facility] has no immune related toxicity with pembrolizumab  However, he had progressive disease in May 2021 his systemic therapy was changed to cabozantinib  He developed what appeared to be hand foot reaction with skin peeling and redness and pain especially fingers    Therefore, he discontinued cabozantinib 60 mg 2 weeks ago  His skin change has improved  He recently underwent CT scan of chest abdomen pelvis which showed widespread metastatic disease are smaller than before  He has no cough or shortness of breath  He has no complaint of pain  He has baseline ambulatory dysfunction  His performance status is 1/4 on the ECOG scale         Objective:      Primary Diagnosis:     1  Localized renal cell carcinoma T1 NX M0 grade 2-3 diagnosed in August 2007      2  Metastatic renal cell carcinoma, diagnosed in 2013       Cancer Staging:  Cancer Staging  No matching staging information was found for the patient         Previous Hematologic/ Oncologic Treatment:      1  Sunitinib from March 2015 through December 2016    2  Axitinib monotherapy from December 2016 through November 2019    3  Axitinib with pembrolizumab from November 2019 through April 2020       Current Hematologic/ Oncologic Treatment:       Cabozantinib since May 2020       Disease Status:      Partial response      Test Results:     Pathology:           Radiology:     CT scan of chest abdomen pelvis in July 2020 showed decreased size of widespread metastatic disease      Laboratory:     See below   Normal TSH      Physical Exam:        General Appearance:    Alert, oriented          Eyes:    PERRL   Ears:    Normal external ear canals, both ears   Nose:   Nares normal, septum midline   Throat:   Mucosa moist  Pharynx without injection  Neck:   Supple         Lungs:     Clear to auscultation bilaterally   Chest Wall:    No tenderness or deformity    Heart:    Regular rate and rhythm         Abdomen:     Soft, non-tender, bowel sounds +, no organomegaly               Extremities:   Extremities no cyanosis or edema         Skin:   no rash or icterus      Lymph nodes:   Cervical, supraclavicular, and axillary nodes normal   Neurologic:   CNII-XII intact, normal strength, sensation and reflexes     Throughout             Breast exam:   NA ROS: Review of Systems   All other systems reviewed and are negative  Imaging: Ct Head Without Contrast    Result Date: 7/14/2020  Narrative: CT BRAIN - WITHOUT CONTRAST INDICATION:   fall  laceration/hematoma left fore head R/O ICH, fx  COMPARISON:  None  TECHNIQUE:  CT examination of the brain was performed  In addition to axial images, coronal 2D reformatted images were created and submitted for interpretation  Radiation dose length product (DLP) for this visit:  878 mGy-cm   This examination, like all CT scans performed in the [facility], was performed utilizing techniques to minimize radiation dose exposure, including the use of iterative reconstruction and automated exposure control  IMAGE QUALITY:  Diagnostic  FINDINGS: PARENCHYMA:  No intracranial mass, mass effect or midline shift  No CT signs of acute infarction  No acute parenchymal hemorrhage  VENTRICLES AND EXTRA-AXIAL SPACES:  Normal for the patient's age  VISUALIZED ORBITS AND PARANASAL SINUSES:  Unremarkable  CALVARIUM AND EXTRACRANIAL SOFT TISSUES:  Small left frontal scalp laceration/hematoma without a calvarial fracture  Impression: 1  No acute intracranial abnormality  2   Small left frontal scalp laceration/hematoma without a calvarial fracture  Workstation performed: [id number]     Ct Cervical Spine Without Contrast    Result Date: 7/14/2020  Narrative: CT CERVICAL SPINE - WITHOUT CONTRAST INDICATION:   fall  face first on cement   r/o fracture  COMPARISON:  12/16/2016 TECHNIQUE:  CT examination of the cervical spine was performed without intravenous contrast   Contiguous axial images were obtained  Sagittal and coronal reconstructions were performed  Radiation dose length product (DLP) for this visit:  625 mGy-cm     This examination, like all CT scans performed in the [facility], was performed utilizing techniques to minimize radiation dose exposure, including the use of iterative reconstruction and automated exposure control  IMAGE QUALITY:  Diagnostic  FINDINGS: ALIGNMENT:  Normal alignment of the cervical spine  No subluxation  VERTEBRAL BODIES:  No fracture  DEGENERATIVE CHANGES:  Mild multilevel cervical degenerative changes are noted without critical central canal stenosis  PREVERTEBRAL AND PARASPINAL SOFT TISSUES:  Unremarkable  THORACIC INLET:  Normal      Impression: No cervical spine fracture or traumatic malalignment  Workstation performed: [id number]     Ct Chest Abdomen Pelvis W Contrast    Result Date: 7/30/2020  Narrative: CT CHEST, ABDOMEN AND PELVIS WITH IV CONTRAST INDICATION:   C64 2: Malignant neoplasm of left kidney, except renal pelvis  Patient was diagnosed with localized renal cell carcinoma in August 2007  He developed metastatic disease in 2013  He has been undergoing chemotherapy  Worsening diarrhea  Anorexia  COMPARISON:  Multiple prior examinations, most recently April 20, 2020 TECHNIQUE: CT examination of the chest, abdomen and pelvis was performed  Axial, sagittal, and coronal 2D reformatted images were created from the source data and submitted for interpretation  Radiation dose length product (DLP) for this visit:  5133 mGy-cm   This examination, like all CT scans performed in the [facility], was performed utilizing techniques to minimize radiation dose exposure, including the use of iterative reconstruction and automated exposure control  IV Contrast:  100 mL of iohexol (OMNIPAQUE) Enteric Contrast: Enteric contrast was administered  FINDINGS: CHEST LUNGS:  Again noted is consolidative density overlying pleural disease in the medial right upper lobe and overlying osseous tumor mass in the lateral right lower lobe   There are numerous pulmonary nodules/masses as described below: -  Left upper lobe nodule measuring 1 2 x 1 1 cm on image 42 of series 3, decreased from 1 5 x 1 8 cm on previous exam -  Peripheral left upper lobe nodule measuring 3 mm on image 29 of series 3, decreased from 6 mm  on previous examination  -  Juxtapleural left lower lobe pulmonary mass on image 60 of series 3 measuring 1 2 x 0 9 cm, approximately decreased in size from prior examination having measured 1 4 x 1 0 cm when measured using similar technique on the previous exam  -  Right lower lobe mass with central coarse calcification measures 2 1 x 1 3 cm on image 67 of series 3, significantly decreased from 2 9 x 2 1 cm when measured using similar technique on April 20, 2020  -  Right lower lobe pulmonary nodule measuring 1 8 x 1 0 cm on image 60 is decreased from 2 0 x 1 2 cm when measured using similar technique on April 20, 2020  Please note that measurement described on prior examination may have occluded adjacent pulmonary artery branch  -  Juxtapleural anterior right middle lobe pulmonary nodule, 7 mm on image 76 of series 3, decreased from 9 mm on prior examination  No new discrete pulmonary mass is identified  No groundglass consolidative airspace opacities to suggest infection  PLEURA: A pleural-based nodule in the medial left lung apex measuring 9 mm on image 12 of series 3 is unchanged from April 2020  A combination of pleural disease and overlying airspace consolidation in the paramediastinal medial right upper lobe adjacent to osseous tumor is similar in size when compared to previous examination measuring 3 7 x 2 0 cm on image 33 of series 3 compared with 4 2 x 2 3 cm on prior examination  Unchanged appearance of peripheral pleural parenchymal scarring adjacent to the lytic expansile right posterior lateral seventh rib lesion  No pleural effusion  HEART/GREAT VESSELS:  The heart is normal in size without pericardial mass or effusion  There is enlargement of the ascending aorta measuring 4 cm at the level the right pulmonary artery  There is unchanged prominence of the main pulmonary artery  MEDIASTINUM AND CHERRIE:  Unremarkable   CHEST WALL AND LOWER NECK:   Stable right-sided thyroid nodule extending inferiorly into the superior mediastinum, approximately 18 mm unchanged  An intramuscular mass in the right 7th intercostal space on image 47 of series 2 measuring 11 mm is unchanged in size from April 2020  ABDOMEN LIVER/BILIARY TREE:  Unremarkable  GALLBLADDER:  No calcified gallstones  No pericholecystic inflammatory change  SPLEEN:  Unremarkable  PANCREAS:  Unremarkable  ADRENAL GLANDS:  Right adrenal mass, 2 0 x 1 6 cm, decreased from 2 6 x 1 8 cm when measured using similar technique on previous examination  Left adrenal mass measuring 2 8 x 2 0 cm, minimally decreased from 3 0 x 2 2 cm when measured using similar technique  KIDNEYS/URETERS:  Stable right-sided cortical and parapelvic renal cysts are again noted  The largest of these contains a thin septation is partly exophytic at the lateral upper pole the right kidney measuring up to 7 2 cm  No solid right renal mass or  hydronephrosis noted  Surgical changes of left nephrectomy  Decreased size of tumor mass at the inferior aspect of the left nephrectomy space on image 80 of series 2, 1 5 x 1 2 cm compared with 2 0 x 1 9 cm when measured in similar technique on prior exam  STOMACH AND BOWEL:  There is colonic diverticulosis without evidence of acute diverticulitis  APPENDIX:  No findings to suggest appendicitis  ABDOMINOPELVIC CAVITY:  No ascites  No pneumoperitoneum  No lymphadenopathy  VESSELS:  Atherosclerotic changes are present  No evidence of aneurysm  PELVIS REPRODUCTIVE ORGANS:  Status post prostatectomy  URINARY BLADDER:  Unremarkable  ABDOMINAL WALL/INGUINAL REGIONS:  Unchanged periumbilical fat-containing hernia and bilateral fat-containing inguinal hernias  Enhancing 2 5 x 2 3 cm mass in the right cardio musculature on image 132 of series 2, decreased from 2 9 x 2 8 cm on prior examination    Small enhancing 7 mm mass in the left paraspinal musculature on image 77 of series 2 at the L2-L3 level is not definitively identifiable on previous noncontrast examination probably due to technique  OSSEOUS STRUCTURES: There are multiple expansile lytic lesions of the osseous structures many of which demonstrate associated soft tissue tumor component  An enhancing soft tissue tumor component associated with a right iliac wing metastatic lesion measures 3 1 x 2 9 cm on image 90 of series 2, decreased from 4 5 x 4 0 cm when measured using similar technique on April 20, 2020  Remainder of osseous lesions, including lesions involving right 7th and 8th posterolateral ribs, T4 and T6 vertebral bodies, and L4 vertebral body appear similar from previous examination  Again noted are postsurgical changes at L3-L4 and L4-L5  Impression: Widespread metastatic disease is reidentified  However, most tumor masses are decreased in size consistent with treatment response as described above  Some lesions are unchanged in size from prior examination  No new discrete metastatic lesions identified in the chest, abdomen or pelvis   Workstation performed: [id number]         Labs:   Lab Results   Component Value Date    WBC 3 09 (L) 07/29/2020    HGB 13 9 07/29/2020    HCT 43 1 07/29/2020    MCV 93 07/29/2020     07/29/2020     Lab Results   Component Value Date     12/10/2015    K 3 7 07/29/2020     07/29/2020    CO2 30 07/29/2020    ANIONGAP 7 12/10/2015    BUN 23 07/29/2020    CREATININE 1 35 (H) 07/29/2020    GLUCOSE 102 12/10/2015    GLUF 80 07/29/2020    CALCIUM 9 4 07/29/2020    CORRECTEDCA 10 8 (H) 08/22/2018    AST 25 07/29/2020    ALT 18 07/29/2020    ALKPHOS 75 07/29/2020    PROT 6 9 12/10/2015    BILITOT 0 71 12/10/2015    EGFR 53 07/29/2020         Lab Results   Component Value Date     01/29/2020         Lab Results   Component Value Date    PSA <0 1 09/23/2019    PSA <0 1 12/22/2017    PSA <0 1 12/10/2015         Current Medications: Reviewed  Allergies: Reviewed  PMH/FH/SH:  Reviewed      Vital Sign:    Body surface area is 2 24 meters squared      Wt Readings from Last 3 Encounters:   08/03/20 118 kg (260 lb)   06/01/20 122 kg (269 lb 12 8 oz)   04/23/20 125 kg (275 lb)        Temp Readings from Last 3 Encounters:   08/03/20 98 3 °F (36 8 °C) (Tympanic Core)   07/22/20 97 9 °F (36 6 °C) (Temporal)   07/14/20 98 4 °F (36 9 °C) (Temporal)        BP Readings from Last 3 Encounters:   08/03/20 128/80   07/22/20 136/75   07/14/20 148/89         Pulse Readings from Last 3 Encounters:   08/03/20 102   07/22/20 92   07/14/20 95     @LASTSAO2(3)@

## 2020-08-03 NOTE — PROGRESS NOTES
Patient here for Xgeva injection- denies any recent dental/jaw problems or upcoming procedures  Patient's Calcium was 9 4 on 7/29/20 and CrCl is stable at 55 2 per pharmacist  Patient tolerated injection without complication, next appointment made, left unit in stable condition

## 2020-08-03 NOTE — PLAN OF CARE
Problem: Potential for Falls  Goal: Patient will remain free of falls  Description: INTERVENTIONS:  - Assess patient frequently for physical needs  -  Identify cognitive and physical deficits and behaviors that affect risk of falls    -  [place] fall precautions as indicated by assessment   - Educate patient/family on patient safety including physical limitations  - Instruct patient to call for assistance with activity based on assessment  - Modify environment to reduce risk of injury  - Consider OT/PT consult to assist with strengthening/mobility  Outcome: Progressing

## 2020-08-18 ENCOUNTER — TELEPHONE (OUTPATIENT)
Dept: HEMATOLOGY ONCOLOGY | Facility: MEDICAL CENTER | Age: 70
End: 2020-08-18

## 2020-08-18 DIAGNOSIS — C64.2 CLEAR CELL ADENOCARCINOMA OF KIDNEY, LEFT (HCC): ICD-10-CM

## 2020-08-18 DIAGNOSIS — C64.2 RENAL CELL CARCINOMA OF LEFT KIDNEY METASTATIC TO OTHER SITE (HCC): Primary | ICD-10-CM

## 2020-08-18 NOTE — TELEPHONE ENCOUNTER
Left msg for patient to return my call regarding Rx  Want to confirm schedule every other day vs every day

## 2020-08-18 NOTE — TELEPHONE ENCOUNTER
Medication Refill     Who is Calling  patient    Medication  cabozantinib (Cabometyx) 60 mg TABS        How many pills left     Preferred Pharmacy  [facility]    Call back number  [phone]   Relevant Information  instead of 60 mgs patient was following up with 40 mgs as he had a reaction from the 60mgs         Following up

## 2020-08-18 NOTE — TELEPHONE ENCOUNTER
Returned call to patient  He was following up on New Cabometyx ( 40mg PO every other day)  Per patient this Rx comes from [facility] in [place]  I notified Marcelo Yoo RN that I will be pending new Rx to Dr Miki Meehan for signature    Patient aware and verbalized understanding

## 2020-09-01 ENCOUNTER — TELEPHONE (OUTPATIENT)
Dept: HEMATOLOGY ONCOLOGY | Facility: MEDICAL CENTER | Age: 70
End: 2020-09-01

## 2020-09-01 DIAGNOSIS — C79.51 BONE METASTASES (HCC): ICD-10-CM

## 2020-09-01 DIAGNOSIS — C78.00 MALIGNANT NEOPLASM METASTATIC TO LUNG, UNSPECIFIED LATERALITY (HCC): ICD-10-CM

## 2020-09-01 DIAGNOSIS — C64.2 RENAL CELL CARCINOMA OF LEFT KIDNEY METASTATIC TO OTHER SITE (HCC): ICD-10-CM

## 2020-09-01 NOTE — TELEPHONE ENCOUNTER
Medication Refill     Who is Calling  patient    Medication cabozantinib 40 mg TABS [       How many pills left NONE for 3 weeks    Preferred Pharmacy Mail in order    Call back number [phone]   Relevant Information  patient wants a call back

## 2020-09-01 NOTE — TELEPHONE ENCOUNTER
Faxed Cabometyx ( 40mg PO every other day)  to Lyla at [phone]  [facility] is routing prescription to [facility] even though Kelly Saldaña is picked  Requested medication to be shipped ASAP

## 2020-09-01 NOTE — TELEPHONE ENCOUNTER
Prescription was sent to [facility] [address]  Prescription repended to Dr Miki Meehan  Please send to Replaced by [facility]  8/18/20 original request:  New Cabometyx ( 40mg PO every other day)  Per patient this Rx comes from [facility] in [place]

## 2020-09-04 ENCOUNTER — TELEPHONE (OUTPATIENT)
Dept: HEMATOLOGY ONCOLOGY | Facility: CLINIC | Age: 70
End: 2020-09-04

## 2020-09-04 NOTE — TELEPHONE ENCOUNTER
Verbal prescription given for cabozantinib  Quant of 14 was ordered    Pharmacy requesting quant of 15 - this is the minimal amount they can dispense     Verbal order given to [address]

## 2020-09-29 DIAGNOSIS — C79.51 BONE METASTASES (HCC): ICD-10-CM

## 2020-09-29 DIAGNOSIS — C64.2 RENAL CELL CARCINOMA OF LEFT KIDNEY METASTATIC TO OTHER SITE (HCC): ICD-10-CM

## 2020-09-29 DIAGNOSIS — C78.00 MALIGNANT NEOPLASM METASTATIC TO LUNG, UNSPECIFIED LATERALITY (HCC): ICD-10-CM

## 2020-10-23 ENCOUNTER — HOSPITAL ENCOUNTER (OUTPATIENT)
Dept: RADIOLOGY | Facility: HOSPITAL | Age: 70
Discharge: HOME | End: 2020-10-23
Attending: RADIOLOGY

## 2020-10-23 DIAGNOSIS — C79.51 BONE METASTASES (HCC): ICD-10-CM

## 2020-10-26 ENCOUNTER — TELEPHONE (OUTPATIENT)
Dept: NEUROSURGERY | Facility: CLINIC | Age: 70
End: 2020-10-26

## 2020-10-27 DIAGNOSIS — C78.00 MALIGNANT NEOPLASM METASTATIC TO LUNG, UNSPECIFIED LATERALITY (HCC): ICD-10-CM

## 2020-10-27 DIAGNOSIS — C79.51 BONE METASTASES (HCC): ICD-10-CM

## 2020-10-27 DIAGNOSIS — C64.2 RENAL CELL CARCINOMA OF LEFT KIDNEY METASTATIC TO OTHER SITE (HCC): ICD-10-CM

## 2020-11-06 ENCOUNTER — LAB (OUTPATIENT)
Dept: LAB | Facility: MEDICAL CENTER | Age: 70
End: 2020-11-06
Payer: MEDICARE

## 2020-11-06 ENCOUNTER — TELEPHONE (OUTPATIENT)
Dept: HEMATOLOGY ONCOLOGY | Facility: CLINIC | Age: 70
End: 2020-11-06

## 2020-11-06 DIAGNOSIS — C78.00 MALIGNANT NEOPLASM METASTATIC TO LUNG, UNSPECIFIED LATERALITY (HCC): ICD-10-CM

## 2020-11-06 DIAGNOSIS — C64.2 CLEAR CELL ADENOCARCINOMA OF KIDNEY, LEFT (HCC): ICD-10-CM

## 2020-11-06 DIAGNOSIS — C79.51 BONE METASTASES (HCC): ICD-10-CM

## 2020-11-06 LAB
ALBUMIN SERPL BCP-MCNC: 3.4 G/DL (ref 3.5–5)
ALP SERPL-CCNC: 75 U/L (ref 46–116)
ALT SERPL W P-5'-P-CCNC: 19 U/L (ref 12–78)
ANION GAP SERPL CALCULATED.3IONS-SCNC: 4 MMOL/L (ref 4–13)
AST SERPL W P-5'-P-CCNC: 17 U/L (ref 5–45)
BASOPHILS # BLD AUTO: 0.02 THOUSANDS/ΜL (ref 0–0.1)
BASOPHILS NFR BLD AUTO: 0 % (ref 0–1)
BILIRUB SERPL-MCNC: 0.7 MG/DL (ref 0.2–1)
BUN SERPL-MCNC: 23 MG/DL (ref 5–25)
CALCIUM ALBUM COR SERPL-MCNC: 10.4 MG/DL (ref 8.3–10.1)
CALCIUM SERPL-MCNC: 9.9 MG/DL (ref 8.3–10.1)
CHLORIDE SERPL-SCNC: 106 MMOL/L (ref 100–108)
CO2 SERPL-SCNC: 32 MMOL/L (ref 21–32)
CREAT SERPL-MCNC: 1.24 MG/DL (ref 0.6–1.3)
EOSINOPHIL # BLD AUTO: 0.16 THOUSAND/ΜL (ref 0–0.61)
EOSINOPHIL NFR BLD AUTO: 2 % (ref 0–6)
ERYTHROCYTE [DISTWIDTH] IN BLOOD BY AUTOMATED COUNT: 15.5 % (ref 11.6–15.1)
GFR SERPL CREATININE-BSD FRML MDRD: 58 ML/MIN/1.73SQ M
GLUCOSE P FAST SERPL-MCNC: 78 MG/DL (ref 65–99)
HCT VFR BLD AUTO: 48.8 % (ref 36.5–49.3)
HGB BLD-MCNC: 14.8 G/DL (ref 12–17)
IMM GRANULOCYTES # BLD AUTO: 0.01 THOUSAND/UL (ref 0–0.2)
IMM GRANULOCYTES NFR BLD AUTO: 0 % (ref 0–2)
LYMPHOCYTES # BLD AUTO: 2.02 THOUSANDS/ΜL (ref 0.6–4.47)
LYMPHOCYTES NFR BLD AUTO: 30 % (ref 14–44)
MCH RBC QN AUTO: 29 PG (ref 26.8–34.3)
MCHC RBC AUTO-ENTMCNC: 30.3 G/DL (ref 31.4–37.4)
MCV RBC AUTO: 96 FL (ref 82–98)
MONOCYTES # BLD AUTO: 0.35 THOUSAND/ΜL (ref 0.17–1.22)
MONOCYTES NFR BLD AUTO: 5 % (ref 4–12)
NEUTROPHILS # BLD AUTO: 4.08 THOUSANDS/ΜL (ref 1.85–7.62)
NEUTS SEG NFR BLD AUTO: 63 % (ref 43–75)
NRBC BLD AUTO-RTO: 0 /100 WBCS
PLATELET # BLD AUTO: 190 THOUSANDS/UL (ref 149–390)
PMV BLD AUTO: 10.8 FL (ref 8.9–12.7)
POTASSIUM SERPL-SCNC: 3.9 MMOL/L (ref 3.5–5.3)
PROT SERPL-MCNC: 7.1 G/DL (ref 6.4–8.2)
RBC # BLD AUTO: 5.1 MILLION/UL (ref 3.88–5.62)
SODIUM SERPL-SCNC: 142 MMOL/L (ref 136–145)
WBC # BLD AUTO: 6.64 THOUSAND/UL (ref 4.31–10.16)

## 2020-11-06 PROCEDURE — 80053 COMPREHEN METABOLIC PANEL: CPT

## 2020-11-06 PROCEDURE — 36415 COLL VENOUS BLD VENIPUNCTURE: CPT

## 2020-11-06 PROCEDURE — 85025 COMPLETE CBC W/AUTO DIFF WBC: CPT

## 2020-11-09 ENCOUNTER — HOSPITAL ENCOUNTER (OUTPATIENT)
Dept: INFUSION CENTER | Facility: CLINIC | Age: 70
Discharge: HOME | End: 2020-11-09
Payer: MEDICARE

## 2020-11-09 ENCOUNTER — OFFICE VISIT (OUTPATIENT)
Dept: HEMATOLOGY ONCOLOGY | Facility: CLINIC | Age: 70
End: 2020-11-09
Payer: MEDICARE

## 2020-11-09 VITALS
HEART RATE: 80 BPM | RESPIRATION RATE: 18 BRPM | DIASTOLIC BLOOD PRESSURE: 90 MMHG | TEMPERATURE: 97.5 F | SYSTOLIC BLOOD PRESSURE: 152 MMHG

## 2020-11-09 VITALS
HEIGHT: 66 IN | HEART RATE: 83 BPM | DIASTOLIC BLOOD PRESSURE: 74 MMHG | SYSTOLIC BLOOD PRESSURE: 122 MMHG | BODY MASS INDEX: 43.39 KG/M2 | OXYGEN SATURATION: 92 % | RESPIRATION RATE: 18 BRPM | WEIGHT: 270 LBS | TEMPERATURE: 97.8 F

## 2020-11-09 DIAGNOSIS — C78.00 MALIGNANT NEOPLASM METASTATIC TO LUNG, UNSPECIFIED LATERALITY (HCC): ICD-10-CM

## 2020-11-09 DIAGNOSIS — C64.2 CLEAR CELL ADENOCARCINOMA OF KIDNEY, LEFT (HCC): Primary | ICD-10-CM

## 2020-11-09 DIAGNOSIS — C79.51 BONE METASTASES (HCC): Primary | ICD-10-CM

## 2020-11-09 DIAGNOSIS — C79.51 BONE METASTASES (HCC): ICD-10-CM

## 2020-11-09 DIAGNOSIS — R94.6 ABNORMAL RESULTS OF THYROID FUNCTION STUDIES: ICD-10-CM

## 2020-11-09 PROCEDURE — 99214 OFFICE O/P EST MOD 30 MIN: CPT | Performed by: INTERNAL MEDICINE

## 2020-11-09 PROCEDURE — 96401 CHEMO ANTI-NEOPL SQ/IM: CPT

## 2020-11-09 RX ADMIN — DENOSUMAB 120 MG: 120 INJECTION SUBCUTANEOUS at 10:07

## 2020-11-16 ENCOUNTER — TELEPHONE (OUTPATIENT)
Dept: HEMATOLOGY ONCOLOGY | Facility: CLINIC | Age: 70
End: 2020-11-16

## 2020-11-23 ENCOUNTER — TELEPHONE (OUTPATIENT)
Dept: NEUROSURGERY | Facility: CLINIC | Age: 70
End: 2020-11-23

## 2020-11-25 DIAGNOSIS — C78.00 MALIGNANT NEOPLASM METASTATIC TO LUNG, UNSPECIFIED LATERALITY (HCC): ICD-10-CM

## 2020-11-25 DIAGNOSIS — C79.51 BONE METASTASES (HCC): ICD-10-CM

## 2020-11-25 DIAGNOSIS — C64.2 RENAL CELL CARCINOMA OF LEFT KIDNEY METASTATIC TO OTHER SITE (HCC): ICD-10-CM

## 2020-12-02 ENCOUNTER — TELEPHONE (OUTPATIENT)
Dept: NEUROSURGERY | Facility: CLINIC | Age: 70
End: 2020-12-02

## 2020-12-03 DIAGNOSIS — C78.00 MALIGNANT NEOPLASM METASTATIC TO LUNG, UNSPECIFIED LATERALITY (HCC): Primary | ICD-10-CM

## 2020-12-03 DIAGNOSIS — C79.51 BONE METASTASES (HCC): ICD-10-CM

## 2020-12-03 DIAGNOSIS — C64.2 RENAL CELL CARCINOMA OF LEFT KIDNEY METASTATIC TO OTHER SITE (HCC): ICD-10-CM

## 2020-12-04 ENCOUNTER — TELEPHONE (OUTPATIENT)
Dept: HEMATOLOGY ONCOLOGY | Facility: CLINIC | Age: 70
End: 2020-12-04

## 2020-12-04 DIAGNOSIS — F41.9 ANXIETY: Primary | ICD-10-CM

## 2020-12-04 RX ORDER — LORAZEPAM 0.5 MG/1
0.5 TABLET ORAL EVERY 8 HOURS PRN
Qty: 2 TABLET | Refills: 0 | Status: SHIPPED | OUTPATIENT
Start: 2020-12-04

## 2020-12-09 ENCOUNTER — TELEPHONE (OUTPATIENT)
Dept: HEMATOLOGY ONCOLOGY | Facility: CLINIC | Age: 70
End: 2020-12-09

## 2020-12-09 DIAGNOSIS — C64.2 CLEAR CELL ADENOCARCINOMA OF KIDNEY, LEFT (HCC): ICD-10-CM

## 2020-12-09 DIAGNOSIS — C79.51 BONE METASTASES (HCC): ICD-10-CM

## 2020-12-09 DIAGNOSIS — C78.00 MALIGNANT NEOPLASM METASTATIC TO LUNG, UNSPECIFIED LATERALITY (HCC): ICD-10-CM

## 2020-12-09 RX ORDER — TRAMADOL HYDROCHLORIDE 50 MG/1
50 TABLET ORAL EVERY 6 HOURS PRN
Qty: 60 TABLET | Refills: 2 | Status: SHIPPED | OUTPATIENT
Start: 2020-12-09 | End: 2021-10-22 | Stop reason: SDUPTHER

## 2020-12-11 ENCOUNTER — HOSPITAL ENCOUNTER (OUTPATIENT)
Dept: NUCLEAR MEDICINE | Facility: HOSPITAL | Age: 70
Discharge: HOME | End: 2020-12-11
Attending: INTERNAL MEDICINE
Payer: MEDICARE

## 2020-12-11 DIAGNOSIS — C78.00 MALIGNANT NEOPLASM METASTATIC TO LUNG, UNSPECIFIED LATERALITY (HCC): ICD-10-CM

## 2020-12-11 DIAGNOSIS — C79.51 BONE METASTASES (HCC): ICD-10-CM

## 2020-12-11 DIAGNOSIS — C64.2 RENAL CELL CARCINOMA OF LEFT KIDNEY METASTATIC TO OTHER SITE (HCC): ICD-10-CM

## 2020-12-11 PROCEDURE — 78306 BONE IMAGING WHOLE BODY: CPT

## 2020-12-11 PROCEDURE — A9503 TC99M MEDRONATE: HCPCS

## 2020-12-11 PROCEDURE — G1004 CDSM NDSC: HCPCS

## 2020-12-14 ENCOUNTER — TELEPHONE (OUTPATIENT)
Dept: HEMATOLOGY ONCOLOGY | Facility: CLINIC | Age: 70
End: 2020-12-14

## 2020-12-15 ENCOUNTER — TELEPHONE (OUTPATIENT)
Dept: NEUROSURGERY | Facility: CLINIC | Age: 70
End: 2020-12-15

## 2021-01-04 DIAGNOSIS — C64.2 RENAL CELL CARCINOMA OF LEFT KIDNEY METASTATIC TO OTHER SITE (HCC): ICD-10-CM

## 2021-01-04 DIAGNOSIS — C79.51 BONE METASTASES (HCC): ICD-10-CM

## 2021-01-04 DIAGNOSIS — C78.00 MALIGNANT NEOPLASM METASTATIC TO LUNG, UNSPECIFIED LATERALITY (HCC): ICD-10-CM

## 2021-02-01 ENCOUNTER — HOSPITAL ENCOUNTER (OUTPATIENT)
Dept: INFUSION CENTER | Facility: CLINIC | Age: 71
End: 2021-02-01

## 2021-02-06 ENCOUNTER — HOSPITAL ENCOUNTER (OUTPATIENT)
Dept: CT IMAGING | Facility: HOSPITAL | Age: 71
Discharge: HOME | End: 2021-02-06
Attending: INTERNAL MEDICINE
Payer: MEDICARE

## 2021-02-06 DIAGNOSIS — C78.00 MALIGNANT NEOPLASM METASTATIC TO LUNG, UNSPECIFIED LATERALITY (HCC): ICD-10-CM

## 2021-02-06 DIAGNOSIS — C64.2 CLEAR CELL ADENOCARCINOMA OF KIDNEY, LEFT (HCC): ICD-10-CM

## 2021-02-06 DIAGNOSIS — C79.51 BONE METASTASES (HCC): ICD-10-CM

## 2021-02-06 PROCEDURE — 74177 CT ABD & PELVIS W/CONTRAST: CPT

## 2021-02-06 PROCEDURE — 71260 CT THORAX DX C+: CPT

## 2021-02-06 PROCEDURE — G1004 CDSM NDSC: HCPCS

## 2021-02-06 RX ADMIN — IOHEXOL 100 ML: 350 INJECTION, SOLUTION INTRAVENOUS at 09:24

## 2021-02-08 ENCOUNTER — LAB (OUTPATIENT)
Dept: LAB | Facility: MEDICAL CENTER | Age: 71
End: 2021-02-08
Payer: MEDICARE

## 2021-02-08 DIAGNOSIS — C79.51 BONE METASTASES (HCC): ICD-10-CM

## 2021-02-08 DIAGNOSIS — R94.6 ABNORMAL RESULTS OF THYROID FUNCTION STUDIES: ICD-10-CM

## 2021-02-08 DIAGNOSIS — C79.51 SPINE METASTASIS (HCC): ICD-10-CM

## 2021-02-08 DIAGNOSIS — C64.2 CLEAR CELL ADENOCARCINOMA OF KIDNEY, LEFT (HCC): ICD-10-CM

## 2021-02-08 DIAGNOSIS — C78.00 MALIGNANT NEOPLASM METASTATIC TO LUNG, UNSPECIFIED LATERALITY (HCC): ICD-10-CM

## 2021-02-08 LAB
ALBUMIN SERPL BCP-MCNC: 3.4 G/DL (ref 3.5–5)
ALP SERPL-CCNC: 72 U/L (ref 46–116)
ALT SERPL W P-5'-P-CCNC: 22 U/L (ref 12–78)
ANION GAP SERPL CALCULATED.3IONS-SCNC: 4 MMOL/L (ref 4–13)
AST SERPL W P-5'-P-CCNC: 21 U/L (ref 5–45)
BASOPHILS # BLD AUTO: 0.01 THOUSANDS/ΜL (ref 0–0.1)
BASOPHILS NFR BLD AUTO: 0 % (ref 0–1)
BILIRUB SERPL-MCNC: 0.79 MG/DL (ref 0.2–1)
BUN SERPL-MCNC: 22 MG/DL (ref 5–25)
CALCIUM ALBUM COR SERPL-MCNC: 10.3 MG/DL (ref 8.3–10.1)
CALCIUM SERPL-MCNC: 9.8 MG/DL (ref 8.3–10.1)
CHLORIDE SERPL-SCNC: 104 MMOL/L (ref 100–108)
CO2 SERPL-SCNC: 33 MMOL/L (ref 21–32)
CREAT SERPL-MCNC: 1.35 MG/DL (ref 0.6–1.3)
EOSINOPHIL # BLD AUTO: 0.14 THOUSAND/ΜL (ref 0–0.61)
EOSINOPHIL NFR BLD AUTO: 2 % (ref 0–6)
ERYTHROCYTE [DISTWIDTH] IN BLOOD BY AUTOMATED COUNT: 17.5 % (ref 11.6–15.1)
GFR SERPL CREATININE-BSD FRML MDRD: 52 ML/MIN/1.73SQ M
GLUCOSE SERPL-MCNC: 79 MG/DL (ref 65–140)
HCT VFR BLD AUTO: 50.4 % (ref 36.5–49.3)
HGB BLD-MCNC: 15.8 G/DL (ref 12–17)
IMM GRANULOCYTES # BLD AUTO: 0.02 THOUSAND/UL (ref 0–0.2)
IMM GRANULOCYTES NFR BLD AUTO: 0 % (ref 0–2)
LYMPHOCYTES # BLD AUTO: 3.15 THOUSANDS/ΜL (ref 0.6–4.47)
LYMPHOCYTES NFR BLD AUTO: 42 % (ref 14–44)
MCH RBC QN AUTO: 29.9 PG (ref 26.8–34.3)
MCHC RBC AUTO-ENTMCNC: 31.3 G/DL (ref 31.4–37.4)
MCV RBC AUTO: 96 FL (ref 82–98)
MONOCYTES # BLD AUTO: 0.41 THOUSAND/ΜL (ref 0.17–1.22)
MONOCYTES NFR BLD AUTO: 6 % (ref 4–12)
NEUTROPHILS # BLD AUTO: 3.71 THOUSANDS/ΜL (ref 1.85–7.62)
NEUTS SEG NFR BLD AUTO: 50 % (ref 43–75)
NRBC BLD AUTO-RTO: 0 /100 WBCS
PLATELET # BLD AUTO: 178 THOUSANDS/UL (ref 149–390)
PMV BLD AUTO: 11 FL (ref 8.9–12.7)
POTASSIUM SERPL-SCNC: 3.7 MMOL/L (ref 3.5–5.3)
PROT SERPL-MCNC: 7.1 G/DL (ref 6.4–8.2)
RBC # BLD AUTO: 5.28 MILLION/UL (ref 3.88–5.62)
SODIUM SERPL-SCNC: 141 MMOL/L (ref 136–145)
TSH SERPL DL<=0.05 MIU/L-ACNC: 1.88 UIU/ML (ref 0.36–3.74)
WBC # BLD AUTO: 7.44 THOUSAND/UL (ref 4.31–10.16)

## 2021-02-08 PROCEDURE — 36415 COLL VENOUS BLD VENIPUNCTURE: CPT

## 2021-02-08 PROCEDURE — 80053 COMPREHEN METABOLIC PANEL: CPT

## 2021-02-08 PROCEDURE — 85025 COMPLETE CBC W/AUTO DIFF WBC: CPT

## 2021-02-08 PROCEDURE — 84443 ASSAY THYROID STIM HORMONE: CPT

## 2021-02-10 ENCOUNTER — CLINICAL SUPPORT (OUTPATIENT)
Dept: RADIATION ONCOLOGY | Facility: HOSPITAL | Age: 71
End: 2021-02-10
Attending: RADIOLOGY
Payer: MEDICARE

## 2021-02-10 VITALS
WEIGHT: 281 LBS | HEART RATE: 92 BPM | HEIGHT: 66 IN | TEMPERATURE: 97.6 F | BODY MASS INDEX: 45.16 KG/M2 | DIASTOLIC BLOOD PRESSURE: 70 MMHG | SYSTOLIC BLOOD PRESSURE: 120 MMHG | OXYGEN SATURATION: 95 % | RESPIRATION RATE: 14 BRPM

## 2021-02-10 DIAGNOSIS — C79.51 BONE METASTASES (HCC): Primary | ICD-10-CM

## 2021-02-10 DIAGNOSIS — C64.9 METASTATIC RENAL CELL CARCINOMA, UNSPECIFIED LATERALITY (HCC): Primary | ICD-10-CM

## 2021-02-10 PROCEDURE — 99211 OFF/OP EST MAY X REQ PHY/QHP: CPT | Performed by: RADIOLOGY

## 2021-02-10 NOTE — PROGRESS NOTES
Follow-up - Radiation Oncology   Bright Bennett 1950 69 y o  male [id number]      History of Present Illness   Cancer Staging  No matching staging information was found for the patient  Interval History:  He returns for follow up post right ulnar radiation completed 5/31/17, right posterior 7th rib completed 8/3/17, Stereotactic radiation to T6 completed 10/27/17, SBRT to T10 completed 7/27/18 and SBRT to paraspinal mass at [facility] 11/23/18       Last seen for radiation telemedicine follow up on 4/22/20  At that time his MRI of the thoracic spine revealed stability  His CT scan from 4/20/20 revealed increased pulmonary metastasis as well as increased soft tissue mass the left nephrectomy bed   He was on Keytruda       7/14/20  CT head without contrast (Indication: fall  laceration/hematoma left fore head R/O ICH, fx)  IMPRESSION:   1   No acute intracranial abnormality      2   Small left frontal scalp laceration/hematoma without a calvarial fracture      7/14/20 CT Cervical spine (Indication: fall  face first on cement, r/o fracture)  IMPRESSION: No cervical spine fracture or traumatic malalignment      7/28/20 CT C/A/P   IMPRESSION:   Widespread metastatic disease is reidentified  Izzy Gunter, most tumor masses are decreased in size consistent with treatment response as described above   Some lesions are unchanged in size from prior examination   No new discrete metastatic lesions identified in the chest, abdomen or pelvis      8/3/20 Dr Noelle Carrion follow up - early May 2020 started on cabozantinib with expected side effects     "However, he discontinued cabozantinib 2 weeks ago due to the hand-foot reaction   His recent CT scan showed partial response   We discussed further treatment options   I recommended him to restart cabozantinib 40 mg daily  "  Restart denosumab every 3 months      11/9/20 Reba Lara MD  Clinically, he has no evidence of progression   I recommended him to continue with cabozantinib (Cometriq)  40 mg every other day     12/11/20 Bone scan  1   Scattered foci of increased radiotracer uptake compatible with osseous metastasis as noted above  Left scapular activity compatible with metastasis  No suspicious right shoulder activity  2   Faint soft tissue focus in the right posterior buttock region where there is a hyperdense soft tissue nodule on CT compatible with soft tissue metastasis      2/6/21 CT chest, abdomen and pelvis  IMPRESSION:     Reidentified, predominantly improved, widespread metastatic disease:   Improved pulmonary/parenchymal metastases  Improved adrenal metastases  Increased size of a nodule located in the left inferior pararenal space  Decreased right gluteal mass  Resolved 7 mm left paraspinal intramuscular and right chest wall lesions    Unchanged osseous metastases      New foci of metastases are not identified             Historical Information   Oncology History   Clear cell adenocarcinoma of kidney, left ([facility] [address] )   3/2/2018 Initial Diagnosis    Clear cell adenocarcinoma of kidney, left ([facility] [address] )     1/31/2020 - 4/22/2020 Chemotherapy    pembrolizumab (KEYTRUDA) 200 mg in sodium chloride 0 9 % 50 mL IVPB, 200 mg, Intravenous, Once, 4 of 9 cycles  Administration: 200 mg (1/31/2020), 200 mg (2/21/2020), 200 mg (3/13/2020), 200 mg (4/3/2020)     Bone metastases ([address] )   6/22/2016 Initial Diagnosis    Bone metastases ([address] )     3/8/2017 -  Chemotherapy    Axitinib 4 mg b i d        Denosumab 60 mg subcutaneously monthly initiated January 12, 2018 5/17/2017 - 5/31/2017 Radiation    palliative radiation therapy following ORIF of right ulna for metastatic renal cell carcinoma to 3000cGy     7/26/2017 - 8/3/2017 Radiation    palliative radiation therapy for metastatic renal carcinoma to the right posterior seventh rib with bone and soft tissue metastases to 2800cGy     10/18/2017 - 10/27/2017 Radiation    stereotactic radiation therapy to a T6 vertebral body metastasis to 3000cGy     7/18/2018 - 7/27/2018 Radiation    Stereotactic radiation to T10 metastasis to 3000 cGy in 5 fractions     11/14/2018 - 11/23/2018 Radiation    SBRT to paraspinal mass at T4 to 3000 cGy           1/31/2020 - 4/22/2020 Chemotherapy    pembrolizumab (KEYTRUDA) 200 mg in sodium chloride 0 9 % 50 mL IVPB, 200 mg, Intravenous, Once, 4 of 9 cycles  Administration: 200 mg (1/31/2020), 200 mg (2/21/2020), 200 mg (3/13/2020), 200 mg (4/3/2020)     Lung metastases ([address] )   3/2/2018 Initial Diagnosis    Lung metastases ([address] )     1/31/2020 - 4/22/2020 Chemotherapy    pembrolizumab (KEYTRUDA) 200 mg in sodium chloride 0 9 % 50 mL IVPB, 200 mg, Intravenous, Once, 4 of 9 cycles  Administration: 200 mg (1/31/2020), 200 mg (2/21/2020), 200 mg (3/13/2020), 200 mg (4/3/2020)     Spine metastasis ([address] )   7/11/2018 Initial Diagnosis    Spine metastasis ([address] )     1/31/2020 - 4/22/2020 Chemotherapy    pembrolizumab (KEYTRUDA) 200 mg in sodium chloride 0 9 % 50 mL IVPB, 200 mg, Intravenous, Once, 4 of 9 cycles  Administration: 200 mg (1/31/2020), 200 mg (2/21/2020), 200 mg (3/13/2020), 200 mg (4/3/2020)     Metastatic renal cell carcinoma ([address] )   9/26/2018 Initial Diagnosis    Metastatic renal cell carcinoma ([address] )         Past Medical History:   Diagnosis Date    Arthritis     Cancer ([address] )     prostate - mets to bone and lung    History of radiation therapy 07/27/2018    SBRT to T10 7/18-7/27/2018    Hyperlipidemia     Hypertension      Past Surgical History:   Procedure Laterality Date    DISTAL ULNA EXCISION Right     excision of tumor and orif with cement and screws    JOINT REPLACEMENT Bilateral     tkr's    LUNG SURGERY Right     exc of nodules    NEPHRECTOMY Left        Social History   Social History     Substance and Sexual Activity   Alcohol Use Not Currently    Comment: occasional use     Social History     Substance and Sexual Activity   Drug Use No     Social History     Tobacco Use   Smoking Status Former Smoker    Packs/day: 1 00    Types: Cigarettes    Quit date: 3/28/2003    Years since quittin 8   Smokeless Tobacco Never Used         Meds/Allergies     Current Outpatient Medications:     al mag oxide-diphenhydramine-lidocaine viscous (MAGIC MOUTHWASH) 1:1:1 suspension, Swish and spit 10 mL every 4 (four) hours as needed for mouth pain or discomfort, Disp: 250 mL, Rfl: 0    amLODIPine (NORVASC) 5 mg tablet, Take 5 mg by mouth daily  , Disp: , Rfl:     cabozantinib 40 mg TABS, Take 1 tablet (40 mg total) by mouth every other day, Disp: 15 tablet, Rfl: 5    cholecalciferol (VITAMIN D3) 1,000 units tablet, Take 1,000 Units by mouth daily, Disp: , Rfl:     diphenhydrAMINE (BENADRYL) 25 mg tablet, Take 25 mg by mouth daily at bedtime as needed for itching, Disp: , Rfl:     gabapentin (NEURONTIN) 100 mg capsule, Take 300 mg by mouth 3 (three) times a day , Disp: , Rfl:     lisinopril-hydrochlorothiazide (PRINZIDE,ZESTORETIC) 20-12 5 MG per tablet, Take 1 tablet by mouth 2 (two) times a day  , Disp: , Rfl:     loperamide (IMODIUM) 2 mg capsule, Take 2 mg by mouth 4 (four) times a day as needed for diarrhea, Disp: , Rfl:     naproxen sodium (ALEVE) 220 MG tablet, Take 220 mg by mouth as needed for mild pain, Disp: , Rfl:     traMADol (ULTRAM) 50 mg tablet, Take 1 tablet (50 mg total) by mouth every 6 (six) hours as needed for moderate pain, Disp: 60 tablet, Rfl: 2    trolamine salicylate (ASPERCREME) 10 % cream, Apply 1 application topically as needed for muscle/joint pain, Disp: , Rfl:     LORazepam (ATIVAN) 0 5 mg tablet, Take 1 tablet (0 5 mg total) by mouth every 8 (eight) hours as needed for anxiety (Patient not taking: Reported on 2/10/2021), Disp: 2 tablet, Rfl: 0    simvastatin (ZOCOR) 40 mg tablet, Take 40 mg by mouth, Disp: , Rfl:   No Known Allergies      Review of Systems   Constitutional: Negative   Negative for activity change, appetite change, chills, fatigue, fever and unexpected weight change  HENT: Positive for postnasal drip  Eyes:   Wears glasses   Respiratory: Negative  Negative for cough and shortness of breath  Cardiovascular: Negative  Negative for chest pain and leg swelling  Gastrointestinal: Negative for abdominal pain, blood in stool, constipation, diarrhea, nausea and vomiting  Endocrine: Negative  Genitourinary: Negative for difficulty urinating, dysuria and hematuria  Musculoskeletal: Positive for back pain  R shoulder pain   Skin: Negative  Allergic/Immunologic: Positive for environmental allergies  Neurological: Negative for dizziness, weakness, light-headedness and headaches  Hematological: Negative  Psychiatric/Behavioral: Negative  OBJECTIVE:   /70 (BP Location: Left arm, Patient Position: Sitting)   Pulse 92   Temp 97 6 °F (36 4 °C) (Temporal)   Resp 14   Ht 5' 6" (1 676 m)   Wt 127 kg (281 lb)   SpO2 95%   BMI 45 35 kg/m²   Pain Assessment:  0  ECOG/Zubrod/WHO: 1 - Symptomatic but completely ambulatory    Physical Exam    he has no significant tenderness to palpation on his spine  Minimal tenderness in the left scapular region  He is conversing appropriately  Breathing is unlabored  He is ambulating independently          RESULTS    Lab Results:   Recent Results (from the past 672 hour(s))   TSH, 3rd generation with Free T4 reflex    Collection Time: 02/08/21  1:21 PM   Result Value Ref Range    TSH 3RD GENERATON 1 880 0 358 - 3 740 uIU/mL   CBC and differential    Collection Time: 02/08/21  1:21 PM   Result Value Ref Range    WBC 7 44 4 31 - 10 16 Thousand/uL    RBC 5 28 3 88 - 5 62 Million/uL    Hemoglobin 15 8 12 0 - 17 0 g/dL    Hematocrit 50 4 (H) 36 5 - 49 3 %    MCV 96 82 - 98 fL    MCH 29 9 26 8 - 34 3 pg    MCHC 31 3 (L) 31 4 - 37 4 g/dL    RDW 17 5 (H) 11 6 - 15 1 %    MPV 11 0 8 9 - 12 7 fL    Platelets 173 427 - 001 Thousands/uL    nRBC 0 /100 WBCs    Neutrophils Relative 50 43 - 75 %    Immat GRANS % 0 0 - 2 %    Lymphocytes Relative 42 14 - 44 %    Monocytes Relative 6 4 - 12 %    Eosinophils Relative 2 0 - 6 %    Basophils Relative 0 0 - 1 %    Neutrophils Absolute 3 71 1 85 - 7 62 Thousands/µL    Immature Grans Absolute 0 02 0 00 - 0 20 Thousand/uL    Lymphocytes Absolute 3 15 0 60 - 4 47 Thousands/µL    Monocytes Absolute 0 41 0 17 - 1 22 Thousand/µL    Eosinophils Absolute 0 14 0 00 - 0 61 Thousand/µL    Basophils Absolute 0 01 0 00 - 0 10 Thousands/µL   Comprehensive metabolic panel    Collection Time: 02/08/21  1:21 PM   Result Value Ref Range    Sodium 141 136 - 145 mmol/L    Potassium 3 7 3 5 - 5 3 mmol/L    Chloride 104 100 - 108 mmol/L    CO2 33 (H) 21 - 32 mmol/L    ANION GAP 4 4 - 13 mmol/L    BUN 22 5 - 25 mg/dL    Creatinine 1 35 (H) 0 60 - 1 30 mg/dL    Glucose 79 65 - 140 mg/dL    Calcium 9 8 8 3 - 10 1 mg/dL    Corrected Calcium 10 3 (H) 8 3 - 10 1 mg/dL    AST 21 5 - 45 U/L    ALT 22 12 - 78 U/L    Alkaline Phosphatase 72 46 - 116 U/L    Total Protein 7 1 6 4 - 8 2 g/dL    Albumin 3 4 (L) 3 5 - 5 0 g/dL    Total Bilirubin 0 79 0 20 - 1 00 mg/dL    eGFR 52 ml/min/1 73sq m       Imaging Studies:Ct Chest Abdomen Pelvis W Contrast    Result Date: 2/9/2021  Narrative: CT CHEST, ABDOMEN AND PELVIS WITH IV CONTRAST INDICATION:   C64 2: Malignant neoplasm of left kidney, except renal pelvis C78 00: Secondary malignant neoplasm of unspecified lung C79 51: Secondary malignant neoplasm of bone  Metastatic left renal cell carcinoma status post left nephrectomy  COMPARISON:  None  TECHNIQUE: CT examination of the chest, abdomen and pelvis was performed  Axial, sagittal, and coronal 2D reformatted images were created from the source data and submitted for interpretation  Radiation dose length product (DLP) for this visit:  0704 mGy-cm     This examination, like all CT scans performed in the [facility], was performed utilizing techniques to minimize radiation dose exposure, including the use of iterative reconstruction and automated exposure control  IV Contrast:  100 mL of iohexol (OMNIPAQUE) Enteric Contrast: Enteric contrast was administered  FINDINGS: CHEST LUNGS:  Reidentified chronic consolidative density in the medial posterior right upper lobe #3/38 unchanged from the prior study with associated pleural disease  Similar right lower lobe consolidation adjacent to an expansile seventh rib lesion  Unchanged 15 x 10 mm left upper lobe nodule #3/45  Unchanged 3 mm left upper lobe nodule #3/32  Decreased size of a 9 x 8 mm left lower lobe nodule previously measured 12 x 9 mm  #3/62  Decreased size of a right lower lobe nodule with coarse calcifications now measuring 14 x 8 mm previously measured 21 x 13 mm  The previously seen 18 x 10 mm right lower lobe nodule adjacent to the right lower lobe consolidative density is no longer well visualized  Unchanged 7 mm right middle lobe nodule  #3/80  PLEURA:  Unchanged pleural-based densities described above in the lung section  No pleural effusion  HEART/GREAT VESSELS: No pericardial effusion  Previously described ascending aortic aneurysm is less well characterized on today's study which is slightly limited by motion artifact through the ascending aorta  MEDIASTINUM AND CHERRIE:  Unremarkable  CHEST WALL AND LOWER NECK:   Unchanged thyroid nodules  Previously seen mass within the intercostal muscles anterior to the right eighth rib is no longer identified  ABDOMEN LIVER/BILIARY TREE:  Unremarkable  GALLBLADDER:  No calcified gallstones  No pericholecystic inflammatory change  SPLEEN:  Unremarkable  PANCREAS:  Unremarkable  ADRENAL GLANDS:  The right adrenal mass is no longer identified  Mild residual thickening of the right adrenal  Decreased size of the known left adrenal mass now measures 28 x 12 mm  I remeasured on the prior study at 32 x 18 mm  KIDNEYS/URETERS:  Status post left nephrectomy  Stable right renal cortical and parapelvic cysts  Increased size of the left inferior pararenal space nodule now measuring 20 x 19 mm previously measured 15 x 12 mm  STOMACH AND BOWEL:  There is colonic diverticulosis without evidence of acute diverticulitis  APPENDIX:  Noninflamed  ABDOMINOPELVIC CAVITY:  No ascites  No pneumoperitoneum  No lymphadenopathy  VESSELS:  Unremarkable for patient's age  PELVIS REPRODUCTIVE ORGANS:  Unremarkable for patient's age  URINARY BLADDER:  Unremarkable  ABDOMINAL WALL/INGUINAL REGIONS:  Unchanged uncomplicated small fat-containing paraumbilical hernia and unchanged bilateral fat-containing inguinal hernias  Decreased size of the right gluteal mass now measures 20 mm and previously measured 35 mm  #2/134  Previously seen 7 mm left paraspinal intramuscular lesion is no longer identified  OSSEOUS STRUCTURES:  Unchanged expansile right seventh and eighth rib metastases  Unchanged expansile right iliac wing lesion  2 lucent lesions of the medial right ilium #2/105 and #2/120 are unchanged  Unchanged lytic left iliac lesion adjacent to the left SI joint  Unchanged T4, T6 and L4 lesions  No acute pathologic fracture  Lower lumbar postsurgical changes  Impression: Reidentified, predominantly improved, widespread metastatic disease: Improved pulmonary/parenchymal metastases  Improved adrenal metastases  Increased size of a nodule located in the left inferior pararenal space  Decreased right gluteal mass  Resolved 7 mm left paraspinal intramuscular and right chest wall lesions  Unchanged osseous metastases  New foci of metastases are not identified  Workstation performed: [id number]           Assessment/Plan:  Orders Placed This Encounter   Procedures    MRI thoracic spine w wo contrast        Nica Smith is a 69y o  year old male with   Metastatic renal cell carcinoma  He states he was unable to perform his MRI scan  However he underwent bone scan and CT scan instead  He has osseous metastasis is relatively stable    His bone scan did show increased uptake in the left scapula which he states  Is not painful  He continues on systemic therapy with Medical Oncology  I have ordered follow-up MRI of the spine in 6 months and he will return to Neuro-Oncology Clinic thereafter  Dalila Woodson MD  9/69/3030,3:65 PM    Portions of the record may have been created with voice recognition software   Occasional wrong word or "sound a like" substitutions may have occurred due to the inherent limitations of voice recognition software   Read the chart carefully and recognize, using context, where substitutions have occurred

## 2021-02-11 ENCOUNTER — TELEPHONE (OUTPATIENT)
Dept: HEMATOLOGY ONCOLOGY | Facility: MEDICAL CENTER | Age: 71
End: 2021-02-11

## 2021-02-11 NOTE — TELEPHONE ENCOUNTER
COVID Screening   Patient called in to go over screening questions     Response was negative    Important Notes    Went over appointment details, patient voiced understanding

## 2021-02-12 ENCOUNTER — OFFICE VISIT (OUTPATIENT)
Dept: HEMATOLOGY ONCOLOGY | Facility: CLINIC | Age: 71
End: 2021-02-12
Payer: MEDICARE

## 2021-02-12 ENCOUNTER — TELEPHONE (OUTPATIENT)
Dept: HEMATOLOGY ONCOLOGY | Facility: CLINIC | Age: 71
End: 2021-02-12

## 2021-02-12 ENCOUNTER — DOCUMENTATION (OUTPATIENT)
Dept: HEMATOLOGY ONCOLOGY | Facility: CLINIC | Age: 71
End: 2021-02-12

## 2021-02-12 ENCOUNTER — HOSPITAL ENCOUNTER (OUTPATIENT)
Dept: INFUSION CENTER | Facility: CLINIC | Age: 71
Discharge: HOME | End: 2021-02-12
Payer: MEDICARE

## 2021-02-12 VITALS
SYSTOLIC BLOOD PRESSURE: 152 MMHG | WEIGHT: 284 LBS | HEART RATE: 83 BPM | BODY MASS INDEX: 45.64 KG/M2 | TEMPERATURE: 97.9 F | HEIGHT: 66 IN | RESPIRATION RATE: 18 BRPM | DIASTOLIC BLOOD PRESSURE: 90 MMHG | OXYGEN SATURATION: 90 %

## 2021-02-12 VITALS
HEART RATE: 85 BPM | SYSTOLIC BLOOD PRESSURE: 153 MMHG | DIASTOLIC BLOOD PRESSURE: 92 MMHG | RESPIRATION RATE: 18 BRPM | TEMPERATURE: 97.3 F

## 2021-02-12 DIAGNOSIS — C64.2 CLEAR CELL ADENOCARCINOMA OF KIDNEY, LEFT (HCC): Primary | ICD-10-CM

## 2021-02-12 DIAGNOSIS — C64.2 RENAL CELL CARCINOMA OF LEFT KIDNEY METASTATIC TO OTHER SITE (HCC): ICD-10-CM

## 2021-02-12 DIAGNOSIS — C79.51 BONE METASTASES (HCC): ICD-10-CM

## 2021-02-12 DIAGNOSIS — C79.51 BONE METASTASES (HCC): Primary | ICD-10-CM

## 2021-02-12 DIAGNOSIS — C78.00 MALIGNANT NEOPLASM METASTATIC TO LUNG, UNSPECIFIED LATERALITY (HCC): ICD-10-CM

## 2021-02-12 DIAGNOSIS — E03.2 HYPOTHYROIDISM DUE TO MEDICAMENTS AND OTHER EXOGENOUS SUBSTANCES: ICD-10-CM

## 2021-02-12 PROCEDURE — 99214 OFFICE O/P EST MOD 30 MIN: CPT | Performed by: INTERNAL MEDICINE

## 2021-02-12 PROCEDURE — 96401 CHEMO ANTI-NEOPL SQ/IM: CPT

## 2021-02-12 RX ADMIN — DENOSUMAB 120 MG: 120 INJECTION SUBCUTANEOUS at 09:47

## 2021-02-12 NOTE — PROGRESS NOTES
Patient arrived on unit for [facility]  Denies any current issues/concerns  Denies any dental pain/up coming dental procedures  Tolerated injection in [place]  Aware of next appointment   Appointment card given to patient

## 2021-02-12 NOTE — PROGRESS NOTES
2-12-21  Received Restart email from providers office- Aide Snyder is already in place    Patient is enrolled with [address] ID#  [id number]  CARD#  [id number]  PCN#  [id number]  GRP#  [id number]  BIN#  [id number]  AMT $ 10,000    EFF 2-12-21    email to team, epic updated

## 2021-02-12 NOTE — TELEPHONE ENCOUNTER
I called the patient and left a message that he is scheduled for a CT scan and follow up with labs to be completed prior to the CT scan  Follow up is scheduled for 8/12 @ 8:40 am  CT scan is scheduled for 8/5 @ 9 am at the [facility]  I briefly went over the instructions of the CT real quick and voiced to call the office if the appointments don't work and if there are any questions

## 2021-02-12 NOTE — PROGRESS NOTES
Hematology / Oncology Outpatient Follow Up Note    Jose Gay 69 y o  male :1950 [id number]         Date:  2021    Assessment / Plan:  A 69-year-old gentleman who was diagnosed with localized renal cell carcinoma in 2007  He developed metastatic disease in   He was previously on sunitinib as well as axitinib   Most recently, he was on combination of axitinib and pembrolizumab under the care of Dr Clabe Opitz  [facility] disease progressed on this regimen   Therefore, since early May 2020, he has been on cabozantinib with expected side effects  He is currently on cabozantinib 40 mg every other day with excellent tolerance  Based on recent CT scan, he has continuous responding disease  He has very minimal toxicity with current regimen  I recommended him to continue with cabozantinib 40 mg every other day  I will see him again in 6 months with CBC, CMP and TSH as well as CT scan of chest abdomen pelvis  He is in agreement with my recommendations       Subjective:      HPI:             Interval History:  A 69-year-old gentleman who has history of left nephrectomy for renal cell carcinoma in 2007  He was well until  when he was found to have lung metastasis   He was watched until 2015 when he started sunitinib until 2016  Since then, he has been on axitinib which he is still on   In 2019, CT scan showed progression of tumor in the renal bed as well as some mixed response was lung metastasis   Therefore, Dr Clabe Opitz at [facility] has no immune related toxicity with pembrolizumab   However, he had progressive disease in May 2021 his systemic therapy was changed to cabozantinib  Because of the initial toxicity, he is currently on cabozantinib 40 mg every other day with excellent tolerance  He presents today for routine follow-up  He feels well  He has no GI toxicity  His weight is stable  He has no cough, sputum production    He has very minimal exertional shortness of breath  He has very minimal pain  His performance status is 1/4 on the ECOG scale         Objective:      Primary Diagnosis:     1  Localized renal cell carcinoma T1 NX M0 grade 2-3 diagnosed in August 2007      2  Metastatic renal cell carcinoma, diagnosed in 2013       Cancer Staging:  Cancer Staging  No matching staging information was found for the patient         Previous Hematologic/ Oncologic Treatment:      1  Sunitinib from March 2015 through December 2016    2  Axitinib monotherapy from December 2016 through November 2019    3  Axitinib with pembrolizumab from November 2019 through April 2020       Current Hematologic/ Oncologic Treatment:       Cabozantinib since May 2020   currently 40 mg every other day      Disease Status:      Partial response on cabozantinib      Test Results:     Pathology:           Radiology:     CT scan of chest abdomen pelvis in   February 2021 showed decreased size of widespread metastasis in lung, adrenal gland, renal bed      Laboratory:     See below   Normal TSH      Physical Exam:        General Appearance:    Alert, oriented          Eyes:    PERRL   Ears:    Normal external ear canals, both ears   Nose:   Nares normal, septum midline   Throat:   Mucosa moist  Pharynx without injection  Neck:   Supple         Lungs:     Clear to auscultation bilaterally   Chest Wall:    No tenderness or deformity    Heart:    Regular rate and rhythm         Abdomen:     Soft, non-tender, bowel sounds +, no organomegaly               Extremities:   Extremities no cyanosis or edema         Skin:   no rash or icterus  Lymph nodes:   Cervical, supraclavicular, and axillary nodes normal   Neurologic:   CNII-XII intact, normal strength, sensation and reflexes     Throughout             Breast exam:   NA           ROS: Review of Systems   All other systems reviewed and are negative            Imaging: Ct Chest Abdomen Pelvis W Contrast    Result Date: 2/9/2021  Narrative: CT CHEST, ABDOMEN AND PELVIS WITH IV CONTRAST INDICATION:   C64 2: Malignant neoplasm of left kidney, except renal pelvis C78 00: Secondary malignant neoplasm of unspecified lung C79 51: Secondary malignant neoplasm of bone  Metastatic left renal cell carcinoma status post left nephrectomy  COMPARISON:  None  TECHNIQUE: CT examination of the chest, abdomen and pelvis was performed  Axial, sagittal, and coronal 2D reformatted images were created from the source data and submitted for interpretation  Radiation dose length product (DLP) for this visit:  4893 mGy-cm   This examination, like all CT scans performed in the [facility], was performed utilizing techniques to minimize radiation dose exposure, including the use of iterative reconstruction and automated exposure control  IV Contrast:  100 mL of iohexol (OMNIPAQUE) Enteric Contrast: Enteric contrast was administered  FINDINGS: CHEST LUNGS:  Reidentified chronic consolidative density in the medial posterior right upper lobe #3/38 unchanged from the prior study with associated pleural disease  Similar right lower lobe consolidation adjacent to an expansile seventh rib lesion  Unchanged 15 x 10 mm left upper lobe nodule #3/45  Unchanged 3 mm left upper lobe nodule #3/32  Decreased size of a 9 x 8 mm left lower lobe nodule previously measured 12 x 9 mm  #3/62  Decreased size of a right lower lobe nodule with coarse calcifications now measuring 14 x 8 mm previously measured 21 x 13 mm  The previously seen 18 x 10 mm right lower lobe nodule adjacent to the right lower lobe consolidative density is no longer well visualized  Unchanged 7 mm right middle lobe nodule  #3/80  PLEURA:  Unchanged pleural-based densities described above in the lung section  No pleural effusion  HEART/GREAT VESSELS: No pericardial effusion   Previously described ascending aortic aneurysm is less well characterized on today's study which is slightly limited by motion artifact through the ascending aorta  MEDIASTINUM AND CHERRIE:  Unremarkable  CHEST WALL AND LOWER NECK:   Unchanged thyroid nodules  Previously seen mass within the intercostal muscles anterior to the right eighth rib is no longer identified  ABDOMEN LIVER/BILIARY TREE:  Unremarkable  GALLBLADDER:  No calcified gallstones  No pericholecystic inflammatory change  SPLEEN:  Unremarkable  PANCREAS:  Unremarkable  ADRENAL GLANDS:  The right adrenal mass is no longer identified  Mild residual thickening of the right adrenal  Decreased size of the known left adrenal mass now measures 28 x 12 mm  I remeasured on the prior study at 32 x 18 mm  KIDNEYS/URETERS:  Status post left nephrectomy  Stable right renal cortical and parapelvic cysts  Increased size of the left inferior pararenal space nodule now measuring 20 x 19 mm previously measured 15 x 12 mm  STOMACH AND BOWEL:  There is colonic diverticulosis without evidence of acute diverticulitis  APPENDIX:  Noninflamed  ABDOMINOPELVIC CAVITY:  No ascites  No pneumoperitoneum  No lymphadenopathy  VESSELS:  Unremarkable for patient's age  PELVIS REPRODUCTIVE ORGANS:  Unremarkable for patient's age  URINARY BLADDER:  Unremarkable  ABDOMINAL WALL/INGUINAL REGIONS:  Unchanged uncomplicated small fat-containing paraumbilical hernia and unchanged bilateral fat-containing inguinal hernias  Decreased size of the right gluteal mass now measures 20 mm and previously measured 35 mm  #2/134  Previously seen 7 mm left paraspinal intramuscular lesion is no longer identified  OSSEOUS STRUCTURES:  Unchanged expansile right seventh and eighth rib metastases  Unchanged expansile right iliac wing lesion  2 lucent lesions of the medial right ilium #2/105 and #2/120 are unchanged  Unchanged lytic left iliac lesion adjacent to the left SI joint  Unchanged T4, T6 and L4 lesions  No acute pathologic fracture  Lower lumbar postsurgical changes       Impression: Reidentified, predominantly improved, widespread metastatic disease: Improved pulmonary/parenchymal metastases  Improved adrenal metastases  Increased size of a nodule located in the left inferior pararenal space  Decreased right gluteal mass  Resolved 7 mm left paraspinal intramuscular and right chest wall lesions  Unchanged osseous metastases  New foci of metastases are not identified  Workstation performed: [id number]         Labs:   Lab Results   Component Value Date    WBC 7 44 02/08/2021    HGB 15 8 02/08/2021    HCT 50 4 (H) 02/08/2021    MCV 96 02/08/2021     02/08/2021     Lab Results   Component Value Date     12/10/2015    K 3 7 02/08/2021     02/08/2021    CO2 33 (H) 02/08/2021    ANIONGAP 7 12/10/2015    BUN 22 02/08/2021    CREATININE 1 35 (H) 02/08/2021    GLUCOSE 102 12/10/2015    GLUF 78 11/06/2020    CALCIUM 9 8 02/08/2021    CORRECTEDCA 10 3 (H) 02/08/2021    AST 21 02/08/2021    ALT 22 02/08/2021    ALKPHOS 72 02/08/2021    PROT 6 9 12/10/2015    BILITOT 0 71 12/10/2015    EGFR 52 02/08/2021         Lab Results   Component Value Date     01/29/2020         Lab Results   Component Value Date    PSA <0 1 09/23/2019    PSA <0 1 12/22/2017    PSA <0 1 12/10/2015         Current Medications: Reviewed  Allergies: Reviewed  PMH/FH/SH:  Reviewed      Vital Sign:    Body surface area is 2 32 meters squared      Wt Readings from Last 3 Encounters:   02/12/21 129 kg (284 lb)   02/10/21 127 kg (281 lb)   11/09/20 122 kg (270 lb)        Temp Readings from Last 3 Encounters:   02/12/21 97 9 °F (36 6 °C) (Tympanic Core)   02/10/21 97 6 °F (36 4 °C) (Temporal)   11/09/20 97 5 °F (36 4 °C) (Temporal)        BP Readings from Last 3 Encounters:   02/12/21 152/90   02/10/21 120/70   11/09/20 152/90         Pulse Readings from Last 3 Encounters:   02/12/21 83   02/10/21 92   11/09/20 80     @LASTSAO2(3)@

## 2021-03-10 DIAGNOSIS — Z23 ENCOUNTER FOR IMMUNIZATION: ICD-10-CM

## 2021-05-03 ENCOUNTER — APPOINTMENT (OUTPATIENT)
Dept: LAB | Facility: MEDICAL CENTER | Age: 71
End: 2021-05-03
Payer: MEDICARE

## 2021-05-03 DIAGNOSIS — C64.2 CLEAR CELL ADENOCARCINOMA OF KIDNEY, LEFT (HCC): ICD-10-CM

## 2021-05-03 DIAGNOSIS — E03.2 HYPOTHYROIDISM DUE TO MEDICAMENTS AND OTHER EXOGENOUS SUBSTANCES: ICD-10-CM

## 2021-05-03 LAB
ALBUMIN SERPL BCP-MCNC: 3.3 G/DL (ref 3.5–5)
ALP SERPL-CCNC: 69 U/L (ref 46–116)
ALT SERPL W P-5'-P-CCNC: 21 U/L (ref 12–78)
ANION GAP SERPL CALCULATED.3IONS-SCNC: 3 MMOL/L (ref 4–13)
AST SERPL W P-5'-P-CCNC: 21 U/L (ref 5–45)
BASOPHILS # BLD AUTO: 0.03 THOUSANDS/ΜL (ref 0–0.1)
BASOPHILS NFR BLD AUTO: 0 % (ref 0–1)
BILIRUB SERPL-MCNC: 0.76 MG/DL (ref 0.2–1)
BUN SERPL-MCNC: 19 MG/DL (ref 5–25)
CALCIUM ALBUM COR SERPL-MCNC: 10.9 MG/DL (ref 8.3–10.1)
CALCIUM SERPL-MCNC: 10.3 MG/DL (ref 8.3–10.1)
CHLORIDE SERPL-SCNC: 105 MMOL/L (ref 100–108)
CO2 SERPL-SCNC: 32 MMOL/L (ref 21–32)
CREAT SERPL-MCNC: 1.34 MG/DL (ref 0.6–1.3)
EOSINOPHIL # BLD AUTO: 0.17 THOUSAND/ΜL (ref 0–0.61)
EOSINOPHIL NFR BLD AUTO: 2 % (ref 0–6)
ERYTHROCYTE [DISTWIDTH] IN BLOOD BY AUTOMATED COUNT: 17.1 % (ref 11.6–15.1)
GFR SERPL CREATININE-BSD FRML MDRD: 53 ML/MIN/1.73SQ M
GLUCOSE SERPL-MCNC: 79 MG/DL (ref 65–140)
HCT VFR BLD AUTO: 52.1 % (ref 36.5–49.3)
HGB BLD-MCNC: 16.1 G/DL (ref 12–17)
IMM GRANULOCYTES # BLD AUTO: 0.01 THOUSAND/UL (ref 0–0.2)
IMM GRANULOCYTES NFR BLD AUTO: 0 % (ref 0–2)
LYMPHOCYTES # BLD AUTO: 3.1 THOUSANDS/ΜL (ref 0.6–4.47)
LYMPHOCYTES NFR BLD AUTO: 42 % (ref 14–44)
MCH RBC QN AUTO: 29.9 PG (ref 26.8–34.3)
MCHC RBC AUTO-ENTMCNC: 30.9 G/DL (ref 31.4–37.4)
MCV RBC AUTO: 97 FL (ref 82–98)
MONOCYTES # BLD AUTO: 0.53 THOUSAND/ΜL (ref 0.17–1.22)
MONOCYTES NFR BLD AUTO: 7 % (ref 4–12)
NEUTROPHILS # BLD AUTO: 3.55 THOUSANDS/ΜL (ref 1.85–7.62)
NEUTS SEG NFR BLD AUTO: 49 % (ref 43–75)
NRBC BLD AUTO-RTO: 0 /100 WBCS
PLATELET # BLD AUTO: 194 THOUSANDS/UL (ref 149–390)
PMV BLD AUTO: 10.7 FL (ref 8.9–12.7)
POTASSIUM SERPL-SCNC: 3.6 MMOL/L (ref 3.5–5.3)
PROT SERPL-MCNC: 7.4 G/DL (ref 6.4–8.2)
RBC # BLD AUTO: 5.38 MILLION/UL (ref 3.88–5.62)
SODIUM SERPL-SCNC: 140 MMOL/L (ref 136–145)
TSH SERPL DL<=0.05 MIU/L-ACNC: 1.14 UIU/ML (ref 0.36–3.74)
WBC # BLD AUTO: 7.39 THOUSAND/UL (ref 4.31–10.16)

## 2021-05-03 PROCEDURE — 36415 COLL VENOUS BLD VENIPUNCTURE: CPT

## 2021-05-03 PROCEDURE — 80053 COMPREHEN METABOLIC PANEL: CPT

## 2021-05-03 PROCEDURE — 85025 COMPLETE CBC W/AUTO DIFF WBC: CPT

## 2021-05-03 PROCEDURE — 84443 ASSAY THYROID STIM HORMONE: CPT

## 2021-05-05 ENCOUNTER — HOSPITAL ENCOUNTER (OUTPATIENT)
Dept: INFUSION CENTER | Facility: CLINIC | Age: 71
Discharge: HOME | End: 2021-05-05
Payer: MEDICARE

## 2021-05-05 DIAGNOSIS — C79.51 BONE METASTASES (HCC): Primary | ICD-10-CM

## 2021-05-05 PROCEDURE — 96401 CHEMO ANTI-NEOPL SQ/IM: CPT

## 2021-05-05 RX ORDER — MULTIVITAMIN
1 TABLET ORAL DAILY
COMMUNITY

## 2021-05-05 RX ADMIN — DENOSUMAB 120 MG: 120 INJECTION SUBCUTANEOUS at 14:32

## 2021-05-05 NOTE — PLAN OF CARE
Problem: Potential for Falls  Goal: Patient will remain free of falls  Description: INTERVENTIONS:  - Assess patient frequently for physical needs  -  Identify cognitive and physical deficits and behaviors that affect risk of falls    -  [place] fall precautions as indicated by assessment   - Educate patient/family on patient safety including physical limitations  - Instruct patient to call for assistance with activity based on assessment  - Modify environment to reduce risk of injury  - Consider OT/PT consult to assist with strengthening/mobility  Outcome: Progressing

## 2021-05-05 NOTE — PROGRESS NOTES
Patient arrived to the unit and denied dental concerns or jaw pain  Patient's ca is 10  3  he reported that he has been taking a lot of vitamins  He was advised to talk to his Dr about his vitamins  Patient tolerated his xgeva injection in his left arm well without adverse reaction

## 2021-06-30 ENCOUNTER — TELEPHONE (OUTPATIENT)
Dept: RADIATION ONCOLOGY | Facility: HOSPITAL | Age: 71
End: 2021-06-30

## 2021-06-30 NOTE — TELEPHONE ENCOUNTER
Returned call to patient regarding his concern of not being able to get through his MRI of the spine scheduled for 8/2/21, ordered by Dr Lissa Collado  He reports that when he lays flat, he gets pain between his shoulder blades that radiates down his arms  Pain is controlled if he can have a pillow to lay on or if his arms are positioned a certain way  He states that one time during a CT scan some sort of a sling was used on his arms to relieve pressure on his back  He states that with an MRI they can not use pillows or slings  He also states that if he takes his tramadol, this does not given him enough relief of the pain to get through the MRI either  Currently for pain he is taking Aleve 2 tabs every morning and at bedtime  He only takes the tramadol before going to doctors office appointments because this also helps to decrease his BP  He states he does not like to take the tramadol to often because he gets constipated  Melyssa Milan was informed that Dr Lissa Collado is out of the office this week, I will discuss his concerns with her next week and get back to him with her recommendations  Patient verbalized understanding

## 2021-07-06 ENCOUNTER — TELEPHONE (OUTPATIENT)
Dept: RADIATION ONCOLOGY | Facility: CLINIC | Age: 71
End: 2021-07-06

## 2021-07-06 NOTE — TELEPHONE ENCOUNTER
Dr Austin Bradford informed that patient called last week to let us know that he is unable to lay flat on his back for scheduled MRI of the thoracic spine on 8/2/21 due to upper back and arm pain  He is scheduled for CT of the chest abdomen pelvis on 8/5/21, ordered by Dr Angel Luis Brownr  Per Dr Austin Bradford,  MRI of the spine to be canceled (done)  8/5/21 CT scan to be reviewed at neuro oncology working group on 8/11/21  Karyle Jourdain already has a follow up  scheduled at neuro oncology clinic the same day (8/11/21)  Karyle Jourdain was called and is aware of above

## 2021-07-09 ENCOUNTER — TELEPHONE (OUTPATIENT)
Dept: HEMATOLOGY ONCOLOGY | Facility: CLINIC | Age: 71
End: 2021-07-09

## 2021-07-09 NOTE — TELEPHONE ENCOUNTER
Appointment Cancellation Or Reschedule     Person calling in patient   Provider flora   Office Visit Date and Time 8-12-21 840am   Office Visit Location [place]   Did patient want to reschedule their office appointment? If so, when was it scheduled to? Yes 8-9-21 2pm   Is this patient calling to reschedule an infusion appointment? no   When is their next infusion appointment? 7-28-21   Is this patient a Chemo patient? yes   Reason for Cancellation or Reschedule conflict     If the patient is a treatment patient, please route this to the office nurse  If the patient is not on treatment, please route to the office MA

## 2021-07-09 NOTE — PATIENT INSTRUCTIONS
The patient and his son Cyndee Meigs was with him in the office today are aware to seek medical attention for cough, shortness of breath, nosebleeds, easy bruising, reduced appetite, loose bowel movements, recurrence of posterior neck pain, progressive right mid posterior chest pain, paresthesias of the upper extremities, difficulty with ambulation, easy fatigue, or if other new problems arise    Attending Attestation (For Attendings USE Only)...

## 2021-07-14 DIAGNOSIS — C64.2 RENAL CELL CARCINOMA OF LEFT KIDNEY METASTATIC TO OTHER SITE (HCC): ICD-10-CM

## 2021-07-14 DIAGNOSIS — C78.00 MALIGNANT NEOPLASM METASTATIC TO LUNG, UNSPECIFIED LATERALITY (HCC): ICD-10-CM

## 2021-07-14 DIAGNOSIS — C79.51 BONE METASTASES (HCC): ICD-10-CM

## 2021-07-27 ENCOUNTER — TELEPHONE (OUTPATIENT)
Dept: HEMATOLOGY ONCOLOGY | Facility: CLINIC | Age: 71
End: 2021-07-27

## 2021-07-27 ENCOUNTER — APPOINTMENT (OUTPATIENT)
Dept: LAB | Facility: MEDICAL CENTER | Age: 71
End: 2021-07-27
Payer: MEDICARE

## 2021-07-27 DIAGNOSIS — E03.2 HYPOTHYROIDISM DUE TO MEDICAMENTS AND OTHER EXOGENOUS SUBSTANCES: ICD-10-CM

## 2021-07-27 DIAGNOSIS — C78.00 MALIGNANT NEOPLASM METASTATIC TO LUNG, UNSPECIFIED LATERALITY (HCC): ICD-10-CM

## 2021-07-27 DIAGNOSIS — C78.00 MALIGNANT NEOPLASM METASTATIC TO LUNG, UNSPECIFIED LATERALITY (HCC): Primary | ICD-10-CM

## 2021-07-27 LAB
ALBUMIN SERPL BCP-MCNC: 2.9 G/DL (ref 3.5–5)
ALP SERPL-CCNC: 65 U/L (ref 46–116)
ALT SERPL W P-5'-P-CCNC: 18 U/L (ref 12–78)
ANION GAP SERPL CALCULATED.3IONS-SCNC: 3 MMOL/L (ref 4–13)
AST SERPL W P-5'-P-CCNC: 20 U/L (ref 5–45)
BASOPHILS # BLD AUTO: 0.02 THOUSANDS/ΜL (ref 0–0.1)
BASOPHILS NFR BLD AUTO: 0 % (ref 0–1)
BILIRUB SERPL-MCNC: 0.78 MG/DL (ref 0.2–1)
BUN SERPL-MCNC: 18 MG/DL (ref 5–25)
CALCIUM ALBUM COR SERPL-MCNC: 10.4 MG/DL (ref 8.3–10.1)
CALCIUM SERPL-MCNC: 9.5 MG/DL (ref 8.3–10.1)
CHLORIDE SERPL-SCNC: 105 MMOL/L (ref 100–108)
CO2 SERPL-SCNC: 30 MMOL/L (ref 21–32)
CREAT SERPL-MCNC: 1.26 MG/DL (ref 0.6–1.3)
EOSINOPHIL # BLD AUTO: 0.14 THOUSAND/ΜL (ref 0–0.61)
EOSINOPHIL NFR BLD AUTO: 2 % (ref 0–6)
ERYTHROCYTE [DISTWIDTH] IN BLOOD BY AUTOMATED COUNT: 17.2 % (ref 11.6–15.1)
GFR SERPL CREATININE-BSD FRML MDRD: 57 ML/MIN/1.73SQ M
GLUCOSE SERPL-MCNC: 121 MG/DL (ref 65–140)
HCT VFR BLD AUTO: 50.2 % (ref 36.5–49.3)
HGB BLD-MCNC: 15.4 G/DL (ref 12–17)
IMM GRANULOCYTES # BLD AUTO: 0.02 THOUSAND/UL (ref 0–0.2)
IMM GRANULOCYTES NFR BLD AUTO: 0 % (ref 0–2)
LYMPHOCYTES # BLD AUTO: 2.81 THOUSANDS/ΜL (ref 0.6–4.47)
LYMPHOCYTES NFR BLD AUTO: 35 % (ref 14–44)
MCH RBC QN AUTO: 29.7 PG (ref 26.8–34.3)
MCHC RBC AUTO-ENTMCNC: 30.7 G/DL (ref 31.4–37.4)
MCV RBC AUTO: 97 FL (ref 82–98)
MONOCYTES # BLD AUTO: 0.41 THOUSAND/ΜL (ref 0.17–1.22)
MONOCYTES NFR BLD AUTO: 5 % (ref 4–12)
NEUTROPHILS # BLD AUTO: 4.57 THOUSANDS/ΜL (ref 1.85–7.62)
NEUTS SEG NFR BLD AUTO: 58 % (ref 43–75)
NRBC BLD AUTO-RTO: 0 /100 WBCS
PLATELET # BLD AUTO: 208 THOUSANDS/UL (ref 149–390)
PMV BLD AUTO: 10.8 FL (ref 8.9–12.7)
POTASSIUM SERPL-SCNC: 3.7 MMOL/L (ref 3.5–5.3)
PROT SERPL-MCNC: 7.2 G/DL (ref 6.4–8.2)
RBC # BLD AUTO: 5.18 MILLION/UL (ref 3.88–5.62)
SODIUM SERPL-SCNC: 138 MMOL/L (ref 136–145)
TSH SERPL DL<=0.05 MIU/L-ACNC: 1.13 UIU/ML (ref 0.36–3.74)
WBC # BLD AUTO: 7.97 THOUSAND/UL (ref 4.31–10.16)

## 2021-07-27 PROCEDURE — 85025 COMPLETE CBC W/AUTO DIFF WBC: CPT

## 2021-07-27 PROCEDURE — 36415 COLL VENOUS BLD VENIPUNCTURE: CPT

## 2021-07-27 PROCEDURE — 84443 ASSAY THYROID STIM HORMONE: CPT

## 2021-07-27 PROCEDURE — 80053 COMPREHEN METABOLIC PANEL: CPT

## 2021-07-27 NOTE — TELEPHONE ENCOUNTER
Patient is at the out patient lab for blood work prior to his x-geva injection  I explained that there are no lab parameters on his X-geva orders  Patient is due to see Dr Yamilet Conrad on 8/9/21  Per last office note CBCD, CMP and TSH are needed prior to visit  Will enter orders for patient to have drawn today    Will send to RN as Janina Tijerina

## 2021-07-28 ENCOUNTER — HOSPITAL ENCOUNTER (OUTPATIENT)
Dept: INFUSION CENTER | Facility: CLINIC | Age: 71
Discharge: HOME | End: 2021-07-28
Payer: MEDICARE

## 2021-07-28 VITALS
TEMPERATURE: 97.8 F | DIASTOLIC BLOOD PRESSURE: 90 MMHG | SYSTOLIC BLOOD PRESSURE: 150 MMHG | RESPIRATION RATE: 18 BRPM | HEART RATE: 92 BPM

## 2021-07-28 DIAGNOSIS — C79.51 BONE METASTASES (HCC): Primary | ICD-10-CM

## 2021-07-28 PROCEDURE — 96401 CHEMO ANTI-NEOPL SQ/IM: CPT

## 2021-07-28 RX ADMIN — DENOSUMAB 120 MG: 120 INJECTION SUBCUTANEOUS at 13:58

## 2021-07-28 NOTE — PROGRESS NOTES
Pt  Denied new symptoms or concerns today  Labs reviewed  Ca+ 9 5  Creatinine 1 26  CrCl parameters met  Xgeva given SQ  in [facility]  Pt  Tolerated well  Pt  States he will follow up with physician  Pt is unsure of future plan and no further orders noted  AVS provided

## 2021-08-04 ENCOUNTER — TELEPHONE (OUTPATIENT)
Dept: NEUROSURGERY | Facility: CLINIC | Age: 71
End: 2021-08-04

## 2021-08-04 NOTE — TELEPHONE ENCOUNTER
Called patient to move up appointment time on 8/11/21 from 9 am to 8:30 am  Left a voice message requesting for a call back  Provided my direct line

## 2021-08-05 ENCOUNTER — HOSPITAL ENCOUNTER (OUTPATIENT)
Dept: CT IMAGING | Facility: HOSPITAL | Age: 71
Discharge: HOME | End: 2021-08-05
Attending: INTERNAL MEDICINE
Payer: MEDICARE

## 2021-08-05 DIAGNOSIS — C64.2 CLEAR CELL ADENOCARCINOMA OF KIDNEY, LEFT (HCC): ICD-10-CM

## 2021-08-05 PROCEDURE — G1004 CDSM NDSC: HCPCS

## 2021-08-05 PROCEDURE — 71260 CT THORAX DX C+: CPT

## 2021-08-05 PROCEDURE — 74177 CT ABD & PELVIS W/CONTRAST: CPT

## 2021-08-05 RX ADMIN — IOHEXOL 100 ML: 350 INJECTION, SOLUTION INTRAVENOUS at 18:25

## 2021-08-05 NOTE — TELEPHONE ENCOUNTER
Patient returned the call and left a voice message stating he is fine with the appointment time of 8:30 am on 8/11/21

## 2021-08-06 ENCOUNTER — TELEPHONE (OUTPATIENT)
Dept: HEMATOLOGY ONCOLOGY | Facility: CLINIC | Age: 71
End: 2021-08-06

## 2021-08-06 NOTE — TELEPHONE ENCOUNTER
Appointment Cancellation Or Reschedule     Person calling in Patient    Provider Dr Desi Rogers   Office Visit Date and Time  08/09/21   Office Visit Location [place]   Did patient want to reschedule their office appointment? If so, when was it scheduled to? Yes 08/19/21   Is this patient calling to reschedule an infusion appointment? no   When is their next infusion appointment? No appt   Is this patient a Chemo patient? n/a   Reason for Cancellation or Reschedule Patient has work conflict rescheduled  If the patient is a treatment patient, please route this to the office nurse  If the patient is not on treatment, please route to the office MA  Pt advised to f/u with Dr. Sanderson for continued survelliance

## 2021-08-11 ENCOUNTER — OFFICE VISIT (OUTPATIENT)
Dept: NEUROSURGERY | Facility: CLINIC | Age: 71
End: 2021-08-11
Payer: MEDICARE

## 2021-08-11 ENCOUNTER — CLINICAL SUPPORT (OUTPATIENT)
Dept: RADIATION ONCOLOGY | Facility: HOSPITAL | Age: 71
End: 2021-08-11
Attending: RADIOLOGY
Payer: MEDICARE

## 2021-08-11 VITALS
TEMPERATURE: 98.4 F | HEIGHT: 66 IN | DIASTOLIC BLOOD PRESSURE: 92 MMHG | RESPIRATION RATE: 18 BRPM | OXYGEN SATURATION: 92 % | SYSTOLIC BLOOD PRESSURE: 150 MMHG | WEIGHT: 287 LBS | BODY MASS INDEX: 46.12 KG/M2 | HEART RATE: 93 BPM

## 2021-08-11 DIAGNOSIS — C79.51 BONE METASTASES (HCC): Primary | ICD-10-CM

## 2021-08-11 DIAGNOSIS — C79.51 SPINE METASTASIS (HCC): Primary | ICD-10-CM

## 2021-08-11 DIAGNOSIS — M48.062 LUMBAR STENOSIS WITH NEUROGENIC CLAUDICATION: ICD-10-CM

## 2021-08-11 DIAGNOSIS — M48.02 CERVICAL STENOSIS OF SPINAL CANAL: ICD-10-CM

## 2021-08-11 PROCEDURE — 99213 OFFICE O/P EST LOW 20 MIN: CPT | Performed by: NEUROLOGICAL SURGERY

## 2021-08-11 PROCEDURE — 99211 OFF/OP EST MAY X REQ PHY/QHP: CPT | Performed by: RADIOLOGY

## 2021-08-11 NOTE — PROGRESS NOTES
Neurosurgery Office Note  Karol Beal 69 y o  male MRN: [id number]      Assessment/Plan      Diagnoses and all orders for this visit:    Spine metastasis [facility])          Discussion:     66 y  o  male with a history of RCC       S/p SBRT T6 lesion 10/2017, then SRBT to T10 lesion 7/2018, and most recently SBRT to T4 lesion 11/2018 for renal cell CA mets  These were stable on most recent MRI  01/2020  No new lesions  Since then, he has had only CT scans, as he cannot tolerate lying flat on the MRI table secondary to what is likely degenerative cervical disease  He also has profound lumbar degenerative disease,    He did recently have a CT scan of his chest and pelvis, which also visualized his spine  His thoracic lesions appear to be stable, as reviewed at [address] [place] [address] this morning  His systemic disease, however, appears to be progressing somewhat  He does have follow-up with Dr Hedy Valdez next week      For his spine disease, NCCN guidelines are for follow-up imaging  'as clinically indicated'  I have discussed surveillance imaging with the patient, and we have agreed to utilize  His surveillance CT scans to monitor his spine, and less otherwise clinically indicated  As such, I will see him next after his next staging CT scan, which presumably would be 02/2022 as such we will plan to see him 3/2022        I did review with him also some of his symptoms/issues related to his degenerative spine disease  When he lays flat, he gets Lhermitte's like phenomenon down his arms  He cannot climb stairs secondary to leg tiredness, which is likely lumbar stenosis  However, with progressive metastatic disease, I do not feel aggressive intervention is warranted  I did discuss with him perhaps referring him to pain management/palliative care, but he states he is able to manage with these symptoms at present  Metrics: [id number]; VAS 80, KPS , ECOG 0-1       Counseling / Coordination of Care  I spent 20 minutes with the patient, more than 50% was spent on counseling and/or coordination of care  CHIEF COMPLAINT    No chief complaint on file  HISTORY    History of Present Illness     69y o  year old male     HPI    See Discussion    REVIEW OF SYSTEMS    Review of Systems      Meds/Allergies     Current Outpatient Medications   Medication Sig Dispense Refill    al mag oxide-diphenhydramine-lidocaine viscous (MAGIC MOUTHWASH) 1:1:1 suspension Swish and spit 10 mL every 4 (four) hours as needed for mouth pain or discomfort (Patient not taking: Reported on 8/11/2021) 250 mL 0    amLODIPine (NORVASC) 5 mg tablet Take 5 mg by mouth daily        cabozantinib (Cabometyx) 40 mg TABS Take 1 tablet (40 mg total) by mouth every other day   15 tablet 10    cholecalciferol (VITAMIN D3) 1,000 units tablet Take 1,000 Units by mouth daily      diphenhydrAMINE (BENADRYL) 25 mg tablet Take 25 mg by mouth daily at bedtime as needed for itching      gabapentin (NEURONTIN) 100 mg capsule Take 300 mg by mouth 3 (three) times a day       lisinopril-hydrochlorothiazide (PRINZIDE,ZESTORETIC) 20-12 5 MG per tablet Take 1 tablet by mouth 2 (two) times a day        loperamide (IMODIUM) 2 mg capsule Take 2 mg by mouth 4 (four) times a day as needed for diarrhea      LORazepam (ATIVAN) 0 5 mg tablet Take 1 tablet (0 5 mg total) by mouth every 8 (eight) hours as needed for anxiety (Patient not taking: Reported on 8/11/2021) 2 tablet 0    Multiple Vitamin (multivitamin) tablet Take 1 tablet by mouth daily      naproxen sodium (ALEVE) 220 MG tablet Take 220 mg by mouth as needed for mild pain      simvastatin (ZOCOR) 40 mg tablet Take 40 mg by mouth (Patient not taking: Reported on 8/11/2021)      traMADol (ULTRAM) 50 mg tablet Take 1 tablet (50 mg total) by mouth every 6 (six) hours as needed for moderate pain 60 tablet 2    trolamine salicylate (ASPERCREME) 10 % cream Apply 1 application topically as needed for muscle/joint pain No current facility-administered medications for this visit  No Known Allergies    PAST HISTORY    Past Medical History:   Diagnosis Date    Arthritis     Cancer [facility])     prostate - mets to bone and lung    History of radiation therapy 2018    SBRT to T10 -2018    Hyperlipidemia     Hypertension        Past Surgical History:   Procedure Laterality Date    DISTAL ULNA EXCISION Right     excision of tumor and orif with cement and screws    JOINT REPLACEMENT Bilateral     tkr's    LUNG SURGERY Right     exc of nodules    NEPHRECTOMY Left        Social History     Tobacco Use    Smoking status: Former Smoker     Packs/day: 1 00     Types: Cigarettes     Quit date: 3/28/2003     Years since quittin 3    Smokeless tobacco: Never Used   Vaping Use    Vaping Use: Never used   Substance Use Topics    Alcohol use: Not Currently     Comment: occasional use    Drug use: No       Family History   Problem Relation Age of Onset    No Known Problems Mother     No Known Problems Father          The following portions of the patient's history were reviewed in this encounter and updated as appropriate: Past medical, surgical, family, and social history, as well as medications, allergies, and review of systems  EXAM    Vitals: There were no vitals taken for this visit  ,There is no height or weight on file to calculate BMI  Physical Exam    Neurologic Exam      MEDICAL DECISION MAKING    Imaging Studies:     CT chest abdomen pelvis w contrast    Result Date: 2021  Narrative: CT CHEST, ABDOMEN AND PELVIS WITH IV CONTRAST INDICATION:   C64 2: Malignant neoplasm of left kidney, except renal pelvis  Clear cell adenocarcinoma left kidney COMPARISON:  2021; 2020; 2019 TECHNIQUE: CT examination of the chest, abdomen and pelvis was performed  Axial, sagittal, and coronal 2D reformatted images were created from the source data and submitted for interpretation   Radiation dose length product (DLP) for this visit:  1489 mGy-cm   This examination, like all CT scans performed in the [facility], was performed utilizing techniques to minimize radiation dose exposure, including the use of iterative reconstruction and automated exposure control  IV Contrast:  100 mL of iohexol (OMNIPAQUE) Enteric Contrast: Enteric contrast was administered  FINDINGS: CHEST LUNGS:  Patchy consolidation both right lower lobe adjacent to rib metastasis  Also consolidation is noted involving the superior right hilum adjacent pleural surface without change  1 1 cm left lower lobe nodule image 62, series 3, previously 9 mm  1 9 x 1 2 cm left upper lobe nodule, previously 1 5 x 1 0 cm  1 7 x 1 5 cm nodule right middle lobe inferiorly, previously 1 x 0 7 cm  There is no tracheal or endobronchial lesion  PLEURA:  Unremarkable  HEART/GREAT VESSELS:  Unremarkable for patient's age  MEDIASTINUM AND CHERRIE:  Unremarkable  CHEST WALL AND LOWER NECK:   Right chest wall nodularity seen previously is no longer visualized  Small thyroid nodules are unchanged  ABDOMEN LIVER/BILIARY TREE:  Unremarkable  GALLBLADDER:  No calcified gallstones  No pericholecystic inflammatory change  SPLEEN:  Unremarkable  PANCREAS:  Unremarkable  ADRENAL GLANDS:  Left adrenal nodule is present measuring 3 4 x 1 6 cm, previously 2 8 x 1 2 cm  KIDNEYS/URETERS:  Multiple renal cysts are identified on the right  Parapelvic cysts in the right kidney is similar to prior studies  This etiology was confirmed on delayed imaging performed on 9/5/2017  The patient is status post left nephrectomy with clips present  The left renal fossa is unremarkable  STOMACH AND BOWEL:  No small bowel dilatation is seen  There is left colon diverticulosis without CT evidence of diverticulitis  APPENDIX:  No findings to suggest appendicitis  ABDOMINOPELVIC CAVITY:  No ascites  No pneumoperitoneum  No lymphadenopathy    All nodes or vessels are noted in the left renal fossa without change  There is a left external iliac node measuring 8 mm in short axis without change  There is a mesenteric mass present measuring 7 3 x 3 4 cm on image 80 which is enlarged from the prior study where separate nodes were evident  The mass is now confluent  Retroperitoneal mass measures 2 6 cm on image 80, previously 2 cm  VESSELS:  Unremarkable for patient's age  PELVIS REPRODUCTIVE ORGANS:  The prostate is small and should be correlated with any history of prostate surgery  Nain Rower URINARY BLADDER:  Unremarkable  ABDOMINAL WALL/INGUINAL REGIONS:  Right gluteal nodule measures 1 8 cm on image 133 without change  Mild prominent fat in the canal is seen  There appears to be abdominal wall lipoma on image 81 without change  OSSEOUS STRUCTURES:  An expansile lytic lesion involves the right 7th rib on image 30 measuring 5 8 cm x 2 7 cm without significant change  No increased ossification is identified  A 2nd expansile lesion is noted involving image 47 involving the lateral 8th rib also similar  Expansile lesion of the right iliac crest measures 7 cm in its greatest dimension also without change  Lesions are seen more inferiorly involving the ileum as well as the ischium which are lytic in nature  Large lesion involves the left ilium adjacent to the SI joint without change  Orthopedic hardware is noted within the lumbar spine  Lesion involves T6 as well as blastic lesion involving T4 Some sclerosis involves the right 1st rib  Impression: Worsening pulmonary metastases as well as new large james mass in the right upper quadrant where mildly enlarged nodes were seen previously  Osseous metastases appear unchanged  Left adrenal mass is slightly larger  Status post left nephrectomy  The right kidney demonstrates multiple cysts Right gluteal mass is unchanged  No new obvious chest or abdominal wall masses   Workstation performed: [id number]       I have personally reviewed pertinent reports  and I have personally reviewed pertinent films in PACS with a Radiologist   At [address] [place] [address] NOTE:  This encounter may have been completed utilizing the [employer] Direct Speech Voice Recognition Software  Grammatical errors, random word insertions, pronoun errors and incomplete sentences are occasional consequences of the system due to software limitations, ambient noise and hardware issues  These may be missed by proof reading prior to affixing electronic signature  Any questions or concerns about the content, text or information contained within the body of this dictation should be directly addressed to the advanced practitioner or physician for clarification

## 2021-08-11 NOTE — PROGRESS NOTES
Trish Slaughter 1950 is a 69 y o  male       Follow up visit     Trish Slaughter is a 69 y o  male with a history of metastatic renal cell cancer, initially diagnosed in 2007  He developed metastatic disease in 2013  He remains on systemic therapy with Medical Oncology  He returns for follow up post right ulnar radiation completed 5/31/17, right posterior 7th rib completed 8/3/17, Stereotactic radiation to T6 completed 10/27/17, SBRT to T10 completed 7/27/18 and SBRT to paraspinal mass at T4 completed 11/23/18  Last seen for radiation follow up on 2/10/21     2/12/21 Dr Georgiana Jules f/u  Continue cabozantinib 40 mg every other day, excellent tolerance  Follow up in 6 months w/CT scan and blood work     8/5/21 CT chest abdoman pelvis  IMPRESSION:   Worsening pulmonary metastases as well as new large james mass in the right upper quadrant where mildly enlarged nodes were seen previously  Osseous metastases appear unchanged  Left adrenal mass is slightly larger  Status post left nephrectomy  The right kidney demonstrates multiple cysts   Right gluteal mass is unchanged  No new obvious chest or abdominal wall masses  Today, he reports constant lower back pain  No radiation of pain down legs, but does have constant sciatic pain down the outside of the right thigh to the knee  He reports he's unable to lay flat for MRI's due to the positioning of his head/neck  He states that he gets bilateral shoulder pain with radiation down both arms, R>L side         8/19/21 Dr Georgiana Jules      Oncology History   Clear cell adenocarcinoma of kidney, left ([facility] [address] )   3/2/2018 Initial Diagnosis    Clear cell adenocarcinoma of kidney, left ([facility] [address] )     1/31/2020 - 4/22/2020 Chemotherapy    pembrolizumab (KEYTRUDA) 200 mg in sodium chloride 0 9 % 50 mL IVPB, 200 mg, Intravenous, Once, 4 of 9 cycles  Administration: 200 mg (1/31/2020), 200 mg (2/21/2020), 200 mg (3/13/2020), 200 mg (4/3/2020)      Chemotherapy Cabozantinib (Cabometyx) 40 mg every other day      Bone metastases ([address] )   6/22/2016 Initial Diagnosis    Bone metastases (HCC)     3/8/2017 -  Chemotherapy    Axitinib 4 mg b i d        Denosumab 60 mg subcutaneously monthly initiated January 12, 2018 5/17/2017 - 5/31/2017 Radiation    palliative radiation therapy following ORIF of right ulna for metastatic renal cell carcinoma to 3000cGy     7/26/2017 - 8/3/2017 Radiation    palliative radiation therapy for metastatic renal carcinoma to the right posterior seventh rib with bone and soft tissue metastases to 2800cGy     10/18/2017 - 10/27/2017 Radiation    stereotactic radiation therapy to a T6 vertebral body metastasis to 3000cGy     7/18/2018 - 7/27/2018 Radiation    Stereotactic radiation to T10 metastasis to 3000 cGy in 5 fractions     11/14/2018 - 11/23/2018 Radiation    SBRT to paraspinal mass at T4 to 3000 cGy           1/31/2020 - 4/22/2020 Chemotherapy    pembrolizumab (KEYTRUDA) 200 mg in sodium chloride 0 9 % 50 mL IVPB, 200 mg, Intravenous, Once, 4 of 9 cycles  Administration: 200 mg (1/31/2020), 200 mg (2/21/2020), 200 mg (3/13/2020), 200 mg (4/3/2020)      Chemotherapy    Cabozantinib (Cabometyx) 40 mg every other day      Lung metastases ([facility] [address] )   3/2/2018 Initial Diagnosis    Lung metastases ([facility] [address] )     1/31/2020 - 4/22/2020 Chemotherapy    pembrolizumab (KEYTRUDA) 200 mg in sodium chloride 0 9 % 50 mL IVPB, 200 mg, Intravenous, Once, 4 of 9 cycles  Administration: 200 mg (1/31/2020), 200 mg (2/21/2020), 200 mg (3/13/2020), 200 mg (4/3/2020)      Chemotherapy    Cabozantinib (Cabometyx) 40 mg every other day      Spine metastasis ([address] )   7/11/2018 Initial Diagnosis    Spine metastasis ([facility] [address] )     1/31/2020 - 4/22/2020 Chemotherapy    pembrolizumab (KEYTRUDA) 200 mg in sodium chloride 0 9 % 50 mL IVPB, 200 mg, Intravenous, Once, 4 of 9 cycles  Administration: 200 mg (1/31/2020), 200 mg (2/21/2020), 200 mg (3/13/2020), 200 mg (4/3/2020) Chemotherapy    Cabozantinib (Cabometyx) 40 mg every other day      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 ([address] ) (Resolved)   7/11/2018 Initial Diagnosis    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 ([address] ) (Resolved)     Metastatic renal cell carcinoma ([address] )   9/26/2018 Initial Diagnosis    Metastatic renal cell carcinoma (HCC)      Chemotherapy    Cabozantinib (Cabometyx) 40 mg every other day          Clinical Trial: no      Health Maintenance   Topic Date Due    Medicare Annual Wellness Visit (AWV)  Never done    BMI: Followup Plan  Never done    Colorectal Cancer Screening  Never done    Fall Risk  01/30/2020    Influenza Vaccine (1) 09/01/2021    Depression Screening PHQ  02/10/2022    BMI: Adult  02/12/2022    DTaP,Tdap,and Td Vaccines (3 - Td or Tdap) 07/14/2030    Hepatitis C Screening  Completed    Pneumococcal Vaccine: 65+ Years  Completed    COVID-19 Vaccine  Completed    HIB Vaccine  Aged Out    Hepatitis B Vaccine  Aged Out    IPV Vaccine  Aged Out    Hepatitis A Vaccine  Aged Out    Meningococcal ACWY Vaccine  Aged Out    HPV Vaccine  Aged Out       Patient Active Problem List   Diagnosis    Clear cell adenocarcinoma of kidney, left ([address] )    Bone metastases ([address] )    Lung metastases ([facility] [address] )    Spine metastasis ([facility] [address] )    Metastatic renal cell carcinoma ([address] )    Hx of prostatectomy    History of prostate cancer    Azotemia     Past Medical History:   Diagnosis Date    Arthritis     Cancer ([address] )     prostate - mets to bone and lung    History of radiation therapy 07/27/2018    SBRT to T10 7/18-7/27/2018    Hyperlipidemia     Hypertension      Past Surgical History:   Procedure Laterality Date    DISTAL ULNA EXCISION Right     excision of tumor and orif with cement and screws    JOINT REPLACEMENT Bilateral     tkr's    LUNG SURGERY Right     exc of nodules    NEPHRECTOMY Left      Family History   Problem Relation Age of Onset    No Known Problems Mother     No Known Problems Father      Social History     Socioeconomic History    Marital status:      Spouse name: Not on file    Number of children: 1    Years of education: 15    Highest education level: Not on file   Occupational History    Occupation: retired     Comment: P/T    Tobacco Use    Smoking status: Former Smoker     Packs/day: 1 00     Types: Cigarettes     Quit date: 3/28/2003     Years since quittin 3    Smokeless tobacco: Never Used   Vaping Use    Vaping Use: Never used   Substance and Sexual Activity    Alcohol use: Not Currently     Comment: occasional use    Drug use: No    Sexual activity: Not on file   Other Topics Concern    Not on file   Social History Narrative    Lives at home alone     Social Determinants of Health     Financial Resource Strain:     Difficulty of Paying Living Expenses:    Food Insecurity:     Worried About [address] [employer] in the Last Year:     [address] in the Last Year:    Transportation Needs:     Lack of Transportation (Medical):      Lack of Transportation (Non-Medical):    Physical Activity:     Days of Exercise per Week:     Minutes of Exercise per Session:    Stress:     Feeling of Stress :    Social Connections:     Frequency of Communication with Friends and Family:     Frequency of Social Gatherings with Friends and Family:     Attends [employer] Services:     Active Member of Clubs or Organizations:     Attends Club or Organization Meetings:     Marital Status:    Intimate Partner Violence:     Fear of Current or Ex-Partner:     Emotionally Abused:     Physically Abused:     Sexually Abused:        Current Outpatient Medications:     al mag oxide-diphenhydramine-lidocaine viscous (MAGIC MOUTHWASH) 1:1:1 suspension, Swish and spit 10 mL every 4 (four) hours as needed for mouth pain or discomfort, Disp: 250 mL, Rfl: 0    amLODIPine (NORVASC) 5 mg tablet, Take 5 mg by mouth daily  , Disp: , Rfl:     cabozantinib (Cabometyx) 40 mg TABS, Take 1 tablet (40 mg total) by mouth every other day , Disp: 15 tablet, Rfl: 10    cholecalciferol (VITAMIN D3) 1,000 units tablet, Take 1,000 Units by mouth daily, Disp: , Rfl:     diphenhydrAMINE (BENADRYL) 25 mg tablet, Take 25 mg by mouth daily at bedtime as needed for itching, Disp: , Rfl:     gabapentin (NEURONTIN) 100 mg capsule, Take 300 mg by mouth 3 (three) times a day , Disp: , Rfl:     lisinopril-hydrochlorothiazide (PRINZIDE,ZESTORETIC) 20-12 5 MG per tablet, Take 1 tablet by mouth 2 (two) times a day  , Disp: , Rfl:     loperamide (IMODIUM) 2 mg capsule, Take 2 mg by mouth 4 (four) times a day as needed for diarrhea, Disp: , Rfl:     LORazepam (ATIVAN) 0 5 mg tablet, Take 1 tablet (0 5 mg total) by mouth every 8 (eight) hours as needed for anxiety, Disp: 2 tablet, Rfl: 0    Multiple Vitamin (multivitamin) tablet, Take 1 tablet by mouth daily, Disp: , Rfl:     naproxen sodium (ALEVE) 220 MG tablet, Take 220 mg by mouth as needed for mild pain, Disp: , Rfl:     simvastatin (ZOCOR) 40 mg tablet, Take 40 mg by mouth, Disp: , Rfl:     traMADol (ULTRAM) 50 mg tablet, Take 1 tablet (50 mg total) by mouth every 6 (six) hours as needed for moderate pain, Disp: 60 tablet, Rfl: 2    trolamine salicylate (ASPERCREME) 10 % cream, Apply 1 application topically as needed for muscle/joint pain, Disp: , Rfl:   No Known Allergies    Review of Systems:  Review of Systems   Constitutional: Positive for fatigue (always tired)  Negative for activity change, appetite change, chills, fever and unexpected weight change  HENT: Positive for postnasal drip  Eyes:        Wears glasses   Respiratory: Negative  Negative for cough (occasional cough, secondary to postnasal drip) and shortness of breath  Cardiovascular: Negative  Negative for chest pain and leg swelling     Gastrointestinal: Negative for abdominal pain, blood in stool, constipation, diarrhea, nausea and vomiting  Endocrine: Negative  Genitourinary: Negative for difficulty urinating, dysuria and hematuria  Musculoskeletal: Positive for back pain (constant lower back pain; intermittent L shoulder pain depending on lposition of left arm, pain radiates to left side of neck and across upper back) and gait problem (using cane for balance)  Negative for neck pain  R shoulder pain   Skin: Negative  Allergic/Immunologic: Positive for environmental allergies  Neurological: Negative for dizziness, weakness (legs), light-headedness and headaches  Sciatic pain down outside right leg; improving numbness/tingling in hands/fingers   Hematological: Negative  Psychiatric/Behavioral: Negative  Vitals:    08/11/21 0838   Height: 5' 6" (1 676 m)          Imaging:CT chest abdomen pelvis w contrast    Result Date: 8/6/2021  Narrative: CT CHEST, ABDOMEN AND PELVIS WITH IV CONTRAST INDICATION:   C64 2: Malignant neoplasm of left kidney, except renal pelvis  Clear cell adenocarcinoma left kidney COMPARISON:  2/6/2021; 1/20/2020; 11/7/2019 TECHNIQUE: CT examination of the chest, abdomen and pelvis was performed  Axial, sagittal, and coronal 2D reformatted images were created from the source data and submitted for interpretation  Radiation dose length product (DLP) for this visit:  1489 mGy-cm   This examination, like all CT scans performed in the [facility], was performed utilizing techniques to minimize radiation dose exposure, including the use of iterative reconstruction and automated exposure control  IV Contrast:  100 mL of iohexol (OMNIPAQUE) Enteric Contrast: Enteric contrast was administered  FINDINGS: CHEST LUNGS:  Patchy consolidation both right lower lobe adjacent to rib metastasis  Also consolidation is noted involving the superior right hilum adjacent pleural surface without change  1 1 cm left lower lobe nodule image 62, series 3, previously 9 mm   1 9 x 1 2 cm left upper lobe nodule, previously 1 5 x 1 0 cm  1 7 x 1 5 cm nodule right middle lobe inferiorly, previously 1 x 0 7 cm  There is no tracheal or endobronchial lesion  PLEURA:  Unremarkable  HEART/GREAT VESSELS:  Unremarkable for patient's age  MEDIASTINUM AND CHERRIE:  Unremarkable  CHEST WALL AND LOWER NECK:   Right chest wall nodularity seen previously is no longer visualized  Small thyroid nodules are unchanged  ABDOMEN LIVER/BILIARY TREE:  Unremarkable  GALLBLADDER:  No calcified gallstones  No pericholecystic inflammatory change  SPLEEN:  Unremarkable  PANCREAS:  Unremarkable  ADRENAL GLANDS:  Left adrenal nodule is present measuring 3 4 x 1 6 cm, previously 2 8 x 1 2 cm  KIDNEYS/URETERS:  Multiple renal cysts are identified on the right  Parapelvic cysts in the right kidney is similar to prior studies  This etiology was confirmed on delayed imaging performed on 9/5/2017  The patient is status post left nephrectomy with clips present  The left renal fossa is unremarkable  STOMACH AND BOWEL:  No small bowel dilatation is seen  There is left colon diverticulosis without CT evidence of diverticulitis  APPENDIX:  No findings to suggest appendicitis  ABDOMINOPELVIC CAVITY:  No ascites  No pneumoperitoneum  No lymphadenopathy  All nodes or vessels are noted in the left renal fossa without change  There is a left external iliac node measuring 8 mm in short axis without change  There is a mesenteric mass present measuring 7 3 x 3 4 cm on image 80 which is enlarged from the prior study where separate nodes were evident  The mass is now confluent  Retroperitoneal mass measures 2 6 cm on image 80, previously 2 cm  VESSELS:  Unremarkable for patient's age  PELVIS REPRODUCTIVE ORGANS:  The prostate is small and should be correlated with any history of prostate surgery  Cece Serrano URINARY BLADDER:  Unremarkable  ABDOMINAL WALL/INGUINAL REGIONS:  Right gluteal nodule measures 1 8 cm on image 133 without change   Mild prominent fat in the canal is seen  There appears to be abdominal wall lipoma on image 81 without change  OSSEOUS STRUCTURES:  An expansile lytic lesion involves the right 7th rib on image 30 measuring 5 8 cm x 2 7 cm without significant change  No increased ossification is identified  A 2nd expansile lesion is noted involving image 47 involving the lateral 8th rib also similar  Expansile lesion of the right iliac crest measures 7 cm in its greatest dimension also without change  Lesions are seen more inferiorly involving the ileum as well as the ischium which are lytic in nature  Large lesion involves the left ilium adjacent to the SI joint without change  Orthopedic hardware is noted within the lumbar spine  Lesion involves T6 as well as blastic lesion involving T4 Some sclerosis involves the right 1st rib  Impression: Worsening pulmonary metastases as well as new large james mass in the right upper quadrant where mildly enlarged nodes were seen previously  Osseous metastases appear unchanged  Left adrenal mass is slightly larger  Status post left nephrectomy  The right kidney demonstrates multiple cysts Right gluteal mass is unchanged  No new obvious chest or abdominal wall masses   Workstation performed: [id number]       Teaching:

## 2021-08-19 ENCOUNTER — OFFICE VISIT (OUTPATIENT)
Dept: HEMATOLOGY ONCOLOGY | Facility: CLINIC | Age: 71
End: 2021-08-19
Payer: MEDICARE

## 2021-08-19 ENCOUNTER — DOCUMENTATION (OUTPATIENT)
Dept: HEMATOLOGY ONCOLOGY | Facility: CLINIC | Age: 71
End: 2021-08-19

## 2021-08-19 VITALS
WEIGHT: 289 LBS | TEMPERATURE: 97.8 F | SYSTOLIC BLOOD PRESSURE: 138 MMHG | BODY MASS INDEX: 46.45 KG/M2 | DIASTOLIC BLOOD PRESSURE: 80 MMHG | RESPIRATION RATE: 18 BRPM | HEIGHT: 66 IN | HEART RATE: 99 BPM | OXYGEN SATURATION: 93 %

## 2021-08-19 DIAGNOSIS — C79.51 BONE METASTASES (HCC): ICD-10-CM

## 2021-08-19 DIAGNOSIS — C64.2 CLEAR CELL ADENOCARCINOMA OF KIDNEY, LEFT (HCC): ICD-10-CM

## 2021-08-19 DIAGNOSIS — C79.51 SPINE METASTASIS (HCC): ICD-10-CM

## 2021-08-19 DIAGNOSIS — C78.00 MALIGNANT NEOPLASM METASTATIC TO LUNG, UNSPECIFIED LATERALITY (HCC): Primary | ICD-10-CM

## 2021-08-19 PROCEDURE — 99215 OFFICE O/P EST HI 40 MIN: CPT | Performed by: INTERNAL MEDICINE

## 2021-08-19 RX ORDER — SODIUM CHLORIDE 9 MG/ML
20 INJECTION, SOLUTION INTRAVENOUS ONCE
Status: CANCELLED | OUTPATIENT
Start: 2021-10-22

## 2021-08-19 RX ORDER — SODIUM CHLORIDE 9 MG/ML
20 INJECTION, SOLUTION INTRAVENOUS ONCE
Status: CANCELLED | OUTPATIENT
Start: 2021-09-10

## 2021-08-19 NOTE — PROGRESS NOTES
Hematology / Oncology Outpatient Follow Up Note    Imelda Vásquez 69 y o  male :1950 VILLA:[id number]         Date:  2021    Assessment / Plan:  A 65-year-old gentleman who was diagnosed with localized renal cell carcinoma in 2007  He developed metastatic disease in   He was previously on sunitinib as well as axitinib   More recently, he was on combination of axitinib and pembrolizumab under the care of Dr Carmel [facility] disease progressed on this regimen   Therefore, since early May 2020, he has been on cabozantinib with expected side effects  He achieved partial response on cabozantinib  However, his recent CT scans show progression of lung, adrenal metastasis  In addition, he has progression of intra-abdominal lymphadenopathy  His symptoms are quite stable  We discussed the further management of metastatic renal cell carcinoma  I recommended him to start lenvatinib with pembrolizumab  Side effects of this regimen was thoroughly discussed, including but not limited to fatigue, hypothyroidism, hand-foot reaction, hypertension, immune mediated organ toxicities as well as small chance of treatment related mortality  He understood and wished to proceed  His going to discontinue cabozantinib  I am going to set up 1st infusion of pembrolizumab in September 10, 2021  I will see him again in mid 2021 when he is due for 2nd cycle of pembrolizumab  Addendum; Based on the clinical trial lenvatinib and pembrolizumab, this combination appeared to produce impressive response rate  Therefore, I think it is most appropriate to use this combination regimen prior to the use of everolimus        Subjective:      HPI:             Interval History:  A 65-year-old gentleman who has history of left nephrectomy for renal cell carcinoma in 2007   He was well until  when he was found to have lung metastasis   He was watched until 2015 when he started sunitinib until December 2016  Since then, he has been on axitinib which he is still on   In November 2019, CT scan showed progression of tumor in the renal bed as well as some mixed response was lung metastasis   Therefore, Dr Sumit Elizondo at [facility] had no immune related toxicity with pembrolizumab   However, he had progressive disease in May 2021 his systemic therapy was changed to cabozantinib   Because of the initial toxicity, he is currently on cabozantinib 40 mg every other day with excellent tolerance  He had initial partial response on current regimen  He presents today for routine follow-up  He feels well  He has chronic low back pain  He has no respiratory symptoms  Has chronic mild fatigue  His weight is stable  His recent CT scan showed progression of lung metastasis as well as abdominal lymph node metastasis progression    His performance status is 0 to 1/4 on the ECOG scale        Objective:      Primary Diagnosis:     1  Localized renal cell carcinoma T1 NX M0 grade 2-3 diagnosed in August 2007      2  Metastatic renal cell carcinoma, diagnosed in 2013       Cancer Staging:  Cancer Staging  No matching staging information was found for the patient         Previous Hematologic/ Oncologic Treatment:      1  Sunitinib from March 2015 through December 2016    2  Axitinib monotherapy from December 2016 through November 2019    3  Axitinib with pembrolizumab from November 2019 through April 2020    4   Cabozantinib from May 2020 through August 2021      Current Hematologic/ Oncologic Treatment:        lenvatinib with pembrolizumab to be started in September 2021      Disease Status:        Progressive disease on cabozantinib      Test Results:     Pathology:           Radiology:     CT scan of chest abdomen pelvis in  August 2021 showed increased size of pulmonary metastasis, adrenal mass as well as intra or abdominal lymph node      Laboratory:     See below   Normal TSH      Physical Exam:        General Appearance:    Alert, oriented          Eyes:    PERRL   Ears:    Normal external ear canals, both ears   Nose:   Nares normal, septum midline   Throat:   Mucosa moist  Pharynx without injection  Neck:   Supple         Lungs:     Clear to auscultation bilaterally   Chest Wall:    No tenderness or deformity    Heart:    Regular rate and rhythm         Abdomen:     Soft, non-tender, bowel sounds +, no organomegaly               Extremities:   Extremities no cyanosis or edema         Skin:   no rash or icterus  Lymph nodes:   Cervical, supraclavicular, and axillary nodes normal   Neurologic:   CNII-XII intact, normal strength, sensation and reflexes     Throughout             Breast exam:   NA           ROS: Review of Systems   All other systems reviewed and are negative  Imaging: CT chest abdomen pelvis w contrast    Result Date: 8/6/2021  Narrative: CT CHEST, ABDOMEN AND PELVIS WITH IV CONTRAST INDICATION:   C64 2: Malignant neoplasm of left kidney, except renal pelvis  Clear cell adenocarcinoma left kidney COMPARISON:  2/6/2021; 1/20/2020; 11/7/2019 TECHNIQUE: CT examination of the chest, abdomen and pelvis was performed  Axial, sagittal, and coronal 2D reformatted images were created from the source data and submitted for interpretation  Radiation dose length product (DLP) for this visit:  1489 mGy-cm   This examination, like all CT scans performed in the [facility], was performed utilizing techniques to minimize radiation dose exposure, including the use of iterative reconstruction and automated exposure control  IV Contrast:  100 mL of iohexol (OMNIPAQUE) Enteric Contrast: Enteric contrast was administered  FINDINGS: CHEST LUNGS:  Patchy consolidation both right lower lobe adjacent to rib metastasis  Also consolidation is noted involving the superior right hilum adjacent pleural surface without change  1 1 cm left lower lobe nodule image 62, series 3, previously 9 mm   1 9 x 1 2 cm left upper lobe nodule, previously 1 5 x 1 0 cm  1 7 x 1 5 cm nodule right middle lobe inferiorly, previously 1 x 0 7 cm  There is no tracheal or endobronchial lesion  PLEURA:  Unremarkable  HEART/GREAT VESSELS:  Unremarkable for patient's age  MEDIASTINUM AND CHERRIE:  Unremarkable  CHEST WALL AND LOWER NECK:   Right chest wall nodularity seen previously is no longer visualized  Small thyroid nodules are unchanged  ABDOMEN LIVER/BILIARY TREE:  Unremarkable  GALLBLADDER:  No calcified gallstones  No pericholecystic inflammatory change  SPLEEN:  Unremarkable  PANCREAS:  Unremarkable  ADRENAL GLANDS:  Left adrenal nodule is present measuring 3 4 x 1 6 cm, previously 2 8 x 1 2 cm  KIDNEYS/URETERS:  Multiple renal cysts are identified on the right  Parapelvic cysts in the right kidney is similar to prior studies  This etiology was confirmed on delayed imaging performed on 9/5/2017  The patient is status post left nephrectomy with clips present  The left renal fossa is unremarkable  STOMACH AND BOWEL:  No small bowel dilatation is seen  There is left colon diverticulosis without CT evidence of diverticulitis  APPENDIX:  No findings to suggest appendicitis  ABDOMINOPELVIC CAVITY:  No ascites  No pneumoperitoneum  No lymphadenopathy  All nodes or vessels are noted in the left renal fossa without change  There is a left external iliac node measuring 8 mm in short axis without change  There is a mesenteric mass present measuring 7 3 x 3 4 cm on image 80 which is enlarged from the prior study where separate nodes were evident  The mass is now confluent  Retroperitoneal mass measures 2 6 cm on image 80, previously 2 cm  VESSELS:  Unremarkable for patient's age  PELVIS REPRODUCTIVE ORGANS:  The prostate is small and should be correlated with any history of prostate surgery  Kelsie Swenson URINARY BLADDER:  Unremarkable  ABDOMINAL WALL/INGUINAL REGIONS:  Right gluteal nodule measures 1 8 cm on image 133 without change   Mild prominent fat in the canal is seen  There appears to be abdominal wall lipoma on image 81 without change  OSSEOUS STRUCTURES:  An expansile lytic lesion involves the right 7th rib on image 30 measuring 5 8 cm x 2 7 cm without significant change  No increased ossification is identified  A 2nd expansile lesion is noted involving image 47 involving the lateral 8th rib also similar  Expansile lesion of the right iliac crest measures 7 cm in its greatest dimension also without change  Lesions are seen more inferiorly involving the ileum as well as the ischium which are lytic in nature  Large lesion involves the left ilium adjacent to the SI joint without change  Orthopedic hardware is noted within the lumbar spine  Lesion involves T6 as well as blastic lesion involving T4 Some sclerosis involves the right 1st rib  Impression: Worsening pulmonary metastases as well as new large james mass in the right upper quadrant where mildly enlarged nodes were seen previously  Osseous metastases appear unchanged  Left adrenal mass is slightly larger  Status post left nephrectomy  The right kidney demonstrates multiple cysts Right gluteal mass is unchanged  No new obvious chest or abdominal wall masses   Workstation performed: [id number]         Labs:   Lab Results   Component Value Date    WBC 7 97 07/27/2021    HGB 15 4 07/27/2021    HCT 50 2 (H) 07/27/2021    MCV 97 07/27/2021     07/27/2021     Lab Results   Component Value Date     12/10/2015    K 3 7 07/27/2021     07/27/2021    CO2 30 07/27/2021    ANIONGAP 7 12/10/2015    BUN 18 07/27/2021    CREATININE 1 26 07/27/2021    GLUCOSE 102 12/10/2015    GLUF 78 11/06/2020    CALCIUM 9 5 07/27/2021    CORRECTEDCA 10 4 (H) 07/27/2021    AST 20 07/27/2021    ALT 18 07/27/2021    ALKPHOS 65 07/27/2021    PROT 6 9 12/10/2015    BILITOT 0 71 12/10/2015    EGFR 57 07/27/2021         Lab Results   Component Value Date     01/29/2020         Lab Results Component Value Date    PSA <0 1 09/23/2019    PSA <0 1 12/22/2017    PSA <0 1 12/10/2015         Current Medications: Reviewed  Allergies: Reviewed  PMH/FH/SH:  Reviewed      Vital Sign:    Body surface area is 2 34 meters squared      Wt Readings from Last 3 Encounters:   08/19/21 131 kg (289 lb)   08/11/21 130 kg (287 lb)   02/12/21 129 kg (284 lb)        Temp Readings from Last 3 Encounters:   08/19/21 98 °F (36 7 °C) (Tympanic Core)   08/11/21 98 4 °F (36 9 °C) (Temporal)   07/28/21 97 8 °F (36 6 °C) (Tympanic)        BP Readings from Last 3 Encounters:   08/19/21 138/80   08/11/21 150/92   07/28/21 150/90         Pulse Readings from Last 3 Encounters:   08/19/21 99   08/11/21 93   07/28/21 92     @LASTSAO2(3)@

## 2021-08-19 NOTE — PROGRESS NOTES
8-19-21  Rcvd new oral chemo start- [facility] // Katie needed  Patient has active mariluz with [facility] ID# [id number]  CARD# [id number]  PCN# [id number]  GRP# [id number]  BIN# [id number]  AMT [id number] W/ Remaining balance $ [id number]  Eff 1-13-21  Thru 1-12-22    Epic noted, Email to team

## 2021-08-20 ENCOUNTER — DOCUMENTATION (OUTPATIENT)
Dept: HEMATOLOGY ONCOLOGY | Facility: CLINIC | Age: 71
End: 2021-08-20

## 2021-08-20 NOTE — PROGRESS NOTES
Request received for patient to get Lenvima 18 mg daily dose  Krystinaecka Skiff has been submitted and pending via cover my meds Aly: Michelle Zeus: [id number]                  PCN: [id number]                  ID: [id number]                  GRP: [id number]    UPDATE:  This has been denied and an appeal has been started  UPDATE:  This has been approved  [facility] and clinical aware

## 2021-08-25 DIAGNOSIS — C78.00 MALIGNANT NEOPLASM METASTATIC TO LUNG, UNSPECIFIED LATERALITY (HCC): Primary | ICD-10-CM

## 2021-08-25 RX ORDER — LENVATINIB 18 MG/DAY
18 KIT ORAL DAILY
Qty: 90 EACH | Refills: 4 | Status: SHIPPED | OUTPATIENT
Start: 2021-08-25 | End: 2021-08-27 | Stop reason: SDUPTHER

## 2021-08-27 ENCOUNTER — TELEPHONE (OUTPATIENT)
Dept: HEMATOLOGY ONCOLOGY | Facility: CLINIC | Age: 71
End: 2021-08-27

## 2021-08-27 DIAGNOSIS — C78.00 MALIGNANT NEOPLASM METASTATIC TO LUNG, UNSPECIFIED LATERALITY (HCC): ICD-10-CM

## 2021-08-27 RX ORDER — LENVATINIB 18 MG/DAY
18 KIT ORAL DAILY
Qty: 90 EACH | Refills: 4 | Status: SHIPPED | OUTPATIENT
Start: 2021-08-27 | End: 2022-02-18

## 2021-08-27 NOTE — TELEPHONE ENCOUNTER
Patient calling about his Rohini Courts  He states he received a call from [facility] regarding medication  Per patient his out of pocket cost is too high for him  I reviewed chart notes which states Mandy Pacheco was approved and patient still has funding  Will send to oncology finance to return call to patient to answer his questions  Will include RN  Looks like Rx was sent to [facility] not [facility]    Call back number [phone]

## 2021-08-27 NOTE — TELEPHONE ENCOUNTER
Call received from [facility] from [address] in reference to patient's Delmy Kruger  Reviewed Oncology finance updated message on 8/20/21 epic note updated today  UPDATE:  This has been approved  [facility] and clinical aware  Call placed to Yaquelin Escalona at [facility] to verify that he received the prescription that was sent to him today and question if it is being filled at [facility]  Yaquelin Escalona from [facility] is now getting a refill too soon message because it looks like it was filled at [facility] yesterday  Will need to forward to Oncology Finance team to cancel out their order per Yaquelin Escalona  Will also notify Clinical     Oncology finance please reach out to [facility] at [phone]

## 2021-08-30 DIAGNOSIS — K13.79 MOUTH SORES: ICD-10-CM

## 2021-08-30 NOTE — TELEPHONE ENCOUNTER
Patient is calling in to follow up on above  Advised that [facility] will be calling him today per Jonathan Del Cid  Patient requested that they call him on his cellphone number  Ektaюлияazar Devoraalva advised of above  Patient is questioning if he should start lenvima meds when he receives them? Or wait until 9/10/21 infusion? Will forward to Dr Lorraine Fitzpatrick RN to review above question

## 2021-09-08 ENCOUNTER — APPOINTMENT (OUTPATIENT)
Dept: LAB | Facility: MEDICAL CENTER | Age: 71
End: 2021-09-08
Payer: MEDICARE

## 2021-09-08 DIAGNOSIS — M25.562 LEFT KNEE PAIN, UNSPECIFIED CHRONICITY: ICD-10-CM

## 2021-09-08 DIAGNOSIS — C61 MALIGNANT NEOPLASM OF PROSTATE (HCC): ICD-10-CM

## 2021-09-08 DIAGNOSIS — C78.00 MALIGNANT NEOPLASM METASTATIC TO LUNG, UNSPECIFIED LATERALITY (HCC): ICD-10-CM

## 2021-09-08 DIAGNOSIS — C64.2 CLEAR CELL ADENOCARCINOMA OF KIDNEY, LEFT (HCC): ICD-10-CM

## 2021-09-08 DIAGNOSIS — C79.51 SPINE METASTASIS (HCC): ICD-10-CM

## 2021-09-08 DIAGNOSIS — C64.9 RENAL CELL CARCINOMA, UNSPECIFIED LATERALITY (HCC): ICD-10-CM

## 2021-09-08 DIAGNOSIS — C79.51 BONE METASTASES (HCC): ICD-10-CM

## 2021-09-08 LAB
25(OH)D3 SERPL-MCNC: 46.1 NG/ML (ref 30–100)
ALBUMIN SERPL BCP-MCNC: 3 G/DL (ref 3.5–5)
ALP SERPL-CCNC: 72 U/L (ref 46–116)
ALT SERPL W P-5'-P-CCNC: 23 U/L (ref 12–78)
ANION GAP SERPL CALCULATED.3IONS-SCNC: 5 MMOL/L (ref 4–13)
AST SERPL W P-5'-P-CCNC: 27 U/L (ref 5–45)
BASOPHILS # BLD AUTO: 0.01 THOUSANDS/ΜL (ref 0–0.1)
BASOPHILS NFR BLD AUTO: 0 % (ref 0–1)
BILIRUB SERPL-MCNC: 1.03 MG/DL (ref 0.2–1)
BUN SERPL-MCNC: 17 MG/DL (ref 5–25)
CALCIUM ALBUM COR SERPL-MCNC: 10.2 MG/DL (ref 8.3–10.1)
CALCIUM SERPL-MCNC: 9.4 MG/DL (ref 8.3–10.1)
CHLORIDE SERPL-SCNC: 103 MMOL/L (ref 100–108)
CHOLEST SERPL-MCNC: 196 MG/DL (ref 50–200)
CO2 SERPL-SCNC: 32 MMOL/L (ref 21–32)
CREAT SERPL-MCNC: 1.08 MG/DL (ref 0.6–1.3)
EOSINOPHIL # BLD AUTO: 0.15 THOUSAND/ΜL (ref 0–0.61)
EOSINOPHIL NFR BLD AUTO: 2 % (ref 0–6)
ERYTHROCYTE [DISTWIDTH] IN BLOOD BY AUTOMATED COUNT: 16.2 % (ref 11.6–15.1)
GFR SERPL CREATININE-BSD FRML MDRD: 69 ML/MIN/1.73SQ M
GLUCOSE P FAST SERPL-MCNC: 78 MG/DL (ref 65–99)
HCT VFR BLD AUTO: 51.1 % (ref 36.5–49.3)
HDLC SERPL-MCNC: 43 MG/DL
HGB BLD-MCNC: 15.9 G/DL (ref 12–17)
IMM GRANULOCYTES # BLD AUTO: 0.01 THOUSAND/UL (ref 0–0.2)
IMM GRANULOCYTES NFR BLD AUTO: 0 % (ref 0–2)
LDLC SERPL CALC-MCNC: 129 MG/DL (ref 0–100)
LYMPHOCYTES # BLD AUTO: 2.42 THOUSANDS/ΜL (ref 0.6–4.47)
LYMPHOCYTES NFR BLD AUTO: 37 % (ref 14–44)
MCH RBC QN AUTO: 29.3 PG (ref 26.8–34.3)
MCHC RBC AUTO-ENTMCNC: 31.1 G/DL (ref 31.4–37.4)
MCV RBC AUTO: 94 FL (ref 82–98)
MONOCYTES # BLD AUTO: 0.41 THOUSAND/ΜL (ref 0.17–1.22)
MONOCYTES NFR BLD AUTO: 6 % (ref 4–12)
NEUTROPHILS # BLD AUTO: 3.48 THOUSANDS/ΜL (ref 1.85–7.62)
NEUTS SEG NFR BLD AUTO: 55 % (ref 43–75)
NONHDLC SERPL-MCNC: 153 MG/DL
NRBC BLD AUTO-RTO: 0 /100 WBCS
PLATELET # BLD AUTO: 163 THOUSANDS/UL (ref 149–390)
PMV BLD AUTO: 10.4 FL (ref 8.9–12.7)
POTASSIUM SERPL-SCNC: 3.8 MMOL/L (ref 3.5–5.3)
PROT SERPL-MCNC: 6.5 G/DL (ref 6.4–8.2)
RBC # BLD AUTO: 5.42 MILLION/UL (ref 3.88–5.62)
SODIUM SERPL-SCNC: 140 MMOL/L (ref 136–145)
T3FREE SERPL-MCNC: 2.52 PG/ML (ref 2.3–4.2)
TRIGL SERPL-MCNC: 120 MG/DL
TSH SERPL DL<=0.05 MIU/L-ACNC: 1.16 UIU/ML (ref 0.36–3.74)
WBC # BLD AUTO: 6.48 THOUSAND/UL (ref 4.31–10.16)

## 2021-09-08 PROCEDURE — 36415 COLL VENOUS BLD VENIPUNCTURE: CPT

## 2021-09-08 PROCEDURE — 84443 ASSAY THYROID STIM HORMONE: CPT

## 2021-09-08 PROCEDURE — 80061 LIPID PANEL: CPT

## 2021-09-08 PROCEDURE — 84481 FREE ASSAY (FT-3): CPT

## 2021-09-08 PROCEDURE — 82306 VITAMIN D 25 HYDROXY: CPT

## 2021-09-08 PROCEDURE — 80053 COMPREHEN METABOLIC PANEL: CPT

## 2021-09-08 PROCEDURE — 85025 COMPLETE CBC W/AUTO DIFF WBC: CPT

## 2021-09-10 ENCOUNTER — HOSPITAL ENCOUNTER (OUTPATIENT)
Dept: INFUSION CENTER | Facility: CLINIC | Age: 71
Discharge: HOME | End: 2021-09-10
Payer: MEDICARE

## 2021-09-10 VITALS
HEART RATE: 94 BPM | BODY MASS INDEX: 46.91 KG/M2 | WEIGHT: 291.89 LBS | RESPIRATION RATE: 18 BRPM | TEMPERATURE: 97.8 F | SYSTOLIC BLOOD PRESSURE: 160 MMHG | DIASTOLIC BLOOD PRESSURE: 98 MMHG | HEIGHT: 66 IN

## 2021-09-10 DIAGNOSIS — C78.00 MALIGNANT NEOPLASM METASTATIC TO LUNG, UNSPECIFIED LATERALITY (HCC): ICD-10-CM

## 2021-09-10 DIAGNOSIS — C64.2 CLEAR CELL ADENOCARCINOMA OF KIDNEY, LEFT (HCC): ICD-10-CM

## 2021-09-10 DIAGNOSIS — C79.51 BONE METASTASES (HCC): Primary | ICD-10-CM

## 2021-09-10 DIAGNOSIS — C79.51 SPINE METASTASIS (HCC): ICD-10-CM

## 2021-09-10 PROCEDURE — 96413 CHEMO IV INFUSION 1 HR: CPT

## 2021-09-10 RX ORDER — SODIUM CHLORIDE 9 MG/ML
20 INJECTION, SOLUTION INTRAVENOUS ONCE
Status: COMPLETED | OUTPATIENT
Start: 2021-09-10 | End: 2021-09-10

## 2021-09-10 RX ADMIN — SODIUM CHLORIDE 20 ML/HR: 0.9 INJECTION, SOLUTION INTRAVENOUS at 09:40

## 2021-09-10 RX ADMIN — SODIUM CHLORIDE 400 MG: 9 INJECTION, SOLUTION INTRAVENOUS at 10:02

## 2021-09-10 NOTE — PROGRESS NOTES
Per Dr Codi Crowley, will proceed with treatment today  Pt  Instructed to monitor BP at home and follow up with PCP if BP remains elevated

## 2021-09-10 NOTE — PROGRESS NOTES
Pt  Denies new symptoms or concerns today  Labs reviewed  BP elevated  Pt  Took Lisinopril-HCTZ this am  Pt  Takes this BID and also takes Norvasc in the evening  Pt  Began a new medication Lenvatinib 5 days ago  RN notified office of Dr Anmol Hardin

## 2021-09-20 ENCOUNTER — TELEPHONE (OUTPATIENT)
Dept: HEMATOLOGY ONCOLOGY | Facility: CLINIC | Age: 71
End: 2021-09-20

## 2021-09-20 DIAGNOSIS — K52.1 CHEMOTHERAPY INDUCED DIARRHEA: Primary | ICD-10-CM

## 2021-09-20 DIAGNOSIS — T45.1X5A CHEMOTHERAPY INDUCED DIARRHEA: Primary | ICD-10-CM

## 2021-09-20 RX ORDER — DIPHENOXYLATE HYDROCHLORIDE AND ATROPINE SULFATE 2.5; .025 MG/1; MG/1
1 TABLET ORAL 4 TIMES DAILY PRN
Qty: 30 TABLET | Refills: 1 | Status: SHIPPED | OUTPATIENT
Start: 2021-09-20

## 2021-09-20 NOTE — TELEPHONE ENCOUNTER
Returned call to patient  He stopped his Jay Juarez on 9/16/21 due to diarrhea, gas pain, fatigue and decrease in appetite  Patient states he has diarrhea "all the time"  He tried Imodium without relief  Today patient reports he is feeling "a little better"  His diarrhea has subsided somewhat and he was able to eat  Per Dr Kadeem Khan patient is to continue to hold Lenvima x1 week    Rx for Lomotil will be sent to the pharmacy  Patient will call back in 1 week with an update or will call sooner if symptoms do not improve  Encouraged increase in PO fluids and bland diet    Patient agreeable and verbalized understanding

## 2021-09-20 NOTE — TELEPHONE ENCOUNTER
Patient called stating he has stopped his Lenvatinib   Patient is requesting a call back at [phone]

## 2021-10-18 ENCOUNTER — APPOINTMENT (OUTPATIENT)
Dept: LAB | Facility: MEDICAL CENTER | Age: 71
End: 2021-10-18
Payer: MEDICARE

## 2021-10-18 DIAGNOSIS — C78.00 MALIGNANT NEOPLASM METASTATIC TO LUNG, UNSPECIFIED LATERALITY (HCC): ICD-10-CM

## 2021-10-18 DIAGNOSIS — C79.51 SPINE METASTASIS (HCC): ICD-10-CM

## 2021-10-18 DIAGNOSIS — C64.2 CLEAR CELL ADENOCARCINOMA OF KIDNEY, LEFT (HCC): ICD-10-CM

## 2021-10-18 DIAGNOSIS — C79.51 BONE METASTASES (HCC): ICD-10-CM

## 2021-10-18 LAB
ALBUMIN SERPL BCP-MCNC: 3 G/DL (ref 3.5–5)
ALP SERPL-CCNC: 70 U/L (ref 46–116)
ALT SERPL W P-5'-P-CCNC: 23 U/L (ref 12–78)
ANION GAP SERPL CALCULATED.3IONS-SCNC: 2 MMOL/L (ref 4–13)
AST SERPL W P-5'-P-CCNC: 29 U/L (ref 5–45)
BASOPHILS # BLD AUTO: 0.02 THOUSANDS/ΜL (ref 0–0.1)
BASOPHILS NFR BLD AUTO: 0 % (ref 0–1)
BILIRUB SERPL-MCNC: 1.12 MG/DL (ref 0.2–1)
BUN SERPL-MCNC: 22 MG/DL (ref 5–25)
CALCIUM ALBUM COR SERPL-MCNC: 10.4 MG/DL (ref 8.3–10.1)
CALCIUM SERPL-MCNC: 9.6 MG/DL (ref 8.3–10.1)
CHLORIDE SERPL-SCNC: 104 MMOL/L (ref 100–108)
CO2 SERPL-SCNC: 35 MMOL/L (ref 21–32)
CREAT SERPL-MCNC: 1.29 MG/DL (ref 0.6–1.3)
EOSINOPHIL # BLD AUTO: 0.15 THOUSAND/ΜL (ref 0–0.61)
EOSINOPHIL NFR BLD AUTO: 2 % (ref 0–6)
ERYTHROCYTE [DISTWIDTH] IN BLOOD BY AUTOMATED COUNT: 16.6 % (ref 11.6–15.1)
GFR SERPL CREATININE-BSD FRML MDRD: 55 ML/MIN/1.73SQ M
GLUCOSE P FAST SERPL-MCNC: 128 MG/DL (ref 65–99)
HCT VFR BLD AUTO: 54.8 % (ref 36.5–49.3)
HGB BLD-MCNC: 16.7 G/DL (ref 12–17)
IMM GRANULOCYTES # BLD AUTO: 0.01 THOUSAND/UL (ref 0–0.2)
IMM GRANULOCYTES NFR BLD AUTO: 0 % (ref 0–2)
LYMPHOCYTES # BLD AUTO: 2.71 THOUSANDS/ΜL (ref 0.6–4.47)
LYMPHOCYTES NFR BLD AUTO: 38 % (ref 14–44)
MCH RBC QN AUTO: 29.2 PG (ref 26.8–34.3)
MCHC RBC AUTO-ENTMCNC: 30.5 G/DL (ref 31.4–37.4)
MCV RBC AUTO: 96 FL (ref 82–98)
MONOCYTES # BLD AUTO: 0.37 THOUSAND/ΜL (ref 0.17–1.22)
MONOCYTES NFR BLD AUTO: 5 % (ref 4–12)
NEUTROPHILS # BLD AUTO: 3.89 THOUSANDS/ΜL (ref 1.85–7.62)
NEUTS SEG NFR BLD AUTO: 55 % (ref 43–75)
NRBC BLD AUTO-RTO: 0 /100 WBCS
PLATELET # BLD AUTO: 151 THOUSANDS/UL (ref 149–390)
PMV BLD AUTO: 11.2 FL (ref 8.9–12.7)
POTASSIUM SERPL-SCNC: 3.9 MMOL/L (ref 3.5–5.3)
PROT SERPL-MCNC: 7.1 G/DL (ref 6.4–8.2)
RBC # BLD AUTO: 5.72 MILLION/UL (ref 3.88–5.62)
SODIUM SERPL-SCNC: 141 MMOL/L (ref 136–145)
T3FREE SERPL-MCNC: 2.41 PG/ML (ref 2.3–4.2)
TSH SERPL DL<=0.05 MIU/L-ACNC: 2.96 UIU/ML (ref 0.36–3.74)
WBC # BLD AUTO: 7.15 THOUSAND/UL (ref 4.31–10.16)

## 2021-10-18 PROCEDURE — 84481 FREE ASSAY (FT-3): CPT

## 2021-10-18 PROCEDURE — 80053 COMPREHEN METABOLIC PANEL: CPT

## 2021-10-18 PROCEDURE — 85025 COMPLETE CBC W/AUTO DIFF WBC: CPT

## 2021-10-18 PROCEDURE — 84443 ASSAY THYROID STIM HORMONE: CPT

## 2021-10-18 PROCEDURE — 36415 COLL VENOUS BLD VENIPUNCTURE: CPT

## 2021-10-22 ENCOUNTER — OFFICE VISIT (OUTPATIENT)
Dept: HEMATOLOGY ONCOLOGY | Facility: CLINIC | Age: 71
End: 2021-10-22
Payer: MEDICARE

## 2021-10-22 ENCOUNTER — HOSPITAL ENCOUNTER (OUTPATIENT)
Dept: INFUSION CENTER | Facility: CLINIC | Age: 71
Discharge: HOME | End: 2021-10-22
Payer: MEDICARE

## 2021-10-22 VITALS
SYSTOLIC BLOOD PRESSURE: 124 MMHG | OXYGEN SATURATION: 90 % | TEMPERATURE: 97.7 F | WEIGHT: 284 LBS | BODY MASS INDEX: 45.64 KG/M2 | HEART RATE: 98 BPM | DIASTOLIC BLOOD PRESSURE: 68 MMHG | RESPIRATION RATE: 18 BRPM | HEIGHT: 66 IN

## 2021-10-22 VITALS
WEIGHT: 285.39 LBS | RESPIRATION RATE: 18 BRPM | BODY MASS INDEX: 46.09 KG/M2 | TEMPERATURE: 97.3 F | DIASTOLIC BLOOD PRESSURE: 79 MMHG | HEART RATE: 91 BPM | SYSTOLIC BLOOD PRESSURE: 117 MMHG

## 2021-10-22 DIAGNOSIS — C79.51 BONE METASTASES (HCC): ICD-10-CM

## 2021-10-22 DIAGNOSIS — C64.2 CLEAR CELL ADENOCARCINOMA OF KIDNEY, LEFT (HCC): ICD-10-CM

## 2021-10-22 DIAGNOSIS — C64.9 METASTATIC RENAL CELL CARCINOMA, UNSPECIFIED LATERALITY (HCC): ICD-10-CM

## 2021-10-22 DIAGNOSIS — C79.51 SPINE METASTASIS (HCC): ICD-10-CM

## 2021-10-22 DIAGNOSIS — C64.2 CLEAR CELL ADENOCARCINOMA OF KIDNEY, LEFT (HCC): Primary | ICD-10-CM

## 2021-10-22 DIAGNOSIS — C78.00 MALIGNANT NEOPLASM METASTATIC TO LUNG, UNSPECIFIED LATERALITY (HCC): ICD-10-CM

## 2021-10-22 DIAGNOSIS — C79.51 BONE METASTASES (HCC): Primary | ICD-10-CM

## 2021-10-22 PROCEDURE — 96413 CHEMO IV INFUSION 1 HR: CPT

## 2021-10-22 PROCEDURE — 96401 CHEMO ANTI-NEOPL SQ/IM: CPT

## 2021-10-22 PROCEDURE — 99214 OFFICE O/P EST MOD 30 MIN: CPT | Performed by: INTERNAL MEDICINE

## 2021-10-22 RX ORDER — SODIUM CHLORIDE 9 MG/ML
20 INJECTION, SOLUTION INTRAVENOUS ONCE
Status: CANCELLED | OUTPATIENT
Start: 2021-12-03

## 2021-10-22 RX ORDER — SODIUM CHLORIDE 9 MG/ML
20 INJECTION, SOLUTION INTRAVENOUS ONCE
Status: COMPLETED | OUTPATIENT
Start: 2021-10-22 | End: 2021-10-22

## 2021-10-22 RX ORDER — SODIUM CHLORIDE 9 MG/ML
20 INJECTION, SOLUTION INTRAVENOUS ONCE
Status: CANCELLED | OUTPATIENT
Start: 2022-01-14

## 2021-10-22 RX ORDER — TRAMADOL HYDROCHLORIDE 50 MG/1
50 TABLET ORAL EVERY 6 HOURS PRN
Qty: 60 TABLET | Refills: 2 | Status: SHIPPED | OUTPATIENT
Start: 2021-10-22 | End: 2022-06-03 | Stop reason: SDUPTHER

## 2021-10-22 RX ADMIN — SODIUM CHLORIDE 400 MG: 9 INJECTION, SOLUTION INTRAVENOUS at 10:17

## 2021-10-22 RX ADMIN — SODIUM CHLORIDE 20 ML/HR: 0.9 INJECTION, SOLUTION INTRAVENOUS at 09:45

## 2021-10-22 RX ADMIN — DENOSUMAB 120 MG: 120 INJECTION SUBCUTANEOUS at 11:25

## 2021-11-29 ENCOUNTER — APPOINTMENT (OUTPATIENT)
Dept: LAB | Facility: MEDICAL CENTER | Age: 71
End: 2021-11-29
Payer: MEDICARE

## 2021-11-29 DIAGNOSIS — C79.51 SPINE METASTASIS (HCC): ICD-10-CM

## 2021-11-29 DIAGNOSIS — C78.00 MALIGNANT NEOPLASM METASTATIC TO LUNG, UNSPECIFIED LATERALITY (HCC): ICD-10-CM

## 2021-11-29 DIAGNOSIS — C79.51 BONE METASTASES (HCC): ICD-10-CM

## 2021-11-29 DIAGNOSIS — C64.2 CLEAR CELL ADENOCARCINOMA OF KIDNEY, LEFT (HCC): ICD-10-CM

## 2021-11-29 LAB
ALBUMIN SERPL BCP-MCNC: 3.1 G/DL (ref 3.5–5)
ALP SERPL-CCNC: 63 U/L (ref 46–116)
ALT SERPL W P-5'-P-CCNC: 26 U/L (ref 12–78)
ANION GAP SERPL CALCULATED.3IONS-SCNC: 6 MMOL/L (ref 4–13)
AST SERPL W P-5'-P-CCNC: 31 U/L (ref 5–45)
BASOPHILS # BLD AUTO: 0.03 THOUSANDS/ΜL (ref 0–0.1)
BASOPHILS NFR BLD AUTO: 0 % (ref 0–1)
BILIRUB SERPL-MCNC: 1.81 MG/DL (ref 0.2–1)
BUN SERPL-MCNC: 14 MG/DL (ref 5–25)
CALCIUM ALBUM COR SERPL-MCNC: 9.6 MG/DL (ref 8.3–10.1)
CALCIUM SERPL-MCNC: 8.9 MG/DL (ref 8.3–10.1)
CHLORIDE SERPL-SCNC: 105 MMOL/L (ref 100–108)
CO2 SERPL-SCNC: 29 MMOL/L (ref 21–32)
CREAT SERPL-MCNC: 1.14 MG/DL (ref 0.6–1.3)
EOSINOPHIL # BLD AUTO: 0.18 THOUSAND/ΜL (ref 0–0.61)
EOSINOPHIL NFR BLD AUTO: 2 % (ref 0–6)
ERYTHROCYTE [DISTWIDTH] IN BLOOD BY AUTOMATED COUNT: 17.2 % (ref 11.6–15.1)
GFR SERPL CREATININE-BSD FRML MDRD: 64 ML/MIN/1.73SQ M
GLUCOSE P FAST SERPL-MCNC: 85 MG/DL (ref 65–99)
HCT VFR BLD AUTO: 54.3 % (ref 36.5–49.3)
HGB BLD-MCNC: 16.6 G/DL (ref 12–17)
IMM GRANULOCYTES # BLD AUTO: 0.01 THOUSAND/UL (ref 0–0.2)
IMM GRANULOCYTES NFR BLD AUTO: 0 % (ref 0–2)
LYMPHOCYTES # BLD AUTO: 2.9 THOUSANDS/ΜL (ref 0.6–4.47)
LYMPHOCYTES NFR BLD AUTO: 37 % (ref 14–44)
MCH RBC QN AUTO: 29.4 PG (ref 26.8–34.3)
MCHC RBC AUTO-ENTMCNC: 30.6 G/DL (ref 31.4–37.4)
MCV RBC AUTO: 96 FL (ref 82–98)
MONOCYTES # BLD AUTO: 0.45 THOUSAND/ΜL (ref 0.17–1.22)
MONOCYTES NFR BLD AUTO: 6 % (ref 4–12)
NEUTROPHILS # BLD AUTO: 4.35 THOUSANDS/ΜL (ref 1.85–7.62)
NEUTS SEG NFR BLD AUTO: 55 % (ref 43–75)
NRBC BLD AUTO-RTO: 0 /100 WBCS
PLATELET # BLD AUTO: 221 THOUSANDS/UL (ref 149–390)
PMV BLD AUTO: 10.8 FL (ref 8.9–12.7)
POTASSIUM SERPL-SCNC: 3.7 MMOL/L (ref 3.5–5.3)
PROT SERPL-MCNC: 7.3 G/DL (ref 6.4–8.2)
RBC # BLD AUTO: 5.65 MILLION/UL (ref 3.88–5.62)
SODIUM SERPL-SCNC: 140 MMOL/L (ref 136–145)
T3FREE SERPL-MCNC: 2.46 PG/ML (ref 2.3–4.2)
TSH SERPL DL<=0.05 MIU/L-ACNC: 2.62 UIU/ML (ref 0.36–3.74)
WBC # BLD AUTO: 7.92 THOUSAND/UL (ref 4.31–10.16)

## 2021-11-29 PROCEDURE — 80053 COMPREHEN METABOLIC PANEL: CPT

## 2021-11-29 PROCEDURE — 84481 FREE ASSAY (FT-3): CPT

## 2021-11-29 PROCEDURE — 85025 COMPLETE CBC W/AUTO DIFF WBC: CPT

## 2021-11-29 PROCEDURE — 36415 COLL VENOUS BLD VENIPUNCTURE: CPT

## 2021-11-29 PROCEDURE — 84443 ASSAY THYROID STIM HORMONE: CPT

## 2021-12-03 ENCOUNTER — HOSPITAL ENCOUNTER (OUTPATIENT)
Dept: INFUSION CENTER | Facility: CLINIC | Age: 71
Discharge: HOME | End: 2021-12-03
Payer: MEDICARE

## 2021-12-03 VITALS
HEART RATE: 84 BPM | SYSTOLIC BLOOD PRESSURE: 170 MMHG | RESPIRATION RATE: 18 BRPM | TEMPERATURE: 97.4 F | WEIGHT: 278.33 LBS | DIASTOLIC BLOOD PRESSURE: 99 MMHG | BODY MASS INDEX: 44.95 KG/M2

## 2021-12-03 DIAGNOSIS — C78.00 MALIGNANT NEOPLASM METASTATIC TO LUNG, UNSPECIFIED LATERALITY (HCC): ICD-10-CM

## 2021-12-03 DIAGNOSIS — C79.51 BONE METASTASES (HCC): Primary | ICD-10-CM

## 2021-12-03 DIAGNOSIS — C64.2 CLEAR CELL ADENOCARCINOMA OF KIDNEY, LEFT (HCC): ICD-10-CM

## 2021-12-03 DIAGNOSIS — C79.51 SPINE METASTASIS (HCC): ICD-10-CM

## 2021-12-03 PROCEDURE — 96413 CHEMO IV INFUSION 1 HR: CPT

## 2021-12-03 RX ORDER — SODIUM CHLORIDE 9 MG/ML
20 INJECTION, SOLUTION INTRAVENOUS ONCE
Status: COMPLETED | OUTPATIENT
Start: 2021-12-03 | End: 2021-12-03

## 2021-12-03 RX ADMIN — SODIUM CHLORIDE 400 MG: 9 INJECTION, SOLUTION INTRAVENOUS at 10:33

## 2021-12-03 RX ADMIN — SODIUM CHLORIDE 20 ML/HR: 0.9 INJECTION, SOLUTION INTRAVENOUS at 09:35

## 2021-12-13 ENCOUNTER — HOSPITAL ENCOUNTER (OUTPATIENT)
Dept: CT IMAGING | Facility: HOSPITAL | Age: 71
Discharge: HOME | End: 2021-12-13
Attending: INTERNAL MEDICINE
Payer: MEDICARE

## 2021-12-13 DIAGNOSIS — C64.9 METASTATIC RENAL CELL CARCINOMA, UNSPECIFIED LATERALITY (HCC): ICD-10-CM

## 2021-12-13 DIAGNOSIS — C79.51 BONE METASTASES (HCC): ICD-10-CM

## 2021-12-13 DIAGNOSIS — C78.00 MALIGNANT NEOPLASM METASTATIC TO LUNG, UNSPECIFIED LATERALITY (HCC): ICD-10-CM

## 2021-12-13 DIAGNOSIS — C64.2 CLEAR CELL ADENOCARCINOMA OF KIDNEY, LEFT (HCC): ICD-10-CM

## 2021-12-13 PROCEDURE — 71260 CT THORAX DX C+: CPT

## 2021-12-13 PROCEDURE — 74177 CT ABD & PELVIS W/CONTRAST: CPT

## 2021-12-13 RX ADMIN — IOHEXOL 100 ML: 350 INJECTION, SOLUTION INTRAVENOUS at 16:00

## 2021-12-20 ENCOUNTER — OFFICE VISIT (OUTPATIENT)
Dept: HEMATOLOGY ONCOLOGY | Facility: CLINIC | Age: 71
End: 2021-12-20
Payer: MEDICARE

## 2021-12-20 VITALS
WEIGHT: 283 LBS | RESPIRATION RATE: 20 BRPM | HEIGHT: 66 IN | HEART RATE: 75 BPM | SYSTOLIC BLOOD PRESSURE: 138 MMHG | BODY MASS INDEX: 45.48 KG/M2 | TEMPERATURE: 97 F | DIASTOLIC BLOOD PRESSURE: 64 MMHG | OXYGEN SATURATION: 98 %

## 2021-12-20 DIAGNOSIS — C78.00 MALIGNANT NEOPLASM METASTATIC TO LUNG, UNSPECIFIED LATERALITY (HCC): Primary | ICD-10-CM

## 2021-12-20 DIAGNOSIS — C79.51 BONE METASTASES (HCC): ICD-10-CM

## 2021-12-20 DIAGNOSIS — C79.51 SPINE METASTASIS (HCC): ICD-10-CM

## 2021-12-20 DIAGNOSIS — C64.2 CLEAR CELL ADENOCARCINOMA OF KIDNEY, LEFT (HCC): ICD-10-CM

## 2021-12-20 PROCEDURE — 99214 OFFICE O/P EST MOD 30 MIN: CPT | Performed by: INTERNAL MEDICINE

## 2021-12-20 RX ORDER — SODIUM CHLORIDE 9 MG/ML
20 INJECTION, SOLUTION INTRAVENOUS ONCE
Status: CANCELLED | OUTPATIENT
Start: 2022-02-25

## 2021-12-20 RX ORDER — SODIUM CHLORIDE 9 MG/ML
20 INJECTION, SOLUTION INTRAVENOUS ONCE
Status: CANCELLED | OUTPATIENT
Start: 2022-05-20

## 2021-12-20 RX ORDER — SODIUM CHLORIDE 9 MG/ML
20 INJECTION, SOLUTION INTRAVENOUS ONCE
Status: CANCELLED | OUTPATIENT
Start: 2022-04-08

## 2022-01-04 ENCOUNTER — DOCUMENTATION (OUTPATIENT)
Dept: HEMATOLOGY ONCOLOGY | Facility: CLINIC | Age: 72
End: 2022-01-04

## 2022-01-05 NOTE — PROGRESS NOTES
Rcvd email from pharmacy regarding low funds for Adrianna Gallego  UO: [id number]  Patient Responsibility: $[id number]  Rx ZGO: [id number]  Rx PCN: [id number]  Group #: [id number]

## 2022-01-13 ENCOUNTER — APPOINTMENT (OUTPATIENT)
Dept: LAB | Facility: MEDICAL CENTER | Age: 72
End: 2022-01-13
Payer: MEDICARE

## 2022-01-13 DIAGNOSIS — C78.00 MALIGNANT NEOPLASM METASTATIC TO LUNG, UNSPECIFIED LATERALITY (HCC): ICD-10-CM

## 2022-01-13 DIAGNOSIS — C64.2 CLEAR CELL ADENOCARCINOMA OF KIDNEY, LEFT (HCC): ICD-10-CM

## 2022-01-13 DIAGNOSIS — C79.51 BONE METASTASES (HCC): ICD-10-CM

## 2022-01-13 DIAGNOSIS — C79.51 SPINE METASTASIS (HCC): ICD-10-CM

## 2022-01-13 LAB
ALBUMIN SERPL BCP-MCNC: 3.4 G/DL (ref 3.5–5)
ALP SERPL-CCNC: 83 U/L (ref 46–116)
ALT SERPL W P-5'-P-CCNC: 19 U/L (ref 12–78)
ANION GAP SERPL CALCULATED.3IONS-SCNC: 4 MMOL/L (ref 4–13)
AST SERPL W P-5'-P-CCNC: 21 U/L (ref 5–45)
BASOPHILS # BLD AUTO: 0.03 THOUSANDS/ΜL (ref 0–0.1)
BASOPHILS NFR BLD AUTO: 0 % (ref 0–1)
BILIRUB SERPL-MCNC: 1.05 MG/DL (ref 0.2–1)
BUN SERPL-MCNC: 20 MG/DL (ref 5–25)
CALCIUM ALBUM COR SERPL-MCNC: 10.7 MG/DL (ref 8.3–10.1)
CALCIUM SERPL-MCNC: 10.2 MG/DL (ref 8.3–10.1)
CHLORIDE SERPL-SCNC: 104 MMOL/L (ref 100–108)
CO2 SERPL-SCNC: 33 MMOL/L (ref 21–32)
CREAT SERPL-MCNC: 1.1 MG/DL (ref 0.6–1.3)
EOSINOPHIL # BLD AUTO: 0.42 THOUSAND/ΜL (ref 0–0.61)
EOSINOPHIL NFR BLD AUTO: 5 % (ref 0–6)
ERYTHROCYTE [DISTWIDTH] IN BLOOD BY AUTOMATED COUNT: 15.9 % (ref 11.6–15.1)
GFR SERPL CREATININE-BSD FRML MDRD: 66 ML/MIN/1.73SQ M
GLUCOSE P FAST SERPL-MCNC: 77 MG/DL (ref 65–99)
HCT VFR BLD AUTO: 50.6 % (ref 36.5–49.3)
HGB BLD-MCNC: 15.4 G/DL (ref 12–17)
IMM GRANULOCYTES # BLD AUTO: 0.01 THOUSAND/UL (ref 0–0.2)
IMM GRANULOCYTES NFR BLD AUTO: 0 % (ref 0–2)
LYMPHOCYTES # BLD AUTO: 3.68 THOUSANDS/ΜL (ref 0.6–4.47)
LYMPHOCYTES NFR BLD AUTO: 42 % (ref 14–44)
MCH RBC QN AUTO: 28.7 PG (ref 26.8–34.3)
MCHC RBC AUTO-ENTMCNC: 30.4 G/DL (ref 31.4–37.4)
MCV RBC AUTO: 94 FL (ref 82–98)
MONOCYTES # BLD AUTO: 0.47 THOUSAND/ΜL (ref 0.17–1.22)
MONOCYTES NFR BLD AUTO: 5 % (ref 4–12)
NEUTROPHILS # BLD AUTO: 4.25 THOUSANDS/ΜL (ref 1.85–7.62)
NEUTS SEG NFR BLD AUTO: 48 % (ref 43–75)
NRBC BLD AUTO-RTO: 0 /100 WBCS
PLATELET # BLD AUTO: 206 THOUSANDS/UL (ref 149–390)
PMV BLD AUTO: 10.5 FL (ref 8.9–12.7)
POTASSIUM SERPL-SCNC: 3.9 MMOL/L (ref 3.5–5.3)
PROT SERPL-MCNC: 7.6 G/DL (ref 6.4–8.2)
RBC # BLD AUTO: 5.36 MILLION/UL (ref 3.88–5.62)
SODIUM SERPL-SCNC: 141 MMOL/L (ref 136–145)
T3FREE SERPL-MCNC: 2.41 PG/ML (ref 2.3–4.2)
TSH SERPL DL<=0.05 MIU/L-ACNC: 1.69 UIU/ML (ref 0.36–3.74)
WBC # BLD AUTO: 8.86 THOUSAND/UL (ref 4.31–10.16)

## 2022-01-13 PROCEDURE — 85025 COMPLETE CBC W/AUTO DIFF WBC: CPT

## 2022-01-13 PROCEDURE — 84443 ASSAY THYROID STIM HORMONE: CPT

## 2022-01-13 PROCEDURE — 36415 COLL VENOUS BLD VENIPUNCTURE: CPT

## 2022-01-13 PROCEDURE — 80053 COMPREHEN METABOLIC PANEL: CPT

## 2022-01-13 PROCEDURE — 84481 FREE ASSAY (FT-3): CPT

## 2022-01-14 ENCOUNTER — HOSPITAL ENCOUNTER (OUTPATIENT)
Dept: INFUSION CENTER | Facility: CLINIC | Age: 72
Discharge: HOME | End: 2022-01-14
Payer: MEDICARE

## 2022-01-14 VITALS
RESPIRATION RATE: 20 BRPM | WEIGHT: 273.59 LBS | HEART RATE: 71 BPM | BODY MASS INDEX: 43.97 KG/M2 | DIASTOLIC BLOOD PRESSURE: 75 MMHG | HEIGHT: 66 IN | TEMPERATURE: 96.5 F | SYSTOLIC BLOOD PRESSURE: 120 MMHG

## 2022-01-14 DIAGNOSIS — C78.00 MALIGNANT NEOPLASM METASTATIC TO LUNG, UNSPECIFIED LATERALITY (HCC): ICD-10-CM

## 2022-01-14 DIAGNOSIS — C64.2 CLEAR CELL ADENOCARCINOMA OF KIDNEY, LEFT (HCC): ICD-10-CM

## 2022-01-14 DIAGNOSIS — C79.51 BONE METASTASES (HCC): Primary | ICD-10-CM

## 2022-01-14 DIAGNOSIS — C79.51 SPINE METASTASIS (HCC): ICD-10-CM

## 2022-01-14 PROCEDURE — 96401 CHEMO ANTI-NEOPL SQ/IM: CPT

## 2022-01-14 PROCEDURE — 96413 CHEMO IV INFUSION 1 HR: CPT

## 2022-01-14 RX ORDER — SODIUM CHLORIDE 9 MG/ML
20 INJECTION, SOLUTION INTRAVENOUS ONCE
Status: COMPLETED | OUTPATIENT
Start: 2022-01-14 | End: 2022-01-14

## 2022-01-14 RX ADMIN — DENOSUMAB 120 MG: 120 INJECTION SUBCUTANEOUS at 10:04

## 2022-01-14 RX ADMIN — SODIUM CHLORIDE 400 MG: 9 INJECTION, SOLUTION INTRAVENOUS at 10:20

## 2022-01-14 RX ADMIN — SODIUM CHLORIDE 20 ML/HR: 0.9 INJECTION, SOLUTION INTRAVENOUS at 10:01

## 2022-01-14 NOTE — PROGRESS NOTES
Patient arrived to unit without complaint  Patient's serum calcium and creatinine clearance reviewed and ok to give Xgeva today  Patient received Xgeva SC to right arm without incident  Patient tolerated chemotherapy without incident  AVS provided and patient aware of next appointment  Patient left in stable condition

## 2022-02-18 DIAGNOSIS — C78.00 MALIGNANT NEOPLASM METASTATIC TO LUNG, UNSPECIFIED LATERALITY (HCC): ICD-10-CM

## 2022-02-18 RX ORDER — LENVATINIB 18 MG/DAY
KIT ORAL
Qty: 90 EACH | Refills: 10 | Status: SHIPPED | OUTPATIENT
Start: 2022-02-18 | End: 2022-03-28 | Stop reason: ALTCHOICE

## 2022-02-23 ENCOUNTER — APPOINTMENT (OUTPATIENT)
Dept: LAB | Facility: MEDICAL CENTER | Age: 72
End: 2022-02-23
Payer: MEDICARE

## 2022-02-23 DIAGNOSIS — C79.51 BONE METASTASES (HCC): ICD-10-CM

## 2022-02-23 DIAGNOSIS — C78.00 MALIGNANT NEOPLASM METASTATIC TO LUNG, UNSPECIFIED LATERALITY (HCC): ICD-10-CM

## 2022-02-23 DIAGNOSIS — C79.51 SPINE METASTASIS (HCC): ICD-10-CM

## 2022-02-23 DIAGNOSIS — C64.2 CLEAR CELL ADENOCARCINOMA OF KIDNEY, LEFT (HCC): ICD-10-CM

## 2022-02-23 LAB
ALBUMIN SERPL BCP-MCNC: 3.3 G/DL (ref 3.5–5)
ALP SERPL-CCNC: 61 U/L (ref 46–116)
ALT SERPL W P-5'-P-CCNC: 16 U/L (ref 12–78)
ANION GAP SERPL CALCULATED.3IONS-SCNC: 3 MMOL/L (ref 4–13)
AST SERPL W P-5'-P-CCNC: 19 U/L (ref 5–45)
BASOPHILS # BLD AUTO: 0.02 THOUSANDS/ΜL (ref 0–0.1)
BASOPHILS NFR BLD AUTO: 0 % (ref 0–1)
BILIRUB SERPL-MCNC: 0.77 MG/DL (ref 0.2–1)
BUN SERPL-MCNC: 23 MG/DL (ref 5–25)
CALCIUM ALBUM COR SERPL-MCNC: 10.4 MG/DL (ref 8.3–10.1)
CALCIUM SERPL-MCNC: 9.8 MG/DL (ref 8.3–10.1)
CHLORIDE SERPL-SCNC: 106 MMOL/L (ref 100–108)
CO2 SERPL-SCNC: 32 MMOL/L (ref 21–32)
CREAT SERPL-MCNC: 1.25 MG/DL (ref 0.6–1.3)
EOSINOPHIL # BLD AUTO: 0.24 THOUSAND/ΜL (ref 0–0.61)
EOSINOPHIL NFR BLD AUTO: 3 % (ref 0–6)
ERYTHROCYTE [DISTWIDTH] IN BLOOD BY AUTOMATED COUNT: 16.2 % (ref 11.6–15.1)
GFR SERPL CREATININE-BSD FRML MDRD: 57 ML/MIN/1.73SQ M
GLUCOSE P FAST SERPL-MCNC: 94 MG/DL (ref 65–99)
HCT VFR BLD AUTO: 50.7 % (ref 36.5–49.3)
HGB BLD-MCNC: 15.1 G/DL (ref 12–17)
IMM GRANULOCYTES # BLD AUTO: 0.01 THOUSAND/UL (ref 0–0.2)
IMM GRANULOCYTES NFR BLD AUTO: 0 % (ref 0–2)
LYMPHOCYTES # BLD AUTO: 3.48 THOUSANDS/ΜL (ref 0.6–4.47)
LYMPHOCYTES NFR BLD AUTO: 39 % (ref 14–44)
MCH RBC QN AUTO: 28.6 PG (ref 26.8–34.3)
MCHC RBC AUTO-ENTMCNC: 29.8 G/DL (ref 31.4–37.4)
MCV RBC AUTO: 96 FL (ref 82–98)
MONOCYTES # BLD AUTO: 0.52 THOUSAND/ΜL (ref 0.17–1.22)
MONOCYTES NFR BLD AUTO: 6 % (ref 4–12)
NEUTROPHILS # BLD AUTO: 4.77 THOUSANDS/ΜL (ref 1.85–7.62)
NEUTS SEG NFR BLD AUTO: 52 % (ref 43–75)
NRBC BLD AUTO-RTO: 0 /100 WBCS
PLATELET # BLD AUTO: 193 THOUSANDS/UL (ref 149–390)
PMV BLD AUTO: 10.6 FL (ref 8.9–12.7)
POTASSIUM SERPL-SCNC: 4.1 MMOL/L (ref 3.5–5.3)
PROT SERPL-MCNC: 7.5 G/DL (ref 6.4–8.2)
RBC # BLD AUTO: 5.28 MILLION/UL (ref 3.88–5.62)
SODIUM SERPL-SCNC: 141 MMOL/L (ref 136–145)
T3FREE SERPL-MCNC: 2.21 PG/ML (ref 2.3–4.2)
TSH SERPL DL<=0.05 MIU/L-ACNC: 1.9 UIU/ML (ref 0.36–3.74)
WBC # BLD AUTO: 9.04 THOUSAND/UL (ref 4.31–10.16)

## 2022-02-23 PROCEDURE — 85025 COMPLETE CBC W/AUTO DIFF WBC: CPT

## 2022-02-23 PROCEDURE — 36415 COLL VENOUS BLD VENIPUNCTURE: CPT

## 2022-02-23 PROCEDURE — 80053 COMPREHEN METABOLIC PANEL: CPT

## 2022-02-23 PROCEDURE — 84481 FREE ASSAY (FT-3): CPT

## 2022-02-23 PROCEDURE — 84443 ASSAY THYROID STIM HORMONE: CPT

## 2022-02-25 ENCOUNTER — HOSPITAL ENCOUNTER (OUTPATIENT)
Dept: INFUSION CENTER | Facility: CLINIC | Age: 72
Discharge: HOME | End: 2022-02-25
Payer: MEDICARE

## 2022-02-25 VITALS
HEART RATE: 87 BPM | BODY MASS INDEX: 44.5 KG/M2 | SYSTOLIC BLOOD PRESSURE: 130 MMHG | WEIGHT: 276.9 LBS | HEIGHT: 66 IN | RESPIRATION RATE: 18 BRPM | TEMPERATURE: 98 F | DIASTOLIC BLOOD PRESSURE: 81 MMHG

## 2022-02-25 DIAGNOSIS — C79.51 SPINE METASTASIS (HCC): ICD-10-CM

## 2022-02-25 DIAGNOSIS — C64.2 CLEAR CELL ADENOCARCINOMA OF KIDNEY, LEFT (HCC): ICD-10-CM

## 2022-02-25 DIAGNOSIS — C79.51 BONE METASTASES (HCC): Primary | ICD-10-CM

## 2022-02-25 DIAGNOSIS — C78.00 MALIGNANT NEOPLASM METASTATIC TO LUNG, UNSPECIFIED LATERALITY (HCC): ICD-10-CM

## 2022-02-25 PROCEDURE — 96413 CHEMO IV INFUSION 1 HR: CPT

## 2022-02-25 RX ORDER — SODIUM CHLORIDE 9 MG/ML
20 INJECTION, SOLUTION INTRAVENOUS ONCE
Status: COMPLETED | OUTPATIENT
Start: 2022-02-25 | End: 2022-02-25

## 2022-02-25 RX ADMIN — SODIUM CHLORIDE 400 MG: 9 INJECTION, SOLUTION INTRAVENOUS at 12:06

## 2022-02-25 RX ADMIN — SODIUM CHLORIDE 20 ML/HR: 0.9 INJECTION, SOLUTION INTRAVENOUS at 11:44

## 2022-02-25 NOTE — PROGRESS NOTES
Pt tolerated [facility] without incident  Pt was provided with AVS and is aware of his next appt

## 2022-03-09 ENCOUNTER — APPOINTMENT (OUTPATIENT)
Dept: RADIATION ONCOLOGY | Facility: HOSPITAL | Age: 72
End: 2022-03-09
Payer: MEDICARE

## 2022-03-17 ENCOUNTER — HOSPITAL ENCOUNTER (OUTPATIENT)
Dept: CT IMAGING | Facility: HOSPITAL | Age: 72
Discharge: HOME | End: 2022-03-17
Attending: INTERNAL MEDICINE
Payer: MEDICARE

## 2022-03-17 DIAGNOSIS — C78.00 MALIGNANT NEOPLASM METASTATIC TO LUNG, UNSPECIFIED LATERALITY (HCC): ICD-10-CM

## 2022-03-17 DIAGNOSIS — C64.2 CLEAR CELL ADENOCARCINOMA OF KIDNEY, LEFT (HCC): ICD-10-CM

## 2022-03-17 DIAGNOSIS — C79.51 BONE METASTASES (HCC): ICD-10-CM

## 2022-03-17 PROCEDURE — 74177 CT ABD & PELVIS W/CONTRAST: CPT

## 2022-03-17 PROCEDURE — 71260 CT THORAX DX C+: CPT

## 2022-03-17 RX ADMIN — IOHEXOL 100 ML: 350 INJECTION, SOLUTION INTRAVENOUS at 18:34

## 2022-03-23 ENCOUNTER — RADIATION ONCOLOGY FOLLOW-UP (OUTPATIENT)
Dept: RADIATION ONCOLOGY | Facility: HOSPITAL | Age: 72
End: 2022-03-23
Attending: RADIOLOGY
Payer: MEDICARE

## 2022-03-23 VITALS
DIASTOLIC BLOOD PRESSURE: 96 MMHG | OXYGEN SATURATION: 94 % | SYSTOLIC BLOOD PRESSURE: 170 MMHG | HEIGHT: 66 IN | HEART RATE: 85 BPM | WEIGHT: 272 LBS | TEMPERATURE: 97.7 F | BODY MASS INDEX: 43.71 KG/M2 | RESPIRATION RATE: 18 BRPM

## 2022-03-23 DIAGNOSIS — C79.51 SPINE METASTASIS (HCC): Primary | ICD-10-CM

## 2022-03-23 DIAGNOSIS — C79.51 BONE METASTASES (HCC): Primary | ICD-10-CM

## 2022-03-23 PROCEDURE — 99211 OFF/OP EST MAY X REQ PHY/QHP: CPT | Performed by: RADIOLOGY

## 2022-03-23 PROCEDURE — 99213 OFFICE O/P EST LOW 20 MIN: CPT | Performed by: RADIOLOGY

## 2022-03-23 NOTE — PROGRESS NOTES
Wendy Ortiz 1950 is a 66 y o  male     Follow up visit     Wendy Ortiz is a 66 y o  male with a history of metastatic renal cell cancer, initially diagnosed in 2007  He developed metastatic disease in 2013  He remains on systemic therapy with Medical Oncology  He returns for follow up post right ulnar radiation completed 5/31/17, right posterior 7th rib completed 8/3/17, Stereotactic radiation to T6 completed 10/27/17, SBRT to T10 completed 7/27/18 and SBRT to paraspinal mass at T4 completed 11/23/18  Last seen for radiation follow up on 2/10/21      2/12/21 Dr Miki Meehan f/u  Continue cabozantinib 40 mg every other day, excellent tolerance  Follow up in 6 months w/CT scan and blood work      8/5/21 CT chest abdoman pelvis  IMPRESSION:   Worsening pulmonary metastases as well as new large james mass in the right upper quadrant where mildly enlarged nodes were seen previously  Osseous metastases appear unchanged  Left adrenal mass is slightly larger  Status post left nephrectomy  The right kidney demonstrates multiple cysts   Right gluteal mass is unchanged  No new obvious chest or abdominal wall masses  8/11/21 Neurosurgery, Dr Chilango Lala  thoracic lesions appear to be stable, as reviewed at [address] [place] [address] this morning    8/19/21 [facility]      12/13/21 CT chest abdomen pelvis  IMPRESSION:  Chest:  1  Decreased size of pulmonary metastases  Abdomen and pelvis:  1  Slight decrease in size of left adrenal nodule  2   New 1 x 0 9 cm soft tissue nodule in the left nephrectomy bed concerning for recurrent tumor  A centrally calcified left retroperitoneal soft tissue nodule has slightly decreased in size  3   Increased size of ill-defined infracolic mesenteric soft tissue mass and retroperitoneal lymph node posterior to the pancreas  4   Probable decrease in size of enhancing right gluteal soft tissue nodule which is incompletely imaged    5   Stable size of expansile osteolytic skeletal metastases  Overall mixed response to therapy  12/20/21 Cheng Lino  Recent CT scan, has mixed response  Overall, this should be interpreted as stable disease  Continue pembrolizumab every 6 weeks as well as lenvatinib  Plan repeat CT chest abdomen pelvis in 3 months      3/17/22 CT chest abdomen pelvis  IMPRESSION:   1  Overall findings concerning for disease progression  2   Findings concerning for progression of pulmonary metastasis with new and enlarging nodules seen  3   Enlarging nodule at the left renal bed suspicious for progression of metastasis  Slightly larger left adrenal nodule suspicious for metastasis  4   Enlarging retroperitoneal adenopathy suspicious for progression of metastasis  5   Enlarging mesenteric mass suspicious for progression  6   Stable appearance to the osseous metastasis            Upcoming:  3/28/22 [facility]      Oncology History   Clear cell adenocarcinoma of kidney, left ([facility] [address] )   3/2/2018 Initial Diagnosis    Clear cell adenocarcinoma of kidney, left ([facility] [address] )     1/31/2020 - 4/22/2020 Chemotherapy    pembrolizumab (KEYTRUDA) 200 mg in sodium chloride 0 9 % 50 mL IVPB, 200 mg, Intravenous, Once, 4 of 9 cycles  Administration: 200 mg (1/31/2020), 200 mg (2/21/2020), 200 mg (3/13/2020), 200 mg (4/3/2020)      Chemotherapy    Cabozantinib (Cabometyx) 40 mg every other day      9/10/2021 -  Chemotherapy    pembrolizumab (KEYTRUDA) IVPB, 400 mg, Intravenous, Once, 5 of 9 cycles  Administration: 400 mg (9/10/2021), 400 mg (10/22/2021), 400 mg (12/3/2021), 400 mg (1/14/2022), 400 mg (2/25/2022)     Bone metastases ([address] )   6/22/2016 Initial Diagnosis    Bone metastases ([address] )     3/8/2017 -  Chemotherapy    Axitinib 4 mg b i d        Denosumab 60 mg subcutaneously monthly initiated January 12, 2018 5/17/2017 - 5/31/2017 Radiation    palliative radiation therapy following ORIF of right ulna for metastatic renal cell carcinoma to 3000cGy     7/26/2017 - 8/3/2017 Radiation    palliative radiation therapy for metastatic renal carcinoma to the right posterior seventh rib with bone and soft tissue metastases to 2800cGy     10/18/2017 - 10/27/2017 Radiation    stereotactic radiation therapy to a T6 vertebral body metastasis to 3000cGy     7/18/2018 - 7/27/2018 Radiation    Stereotactic radiation to T10 metastasis to 3000 cGy in 5 fractions     11/14/2018 - 11/23/2018 Radiation    SBRT to paraspinal mass at T4 to 3000 cGy           1/31/2020 - 4/22/2020 Chemotherapy    pembrolizumab (KEYTRUDA) 200 mg in sodium chloride 0 9 % 50 mL IVPB, 200 mg, Intravenous, Once, 4 of 9 cycles  Administration: 200 mg (1/31/2020), 200 mg (2/21/2020), 200 mg (3/13/2020), 200 mg (4/3/2020)      Chemotherapy    Cabozantinib (Cabometyx) 40 mg every other day      9/10/2021 -  Chemotherapy    pembrolizumab (KEYTRUDA) IVPB, 400 mg, Intravenous, Once, 5 of 9 cycles  Administration: 400 mg (9/10/2021), 400 mg (10/22/2021), 400 mg (12/3/2021), 400 mg (1/14/2022), 400 mg (2/25/2022)     Lung metastases ([facility] [address] )   3/2/2018 Initial Diagnosis    Lung metastases ([facility] [address] )     1/31/2020 - 4/22/2020 Chemotherapy    pembrolizumab (KEYTRUDA) 200 mg in sodium chloride 0 9 % 50 mL IVPB, 200 mg, Intravenous, Once, 4 of 9 cycles  Administration: 200 mg (1/31/2020), 200 mg (2/21/2020), 200 mg (3/13/2020), 200 mg (4/3/2020)      Chemotherapy    Cabozantinib (Cabometyx) 40 mg every other day      9/10/2021 -  Chemotherapy    pembrolizumab (KEYTRUDA) IVPB, 400 mg, Intravenous, Once, 5 of 9 cycles  Administration: 400 mg (9/10/2021), 400 mg (10/22/2021), 400 mg (12/3/2021), 400 mg (1/14/2022), 400 mg (2/25/2022)     Spine metastasis ([facility] [address] )   7/11/2018 Initial Diagnosis    Spine metastasis ([facility] [address] )     1/31/2020 - 4/22/2020 Chemotherapy    pembrolizumab (KEYTRUDA) 200 mg in sodium chloride 0 9 % 50 mL IVPB, 200 mg, Intravenous, Once, 4 of 9 cycles  Administration: 200 mg (1/31/2020), 200 mg (2/21/2020), 200 mg (3/13/2020), 200 mg (4/3/2020)      Chemotherapy    Cabozantinib (Cabometyx) 40 mg every other day      9/10/2021 -  Chemotherapy    pembrolizumab (KEYTRUDA) IVPB, 400 mg, Intravenous, Once, 5 of 9 cycles  Administration: 400 mg (9/10/2021), 400 mg (10/22/2021), 400 mg (12/3/2021), 400 mg (1/14/2022), 400 mg (2/25/2022)     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 ([address] ) (Resolved)   7/11/2018 Initial Diagnosis    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 ([address] ) (Resolved)     Metastatic renal cell carcinoma ([facility] [address] )   9/26/2018 Initial Diagnosis    Metastatic renal cell carcinoma (HCC)      Chemotherapy    Cabozantinib (Cabometyx) 40 mg every other day          Review of Systems:  Review of Systems   Constitutional: Positive for fatigue (tired all the time, especially on days he works, still working part time, 30 hours per week)  Negative for activity change, appetite change, chills, fever and unexpected weight change  HENT: Positive for postnasal drip (chronic) and rhinorrhea  Eyes: Negative  Wears glasses    Scheduled for routine eye exam later this month   Respiratory: Positive for shortness of breath (Occasional LAYNE)  Negative for cough (occasional cough, secondary to postnasal drip), chest tightness and wheezing  Cardiovascular: Negative  Negative for chest pain and leg swelling  Gastrointestinal: Positive for diarrhea (managed with Imodium or Lomotil)  Negative for abdominal pain, blood in stool, constipation, nausea and vomiting  Endocrine: Negative  Genitourinary: Negative for difficulty urinating, dysuria and hematuria  Nocturia x3   Musculoskeletal: Positive for back pain (intermittent lower back pain (around waistline), No thoracic spine pain, no right arm pain) and gait problem (using cane for balance)  Negative for neck pain (occasionally)          Intermittent right sided rib pain  Intermittent tingling both shoulders with radiation down both arms when laying down or leaning against something hard   Skin: Negative  Allergic/Immunologic: Positive for environmental allergies  Neurological: Positive for numbness (finger tips /toes/bottom of feet)  Negative for dizziness, weakness (legs), light-headedness and headaches  Hematological: Negative  Psychiatric/Behavioral: Positive for sleep disturbance (nocturia x3)  Negative for dysphoric mood  The patient is not nervous/anxious  Clinical Trial: no    Teaching:     Covid Vaccine Status:  Moderna x3    Health Maintenance   Topic Date Due    Medicare Annual Wellness Visit (AWV)  Never done    BMI: Followup Plan  Never done    Colorectal Cancer Screening  Never done    Fall Risk  01/30/2020    Depression Screening  08/11/2022    COVID-19 Vaccine (4 - Booster for Ivonne  series) 06/05/2022    BMI: Adult  02/25/2023    DTaP,Tdap,and Td Vaccines (3 - Td or Tdap) 07/14/2030    Hepatitis C Screening  Completed    Pneumococcal Vaccine: 65+ Years  Completed    Influenza Vaccine  Completed    HIB Vaccine  Aged Out    Hepatitis B Vaccine  Aged Out    IPV Vaccine  Aged Out    Hepatitis A Vaccine  Aged Out    Meningococcal ACWY Vaccine  Aged Out    HPV Vaccine  Aged Out     Patient Active Problem List   Diagnosis    Clear cell adenocarcinoma of kidney, left ([address] )    Bone metastases ([address] )    Lung metastases ([facility] [address] [facility]    Spine metastasis ([facility] [address] )    Metastatic renal cell carcinoma ([address] )    Hx of prostatectomy    History of prostate cancer    Azotemia     Past Medical History:   Diagnosis Date    Arthritis     Cancer ([address] )     prostate - mets to bone and lung    History of radiation therapy 07/27/2018    SBRT to T10 7/18-7/27/2018    Hyperlipidemia     Hypertension      Past Surgical History:   Procedure Laterality Date    DISTAL ULNA EXCISION Right     excision of tumor and orif with cement and screws    JOINT REPLACEMENT Bilateral     tkr's    LUNG SURGERY Right     exc of nodules    NEPHRECTOMY Left Family History   Problem Relation Age of Onset    No Known Problems Mother     No Known Problems Father      Social History     Socioeconomic History    Marital status:       Spouse name: Not on file    Number of children: 1    Years of education: 15    Highest education level: Not on file   Occupational History    Occupation: retired     Comment: P/T    Tobacco Use    Smoking status: Former Smoker     Packs/day: 1 00     Types: Cigarettes     Quit date: 3/28/2003     Years since quittin 0    Smokeless tobacco: Never Used   Vaping Use    Vaping Use: Never used   Substance and Sexual Activity    Alcohol use: Not Currently     Comment: occasional use    Drug use: No    Sexual activity: Not on file   Other Topics Concern    Not on file   Social History Narrative    Lives at home alone     Social Determinants of Health     Financial Resource Strain: Not on file   Food Insecurity: Not on file   Transportation Needs: Not on file   Physical Activity: Not on file   Stress: Not on file   Social Connections: Not on file   Intimate Partner Violence: Not on file   Housing Stability: Not on file       Current Outpatient Medications:     al mag oxide-diphenhydramine-lidocaine viscous (MAGIC MOUTHWASH) 1:1:1 suspension, Swish and spit 10 mL every 4 (four) hours as needed for mouth pain or discomfort (Patient not taking: Reported on 2021), Disp: 250 mL, Rfl: 0    amLODIPine (NORVASC) 5 mg tablet, Take 5 mg by mouth daily  , Disp: , Rfl:     cholecalciferol (VITAMIN D3) 1,000 units tablet, Take 1,000 Units by mouth daily, Disp: , Rfl:     diphenhydrAMINE (BENADRYL) 25 mg tablet, Take 25 mg by mouth daily at bedtime as needed for itching, Disp: , Rfl:     diphenoxylate-atropine (LOMOTIL) 2 5-0 025 mg per tablet, Take 1 tablet by mouth 4 (four) times a day as needed for diarrhea, Disp: 30 tablet, Rfl: 1    gabapentin (NEURONTIN) 100 mg capsule, Take 300 mg by mouth 3 (three) times a day , Disp: , Rfl:     Lenvima, 18 MG Daily Dose, 10 MG & 2 x 4 MG CPPK, Take 18 mg by mouth daily, as instructed within package  , Disp: 90 each, Rfl: 10    lisinopril-hydrochlorothiazide (PRINZIDE,ZESTORETIC) 20-12 5 MG per tablet, Take 1 tablet by mouth 2 (two) times a day  , Disp: , Rfl:     loperamide (IMODIUM) 2 mg capsule, Take 2 mg by mouth 4 (four) times a day as needed for diarrhea, Disp: , Rfl:     LORazepam (ATIVAN) 0 5 mg tablet, Take 1 tablet (0 5 mg total) by mouth every 8 (eight) hours as needed for anxiety (Patient not taking: Reported on 8/11/2021), Disp: 2 tablet, Rfl: 0    Multiple Vitamin (multivitamin) tablet, Take 1 tablet by mouth daily, Disp: , Rfl:     naproxen sodium (ALEVE) 220 MG tablet, Take 220 mg by mouth as needed for mild pain, Disp: , Rfl:     traMADol (ULTRAM) 50 mg tablet, Take 1 tablet (50 mg total) by mouth every 6 (six) hours as needed for moderate pain, Disp: 60 tablet, Rfl: 2    trolamine salicylate (ASPERCREME) 10 % cream, Apply 1 application topically as needed for muscle/joint pain, Disp: , Rfl:   No Known Allergies  There were no vitals filed for this visit

## 2022-03-23 NOTE — PROGRESS NOTES
Follow-up - Radiation Oncology   Mareblla Cobb 1950 66 y o  male [id number]      History of Present Illness   Cancer Staging  No matching staging information was found for the patient  Interval History:  Alina Sanders a 66 y  o  male with a history of metastatic renal cell cancer, initially diagnosed in 2007  He developed metastatic disease in 2013  He remains on systemic therapy with Medical Oncology       He returns for follow up post right ulnar radiation completed 5/31/17, right posterior 7th rib completed 8/3/17, Stereotactic radiation to T6 completed 10/27/17, SBRT to T10 completed 7/27/18 and SBRT to paraspinal mass at [facility] 11/23/18       Last seen for radiation follow up on 2/10/21      2/12/21 Dr Neptali Jose f/u  Continue cabozantinib 40 mg every other day, excellent tolerance  Follow up in 6 months w/CT scan and blood work      8/5/21 CT chest abdoman pelvis  IMPRESSION:   Worsening pulmonary metastases as well as new large james mass in the right upper quadrant where mildly enlarged nodes were seen previously    Osseous metastases appear unchanged    Left adrenal mass is slightly larger    Status post left nephrectomy    The right kidney demonstrates multiple cysts   Right gluteal mass is unchanged    No new obvious chest or abdominal wall masses         8/11/21 Neurosurgery, Dr Azalea Tatum  thoracic lesions appear to be stable, as reviewed at [address] [place] [address] this morning     8/19/21 [facility]        12/13/21 CT chest abdomen pelvis  IMPRESSION:  Chest:  1   Decreased size of pulmonary metastases      Abdomen and pelvis:  1   Slight decrease in size of left adrenal nodule  2   New 1 x 0 9 cm soft tissue nodule in the left nephrectomy bed concerning for recurrent tumor   A centrally calcified left retroperitoneal soft tissue nodule has slightly decreased in size    3   Increased size of ill-defined infracolic mesenteric soft tissue mass and retroperitoneal lymph node posterior to the pancreas  4   Probable decrease in size of enhancing right gluteal soft tissue nodule which is incompletely imaged  5   Stable size of expansile osteolytic skeletal metastases  Overall mixed response to therapy         12/20/21 Lazarus Lino  Recent CT scan, has mixed response   Overall, this should be interpreted as stable disease     Continue pembrolizumab every 6 weeks as well as lenvatinib  Plan repeat CT chest abdomen pelvis in 3 months        3/17/22 CT chest abdomen pelvis  IMPRESSION:   1   Overall findings concerning for disease progression  2   Findings concerning for progression of pulmonary metastasis with new and enlarging nodules seen  3   Enlarging nodule at the left renal bed suspicious for progression of metastasis   Slightly larger left adrenal nodule suspicious for metastasis  4   Enlarging retroperitoneal adenopathy suspicious for progression of metastasis     5   Enlarging mesenteric mass suspicious for progression  6   Stable appearance to the osseous metastasis               Upcoming:  3/28/22 Leny Contreras           Historical Information   Oncology History   Clear cell adenocarcinoma of kidney, left ([facility] )   3/2/2018 Initial Diagnosis    Clear cell adenocarcinoma of kidney, left ([facility] )     1/31/2020 - 4/22/2020 Chemotherapy    pembrolizumab (KEYTRUDA) 200 mg in sodium chloride 0 9 % 50 mL IVPB, 200 mg, Intravenous, Once, 4 of 9 cycles  Administration: 200 mg (1/31/2020), 200 mg (2/21/2020), 200 mg (3/13/2020), 200 mg (4/3/2020)      Chemotherapy    Cabozantinib (Cabometyx) 40 mg every other day      9/10/2021 -  Chemotherapy    pembrolizumab (KEYTRUDA) IVPB, 400 mg, Intravenous, Once, 5 of 9 cycles  Administration: 400 mg (9/10/2021), 400 mg (10/22/2021), 400 mg (12/3/2021), 400 mg (1/14/2022), 400 mg (2/25/2022)     Bone metastases ([address] )   6/22/2016 Initial Diagnosis    Bone metastases ([facility] )     3/8/2017 -  Chemotherapy    Axitinib 4 mg b i d        Denosumab 60 mg subcutaneously monthly initiated January 12, 2018 5/17/2017 - 5/31/2017 Radiation    palliative radiation therapy following ORIF of right ulna for metastatic renal cell carcinoma to 3000cGy     7/26/2017 - 8/3/2017 Radiation    palliative radiation therapy for metastatic renal carcinoma to the right posterior seventh rib with bone and soft tissue metastases to 2800cGy     10/18/2017 - 10/27/2017 Radiation    stereotactic radiation therapy to a T6 vertebral body metastasis to 3000cGy     7/18/2018 - 7/27/2018 Radiation    Stereotactic radiation to T10 metastasis to 3000 cGy in 5 fractions     11/14/2018 - 11/23/2018 Radiation    SBRT to paraspinal mass at T4 to 3000 cGy           1/31/2020 - 4/22/2020 Chemotherapy    pembrolizumab (KEYTRUDA) 200 mg in sodium chloride 0 9 % 50 mL IVPB, 200 mg, Intravenous, Once, 4 of 9 cycles  Administration: 200 mg (1/31/2020), 200 mg (2/21/2020), 200 mg (3/13/2020), 200 mg (4/3/2020)      Chemotherapy    Cabozantinib (Cabometyx) 40 mg every other day      9/10/2021 -  Chemotherapy    pembrolizumab (KEYTRUDA) IVPB, 400 mg, Intravenous, Once, 5 of 9 cycles  Administration: 400 mg (9/10/2021), 400 mg (10/22/2021), 400 mg (12/3/2021), 400 mg (1/14/2022), 400 mg (2/25/2022)     Lung metastases ([address] )   3/2/2018 Initial Diagnosis    Lung metastases ([address] )     1/31/2020 - 4/22/2020 Chemotherapy    pembrolizumab (KEYTRUDA) 200 mg in sodium chloride 0 9 % 50 mL IVPB, 200 mg, Intravenous, Once, 4 of 9 cycles  Administration: 200 mg (1/31/2020), 200 mg (2/21/2020), 200 mg (3/13/2020), 200 mg (4/3/2020)      Chemotherapy    Cabozantinib (Cabometyx) 40 mg every other day      9/10/2021 -  Chemotherapy    pembrolizumab (KEYTRUDA) IVPB, 400 mg, Intravenous, Once, 5 of 9 cycles  Administration: 400 mg (9/10/2021), 400 mg (10/22/2021), 400 mg (12/3/2021), 400 mg (1/14/2022), 400 mg (2/25/2022)     Spine metastasis ([facility] )   7/11/2018 Initial Diagnosis    [facility] )     1/31/2020 - 4/22/2020 Chemotherapy    pembrolizumab (KEYTRUDA) 200 mg in sodium chloride 0 9 % 50 mL IVPB, 200 mg, Intravenous, Once, 4 of 9 cycles  Administration: 200 mg (2020), 200 mg (2020), 200 mg (3/13/2020), 200 mg (4/3/2020)      Chemotherapy    Cabozantinib (Cabometyx) 40 mg every other day      9/10/2021 -  Chemotherapy    pembrolizumab (KEYTRUDA) IVPB, 400 mg, Intravenous, Once, 5 of 9 cycles  Administration: 400 mg (9/10/2021), 400 mg (10/22/2021), 400 mg (12/3/2021), 400 mg (2022), 400 mg (2022)     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 ([address] ) (Resolved)   2018 Initial Diagnosis    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 ([address] ) (Resolved)     Metastatic renal cell carcinoma ([address] )   2018 Initial Diagnosis    Metastatic renal cell carcinoma (HCC)      Chemotherapy    Cabozantinib (Cabometyx) 40 mg every other day          Past Medical History:   Diagnosis Date    Arthritis     Cancer ([address] )     prostate - mets to bone and lung    History of radiation therapy 2018    SBRT to T10 -2018    Hyperlipidemia     Hypertension      Past Surgical History:   Procedure Laterality Date    DISTAL ULNA EXCISION Right     excision of tumor and orif with cement and screws    JOINT REPLACEMENT Bilateral     tkr's    LUNG SURGERY Right     exc of nodules    NEPHRECTOMY Left        Social History   Social History     Substance and Sexual Activity   Alcohol Use Not Currently    Comment: occasional use     Social History     Substance and Sexual Activity   Drug Use No     Social History     Tobacco Use   Smoking Status Former Smoker    Packs/day: 1 00    Types: Cigarettes    Quit date: 3/28/2003    Years since quittin 0   Smokeless Tobacco Never Used         Meds/Allergies     Current Outpatient Medications:     amLODIPine (NORVASC) 5 mg tablet, Take 5 mg by mouth daily  , Disp: , Rfl:     cholecalciferol (VITAMIN D3) 1,000 units tablet, Take 1,000 Units by mouth daily, Disp: , Rfl:     diphenhydrAMINE (BENADRYL) 25 mg tablet, Take 25 mg by mouth daily at bedtime as needed for itching, Disp: , Rfl:     diphenoxylate-atropine (LOMOTIL) 2 5-0 025 mg per tablet, Take 1 tablet by mouth 4 (four) times a day as needed for diarrhea, Disp: 30 tablet, Rfl: 1    gabapentin (NEURONTIN) 100 mg capsule, Take 300 mg by mouth 3 (three) times a day , Disp: , Rfl:     Lenvima, 18 MG Daily Dose, 10 MG & 2 x 4 MG CPPK, Take 18 mg by mouth daily, as instructed within package  , Disp: 90 each, Rfl: 10    lisinopril-hydrochlorothiazide (PRINZIDE,ZESTORETIC) 20-12 5 MG per tablet, Take 1 tablet by mouth 2 (two) times a day  , Disp: , Rfl:     loperamide (IMODIUM) 2 mg capsule, Take 2 mg by mouth 4 (four) times a day as needed for diarrhea, Disp: , Rfl:     Multiple Vitamin (multivitamin) tablet, Take 1 tablet by mouth daily, Disp: , Rfl:     naproxen sodium (ALEVE) 220 MG tablet, Take 220 mg by mouth as needed for mild pain, Disp: , Rfl:     traMADol (ULTRAM) 50 mg tablet, Take 1 tablet (50 mg total) by mouth every 6 (six) hours as needed for moderate pain, Disp: 60 tablet, Rfl: 2    trolamine salicylate (ASPERCREME) 10 % cream, Apply 1 application topically as needed for muscle/joint pain, Disp: , Rfl:     al mag oxide-diphenhydramine-lidocaine viscous (MAGIC MOUTHWASH) 1:1:1 suspension, Swish and spit 10 mL every 4 (four) hours as needed for mouth pain or discomfort (Patient not taking: Reported on 8/11/2021), Disp: 250 mL, Rfl: 0    LORazepam (ATIVAN) 0 5 mg tablet, Take 1 tablet (0 5 mg total) by mouth every 8 (eight) hours as needed for anxiety (Patient not taking: Reported on 8/11/2021), Disp: 2 tablet, Rfl: 0  No Known Allergies      Review of Systems   Constitutional: Positive for fatigue (tired all the time, especially on days he works, still working part time, 30 hours per week)   Negative for activity change, appetite change, chills, fever and unexpected weight change  HENT: Positive for postnasal drip (chronic) and rhinorrhea  Eyes: Negative  Wears glasses     Scheduled for routine eye exam later this month   Respiratory: Positive for shortness of breath (Occasional LAYNE)  Negative for cough (occasional cough, secondary to postnasal drip), chest tightness and wheezing  Cardiovascular: Negative  Negative for chest pain and leg swelling  Gastrointestinal: Positive for diarrhea (managed with Imodium or Lomotil)  Negative for abdominal pain, blood in stool, constipation, nausea and vomiting  Endocrine: Negative  Genitourinary: Negative for difficulty urinating, dysuria and hematuria  Nocturia x3   Musculoskeletal: Positive for back pain (intermittent lower back pain (around waistline), No thoracic spine pain, no right arm pain) and gait problem (using cane for balance)  Negative for neck pain (occasionally)  Intermittent right sided rib pain  Intermittent tingling both shoulders with radiation down both arms when laying down or leaning against something hard   Skin: Negative  Allergic/Immunologic: Positive for environmental allergies  Neurological: Positive for numbness (finger tips /toes/bottom of feet)  Negative for dizziness, weakness (legs), light-headedness and headaches  Hematological: Negative  Psychiatric/Behavioral: Positive for sleep disturbance (nocturia x3)  Negative for dysphoric mood  The patient is not nervous/anxious  OBJECTIVE:   /96 (BP Location: Left arm, Patient Position: Sitting)   Pulse 85   Temp 97 7 °F (36 5 °C) (Temporal)   Resp 18   Ht 5' 5 98" (1 676 m)   Wt 123 kg (272 lb)   SpO2 94%   BMI 43 93 kg/m²   Pain Assessment:  0  ECOG/Zubrod/WHO: 1    Physical Exam   He is conversing appropriately  His breathing is unlabored  Abdomen obese  He has no spinal tenderness to palpation    Ambulating with a cane        RESULTS    Lab Results:   Recent Results (from the past 672 hour(s)) CBC and differential    Collection Time: 02/23/22  1:05 PM   Result Value Ref Range    WBC 9 04 4 31 - 10 16 Thousand/uL    RBC 5 28 3 88 - 5 62 Million/uL    Hemoglobin 15 1 12 0 - 17 0 g/dL    Hematocrit 50 7 (H) 36 5 - 49 3 %    MCV 96 82 - 98 fL    MCH 28 6 26 8 - 34 3 pg    MCHC 29 8 (L) 31 4 - 37 4 g/dL    RDW 16 2 (H) 11 6 - 15 1 %    MPV 10 6 8 9 - 12 7 fL    Platelets 900 124 - 387 Thousands/uL    nRBC 0 /100 WBCs    Neutrophils Relative 52 43 - 75 %    Immat GRANS % 0 0 - 2 %    Lymphocytes Relative 39 14 - 44 %    Monocytes Relative 6 4 - 12 %    Eosinophils Relative 3 0 - 6 %    Basophils Relative 0 0 - 1 %    Neutrophils Absolute 4 77 1 85 - 7 62 Thousands/µL    Immature Grans Absolute 0 01 0 00 - 0 20 Thousand/uL    Lymphocytes Absolute 3 48 0 60 - 4 47 Thousands/µL    Monocytes Absolute 0 52 0 17 - 1 22 Thousand/µL    Eosinophils Absolute 0 24 0 00 - 0 61 Thousand/µL    Basophils Absolute 0 02 0 00 - 0 10 Thousands/µL   Comprehensive metabolic panel    Collection Time: 02/23/22  1:05 PM   Result Value Ref Range    Sodium 141 136 - 145 mmol/L    Potassium 4 1 3 5 - 5 3 mmol/L    Chloride 106 100 - 108 mmol/L    CO2 32 21 - 32 mmol/L    ANION GAP 3 (L) 4 - 13 mmol/L    BUN 23 5 - 25 mg/dL    Creatinine 1 25 0 60 - 1 30 mg/dL    Glucose, Fasting 94 65 - 99 mg/dL    Calcium 9 8 8 3 - 10 1 mg/dL    Corrected Calcium 10 4 (H) 8 3 - 10 1 mg/dL    AST 19 5 - 45 U/L    ALT 16 12 - 78 U/L    Alkaline Phosphatase 61 46 - 116 U/L    Total Protein 7 5 6 4 - 8 2 g/dL    Albumin 3 3 (L) 3 5 - 5 0 g/dL    Total Bilirubin 0 77 0 20 - 1 00 mg/dL    eGFR 57 ml/min/1 73sq m   TSH, 3rd generation with Free T4 reflex    Collection Time: 02/23/22  1:05 PM   Result Value Ref Range    TSH 3RD GENERATON 1 900 0 358 - 3 740 uIU/mL   T3, free    Collection Time: 02/23/22  1:05 PM   Result Value Ref Range    T3, Free 2 21 (L) 2 30 - 4 20 pg/mL       Imaging Studies:CT chest abdomen pelvis w contrast    Result Date: 3/22/2022  Narrative: CT CHEST, ABDOMEN AND PELVIS WITH IV CONTRAST INDICATION:   C78 00: Secondary malignant neoplasm of unspecified lung C64 2: Malignant neoplasm of left kidney, except renal pelvis C79 51: Secondary malignant neoplasm of bone  COMPARISON:  CT chest abdomen pelvis 12/13/2021 TECHNIQUE: CT examination of the chest, abdomen and pelvis was performed  Axial, sagittal, and coronal 2D reformatted images were created from the source data and submitted for interpretation  Radiation dose length product (DLP) for this visit:  1371 mGy-cm   This examination, like all CT scans performed in the [facility], was performed utilizing techniques to minimize radiation dose exposure, including the use of iterative reconstruction and automated exposure control  IV Contrast:  100 mL of iohexol (OMNIPAQUE) Enteric Contrast: Enteric contrast was administered  FINDINGS: CHEST LUNGS: Bilateral pulmonary nodules and masses again compatible with metastasis  New 7 mm left apical nodule image 18 series 3  New 8 mm subpleural nodule in the left upper lobe laterally image 51 series 3  Left upper lobe nodule anteromedially measures 1 6 x 1 1 cm image 42 series 3, not a significant change, previously 1 5 x 1 0 cm  Right middle lobe pleural-based nodule anteriorly measures 2 2 x 1 2 cm image 76 series 3, larger, prior measurements of 1 4 x 1 0 cm  Stable suspected post radiation changes adjacent to expansile right posterior rib metastasis  PLEURA:  Unremarkable  HEART/GREAT VESSELS: Heart is mildly enlarged, stable  No thoracic aortic aneurysm  There is enlargement of the central pulmonary arterial tree suggesting some element of pulmonary artery hypertension  MEDIASTINUM AND CHERRIE:  Unremarkable  CHEST WALL AND LOWER NECK:   Stable multinodular thyroid gland  ABDOMEN LIVER/BILIARY TREE:  Small peripheral calcification in the right lobe medially, stable  No suspicious masses  No biliary ductal dilatation  GALLBLADDER:  No calcified gallstones  No pericholecystic inflammatory change  SPLEEN:  Unremarkable  PANCREAS:  Unremarkable  ADRENAL GLANDS:  Heterogeneous left adrenal nodule measures 3 1 x 1 4 cm image 59 series 2, slightly larger, prior measurements of 2 5 x 1 6 cm  Right adrenal gland is unremarkable  KIDNEYS/URETERS:  Status post left nephrectomy  1 5 x 1 1 cm nodule at the left renal bed image 71 series 2, larger, previously 1 0 x 0 9 cm  Right kidney again demonstrates multiple cortical and parapelvic renal cysts  Additional subcentimeter hypodensities, too small to characterize  No findings for hydronephrosis  STOMACH AND BOWEL:  There is colonic diverticulosis without evidence of acute diverticulitis  APPENDIX:  No findings to suggest appendicitis  ABDOMINOPELVIC CAVITY:  Largely solid mesenteric mass on the right measures 9 5 x 5 5 cm image 80 series 2, larger, prior measurements of 8 3 x 4 8 cm  Retroperitoneal lymphadenopathy again identified near the celiac axis  Lymph node here measures 3 4 x 1 5 cm image 55 series 2, prior measurements of 2 8 x 1 2 cm  VESSELS:  Atherosclerotic changes are present  No evidence of aneurysm  PELVIS REPRODUCTIVE ORGANS:  Prostate gland not visualized  Correlate for prior surgical resection  URINARY BLADDER:  Collapsed limiting evaluation  ABDOMINAL WALL/INGUINAL REGIONS:  Small fat-containing bilateral inguinal hernias  Small fat-containing periumbilical hernia  Again incompletely visualized right gluteus intramuscular nodule 1 5 cm, may represent metastasis  OSSEOUS STRUCTURES:  Multiple osseous lesions compatible with metastasis, many are lytic and expansile in appearance for example at the right posterior 7th rib and right iliac bone  Overall these are not significantly changed  Posterior fusion hardware in the lower lumbar spine  Impression: 1  Overall findings concerning for disease progression   2   Findings concerning for progression of pulmonary metastasis with new and enlarging nodules seen  3   Enlarging nodule at the left renal bed suspicious for progression of metastasis  Slightly larger left adrenal nodule suspicious for metastasis  4   Enlarging retroperitoneal adenopathy suspicious for progression of metastasis  5   Enlarging mesenteric mass suspicious for progression  6   Stable appearance to the osseous metastasis  The study was marked in EPIC for significant notification  Workstation performed: [id number]           Assessment/Plan:  No orders of the defined types were placed in this encounter  Lorena Burkett is a 66y o  year old male with metastatic renal cell carcinoma  He has received multiple palliative radiation treatments for or bone metastasis  His case was reviewed in Neuro-Oncology Clinic today  CT scan reveals stability of his bone metastasis  There is however progression noted in the lungs, retroperitoneum and mesentery  Has follow-up with Dr Kira Crum next week and we discussed his systemic therapy may be adjusted based on his recent CT imaging results  He will return for follow-up visit in 6 months  We will be using his CT imaging that has been ordered by Dr Kira Crum to assess bone metastasis  Kailey Lopez MD  9/45/7630,8:85 AM    Portions of the record may have been created with voice recognition software   Occasional wrong word or "sound a like" substitutions may have occurred due to the inherent limitations of voice recognition software   Read the chart carefully and recognize, using context, where substitutions have occurred

## 2022-03-23 NOTE — LETTER
March 23, 2022     Janet Crawford MD  [address]    Patient: Horacio Belcher   YOB: 1950   Date of Visit: 3/23/2022       Dear Dr Jose Samaniego:    Thank you for referring Nakia Munoz to me for evaluation  Below are my notes for this consultation  If you have questions, please do not hesitate to call me  I look forward to following your patient along with you  Sincerely,        Alma Delia Jones MD        CC: No Recipients  Alma Delia Jones MD  3/23/2022 10:13 AM  Sign when Signing Visit  Follow-up - Radiation Oncology   Horacio Po 1950 66 y o  male [id number]      History of Present Illness   Cancer Staging  No matching staging information was found for the patient  Interval History:  Janett Pavon a 66 y  o  male with a history of metastatic renal cell cancer, initially diagnosed in 2007  He developed metastatic disease in 2013   He remains on systemic therapy with Medical Oncology       He returns for follow up post right ulnar radiation completed 5/31/17, right posterior 7th rib completed 8/3/17, Stereotactic radiation to T6 completed 10/27/17, SBRT to T10 completed 7/27/18 and SBRT to paraspinal mass at [facility] 11/23/18       Last seen for radiation follow up on 2/10/21      2/12/21 Dr Jose Samaniego f/u  Continue cabozantinib 40 mg every other day, excellent tolerance  Follow up in 6 months w/CT scan and blood work      8/5/21 CT chest abdoman pelvis  IMPRESSION:   Worsening pulmonary metastases as well as new large james mass in the right upper quadrant where mildly enlarged nodes were seen previously    Osseous metastases appear unchanged    Left adrenal mass is slightly larger    Status post left nephrectomy    The right kidney demonstrates multiple cysts   Right gluteal mass is unchanged    No new obvious chest or abdominal wall masses         8/11/21 Neurosurgery, Dr Kellee Williamson  thoracic lesions appear to be stable, as reviewed at [facility] this morning     8/19/21 Yessica [place]        12/13/21 CT chest abdomen pelvis  IMPRESSION:  Chest:  1   Decreased size of pulmonary metastases      Abdomen and pelvis:  1   Slight decrease in size of left adrenal nodule  2   New 1 x 0 9 cm soft tissue nodule in the left nephrectomy bed concerning for recurrent tumor   A centrally calcified left retroperitoneal soft tissue nodule has slightly decreased in size  3   Increased size of ill-defined infracolic mesenteric soft tissue mass and retroperitoneal lymph node posterior to the pancreas  4   Probable decrease in size of enhancing right gluteal soft tissue nodule which is incompletely imaged  5   Stable size of expansile osteolytic skeletal metastases  Overall mixed response to therapy         12/20/21 Jarvis Lino  Recent CT scan, has mixed response   Overall, this should be interpreted as stable disease     Continue pembrolizumab every 6 weeks as well as lenvatinib  Plan repeat CT chest abdomen pelvis in 3 months        3/17/22 CT chest abdomen pelvis  IMPRESSION:   1   Overall findings concerning for disease progression  2   Findings concerning for progression of pulmonary metastasis with new and enlarging nodules seen  3   Enlarging nodule at the left renal bed suspicious for progression of metastasis   Slightly larger left adrenal nodule suspicious for metastasis  4   Enlarging retroperitoneal adenopathy suspicious for progression of metastasis     5   Enlarging mesenteric mass suspicious for progression  6   Stable appearance to the osseous metastasis               Upcoming:  3/28/22 Yessica [place]           Historical Information   Oncology History   Clear cell adenocarcinoma of kidney, 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[address] )   3/2/2018 Initial Diagnosis    Clear cell adenocarcinoma of kidney, 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[address] )     1/31/2020 - 4/22/2020 Chemotherapy    pembrolizumab (KEYTRUDA) 200 mg in sodium chloride 0 9 % 50 mL IVPB, 200 mg, Intravenous, Once, 4 of 9 cycles  Administration: 200 mg (1/31/2020), 200 mg (2/21/2020), 200 mg (3/13/2020), 200 mg (4/3/2020)      Chemotherapy    Cabozantinib (Cabometyx) 40 mg every other day      9/10/2021 -  Chemotherapy    pembrolizumab (KEYTRUDA) IVPB, 400 mg, Intravenous, Once, 5 of 9 cycles  Administration: 400 mg (9/10/2021), 400 mg (10/22/2021), 400 mg (12/3/2021), 400 mg (1/14/2022), 400 mg (2/25/2022)     Bone metastases ([address] )   6/22/2016 Initial Diagnosis    Bone metastases ([facility] )     3/8/2017 -  Chemotherapy    Axitinib 4 mg b i d        Denosumab 60 mg subcutaneously monthly initiated January 12, 2018 5/17/2017 - 5/31/2017 Radiation    palliative radiation therapy following ORIF of right ulna for metastatic renal cell carcinoma to 3000cGy     7/26/2017 - 8/3/2017 Radiation    palliative radiation therapy for metastatic renal carcinoma to the right posterior seventh rib with bone and soft tissue metastases to 2800cGy     10/18/2017 - 10/27/2017 Radiation    stereotactic radiation therapy to a T6 vertebral body metastasis to 3000cGy     7/18/2018 - 7/27/2018 Radiation    Stereotactic radiation to T10 metastasis to 3000 cGy in 5 fractions     11/14/2018 - 11/23/2018 Radiation    SBRT to paraspinal mass at T4 to 3000 cGy           1/31/2020 - 4/22/2020 Chemotherapy    pembrolizumab (KEYTRUDA) 200 mg in sodium chloride 0 9 % 50 mL IVPB, 200 mg, Intravenous, Once, 4 of 9 cycles  Administration: 200 mg (1/31/2020), 200 mg (2/21/2020), 200 mg (3/13/2020), 200 mg (4/3/2020)      Chemotherapy    Cabozantinib (Cabometyx) 40 mg every other day      9/10/2021 -  Chemotherapy    pembrolizumab (KEYTRUDA) IVPB, 400 mg, Intravenous, Once, 5 of 9 cycles  Administration: 400 mg (9/10/2021), 400 mg (10/22/2021), 400 mg (12/3/2021), 400 mg (1/14/2022), 400 mg (2/25/2022)     Lung metastases ([facility] )   3/2/2018 Initial Diagnosis    Lung metastases ([facility] [facility] )     1/31/2020 - 4/22/2020 Chemotherapy    pembrolizumab (KEYTRUDA) 200 mg in sodium chloride 0 9 % 50 mL IVPB, 200 mg, Intravenous, Once, 4 of 9 cycles  Administration: 200 mg (1/31/2020), 200 mg (2/21/2020), 200 mg (3/13/2020), 200 mg (4/3/2020)      Chemotherapy    Cabozantinib (Cabometyx) 40 mg every other day      9/10/2021 -  Chemotherapy    pembrolizumab (KEYTRUDA) IVPB, 400 mg, Intravenous, Once, 5 of 9 cycles  Administration: 400 mg (9/10/2021), 400 mg (10/22/2021), 400 mg (12/3/2021), 400 mg (1/14/2022), 400 mg (2/25/2022)     Spine metastasis ([facility] )   7/11/2018 Initial Diagnosis    Spine metastasis ([address] )     1/31/2020 - 4/22/2020 Chemotherapy    pembrolizumab (KEYTRUDA) 200 mg in sodium chloride 0 9 % 50 mL IVPB, 200 mg, Intravenous, Once, 4 of 9 cycles  Administration: 200 mg (1/31/2020), 200 mg (2/21/2020), 200 mg (3/13/2020), 200 mg (4/3/2020)      Chemotherapy    Cabozantinib (Cabometyx) 40 mg every other day      9/10/2021 -  Chemotherapy    pembrolizumab (KEYTRUDA) IVPB, 400 mg, Intravenous, Once, 5 of 9 cycles  Administration: 400 mg (9/10/2021), 400 mg (10/22/2021), 400 mg (12/3/2021), 400 mg (1/14/2022), 400 mg (2/25/2022)     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 ([address] ) (Resolved)   7/11/2018 Initial Diagnosis    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 ([address] ) (Resolved)     Metastatic renal cell carcinoma ([facility] )   9/26/2018 Initial Diagnosis    Metastatic renal cell carcinoma (HCC)      Chemotherapy    Cabozantinib (Cabometyx) 40 mg every other day          Past Medical History:   Diagnosis Date    Arthritis     Cancer ([address] )     prostate - mets to bone and lung    History of radiation therapy 07/27/2018    SBRT to T10 7/18-7/27/2018    Hyperlipidemia     Hypertension      Past Surgical History:   Procedure Laterality Date    DISTAL ULNA EXCISION Right     excision of tumor and orif with cement and screws    JOINT REPLACEMENT Bilateral     tkr's    LUNG SURGERY Right     exc of nodules    NEPHRECTOMY Left        Social History   Social History Substance and Sexual Activity   Alcohol Use Not Currently    Comment: occasional use     Social History     Substance and Sexual Activity   Drug Use No     Social History     Tobacco Use   Smoking Status Former Smoker    Packs/day: 1 00    Types: Cigarettes    Quit date: 3/28/2003    Years since quittin 0   Smokeless Tobacco Never Used         Meds/Allergies     Current Outpatient Medications:     amLODIPine (NORVASC) 5 mg tablet, Take 5 mg by mouth daily  , Disp: , Rfl:     cholecalciferol (VITAMIN D3) 1,000 units tablet, Take 1,000 Units by mouth daily, Disp: , Rfl:     diphenhydrAMINE (BENADRYL) 25 mg tablet, Take 25 mg by mouth daily at bedtime as needed for itching, Disp: , Rfl:     diphenoxylate-atropine (LOMOTIL) 2 5-0 025 mg per tablet, Take 1 tablet by mouth 4 (four) times a day as needed for diarrhea, Disp: 30 tablet, Rfl: 1    gabapentin (NEURONTIN) 100 mg capsule, Take 300 mg by mouth 3 (three) times a day , Disp: , Rfl:     Lenvima, 18 MG Daily Dose, 10 MG & 2 x 4 MG CPPK, Take 18 mg by mouth daily, as instructed within package  , Disp: 90 each, Rfl: 10    lisinopril-hydrochlorothiazide (PRINZIDE,ZESTORETIC) 20-12 5 MG per tablet, Take 1 tablet by mouth 2 (two) times a day  , Disp: , Rfl:     loperamide (IMODIUM) 2 mg capsule, Take 2 mg by mouth 4 (four) times a day as needed for diarrhea, Disp: , Rfl:     Multiple Vitamin (multivitamin) tablet, Take 1 tablet by mouth daily, Disp: , Rfl:     naproxen sodium (ALEVE) 220 MG tablet, Take 220 mg by mouth as needed for mild pain, Disp: , Rfl:     traMADol (ULTRAM) 50 mg tablet, Take 1 tablet (50 mg total) by mouth every 6 (six) hours as needed for moderate pain, Disp: 60 tablet, Rfl: 2    trolamine salicylate (ASPERCREME) 10 % cream, Apply 1 application topically as needed for muscle/joint pain, Disp: , Rfl:     al mag oxide-diphenhydramine-lidocaine viscous (MAGIC MOUTHWASH) 1:1:1 suspension, Swish and spit 10 mL every 4 (four) hours as needed for mouth pain or discomfort (Patient not taking: Reported on 8/11/2021), Disp: 250 mL, Rfl: 0    LORazepam (ATIVAN) 0 5 mg tablet, Take 1 tablet (0 5 mg total) by mouth every 8 (eight) hours as needed for anxiety (Patient not taking: Reported on 8/11/2021), Disp: 2 tablet, Rfl: 0  No Known Allergies      Review of Systems   Constitutional: Positive for fatigue (tired all the time, especially on days he works, still working part time, 30 hours per week)  Negative for activity change, appetite change, chills, fever and unexpected weight change  HENT: Positive for postnasal drip (chronic) and rhinorrhea  Eyes: Negative  Wears glasses     Scheduled for routine eye exam later this month   Respiratory: Positive for shortness of breath (Occasional LAYNE)  Negative for cough (occasional cough, secondary to postnasal drip), chest tightness and wheezing  Cardiovascular: Negative  Negative for chest pain and leg swelling  Gastrointestinal: Positive for diarrhea (managed with Imodium or Lomotil)  Negative for abdominal pain, blood in stool, constipation, nausea and vomiting  Endocrine: Negative  Genitourinary: Negative for difficulty urinating, dysuria and hematuria  Nocturia x3   Musculoskeletal: Positive for back pain (intermittent lower back pain (around waistline), No thoracic spine pain, no right arm pain) and gait problem (using cane for balance)  Negative for neck pain (occasionally)  Intermittent right sided rib pain  Intermittent tingling both shoulders with radiation down both arms when laying down or leaning against something hard   Skin: Negative  Allergic/Immunologic: Positive for environmental allergies  Neurological: Positive for numbness (finger tips /toes/bottom of feet)  Negative for dizziness, weakness (legs), light-headedness and headaches  Hematological: Negative  Psychiatric/Behavioral: Positive for sleep disturbance (nocturia x3)   Negative for dysphoric mood  The patient is not nervous/anxious  OBJECTIVE:   /96 (BP Location: Left arm, Patient Position: Sitting)   Pulse 85   Temp 97 7 °F (36 5 °C) (Temporal)   Resp 18   Ht 5' 5 98" (1 676 m)   Wt 123 kg (272 lb)   SpO2 94%   BMI 43 93 kg/m²   Pain Assessment:  0  ECOG/Zubrod/WHO: 1    Physical Exam   He is conversing appropriately  His breathing is unlabored  Abdomen obese  He has no spinal tenderness to palpation    Ambulating with a cane        RESULTS    Lab Results:   Recent Results (from the past 672 hour(s))   CBC and differential    Collection Time: 02/23/22  1:05 PM   Result Value Ref Range    WBC 9 04 4 31 - 10 16 Thousand/uL    RBC 5 28 3 88 - 5 62 Million/uL    Hemoglobin 15 1 12 0 - 17 0 g/dL    Hematocrit 50 7 (H) 36 5 - 49 3 %    MCV 96 82 - 98 fL    MCH 28 6 26 8 - 34 3 pg    MCHC 29 8 (L) 31 4 - 37 4 g/dL    RDW 16 2 (H) 11 6 - 15 1 %    MPV 10 6 8 9 - 12 7 fL    Platelets 562 797 - 572 Thousands/uL    nRBC 0 /100 WBCs    Neutrophils Relative 52 43 - 75 %    Immat GRANS % 0 0 - 2 %    Lymphocytes Relative 39 14 - 44 %    Monocytes Relative 6 4 - 12 %    Eosinophils Relative 3 0 - 6 %    Basophils Relative 0 0 - 1 %    Neutrophils Absolute 4 77 1 85 - 7 62 Thousands/µL    Immature Grans Absolute 0 01 0 00 - 0 20 Thousand/uL    Lymphocytes Absolute 3 48 0 60 - 4 47 Thousands/µL    Monocytes Absolute 0 52 0 17 - 1 22 Thousand/µL    Eosinophils Absolute 0 24 0 00 - 0 61 Thousand/µL    Basophils Absolute 0 02 0 00 - 0 10 Thousands/µL   Comprehensive metabolic panel    Collection Time: 02/23/22  1:05 PM   Result Value Ref Range    Sodium 141 136 - 145 mmol/L    Potassium 4 1 3 5 - 5 3 mmol/L    Chloride 106 100 - 108 mmol/L    CO2 32 21 - 32 mmol/L    ANION GAP 3 (L) 4 - 13 mmol/L    BUN 23 5 - 25 mg/dL    Creatinine 1 25 0 60 - 1 30 mg/dL    Glucose, Fasting 94 65 - 99 mg/dL    Calcium 9 8 8 3 - 10 1 mg/dL    Corrected Calcium 10 4 (H) 8 3 - 10 1 mg/dL    AST 19 5 - 45 U/L    ALT 16 12 - 78 U/L    Alkaline Phosphatase 61 46 - 116 U/L    Total Protein 7 5 6 4 - 8 2 g/dL    Albumin 3 3 (L) 3 5 - 5 0 g/dL    Total Bilirubin 0 77 0 20 - 1 00 mg/dL    eGFR 57 ml/min/1 73sq m   TSH, 3rd generation with Free T4 reflex    Collection Time: 02/23/22  1:05 PM   Result Value Ref Range    TSH 3RD GENERATON 1 900 0 358 - 3 740 uIU/mL   T3, free    Collection Time: 02/23/22  1:05 PM   Result Value Ref Range    T3, Free 2 21 (L) 2 30 - 4 20 pg/mL       Imaging Studies:CT chest abdomen pelvis w contrast    Result Date: 3/22/2022  Narrative: CT CHEST, ABDOMEN AND PELVIS WITH IV CONTRAST INDICATION:   C78 00: Secondary malignant neoplasm of unspecified lung C64 2: Malignant neoplasm of left kidney, except renal pelvis C79 51: Secondary malignant neoplasm of bone  COMPARISON:  CT chest abdomen pelvis 12/13/2021 TECHNIQUE: CT examination of the chest, abdomen and pelvis was performed  Axial, sagittal, and coronal 2D reformatted images were created from the source data and submitted for interpretation  Radiation dose length product (DLP) for this visit:  1371 mGy-cm   This examination, like all CT scans performed in the [facility], was performed utilizing techniques to minimize radiation dose exposure, including the use of iterative reconstruction and automated exposure control  IV Contrast:  100 mL of iohexol (OMNIPAQUE) Enteric Contrast: Enteric contrast was administered  FINDINGS: CHEST LUNGS: Bilateral pulmonary nodules and masses again compatible with metastasis  New 7 mm left apical nodule image 18 series 3  New 8 mm subpleural nodule in the left upper lobe laterally image 51 series 3  Left upper lobe nodule anteromedially measures 1 6 x 1 1 cm image 42 series 3, not a significant change, previously 1 5 x 1 0 cm  Right middle lobe pleural-based nodule anteriorly measures 2 2 x 1 2 cm image 76 series 3, larger, prior measurements of 1 4 x 1 0 cm   Stable suspected post radiation changes adjacent to expansile right posterior rib metastasis  PLEURA:  Unremarkable  HEART/GREAT VESSELS: Heart is mildly enlarged, stable  No thoracic aortic aneurysm  There is enlargement of the central pulmonary arterial tree suggesting some element of pulmonary artery hypertension  MEDIASTINUM AND CHERRIE:  Unremarkable  CHEST WALL AND LOWER NECK:   Stable multinodular thyroid gland  ABDOMEN LIVER/BILIARY TREE:  Small peripheral calcification in the right lobe medially, stable  No suspicious masses  No biliary ductal dilatation  GALLBLADDER:  No calcified gallstones  No pericholecystic inflammatory change  SPLEEN:  Unremarkable  PANCREAS:  Unremarkable  ADRENAL GLANDS:  Heterogeneous left adrenal nodule measures 3 1 x 1 4 cm image 59 series 2, slightly larger, prior measurements of 2 5 x 1 6 cm  Right adrenal gland is unremarkable  KIDNEYS/URETERS:  Status post left nephrectomy  1 5 x 1 1 cm nodule at the left renal bed image 71 series 2, larger, previously 1 0 x 0 9 cm  Right kidney again demonstrates multiple cortical and parapelvic renal cysts  Additional subcentimeter hypodensities, too small to characterize  No findings for hydronephrosis  STOMACH AND BOWEL:  There is colonic diverticulosis without evidence of acute diverticulitis  APPENDIX:  No findings to suggest appendicitis  ABDOMINOPELVIC CAVITY:  Largely solid mesenteric mass on the right measures 9 5 x 5 5 cm image 80 series 2, larger, prior measurements of 8 3 x 4 8 cm  Retroperitoneal lymphadenopathy again identified near the celiac axis  Lymph node here measures 3 4 x 1 5 cm image 55 series 2, prior measurements of 2 8 x 1 2 cm  VESSELS:  Atherosclerotic changes are present  No evidence of aneurysm  PELVIS REPRODUCTIVE ORGANS:  Prostate gland not visualized  Correlate for prior surgical resection  URINARY BLADDER:  Collapsed limiting evaluation  ABDOMINAL WALL/INGUINAL REGIONS:  Small fat-containing bilateral inguinal hernias    Small fat-containing periumbilical hernia  Again incompletely visualized right gluteus intramuscular nodule 1 5 cm, may represent metastasis  OSSEOUS STRUCTURES:  Multiple osseous lesions compatible with metastasis, many are lytic and expansile in appearance for example at the right posterior 7th rib and right iliac bone  Overall these are not significantly changed  Posterior fusion hardware in the lower lumbar spine  Impression: 1  Overall findings concerning for disease progression  2   Findings concerning for progression of pulmonary metastasis with new and enlarging nodules seen  3   Enlarging nodule at the left renal bed suspicious for progression of metastasis  Slightly larger left adrenal nodule suspicious for metastasis  4   Enlarging retroperitoneal adenopathy suspicious for progression of metastasis  5   Enlarging mesenteric mass suspicious for progression  6   Stable appearance to the osseous metastasis  The study was marked in EPIC for significant notification  Workstation performed: [id number]           Assessment/Plan:  No orders of the defined types were placed in this encounter  Wendy Ortiz is a 66y o  year old male with metastatic renal cell carcinoma  He has received multiple palliative radiation treatments for or bone metastasis  His case was reviewed in Neuro-Oncology Clinic today  CT scan reveals stability of his bone metastasis  There is however progression noted in the lungs, retroperitoneum and mesentery  Has follow-up with Dr Miki Meehan next week and we discussed his systemic therapy may be adjusted based on his recent CT imaging results  He will return for follow-up visit in 6 months  We will be using his CT imaging that has been ordered by Dr Miki Meehan to assess bone metastasis        Bing Garay MD  2/35/8926,8:70 AM    Portions of the record may have been created with voice recognition software   Occasional wrong word or "sound a like" substitutions may have occurred due to the inherent limitations of voice recognition software   Read the chart carefully and recognize, using context, where substitutions have occurred

## 2022-03-28 ENCOUNTER — OFFICE VISIT (OUTPATIENT)
Dept: HEMATOLOGY ONCOLOGY | Facility: CLINIC | Age: 72
End: 2022-03-28
Payer: MEDICARE

## 2022-03-28 ENCOUNTER — DOCUMENTATION (OUTPATIENT)
Dept: HEMATOLOGY ONCOLOGY | Facility: CLINIC | Age: 72
End: 2022-03-28

## 2022-03-28 VITALS
OXYGEN SATURATION: 96 % | HEART RATE: 90 BPM | SYSTOLIC BLOOD PRESSURE: 136 MMHG | HEIGHT: 70 IN | DIASTOLIC BLOOD PRESSURE: 74 MMHG | WEIGHT: 282 LBS | TEMPERATURE: 97.5 F | BODY MASS INDEX: 40.37 KG/M2 | RESPIRATION RATE: 17 BRPM

## 2022-03-28 DIAGNOSIS — C64.2 CLEAR CELL ADENOCARCINOMA OF KIDNEY, LEFT (HCC): ICD-10-CM

## 2022-03-28 DIAGNOSIS — C78.00 MALIGNANT NEOPLASM METASTATIC TO LUNG, UNSPECIFIED LATERALITY (HCC): Primary | ICD-10-CM

## 2022-03-28 PROCEDURE — 99215 OFFICE O/P EST HI 40 MIN: CPT | Performed by: INTERNAL MEDICINE

## 2022-03-28 RX ORDER — FLUOROURACIL 50 MG/G
CREAM TOPICAL
COMMUNITY
Start: 2022-02-15

## 2022-03-28 NOTE — PROGRESS NOTES
Hematology / Oncology Outpatient Follow Up Note    Juanjose Boone 66 y o  male :1950 ONR:[id number]         Date:  3/28/2022    Assessment / Plan:  A 41-year-old gentleman who was diagnosed with localized renal cell carcinoma in 2007  He developed metastatic disease in   He was previously on sunitinib as well as axitinib   More recently, he was on combination of axitinib and pembrolizumab under the care of Dr Nish Jules  [facility] progressed on this regimen   Therefore, since early May 2020, he was on cabozantinib , resulting in partial response   However, he was found to have progressive disease in 2021  Therefore, he is currently on lenvatinib with pembrolizumab, resulting in partial response  However, he has recent progression based on the CT scan of chest abdomen pelvis  He still have quite reasonable performance status and minimal symptoms from metastatic disease  Therefore, continuing systemic therapy is quite reasonable  I recommended him to start tivozanib 1 34 mg daily for 3 weeks followed by 1 week off  Side effects of this regimen was thoroughly discussed, including but not limited to hypertension, hand-foot reaction of the skin, GI toxicity as well as kidney and renal   He understood and wished to proceed  He will continue with denosumab every 3 months  I will see him again in 6 weeks with CBC and CMP  All the patient questions were answered to his satisfaction         Subjective:      HPI:             Interval History:  A 41-year-old gentleman who has history of left nephrectomy for renal cell carcinoma in 2007  He was well until  when he was found to have lung metastasis   He was watched until 2015 when he started sunitinib until 2016   Since then, he has been on axitinib which he is still on   In 2019, CT scan showed progression of tumor in the renal bed as well as some mixed response was lung metastasis   Therefore,   Fransisca at added pembrolizumab   He had no immune related toxicity with pembrolizumab   However, he had progressive disease in May 2021 his systemic therapy was changed to cabozantinib   Because of the initial toxicity, he was on cabozantinib 40 mg every other day with excellent tolerance    He had initial partial response on cabozantinib  However, he had progressive disease with lung metastasis as well as intra-abdominal lymphadenopathy in August 2021  Therefore, systemic therapy was changed to lenvatinib with pembrolizumab, resulting in partial response  He presents today for follow-up  He recently underwent CT scan of chest abdomen pelvis which showed progression of metastatic disease in the lung, adrenal gland as well as mesenteric lymphadenopathy  He does not have new symptoms  He has mild exertional shortness of breath  He has no complaint of pain  His weight is stable  His performance status is 1/4 on ECOG scale  Objective:      Primary Diagnosis:     1  Localized renal cell carcinoma T1 NX M0 grade 2-3 diagnosed in August 2007      2  Metastatic renal cell carcinoma, diagnosed in 2013       Cancer Staging:  Cancer Staging  No matching staging information was found for the patient         Previous Hematologic/ Oncologic Treatment:      1  Sunitinib from March 2015 through December 2016    2  Axitinib monotherapy from December 2016 through November 2019    3  Axitinib with pembrolizumab from November 2019 through April 2020    4   Cabozantinib from May 2020 through August 2021   5  Lenvatinib with pembrolizumab from September 2021 through March 2022      Current Hematologic/ Oncologic Treatment:       Tivozanib to be started in April 2022      Disease Status:      Progressive disease on lenvatinib and pembrolizumab      Test Results:     Pathology:           Radiology:     CT scan of chest abdomen pelvis in  March 2022 showed increased lung metastasis  Enlarging adrenal mass    Enlarging mesenteric lymphadenopathy      Laboratory:     See below   Normal TSH      Physical Exam:        General Appearance:    Alert, oriented          Eyes:    PERRL   Ears:    Normal external ear canals, both ears   Nose:   Nares normal, septum midline   Throat:   Mucosa moist  Pharynx without injection  Neck:   Supple         Lungs:     Clear to auscultation bilaterally   Chest Wall:    No tenderness or deformity    Heart:    Regular rate and rhythm         Abdomen:     Soft, non-tender, bowel sounds +, no organomegaly               Extremities:   Extremities no cyanosis or edema         Skin:   no rash or icterus  Lymph nodes:   Cervical, supraclavicular, and axillary nodes normal   Neurologic:   CNII-XII intact, normal strength, sensation and reflexes     Throughout             Breast exam:   NA           ROS: Review of Systems   All other systems reviewed and are negative  Imaging: CT chest abdomen pelvis w contrast    Result Date: 3/22/2022  Narrative: CT CHEST, ABDOMEN AND PELVIS WITH IV CONTRAST INDICATION:   C78 00: Secondary malignant neoplasm of unspecified lung C64 2: Malignant neoplasm of left kidney, except renal pelvis C79 51: Secondary malignant neoplasm of bone  COMPARISON:  CT chest abdomen pelvis 12/13/2021 TECHNIQUE: CT examination of the chest, abdomen and pelvis was performed  Axial, sagittal, and coronal 2D reformatted images were created from the source data and submitted for interpretation  Radiation dose length product (DLP) for this visit:  1371 mGy-cm   This examination, like all CT scans performed in the [facility], was performed utilizing techniques to minimize radiation dose exposure, including the use of iterative reconstruction and automated exposure control  IV Contrast:  100 mL of iohexol (OMNIPAQUE) Enteric Contrast: Enteric contrast was administered  FINDINGS: CHEST LUNGS: Bilateral pulmonary nodules and masses again compatible with metastasis    New 7 mm left apical nodule image 18 series 3  New 8 mm subpleural nodule in the left upper lobe laterally image 51 series 3  Left upper lobe nodule anteromedially measures 1 6 x 1 1 cm image 42 series 3, not a significant change, previously 1 5 x 1 0 cm  Right middle lobe pleural-based nodule anteriorly measures 2 2 x 1 2 cm image 76 series 3, larger, prior measurements of 1 4 x 1 0 cm  Stable suspected post radiation changes adjacent to expansile right posterior rib metastasis  PLEURA:  Unremarkable  HEART/GREAT VESSELS: Heart is mildly enlarged, stable  No thoracic aortic aneurysm  There is enlargement of the central pulmonary arterial tree suggesting some element of pulmonary artery hypertension  MEDIASTINUM AND CHERRIE:  Unremarkable  CHEST WALL AND LOWER NECK:   Stable multinodular thyroid gland  ABDOMEN LIVER/BILIARY TREE:  Small peripheral calcification in the right lobe medially, stable  No suspicious masses  No biliary ductal dilatation  GALLBLADDER:  No calcified gallstones  No pericholecystic inflammatory change  SPLEEN:  Unremarkable  PANCREAS:  Unremarkable  ADRENAL GLANDS:  Heterogeneous left adrenal nodule measures 3 1 x 1 4 cm image 59 series 2, slightly larger, prior measurements of 2 5 x 1 6 cm  Right adrenal gland is unremarkable  KIDNEYS/URETERS:  Status post left nephrectomy  1 5 x 1 1 cm nodule at the left renal bed image 71 series 2, larger, previously 1 0 x 0 9 cm  Right kidney again demonstrates multiple cortical and parapelvic renal cysts  Additional subcentimeter hypodensities, too small to characterize  No findings for hydronephrosis  STOMACH AND BOWEL:  There is colonic diverticulosis without evidence of acute diverticulitis  APPENDIX:  No findings to suggest appendicitis  ABDOMINOPELVIC CAVITY:  Largely solid mesenteric mass on the right measures 9 5 x 5 5 cm image 80 series 2, larger, prior measurements of 8 3 x 4 8 cm   Retroperitoneal lymphadenopathy again identified near the celiac axis   Lymph node here measures 3 4 x 1 5 cm image 55 series 2, prior measurements of 2 8 x 1 2 cm  VESSELS:  Atherosclerotic changes are present  No evidence of aneurysm  PELVIS REPRODUCTIVE ORGANS:  Prostate gland not visualized  Correlate for prior surgical resection  URINARY BLADDER:  Collapsed limiting evaluation  ABDOMINAL WALL/INGUINAL REGIONS:  Small fat-containing bilateral inguinal hernias  Small fat-containing periumbilical hernia  Again incompletely visualized right gluteus intramuscular nodule 1 5 cm, may represent metastasis  OSSEOUS STRUCTURES:  Multiple osseous lesions compatible with metastasis, many are lytic and expansile in appearance for example at the right posterior 7th rib and right iliac bone  Overall these are not significantly changed  Posterior fusion hardware in the lower lumbar spine  Impression: 1  Overall findings concerning for disease progression  2   Findings concerning for progression of pulmonary metastasis with new and enlarging nodules seen  3   Enlarging nodule at the left renal bed suspicious for progression of metastasis  Slightly larger left adrenal nodule suspicious for metastasis  4   Enlarging retroperitoneal adenopathy suspicious for progression of metastasis  5   Enlarging mesenteric mass suspicious for progression  6   Stable appearance to the osseous metastasis  The study was marked in EPIC for significant notification   Workstation performed: [id number]         Labs:   Lab Results   Component Value Date    WBC 9 04 02/23/2022    HGB 15 1 02/23/2022    HCT 50 7 (H) 02/23/2022    MCV 96 02/23/2022     02/23/2022     Lab Results   Component Value Date     12/10/2015    K 4 1 02/23/2022     02/23/2022    CO2 32 02/23/2022    ANIONGAP 7 12/10/2015    BUN 23 02/23/2022    CREATININE 1 25 02/23/2022    GLUCOSE 102 12/10/2015    GLUF 94 02/23/2022    CALCIUM 9 8 02/23/2022    CORRECTEDCA 10 4 (H) 02/23/2022    AST 19 02/23/2022    ALT 16 02/23/2022    ALKPHOS 61 02/23/2022    PROT 6 9 12/10/2015    BILITOT 0 71 12/10/2015    EGFR 57 02/23/2022         Lab Results   Component Value Date     01/29/2020           Lab Results   Component Value Date    PSA <0 1 09/23/2019    PSA <0 1 12/22/2017    PSA <0 1 12/10/2015           Current Medications: Reviewed  Allergies: Reviewed  PMH/FH/SH:  Reviewed      Vital Sign:    Body surface area is 2 42 meters squared      Wt Readings from Last 3 Encounters:   03/28/22 128 kg (282 lb)   03/23/22 123 kg (272 lb)   02/25/22 126 kg (276 lb 14 4 oz)        Temp Readings from Last 3 Encounters:   03/28/22 97 5 °F (36 4 °C) (Temporal)   03/23/22 97 7 °F (36 5 °C) (Temporal)   02/25/22 98 °F (36 7 °C) (Temporal)        BP Readings from Last 3 Encounters:   03/28/22 136/74   03/23/22 170/96   02/25/22 130/81         Pulse Readings from Last 3 Encounters:   03/28/22 90   03/23/22 85   02/25/22 87     @LASTSAO2(3)@

## 2022-03-28 NOTE — PROGRESS NOTES
3-28-22  Rcvd new oral chemo referral- fotivada // Copay amt $ 1343 71    Patient Speedy Salazar [birth year]  Patients mariluz with [facility] has been renewed  [facility] ID#  [id number]  CARD#  [id number]  BIN# [id number]  PCN# [id number]  [facility]#  [id number]  EFF 2-26-22 THRU 2-25-23  AMT $10,000    Epic noted

## 2022-03-29 DIAGNOSIS — C78.00 MALIGNANT NEOPLASM METASTATIC TO LUNG, UNSPECIFIED LATERALITY (HCC): Primary | ICD-10-CM

## 2022-03-29 RX ORDER — TIVOZANIB 1.34 MG/1
1.34 CAPSULE ORAL DAILY
Qty: 21 CAPSULE | Refills: 6 | Status: SHIPPED | OUTPATIENT
Start: 2022-03-29 | End: 2022-06-17 | Stop reason: SDUPTHER

## 2022-04-18 ENCOUNTER — TELEPHONE (OUTPATIENT)
Dept: HEMATOLOGY ONCOLOGY | Facility: CLINIC | Age: 72
End: 2022-04-18

## 2022-04-18 NOTE — TELEPHONE ENCOUNTER
"I don't think there is alternative for him  Let him see PCP for BP meds adjustment and retry Fotivda again, once BP is well controlled"    Friday- clinic called patient and he wasn't having any issues  But was starting to feel tired, progressing  Patient to start checking BP, since he wasn't taking it  Mornin/106  Afternoon after BP med: 136/91  Night: 156/81    Today mornin/94  Today 11am after med: 140/82 (typical area)  Patient stated that he didn't feel as tired today  He did not take Sirisha Kenrick on [employer] [facility] [place] with patient to make him aware and he verbalized understanding/agreed to plan

## 2022-04-18 NOTE — TELEPHONE ENCOUNTER
CALL RETURN FORM   Reason for patient call? Patient is calling  because he states his blood pressure was running high and he stopped his Tivozanib HCl (Fotivda) 1340 MCG CAPS   He states he started to feel better once he stopped taking it  He wants to know if there is something else he can take     Patient's primary oncologist? Dr Flower Henson   Name of person the patient was calling for? toni   Any additional information to add, if applicable? Best call back number is [phone]   Informed patient that the message will be forwarded to the team and someone will get back to them as soon as possible    Did you relay this information to the patient?  yes

## 2022-06-01 ENCOUNTER — APPOINTMENT (OUTPATIENT)
Dept: LAB | Facility: MEDICAL CENTER | Age: 72
End: 2022-06-01
Payer: MEDICARE

## 2022-06-01 DIAGNOSIS — C78.00 MALIGNANT NEOPLASM METASTATIC TO LUNG, UNSPECIFIED LATERALITY (HCC): ICD-10-CM

## 2022-06-01 DIAGNOSIS — C64.2 CLEAR CELL ADENOCARCINOMA OF KIDNEY, LEFT (HCC): ICD-10-CM

## 2022-06-01 LAB
BASOPHILS # BLD AUTO: 0.02 THOUSANDS/ΜL (ref 0–0.1)
BASOPHILS NFR BLD AUTO: 0 % (ref 0–1)
EOSINOPHIL # BLD AUTO: 0.13 THOUSAND/ΜL (ref 0–0.61)
EOSINOPHIL NFR BLD AUTO: 2 % (ref 0–6)
ERYTHROCYTE [DISTWIDTH] IN BLOOD BY AUTOMATED COUNT: 15.7 % (ref 11.6–15.1)
HCT VFR BLD AUTO: 50.9 % (ref 36.5–49.3)
HGB BLD-MCNC: 15.2 G/DL (ref 12–17)
IMM GRANULOCYTES # BLD AUTO: 0.02 THOUSAND/UL (ref 0–0.2)
IMM GRANULOCYTES NFR BLD AUTO: 0 % (ref 0–2)
LYMPHOCYTES # BLD AUTO: 2.99 THOUSANDS/ΜL (ref 0.6–4.47)
LYMPHOCYTES NFR BLD AUTO: 42 % (ref 14–44)
MCH RBC QN AUTO: 27.5 PG (ref 26.8–34.3)
MCHC RBC AUTO-ENTMCNC: 29.9 G/DL (ref 31.4–37.4)
MCV RBC AUTO: 92 FL (ref 82–98)
MONOCYTES # BLD AUTO: 0.47 THOUSAND/ΜL (ref 0.17–1.22)
MONOCYTES NFR BLD AUTO: 7 % (ref 4–12)
NEUTROPHILS # BLD AUTO: 3.53 THOUSANDS/ΜL (ref 1.85–7.62)
NEUTS SEG NFR BLD AUTO: 49 % (ref 43–75)
NRBC BLD AUTO-RTO: 0 /100 WBCS
PLATELET # BLD AUTO: 188 THOUSANDS/UL (ref 149–390)
PMV BLD AUTO: 11.6 FL (ref 8.9–12.7)
RBC # BLD AUTO: 5.52 MILLION/UL (ref 3.88–5.62)
WBC # BLD AUTO: 7.16 THOUSAND/UL (ref 4.31–10.16)

## 2022-06-01 PROCEDURE — 36415 COLL VENOUS BLD VENIPUNCTURE: CPT

## 2022-06-01 PROCEDURE — 80053 COMPREHEN METABOLIC PANEL: CPT

## 2022-06-01 PROCEDURE — 85025 COMPLETE CBC W/AUTO DIFF WBC: CPT

## 2022-06-02 LAB
ALBUMIN SERPL BCP-MCNC: 3 G/DL (ref 3.5–5)
ALP SERPL-CCNC: 68 U/L (ref 46–116)
ALT SERPL W P-5'-P-CCNC: 19 U/L (ref 12–78)
ANION GAP SERPL CALCULATED.3IONS-SCNC: 1 MMOL/L (ref 4–13)
AST SERPL W P-5'-P-CCNC: 28 U/L (ref 5–45)
BILIRUB SERPL-MCNC: 0.73 MG/DL (ref 0.2–1)
BUN SERPL-MCNC: 24 MG/DL (ref 5–25)
CALCIUM ALBUM COR SERPL-MCNC: 11.2 MG/DL (ref 8.3–10.1)
CALCIUM SERPL-MCNC: 10.4 MG/DL (ref 8.3–10.1)
CHLORIDE SERPL-SCNC: 107 MMOL/L (ref 100–108)
CO2 SERPL-SCNC: 32 MMOL/L (ref 21–32)
CREAT SERPL-MCNC: 1.3 MG/DL (ref 0.6–1.3)
GFR SERPL CREATININE-BSD FRML MDRD: 54 ML/MIN/1.73SQ M
GLUCOSE SERPL-MCNC: 89 MG/DL (ref 65–140)
POTASSIUM SERPL-SCNC: 4 MMOL/L (ref 3.5–5.3)
PROT SERPL-MCNC: 6.4 G/DL (ref 6.4–8.2)
SODIUM SERPL-SCNC: 140 MMOL/L (ref 136–145)

## 2022-06-03 ENCOUNTER — HOSPITAL ENCOUNTER (OUTPATIENT)
Dept: INFUSION CENTER | Facility: CLINIC | Age: 72
Discharge: HOME | End: 2022-06-03
Payer: MEDICARE

## 2022-06-03 ENCOUNTER — OFFICE VISIT (OUTPATIENT)
Dept: HEMATOLOGY ONCOLOGY | Facility: CLINIC | Age: 72
End: 2022-06-03
Payer: MEDICARE

## 2022-06-03 VITALS
SYSTOLIC BLOOD PRESSURE: 124 MMHG | DIASTOLIC BLOOD PRESSURE: 72 MMHG | HEIGHT: 70 IN | WEIGHT: 265 LBS | HEART RATE: 96 BPM | OXYGEN SATURATION: 90 % | TEMPERATURE: 96.4 F | BODY MASS INDEX: 37.94 KG/M2 | RESPIRATION RATE: 18 BRPM

## 2022-06-03 VITALS
SYSTOLIC BLOOD PRESSURE: 124 MMHG | TEMPERATURE: 98.2 F | HEART RATE: 96 BPM | RESPIRATION RATE: 18 BRPM | DIASTOLIC BLOOD PRESSURE: 72 MMHG

## 2022-06-03 DIAGNOSIS — C64.2 CLEAR CELL ADENOCARCINOMA OF KIDNEY, LEFT (HCC): ICD-10-CM

## 2022-06-03 DIAGNOSIS — C78.00 MALIGNANT NEOPLASM METASTATIC TO LUNG, UNSPECIFIED LATERALITY (HCC): ICD-10-CM

## 2022-06-03 DIAGNOSIS — C64.2 CLEAR CELL ADENOCARCINOMA OF KIDNEY, LEFT (HCC): Primary | ICD-10-CM

## 2022-06-03 DIAGNOSIS — C79.51 BONE METASTASES (HCC): ICD-10-CM

## 2022-06-03 DIAGNOSIS — C79.51 BONE METASTASES (HCC): Primary | ICD-10-CM

## 2022-06-03 PROCEDURE — 99214 OFFICE O/P EST MOD 30 MIN: CPT | Performed by: INTERNAL MEDICINE

## 2022-06-03 PROCEDURE — 96372 THER/PROPH/DIAG INJ SC/IM: CPT

## 2022-06-03 RX ORDER — TRAMADOL HYDROCHLORIDE 50 MG/1
50 TABLET ORAL EVERY 6 HOURS PRN
Qty: 60 TABLET | Refills: 0 | Status: CANCELLED | OUTPATIENT
Start: 2022-06-03

## 2022-06-03 RX ORDER — TRAMADOL HYDROCHLORIDE 50 MG/1
50 TABLET ORAL EVERY 6 HOURS PRN
Qty: 60 TABLET | Refills: 2 | Status: SHIPPED | OUTPATIENT
Start: 2022-06-03

## 2022-06-03 RX ADMIN — DENOSUMAB 120 MG: 120 INJECTION SUBCUTANEOUS at 09:48

## 2022-06-03 NOTE — PROGRESS NOTES
Pt arrived to unit without complaint  Pt denies unusual muscle cramps,pain/swelling in the jaw, gums or mouth  Pt tolerated Xgeva injection in left arm without incident  AVS declined, but pt aware of future appts  Pt left unit in stable condition

## 2022-06-03 NOTE — PROGRESS NOTES
Hematology / Oncology Outpatient Follow Up Note    Jose Gay 66 y o  male :1950 NTE:[id number]         Date:  6/3/2022    Assessment / Plan:  A 21-year-old gentleman who was diagnosed with localized renal cell carcinoma in 2007  He developed metastatic disease in   He was previously on sunitinib as well as axitinib   More recently, he was on combination of axitinib and pembrolizumab under the care of Dr Clabe Opitz [facility] disease progressed on this regimen   Therefore, since early May 2020, he was on cabozantinib , resulting in partial response   However, he was found to have progressive disease in 2021  Therefore, he was separately on lenvatinib with pembrolizumab, resulting in partial response  However, his disease progressed in late 2022  He is currently on tivozanib every other day, 3 weeks on followed by 1 week off due to the significant hypotension  His condition is quite stable  He is overdue for denosumab which I recommended him to have today  He has new symptom with bilateral upper extremity numbness which could be due to the fibula spine metastasis  I recommended him to have CT scan of her spine within a week or 2  Once I obtain report, I will contact him  He may need palliative radiation therapy to the cervical spine  I am going to set up CT scan of chest abdomen pelvis in 2 months for office visit for tumor evaluation  He is in agreement with my recommendations         Subjective:      HPI:             Interval History:  A 21-year-old gentleman who has history of left nephrectomy for renal cell carcinoma in 2007  He was well until  when he was found to have lung metastasis   He was watched until 2015 when he started sunitinib until 2016   Since then, he has been on axitinib which he is still on   In 2019, CT scan showed progression of tumor in the renal bed as well as some mixed response was lung metastasis   Therefore,   Fransisca at added pembrolizumab   He had no immune related toxicity with pembrolizumab   However, he had progressive disease in May 2021 his systemic therapy was changed to cabozantinib   Because of the initial toxicity, he was on cabozantinib 40 mg every other day with excellent tolerance    He had initial partial response on cabozantinib  However, he had progressive disease with lung metastasis as well as intra-abdominal lymphadenopathy in August 2021  Therefore, systemic therapy was changed to lenvatinib with pembrolizumab, resulting in partial response  Unfortunately, he had another progressive disease in late March 2022  Therefore, his systemic therapy was changed to tivozanib  On the treatment, he has significant hypertension as well as exertional shortness of breath  Therefore, he is currently on tivozanib 1 34 mg every other day, 3 weeks on followed by 1 week off  His blood pressure today is normal   He feels well  His breathing symptoms is back to baseline  He has no weight loss  He has new complaint of some numbness intermittently in his bilateral upper extremities  He has no weakness in his lower extremities    His performance status is 1/4 on the ECOG scale         Objective:      Primary Diagnosis:     1  Localized renal cell carcinoma T1 NX M0 grade 2-3 diagnosed in August 2007      2  Metastatic renal cell carcinoma, diagnosed in 2013       Cancer Staging:  Cancer Staging  No matching staging information was found for the patient         Previous Hematologic/ Oncologic Treatment:      1  Sunitinib from March 2015 through December 2016    2  Axitinib monotherapy from December 2016 through November 2019    3  Axitinib with pembrolizumab from November 2019 through April 2020    4   Cabozantinib from May 2020 through August 2021   5  Lenvatinib with pembrolizumab from September 2021 through March 2022      Current Hematologic/ Oncologic Treatment:       Tivozanib since April 2022      Disease Status:      Not evaluated on current treatment      Test Results:     Pathology:           Radiology:     CT scan of chest abdomen pelvis in  March 2022 showed increased lung metastasis  Enlarging adrenal mass  Enlarging mesenteric lymphadenopathy      Laboratory:     See below   Normal TSH      Physical Exam:        General Appearance:    Alert, oriented          Eyes:    PERRL   Ears:    Normal external ear canals, both ears   Nose:   Nares normal, septum midline   Throat:   Mucosa moist  Pharynx without injection  Neck:   Supple         Lungs:     Clear to auscultation bilaterally   Chest Wall:    No tenderness or deformity    Heart:    Regular rate and rhythm         Abdomen:     Soft, non-tender, bowel sounds +, no organomegaly               Extremities:   Extremities no cyanosis or edema         Skin:   no rash or icterus  Lymph nodes:   Cervical, supraclavicular, and axillary nodes normal   Neurologic:   CNII-XII intact, normal strength, sensation and reflexes     Throughout             Breast exam:   NA           ROS: Review of Systems   All other systems reviewed and are negative  Imaging: No results found        Labs:   Lab Results   Component Value Date    WBC 7 16 06/01/2022    HGB 15 2 06/01/2022    HCT 50 9 (H) 06/01/2022    MCV 92 06/01/2022     06/01/2022     Lab Results   Component Value Date     12/10/2015    K 4 0 06/01/2022     06/01/2022    CO2 32 06/01/2022    ANIONGAP 7 12/10/2015    BUN 24 06/01/2022    CREATININE 1 30 06/01/2022    GLUCOSE 102 12/10/2015    GLUF 94 02/23/2022    CALCIUM 10 4 (H) 06/01/2022    CORRECTEDCA 11 2 (H) 06/01/2022    AST 28 06/01/2022    ALT 19 06/01/2022    ALKPHOS 68 06/01/2022    PROT 6 9 12/10/2015    BILITOT 0 71 12/10/2015    EGFR 54 06/01/2022         Lab Results   Component Value Date     01/29/2020           Lab Results   Component Value Date    PSA <0 1 09/23/2019    PSA <0 1 12/22/2017    PSA <0 1 12/10/2015 Current Medications: Reviewed  Allergies: Reviewed  PMH/FH/SH:  Reviewed      Vital Sign:    Body surface area is 2 35 meters squared      Wt Readings from Last 3 Encounters:   06/03/22 120 kg (265 lb)   03/28/22 128 kg (282 lb)   03/23/22 123 kg (272 lb)        Temp Readings from Last 3 Encounters:   06/03/22 (!) 96 4 °F (35 8 °C) (Tympanic)   03/28/22 97 5 °F (36 4 °C) (Temporal)   03/23/22 97 7 °F (36 5 °C) (Temporal)        BP Readings from Last 3 Encounters:   06/03/22 124/72   03/28/22 136/74   03/23/22 170/96         Pulse Readings from Last 3 Encounters:   06/03/22 96   03/28/22 90   03/23/22 85     @LASTSAO2(3)@

## 2022-06-09 ENCOUNTER — HOSPITAL ENCOUNTER (OUTPATIENT)
Dept: CT IMAGING | Facility: HOSPITAL | Age: 72
Discharge: HOME | End: 2022-06-09
Attending: INTERNAL MEDICINE
Payer: MEDICARE

## 2022-06-09 DIAGNOSIS — C79.51 BONE METASTASES (HCC): ICD-10-CM

## 2022-06-09 DIAGNOSIS — C64.2 CLEAR CELL ADENOCARCINOMA OF KIDNEY, LEFT (HCC): ICD-10-CM

## 2022-06-09 DIAGNOSIS — C78.00 MALIGNANT NEOPLASM METASTATIC TO LUNG, UNSPECIFIED LATERALITY (HCC): ICD-10-CM

## 2022-06-09 PROCEDURE — 72125 CT NECK SPINE W/O DYE: CPT

## 2022-06-09 PROCEDURE — G1004 CDSM NDSC: HCPCS

## 2022-06-10 ENCOUNTER — TELEPHONE (OUTPATIENT)
Dept: HEMATOLOGY ONCOLOGY | Facility: CLINIC | Age: 72
End: 2022-06-10

## 2022-06-10 NOTE — TELEPHONE ENCOUNTER
Called pt and left VM with below info  Advised him to call office with any questions otherwise we would see him at his follow up in august    ----- Message from Claribel Singh MD sent at 6/10/2022 11:16 AM EDT -----  CT cervical spine showed no metastasis  No actions needed

## 2022-06-17 DIAGNOSIS — C78.00 MALIGNANT NEOPLASM METASTATIC TO LUNG, UNSPECIFIED LATERALITY (HCC): ICD-10-CM

## 2022-06-17 RX ORDER — TIVOZANIB 1.34 MG/1
1.34 CAPSULE ORAL DAILY
Qty: 11 CAPSULE | Refills: 6 | Status: SHIPPED | OUTPATIENT
Start: 2022-06-17

## 2022-06-22 ENCOUNTER — TELEPHONE (OUTPATIENT)
Dept: HEMATOLOGY ONCOLOGY | Facility: CLINIC | Age: 72
End: 2022-06-22

## 2022-06-22 NOTE — TELEPHONE ENCOUNTER
Spoke with Gregorio Wilkerson to clarify directions of every other day for 3 weeks on, then 1 week off

## 2022-06-22 NOTE — TELEPHONE ENCOUNTER
[facility], calling to get clarity of rx directions for medication  Tivozanib HCl (Fotivda) 1 34 MG CAPS [[id number]] , she states there are 2 sets of directions  She can be reached at [phone]  Patient sees Dr Arlin De La Fuente

## 2022-07-25 ENCOUNTER — APPOINTMENT (OUTPATIENT)
Dept: LAB | Facility: MEDICAL CENTER | Age: 72
End: 2022-07-25
Payer: MEDICARE

## 2022-07-25 DIAGNOSIS — C78.00 MALIGNANT NEOPLASM METASTATIC TO LUNG, UNSPECIFIED LATERALITY (HCC): ICD-10-CM

## 2022-07-25 DIAGNOSIS — C64.2 CLEAR CELL ADENOCARCINOMA OF KIDNEY, LEFT (HCC): ICD-10-CM

## 2022-07-25 DIAGNOSIS — C79.51 BONE METASTASES (HCC): ICD-10-CM

## 2022-07-25 LAB
ALBUMIN SERPL BCP-MCNC: 3 G/DL (ref 3.5–5)
ALP SERPL-CCNC: 71 U/L (ref 46–116)
ALT SERPL W P-5'-P-CCNC: 12 U/L (ref 12–78)
ANION GAP SERPL CALCULATED.3IONS-SCNC: 2 MMOL/L (ref 4–13)
AST SERPL W P-5'-P-CCNC: 20 U/L (ref 5–45)
BASOPHILS # BLD AUTO: 0.01 THOUSANDS/ΜL (ref 0–0.1)
BASOPHILS NFR BLD AUTO: 0 % (ref 0–1)
BILIRUB SERPL-MCNC: 0.69 MG/DL (ref 0.2–1)
BUN SERPL-MCNC: 17 MG/DL (ref 5–25)
CALCIUM ALBUM COR SERPL-MCNC: 10.7 MG/DL (ref 8.3–10.1)
CALCIUM SERPL-MCNC: 9.9 MG/DL (ref 8.3–10.1)
CHLORIDE SERPL-SCNC: 106 MMOL/L (ref 96–108)
CO2 SERPL-SCNC: 32 MMOL/L (ref 21–32)
CREAT SERPL-MCNC: 1.09 MG/DL (ref 0.6–1.3)
EOSINOPHIL # BLD AUTO: 0.09 THOUSAND/ΜL (ref 0–0.61)
EOSINOPHIL NFR BLD AUTO: 1 % (ref 0–6)
ERYTHROCYTE [DISTWIDTH] IN BLOOD BY AUTOMATED COUNT: 16.9 % (ref 11.6–15.1)
GFR SERPL CREATININE-BSD FRML MDRD: 67 ML/MIN/1.73SQ M
GLUCOSE SERPL-MCNC: 81 MG/DL (ref 65–140)
HCT VFR BLD AUTO: 52.4 % (ref 36.5–49.3)
HGB BLD-MCNC: 15.5 G/DL (ref 12–17)
IMM GRANULOCYTES # BLD AUTO: 0.01 THOUSAND/UL (ref 0–0.2)
IMM GRANULOCYTES NFR BLD AUTO: 0 % (ref 0–2)
LYMPHOCYTES # BLD AUTO: 2.63 THOUSANDS/ΜL (ref 0.6–4.47)
LYMPHOCYTES NFR BLD AUTO: 35 % (ref 14–44)
MCH RBC QN AUTO: 27 PG (ref 26.8–34.3)
MCHC RBC AUTO-ENTMCNC: 29.6 G/DL (ref 31.4–37.4)
MCV RBC AUTO: 91 FL (ref 82–98)
MONOCYTES # BLD AUTO: 0.43 THOUSAND/ΜL (ref 0.17–1.22)
MONOCYTES NFR BLD AUTO: 6 % (ref 4–12)
NEUTROPHILS # BLD AUTO: 4.44 THOUSANDS/ΜL (ref 1.85–7.62)
NEUTS SEG NFR BLD AUTO: 58 % (ref 43–75)
NRBC BLD AUTO-RTO: 0 /100 WBCS
PLATELET # BLD AUTO: 184 THOUSANDS/UL (ref 149–390)
PMV BLD AUTO: 10.7 FL (ref 8.9–12.7)
POTASSIUM SERPL-SCNC: 4 MMOL/L (ref 3.5–5.3)
PROT SERPL-MCNC: 7.2 G/DL (ref 6.4–8.4)
RBC # BLD AUTO: 5.75 MILLION/UL (ref 3.88–5.62)
SODIUM SERPL-SCNC: 140 MMOL/L (ref 135–147)
WBC # BLD AUTO: 7.61 THOUSAND/UL (ref 4.31–10.16)

## 2022-07-25 PROCEDURE — 85025 COMPLETE CBC W/AUTO DIFF WBC: CPT

## 2022-07-25 PROCEDURE — 80053 COMPREHEN METABOLIC PANEL: CPT

## 2022-07-25 PROCEDURE — 36415 COLL VENOUS BLD VENIPUNCTURE: CPT

## 2022-07-27 ENCOUNTER — HOSPITAL ENCOUNTER (OUTPATIENT)
Dept: CT IMAGING | Facility: HOSPITAL | Age: 72
Discharge: HOME | End: 2022-07-27
Attending: INTERNAL MEDICINE
Payer: MEDICARE

## 2022-07-27 DIAGNOSIS — C78.00 MALIGNANT NEOPLASM METASTATIC TO LUNG, UNSPECIFIED LATERALITY (HCC): ICD-10-CM

## 2022-07-27 DIAGNOSIS — C64.2 CLEAR CELL ADENOCARCINOMA OF KIDNEY, LEFT (HCC): ICD-10-CM

## 2022-07-27 DIAGNOSIS — C79.51 BONE METASTASES (HCC): ICD-10-CM

## 2022-07-27 PROCEDURE — 74177 CT ABD & PELVIS W/CONTRAST: CPT

## 2022-07-27 PROCEDURE — 71260 CT THORAX DX C+: CPT

## 2022-07-27 RX ADMIN — IOHEXOL 75 ML: 350 INJECTION, SOLUTION INTRAVENOUS at 18:24

## 2022-08-05 ENCOUNTER — OFFICE VISIT (OUTPATIENT)
Dept: HEMATOLOGY ONCOLOGY | Facility: CLINIC | Age: 72
End: 2022-08-05
Payer: MEDICARE

## 2022-08-05 VITALS
RESPIRATION RATE: 18 BRPM | SYSTOLIC BLOOD PRESSURE: 129 MMHG | WEIGHT: 265 LBS | OXYGEN SATURATION: 98 % | TEMPERATURE: 96.9 F | DIASTOLIC BLOOD PRESSURE: 78 MMHG | HEART RATE: 97 BPM | BODY MASS INDEX: 37.94 KG/M2 | HEIGHT: 70 IN

## 2022-08-05 DIAGNOSIS — C79.51 BONE METASTASES (HCC): ICD-10-CM

## 2022-08-05 DIAGNOSIS — C64.2 CLEAR CELL ADENOCARCINOMA OF KIDNEY, LEFT (HCC): ICD-10-CM

## 2022-08-05 DIAGNOSIS — C78.00 MALIGNANT NEOPLASM METASTATIC TO LUNG, UNSPECIFIED LATERALITY (HCC): Primary | ICD-10-CM

## 2022-08-05 PROCEDURE — 99214 OFFICE O/P EST MOD 30 MIN: CPT | Performed by: INTERNAL MEDICINE

## 2022-08-05 NOTE — PROGRESS NOTES
Hematology / Oncology Outpatient Follow Up Note    Violet Verdin 66 y o  male :1950 ELM:[id number]         Date:  2022    Assessment / Plan:  A 65-year-old gentleman who was diagnosed with localized renal cell carcinoma in 2007  He developed metastatic disease in   Since then, he has been on extensive treatment with multiple line of anti VEGF TKI as well as checkpoint inhibitors  Most recently, he has been on tivozanib resulting in progressive disease shown by CT scan of chest abdomen pelvis in 2022  He still does not have tumor related symptoms  His performance status is well maintained  Since he has been heavily pretreated, there is no standard treatment available  Following 3 treatment options were thoroughly discussed  1  Obtaining 2nd opinion at [facility] or [facility] with possibility of clinical trial     2  Pazopanib  3  Ipilimumab with nivolumab  Pros and cons of above 3 options were thoroughly discussed as well as side effects of pazopanib  I am less enthusiastic about checkpoint inhibitor combination, due to the toxicities  We provided information on pazopanib  He will consider these treatment option and get back to me within a week  All the patient's and his sister's questions were answered to their satisfaction       Subjective:      HPI:             Interval History:  A 65-year-old gentleman who has history of left nephrectomy for renal cell carcinoma in 2007  He was well until  when he was found to have lung metastasis   He was watched until 2015 when he started sunitinib until 2016   Since then, he has been on axitinib which he is still on   In 2019, CT scan showed progression of tumor in the renal bed as well as some mixed response was lung metastasis   Therefore, Dr Rene Leroy at added pembrolizumab   He had no immune related toxicity with pembrolizumab   However, he had progressive disease in May 2021 his systemic therapy was changed to cabozantinib   Because of the initial toxicity, he was on cabozantinib 40 mg every other day with excellent tolerance    He had initial partial response on cabozantinib  However, he had progressive disease with lung metastasis as well as intra-abdominal lymphadenopathy in August 2021  Therefore, systemic therapy was changed to lenvatinib with pembrolizumab, resulting in partial response  Unfortunately, he had another progressive disease in late March 2022  Therefore, his systemic therapy was changed to tivozanib  He has been tolerating tivozanib well  However, his recent CT scan showed progression of lung metastasis as well as progression of abdominal lymphadenopathy  He presents today for follow-up  He feels well  He has no complaint of pain  His weight is stable  He has no respiratory symptoms  His performance status is 0 to 1/4 on ECOG scale  He still works as a part-time        Objective:      Primary Diagnosis:     1  Localized renal cell carcinoma T1 NX M0 grade 2-3 diagnosed in August 2007      2  Metastatic renal cell carcinoma, diagnosed in 2013       Cancer Staging:  Cancer Staging  No matching staging information was found for the patient         Previous Hematologic/ Oncologic Treatment:      1  Sunitinib from March 2015 through December 2016    2  Axitinib monotherapy from December 2016 through November 2019    3  Axitinib with pembrolizumab from November 2019 through April 2020    4   Cabozantinib from May 2020 through August 2021   5  Lenvatinib with pembrolizumab from September 2021 through March 2022   6  Tivozanib from April 2022 through August 2022       Current Hematologic/ Oncologic Treatment:       Obtaining 2nd opinion or pazopanib      Disease Status:      Progressive disease on tivozanib      Test Results:     Pathology:           Radiology:     CT scan of chest abdomen pelvis in July 2022 showed increased lung metastasis  Enlarging abdominal adenopathy and soft tissue had renal bed       Laboratory:     See below   Normal TSH      Physical Exam:        General Appearance:    Alert, oriented          Eyes:    PERRL   Ears:    Normal external ear canals, both ears   Nose:   Nares normal, septum midline   Throat:   Mucosa moist  Pharynx without injection  Neck:   Supple         Lungs:     Clear to auscultation bilaterally   Chest Wall:    No tenderness or deformity    Heart:    Regular rate and rhythm         Abdomen:     Soft, non-tender, bowel sounds +, no organomegaly               Extremities:   Extremities no cyanosis or edema         Skin:   no rash or icterus  Lymph nodes:   Cervical, supraclavicular, and axillary nodes normal   Neurologic:   CNII-XII intact, normal strength, sensation and reflexes     Throughout             Breast exam:   NA           ROS: Review of Systems   All other systems reviewed and are negative  Imaging: CT chest abdomen pelvis w contrast    Result Date: 8/2/2022  Narrative: CT CHEST, ABDOMEN AND PELVIS WITH IV CONTRAST INDICATION:   C64 2: Malignant neoplasm of left kidney, except renal pelvis C79 51: Secondary malignant neoplasm of bone C78 00: Secondary malignant neoplasm of unspecified lung  History of bone metastasis  Left renal carcinoma  Prostate cancer  Prostatectomy  Lung metastasis  Spine metastasis  Radiation therapy SBRT to T10  Left nephrectomy  Right lung surgery  COMPARISON:  CT chest abdomen pelvis 03/17/2022  TECHNIQUE: CT examination of the chest, abdomen and pelvis was performed  Axial, sagittal, and coronal 2D reformatted images were created from the source data and submitted for interpretation  Radiation dose length product (DLP) for this visit:  1501 mGy-cm     This examination, like all CT scans performed in the [facility], was performed utilizing techniques to minimize radiation dose exposure, including the use of iterative reconstruction and automated exposure control  IV Contrast:  75 mL of iohexol (OMNIPAQUE) Enteric Contrast: Enteric contrast was administered  FINDINGS: CHEST LUNGS:  8mm nodular density along the right lateral tracheal wall 3/36, new from prior may represent small amounts of mucus  Mild emphysema  Probable scarring in the superior posterior right upper lobe with associated bronchiolectasis without significant change from prior  2 mm nodule right upper lobe 3/35, in retrospect this was punctate  2 mm nodule right middle lobe 3/61, also punctate in retrospect on prior examination  26 x 13 mm pleural-based nodule in the right middle lobe 3/80, previously 10 x 21 mm  17 x 15 mm peripherally calcified nodule in the right lower lobe 3/68, previously 13 x 11 mm  Subpleural consolidation in the right lower lobe subjacent to bony metastasis redemonstrated  6 mm left upper lobe nodule 3/17, previously 8 mm  5 mm subpleural nodule left upper lobe 3/20, new from prior  3 mm left upper lobe nodule, 3/24, also new  increased size of additional left lung nodules  15 x 12 mm left upper lobe nodule 3/43 previously 15 x 10 mm  20 x 11 mm subpleural nodule left lower lobe 3/62, previously 17 x 10 mm  PLEURA:  Unremarkable  HEART/GREAT VESSELS: Thoracic aortic, annular, and coronary artery calcification  No thoracic aortic aneurysm  There is enlargement of the central pulmonary arterial tree suggesting some element of pulmonary artery hypertension  MEDIASTINUM AND CHERRIE:  Unremarkable  CHEST WALL AND LOWER NECK:    Stable multinodular thyroid gland  ABDOMEN LIVER/BILIARY TREE:  Unremarkable  GALLBLADDER:  No calcified gallstones  No pericholecystic inflammatory change  SPLEEN:  Unremarkable  PANCREAS:  Unremarkable  ADRENAL GLANDS:  31 x 40 mm left adrenal mass is stable  KIDNEYS/URETERS:  Large right likely minimally complicated right renal cyst   No right hydronephrosis  Status post left nephrectomy    Increased size and number of soft tissue density in the left renal fossa may represent lymphadenopathy or localized recurrence, for example 23 x 19 mm soft tissue density, series 2 image 71 previously 14 x 13 mm  Curvilinear soft tissue density measuring 32 x 12 mm series 2 image 68, new from prior  STOMACH AND BOWEL:  Small lipoma in the duodenal bulb  Colonic diverticulosis, without findings for diverticulitis  APPENDIX:  No findings to suggest appendicitis  ABDOMINOPELVIC CAVITY:  38 x 35 mm centrally calcified mass in the left retroperitoneum 2/78 previously 27 x 26 mm  Ill-defined soft tissue density mass right mesentery maximum axial measurements of 10 6 x 6 0 cm, previously 9 5 x 5 5  Retroperitoneal lymphadenopathy redemonstrated, gastrohepatic and celiac axis lymphadenopathy, increased in size from prior, 37 x 20 mm previously 31 x 19 mm and 53 x 22 mm previously 49 x 22 mm  VESSELS:  Atherosclerotic changes are present  No evidence of aneurysm  PELVIS REPRODUCTIVE ORGANS:  Absent  URINARY BLADDER:  Collapsed ABDOMINAL WALL/INGUINAL REGIONS:  Postsurgical changes anteriorly  Small lipoma in the right external oblique  Bilateral fat-containing inguinal hernia  There are incisional hernias  OSSEOUS STRUCTURES:  Osseous metastasis redemonstrated, most notably involving right ribs 9, lytic expansile lesion with associated soft tissue component and similar but more significant involving right rib 7 posteriorly  Expansile metastatic lesion involving the right ilium with increased soft tissue component compared to prior with greatest axial measurement of 76 mm previously 68 mm  Extension along the psoas muscle  Metastatic lesions involving pelvic bones with soft tissue component involving  the left ilium adjacent to sacroiliac joint with greatest axial measurements of 55 mm previously 48 mm  Postsurgical changes of the lumbar spine  Additional vertebral body metastasis do not appear significantly changed from prior    Osseous and soft tissue component metastasis involving the left scapula  Impression: Findings of disease progression as described above   Increased size of pulmonary metastasis  Increased size of retroperitoneal and left renal fossa metastatic disease  Increased size of mesenteric mass  Additional findings as above  The study was marked in EPIC for significant notification  Workstation performed: [id number]         Labs:   Lab Results   Component Value Date    WBC 7 61 07/25/2022    HGB 15 5 07/25/2022    HCT 52 4 (H) 07/25/2022    MCV 91 07/25/2022     07/25/2022     Lab Results   Component Value Date     12/10/2015    K 4 0 07/25/2022     07/25/2022    CO2 32 07/25/2022    ANIONGAP 7 12/10/2015    BUN 17 07/25/2022    CREATININE 1 09 07/25/2022    GLUCOSE 102 12/10/2015    GLUF 94 02/23/2022    CALCIUM 9 9 07/25/2022    CORRECTEDCA 10 7 (H) 07/25/2022    AST 20 07/25/2022    ALT 12 07/25/2022    ALKPHOS 71 07/25/2022    PROT 6 9 12/10/2015    BILITOT 0 71 12/10/2015    EGFR 67 07/25/2022         Lab Results   Component Value Date     01/29/2020          Lab Results   Component Value Date    PSA <0 1 09/23/2019    PSA <0 1 12/22/2017    PSA <0 1 12/10/2015         Current Medications: Reviewed  Allergies: Reviewed  PMH/FH/SH:  Reviewed      Vital Sign:    Body surface area is 2 35 meters squared      Wt Readings from Last 3 Encounters:   08/05/22 120 kg (265 lb)   06/03/22 120 kg (265 lb)   03/28/22 128 kg (282 lb)        Temp Readings from Last 3 Encounters:   08/05/22 (!) 96 9 °F (36 1 °C) (Tympanic)   06/03/22 98 2 °F (36 8 °C) (Temporal)   06/03/22 (!) 96 4 °F (35 8 °C) (Tympanic)        BP Readings from Last 3 Encounters:   08/05/22 129/78   06/03/22 124/72   06/03/22 124/72         Pulse Readings from Last 3 Encounters:   08/05/22 97   06/03/22 96   06/03/22 96     @LASTSAO2(3)@

## 2022-08-26 ENCOUNTER — HOSPITAL ENCOUNTER (OUTPATIENT)
Dept: INFUSION CENTER | Facility: CLINIC | Age: 72
End: 2022-08-26
Payer: MEDICARE

## 2022-08-26 ENCOUNTER — TELEPHONE (OUTPATIENT)
Dept: HEMATOLOGY ONCOLOGY | Facility: CLINIC | Age: 72
End: 2022-08-26

## 2022-08-26 VITALS
HEART RATE: 97 BPM | SYSTOLIC BLOOD PRESSURE: 153 MMHG | RESPIRATION RATE: 18 BRPM | TEMPERATURE: 98.5 F | DIASTOLIC BLOOD PRESSURE: 85 MMHG

## 2022-08-26 DIAGNOSIS — C79.51 BONE METASTASES (HCC): Primary | ICD-10-CM

## 2022-08-26 PROCEDURE — 96372 THER/PROPH/DIAG INJ SC/IM: CPT

## 2022-08-26 RX ADMIN — DENOSUMAB 120 MG: 120 INJECTION SUBCUTANEOUS at 09:24

## 2022-08-26 NOTE — TELEPHONE ENCOUNTER
CALL RETURN FORM   Reason for patient call? Patient calling says Dr Cheo Desai had proposed a medication change to Pazopanib  Patient would rather NOT do that  Stay on current medication  Patient's primary oncologist? Cheo Desai    Name of person the patient was calling for? Cheo Desai    Any additional information to add, if applicable? [phone]   Informed patient that the message will be forwarded to the team and someone will get back to them as soon as possible    Did you relay this information to the patient?   yes

## 2022-08-26 NOTE — PROGRESS NOTES
Patient arrived to the unit and denied dental concerns  He tolerated his xgeva in his right arm well without adverse reaction  He is aware of his next injection

## 2022-08-29 ENCOUNTER — TELEPHONE (OUTPATIENT)
Dept: HEMATOLOGY ONCOLOGY | Facility: CLINIC | Age: 72
End: 2022-08-29

## 2022-08-29 DIAGNOSIS — C78.00 MALIGNANT NEOPLASM METASTATIC TO LUNG, UNSPECIFIED LATERALITY (HCC): Primary | ICD-10-CM

## 2022-08-29 DIAGNOSIS — C79.51 BONE METASTASES (HCC): ICD-10-CM

## 2022-08-29 DIAGNOSIS — C64.2 CLEAR CELL ADENOCARCINOMA OF KIDNEY, LEFT (HCC): ICD-10-CM

## 2022-08-29 NOTE — TELEPHONE ENCOUNTER
Please schedule CT scan CAP with contrast in about 3 months, then an appointment with Dr Cyndie Power one week after scan  I spoke with patient to make him aware  He verbalized understanding and agreed to plan

## 2022-08-29 NOTE — TELEPHONE ENCOUNTER
Patient is scheduled for both CT and our office after the CT  Left patient a detailed message about both appointments

## 2022-08-29 NOTE — TELEPHONE ENCOUNTER
Left detailed voicemail for patient about scheduled CT and follow up with Dr Cyndie Power after the CT  Provided details for patient about the CT and the follow up with Dr Cyndie Power  Provided call back numbers for Central Scheduling and for our office in case the appointments do not work for the patient

## 2022-09-13 ENCOUNTER — TELEPHONE (OUTPATIENT)
Dept: HEMATOLOGY ONCOLOGY | Facility: CLINIC | Age: 72
End: 2022-09-13

## 2022-09-13 NOTE — TELEPHONE ENCOUNTER
"This is [facility] [employer]  [phone] or [phone] I need the help for you to open up a portal or something for my family doctor so I could get an IV for this COVID this COVID that I have they want to give me an infusion on N A/B  If you have any questions, give me a call  Right  Thanks   Bye "    I left message for patient to call me back because the message is unclear and I am not entirely sure what I need to do to open a portal

## 2022-09-13 NOTE — TELEPHONE ENCOUNTER
Patient's PCP wants us to order NAB for covid treatment since they do not have access to [employer] system to order it  However, they stated that he can not have the paxlovid due to his medications? I am not entirely sure what it is to be ordered?

## 2022-09-14 ENCOUNTER — TELEPHONE (OUTPATIENT)
Dept: FAMILY MEDICINE CLINIC | Facility: CLINIC | Age: 72
End: 2022-09-14

## 2022-09-14 ENCOUNTER — TELEMEDICINE (OUTPATIENT)
Dept: FAMILY MEDICINE CLINIC | Facility: CLINIC | Age: 72
End: 2022-09-14
Payer: MEDICARE

## 2022-09-14 DIAGNOSIS — U07.1 COVID-19: Primary | ICD-10-CM

## 2022-09-14 DIAGNOSIS — C64.9 METASTATIC RENAL CELL CARCINOMA, UNSPECIFIED LATERALITY (HCC): ICD-10-CM

## 2022-09-14 PROCEDURE — 99213 OFFICE O/P EST LOW 20 MIN: CPT | Performed by: NURSE PRACTITIONER

## 2022-09-14 NOTE — PATIENT INSTRUCTIONS
COVID-19 and Chronic Health Conditions   AMBULATORY CARE:   What you need to know about coronavirus disease 2019 (COVID-19) and chronic health conditions: Your chronic condition may increase your risk for COVID-19 or serious problems it can cause  Healthcare providers might need to make changes that affect how you usually manage your chronic health condition  Providers may change hours of operation or not have patients come in to be seen  You may not be able to make appointments to get blood drawn or to have tests or procedures  This may continue until the virus that causes COVID-19 is controlled  Until then, you can take steps to manage your condition  The steps will also lower your risk for COVID-19 or the serious problems it causes  If you do develop COVID-19, healthcare providers will tell you when it is okay to be around others after you recover  This depends on your chronic condition, any symptoms of COVID-19 that developed, and how severe the symptoms were  What you need to know about serious problems from the virus:  You may develop long-term health problems caused by the virus  Your risk is higher if you are 65 or older  A weak immune system, obesity, diabetes, chronic kidney disease, or a heart or lung condition can also increase your risk  Your risk is also higher if you are a current or former cigarette smoker   COVID-19 can lead to any of the following:  Multisymptom inflammatory syndrome in adults (MIS-A) or in children (MIS-C), causing inflammation in the heart, digestive system, skin, or brain    Shortness of breath, serious lower respiratory conditions, such as pneumonia or acute respiratory distress syndrome (ARDS)    Blood clots or blood vessel damage    Organ damage from a lack of oxygen or from blood clots    Sleep problems    Problems thinking clearly, remembering information, or concentrating    Mood changes, depression, or anxiety    Long-term problems tasting or smelling    Loss of appetite and weight loss    Nerve pain    Fatigue (feeling mentally and physically tired)    Call your local emergency number (911 in the [address]) if:   You have trouble breathing or shortness of breath  You have chest pain or pressure that lasts longer than 5 minutes  You become confused or hard to wake  Your lips or face are blue  Seek care immediately if:   You have a fever of 104°F (40°C) or higher  Call your doctor or healthcare provider if:   You have symptoms of COVID-19  You have questions or concerns about COVID-19 or your chronic condition  How the 2019 coronavirus spreads: The virus spreads quickly and easily  The virus can be passed starting 2 to 3 days before symptoms begin or before a positive test if symptoms never begin  Droplets are the main way all coronaviruses spread  The virus travels in droplets that form when a person talks, sings, coughs, or sneezes  The droplets can also float in the air for minutes or hours  Infection happens when you breathe in the droplets or get them in your eyes or nose  Close personal contact with an infected person increases your risk for infection  This means being within 6 feet (2 meters) of the person for at least 15 minutes over 24 hours  Person-to-person contact can spread the virus  For example, a person with the virus on his or her hands can spread it by shaking hands with someone  The virus can stay on objects and surfaces for up to 3 days  You may become infected by touching the object or surface and then touching your eyes or mouth  Manage your chronic health condition during this time:  If you do not have a regular healthcare provider, experts recommend you contact a local community health center or health department  The following can help you manage your condition and prevent COVID-19:  Get emergency care for your condition if needed    Talk to your healthcare providers about symptoms of your chronic condition that need immediate care  Your providers can help you create a plan or add exacerbation management to your plan  The plan will include when to go to an emergency department and when to call your local emergency number  This will depend on where you live and the services that are available during this time  Go to dialysis appointments as scheduled  It is important to stay on schedule  You will need to have enough food to be able to follow the emergency diet plan if you must miss a session  The emergency diet needs to be part of the management plan for your condition  Reschedule any upcoming appointments as needed  Medical facilities may be closed until the coronavirus is better controlled  This means you may need to reschedule a surgery, procedure, or check-up appointment  If you cannot have a phone or video appointment, you will need to make a new appointment  Some providers may be scheduling appointments several months in advance  Some surgeries and procedures will happen as scheduled  This depends on the medical condition and the reason for the surgery or procedure  You may need to have extra testing for COVID-19 several days before  Follow any regular management plan you use  Your healthcare provider will tell you if you need to make any changes to your regular management plan  For example, if you have asthma, continue to follow your asthma action plan  If you have diabetes, you may need to check your blood sugar level more often  Stress and illness can make blood sugar levels go up  You may need to adjust medicine such as insulin  If you have a heart condition or high blood pressure, you may need to check your blood pressure more often  Stress and illness can also raise your blood pressure  Talk to your healthcare providers about your medicines  You may be able to get more than 1 month of medicine at a time  This will lower the number of times you need to go to a pharmacy to get your medicines   Make sure you have enough medicine if you have a condition that can lead to an emergency  Examples include asthma medicines, insulin, or an epinephrine pen  Check the expiration dates on the medicines you currently have  Ask for refills as soon as possible, if needed  If it is not time to refill prescriptions, you may be able to get an emergency supply of some medicines  Medicine plans vary, so ask your healthcare provider or pharmacist for options  Have supplies available in your home  If possible, get extra supplies you use regularly  Examples include absorbent pads, syringes, and wound cleaning solutions  This will limit the number of trips out of your home to get supplies  Know the signs and symptoms of COVID-19  Signs and symptoms usually start about 5 days after infection but can take 2 to 14 days  Signs and symptoms range from mild to severe  You may feel like you have the flu or a bad cold  Your chronic health condition may cause some of the same symptoms COVID-19 causes  This can make it hard to know if a symptom is from COVID-19 or your chronic condition  Keep a record of any new or worsening symptom you have  This is especially important if you have a condition that often causes shortness of breath  Your provider can tell you if you should be tested for COVID-19   Tell your healthcare provider if you think you were infected but develop signs or symptoms not listed below:    A cough    Shortness of breath or trouble breathing that may become severe    A fever of at least 100 4°F, or 38°C (may be lower in adults 65 or older)    Chills that might include shaking    Muscle pain, body aches, or a headache    A sore throat    Suddenly not being able to taste or smell anything    Feeling very tired (fatigue)    Congestion (stuffy head and nose), or a runny nose    Diarrhea, nausea, or vomiting    What you can do to prevent having to go out of your home during this time:   Ask your healthcare provider for other ways to have appointments  Some providers offer phone, video, or other types of appointments  Have food, medicines, and other supplies delivered  Some pharmacies can send certain medicines to you through the mail  Grocery stores and restaurants may be able to deliver food and other items  If possible, have delivered items placed somewhere  Try not to have someone hand you an item  You will be so close to the person that the virus can spread between you  Ask someone to get items you need  The person can get groceries, medicines, or other needed items for you  Choose a person who does not have signs or symptoms of COVID-19 or has tested negative for it  The person should not be waiting for test results  He or she should not have a condition that increases the risk for COVID-19 or serious problems it causes  What you need to know about COVID-19 vaccines: Your healthcare provider can give you more information about what to expect, depending on your chronic condition  You are considered fully vaccinated against COVID-19 two weeks after the final dose of any COVID-19 vaccine  Let your healthcare provider know when you have received the final dose of the vaccine  Make a copy of your vaccination card  Keep the original with you in case you need to show it  Keep the copy in a safe place  COVID-19 vaccines are given as a shot in 1 or 2 doses  Vaccination is recommended for everyone 5 years or older  One 2-dose vaccine is fully approved  for those 16 years or older  This vaccine also has an emergency use authorization (EUA) for children 11to 13years old  No vaccine is currently available for children younger than 5 years  A booster (additional) dose  is given to help the immune system continue to protect against severe COVID-19  A booster is recommended for all adults 18 or older  The booster can be a different brand of the COVID-19 vaccine than you originally received   The timing for the booster depends on the type of vaccine you received:    1-dose vaccine: The booster is given at least 2 months after you received the vaccine  2-dose vaccine: The booster is given at least 5 or 6 months after the second dose  A booster can be given to adolescents 15to 16years old  Only 1 COVID-19 vaccine has this EUA  The booster is given at least 5 months after the second dose of the original vaccine series  A booster is recommended for immunocompromised children 11to 6years old  Only 1 COVID-19 vaccine has this EUA  The booster is given 28 days after the second dose  Continue social distancing and other measures, even after you get the vaccine  Although it is not common, you can become infected after you get the vaccine  You may also be able to pass the virus to others without knowing you are infected  After you get the vaccine, check local, national, and international travel rules  You may need to be tested before you travel  Some countries require proof of a negative test before you travel  You may also need to quarantine after you return  Medicine may be given to prevent infection  The medicine can be given if you are at high risk for infection and cannot get the vaccine  It can also be given if your immune system does not respond well to the vaccine  Lower your risk for COVID-19:   Wash your hands often throughout the day  Use soap and water  Rub your soapy hands together, lacing your fingers, for at least 20 seconds  Rinse with warm, running water  Dry your hands with a clean towel or paper towel  Use hand  that contains alcohol if soap and water are not available  Teach children how to wash their hands and use hand   Cover sneezes and coughs  Turn your face away and cover your mouth and nose with a tissue  Throw the tissue away  Use the bend of your arm if a tissue is not available  Then wash your hands with soap and water or use hand    Teach children how to cover a cough or sneeze  Follow worldwide, national, and local social distancing guidelines  Keep at least 6 feet (2 meters) between you and others  Wear a face covering (mask) around anyone who does not live in your home  Use a cloth covering with at least 2 layers  You can also create layers by putting a cloth covering over a disposable non-medical mask  Cover your mouth and your nose  Try not to touch your face  If you get the virus on your hands, you can transfer it to your eyes, nose, or mouth and become infected  You can also transfer it to objects, surfaces, or people  Clean and disinfect high-touch surfaces and objects in your home often  Use disinfecting wipes, or make a solution by mixing 4 teaspoons of bleach with 1 quart (4 cups) of water  Do not  use any cleaning or disinfecting products that can trigger an asthma attack or other breathing problems  Open windows or have circulating air as you clean  Do not  mix ammonia with bleach  This will create toxic fumes  How to follow social distancing guidelines:  National and local social distancing rules vary  Rules and restrictions may change over time as restrictions are lifted  The following are general guidelines:  Stay home if you are sick or think you may have COVID-19  It is important to stay home if you are waiting for a testing appointment or for test results  Avoid close physical contact with anyone who does not live in your home  Do not shake hands with, hug, or kiss a person as a greeting  If you must use public transportation (such as a bus or subway), try to sit or stand away from others  Wear your face covering  Avoid in-person gatherings and crowds  Attend virtually if possible  Help strengthen your immune system:   Ask about other vaccines you may need  Get the influenza (flu) vaccine as soon as recommended each year, usually starting in September or October  Get the pneumonia vaccine if recommended  Your healthcare provider can tell you if you should also get other vaccines, and when to get them  Do not smoke  Nicotine and other chemicals in cigarettes and cigars can increase your risk for infection and for serious COVID-19 effects  Ask your healthcare provider for information if you currently smoke and need help to quit  E-cigarettes or smokeless tobacco still contain nicotine  Talk to your healthcare provider before you use these products  Eat a variety of healthy foods  Examples include vegetables, fruits, whole-grain breads and cereals, lean meats and poultry, fish, low-fat dairy products, and cooked beans  Healthy foods contain nutrients that help keep your immune system strong  Find ways to manage stress  You may be feeling more stressed than usual because of the COVID-19 outbreak  The situation is very stressful to many people  Talk to your healthcare providers about ways to manage stress during this time  Stress can lead to breathing problems or make the problems worse  Stress can trigger an attack or exacerbation of many health conditions  It is important to do things that help you feel more relaxed, such as the following:     Pick 1 or 2 times a day to watch the news  Constant news watching can increase your stress levels  Talk to a friend on the phone or through a video chat  Take a warm, soothing bath  Listen to music  Exercise can also help relieve stress  This may be hard if your regular gym or outdoor exercise area is closed  If you do not have exercise equipment at home, try walking inside your home  You can walk quickly or turn on music and dance  Follow up with your doctor or healthcare provider as directed: Your providers will tell you when you can come in for tests, procedures, or check-ups  Bring your symptom record with you to all appointments  Write down your questions so you remember to ask them during your visits    For more information:   [facility]  [address] [place] , [address]  Phone: 2- 571 - 6782621  Phone: 5- 786 - 7079733  Web Address: [employer] br    © Copyright [employer] 2022 Information is for End User's use only and may not be sold, redistributed or otherwise used for commercial purposes  All illustrations and images included in CareNotes® are the copyrighted property of [employer]  or Napoleon Riojas   The above information is an  only  It is not intended as medical advice for individual conditions or treatments  Talk to your doctor, nurse or pharmacist before following any medical regimen to see if it is safe and effective for you

## 2022-09-14 NOTE — TELEPHONE ENCOUNTER
----- Message from Carrillo Pierce RN sent at 9/13/2022  4:12 PM EDT -----  Pt has a [facility] doctor but is seen by Qasim Matamoros but his oncologist is out of the office on vacation  Pt is positive for COVID and symptoms started on 9/8  Pt is interested in getting MAB infusion  Can someone please set him up with a virtual visit with a  PCP to get scheduled for MAB?  Thank you

## 2022-09-14 NOTE — PROGRESS NOTES
COVID-19 Outpatient Progress Note    Assessment/Plan:    Problem List Items Addressed This Visit        Other    Metastatic renal cell carcinoma ([facility] [address] )    COVID-19 - Primary     Advised increased rest, hydration, adequate nutrition  Can continue with OTC vitamin c, d, zinc   Patient is outside treatment window for oral anti-virals due to symptom onset being greater than 5 days  Meets criteria for MAB  Medication discussed and instructions on where to receive infusion  Patient advised to follow up with PCP  Patient has infusion at [facility] for 9/15 @ 9:30, made aware by office staff  Made aware if develops SOB, to seek immediate care  Disposition:     Patient has asymptomatic or mild COVID-19 infection  Based off CDC guidelines, they were recommended to isolate for 5 days  If they are asymptomatic or symptoms are improving with no fevers in the past 24 hours, isolation may be ended followed by 5 days of wearing a mask when around othes to minimize risk of infecting others  If still have a fever or other symptoms have not improved, continue to isolate until they improve  Regardless of when they end isolation, avoid being around people who are more likely to get very sick from COVID-19 until at least day 11  Discussed symptom directed medication options with patient  Discussed vitamin D, vitamin C, and/or zinc supplementation with patient  Patient meets criteria for Bebtelovimab infusion  They were counseled in regards to risks, benefits, and side effects of this infusion      Dauna Channel is an investigational medicine used to treat mild-to-moderate symptoms of COVID-19 in adults and children (15years of age and older weighing at least 80 pounds (40 kg)) with positive results of direct SARS-CoV-2 viral testing, and who are at high risk of progression to severe COVID-19, including hospitalization or death, and for whom other COVID-19 treatment options approved or authorized by FDA are not available or clinically appropriate  Bebtelovimab is investigational because it is still being studied  There is limited information about the safety and effectiveness of using bebtelovimab to treat people with mild-to-moderate COVID-19  The FDA has authorized the emergency use of bebtelovimab for the treatment of COVID-19 under an Emergency Use Authorization (EUA)  Chrystie Ormond is not authorized for use in people who:  - are likely to be infected with a SARS-CoV-2 variant that is not able to be treated by bebtelovimab based on the circulating variants in your area (ask your health care provider about FDA and CDCs latest information on circulating variants by geographic area), or  - are hospitalized due to COVID-19, or  - require oxygen therapy and/or respiratory support due to COVID-19, or  - require an increase in baseline oxygen flow rate and/or respiratory support due to [address] and are on chronic oxygen therapy and/or respiratory support due to underlying nonCOVID-19 related comorbidity  How will I receive Bebtelovimab? Chrystie Ormond will be given as an injection through a vein (intravenously or IV) over at least 30 seconds  You will be observed by your healthcare provider for at least 1 hour after you receive bebtelovimab  Possible side effects of Bebtelovimab: Allergic reactions can happen during and after infusion with bebtelovimab  Possible reactions include: fever, chills, nausea, headache, shortness of breath, low or high blood pressure, rapid or slow heart rate, chest discomfort or pain, weakness, confusion, feeling tired, wheezing, swelling of your lips, face, or throat, rash including hives, itching, muscle aches, dizziness, and sweating  These reactions may be severe or life threatening      Worsening symptoms after treatment: You may experience new or worsening symptoms after infusion, including fever, difficulty breathing, rapid or slow heart rate, tiredness, weakness or confusion  If these occur, contact your healthcare provider or seek immediate medical attention as some of these events have required hospitalization  It is unknown if these events are related to treatment or are due to the progression of COVID19  The side effects of getting any medicine by vein may include brief pain, bleeding, bruising of the skin, soreness, swelling, and possible infection at the infusion site  These are not all the possible side effects of bebtelovimab  Not a lot of people have been given bebtelovimab  Serious and unexpected side effects may happen  Bebtelovimab are still being studied so it is possible that all of the risks are not known at this time  It is possible that bebtelovimab could interfere with your body's own ability to fight off a future infection of SARS-CoV-2  Similarly, bebtelovimab may reduce your bodys immune response to a vaccine for SARS-CoV-2  Specific studies have not been conducted to address these possible risks  Talk to your healthcare provider if you have any questions  Emergency Use Authorization:    The [facility] FDA has made bebtelovimab available under an emergency access mechanism called an EUA  The EUA is supported by a  of Health and Human Service (HHS) declaration that circumstances exist to justify the emergency use of drugs and biological products during the COVID-19 pandemic  Bebtelovimab have not undergone the same type of review as an FDA-approved or cleared product  The FDA may issue an EUA when certain criteria are met, which includes that there are no adequate, approved, and available alternatives  In addition, the FDA decision is based on the totality of scientific evidence available showing that it is reasonable to believe that the product meets certain criteria for safety, performance, and labeling and may be effective in treatment of patients during the COVID-19 pandemic   All of these criteria must be met to allow for the product to be used in the treatment of patients during the COVID-19 pandemic  The EUA for bebtelovimab together is in effect for the duration of the COVID-19 declaration justifying emergency use of these products, unless terminated or revoked (after which the product may no longer be used)  What if I am pregnant or breastfeeding? There is no experience treating pregnant women or breastfeeding mothers with bebtelovimab  For a mother and unborn baby, the benefit of receiving bebtelovimab may be greater than the risk from the treatment  If you are pregnant or breastfeeding, discuss your options and specific situation with your healthcare provider  How do I report side effects with Bebtelovimab? Contact your healthcare provider if you have any side effects that bother you or do not go away  Report side effects to FDA MedWatch at www fda gov/medwatch, or call 0-970-nGAP-9845 or to [facility]  as shown below  Email: [email or web address]   Fax number: 0-961.881.8935   Telephone number: 5-984-[phone] ([phone])    Full fact sheet document for patients can be found at: [employer]en[employer]retel [email or web address]    The patient consents to proceed with bebtelovimab infusion  I have spent 15 minutes directly with the patient  Greater than 50% of this time was spent in counseling/coordination of care regarding: prognosis, risks and benefits of treatment options, instructions for management and patient and family education  Encounter provider: IRENE Boyce     Provider located at: [facility] [address] [facility] [address] [facility] [facility]  [address] [place] [address] [address]     Recent Visits  No visits were found meeting these conditions    Showing recent visits within past 7 days and meeting all other requirements  Today's Visits  Date Type Provider Dept   09/14/22 Telemedicine IRENE [address] today's visits and meeting all other requirements  Future Appointments  No visits were found meeting these conditions  Showing future appointments within next 150 days and meeting all other requirements     This virtual check-in was done via Via Face Time after multiple failed attempts on trying to connect via [address] and patient was informed that this is not a secure, HIPAA-compliant platform  He agrees to proceed  Patient agrees to participate in a virtual check in via telephone or video visit instead of presenting to the office to address urgent/immediate medical needs  Patient is aware this is a billable service  He acknowledged consent and understanding of privacy and security of the video platform  The patient has agreed to participate and understands they can discontinue the visit at any time  After connecting through [facility], the patient was identified by name and date of birth  Linda Wilcox was informed that this was a telemedicine visit and that the exam was being conducted confidentially over secure lines  My office door was closed  No one else was in the room  Linda Wilcox acknowledged consent and understanding of privacy and security of the telemedicine visit  I informed the patient that I have reviewed his record in Epic and presented the opportunity for him to ask any questions regarding the visit today  The patient agreed to participate  Verification of patient location:  Patient is located in the following state in which I hold an active license: [place]    Subjective:   Linda Wilcox is a 66 y o  male who has been screened for COVID-19  Symptom change since last report: improving  Patient's symptoms include fatigue and cough  Patient denies fever, chills, malaise, congestion, rhinorrhea, sore throat, anosmia, loss of taste, shortness of breath, abdominal pain, nausea, vomiting, diarrhea, myalgias and headaches       - Date of symptom onset: 9/8/2022  - Date of positive COVID-19 test: 9/11/2022  COVID-19 vaccination status: Fully vaccinated with booster    Anju Brown has been staying home and has isolated themselves in his home  He is taking care to not share personal items and is cleaning all surfaces that are touched often, like counters, tabletops, and doorknobs using household cleaning sprays or wipes  He is wearing a mask when he leaves his room  No results found for: Nadir Moncada, [address] [facility], [place], [place], [address] [facility]    Review of Systems   Constitutional: Positive for fatigue  Negative for chills and fever  HENT: Negative for congestion, rhinorrhea and sore throat  Respiratory: Positive for cough  Negative for choking and shortness of breath  Cardiovascular: Negative for chest pain and palpitations  Gastrointestinal: Negative for abdominal pain, diarrhea, nausea and vomiting  Genitourinary: Negative for decreased urine volume  Musculoskeletal: Negative for myalgias  Neurological: Negative for dizziness, weakness, numbness and headaches  Psychiatric/Behavioral: Negative for confusion       Current Outpatient Medications on File Prior to Visit   Medication Sig    al mag oxide-diphenhydramine-lidocaine viscous (MAGIC MOUTHWASH) 1:1:1 suspension Swish and spit 10 mL every 4 (four) hours as needed for mouth pain or discomfort    amLODIPine (NORVASC) 5 mg tablet Take 5 mg by mouth daily      cholecalciferol (VITAMIN D3) 1,000 units tablet Take 1,000 Units by mouth daily    diphenhydrAMINE (BENADRYL) 25 mg tablet Take 25 mg by mouth daily at bedtime as needed for itching    diphenoxylate-atropine (LOMOTIL) 2 5-0 025 mg per tablet Take 1 tablet by mouth 4 (four) times a day as needed for diarrhea    fluorouracil (EFUDEX) 5 % cream     gabapentin (NEURONTIN) 100 mg capsule Take 300 mg by mouth 3 (three) times a day     lisinopril-hydrochlorothiazide (PRINZIDE,ZESTORETIC) 20-12 5 MG per tablet Take 1 tablet by mouth 2 (two) times a day      Multiple Vitamin (multivitamin) tablet Take 1 tablet by mouth daily    naproxen sodium (ALEVE) 220 MG tablet Take 220 mg by mouth as needed for mild pain    Tivozanib HCl (Fotivda) 1 34 MG CAPS Take 1 34 mg by mouth in the morning    traMADol (ULTRAM) 50 mg tablet Take 1 tablet (50 mg total) by mouth every 6 (six) hours as needed for moderate pain    trolamine salicylate (ASPERCREME) 10 % cream Apply 1 application topically as needed for muscle/joint pain    loperamide (IMODIUM) 2 mg capsule Take 2 mg by mouth 4 (four) times a day as needed for diarrhea    LORazepam (ATIVAN) 0 5 mg tablet Take 1 tablet (0 5 mg total) by mouth every 8 (eight) hours as needed for anxiety (Patient not taking: Reported on 8/11/2021)       Objective: There were no vitals taken for this visit  Physical Exam  Constitutional:       General: He is not in acute distress  Appearance: Normal appearance  He is not ill-appearing  HENT:      Head: Normocephalic  Right Ear: External ear normal       Left Ear: External ear normal    Pulmonary:      Effort: Pulmonary effort is normal  No respiratory distress  Musculoskeletal:         General: Normal range of motion  Cervical back: Normal range of motion  Skin:     Coloration: Skin is not pale  Neurological:      General: No focal deficit present  Mental Status: He is alert and oriented to person, place, and time     Psychiatric:         Mood and Affect: Mood normal          Behavior: Behavior normal        IRENE Nye

## 2022-09-14 NOTE — ASSESSMENT & PLAN NOTE
Advised increased rest, hydration, adequate nutrition  Can continue with OTC vitamin c, d, zinc   Patient is outside treatment window for oral anti-virals due to symptom onset being greater than 5 days  Meets criteria for MAB  Medication discussed and instructions on where to receive infusion  Patient advised to follow up with PCP  Patient has infusion at [facility] for 9/15 @ 9:30, made aware by office staff  Made aware if develops SOB, to seek immediate care

## 2022-09-15 ENCOUNTER — HOSPITAL ENCOUNTER (OUTPATIENT)
Dept: INFUSION CENTER | Facility: HOSPITAL | Age: 72
Discharge: HOME | End: 2022-09-15
Payer: MEDICARE

## 2022-09-15 VITALS
RESPIRATION RATE: 20 BRPM | DIASTOLIC BLOOD PRESSURE: 68 MMHG | TEMPERATURE: 97.4 F | HEART RATE: 82 BPM | SYSTOLIC BLOOD PRESSURE: 114 MMHG | OXYGEN SATURATION: 92 %

## 2022-09-15 DIAGNOSIS — U07.1 COVID-19: Primary | ICD-10-CM

## 2022-09-15 PROCEDURE — M0222 HB BEBTELOVIMAB INJECTION: HCPCS | Performed by: NURSE PRACTITIONER

## 2022-09-15 RX ORDER — ONDANSETRON 2 MG/ML
4 INJECTION INTRAMUSCULAR; INTRAVENOUS ONCE AS NEEDED
Status: CANCELLED | OUTPATIENT
Start: 2022-09-15

## 2022-09-15 RX ORDER — ALBUTEROL SULFATE 90 UG/1
3 AEROSOL, METERED RESPIRATORY (INHALATION) ONCE AS NEEDED
Status: CANCELLED | OUTPATIENT
Start: 2022-09-15

## 2022-09-15 RX ORDER — SODIUM CHLORIDE 9 MG/ML
20 INJECTION, SOLUTION INTRAVENOUS ONCE
Status: CANCELLED | OUTPATIENT
Start: 2022-09-15

## 2022-09-15 RX ORDER — BEBTELOVIMAB 87.5 MG/ML
175 INJECTION, SOLUTION INTRAVENOUS ONCE
Status: CANCELLED | OUTPATIENT
Start: 2022-09-15

## 2022-09-15 RX ORDER — ACETAMINOPHEN 325 MG/1
650 TABLET ORAL ONCE AS NEEDED
Status: DISCONTINUED | OUTPATIENT
Start: 2022-09-15 | End: 2022-09-18 | Stop reason: HOSPADM

## 2022-09-15 RX ORDER — ONDANSETRON 2 MG/ML
4 INJECTION INTRAMUSCULAR; INTRAVENOUS ONCE AS NEEDED
Status: DISCONTINUED | OUTPATIENT
Start: 2022-09-15 | End: 2022-09-18 | Stop reason: HOSPADM

## 2022-09-15 RX ORDER — SODIUM CHLORIDE 9 MG/ML
20 INJECTION, SOLUTION INTRAVENOUS ONCE
Status: DISCONTINUED | OUTPATIENT
Start: 2022-09-15 | End: 2022-09-18 | Stop reason: HOSPADM

## 2022-09-15 RX ORDER — BEBTELOVIMAB 87.5 MG/ML
175 INJECTION, SOLUTION INTRAVENOUS ONCE
Status: COMPLETED | OUTPATIENT
Start: 2022-09-15 | End: 2022-09-15

## 2022-09-15 RX ORDER — ALBUTEROL SULFATE 90 UG/1
3 AEROSOL, METERED RESPIRATORY (INHALATION) ONCE AS NEEDED
Status: DISCONTINUED | OUTPATIENT
Start: 2022-09-15 | End: 2022-09-18 | Stop reason: HOSPADM

## 2022-09-15 RX ORDER — ACETAMINOPHEN 325 MG/1
650 TABLET ORAL ONCE AS NEEDED
Status: CANCELLED | OUTPATIENT
Start: 2022-09-15

## 2022-09-15 RX ADMIN — BEBTELOVIMAB 175 MG: 87.5 INJECTION, SOLUTION INTRAVENOUS at 09:13

## 2022-09-30 ENCOUNTER — TELEPHONE (OUTPATIENT)
Dept: HEMATOLOGY ONCOLOGY | Facility: CLINIC | Age: 72
End: 2022-09-30

## 2022-09-30 DIAGNOSIS — C78.00 MALIGNANT NEOPLASM METASTATIC TO LUNG, UNSPECIFIED LATERALITY (HCC): ICD-10-CM

## 2022-09-30 RX ORDER — TIVOZANIB 1.34 MG/1
1.34 CAPSULE ORAL DAILY
Qty: 11 CAPSULE | Refills: 6 | Status: SHIPPED | OUTPATIENT
Start: 2022-09-30

## 2022-09-30 NOTE — TELEPHONE ENCOUNTER
Medication Refill     Who is Calling  Patient   Medication  Tivozanib HCl (Fotivda) 1 34 MG CAPS    How many pills left 4   Preferred Pharmacy / Address [facility], [address] [place] [address],[address]    Who is your Physician?  Dr Senia Camarena   Call back number [phone]   Relevant Information

## 2022-10-03 ENCOUNTER — TELEPHONE (OUTPATIENT)
Dept: OTHER | Facility: OTHER | Age: 72
End: 2022-10-03

## 2022-10-03 NOTE — TELEPHONE ENCOUNTER
Patient reported he called and was awaiting the nurse to call him back regarding if he should continue taking his Fotivda medication        Requesting call back at [phone]

## 2022-10-05 ENCOUNTER — CLINICAL SUPPORT (OUTPATIENT)
Dept: RADIATION ONCOLOGY | Facility: HOSPITAL | Age: 72
End: 2022-10-05
Attending: RADIOLOGY
Payer: MEDICARE

## 2022-10-05 VITALS
HEIGHT: 70 IN | TEMPERATURE: 97.6 F | SYSTOLIC BLOOD PRESSURE: 122 MMHG | DIASTOLIC BLOOD PRESSURE: 70 MMHG | OXYGEN SATURATION: 96 % | HEART RATE: 91 BPM | RESPIRATION RATE: 16 BRPM | WEIGHT: 259.6 LBS | BODY MASS INDEX: 37.16 KG/M2

## 2022-10-05 DIAGNOSIS — C79.51 SPINE METASTASIS (HCC): Primary | ICD-10-CM

## 2022-10-05 DIAGNOSIS — C79.51 BONE METASTASES (HCC): ICD-10-CM

## 2022-10-05 PROCEDURE — 99214 OFFICE O/P EST MOD 30 MIN: CPT | Performed by: RADIOLOGY

## 2022-10-05 PROCEDURE — 99211 OFF/OP EST MAY X REQ PHY/QHP: CPT | Performed by: RADIOLOGY

## 2022-10-05 NOTE — PROGRESS NOTES
Marbella Stovall 1950 is a 66 y o  male with a history of metastatic renal cell cancer, initially diagnosed in 2007  He developed metastatic disease in 2013  He remains on systemic therapy with Medical Oncology  He returns for routine follow up status post right ulnar radiation completed 5/31/2017, right posterior 7th rib completed 8/3/2017, Stereotactic radiation to T6 completed 10/27/2017, SBRT to T10 completed 7/27/2018 and SBRT to paraspinal mass at T4 completed 11/23/2018  Last seen for radiation follow up on 3/23/22  Follow up visit     6/3/22 Hem/Onc - Dr Neptali Jose  He has been on mutliple regimens with disease progression  Currently on tivozanib every other day, 3 weeks on followed by 1 week off  due to significant hypotension  Overdue to denosumab - recommended today  New symptomtom bilateral upper extremeity numbness could be due to fibula spine metastasis  CT scan of spine ordered - may need palliative radiation  CT scan of CAP in 2 months     6/9/22 CT spine cervical wo contrast  No discrete lytic or blastic osseous lesions identified within the cervical spine  No significant interval change since prior exam   Multilevel cervical spondylosis, as described above  7/27/22 CT CAP w contrast  Findings of disease progression as described above   Increased size of pulmonary metastasis  Increased size of retroperitoneal and left renal fossa metastatic disease  Increased size of mesenteric mass  Additional findings as above  8/5/22 Hem/Onc - Dr Neptali Jose  Recently on tivozanib resulting in disease progression  Does not have any tumor related symptoms  Offered: Second opinion [facility] with possible clinical trial  Pazopanib  Ipilimumab with nivolumab  Patient will consider option and and get back to provider  8/26/22 Patient requesting to stay on same treatment,  provider agreed with follow up CT CAP in 3 months        9/8/22 Symptom onset COVID   9/11/22 COVID positive  9/15/22 Bebtelovimab 175 mg       11/18/22 Infusion  11/29/22 CT CAP w contrast  12/8/22 Hem/Onc       Oncology History   Clear cell adenocarcinoma of kidney, left ([facility] [address] )   3/2/2018 Initial Diagnosis    Clear cell adenocarcinoma of kidney, left ([facility] [address] )     1/31/2020 - 4/22/2020 Chemotherapy    pembrolizumab (KEYTRUDA) 200 mg in sodium chloride 0 9 % 50 mL IVPB, 200 mg, Intravenous, Once, 4 of 9 cycles  Administration: 200 mg (1/31/2020), 200 mg (2/21/2020), 200 mg (3/13/2020), 200 mg (4/3/2020)      Chemotherapy    Cabozantinib (Cabometyx) 40 mg every other day      9/10/2021 - 2/25/2022 Chemotherapy    pembrolizumab (KEYTRUDA) IVPB, 400 mg, Intravenous, Once, 5 of 9 cycles  Administration: 400 mg (9/10/2021), 400 mg (10/22/2021), 400 mg (12/3/2021), 400 mg (1/14/2022), 400 mg (2/25/2022)     Bone metastases ([facility] [address] )   6/22/2016 Initial Diagnosis    Bone metastases ([facility] [address] )     3/8/2017 -  Chemotherapy    Axitinib 4 mg b i d        Denosumab 60 mg subcutaneously monthly initiated January 12, 2018 5/17/2017 - 5/31/2017 Radiation    palliative radiation therapy following ORIF of right ulna for metastatic renal cell carcinoma to 3000cGy     7/26/2017 - 8/3/2017 Radiation    palliative radiation therapy for metastatic renal carcinoma to the right posterior seventh rib with bone and soft tissue metastases to 2800cGy     10/18/2017 - 10/27/2017 Radiation    stereotactic radiation therapy to a T6 vertebral body metastasis to 3000cGy     7/18/2018 - 7/27/2018 Radiation    Stereotactic radiation to T10 metastasis to 3000 cGy in 5 fractions     11/14/2018 - 11/23/2018 Radiation    SBRT to paraspinal mass at T4 to 3000 cGy           1/31/2020 - 4/22/2020 Chemotherapy    pembrolizumab (KEYTRUDA) 200 mg in sodium chloride 0 9 % 50 mL IVPB, 200 mg, Intravenous, Once, 4 of 9 cycles  Administration: 200 mg (1/31/2020), 200 mg (2/21/2020), 200 mg (3/13/2020), 200 mg (4/3/2020)      Chemotherapy    Cabozantinib (Cabometyx) 40 mg every other day 9/10/2021 - 2/25/2022 Chemotherapy    pembrolizumab (KEYTRUDA) IVPB, 400 mg, Intravenous, Once, 5 of 9 cycles  Administration: 400 mg (9/10/2021), 400 mg (10/22/2021), 400 mg (12/3/2021), 400 mg (1/14/2022), 400 mg (2/25/2022)     Lung metastases ([facility] [address] )   3/2/2018 Initial Diagnosis    Lung metastases ([facility] [address] )     1/31/2020 - 4/22/2020 Chemotherapy    pembrolizumab (KEYTRUDA) 200 mg in sodium chloride 0 9 % 50 mL IVPB, 200 mg, Intravenous, Once, 4 of 9 cycles  Administration: 200 mg (1/31/2020), 200 mg (2/21/2020), 200 mg (3/13/2020), 200 mg (4/3/2020)      Chemotherapy    Cabozantinib (Cabometyx) 40 mg every other day      9/10/2021 - 2/25/2022 Chemotherapy    pembrolizumab (KEYTRUDA) IVPB, 400 mg, Intravenous, Once, 5 of 9 cycles  Administration: 400 mg (9/10/2021), 400 mg (10/22/2021), 400 mg (12/3/2021), 400 mg (1/14/2022), 400 mg (2/25/2022)     Spine metastasis ([facility] [address] )   7/11/2018 Initial Diagnosis    Spine metastasis ([facility] [address] )     1/31/2020 - 4/22/2020 Chemotherapy    pembrolizumab (KEYTRUDA) 200 mg in sodium chloride 0 9 % 50 mL IVPB, 200 mg, Intravenous, Once, 4 of 9 cycles  Administration: 200 mg (1/31/2020), 200 mg (2/21/2020), 200 mg (3/13/2020), 200 mg (4/3/2020)      Chemotherapy    Cabozantinib (Cabometyx) 40 mg every other day      9/10/2021 - 2/25/2022 Chemotherapy    pembrolizumab (KEYTRUDA) IVPB, 400 mg, Intravenous, Once, 5 of 9 cycles  Administration: 400 mg (9/10/2021), 400 mg (10/22/2021), 400 mg (12/3/2021), 400 mg (1/14/2022), 400 mg (2/25/2022)     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 ([facility] [address] ) (Resolved)   7/11/2018 Initial Diagnosis    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 ([facility] [address] ) (Resolved)     Metastatic renal cell carcinoma ([facility] [address] )   9/26/2018 Initial Diagnosis    Metastatic renal cell carcinoma (HCC)      Chemotherapy    Cabozantinib (Cabometyx) 40 mg every other day          Review of Systems:  Review of Systems   Constitutional: Positive for fatigue (tired all the time, especially on days he works, still working part time, 30 hours per week)  Negative for activity change, appetite change, chills, fever and unexpected weight change  HENT: Positive for postnasal drip (chronic)  Eyes: Negative  Wears glasses       Respiratory: Positive for shortness of breath (Occasional LAYNE)  Negative for cough (occasional cough, secondary to postnasal drip), chest tightness and wheezing  Cardiovascular: Negative  Negative for chest pain and leg swelling (left calf to foot)  Gastrointestinal: Positive for diarrhea (managed with Imodium or Lomotil)  Negative for abdominal pain, blood in stool, constipation, nausea and vomiting  Endocrine: Negative  Genitourinary: Negative for difficulty urinating, dysuria and hematuria  Nocturia x2   Musculoskeletal: Positive for back pain (constant lower back pain (around waistline), more bothersome depending on activities  ), gait problem (using cane for balance) and neck pain (neuropathy pain from left neck to shoulder, bilateral scapula areas (L>R))  Intermittent right sided rib pain, especially if he coughs alot     Skin: Negative  Allergic/Immunologic: Positive for environmental allergies  Neurological: Positive for numbness (finger tips /toes/bottom of feet)  Negative for dizziness, weakness (legs), light-headedness and headaches  Intermittent tingling both shoulders with radiation down both arms when laying down or leaning against something hard ( left side > right side)     Hematological: Negative  Psychiatric/Behavioral: Positive for sleep disturbance (nocturia)  Negative for dysphoric mood  The patient is not nervous/anxious          Clinical Trial: no    Teaching    Covid Vaccine Status: vaccinated    Health Maintenance   Topic Date Due    Medicare Annual Wellness Visit (AWV)  Never done    BMI: Followup Plan  Never done    Colorectal Cancer Screening  Never done    Fall Risk  01/30/2020  COVID-19 Vaccine (4 - Booster for Moderna series) 2022    Influenza Vaccine (1) 2022    Depression Screening  2023    BMI: Adult  2023    Hepatitis C Screening  Completed    Pneumococcal Vaccine: 65+ Years  Completed    HIB Vaccine  Aged Out    Hepatitis B Vaccine  Aged Out    IPV Vaccine  Aged Out    Hepatitis A Vaccine  Aged Out    Meningococcal ACWY Vaccine  Aged Out    HPV Vaccine  Aged Out     Patient Active Problem List   Diagnosis    Clear cell adenocarcinoma of kidney, left ([facility] [address] [facility]    Bone metastases ([facility] [address] [facility]    Lung metastases ([facility] [address] [facility]    Spine metastasis ([facility] [address] [facility]    Metastatic renal cell carcinoma ([facility] [address] [facility]    Hx of prostatectomy    History of prostate cancer    Azotemia    COVID-19     Past Medical History:   Diagnosis Date    Arthritis     Cancer ([facility] [address] )     prostate - mets to bone and lung    History of radiation therapy 2018    SBRT to T10 -2018    Hyperlipidemia     Hypertension      Past Surgical History:   Procedure Laterality Date    DISTAL ULNA EXCISION Right     excision of tumor and orif with cement and screws    JOINT REPLACEMENT Bilateral     tkr's    LUNG SURGERY Right     exc of nodules    NEPHRECTOMY Left      Family History   Problem Relation Age of Onset    No Known Problems Mother     No Known Problems Father      Social History     Socioeconomic History    Marital status:       Spouse name: Not on file    Number of children: 1    Years of education: 15    Highest education level: Not on file   Occupational History    Occupation: retired     Comment: P/T    Tobacco Use    Smoking status: Former Smoker     Packs/day: 1 00     Types: Cigarettes     Quit date: 3/28/2003     Years since quittin 5    Smokeless tobacco: Never Used   Vaping Use    Vaping Use: Never used   Substance and Sexual Activity    Alcohol use: Not Currently     Comment: occasional use    Drug use: No    Sexual activity: Not on file   Other Topics Concern    Not on file   Social History Narrative    Lives at home alone     Social Determinants of Health     Financial Resource Strain: Not on file   Food Insecurity: Not on file   Transportation Needs: Not on file   Physical Activity: Not on file   Stress: Not on file   Social Connections: Not on file   Intimate Partner Violence: Not on file   Housing Stability: Not on file       Current Outpatient Medications:     amLODIPine (NORVASC) 5 mg tablet, Take 5 mg by mouth daily  , Disp: , Rfl:     cholecalciferol (VITAMIN D3) 1,000 units tablet, Take 1,000 Units by mouth daily, Disp: , Rfl:     diphenhydrAMINE (BENADRYL) 25 mg tablet, Take 25 mg by mouth daily at bedtime as needed for itching, Disp: , Rfl:     diphenoxylate-atropine (LOMOTIL) 2 5-0 025 mg per tablet, Take 1 tablet by mouth 4 (four) times a day as needed for diarrhea, Disp: 30 tablet, Rfl: 1    gabapentin (NEURONTIN) 100 mg capsule, Take 300 mg by mouth 3 (three) times a day , Disp: , Rfl:     lisinopril-hydrochlorothiazide (PRINZIDE,ZESTORETIC) 20-12 5 MG per tablet, Take 1 tablet by mouth 2 (two) times a day  , Disp: , Rfl:     loperamide (IMODIUM) 2 mg capsule, Take 2 mg by mouth 4 (four) times a day as needed for diarrhea, Disp: , Rfl:     Multiple Vitamin (multivitamin) tablet, Take 1 tablet by mouth daily, Disp: , Rfl:     naproxen sodium (ALEVE) 220 MG tablet, Take 220 mg by mouth as needed for mild pain, Disp: , Rfl:     Tivozanib HCl (Fotivda) 1 34 MG CAPS, Take 1 34 mg by mouth in the morning (Patient taking differently: Take 1 34 mg by mouth every other day), Disp: 11 capsule, Rfl: 6    traMADol (ULTRAM) 50 mg tablet, Take 1 tablet (50 mg total) by mouth every 6 (six) hours as needed for moderate pain, Disp: 60 tablet, Rfl: 2    trolamine salicylate (ASPERCREME) 10 % cream, Apply 1 application topically as needed for muscle/joint pain, Disp: , Rfl:     al mag oxide-diphenhydramine-lidocaine viscous (MAGIC MOUTHWASH) 1:1:1 suspension, Swish and spit 10 mL every 4 (four) hours as needed for mouth pain or discomfort (Patient not taking: Reported on 10/5/2022), Disp: 250 mL, Rfl: 0    fluorouracil (EFUDEX) 5 % cream, , Disp: , Rfl:     LORazepam (ATIVAN) 0 5 mg tablet, Take 1 tablet (0 5 mg total) by mouth every 8 (eight) hours as needed for anxiety (Patient not taking: Reported on 8/11/2021), Disp: 2 tablet, Rfl: 0  No Known Allergies  Vitals:    10/05/22 1029   BP: 122/70   BP Location: Left arm   Patient Position: Sitting   Pulse: 91   Resp: 16   Temp: 97 6 °F (36 4 °C)   TempSrc: Temporal   SpO2: 96%   Weight: 118 kg (259 lb 9 6 oz)   Height: 5' 10" (1 778 m)      Pain Score:   8

## 2022-10-05 NOTE — PROGRESS NOTES
Follow-up - Radiation Oncology   Wendy Ortiz 1950 66 y o  male [id number]      History of Present Illness   Cancer Staging  No matching staging information was found for the patient  Interval History:  Wendy Ortiz 1950 is a 66 y o  male with a history of metastatic renal cell cancer, initially diagnosed in 2007  He developed metastatic disease in 2013  He remains on systemic therapy with Medical Oncology       He returns for routine follow up status post right ulnar radiation completed 5/31/2017, right posterior 7th rib completed 8/3/2017, Stereotactic radiation to T6 completed 10/27/2017, SBRT to T10 completed 7/27/2018 and SBRT to paraspinal mass at [facility] 11/23/2018       Last seen for radiation follow up on 3/23/22       Follow up visit      6/3/22 Hem/Onc - Dr Miki Meehan  He has been on mutliple regimens with disease progression  Currently on tivozanib every other day, 3 weeks on followed by 1 week off  due to significant hypotension  Overdue to denosumab - recommended today  New symptomtom bilateral upper extremeity numbness could be due to fibula spine metastasis  CT scan of spine ordered - may need palliative radiation  CT scan of CAP in 2 months      6/9/22 CT spine cervical wo contrast  No discrete lytic or blastic osseous lesions identified within the cervical spine   No significant interval change since prior exam   Multilevel cervical spondylosis, as described above         7/27/22 CT CAP w contrast  Findings of disease progression as described above   Increased size of pulmonary metastasis  Increased size of retroperitoneal and left renal fossa metastatic disease  Increased size of mesenteric mass    Additional findings as above      8/5/22 Hem/Onc - Dr Miki Meehan  Recently on tivozanib resulting in disease progression  Does not have any tumor related symptoms  Offered: Second opinion [facility] with possible clinical trial  Pazopanib  Ipilimumab with nivolumab  Patient will consider option and and get back to provider       8/26/22 Patient requesting to stay on same treatment,  provider agreed with follow up CT CAP in 3 months         9/8/22 Symptom onset COVID   9/11/22 COVID positive  9/15/22 Bebtelovimab 175 mg         11/18/22 Infusion  11/29/22 CT CAP w contrast  12/8/22 Hem/Onc       Historical Information   Oncology History   Clear cell adenocarcinoma of kidney, 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[address] )   3/2/2018 Initial Diagnosis    Clear cell adenocarcinoma of kidney, 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[address] )     1/31/2020 - 4/22/2020 Chemotherapy    pembrolizumab (KEYTRUDA) 200 mg in sodium chloride 0 9 % 50 mL IVPB, 200 mg, Intravenous, Once, 4 of 9 cycles  Administration: 200 mg (1/31/2020), 200 mg (2/21/2020), 200 mg (3/13/2020), 200 mg (4/3/2020)      Chemotherapy    Cabozantinib (Cabometyx) 40 mg every other day      9/10/2021 - 2/25/2022 Chemotherapy    pembrolizumab (KEYTRUDA) IVPB, 400 mg, Intravenous, Once, 5 of 9 cycles  Administration: 400 mg (9/10/2021), 400 mg (10/22/2021), 400 mg (12/3/2021), 400 mg (1/14/2022), 400 mg (2/25/2022)     Bone metastases ([address] )   6/22/2016 Initial Diagnosis    Bone metastases ([address] )     3/8/2017 -  Chemotherapy    Axitinib 4 mg b i d        Denosumab 60 mg subcutaneously monthly initiated January 12, 2018 5/17/2017 - 5/31/2017 Radiation    palliative radiation therapy following ORIF of right ulna for metastatic renal cell carcinoma to 3000cGy     7/26/2017 - 8/3/2017 Radiation    palliative radiation therapy for metastatic renal carcinoma to the right posterior seventh rib with bone and soft tissue metastases to 2800cGy     10/18/2017 - 10/27/2017 Radiation    stereotactic radiation therapy to a T6 vertebral body metastasis to 3000cGy     7/18/2018 - 7/27/2018 Radiation    Stereotactic radiation to T10 metastasis to 3000 cGy in 5 fractions     11/14/2018 - 11/23/2018 Radiation    SBRT to paraspinal mass at T4 to 3000 cGy           1/31/2020 - 4/22/2020 Chemotherapy    pembrolizumab (KEYTRUDA) 200 mg in sodium chloride 0 9 % 50 mL IVPB, 200 mg, Intravenous, Once, 4 of 9 cycles  Administration: 200 mg (1/31/2020), 200 mg (2/21/2020), 200 mg (3/13/2020), 200 mg (4/3/2020)      Chemotherapy    Cabozantinib (Cabometyx) 40 mg every other day      9/10/2021 - 2/25/2022 Chemotherapy    pembrolizumab (KEYTRUDA) IVPB, 400 mg, Intravenous, Once, 5 of 9 cycles  Administration: 400 mg (9/10/2021), 400 mg (10/22/2021), 400 mg (12/3/2021), 400 mg (1/14/2022), 400 mg (2/25/2022)     Lung metastases ([facility] )   3/2/2018 Initial Diagnosis    Lung metastases ([facility] )     1/31/2020 - 4/22/2020 Chemotherapy    pembrolizumab (KEYTRUDA) 200 mg in sodium chloride 0 9 % 50 mL IVPB, 200 mg, Intravenous, Once, 4 of 9 cycles  Administration: 200 mg (1/31/2020), 200 mg (2/21/2020), 200 mg (3/13/2020), 200 mg (4/3/2020)      Chemotherapy    Cabozantinib (Cabometyx) 40 mg every other day      9/10/2021 - 2/25/2022 Chemotherapy    pembrolizumab (KEYTRUDA) IVPB, 400 mg, Intravenous, Once, 5 of 9 cycles  Administration: 400 mg (9/10/2021), 400 mg (10/22/2021), 400 mg (12/3/2021), 400 mg (1/14/2022), 400 mg (2/25/2022)     Spine metastasis ([facility] )   7/11/2018 Initial Diagnosis    Spine metastasis ([facility] )     1/31/2020 - 4/22/2020 Chemotherapy    pembrolizumab (KEYTRUDA) 200 mg in sodium chloride 0 9 % 50 mL IVPB, 200 mg, Intravenous, Once, 4 of 9 cycles  Administration: 200 mg (1/31/2020), 200 mg (2/21/2020), 200 mg (3/13/2020), 200 mg (4/3/2020)      Chemotherapy    Cabozantinib (Cabometyx) 40 mg every other day      9/10/2021 - 2/25/2022 Chemotherapy    pembrolizumab (KEYTRUDA) IVPB, 400 mg, Intravenous, Once, 5 of 9 cycles  Administration: 400 mg (9/10/2021), 400 mg (10/22/2021), 400 mg (12/3/2021), 400 mg (1/14/2022), 400 mg (2/25/2022)     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 [facility]) (Resolved)   7/11/2018 Initial Diagnosis    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 [facility]) (Resolved)     Metastatic renal cell carcinoma ([facility] )   2018 Initial Diagnosis    Metastatic renal cell carcinoma (H[facility])      Chemotherapy    Cabozantinib (Cabometyx) 40 mg every other day          Past Medical History:   Diagnosis Date    Arthritis     [facility]     prostate - mets to bone and lung    History of radiation therapy 2018    SBRT to T10 -2018    Hyperlipidemia     Hypertension      Past Surgical History:   Procedure Laterality Date    DISTAL ULNA EXCISION Right     excision of tumor and orif with cement and screws    JOINT REPLACEMENT Bilateral     tkr's    LUNG SURGERY Right     exc of nodules    NEPHRECTOMY Left        Social History   Social History     Substance and Sexual Activity   Alcohol Use Not Currently    Comment: occasional use     Social History     Substance and Sexual Activity   Drug Use No     Social History     Tobacco Use   Smoking Status Former Smoker    Packs/day: 1 00    Types: Cigarettes    Quit date: 3/28/2003    Years since quittin 5   Smokeless Tobacco Never Used         Meds/Allergies     Current Outpatient Medications:     amLODIPine (NORVASC) 5 mg tablet, Take 5 mg by mouth daily  , Disp: , Rfl:     cholecalciferol (VITAMIN D3) 1,000 units tablet, Take 1,000 Units by mouth daily, Disp: , Rfl:     diphenhydrAMINE (BENADRYL) 25 mg tablet, Take 25 mg by mouth daily at bedtime as needed for itching, Disp: , Rfl:     diphenoxylate-atropine (LOMOTIL) 2 5-0 025 mg per tablet, Take 1 tablet by mouth 4 (four) times a day as needed for diarrhea, Disp: 30 tablet, Rfl: 1    gabapentin (NEURONTIN) 100 mg capsule, Take 300 mg by mouth 3 (three) times a day , Disp: , Rfl:     lisinopril-hydrochlorothiazide (PRINZIDE,ZESTORETIC) 20-12 5 MG per tablet, Take 1 tablet by mouth 2 (two) times a day  , Disp: , Rfl:     loperamide (IMODIUM) 2 mg capsule, Take 2 mg by mouth 4 (four) times a day as needed for diarrhea, Disp: , Rfl:     Multiple Vitamin (multivitamin) tablet, Take 1 tablet by mouth daily, Disp: , Rfl:     naproxen sodium (ALEVE) 220 MG tablet, Take 220 mg by mouth as needed for mild pain, Disp: , Rfl:     Tivozanib HCl (Fotivda) 1 34 MG CAPS, Take 1 34 mg by mouth in the morning (Patient taking differently: Take 1 34 mg by mouth every other day), Disp: 11 capsule, Rfl: 6    traMADol (ULTRAM) 50 mg tablet, Take 1 tablet (50 mg total) by mouth every 6 (six) hours as needed for moderate pain, Disp: 60 tablet, Rfl: 2    trolamine salicylate (ASPERCREME) 10 % cream, Apply 1 application topically as needed for muscle/joint pain, Disp: , Rfl:     al mag oxide-diphenhydramine-lidocaine viscous (MAGIC MOUTHWASH) 1:1:1 suspension, Swish and spit 10 mL every 4 (four) hours as needed for mouth pain or discomfort (Patient not taking: Reported on 10/5/2022), Disp: 250 mL, Rfl: 0    fluorouracil (EFUDEX) 5 % cream, , Disp: , Rfl:     LORazepam (ATIVAN) 0 5 mg tablet, Take 1 tablet (0 5 mg total) by mouth every 8 (eight) hours as needed for anxiety (Patient not taking: Reported on 8/11/2021), Disp: 2 tablet, Rfl: 0  No Known Allergies      Review of Systems   Constitutional: Positive for fatigue (tired all the time, especially on days he works, still working part time, 30 hours per week)  Negative for activity change, appetite change, chills, fever and unexpected weight change  HENT: Positive for postnasal drip (chronic)  Eyes: Negative  Wears glasses        Respiratory: Positive for shortness of breath (Occasional LAYNE)  Negative for cough (occasional cough, secondary to postnasal drip), chest tightness and wheezing  Cardiovascular: Negative  Negative for chest pain and leg swelling (left calf to foot)  Gastrointestinal: Positive for diarrhea (managed with Imodium or Lomotil)  Negative for abdominal pain, blood in stool, constipation, nausea and vomiting  Endocrine: Negative  Genitourinary: Negative for difficulty urinating, dysuria and hematuria  Nocturia x2   Musculoskeletal: Positive for back pain (constant lower back pain (around waistline), more bothersome depending on activities  ), gait problem (using cane for balance) and neck pain (neuropathy pain from left neck to shoulder, bilateral scapula areas (L>R))  Intermittent right sided rib pain, especially if he coughs alot     Skin: Negative  Allergic/Immunologic: Positive for environmental allergies  Neurological: Positive for numbness (finger tips /toes/bottom of feet)  Negative for dizziness, weakness (legs), light-headedness and headaches  Intermittent tingling both shoulders with radiation down both arms when laying down or leaning against something hard ( left side > right side)     Hematological: Negative  Psychiatric/Behavioral: Positive for sleep disturbance (nocturia)  Negative for dysphoric mood  The patient is not nervous/anxious      the    OBJECTIVE:   /70 (BP Location: Left arm, Patient Position: Sitting)   Pulse 91   Temp 97 6 °F (36 4 °C) (Temporal)   Resp 16   Ht 5' 10" (1 778 m)   Wt 118 kg (259 lb 9 6 oz)   SpO2 96%   BMI 37 25 kg/m²   Pain Assessment:  5  ECOG/[facility]/WHO: 2 - Symptomatic, <50% confined to bed    Physical Exam   He is ambulating with a cane  It conversing appropriately  No spinal tenderness to palpation  He is able to lift his arms independently  No discomfort in the groins bilaterally  No tenderness in the pelvic region to palpation        RESULTS    Lab Results: No results found for this or any previous visit (from the past 672 hour(s))  Imaging Studies:No results found  Assessment/Plan:  No orders of the defined types were placed in this encounter  Delmy Rucker is a 66y o  year old male who is 4-5 years post SBRT and radiation for metastatic renal cell carcinoma  His most recent imaging from July reveals progression of his pulmonary metastasis, retroperitoneal disease and mesenteric mass    We discussed that his pelvic lesion also is increased in size however he remains asymptomatic  He currently has elected to continue his current systemic regimen  Dr Lazarus Arita offered 2nd opinion at [facility] however he has not pursued this as of yet  As he remains relatively stable from a symptom standpoint I have not made any further follow-up appointments for him as his mainstay of therapy systemic  I am happy to see him in the future should the need arise  He is scheduled for follow-up imaging next month with follow-up with Dr Lazarus Arita thereafter  Cris Kras  10/5/2022,11:10 AM    Portions of the record may have been created with voice recognition software   Occasional wrong word or "sound a like" substitutions may have occurred due to the inherent limitations of voice recognition software   Read the chart carefully and recognize, using context, where substitutions have occurred

## 2022-10-21 ENCOUNTER — HOSPITAL ENCOUNTER (OUTPATIENT)
Dept: INFUSION CENTER | Facility: CLINIC | Age: 72
End: 2022-10-21

## 2022-10-31 ENCOUNTER — TELEPHONE (OUTPATIENT)
Dept: HEMATOLOGY ONCOLOGY | Facility: MEDICAL CENTER | Age: 72
End: 2022-10-31

## 2022-10-31 DIAGNOSIS — C78.00 MALIGNANT NEOPLASM METASTATIC TO LUNG, UNSPECIFIED LATERALITY (HCC): ICD-10-CM

## 2022-10-31 DIAGNOSIS — C79.51 BONE METASTASES (HCC): ICD-10-CM

## 2022-10-31 DIAGNOSIS — C64.2 CLEAR CELL ADENOCARCINOMA OF KIDNEY, LEFT (HCC): ICD-10-CM

## 2022-10-31 RX ORDER — TRAMADOL HYDROCHLORIDE 50 MG/1
50 TABLET ORAL EVERY 6 HOURS PRN
Qty: 60 TABLET | Refills: 0 | Status: SHIPPED | OUTPATIENT
Start: 2022-10-31

## 2022-10-31 NOTE — TELEPHONE ENCOUNTER
Responded to patient MyChart message to verify upcoming appt  Patient would like to change appt to a Friday or Monday  Next available was not for 2 weeks, appt changed to 12/19/2022    Will send to Dr Onur Jo MA to discuss with MD and make sure appt is ok with provider  Patient aware of plan

## 2022-11-07 DIAGNOSIS — C78.00 MALIGNANT NEOPLASM METASTATIC TO LUNG, UNSPECIFIED LATERALITY (HCC): Primary | ICD-10-CM

## 2022-11-11 ENCOUNTER — APPOINTMENT (OUTPATIENT)
Dept: LAB | Facility: MEDICAL CENTER | Age: 72
End: 2022-11-11

## 2022-11-11 DIAGNOSIS — C78.00 MALIGNANT NEOPLASM METASTATIC TO LUNG, UNSPECIFIED LATERALITY (HCC): ICD-10-CM

## 2022-11-11 LAB
ANION GAP SERPL CALCULATED.3IONS-SCNC: 4 MMOL/L (ref 4–13)
BUN SERPL-MCNC: 24 MG/DL (ref 5–25)
CALCIUM SERPL-MCNC: 9.8 MG/DL (ref 8.3–10.1)
CHLORIDE SERPL-SCNC: 107 MMOL/L (ref 96–108)
CO2 SERPL-SCNC: 29 MMOL/L (ref 21–32)
CREAT SERPL-MCNC: 1.18 MG/DL (ref 0.6–1.3)
GFR SERPL CREATININE-BSD FRML MDRD: 60 ML/MIN/1.73SQ M
GLUCOSE P FAST SERPL-MCNC: 79 MG/DL (ref 65–99)
POTASSIUM SERPL-SCNC: 4.2 MMOL/L (ref 3.5–5.3)
SODIUM SERPL-SCNC: 140 MMOL/L (ref 135–147)

## 2022-11-14 ENCOUNTER — HOSPITAL ENCOUNTER (OUTPATIENT)
Dept: CT IMAGING | Facility: HOSPITAL | Age: 72
Discharge: HOME | End: 2022-11-14
Attending: INTERNAL MEDICINE

## 2022-11-14 DIAGNOSIS — C64.2 CLEAR CELL ADENOCARCINOMA OF KIDNEY, LEFT (HCC): ICD-10-CM

## 2022-11-14 DIAGNOSIS — C79.51 BONE METASTASES (HCC): ICD-10-CM

## 2022-11-14 DIAGNOSIS — C78.00 MALIGNANT NEOPLASM METASTATIC TO LUNG, UNSPECIFIED LATERALITY (HCC): ICD-10-CM

## 2022-11-14 RX ADMIN — IOHEXOL 100 ML: 350 INJECTION, SOLUTION INTRAVENOUS at 19:30

## 2022-11-18 ENCOUNTER — TELEPHONE (OUTPATIENT)
Dept: HEMATOLOGY ONCOLOGY | Facility: CLINIC | Age: 72
End: 2022-11-18

## 2022-11-18 ENCOUNTER — HOSPITAL ENCOUNTER (OUTPATIENT)
Dept: INFUSION CENTER | Facility: CLINIC | Age: 72
End: 2022-11-18

## 2022-11-18 VITALS — TEMPERATURE: 96.8 F

## 2022-11-18 DIAGNOSIS — C79.51 BONE METASTASES (HCC): Primary | ICD-10-CM

## 2022-11-18 RX ADMIN — DENOSUMAB 120 MG: 120 INJECTION SUBCUTANEOUS at 09:22

## 2022-11-18 NOTE — PROGRESS NOTES
Patient denied dental work  Ca 9 8  Patient tolerated his xgeva injection in his left arm well without adverse reaction  Patient is aware of his next appointment

## 2022-11-18 NOTE — TELEPHONE ENCOUNTER
Significant findings on CT CAP    IMPRESSION:     Significant worsening of metastatic disease including lung metastases, adenopathy osseous metastases

## 2022-12-12 DIAGNOSIS — C78.00 MALIGNANT NEOPLASM METASTATIC TO LUNG, UNSPECIFIED LATERALITY (HCC): ICD-10-CM

## 2022-12-12 DIAGNOSIS — C79.51 BONE METASTASES (HCC): ICD-10-CM

## 2022-12-12 DIAGNOSIS — C64.2 CLEAR CELL ADENOCARCINOMA OF KIDNEY, LEFT (HCC): ICD-10-CM

## 2022-12-12 RX ORDER — TRAMADOL HYDROCHLORIDE 50 MG/1
50 TABLET ORAL EVERY 6 HOURS PRN
Qty: 60 TABLET | Refills: 0 | Status: SHIPPED | OUTPATIENT
Start: 2022-12-12

## 2022-12-12 RX ORDER — TIVOZANIB 1.34 MG/1
1.34 CAPSULE ORAL DAILY
Qty: 11 CAPSULE | Refills: 0 | Status: SHIPPED | OUTPATIENT
Start: 2022-12-12 | End: 2022-12-19 | Stop reason: ALTCHOICE

## 2022-12-19 ENCOUNTER — OFFICE VISIT (OUTPATIENT)
Dept: HEMATOLOGY ONCOLOGY | Facility: CLINIC | Age: 72
End: 2022-12-19

## 2022-12-19 ENCOUNTER — TELEPHONE (OUTPATIENT)
Dept: SURGICAL ONCOLOGY | Facility: CLINIC | Age: 72
End: 2022-12-19

## 2022-12-19 VITALS
BODY MASS INDEX: 36.22 KG/M2 | WEIGHT: 253 LBS | SYSTOLIC BLOOD PRESSURE: 120 MMHG | TEMPERATURE: 97.2 F | OXYGEN SATURATION: 92 % | DIASTOLIC BLOOD PRESSURE: 64 MMHG | HEART RATE: 100 BPM | RESPIRATION RATE: 18 BRPM | HEIGHT: 70 IN

## 2022-12-19 DIAGNOSIS — C78.00 MALIGNANT NEOPLASM METASTATIC TO LUNG, UNSPECIFIED LATERALITY (HCC): ICD-10-CM

## 2022-12-19 DIAGNOSIS — C79.51 BONE METASTASES (HCC): ICD-10-CM

## 2022-12-19 DIAGNOSIS — T45.1X5A CHEMOTHERAPY INDUCED DIARRHEA: ICD-10-CM

## 2022-12-19 DIAGNOSIS — K52.1 CHEMOTHERAPY INDUCED DIARRHEA: ICD-10-CM

## 2022-12-19 DIAGNOSIS — C64.2 CLEAR CELL ADENOCARCINOMA OF KIDNEY, LEFT (HCC): ICD-10-CM

## 2022-12-19 DIAGNOSIS — C64.9 METASTATIC RENAL CELL CARCINOMA, UNSPECIFIED LATERALITY (HCC): Primary | ICD-10-CM

## 2022-12-19 DIAGNOSIS — C79.51 SPINE METASTASIS (HCC): ICD-10-CM

## 2022-12-19 RX ORDER — SODIUM CHLORIDE 9 MG/ML
20 INJECTION, SOLUTION INTRAVENOUS ONCE
Status: CANCELLED | OUTPATIENT
Start: 2022-12-30

## 2022-12-19 RX ORDER — SODIUM CHLORIDE 9 MG/ML
20 INJECTION, SOLUTION INTRAVENOUS ONCE
OUTPATIENT
Start: 2023-01-27

## 2022-12-19 RX ORDER — SODIUM CHLORIDE 9 MG/ML
20 INJECTION, SOLUTION INTRAVENOUS ONCE
OUTPATIENT
Start: 2023-02-24

## 2022-12-19 RX ORDER — SODIUM CHLORIDE 9 MG/ML
20 INJECTION, SOLUTION INTRAVENOUS ONCE
OUTPATIENT
Start: 2023-03-24

## 2022-12-19 RX ORDER — DIPHENOXYLATE HYDROCHLORIDE AND ATROPINE SULFATE 2.5; .025 MG/1; MG/1
1 TABLET ORAL 4 TIMES DAILY PRN
Qty: 30 TABLET | Refills: 0 | Status: SHIPPED | OUTPATIENT
Start: 2022-12-19 | End: 2022-12-30 | Stop reason: SDUPTHER

## 2022-12-19 NOTE — TELEPHONE ENCOUNTER
Please schedule treatment on Friday mornings at [address] [place] [address]          Thanks!

## 2022-12-19 NOTE — PROGRESS NOTES
Hematology / Oncology Outpatient Follow Up Note    Yamilet Bailey 67 y o  male :1950 [facility]:[id number]         Date:  2022    Assessment / Plan:  A 59-year-old gentleman who was diagnosed with localized renal cell carcinoma in 2007  He developed metastatic disease in   Since then, he has been on extensive treatment with multiple line of anti VEGF TKI as well as checkpoint inhibitors  Most recently, he has been on tivozanib resulting in progressive disease, in 2022  He wished to stay on tivozanib  Based on the recent CT scan, she has further progressive disease in her lung and abdominal lymph nodes metastasis  He is to have quite minimal to moderate any symptom as well as reasonable performance status  We discussed the further treatment options as follows  1   Possible clinical trial at [facility] which he declined    2  Pazopanib  3   Nivolumab monotherapy  After disease diagnosis discussion, he wished to try nivolumab monotherapy which I am going to set up for the first treatment in 2022  Side effect of nivolumab was thoroughly discussed, including but not limited to immune mediated organ toxicities  He understood and wished to proceed  I will see him again in 2 months for routine follow-up  I may repeat a CT scan in 2023  All the patient and his son and daughter-in-law's questions were answered to their satisfaction's           Subjective:      HPI:             Interval History:  A 59-year-old gentleman who has history of left nephrectomy for renal cell carcinoma in 2007  He was well until  when he was found to have lung metastasis   He was watched until 2015 when he started sunitinib until 2016   Since then, he has been on axitinib which he is still on   In 2019, CT scan showed progression of tumor in the renal bed as well as some mixed response was lung metastasis   Therefore, Dr Ricardo Levels at added pembrolizumab   He had no immune related toxicity with pembrolizumab   However, he had progressive disease in May 2021 his systemic therapy was changed to cabozantinib   Because of the initial toxicity, he was on cabozantinib 40 mg every other day with excellent tolerance    He had initial partial response on cabozantinib  However, he had progressive disease with lung metastasis as well as intra-abdominal lymphadenopathy in August 2021  Therefore, systemic therapy was changed to lenvatinib with pembrolizumab, resulting in partial response  Unfortunately, he had another progressive disease in late March 2022  Therefore, his systemic therapy was changed to tivozanib, resulting in progressive disease in August 2022  We discussed several treatment options, including [facility], pazopanib, nivolumab with ipilimumab  He wished to continue with tivozanib  He presented today for routine follow-up  Unfortunately, his repeated CT scan in November 2022 showed further progressive disease  He presents today with his son and daughter-in-law to discuss further management  He has chronic low back pain  Otherwise, he has minimal to no related symptoms  He has 10 pound weight loss  He denied any respiratory symptoms    His performance status is 1 out of 4 on ECOG scale      Objective:      Primary Diagnosis:     1  Localized renal cell carcinoma T1 NX M0 grade 2-3 diagnosed in August 2007      2  Metastatic renal cell carcinoma, diagnosed in 2013       Cancer Staging:  Cancer Staging  No matching staging information was found for the patient         Previous Hematologic/ Oncologic Treatment:      1  Sunitinib from March 2015 through December 2016    2  Axitinib monotherapy from December 2016 through November 2019    3  Axitinib with pembrolizumab from November 2019 through April 2020    4   Cabozantinib from May 2020 through August 2021   5  Lenvatinib with pembrolizumab from September 2021 through March 2022   6  Tivozanib from April 2022 through December 2022       Current Hematologic/ Oncologic Treatment:       Nivolumab to be started in late December 2022      Disease Status:      Progressive disease on tivozanib      Test Results:     Pathology:           Radiology:     CT scan of chest abdomen pelvis in November 2022 showed further progression of lung and abdominal lymph nodes metastatic disease       Laboratory:     See below   Normal TSH      Physical Exam:        General Appearance:    Alert, oriented          Eyes:    PERRL   Ears:    Normal external ear canals, both ears   Nose:   Nares normal, septum midline   Throat:   Mucosa moist  Pharynx without injection  Neck:   Supple         Lungs:     Clear to auscultation bilaterally   Chest Wall:    No tenderness or deformity    Heart:    Regular rate and rhythm         Abdomen:     Soft, non-tender, bowel sounds +, no organomegaly               Extremities:   Extremities no cyanosis or edema         Skin:   no rash or icterus  Lymph nodes:   Cervical, supraclavicular, and axillary nodes normal   Neurologic:   CNII-XII intact, normal strength, sensation and reflexes     Throughout             Breast exam:   NA           ROS: Review of Systems   All other systems reviewed and are negative  Imaging: CT chest abdomen pelvis w contrast    Result Date: 8/2/2022  Narrative: CT CHEST, ABDOMEN AND PELVIS WITH IV CONTRAST INDICATION:   C64 2: Malignant neoplasm of left kidney, except renal pelvis C79 51: Secondary malignant neoplasm of bone C78 00: Secondary malignant neoplasm of unspecified lung  History of bone metastasis  Left renal carcinoma  Prostate cancer  Prostatectomy  Lung metastasis  Spine metastasis  Radiation therapy SBRT to T10  Left nephrectomy  Right lung surgery  COMPARISON:  CT chest abdomen pelvis 03/17/2022  TECHNIQUE: CT examination of the chest, abdomen and pelvis was performed   Axial, sagittal, and coronal 2D reformatted images were created from the source data and submitted for interpretation  Radiation dose length product (DLP) for this visit:  1875 mGy-cm   This examination, like all CT scans performed in the [facility], was performed utilizing techniques to minimize radiation dose exposure, including the use of iterative reconstruction and automated exposure control  IV Contrast:  75 mL of iohexol (OMNIPAQUE) Enteric Contrast: Enteric contrast was administered  FINDINGS: CHEST LUNGS:  8mm nodular density along the right lateral tracheal wall 3/36, new from prior may represent small amounts of mucus  Mild emphysema  Probable scarring in the superior posterior right upper lobe with associated bronchiolectasis without significant change from prior  2 mm nodule right upper lobe 3/35, in retrospect this was punctate  2 mm nodule right middle lobe 3/61, also punctate in retrospect on prior examination  26 x 13 mm pleural-based nodule in the right middle lobe 3/80, previously 10 x 21 mm  17 x 15 mm peripherally calcified nodule in the right lower lobe 3/68, previously 13 x 11 mm  Subpleural consolidation in the right lower lobe subjacent to bony metastasis redemonstrated  6 mm left upper lobe nodule 3/17, previously 8 mm  5 mm subpleural nodule left upper lobe 3/20, new from prior  3 mm left upper lobe nodule, 3/24, also new  increased size of additional left lung nodules  15 x 12 mm left upper lobe nodule 3/43 previously 15 x 10 mm  20 x 11 mm subpleural nodule left lower lobe 3/62, previously 17 x 10 mm  PLEURA:  Unremarkable  HEART/GREAT VESSELS: Thoracic aortic, annular, and coronary artery calcification  No thoracic aortic aneurysm  There is enlargement of the central pulmonary arterial tree suggesting some element of pulmonary artery hypertension  MEDIASTINUM AND CHERRIE:  Unremarkable  CHEST WALL AND LOWER NECK:    Stable multinodular thyroid gland  ABDOMEN LIVER/BILIARY TREE:  Unremarkable  GALLBLADDER:  No calcified gallstones  No pericholecystic inflammatory change  SPLEEN:  Unremarkable  PANCREAS:  Unremarkable  ADRENAL GLANDS:  31 x 40 mm left adrenal mass is stable  KIDNEYS/URETERS:  Large right likely minimally complicated right renal cyst   No right hydronephrosis  Status post left nephrectomy  Increased size and number of soft tissue density in the left renal fossa may represent lymphadenopathy or localized recurrence, for example 23 x 19 mm soft tissue density, series 2 image 71 previously 14 x 13 mm  Curvilinear soft tissue density measuring 32 x 12 mm series 2 image 68, new from prior  STOMACH AND BOWEL:  Small lipoma in the duodenal bulb  Colonic diverticulosis, without findings for diverticulitis  APPENDIX:  No findings to suggest appendicitis  ABDOMINOPELVIC CAVITY:  38 x 35 mm centrally calcified mass in the left retroperitoneum 2/78 previously 27 x 26 mm  Ill-defined soft tissue density mass right mesentery maximum axial measurements of 10 6 x 6 0 cm, previously 9 5 x 5 5  Retroperitoneal lymphadenopathy redemonstrated, gastrohepatic and celiac axis lymphadenopathy, increased in size from prior, 37 x 20 mm previously 31 x 19 mm and 53 x 22 mm previously 49 x 22 mm  VESSELS:  Atherosclerotic changes are present  No evidence of aneurysm  PELVIS REPRODUCTIVE ORGANS:  Absent  URINARY BLADDER:  Collapsed ABDOMINAL WALL/INGUINAL REGIONS:  Postsurgical changes anteriorly  Small lipoma in the right external oblique  Bilateral fat-containing inguinal hernia  There are incisional hernias  OSSEOUS STRUCTURES:  Osseous metastasis redemonstrated, most notably involving right ribs 9, lytic expansile lesion with associated soft tissue component and similar but more significant involving right rib 7 posteriorly  Expansile metastatic lesion involving the right ilium with increased soft tissue component compared to prior with greatest axial measurement of 76 mm previously 68 mm    Extension along the psoas muscle  Metastatic lesions involving pelvic bones with soft tissue component involving  the left ilium adjacent to sacroiliac joint with greatest axial measurements of 55 mm previously 48 mm  Postsurgical changes of the lumbar spine  Additional vertebral body metastasis do not appear significantly changed from prior  Osseous and soft tissue component metastasis involving the left scapula  Impression: Findings of disease progression as described above   Increased size of pulmonary metastasis  Increased size of retroperitoneal and left renal fossa metastatic disease  Increased size of mesenteric mass  Additional findings as above  The study was marked in EPIC for significant notification  Workstation performed: [id number]         Labs:   Lab Results   Component Value Date    WBC 7 61 07/25/2022    HGB 15 5 07/25/2022    HCT 52 4 (H) 07/25/2022    MCV 91 07/25/2022     07/25/2022     Lab Results   Component Value Date     12/10/2015    K 4 2 11/11/2022     11/11/2022    CO2 29 11/11/2022    ANIONGAP 7 12/10/2015    BUN 24 11/11/2022    CREATININE 1 18 11/11/2022    GLUCOSE 102 12/10/2015    GLUF 79 11/11/2022    CALCIUM 9 8 11/11/2022    CORRECTEDCA 10 7 (H) 07/25/2022    AST 20 07/25/2022    ALT 12 07/25/2022    ALKPHOS 71 07/25/2022    PROT 6 9 12/10/2015    BILITOT 0 71 12/10/2015    EGFR 60 11/11/2022         Lab Results   Component Value Date     01/29/2020          Lab Results   Component Value Date    PSA <0 1 09/23/2019    PSA <0 1 12/22/2017    PSA <0 1 12/10/2015         Current Medications: Reviewed  Allergies: Reviewed  PMH/FH/SH:  Reviewed      Vital Sign:    Body surface area is 2 31 meters squared      Wt Readings from Last 3 Encounters:   12/19/22 115 kg (253 lb)   10/05/22 118 kg (259 lb 9 6 oz)   08/05/22 120 kg (265 lb)        Temp Readings from Last 3 Encounters:   12/19/22 (!) 97 2 °F (36 2 °C) (Tympanic)   11/18/22 (!) 96 8 °F (36 °C) (Temporal) 10/05/22 97 6 °F (36 4 °C) (Temporal)        BP Readings from Last 3 Encounters:   12/19/22 120/64   10/05/22 122/70   09/15/22 114/68         Pulse Readings from Last 3 Encounters:   12/19/22 100   10/05/22 91   09/15/22 82     @LASTSAO2(3)@

## 2022-12-20 DIAGNOSIS — C78.00 MALIGNANT NEOPLASM METASTATIC TO LUNG, UNSPECIFIED LATERALITY (HCC): ICD-10-CM

## 2022-12-20 DIAGNOSIS — C64.2 CLEAR CELL ADENOCARCINOMA OF KIDNEY, LEFT (HCC): ICD-10-CM

## 2022-12-20 DIAGNOSIS — C79.51 BONE METASTASES (HCC): ICD-10-CM

## 2022-12-20 RX ORDER — TRAMADOL HYDROCHLORIDE 50 MG/1
50 TABLET ORAL EVERY 6 HOURS PRN
Qty: 60 TABLET | Refills: 0 | OUTPATIENT
Start: 2022-12-20

## 2022-12-28 ENCOUNTER — APPOINTMENT (OUTPATIENT)
Dept: LAB | Facility: MEDICAL CENTER | Age: 72
End: 2022-12-28

## 2022-12-28 DIAGNOSIS — C79.51 BONE METASTASES (HCC): ICD-10-CM

## 2022-12-28 DIAGNOSIS — C79.51 SPINE METASTASIS (HCC): ICD-10-CM

## 2022-12-28 DIAGNOSIS — C78.00 MALIGNANT NEOPLASM METASTATIC TO LUNG, UNSPECIFIED LATERALITY (HCC): ICD-10-CM

## 2022-12-28 DIAGNOSIS — C64.2 CLEAR CELL ADENOCARCINOMA OF KIDNEY, LEFT (HCC): ICD-10-CM

## 2022-12-28 LAB
ALBUMIN SERPL BCP-MCNC: 2.6 G/DL (ref 3.5–5)
ALP SERPL-CCNC: 55 U/L (ref 46–116)
ALT SERPL W P-5'-P-CCNC: 11 U/L (ref 12–78)
ANION GAP SERPL CALCULATED.3IONS-SCNC: 3 MMOL/L (ref 4–13)
AST SERPL W P-5'-P-CCNC: 18 U/L (ref 5–45)
BASOPHILS # BLD AUTO: 0.01 THOUSANDS/ÂΜL (ref 0–0.1)
BASOPHILS NFR BLD AUTO: 0 % (ref 0–1)
BILIRUB SERPL-MCNC: 0.55 MG/DL (ref 0.2–1)
BUN SERPL-MCNC: 26 MG/DL (ref 5–25)
CALCIUM ALBUM COR SERPL-MCNC: 10 MG/DL (ref 8.3–10.1)
CALCIUM SERPL-MCNC: 8.9 MG/DL (ref 8.3–10.1)
CHLORIDE SERPL-SCNC: 114 MMOL/L (ref 96–108)
CO2 SERPL-SCNC: 26 MMOL/L (ref 21–32)
CREAT SERPL-MCNC: 1.33 MG/DL (ref 0.6–1.3)
EOSINOPHIL # BLD AUTO: 0.16 THOUSAND/ÂΜL (ref 0–0.61)
EOSINOPHIL NFR BLD AUTO: 2 % (ref 0–6)
ERYTHROCYTE [DISTWIDTH] IN BLOOD BY AUTOMATED COUNT: 17.3 % (ref 11.6–15.1)
GFR SERPL CREATININE-BSD FRML MDRD: 52 ML/MIN/1.73SQ M
GLUCOSE SERPL-MCNC: 99 MG/DL (ref 65–140)
HCT VFR BLD AUTO: 41.1 % (ref 36.5–49.3)
HGB BLD-MCNC: 11.6 G/DL (ref 12–17)
IMM GRANULOCYTES # BLD AUTO: 0.01 THOUSAND/UL (ref 0–0.2)
IMM GRANULOCYTES NFR BLD AUTO: 0 % (ref 0–2)
LYMPHOCYTES # BLD AUTO: 2.3 THOUSANDS/ÂΜL (ref 0.6–4.47)
LYMPHOCYTES NFR BLD AUTO: 33 % (ref 14–44)
MCH RBC QN AUTO: 27.2 PG (ref 26.8–34.3)
MCHC RBC AUTO-ENTMCNC: 28.2 G/DL (ref 31.4–37.4)
MCV RBC AUTO: 96 FL (ref 82–98)
MONOCYTES # BLD AUTO: 0.37 THOUSAND/ÂΜL (ref 0.17–1.22)
MONOCYTES NFR BLD AUTO: 5 % (ref 4–12)
NEUTROPHILS # BLD AUTO: 4.11 THOUSANDS/ÂΜL (ref 1.85–7.62)
NEUTS SEG NFR BLD AUTO: 60 % (ref 43–75)
NRBC BLD AUTO-RTO: 0 /100 WBCS
PLATELET # BLD AUTO: 229 THOUSANDS/UL (ref 149–390)
PMV BLD AUTO: 10.8 FL (ref 8.9–12.7)
POTASSIUM SERPL-SCNC: 4.3 MMOL/L (ref 3.5–5.3)
PROT SERPL-MCNC: 6.3 G/DL (ref 6.4–8.4)
RBC # BLD AUTO: 4.27 MILLION/UL (ref 3.88–5.62)
SODIUM SERPL-SCNC: 143 MMOL/L (ref 135–147)
T3FREE SERPL-MCNC: 1.71 PG/ML (ref 2.3–4.2)
TSH SERPL DL<=0.05 MIU/L-ACNC: 0.91 UIU/ML (ref 0.45–4.5)
WBC # BLD AUTO: 6.96 THOUSAND/UL (ref 4.31–10.16)

## 2022-12-30 ENCOUNTER — HOSPITAL ENCOUNTER (OUTPATIENT)
Dept: INFUSION CENTER | Facility: CLINIC | Age: 72
End: 2022-12-30

## 2022-12-30 VITALS
WEIGHT: 265.65 LBS | DIASTOLIC BLOOD PRESSURE: 74 MMHG | RESPIRATION RATE: 18 BRPM | HEART RATE: 97 BPM | BODY MASS INDEX: 38.03 KG/M2 | TEMPERATURE: 98 F | SYSTOLIC BLOOD PRESSURE: 114 MMHG | HEIGHT: 70 IN

## 2022-12-30 DIAGNOSIS — T45.1X5A CHEMOTHERAPY INDUCED DIARRHEA: ICD-10-CM

## 2022-12-30 DIAGNOSIS — C79.51 SPINE METASTASIS (HCC): ICD-10-CM

## 2022-12-30 DIAGNOSIS — C79.51 BONE METASTASES (HCC): Primary | ICD-10-CM

## 2022-12-30 DIAGNOSIS — K52.1 CHEMOTHERAPY INDUCED DIARRHEA: ICD-10-CM

## 2022-12-30 DIAGNOSIS — C78.00 MALIGNANT NEOPLASM METASTATIC TO LUNG, UNSPECIFIED LATERALITY (HCC): ICD-10-CM

## 2022-12-30 DIAGNOSIS — C64.2 CLEAR CELL ADENOCARCINOMA OF KIDNEY, LEFT (HCC): ICD-10-CM

## 2022-12-30 RX ORDER — DIPHENOXYLATE HYDROCHLORIDE AND ATROPINE SULFATE 2.5; .025 MG/1; MG/1
1 TABLET ORAL 4 TIMES DAILY PRN
Qty: 30 TABLET | Refills: 0 | Status: SHIPPED | OUTPATIENT
Start: 2022-12-30

## 2022-12-30 RX ORDER — SODIUM CHLORIDE 9 MG/ML
20 INJECTION, SOLUTION INTRAVENOUS ONCE
Status: COMPLETED | OUTPATIENT
Start: 2022-12-30 | End: 2022-12-30

## 2022-12-30 RX ADMIN — SODIUM CHLORIDE 480 MG: 9 INJECTION, SOLUTION INTRAVENOUS at 09:28

## 2022-12-30 RX ADMIN — SODIUM CHLORIDE 20 ML/HR: 0.9 INJECTION, SOLUTION INTRAVENOUS at 09:08

## 2023-01-01 ENCOUNTER — HOME CARE VISIT (OUTPATIENT)
Dept: HOME HOSPICE | Facility: HOSPICE | Age: 73
End: 2023-01-01

## 2023-01-01 ENCOUNTER — HOSPICE ADMISSION (OUTPATIENT)
Dept: HOME HOSPICE | Facility: HOSPICE | Age: 73
End: 2023-01-01

## 2023-01-01 ENCOUNTER — TELEPHONE (OUTPATIENT)
Dept: NEUROLOGY | Facility: CLINIC | Age: 73
End: 2023-01-01

## 2023-01-01 ENCOUNTER — HOME CARE VISIT (OUTPATIENT)
Dept: HOME HEALTH SERVICES | Facility: HOME HEALTHCARE | Age: 73
End: 2023-01-01

## 2023-01-01 VITALS — RESPIRATION RATE: 22 BRPM | HEART RATE: 94 BPM | SYSTOLIC BLOOD PRESSURE: 110 MMHG | DIASTOLIC BLOOD PRESSURE: 55 MMHG

## 2023-01-01 NOTE — PATIENT INSTRUCTIONS
The patient and his son Mat Araujo who was with him in the office today are aware to seek medical attention for cough, shortness of breath, nosebleeds, easy bruising, reduced appetite, loose bowel movements, recurrence of posterior neck pain, progressive right mid posterior chest pain, paresthesias of the upper extremities, difficulty with ambulation, easy fatigue, or if other new problems arise    This note was copied from the mother's chart.  Mother has no concerns with pumping at this time. Breastfeeding discharge education performed.     Reviewed proper usage of breast pump and to adjust suction according to comfort level. Reviewed with mother frequency and duration of pumping in order to promote and maintain full milk supply. Hands on pumping technique reviewed. Instructed mother on cleaning of breast pump parts. Reviewed proper milk handling, collection, storage, and transportation. Voices understanding.     Mother reports that she does not have an electric breast pump at home. Offered rental pump, mother declines. She is awaiting arrival of an electric breast pump through her insurance provider. Education provided on how to use and care for manual breast pump until electric pump arrives.    Written instructions have been provided and were reviewed at this time. Hand expression reviewed, mother able to return demonstrate. Lactation discharge booklet reviewed.  Mother is aware of warm line, outpatient consultations, and community resources. Encouraged mother to contact lactation with any questions, concerns, or problems. Contact numbers provided, and mother verbalizes understanding.     Instruct the mother to:  Sit upright and lean forward if possible.  Apply warm, wet baby blanket/towel over breasts for a few minutes followed by gentle breast massage.  Form a C with her hand and place it about 1 inch back from the areola with the nipple centered between her thumb and index finger.  Press, compress, relax :  apply pressure in an inward direction toward the breast without stretching the tissue and then compress the breast tissue between her  fingers for a few minutes.  Rotate placement of fingers on the breasts to facilitate emptying.  Collect expressed colostrum/ human milk with a spoon and feed immediately to the baby or place it directly into a sterile storage container for later use.  If stored for  later use, place the babys breastmilk label (with the date and time of collection and the names of meds she is taking) on  the container.  Place the container  immediately  into the breastmilk refrigerator or freezer for later use.

## 2023-01-03 ENCOUNTER — TELEPHONE (OUTPATIENT)
Dept: HEMATOLOGY ONCOLOGY | Facility: CLINIC | Age: 73
End: 2023-01-03

## 2023-01-03 DIAGNOSIS — C78.00 MALIGNANT NEOPLASM METASTATIC TO LUNG, UNSPECIFIED LATERALITY (HCC): ICD-10-CM

## 2023-01-03 DIAGNOSIS — C64.2 CLEAR CELL ADENOCARCINOMA OF KIDNEY, LEFT (HCC): ICD-10-CM

## 2023-01-03 DIAGNOSIS — C79.51 BONE METASTASES (HCC): ICD-10-CM

## 2023-01-03 RX ORDER — TRAMADOL HYDROCHLORIDE 50 MG/1
50 TABLET ORAL EVERY 6 HOURS PRN
Qty: 60 TABLET | Refills: 0 | Status: ON HOLD | OUTPATIENT
Start: 2023-01-03

## 2023-01-03 NOTE — TELEPHONE ENCOUNTER
MEDICATION REFILL ROUTE TO RN    Who is Calling  Patient   Medication traMADol (ULTRAM) 50 mg tablet       How many pills left 0   Preferred Pharmacy / Address [facility], [address] [facility]   Who is your Physician?  Dr  Lajean Baston   Call back number [phone]   Relevant Information  Patient has been waiting over a month for his medication

## 2023-01-09 ENCOUNTER — TELEPHONE (OUTPATIENT)
Dept: HEMATOLOGY ONCOLOGY | Facility: CLINIC | Age: 73
End: 2023-01-09

## 2023-01-09 NOTE — TELEPHONE ENCOUNTER
CALL RETURN FORM   Reason for patient call? Patient calling, tremors entire body since infusion   Patient's primary oncologist?  Ross Bernheim   Name of person the patient was calling for? Brandee   Any additional information to add, if applicable? [phone]   Informed patient that the message will be forwarded to the team and someone will get back to them as soon as possible    Did you relay this information to the patient?   yes

## 2023-01-09 NOTE — TELEPHONE ENCOUNTER
Spoke with patient in regards to the tremors  They typically come and go  However, over the weekend, they seemed to be more consistent  Now, they are back to coming and going  They come in waves throughout his body  He will try benadryl and give the office a call back if symptoms persist  He verbalized understanding and agreed to plan

## 2023-01-12 ENCOUNTER — APPOINTMENT (EMERGENCY)
Dept: RADIOLOGY | Facility: HOSPITAL | Age: 73
End: 2023-01-12

## 2023-01-12 ENCOUNTER — APPOINTMENT (EMERGENCY)
Dept: NON INVASIVE DIAGNOSTICS | Facility: HOSPITAL | Age: 73
End: 2023-01-12

## 2023-01-12 ENCOUNTER — HOSPITAL ENCOUNTER (INPATIENT)
Facility: HOSPITAL | Age: 73
LOS: 7 days | End: 2023-01-19
Attending: EMERGENCY MEDICINE | Admitting: STUDENT IN AN ORGANIZED HEALTH CARE EDUCATION/TRAINING PROGRAM

## 2023-01-12 DIAGNOSIS — N17.9 AKI (ACUTE KIDNEY INJURY) (HCC): ICD-10-CM

## 2023-01-12 DIAGNOSIS — N18.9 ACUTE KIDNEY INJURY SUPERIMPOSED ON CKD (HCC): ICD-10-CM
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
## 2023-01-12 DIAGNOSIS — N17.9 ACUTE KIDNEY INJURY SUPERIMPOSED ON CKD (HCC): ICD-10-CM

## 2023-01-12 DIAGNOSIS — R79.89 POSITIVE D DIMER: ICD-10-CM

## 2023-01-12 DIAGNOSIS — Z90.5 H/O LEFT NEPHRECTOMY: ICD-10-CM

## 2023-01-12 DIAGNOSIS — I50.31 ACUTE DIASTOLIC (CONGESTIVE) HEART FAILURE (HCC): ICD-10-CM

## 2023-01-12 DIAGNOSIS — M25.512 LEFT SHOULDER PAIN: ICD-10-CM

## 2023-01-12 DIAGNOSIS — S49.92XA INJURY OF LEFT SHOULDER, INITIAL ENCOUNTER: ICD-10-CM

## 2023-01-12 DIAGNOSIS — R79.89 ELEVATED BRAIN NATRIURETIC PEPTIDE (BNP) LEVEL: ICD-10-CM

## 2023-01-12 PROBLEM — J96.01 ACUTE RESPIRATORY FAILURE WITH HYPOXIA (HCC): Status: ACTIVE | Noted: 2023-01-12

## 2023-01-12 PROBLEM — K52.9 CHRONIC DIARRHEA: Chronic | Status: ACTIVE | Noted: 2023-01-12
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
## 2023-01-12 LAB
2HR DELTA HS TROPONIN: 0 NG/L
4HR DELTA HS TROPONIN: 1 NG/L
ALBUMIN SERPL BCP-MCNC: 2.6 G/DL (ref 3.5–5)
ALP SERPL-CCNC: 81 U/L (ref 46–116)
ALT SERPL W P-5'-P-CCNC: 14 U/L (ref 12–78)
ANION GAP SERPL CALCULATED.3IONS-SCNC: 8 MMOL/L (ref 4–13)
APTT PPP: 34 SECONDS (ref 23–37)
AST SERPL W P-5'-P-CCNC: 29 U/L (ref 5–45)
ATRIAL RATE: 93 BPM
ATRIAL RATE: 96 BPM
BASOPHILS # BLD AUTO: 0.02 THOUSANDS/ÂΜL (ref 0–0.1)
BASOPHILS NFR BLD AUTO: 0 % (ref 0–1)
BILIRUB SERPL-MCNC: 0.59 MG/DL (ref 0.2–1)
BUN SERPL-MCNC: 44 MG/DL (ref 5–25)
CALCIUM ALBUM COR SERPL-MCNC: 9.8 MG/DL (ref 8.3–10.1)
CALCIUM SERPL-MCNC: 8.7 MG/DL (ref 8.3–10.1)
CARDIAC TROPONIN I PNL SERPL HS: 14 NG/L
CARDIAC TROPONIN I PNL SERPL HS: 14 NG/L
CARDIAC TROPONIN I PNL SERPL HS: 15 NG/L
CHLORIDE SERPL-SCNC: 108 MMOL/L (ref 96–108)
CO2 SERPL-SCNC: 28 MMOL/L (ref 21–32)
CREAT SERPL-MCNC: 2.26 MG/DL (ref 0.6–1.3)
D DIMER PPP FEU-MCNC: 4.25 UG/ML FEU
EOSINOPHIL # BLD AUTO: 0.07 THOUSAND/ÂΜL (ref 0–0.61)
EOSINOPHIL NFR BLD AUTO: 1 % (ref 0–6)
ERYTHROCYTE [DISTWIDTH] IN BLOOD BY AUTOMATED COUNT: 19.2 % (ref 11.6–15.1)
FLUAV RNA RESP QL NAA+PROBE: NEGATIVE
FLUBV RNA RESP QL NAA+PROBE: NEGATIVE
GFR SERPL CREATININE-BSD FRML MDRD: 27 ML/MIN/1.73SQ M
GLUCOSE SERPL-MCNC: 94 MG/DL (ref 65–140)
HCT VFR BLD AUTO: 43.3 % (ref 36.5–49.3)
HGB BLD-MCNC: 12.7 G/DL (ref 12–17)
IMM GRANULOCYTES # BLD AUTO: 0.05 THOUSAND/UL (ref 0–0.2)
IMM GRANULOCYTES NFR BLD AUTO: 1 % (ref 0–2)
INR PPP: 1 (ref 0.84–1.19)
LYMPHOCYTES # BLD AUTO: 1.23 THOUSANDS/ÂΜL (ref 0.6–4.47)
LYMPHOCYTES NFR BLD AUTO: 18 % (ref 14–44)
MCH RBC QN AUTO: 27.8 PG (ref 26.8–34.3)
MCHC RBC AUTO-ENTMCNC: 29.3 G/DL (ref 31.4–37.4)
MCV RBC AUTO: 95 FL (ref 82–98)
MONOCYTES # BLD AUTO: 0.43 THOUSAND/ÂΜL (ref 0.17–1.22)
MONOCYTES NFR BLD AUTO: 6 % (ref 4–12)
NEUTROPHILS # BLD AUTO: 5.05 THOUSANDS/ÂΜL (ref 1.85–7.62)
NEUTS SEG NFR BLD AUTO: 74 % (ref 43–75)
NRBC BLD AUTO-RTO: 0 /100 WBCS
NT-PROBNP SERPL-MCNC: 4475 PG/ML
P AXIS: 32 DEGREES
P AXIS: 34 DEGREES
PLATELET # BLD AUTO: 225 THOUSANDS/UL (ref 149–390)
PMV BLD AUTO: 9.4 FL (ref 8.9–12.7)
POTASSIUM SERPL-SCNC: 5.2 MMOL/L (ref 3.5–5.3)
PR INTERVAL: 164 MS
PR INTERVAL: 178 MS
PROT SERPL-MCNC: 6.6 G/DL (ref 6.4–8.4)
PROTHROMBIN TIME: 13.2 SECONDS (ref 11.6–14.5)
QRS AXIS: 112 DEGREES
QRS AXIS: 131 DEGREES
QRSD INTERVAL: 122 MS
QRSD INTERVAL: 124 MS
QT INTERVAL: 348 MS
QT INTERVAL: 362 MS
QTC INTERVAL: 439 MS
QTC INTERVAL: 450 MS
RBC # BLD AUTO: 4.57 MILLION/UL (ref 3.88–5.62)
RSV RNA RESP QL NAA+PROBE: NEGATIVE
SARS-COV-2 RNA RESP QL NAA+PROBE: NEGATIVE
SODIUM SERPL-SCNC: 144 MMOL/L (ref 135–147)
T WAVE AXIS: 23 DEGREES
T WAVE AXIS: 32 DEGREES
VENTRICULAR RATE: 93 BPM
VENTRICULAR RATE: 96 BPM
WBC # BLD AUTO: 6.85 THOUSAND/UL (ref 4.31–10.16)

## 2023-01-12 RX ORDER — MELATONIN
1000 DAILY
Status: DISCONTINUED | OUTPATIENT
Start: 2023-01-13 | End: 2023-01-19 | Stop reason: HOSPADM

## 2023-01-12 RX ORDER — TRAMADOL HYDROCHLORIDE 50 MG/1
50 TABLET ORAL 4 TIMES DAILY PRN
Status: DISCONTINUED | OUTPATIENT
Start: 2023-01-12 | End: 2023-01-19 | Stop reason: HOSPADM

## 2023-01-12 RX ORDER — OXYCODONE HYDROCHLORIDE 5 MG/1
2.5 TABLET ORAL 4 TIMES DAILY PRN
Status: DISCONTINUED | OUTPATIENT
Start: 2023-01-12 | End: 2023-01-15

## 2023-01-12 RX ORDER — MAGNESIUM HYDROXIDE/ALUMINUM HYDROXICE/SIMETHICONE 120; 1200; 1200 MG/30ML; MG/30ML; MG/30ML
30 SUSPENSION ORAL EVERY 6 HOURS PRN
Status: DISCONTINUED | OUTPATIENT
Start: 2023-01-12 | End: 2023-01-19 | Stop reason: HOSPADM

## 2023-01-12 RX ORDER — HEPARIN SODIUM 5000 [USP'U]/ML
5000 INJECTION, SOLUTION INTRAVENOUS; SUBCUTANEOUS EVERY 8 HOURS SCHEDULED
Status: DISCONTINUED | OUTPATIENT
Start: 2023-01-12 | End: 2023-01-13

## 2023-01-12 RX ORDER — SIMETHICONE 80 MG
80 TABLET,CHEWABLE ORAL 4 TIMES DAILY PRN
Status: DISCONTINUED | OUTPATIENT
Start: 2023-01-12 | End: 2023-01-19 | Stop reason: HOSPADM

## 2023-01-12 RX ORDER — AMLODIPINE BESYLATE 5 MG/1
5 TABLET ORAL
Status: DISCONTINUED | OUTPATIENT
Start: 2023-01-12 | End: 2023-01-13

## 2023-01-12 RX ORDER — ACETAMINOPHEN 325 MG/1
975 TABLET ORAL 4 TIMES DAILY PRN
Status: DISCONTINUED | OUTPATIENT
Start: 2023-01-12 | End: 2023-01-15

## 2023-01-12 RX ORDER — OXYCODONE HYDROCHLORIDE 5 MG/1
5 TABLET ORAL 4 TIMES DAILY PRN
Status: DISCONTINUED | OUTPATIENT
Start: 2023-01-12 | End: 2023-01-19 | Stop reason: HOSPADM

## 2023-01-12 RX ORDER — DIPHENHYDRAMINE HCL 25 MG
25 TABLET ORAL
Status: DISCONTINUED | OUTPATIENT
Start: 2023-01-12 | End: 2023-01-19 | Stop reason: HOSPADM

## 2023-01-12 RX ORDER — HYDROMORPHONE HCL IN WATER/PF 6 MG/30 ML
0.2 PATIENT CONTROLLED ANALGESIA SYRINGE INTRAVENOUS EVERY 4 HOURS PRN
Status: DISCONTINUED | OUTPATIENT
Start: 2023-01-12 | End: 2023-01-19 | Stop reason: HOSPADM

## 2023-01-12 RX ORDER — ONDANSETRON 2 MG/ML
4 INJECTION INTRAMUSCULAR; INTRAVENOUS EVERY 6 HOURS PRN
Status: DISCONTINUED | OUTPATIENT
Start: 2023-01-12 | End: 2023-01-19 | Stop reason: HOSPADM

## 2023-01-12 RX ORDER — GABAPENTIN 300 MG/1
300 CAPSULE ORAL 3 TIMES DAILY
Status: DISCONTINUED | OUTPATIENT
Start: 2023-01-12 | End: 2023-01-19 | Stop reason: HOSPADM

## 2023-01-12 RX ORDER — FUROSEMIDE 10 MG/ML
40 INJECTION INTRAMUSCULAR; INTRAVENOUS ONCE
Status: COMPLETED | OUTPATIENT
Start: 2023-01-12 | End: 2023-01-12

## 2023-01-12 RX ADMIN — SODIUM CHLORIDE 500 ML: 0.9 INJECTION, SOLUTION INTRAVENOUS at 19:47

## 2023-01-12 RX ADMIN — FUROSEMIDE 40 MG: 10 INJECTION, SOLUTION INTRAVENOUS at 23:23

## 2023-01-12 RX ADMIN — HEPARIN SODIUM 5000 UNITS: 5000 INJECTION INTRAVENOUS; SUBCUTANEOUS at 23:23

## 2023-01-12 RX ADMIN — GABAPENTIN 300 MG: 300 CAPSULE ORAL at 22:53

## 2023-01-12 RX ADMIN — AMLODIPINE BESYLATE 5 MG: 5 TABLET ORAL at 22:53

## 2023-01-12 NOTE — ED PROVIDER NOTES
History  Chief Complaint   Patient presents with   • Fall     Pt reports mechanical fall last night  Pt heard  crack in his left shoulder and has had pain since  Denies any other complaints       History provided by:  Patient   used: No    Fall  Mechanism of injury: fall    Injury location:  Shoulder/arm  Shoulder/arm injury location:  L shoulder  Incident location:  Home  Time since incident:  22 hours  Arrived directly from scene: no    Fall:     Fall occurred:  Walking    Height of fall:  Same level    Impact surface:  Unable to specify    Point of impact:  Unable to specify    Entrapped after fall: no    Protective equipment: none    Suspicion of alcohol use: no    Suspicion of drug use: no    Tetanus status:  Unknown  Prior to arrival data:     Bystander interventions:  None    Patient ambulatory at scene: yes      Blood loss:  None    Responsiveness at scene:  Alert    Orientation at scene:  Person, place, situation and time    Loss of consciousness: no      Amnesic to event: no      Airway interventions:  None    Breathing interventions:  None    IV access status:  None    IO access:  None    Fluids administered:  None    Cardiac interventions:  None    Medications administered:  None    Immobilization:  None    Airway condition since incident:  Stable    Breathing condition since incident:  Stable    Circulation condition since incident:  Stable    Mental status condition since incident:  Stable    Disability condition since incident:  Stable  Associated symptoms: no abdominal pain, no back pain, no chest pain, no difficulty breathing, no headaches, no loss of consciousness, no nausea, no neck pain and no vomiting    Risk factors: no anticoagulation therapy    Risk factors comment:  Renal cancer, Lung mets      Prior to Admission Medications   Prescriptions Last Dose Informant Patient Reported? Taking?    LORazepam (ATIVAN) 0 5 mg tablet   No No   Sig: Take 1 tablet (0 5 mg total) by mouth every 8 (eight) hours as needed for anxiety   Patient not taking: Reported on 8/11/2021   Multiple Vitamin (multivitamin) tablet   Yes No   Sig: Take 1 tablet by mouth daily   al mag oxide-diphenhydramine-lidocaine viscous (MAGIC MOUTHWASH) 1:1:1 suspension   No No   Sig: Swish and spit 10 mL every 4 (four) hours as needed for mouth pain or discomfort   amLODIPine (NORVASC) 5 mg tablet   Yes No   Sig: Take 5 mg by mouth daily     cholecalciferol (VITAMIN D3) 1,000 units tablet   Yes No   Sig: Take 1,000 Units by mouth daily   diphenhydrAMINE (BENADRYL) 25 mg tablet   Yes No   Sig: Take 25 mg by mouth daily at bedtime as needed for itching   diphenoxylate-atropine (LOMOTIL) 2 5-0 025 mg per tablet   No No   Sig: Take 1 tablet by mouth 4 (four) times a day as needed for diarrhea   fluorouracil (EFUDEX) 5 % cream   Yes No   gabapentin (NEURONTIN) 100 mg capsule   Yes No   Sig: Take 300 mg by mouth 3 (three) times a day    lisinopril-hydrochlorothiazide (PRINZIDE,ZESTORETIC) 20-12 5 MG per tablet   Yes No   Sig: Take 1 tablet by mouth 2 (two) times a day     loperamide (IMODIUM) 2 mg capsule   Yes No   Sig: Take 2 mg by mouth 4 (four) times a day as needed for diarrhea   naproxen sodium (ALEVE) 220 MG tablet   Yes No   Sig: Take 220 mg by mouth as needed for mild pain   traMADol (ULTRAM) 50 mg tablet   No No   Sig: Take 1 tablet (50 mg total) by mouth every 6 (six) hours as needed for moderate pain   trolamine salicylate (ASPERCREME) 10 % cream   Yes No   Sig: Apply 1 application topically as needed for muscle/joint pain      Facility-Administered Medications: None       Past Medical History:   Diagnosis Date   • Arthritis    • Cancer ([address] )     prostate - mets to bone and lung   • History of radiation therapy 07/27/2018    SBRT to T10 7/18-7/27/2018   • Hyperlipidemia    • Hypertension        Past Surgical History:   Procedure Laterality Date   • DISTAL ULNA EXCISION Right     excision of tumor and orif with cement and screws   • JOINT REPLACEMENT Bilateral     tkr's   • LUNG SURGERY Right     exc of nodules   • NEPHRECTOMY Left        Family History   Problem Relation Age of Onset   • No Known Problems Mother    • No Known Problems Father      I have reviewed and agree with the history as documented  E-Cigarette/Vaping   • E-Cigarette Use Never User      E-Cigarette/Vaping Substances   • Nicotine No    • THC No    • CBD No    • Flavoring No    • Other No    • Unknown No      Social History     Tobacco Use   • Smoking status: Former     Packs/day: 1 00     Types: Cigarettes     Quit date: 3/28/2003     Years since quittin 8   • Smokeless tobacco: Never   Vaping Use   • Vaping Use: Never used   Substance Use Topics   • Alcohol use: Not Currently     Comment: occasional use   • Drug use: Never       Review of Systems   Constitutional: Negative for chills and fever  HENT: Negative for facial swelling, sore throat and trouble swallowing  Eyes: Negative for pain and visual disturbance  Respiratory: Negative for cough, chest tightness and shortness of breath  Cardiovascular: Negative for chest pain and leg swelling  Gastrointestinal: Negative for abdominal pain, diarrhea, nausea and vomiting  Genitourinary: Negative for dysuria and flank pain  Musculoskeletal: Negative for back pain, neck pain and neck stiffness  Left shoulder pain   Skin: Negative for pallor and rash  Allergic/Immunologic: Negative for environmental allergies and immunocompromised state  Neurological: Negative for dizziness, loss of consciousness and headaches  Hematological: Negative for adenopathy  Does not bruise/bleed easily  Psychiatric/Behavioral: Negative for agitation and behavioral problems  All other systems reviewed and are negative  Physical Exam  Physical Exam  Vitals and nursing note reviewed  Constitutional:       General: He is not in acute distress  Appearance: He is well-developed     HENT: Head: Normocephalic and atraumatic  Eyes:      Extraocular Movements: Extraocular movements intact  Cardiovascular:      Rate and Rhythm: Normal rate and regular rhythm  Pulmonary:      Effort: Pulmonary effort is normal  No respiratory distress  Abdominal:      Palpations: Abdomen is soft  Tenderness: There is no abdominal tenderness  There is no guarding or rebound  Musculoskeletal:      Cervical back: Normal range of motion and neck supple  Comments: Decreased ROM left shoulder/scapular area, left mid arm, no visible deformity, NV intact distally   Skin:     General: Skin is warm and dry  Neurological:      General: No focal deficit present  Mental Status: He is alert and oriented to person, place, and time     Psychiatric:         Mood and Affect: Mood normal          Behavior: Behavior normal          Vital Signs  ED Triage Vitals   Temperature Pulse Respirations Blood Pressure SpO2   01/12/23 1733 01/12/23 1733 01/12/23 1733 01/12/23 1733 01/12/23 1733   97 9 °F (36 6 °C) 92 18 138/63 (!) 89 %      Temp Source Heart Rate Source Patient Position - Orthostatic VS BP Location FiO2 (%)   01/12/23 1733 01/12/23 1855 01/12/23 1855 01/12/23 1855 --   Oral Monitor Lying Right arm       Pain Score       01/12/23 1733       10 - Worst Possible Pain           Vitals:    01/12/23 1855 01/12/23 2047 01/12/23 2218 01/12/23 2248   BP: 125/58 142/66 132/63 131/94   Pulse: 97 96 96 92   Patient Position - Orthostatic VS: Lying Lying Lying Lying         Visual Acuity      ED Medications  Medications   amLODIPine (NORVASC) tablet 5 mg (5 mg Oral Given 1/12/23 2253)   cholecalciferol (VITAMIN D3) tablet 1,000 Units (has no administration in time range)   diphenhydrAMINE (BENADRYL) tablet 25 mg (has no administration in time range)   gabapentin (NEURONTIN) capsule 300 mg (300 mg Oral Given 1/12/23 2253)   multivitamin stress formula tablet 1 tablet (has no administration in time range)   traMADol (ULTRAM) tablet 50 mg (has no administration in time range)   ondansetron (ZOFRAN) injection 4 mg (has no administration in time range)   aluminum-magnesium hydroxide-simethicone (MYLANTA) oral suspension 30 mL (has no administration in time range)   simethicone (MYLICON) chewable tablet 80 mg (has no administration in time range)   heparin (porcine) subcutaneous injection 5,000 Units (5,000 Units Subcutaneous Given 1/12/23 2323)   acetaminophen (TYLENOL) tablet 975 mg (has no administration in time range)   oxyCODONE (ROXICODONE) IR tablet 5 mg (has no administration in time range)   oxyCODONE (ROXICODONE) IR tablet 2 5 mg (has no administration in time range)   HYDROmorphone HCl (DILAUDID) injection 0 2 mg (has no administration in time range)   sodium chloride 0 9 % bolus 500 mL (0 mL Intravenous Stopped 1/12/23 2048)   furosemide (LASIX) injection 40 mg (40 mg Intravenous Given 1/12/23 2323)       Diagnostic Studies  Results Reviewed     Procedure Component Value Units Date/Time    HS Troponin I 4hr [[id number]] Collected: 01/12/23 2258    Lab Status: In process Specimen: Blood from Arm, Right Updated: 01/12/23 2316    HS Troponin I 2hr [[id number]]  (Normal) Collected: 01/12/23 2047    Lab Status: Final result Specimen: Blood from Line, Venous Updated: 01/12/23 2159     hs TnI 2hr 14 ng/L      Delta 2hr hsTnI 0 ng/L     FLU/RSV/COVID - if FLU/RSV clinically relevant [[id number]]  (Normal) Collected: 01/12/23 1948    Lab Status: Final result Specimen: Nares from Nose Updated: 01/12/23 2036     SARS-CoV-2 Negative     INFLUENZA A PCR Negative     INFLUENZA B PCR Negative     RSV PCR Negative    Narrative:      FOR PEDIATRIC PATIENTS - copy/paste COVID Guidelines URL to browser: [email or web address]    SARS-CoV-2 assay is a Nucleic Acid Amplification assay intended for the  qualitative detection of nucleic acid from SARS-CoV-2 in nasopharyngeal  swabs   Results are for the presumptive identification of SARS-CoV-2 RNA  Positive results are indicative of infection with SARS-CoV-2, the virus  causing COVID-19, but do not rule out bacterial infection or co-infection  with other viruses  Laboratories within the [place] and its  territories are required to report all positive results to the appropriate  public health authorities  Negative results do not preclude SARS-CoV-2  infection and should not be used as the sole basis for treatment or other  patient management decisions  Negative results must be combined with  clinical observations, patient history, and epidemiological information  This test has not been FDA cleared or approved  This test has been authorized by FDA under an Emergency Use Authorization  (EUA)  This test is only authorized for the duration of time the  declaration that circumstances exist justifying the authorization of the  emergency use of an in vitro diagnostic tests for detection of SARS-CoV-2  virus and/or diagnosis of COVID-19 infection under section 564(b)(1) of  the Act, 21 U  S C  460JTI-6(F)(9), unless the authorization is terminated  or revoked sooner  The test has been validated but independent review by FDA  and CLIA is pending  Test performed using [employer] GeneXpert: This RT-PCR assay targets N2,  a region unique to SARS-CoV-2  A conserved region in the E-gene was chosen  for pan-Sarbecovirus detection which includes SARS-CoV-2  According to CMS-2020-01-R, this platform meets the definition of high-throughput technology      HS Troponin 0hr (reflex protocol) [[id number]]  (Normal) Collected: 01/12/23 1853    Lab Status: Final result Specimen: Blood from Arm, Right Updated: 01/12/23 1930     hs TnI 0hr 14 ng/L     NT-BNP PRO [[id number]]  (Abnormal) Collected: 01/12/23 1853    Lab Status: Final result Specimen: Blood from Arm, Right Updated: 01/12/23 1930     NT-proBNP 4,475 pg/mL     D-Dimer [[id number]]  (Abnormal) Collected: 01/12/23 1853    Lab Status: Final result Specimen: Blood from Arm, Right Updated: 01/12/23 1929     D-Dimer, Quant 4 25 ug/ml FEU     Narrative: In the evaluation for possible pulmonary embolism, in the appropriate (Well's Score of 4 or less) patient, the age adjusted d-dimer cutoff for this patient can be calculated as:    Age x 0 01 (in ug/mL) for Age-adjusted D-dimer exclusion threshold for a patient over 50 years      Comprehensive metabolic panel [[id number]]  (Abnormal) Collected: 01/12/23 1853    Lab Status: Final result Specimen: Blood from Arm, Right Updated: 01/12/23 1923     Sodium 144 mmol/L      Potassium 5 2 mmol/L      Chloride 108 mmol/L      CO2 28 mmol/L      ANION GAP 8 mmol/L      BUN 44 mg/dL      Creatinine 2 26 mg/dL      Glucose 94 mg/dL      Calcium 8 7 mg/dL      Corrected Calcium 9 8 mg/dL      AST 29 U/L      ALT 14 U/L      Alkaline Phosphatase 81 U/L      Total Protein 6 6 g/dL      Albumin 2 6 g/dL      Total Bilirubin 0 59 mg/dL      eGFR 27 ml/min/1 73sq m     Narrative:      [facility] guidelines for Chronic Kidney Disease (CKD):   •  Stage 1 with normal or high GFR (GFR > 90 mL/min/1 73 square meters)  •  Stage 2 Mild CKD (GFR = 60-89 mL/min/1 73 square meters)  •  Stage 3A Moderate CKD (GFR = 45-59 mL/min/1 73 square meters)  •  Stage 3B Moderate CKD (GFR = 30-44 mL/min/1 73 square meters)  •  Stage 4 Severe CKD (GFR = 15-29 mL/min/1 73 square meters)  •  Stage 5 End Stage CKD (GFR <15 mL/min/1 73 square meters)  Note: GFR calculation is accurate only with a steady state creatinine    Protime-INR [[id number]]  (Normal) Collected: 01/12/23 1853    Lab Status: Final result Specimen: Blood from Arm, Right Updated: 01/12/23 1919     Protime 13 2 seconds      INR 1 00    APTT [[id number]]  (Normal) Collected: 01/12/23 1853    Lab Status: Final result Specimen: Blood from Arm, Right Updated: 01/12/23 1919     PTT 34 seconds     CBC and differential [[id number]] (Abnormal) Collected: 01/12/23 1853    Lab Status: Final result Specimen: Blood from Arm, Right Updated: 01/12/23 1906     WBC 6 85 Thousand/uL      RBC 4 57 Million/uL      Hemoglobin 12 7 g/dL      Hematocrit 43 3 %      MCV 95 fL      MCH 27 8 pg      MCHC 29 3 g/dL      RDW 19 2 %      MPV 9 4 fL      Platelets 188 Thousands/uL      nRBC 0 /100 WBCs      Neutrophils Relative 74 %      Immat GRANS % 1 %      Lymphocytes Relative 18 %      Monocytes Relative 6 %      Eosinophils Relative 1 %      Basophils Relative 0 %      Neutrophils Absolute 5 05 Thousands/µL      Immature Grans Absolute 0 05 Thousand/uL      Lymphocytes Absolute 1 23 Thousands/µL      Monocytes Absolute 0 43 Thousand/µL      Eosinophils Absolute 0 07 Thousand/µL      Basophils Absolute 0 02 Thousands/µL                  XR humerus LEFT   Final Result by Noemi Cuellar MD (01/12 2047)      No acute osseous abnormality  Workstation performed: [id number]         XR shoulder 2+ views LEFT   Final Result by Noemi Cuellar MD (01/12 2018)      No acute osseous abnormality  Workstation performed: [id number]         XR clavicle LEFT   Final Result by Noemi Cuellar MD (01/12 2033)      No acute osseous abnormality  Workstation performed: [id number]         XR chest 1 view portable    (Results Pending)   XR scapula LEFT    (Results Pending)   VAS lower limb venous duplex study, complete bilateral    (Results Pending)   NM inpatient order    (Results Pending)              Procedures  Procedures         ED Course  ED Course as of 01/12/23 2344   u Jan 12, 2023 1929 WBC: 6 85   1929 Hemoglobin: 12 7   1929 Platelet Count: [address] [place] Sodium: 144   1929 Potassium: 5 2   1930 BUN(!): 44   1930 Creatinine(!): 2 26  CBC, CMP reviewed, ARIANNA noted, will give cautious fluids due to low o2/lung pathology/cancer  2013 XR reviewed, possible ?impacted fracture neck of left humerus, official report pending, Sling applied     2035 Case discussed with Rushie Cooks, AP with VICENTE, discussed about starting Heparin empirically as unable to get CTA Chest PE scan due to low GFR; SLIM AP would see the patient and decide  2037 Bilateral LE Duplex negative for DVT as per prelim report by Vascular Tech  SBIRT 22yo+    Flowsheet Row Most Recent Value   SBIRT (25 yo +)    In order to provide better care to our patients, we are screening all of our patients for alcohol and drug use  Would it be okay to ask you these screening questions? Yes Filed at: 01/12/2023 1856   Initial Alcohol Screen: US AUDIT-C     1  How often do you have a drink containing alcohol? 0 Filed at: 01/12/2023 1856   2  How many drinks containing alcohol do you have on a typical day you are drinking? 0 Filed at: 01/12/2023 1856   3a  Male UNDER 65: How often do you have five or more drinks on one occasion? 0 Filed at: 01/12/2023 1856   3b  FEMALE Any Age, or MALE 65+: How often do you have 4 or more drinks on one occassion? 0 Filed at: 01/12/2023 1856   Audit-C Score 0 Filed at: 01/12/2023 1856   BANG: How many times in the past year have you    Used an illegal drug or used a prescription medication for non-medical reasons? Never Filed at: 01/12/2023 1856                    Medical Decision Making  Patient is a 65-year-old male, history of renal cancer, lung metastasis, comes in with history of fall, patient states that he fell last night around 8 PM, about 22 hours out, altered head, no loss of consciousness, afterwards patient states that his left arm got caught on stairs, heard a crack left shoulder, decreased range of movement of left shoulder and arm, no headache, chest pain, dyspnea, abdominal pain, vomiting    Exam, patient is conscious, alert, vital signs noted for low O2 sats down to 83-84%, no increased work of breathing, no respiratory distress, mild tenderness of the left shoulder and scapular region, decreased range of movement of left shoulder and arm, no deformity on any joint  Impression: Mechanical fall, hypoxia, in setting of cancer/lung mets, will get cardiac work-up including labs, EKG, troponin, D-dimer, check x-rays for left shoulder, left scapula, left clavicle, chest x-ray  ARIANNA (acute kidney injury) [facility]): acute illness or injury with systemic symptoms  Elevated brain natriuretic peptide (BNP) level: acute illness or injury  Fall, initial encounter: acute illness or injury  Left shoulder pain: acute illness or injury  Positive D dimer: acute illness or injury  Amount and/or Complexity of Data Reviewed  Labs: ordered  Decision-making details documented in ED Course  Radiology: ordered and independent interpretation performed  Decision-making details documented in ED Course  ECG/medicine tests: ordered and independent interpretation performed  Decision-making details documented in ED Course  Risk  Decision regarding hospitalization  Disposition  Final diagnoses:   Fall, initial encounter   Left shoulder pain   Elevated brain natriuretic peptide (BNP) level   ARIANNA (acute kidney injury) ([facility] )   Positive D dimer     Time reflects when diagnosis was documented in both MDM as applicable and the Disposition within this note     Time User Action Codes Description Comment    1/12/2023  6:29 PM Richa Midget Add [H78  JZZK] Fall, initial encounter     1/12/2023  6:29 PM Richa Midget Add [X37 731] Left shoulder pain     1/12/2023 10:46 PM Maliha Sellers Add [N17 9,  N18 9] Acute kidney injury superimposed on CKD ([facility] )     1/12/2023 10:46 PM Maliha Sellers Add [Z90 5] H/O left nephrectomy     1/12/2023 10:46 PM Maliha Sellers Add [K26 93AS] Injury of left shoulder, initial encounter     1/12/2023 10:51 PM Maliha Sellers Add [R79 89] Elevated brain natriuretic peptide (BNP) level     1/12/2023 11:43 PM Richa Midget Add [N17 9] ARIANNA (acute kidney injury) ([facility] [place] [address] )     1/12/2023 11:44 PM Ala, [address] [R79 89] Elevated brain natriuretic peptide (BNP) level     1/12/2023 11:44 PM Crista Babb [R79 89] Positive D dimer       ED Disposition     ED Disposition   Admit    Condition   Stable    Date/Time   Thu Jan 12, 2023  8:55 PM    Comment   Case was discussed with Verner Patty and the patient's admission status was agreed to be Admission Status: inpatient status to the service of Dr Juan Pablo Olson             Follow-up Information    None         Current Discharge Medication List      CONTINUE these medications which have NOT CHANGED    Details   al mag oxide-diphenhydramine-lidocaine viscous (MAGIC MOUTHWASH) 1:1:1 suspension Swish and spit 10 mL every 4 (four) hours as needed for mouth pain or discomfort  Qty: 250 mL, Refills: 0    Associated Diagnoses: Mouth sores      amLODIPine (NORVASC) 5 mg tablet Take 5 mg by mouth daily        cholecalciferol (VITAMIN D3) 1,000 units tablet Take 1,000 Units by mouth daily      diphenhydrAMINE (BENADRYL) 25 mg tablet Take 25 mg by mouth daily at bedtime as needed for itching      diphenoxylate-atropine (LOMOTIL) 2 5-0 025 mg per tablet Take 1 tablet by mouth 4 (four) times a day as needed for diarrhea  Qty: 30 tablet, Refills: 0    Associated Diagnoses: Chemotherapy induced diarrhea      fluorouracil (EFUDEX) 5 % cream       gabapentin (NEURONTIN) 100 mg capsule Take 300 mg by mouth 3 (three) times a day       lisinopril-hydrochlorothiazide (PRINZIDE,ZESTORETIC) 20-12 5 MG per tablet Take 1 tablet by mouth 2 (two) times a day        loperamide (IMODIUM) 2 mg capsule Take 2 mg by mouth 4 (four) times a day as needed for diarrhea      LORazepam (ATIVAN) 0 5 mg tablet Take 1 tablet (0 5 mg total) by mouth every 8 (eight) hours as needed for anxiety  Qty: 2 tablet, Refills: 0    Associated Diagnoses: Anxiety      Multiple Vitamin (multivitamin) tablet Take 1 tablet by mouth daily      naproxen sodium (ALEVE) 220 MG tablet Take 220 mg by mouth as needed for mild pain      traMADol (ULTRAM) 50 mg tablet Take 1 tablet (50 mg total) by mouth every 6 (six) hours as needed for moderate pain  Qty: 60 tablet, Refills: 0    Associated Diagnoses: Malignant neoplasm metastatic to lung, unspecified laterality ([facility] [facility] ); Bone metastases ([facility] ); Clear cell adenocarcinoma of kidney, 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

## 2023-01-13 ENCOUNTER — APPOINTMENT (OUTPATIENT)
Dept: NUCLEAR MEDICINE | Facility: HOSPITAL | Age: 73
End: 2023-01-13

## 2023-01-13 ENCOUNTER — APPOINTMENT (INPATIENT)
Dept: CT IMAGING | Facility: HOSPITAL | Age: 73
End: 2023-01-13

## 2023-01-13 LAB
ALBUMIN SERPL BCP-MCNC: 2.5 G/DL (ref 3.5–5)
ALP SERPL-CCNC: 74 U/L (ref 46–116)
ALT SERPL W P-5'-P-CCNC: 11 U/L (ref 12–78)
ANION GAP SERPL CALCULATED.3IONS-SCNC: 9 MMOL/L (ref 4–13)
APTT PPP: 37 SECONDS (ref 23–37)
AST SERPL W P-5'-P-CCNC: 30 U/L (ref 5–45)
BACTERIA UR QL AUTO: ABNORMAL /HPF
BASOPHILS # BLD AUTO: 0.02 THOUSANDS/ÂΜL (ref 0–0.1)
BASOPHILS NFR BLD AUTO: 0 % (ref 0–1)
BILIRUB SERPL-MCNC: 0.67 MG/DL (ref 0.2–1)
BILIRUB UR QL STRIP: NEGATIVE
BUN SERPL-MCNC: 41 MG/DL (ref 5–25)
CALCIUM ALBUM COR SERPL-MCNC: 9.8 MG/DL (ref 8.3–10.1)
CALCIUM SERPL-MCNC: 8.6 MG/DL (ref 8.3–10.1)
CHLORIDE SERPL-SCNC: 108 MMOL/L (ref 96–108)
CLARITY UR: CLEAR
CO2 SERPL-SCNC: 27 MMOL/L (ref 21–32)
COLOR UR: YELLOW
CREAT SERPL-MCNC: 1.91 MG/DL (ref 0.6–1.3)
EOSINOPHIL # BLD AUTO: 0.13 THOUSAND/ÂΜL (ref 0–0.61)
EOSINOPHIL NFR BLD AUTO: 2 % (ref 0–6)
ERYTHROCYTE [DISTWIDTH] IN BLOOD BY AUTOMATED COUNT: 18.9 % (ref 11.6–15.1)
ERYTHROCYTE [DISTWIDTH] IN BLOOD BY AUTOMATED COUNT: 18.9 % (ref 11.6–15.1)
GFR SERPL CREATININE-BSD FRML MDRD: 33 ML/MIN/1.73SQ M
GLUCOSE SERPL-MCNC: 79 MG/DL (ref 65–140)
GLUCOSE UR STRIP-MCNC: NEGATIVE MG/DL
HCT VFR BLD AUTO: 42.6 % (ref 36.5–49.3)
HCT VFR BLD AUTO: 44 % (ref 36.5–49.3)
HGB BLD-MCNC: 12.5 G/DL (ref 12–17)
HGB BLD-MCNC: 13 G/DL (ref 12–17)
HGB UR QL STRIP.AUTO: NEGATIVE
HYALINE CASTS #/AREA URNS LPF: ABNORMAL /LPF
IMM GRANULOCYTES # BLD AUTO: 0.05 THOUSAND/UL (ref 0–0.2)
IMM GRANULOCYTES NFR BLD AUTO: 1 % (ref 0–2)
INR PPP: 1.05 (ref 0.84–1.19)
KETONES UR STRIP-MCNC: NEGATIVE MG/DL
LEUKOCYTE ESTERASE UR QL STRIP: NEGATIVE
LYMPHOCYTES # BLD AUTO: 1.62 THOUSANDS/ÂΜL (ref 0.6–4.47)
LYMPHOCYTES NFR BLD AUTO: 23 % (ref 14–44)
MCH RBC QN AUTO: 27.7 PG (ref 26.8–34.3)
MCH RBC QN AUTO: 27.7 PG (ref 26.8–34.3)
MCHC RBC AUTO-ENTMCNC: 29.3 G/DL (ref 31.4–37.4)
MCHC RBC AUTO-ENTMCNC: 29.5 G/DL (ref 31.4–37.4)
MCV RBC AUTO: 94 FL (ref 82–98)
MCV RBC AUTO: 95 FL (ref 82–98)
MONOCYTES # BLD AUTO: 0.46 THOUSAND/ÂΜL (ref 0.17–1.22)
MONOCYTES NFR BLD AUTO: 7 % (ref 4–12)
NEUTROPHILS # BLD AUTO: 4.77 THOUSANDS/ÂΜL (ref 1.85–7.62)
NEUTS SEG NFR BLD AUTO: 67 % (ref 43–75)
NITRITE UR QL STRIP: NEGATIVE
NON-SQ EPI CELLS URNS QL MICRO: ABNORMAL /HPF
NRBC BLD AUTO-RTO: 0 /100 WBCS
PH UR STRIP.AUTO: 5.5 [PH]
PLATELET # BLD AUTO: 247 THOUSANDS/UL (ref 149–390)
PLATELET # BLD AUTO: 254 THOUSANDS/UL (ref 149–390)
PMV BLD AUTO: 9.4 FL (ref 8.9–12.7)
PMV BLD AUTO: 9.9 FL (ref 8.9–12.7)
POTASSIUM SERPL-SCNC: 5.1 MMOL/L (ref 3.5–5.3)
PROT SERPL-MCNC: 6.4 G/DL (ref 6.4–8.4)
PROT UR STRIP-MCNC: NEGATIVE MG/DL
PROTHROMBIN TIME: 13.7 SECONDS (ref 11.6–14.5)
RBC # BLD AUTO: 4.51 MILLION/UL (ref 3.88–5.62)
RBC # BLD AUTO: 4.69 MILLION/UL (ref 3.88–5.62)
RBC #/AREA URNS AUTO: ABNORMAL /HPF
SODIUM SERPL-SCNC: 144 MMOL/L (ref 135–147)
SP GR UR STRIP.AUTO: 1.02 (ref 1–1.03)
UROBILINOGEN UR QL STRIP.AUTO: 0.2 E.U./DL
WBC # BLD AUTO: 7.05 THOUSAND/UL (ref 4.31–10.16)
WBC # BLD AUTO: 7.54 THOUSAND/UL (ref 4.31–10.16)
WBC #/AREA URNS AUTO: ABNORMAL /HPF

## 2023-01-13 RX ORDER — HEPARIN SODIUM 10000 [USP'U]/100ML
3-30 INJECTION, SOLUTION INTRAVENOUS
Status: DISCONTINUED | OUTPATIENT
Start: 2023-01-13 | End: 2023-01-17

## 2023-01-13 RX ORDER — LOPERAMIDE HYDROCHLORIDE 2 MG/1
2 CAPSULE ORAL 4 TIMES DAILY PRN
Status: DISCONTINUED | OUTPATIENT
Start: 2023-01-13 | End: 2023-01-19 | Stop reason: HOSPADM

## 2023-01-13 RX ORDER — FUROSEMIDE 10 MG/ML
20 INJECTION INTRAMUSCULAR; INTRAVENOUS ONCE
Status: COMPLETED | OUTPATIENT
Start: 2023-01-13 | End: 2023-01-13

## 2023-01-13 RX ORDER — HEPARIN SODIUM 1000 [USP'U]/ML
4600 INJECTION, SOLUTION INTRAVENOUS; SUBCUTANEOUS EVERY 6 HOURS PRN
Status: DISCONTINUED | OUTPATIENT
Start: 2023-01-13 | End: 2023-01-17

## 2023-01-13 RX ORDER — HEPARIN SODIUM 1000 [USP'U]/ML
9200 INJECTION, SOLUTION INTRAVENOUS; SUBCUTANEOUS ONCE
Status: COMPLETED | OUTPATIENT
Start: 2023-01-13 | End: 2023-01-13

## 2023-01-13 RX ORDER — FUROSEMIDE 10 MG/ML
40 INJECTION INTRAMUSCULAR; INTRAVENOUS
Status: DISCONTINUED | OUTPATIENT
Start: 2023-01-13 | End: 2023-01-13

## 2023-01-13 RX ORDER — FUROSEMIDE 10 MG/ML
20 INJECTION INTRAMUSCULAR; INTRAVENOUS
Status: DISCONTINUED | OUTPATIENT
Start: 2023-01-13 | End: 2023-01-16

## 2023-01-13 RX ORDER — HEPARIN SODIUM 1000 [USP'U]/ML
9200 INJECTION, SOLUTION INTRAVENOUS; SUBCUTANEOUS EVERY 6 HOURS PRN
Status: DISCONTINUED | OUTPATIENT
Start: 2023-01-13 | End: 2023-01-17

## 2023-01-13 RX ORDER — AMLODIPINE BESYLATE 5 MG/1
5 TABLET ORAL
Status: DISCONTINUED | OUTPATIENT
Start: 2023-01-13 | End: 2023-01-19 | Stop reason: HOSPADM

## 2023-01-13 RX ADMIN — Medication 1000 UNITS: at 08:44

## 2023-01-13 RX ADMIN — FUROSEMIDE 20 MG: 10 INJECTION, SOLUTION INTRAVENOUS at 12:17

## 2023-01-13 RX ADMIN — B-COMPLEX W/ C & FOLIC ACID TAB 1 TABLET: TAB at 08:44

## 2023-01-13 RX ADMIN — GABAPENTIN 300 MG: 300 CAPSULE ORAL at 21:56

## 2023-01-13 RX ADMIN — FUROSEMIDE 20 MG: 10 INJECTION, SOLUTION INTRAVENOUS at 16:53

## 2023-01-13 RX ADMIN — HEPARIN SODIUM 18 UNITS/KG/HR: 10000 INJECTION, SOLUTION INTRAVENOUS at 17:44

## 2023-01-13 RX ADMIN — HEPARIN SODIUM 5000 UNITS: 5000 INJECTION INTRAVENOUS; SUBCUTANEOUS at 05:10

## 2023-01-13 RX ADMIN — AMLODIPINE BESYLATE 5 MG: 5 TABLET ORAL at 21:56

## 2023-01-13 RX ADMIN — HEPARIN SODIUM 9200 UNITS: 1000 INJECTION INTRAVENOUS; SUBCUTANEOUS at 17:44

## 2023-01-13 RX ADMIN — TRAMADOL HYDROCHLORIDE 50 MG: 50 TABLET, COATED ORAL at 08:44

## 2023-01-13 RX ADMIN — LOPERAMIDE HYDROCHLORIDE 2 MG: 2 CAPSULE ORAL at 10:58

## 2023-01-13 RX ADMIN — GABAPENTIN 300 MG: 300 CAPSULE ORAL at 16:53

## 2023-01-13 RX ADMIN — GABAPENTIN 300 MG: 300 CAPSULE ORAL at 08:43

## 2023-01-13 RX ADMIN — HEPARIN SODIUM 5000 UNITS: 5000 INJECTION INTRAVENOUS; SUBCUTANEOUS at 13:59

## 2023-01-13 NOTE — PLAN OF CARE
Problem: Potential for Falls  Goal: Patient will remain free of falls  Description: INTERVENTIONS:  - Educate patient/family on patient safety including physical limitations  - Instruct patient to call for assistance with activity   - Consult OT/PT to assist with strengthening/mobility   - Keep Call bell within reach  - Keep bed low and locked with side rails adjusted as appropriate  - Keep care items and personal belongings within reach  - Initiate and maintain comfort rounds  - Make Fall Risk Sign visible to staff  - Offer Toileting every 2 Hours, in advance of need  - Initiate/Maintain bed alarm  - Obtain necessary fall risk management equipment: alarms  - Apply yellow socks and bracelet for high fall risk patients  - Consider moving patient to room near nurses station  Outcome: Progressing     Problem: PAIN - ADULT  Goal: Verbalizes/displays adequate comfort level or baseline comfort level  Description: Interventions:  - Encourage patient to monitor pain and request assistance  - Assess pain using appropriate pain scale  - Administer analgesics based on type and severity of pain and evaluate response  - Implement non-pharmacological measures as appropriate and evaluate response  - Consider cultural and social influences on pain and pain management  - Notify physician/advanced practitioner if interventions unsuccessful or patient reports new pain  Outcome: Progressing     Problem: DISCHARGE PLANNING  Goal: Discharge to home or other facility with appropriate resources  Description: INTERVENTIONS:  - Identify barriers to discharge w/patient and caregiver  - Arrange for needed discharge resources and transportation as appropriate  - Identify discharge learning needs (meds, wound care, etc )  - Refer to Case Management Department for coordinating discharge planning if the patient needs post-hospital services based on physician/advanced practitioner order or complex needs related to functional status, cognitive ability, or social support system  Outcome: Progressing     Problem: Knowledge Deficit  Goal: Patient/family/caregiver demonstrates understanding of disease process, treatment plan, medications, and discharge instructions  Description: Complete learning assessment and assess knowledge base    Interventions:  - Provide teaching at level of understanding  - Provide teaching via preferred learning methods  Outcome: Progressing     Problem: CARDIOVASCULAR - ADULT  Goal: Maintains optimal cardiac output and hemodynamic stability  Description: INTERVENTIONS:  - Monitor I/O, vital signs and rhythm  - Monitor for S/S and trends of decreased cardiac output  - Administer and titrate ordered vasoactive medications to optimize hemodynamic stability  - Assess quality of pulses, skin color and temperature  - Assess for signs of decreased coronary artery perfusion  - Instruct patient to report change in severity of symptoms  Outcome: Progressing  Goal: Absence of cardiac dysrhythmias or at baseline rhythm  Description: INTERVENTIONS:  - Continuous cardiac monitoring, vital signs, obtain 12 lead EKG if ordered  - Administer antiarrhythmic and heart rate control medications as ordered  - Monitor electrolytes and administer replacement therapy as ordered  Outcome: Progressing     Problem: RESPIRATORY - ADULT  Goal: Achieves optimal ventilation and oxygenation  Description: INTERVENTIONS:  - Assess for changes in respiratory status  - Assess for changes in mentation and behavior  - Position to facilitate oxygenation and minimize respiratory effort  - Oxygen administered by appropriate delivery if ordered  - Initiate smoking cessation education as indicated  - Encourage broncho-pulmonary hygiene including cough, deep breathe, Incentive Spirometry  - Assess the need for suctioning and aspirate as needed  - Assess and instruct to report SOB or any respiratory difficulty  - Respiratory Therapy support as indicated  Outcome: Progressing Problem: MUSCULOSKELETAL - ADULT  Goal: Maintain proper alignment of affected body part  Description: INTERVENTIONS:  - Support, maintain and protect limb and body alignment  - Provide patient/ family with appropriate education  Outcome: Progressing

## 2023-01-13 NOTE — ASSESSMENT & PLAN NOTE
· Elevated D-dimer 4 2 in setting of AIRANNA  · Lower extremity duplex negative for DVT  · VQ scan shows indeterminate study  · Anticoagulated prophylactically with heparin drip, patient with risk factors for PE with metastatic renal cell carcinoma

## 2023-01-13 NOTE — ASSESSMENT & PLAN NOTE
· Patient reports stretching out left arm, it got caught on the edge of the wall and hyperextended, heard a snap    Now having pain and decreased ROM  · Sling placed in ED  · Probable left humeral neck fracture  · XR L shoulder, humerus and clavicle- negative for acute osseous abnormality  · XR scapula in process  · LUE weightbearing restrictions   · As needed analgesics  · PT OT consult  · Orthopedic surgery consult

## 2023-01-13 NOTE — ASSESSMENT & PLAN NOTE
· History of localized renal cell carcinoma in August 2007, developed metastatic disease in 2013  · Progression of mets to lung, spine, and abdominal lymph nodes   On systemic therapy  · Status post left nephrectomy and prostatectomy  · Followed outpatient by oncology

## 2023-01-13 NOTE — OCCUPATIONAL THERAPY NOTE
Occupational Therapy Evaluation     Patient Name: Akosua Albert  PJAFI'K Date: 1/13/2023  Problem List  Principal Problem:    Acute respiratory failure with hypoxia [facility])  Active Problems:    Metastatic renal cell carcinoma (HCC)    Acute kidney injury superimposed on CKD ([facility] [address] )    H/O left nephrectomy    Fall at home, initial encounter    Injury of left shoulder    Elevated brain natriuretic peptide (BNP) level    Elevated d-dimer    Chronic diarrhea    Past Medical History  Past Medical History:   Diagnosis Date    Arthritis     Cancer ([facility] )     prostate - mets to bone and lung    History of radiation therapy 07/27/2018    SBRT to T10 7/18-7/27/2018    Hyperlipidemia     Hypertension      Past Surgical History  Past Surgical History:   Procedure Laterality Date    DISTAL ULNA EXCISION Right     excision of tumor and orif with cement and screws    JOINT REPLACEMENT Bilateral     tkr's    LUNG SURGERY Right     exc of nodules    NEPHRECTOMY Left            01/13/23 0828   OT Last Visit   OT Visit Date 01/13/23   Note Type   Note type Evaluation   Pain Assessment   Pain Assessment Tool 0-10   Pain Score 7   Pain Location/Orientation Orientation: Left; Location: Shoulder   Hospital Pain Intervention(s) Ambulation/increased activity;Repositioned; Emotional support;Cold applied  (RN aware to bring pain medication)   Restrictions/Precautions   Weight Bearing Precautions Per Order Yes   LUE Weight Bearing Per Order (S)  WBAT  (ortho consult pending)   Braces or Orthoses Sling  (for comfort)   Other Precautions Chair Alarm; Bed Alarm;Pain; Fall Risk;O2;Multiple lines   Home Living   Type of [address] [place] [address] Two level;Bed/bath upstairs  (flight to bed/bath)   Bathroom Shower/Tub Walk-in shower   Bathroom Toilet Standard   Bathroom Equipment Shower chair;Grab bars in [facility] Walker;Cane;Crutches   Additional Comments pt uses SPC at all times in and out of home; pt is left hand dominant   Prior Function   Level of [place] Independent with ADLs; Independent with functional mobility; Independent with IADLS   Lives With Alone   Receives Help From Family  (son and grandson nearby to help if needed)   IADLs Independent with medication management; Independent with meal prep; Independent with driving   Falls in the last 6 months 1 to 4   Vocational Part time employment   Comments pt reports active prior independent w/ cook/clean/laundry   Lifestyle   Autonomy per pt independent w/ ADLs, independent w/ functional transfers and mobility w/ SPC, independent w/ IADLs, driving   Reciprocal Relationships son and grandson   Service to Others  at [address] going to his [employer] in [place] [id number] "I have pain when I move my arm"   ADL   Where Assessed Chair   Eating Assistance 5  Supervision/Setup   Grooming Assistance 4  Minimal Assistance   [address] 4  Minimal Assistance   LB [address] 3  Moderate Assistance   LB Dressing Deficit Don/doff L sock; Don/doff R sock   Toileting Assistance  4  Minimal Assistance   Toileting Deficit Setup;Steadying;Verbal cueing;Supervison/safety; Increased time to complete   Functional Assistance 4  Minimal Assistance   Bed Mobility   Supine to Sit 4  Minimal assistance   Additional items Assist x 1; Increased time required;Verbal cues;LE management; Bedrails;HOB elevated   Additional Comments increased time to complete   Transfers   Sit to Stand 4  Minimal assistance   Additional items Assist x 1; Increased time required;Verbal cues;Armrests   Stand to Sit 4  Minimal assistance   Additional items Assist x 1; Increased time required;Verbal cues;Armrests   Additional Comments cues for hand placement and positioning   Functional Mobility   Functional Mobility 4  Minimal assistance Additional Comments assist x1 w/ SPC and impaired balance noted   Additional items SPC   Balance   Static Sitting Good   Dynamic Sitting Fair +   Static Standing Fair -   Dynamic Standing Fair -   Ambulatory Poor +   Activity Tolerance   Activity Tolerance Patient limited by pain; Patient limited by fatigue;Treatment limited secondary to medical complications (Comment)   Nurse Made Aware appropriate to see per Maureen HERNANDEZ   RUE Assessment   RUE Assessment WFL  (4/5)   LUE Assessment   LUE Assessment X  (in sling NWB,  4/5 hand, wrist, elbow WFL)   Hand Function   Gross Motor Coordination Functional  (impaired left due to restrictions, R UE WFL)   Fine Motor Coordination Functional   Sensation   Light Touch No apparent deficits   Proprioception   Proprioception No apparent deficits   Vision-Basic Assessment   Current Vision Wears glasses all the time   Vision - Complex Assessment   Ocular Range of Motion Intact   Acuity Able to read clock/calendar on wall without difficulty   Perception   Inattention/Neglect Appears intact   Cognition   Overall Cognitive Status [facility]   Arousal/Participation Alert; Cooperative   Attention Within functional limits   Orientation Level Oriented X4   Memory Within functional limits   Following Commands Follows one step commands without difficulty   Comments engages in appropriate conversations, pleasant and cooperative   Assessment   Limitation Decreased ADL status; Decreased UE strength;Decreased cognition;Decreased Safe judgement during ADL;Decreased UE ROM; Decreased endurance;Decreased self-care trans;Decreased high-level ADLs   Prognosis Good   Assessment Pt is a 67 y o  male seen for OT evaluation s/p admit to [facility] on 1/12/2023 w/ fall w/ left shoulder pain and Acute respiratory failure with hypoxia ([address] )  X-ray humerus/clavicle: No acute osseous abnormality  Pt utilized sling and NWB, ortho seen pt after and WBAT LE and sling for comfort   Comorbidities affecting pt's functional performance at time of assessment include: chronic diarrhea, h/o left nephrectomy, metastatic renal cell carcinoma, elevated d dimer, ARIANNA on CKD  Personal factors affecting pt at time of IE include:limited home support, difficulty performing ADLS, difficulty performing IADLS  and decreased initiation and engagement   Prior to admission, pt was living alone and reports  per pt independent w/ ADLs, independent w/ functional transfers and mobility w/ SPC, independent w/ IADLs, driving  Upon evaluation: Pt requires MIN assist supine>Sit bed mobility, MIN assist sit<>stand w/ VCs for hand placement and positioning, MIN assist x1 functional mobility w/ RW, MIN assist UB ADLs, MOD assist LB ADLs, MIN assist grooming 2* the following deficits impacting occupational performance: decreased strength and endurance, impaired balance, impaired activity tolerance, impaired functional reach, increased pain L UE  Pt to benefit from continued skilled OT tx while in the hospital to address deficits as defined above and maximize level of functional independence w ADL's and functional mobility  Occupational Performance areas to address include: grooming, bathing/shower, toilet hygiene, dressing, health maintenance, functional mobility, clothing management, cleaning and meal prep  From OT standpoint, recommendation at time of d/c would be short term rehab vs  home w/ family support and HOME OT pending progress  The patient's raw score on the AM-PAC Daily Activity inpatient short form is 17, standardized score is 37 26, less than 39 4  Patients at this level are likely to benefit from discharge to post-acute rehabilitation services  Please refer to the recommendation of the Occupational Therapist for safe discharge planning  Goals   Patient Goals "get better"   LTG Time Frame 10-14   Long Term Goal please see below goals   Plan   Treatment Interventions ADL retraining;UE strengthening/ROM; Functional transfer training; Endurance training;Cognitive reorientation;Patient/family training;Equipment evaluation/education; Compensatory technique education; Energy conservation; Activityengagement   Goal Expiration Date 01/27/23   OT Frequency 3-5x/wk   Recommendation   OT Discharge Recommendation Post acute rehabilitation services   AM-PAC Daily Activity Inpatient   Lower Body Dressing 2   Bathing 2   Toileting 3   Upper Body Dressing 3   Grooming 3   Eating 4   Daily Activity Raw Score 17   Daily Activity Standardized Score (Calc for Raw Score >=11) 37 26   AM-PAC Applied Cognition Inpatient   Following a Speech/Presentation 4   Understanding Ordinary Conversation 4   Taking Medications 4   Remembering Where Things Are Placed or Put Away 4   Remembering List of 4-5 Errands 4   Taking Care of Complicated Tasks 4   Applied Cognition Raw Score 24   Applied Cognition Standardized Score 62 21   Modified Maskell Scale   Modified Tae Scale 4   End of Consult   Education Provided Yes   Patient Position at End of Consult Bed/Chair alarm activated; All needs within reach; Bedside chair   Nurse Communication Nurse aware of consult     Occupational Therapy Goals to be met in 10-14 days:  1) Pt will improve activity tolerance to G for 30 min txment sessions to enhance ADLs  2) Pt will complete ADLs/self care w/ mod I   3) Pt will complete toileting w/ mod I w/ G hygiene/thoroughness using DME PRN  4) Pt will improve functional transfers on/off all surfaces using DME PRN w/ G balance/safety including toileting w/ mod I  5) Pt will improve fx'l mobility during I/ADl/leisure tasks using DME PRN w/ g balance/safety w/ mod I  6) Pt will engage in ongoing cognitive assessment w/ G participation to A w/ safe d/c planning/recommendations  7) Pt will demonstrate G carryover of pt/caregiver education and training as appropriate w/ mod I  w/ G tolerance  8) Pt will engage in depression screen/leisure interest checklist w/ G participation to monitor s/s depression and ID 3 positive coping strategies to A w/ emotional regulation and management  9) Pt will demonstrate 100% carryover of E C  techniques w/ mod I t/o fx'l I/ADL/leisure tasks w/o cues s/p skilled education  10) Pt will demonstrate improved bed mobility to MOD I to enhance ADLs  11) Pt will engage in activity configuration activity w/ G participation and mod I to increase time management skills and improve participation in a structured routine to improve overall quality of life  12) Pt will demonstrate improved standing tolerance to 3-5 minutes during functional tasks w/ Good - dynamic standing balance to enhance ADL performance  13) Pt will demonstrate improved b/l UE strength by 1 MMT grade to enhance ADLS and functional transfers    Documentation completed by: Temi Aleman MS, OTR/L

## 2023-01-13 NOTE — ASSESSMENT & PLAN NOTE
· RN reports O2 sat 83 to 84%, requiring 2LNC  Not maintained on home oxygen  · Multifactorial in setting of pulmonary mets, obesity, deconditioning and concern for acute CHF  Elevated D dimer 4 25,  · NM study ordered, pending  · Echo ordered to evaluate possible CHF  · Diuresing well, now on room air     · IV lasix per nephrology, 20mg IV BID

## 2023-01-13 NOTE — CONSULTS
Orthopedics   [address] 67 y o  male MRN: [id number]  Unit/Bed#: E4 -01      Chief Complaint:   left shoulder pain    HPI:  67 y o male who presented to the ED yesterday 1/12/2023 after sustaining an injury to the left shoulder  Orthopedics was consulted regarding left shoulder pain  He was seen and examined at bedside  Patient reports that he sustained a fall  Patient states that he had his arm abducted away from his body when he fell and his arm impacted the wall  Patient states that his shoulder was forced forward  He states that he felt and heard a pop within the shoulder without significant pain  Patient reports that prior to this fall he had full range of motion and adequate strength in the left shoulder  Patient states that since the injury he is unable to lift his arm  Patient denies any numbness or tingling in the left upper extremity  Patient states that his pain is well controlled at this time and is resting comfortably in a sling  Patient does have known history of renal cell cancer with metastasis  Patient had prior CT scan of the left shoulder that demonstrates metastasis to the left scapula specifically about the glenoid  Patient reports that he has history of previous pathologic fracture to the right wrist in which surgical intervention was performed for tumor removal and hardware placement      Review Of Systems:   · Skin: Normal  · Neuro: See HPI  · Musculoskeletal: See HPI  · 14 point review of systems negative except as stated above     Past Medical History:   Past Medical History:   Diagnosis Date   • Arthritis    • Cancer ([address] )     prostate - mets to bone and lung   • History of radiation therapy 07/27/2018    SBRT to T10 7/18-7/27/2018   • Hyperlipidemia    • Hypertension        Past Surgical History:   Past Surgical History:   Procedure Laterality Date   • DISTAL ULNA EXCISION Right     excision of tumor and orif with cement and screws   • JOINT REPLACEMENT Bilateral tkr's   • LUNG SURGERY Right     exc of nodules   • NEPHRECTOMY Left        Family History:  Family history reviewed and non-contributory  Family History   Problem Relation Age of Onset   • No Known Problems Mother    • No Known Problems Father        Social History:  Social History     Socioeconomic History   • Marital status:       Spouse name: None   • Number of children: 1   • Years of education: 15   • Highest education level: None   Occupational History   • Occupation: retired     Comment: P/T    Tobacco Use   • Smoking status: Former     Packs/day: 1 00     Types: Cigarettes     Quit date: 3/28/2003     Years since quittin 8   • Smokeless tobacco: Never   Vaping Use   • Vaping Use: Never used   Substance and Sexual Activity   • Alcohol use: Not Currently     Comment: occasional use   • Drug use: Never   • Sexual activity: None   Other Topics Concern   • None   Social History Narrative    Lives at home alone     Social Determinants of Health     Financial Resource Strain: Not on file   Food Insecurity: Not on file   Transportation Needs: Not on file   Physical Activity: Not on file   Stress: Not on file   Social Connections: Not on file   Intimate Partner Violence: Not on file   Housing Stability: Not on file       Allergies:   No Known Allergies        Labs:  0   Lab Value Date/Time    HCT 42 6 2023 0620    HCT 43 3 2023 1853    HCT 41 1 2022 1513    HCT 41 6 12/10/2015 1458    HCT 41 2 10/26/2015 1509    HCT 44 3 10/07/2015 1328    HGB 12 5 2023 0620    HGB 12 7 2023 1853    HGB 11 6 (L) 2022 1513    HGB 13 6 12/10/2015 1458    HGB 13 6 10/26/2015 1509    HGB 14 7 10/07/2015 1328    INR 1 00 2023 1853    WBC 7 05 2023 0620    WBC 6 85 2023 1853    WBC 6 96 2022 1513    WBC 8 90 12/10/2015 1458    WBC 8 00 10/26/2015 1509    WBC 10 65 (H) 10/07/2015 1328       Meds:    Current Facility-Administered Medications:   • acetaminophen (TYLENOL) tablet 975 mg, 975 mg, Oral, 4x Daily PRN, IRENE Flores  •  aluminum-magnesium hydroxide-simethicone (MYLANTA) oral suspension 30 mL, 30 mL, Oral, Q6H PRN, IRENE Flores  •  amLODIPine (NORVASC) tablet 5 mg, 5 mg, Oral, HS, IRENE Flores, 5 mg at 01/12/23 2253  •  cholecalciferol (VITAMIN D3) tablet 1,000 Units, 1,000 Units, Oral, Daily, IRENE Flores  •  diphenhydrAMINE (BENADRYL) tablet 25 mg, 25 mg, Oral, HS PRN, IRENE Flores  •  gabapentin (NEURONTIN) capsule 300 mg, 300 mg, Oral, TID, IRENE Flores, 300 mg at 01/12/23 2253  •  heparin (porcine) subcutaneous injection 5,000 Units, 5,000 Units, Subcutaneous, Q8H [address], 5,000 Units at 01/13/23 0510 **AND** [CANCELED] Platelet count, , , Once, IRENE Flores  •  HYDROmorphone HCl (DILAUDID) injection 0 2 mg, 0 2 mg, Intravenous, Q4H PRN, IRENE Flores  •  multivitamin stress formula tablet 1 tablet, 1 tablet, Oral, Daily, IRENE Flores  •  ondansetron (ZOFRAN) injection 4 mg, 4 mg, Intravenous, Q6H PRN, IRENE Flores  •  oxyCODONE (ROXICODONE) IR tablet 2 5 mg, 2 5 mg, Oral, 4x Daily PRN, IRENE Flores  •  oxyCODONE (ROXICODONE) IR tablet 5 mg, 5 mg, Oral, 4x Daily PRN, IRENE Flores  •  simethicone (MYLICON) chewable tablet 80 mg, 80 mg, Oral, 4x Daily PRN, IRENE Flores  •  traMADol (ULTRAM) tablet 50 mg, 50 mg, Oral, 4x Daily PRN, IERNE Flores    Blood Culture:   No results found for: BLOODCX    Wound Culture:   No results found for: WOUNDCULT    Ins and Outs:  I/O last 24 hours:   In: 56 [P O :390; IV Piggyback:500]  Out: 2275 [Urine:2275]          Physical Exam:   /78 (BP Location: Right arm)   Pulse 85   Temp 98 1 °F (36 7 °C) (Temporal)   Resp 18   Ht 5' 8" (1 727 m)   Wt 116 kg (255 lb 15 3 oz)   SpO2 90%   BMI 38 92 kg/m²   Gen: No acute distress, resting comfortably in bed  HEENT: Eyes clear, moist mucus membranes, hearing intact  Respiratory: No audible wheezing or stridor  Cardiovascular: Well Perfused peripherally, 2+ distal pulse  Abdomen: nondistended, no peritoneal signs  Musculoskeletal: left upper extremity  · Skin unable to perform pink dry and intact  · Unable to perform active forward flexion, active external rotation to 60 degrees  · Full passive range of motion  · Sensation intact to axillary, musculocutaneous, radial, ulna, median nerves  · 5/5 motor strength to axillary, musculocutaneous, radial, ulna, median nerves  · 5/5 supraspinatus, subscapularis   · 2+ radial and ulnar pulse       Radiology:   I personally reviewed the films  X-rays AP/Lateral views of left shoulder, humerus, scapula, and clavicle show lytic metastatic tumor mass within the scapular neck and glenoid without no acute fractures or dislocations  Assessment:  67 y o male with  left shoulder pain s/p fall with metastatic lesion of the scapular neck and glenoid, r/o pathologic fracture    Plan:   · Due to known metastatic lesions scapular neck and glenoid, CT scan of the left upper extremity was ordered to rule out pathologic fracture    · NON-weight bearing to the LUE  · Sling for comfort  · Pain control PRN  · Medical management per primary  · Dispo: Ortho will continue to follow      Linn Willson PA-C

## 2023-01-13 NOTE — ASSESSMENT & PLAN NOTE
· Elevated D-dimer 4 2 in setting of ARIANNA  · Lower extremity duplex negative for DVT  · New need for O2, will check VQ scan

## 2023-01-13 NOTE — ASSESSMENT & PLAN NOTE
· Patient reports stretching out left arm, it got caught on the edge of the wall and hyperextended, heard a snap    Now having pain and decreased ROM  · Sling placed in ED  · CT upper extremity ordered  · LUE weightbearing restrictions   · Orthopedic surgery consult

## 2023-01-13 NOTE — PHYSICAL THERAPY NOTE
PT EVALUATION    67 y o     [id number]    Left shoulder pain [M25 512]  Fall, initial encounter [W19  XXXA]    Past Medical History:   Diagnosis Date    Arthritis     Cancer ([facility] [address] )     prostate - mets to bone and lung    History of radiation therapy 07/27/2018    SBRT to T10 7/18-7/27/2018    Hyperlipidemia     Hypertension          Past Surgical History:   Procedure Laterality Date    DISTAL ULNA EXCISION Right     excision of tumor and orif with cement and screws    JOINT REPLACEMENT Bilateral     tkr's    LUNG SURGERY Right     exc of nodules    NEPHRECTOMY Left         01/13/23 1005   PT Last Visit   PT Visit Date 01/13/23   Note Type   Note type Evaluation   Pain Assessment   Pain Assessment Tool 0-10   Pain Score No Pain   Restrictions/Precautions   Weight Bearing Precautions Per Order Yes   LUE Weight Bearing Per Order (S)  NWB   Other Precautions Chair Alarm; Bed Alarm; Fall Risk   Home Living   Type of [facility] Walker;Cane;Crutches   Additional Comments 4 NATALIYA with L ascending HR, full fight to the second floor with R ascending HR and short L HR   Prior Function   Level of Andrew Independent with functional mobility; Independent with ADLs; Independent with IADLS   Lives With Alone   IADLs Independent with driving   Falls in the last 6 months 1 to 4   Vocational Part time employment  (3 days/week as a )   Cognition   Overall Cognitive Status WFL   Attention Within functional limits   Orientation Level Oriented X4   Memory Within functional limits   Following Commands Follows all commands and directions without difficulty   RLE Assessment   RLE Assessment WFL   LLE Assessment   LLE Assessment WFL   Five Times Sit To Stand   Time (Seconds) 37 76 Seconds   Bed Mobility   Additional Comments Not tested secondary to pt seated in bedside chair, see OT note   Transfers   Sit to Stand 4  Minimal assistance   Additional items Increased time required;Verbal cues  (cues needed to maintain NWB LUE)   Stand to Sit 4  Minimal assistance   Stand pivot 4  Minimal assistance   Toilet transfer 4  Minimal assistance   Additional items Increased time required;Verbal cues;Standard toilet   Ambulation/Elevation   Gait pattern Antalgic; Wide ANDRÉS; Decreased foot clearance; Short stride; Excessively slow; Improper Weight shift   Gait Assistance 4  Minimal assist   Assistive Device Straight cane   Distance 20' x2   Balance   Static Sitting Good   Dynamic Sitting Fair +   Static Standing Fair -   Dynamic Standing Fair -   Ambulatory Poor +   Activity Tolerance   Activity Tolerance Patient limited by fatigue;Patient limited by pain   Assessment   Prognosis Fair   Problem List Decreased endurance; Impaired balance;Decreased mobility; Decreased safety awareness;Orthopedic restrictions   Assessment Lotus Olson is a 67 y o  male admitted to [facility] on 1/12/2023 for Acute respiratory failure with hypoxia ([facility] [address] )  Pt  has a past medical history of Arthritis, Cancer ([facility] [address] ), History of radiation therapy (07/27/2018), Hyperlipidemia, and Hypertension    PT was consulted and pt was seen on 1/13/2023 for mobility assessment and d/c planning  Pt presents with impaired mobility, decreased balance, limited activity tolerance, and impaired functional mobility  Pt is currently functioning at [facility] for all transfers and gait using SPC, with excessive lateral sway, very limited single limb stance R>L  Pt will benefit from continued skilled IP PT to address the above mentioned impairments and initiate stair training in order to decrease risk of fall and maximize functional independence when completing mobility and ADLs  At this time PT recommendations for d/c are rehab vs home pending progress  Barriers to Discharge Decreased caregiver support   Goals   Patient Goals "to feel better"   [facility] Expiration Date 01/23/23   Short Term Goal #1 1)    Pt will perform bed mobility with Miguelito demonstrating appropriate technique 100% of the time in order to improve function  2)  Perform all transfers with Miguelito demonstrating safe and appropriate technique 100% of the time in order to improve ability to negotiate safely in home environment  3) Amb with least restrictive AD > 200'x1 with Miguelito in order to demonstrate ability to negotiate in home environment  4) Increase activity tolerance to 45 minutes in order to improve endurance to functional tasks  5)  Negotiate 12 stairs using most appropriate technique and S in order to be able to negotiate safely in home environment  6) PT for ongoing patient and family/caregiver education, DME needs and d/c planning in order to promote highest level of function in least restrictive environment  Plan   Treatment/Interventions Functional transfer training;LE strengthening/ROM; Elevations; Therapeutic exercise; Endurance training;Patient/family training;Bed mobility;Gait training   PT Frequency 3-5x/wk   Recommendation   PT Discharge Recommendation Post acute rehabilitation services  (vs home health pending progress)   AM-PAC Basic Mobility Inpatient   Turning in Flat Bed Without Bedrails 3   Lying on Back to Sitting on Edge of Flat Bed Without Bedrails 3   Moving Bed to Chair 3   Standing Up From Chair Using Arms 3   Walk in Room 3   Climb 3-5 Stairs With Railing 2   Basic Mobility Inpatient Raw Score 17   Basic Mobility Standardized Score 39 67   Highest Level Of Mobility   JH-HLM Goal 5: Stand one or more mins   JH-HLM Achieved 6: Walk 10 steps or more   End of Consult   Patient Position at End of Consult Bedside chair;Bed/Chair alarm activated; All needs within reach       Jonathon Linda, WIL

## 2023-01-13 NOTE — H&P
[address] [facility]  H&P- Cris Nava 1950, 67 y o  male MRN: [id number]  Unit/Bed#: E4 -01 Encounter: [id number]  Primary Care Provider: Rai Fung MD   Date and time admitted to hospital: 1/12/2023  5:37 PM    * Acute respiratory failure with hypoxia ([address] )  Assessment & Plan  · RN reports O2 sat 83 to 84%, requiring 2LNC  Not maintained on home oxygen  · Multifactorial in setting of pulmonary mets, obesity, deconditioning and concern for acute CHF  Elevated D dimer 4 25, DDX includes PE   · 4+ B/LLE edema  BNP >4400  No baseline on file, no echo on file  · Concern for vascular congestion on CXR  · Respiratory viral panel negative  · Check echo  · Check ambulatory pulse ox prior to discharge    Elevated brain natriuretic peptide (BNP) level  Assessment & Plan  · BNP >4400  No baseline on file, no echo on file  · 4+ B/LLE edema  · CXR appears vascularly congested  Follow-up official radiology reading  · Hx of left nephrectomy  Presents with elevated creatinine; possibly cardiorenal ARIANNA  Will  give 1 dose of IV Lasix  · Check echo  · Monitor daily weight, I&O  Low Na diet with fluid restriction    Injury of left shoulder  Assessment & Plan  · Patient reports stretching out left arm, it got caught on the edge of the wall and hyperextended, heard a snap    Now having pain and decreased ROM  · Sling placed in ED  · Probable left humeral neck fracture  · XR L shoulder, humerus and clavicle- negative for acute osseous abnormality  · XR scapula in process  · LUE weightbearing restrictions   · As needed analgesics  · PT OT consult  · Orthopedic surgery consult    Acute kidney injury superimposed on CKD [facility])  Assessment & Plan  Lab Results   Component Value Date    EGFR 27 01/12/2023    EGFR 52 12/28/2022    EGFR 60 11/11/2022    CREATININE 2 26 (H) 01/12/2023    CREATININE 1 33 (H) 12/28/2022    CREATININE 1 18 11/11/2022     · Creatinine 2 2, baseline 1 0-1 3  · History of left nephrectomy and prostatectomy  · Reports decreased fluid intake and decreased urine output  · Appears fluid overloaded and has elevated BNP >4400  May be cardiorenal  · Hold lisinopril-HCTZ  Will give 1 dose of IV Lasix  · Close intake and output  · Check UA  · Avoid nephrotoxins   · Monitor BMP  · Nephrology consult    Elevated d-dimer  Assessment & Plan  · Elevated D-dimer 4 2 in setting of ARIANNA  · Lower extremity duplex negative for DVT  · New need for O2, will check VQ scan    Fall at home, initial encounter  Assessment & Plan  · Mechanical fall at home yesterday, tripped over an area rug, landed on right side  Denies head strike  Not anticoagulated  · PT OT consult    Metastatic renal cell carcinoma ([facility] [address] )  Assessment & Plan  · History of localized renal cell carcinoma in August 2007, developed metastatic disease in 2013  · Progression of mets to lung, spine, and abdominal lymph nodes  On systemic therapy  · Status post left nephrectomy and prostatectomy  · Followed outpatient by oncology    Chronic diarrhea  Assessment & Plan  · Currently denies diarrhea  · Maintained on as needed Lomotil  Verified on PDMP    H/O left nephrectomy  Assessment & Plan  · History of metastatic renal cell cancer s/p left nephrectomy and prostatectomy    VTE Prophylaxis: Heparin  / sequential compression device   Code Status: FC  POLST: POLST is not applicable to this patient  Discussion with family: Son at bedside    Anticipated Length of Stay:  Patient will be admitted on an Inpatient basis with an anticipated length of stay of  > 2 midnights  Justification for Hospital Stay: Respiratory failure, left shoulder injury with concern for humeral neck fracture s/p fall, ARIANNA in patient with left nephrectomy  Elevated D-dimer rule out PE    Total Time for Visit, including Counseling / Coordination of Care: 60 minutes  Greater than 50% of this total time spent on direct patient counseling and coordination of care      Chief Complaint:   Shoulder pain    History of Present Illness:    Lotus Olson is a 67 y o  male with PMH as above who presents with c/o left shoulder pain with limited range of motion  Reports he stretched out his arm and it got caught at the edge of the wall, arm was pushed back and he heard a snap; immediately felt pain and has decreased range of motion  Reports mechanical fall yesterday, tripped on rug, landed on right side, denies head strike, not anticoagulated  Admits to decreased urine output, denies exertional dyspnea or orthopnea  Review of Systems:    Review of Systems   Constitutional: Negative  HENT: Negative  Respiratory: Negative  Cardiovascular: Negative  Gastrointestinal: Negative  Genitourinary: Positive for decreased urine volume  Negative for dysuria, frequency and hematuria  Musculoskeletal: Positive for arthralgias  Left arm limited ROM   Skin: Negative  Neurological: Negative  Psychiatric/Behavioral: Negative  Past Medical and Surgical History:     Past Medical History:   Diagnosis Date   • Arthritis    • Cancer ([facility] [address] )     prostate - mets to bone and lung   • History of radiation therapy 07/27/2018    SBRT to T10 7/18-7/27/2018   • Hyperlipidemia    • Hypertension        Past Surgical History:   Procedure Laterality Date   • DISTAL ULNA EXCISION Right     excision of tumor and orif with cement and screws   • JOINT REPLACEMENT Bilateral     tkr's   • LUNG SURGERY Right     exc of nodules   • NEPHRECTOMY Left        Meds/Allergies:    Prior to Admission medications    Medication Sig Start Date End Date Taking?  Authorizing Provider   teri Tian Riser oxide-diphenhydramine-lidocaine viscous (MAGIC MOUTHWASH) 1:1:1 suspension Swish and spit 10 mL every 4 (four) hours as needed for mouth pain or discomfort 6/9/20   Gertrudis Aguila MD   amLODIPine (NORVASC) 5 mg tablet Take 5 mg by mouth daily      Historical Provider, MD   cholecalciferol (VITAMIN D3) 1,000 units tablet Take 1,000 Units by mouth daily    Historical Provider, MD   diphenhydrAMINE (BENADRYL) 25 mg tablet Take 25 mg by mouth daily at bedtime as needed for itching    Historical Provider, MD   diphenoxylate-atropine (LOMOTIL) 2 5-0 025 mg per tablet Take 1 tablet by mouth 4 (four) times a day as needed for diarrhea 12/30/22   Dannielle Teague MD   fluorouracil (EFUDEX) 5 % cream  2/15/22   Historical Provider, MD   gabapentin (NEURONTIN) 100 mg capsule Take 300 mg by mouth 3 (three) times a day     Historical Provider, MD   lisinopril-hydrochlorothiazide (PRINZIDE,ZESTORETIC) 20-12 5 MG per tablet Take 1 tablet by mouth 2 (two) times a day      Historical Provider, MD   loperamide (IMODIUM) 2 mg capsule Take 2 mg by mouth 4 (four) times a day as needed for diarrhea    Historical Provider, MD   LORazepam (ATIVAN) 0 5 mg tablet Take 1 tablet (0 5 mg total) by mouth every 8 (eight) hours as needed for anxiety  Patient not taking: Reported on 8/11/2021 12/4/20   Dannielle Teague MD   Multiple Vitamin (multivitamin) tablet Take 1 tablet by mouth daily    Historical Provider, MD   naproxen sodium (ALEVE) 220 MG tablet Take 220 mg by mouth as needed for mild pain    Historical Provider, MD   traMADol (ULTRAM) 50 mg tablet Take 1 tablet (50 mg total) by mouth every 6 (six) hours as needed for moderate pain 1/3/23   Dannielle Teague MD   trolamine salicylate (ASPERCREME) 10 % cream Apply 1 application topically as needed for muscle/joint pain    Historical Provider, MD     I have reviewed home medications with patient personally  Allergies: No Known Allergies    Social History:     Marital Status:     Occupation: retired; works PT driving a shuttle  Patient Pre-hospital Living Situation: [place] as needed  Patient Pre-hospital Level of Mobility: Resides at home alone  Patient Pre-hospital Diet Restrictions:   Substance Use History:   Social History     Substance and Sexual Activity   Alcohol Use Not Currently Comment: occasional use     Social History     Tobacco Use   Smoking Status Former   • Packs/day: 1 00   • Types: Cigarettes   • Quit date: 3/28/2003   • Years since quittin 8   Smokeless Tobacco Never     Social History     Substance and Sexual Activity   Drug Use Never       Family History:    Family History   Problem Relation Age of Onset   • No Known Problems Mother    • No Known Problems Father        Physical Exam:     Vitals:   Blood Pressure: 131/94 (23)  Pulse: 92 (23)  Temperature: 97 8 °F (36 6 °C) (23)  Temp Source: Temporal (23)  Respirations: 18 (23)  Height: 5' 8" (172 7 cm) (23)  Weight - Scale: 116 kg (256 lb 13 4 oz) (23)  SpO2: 94 % (23)    Physical Exam  Constitutional:       General: He is not in acute distress  Appearance: Normal appearance  He is not ill-appearing, toxic-appearing or diaphoretic  Comments: Morbid obesity, appears older than stated age   HENT:      Head: Normocephalic and atraumatic  Mouth/Throat:      Mouth: Mucous membranes are moist       Pharynx: No oropharyngeal exudate or posterior oropharyngeal erythema  Eyes:      General: No scleral icterus  Comments: Eyeglasses   Cardiovascular:      Rate and Rhythm: Normal rate and regular rhythm  Heart sounds: Normal heart sounds  Pulmonary:      Effort: Pulmonary effort is normal       Breath sounds: Normal breath sounds  Comments: 2LNC  Abdominal:      General: Bowel sounds are normal  There is no distension  Palpations: Abdomen is soft  Tenderness: There is no abdominal tenderness  There is no guarding  Musculoskeletal:      Right lower leg: Edema present  Left lower leg: Edema present  Comments: 4+ B/LLE edema  L sling   Skin:     General: Skin is warm  Capillary Refill: Capillary refill takes less than 2 seconds   Normal cap refill and strong radial pulse LUE     Coloration: Skin is not pale  Comments: LLE pigmentation with small excoriations   Neurological:      Mental Status: He is alert and oriented to person, place, and time  Psychiatric:         Mood and Affect: Mood normal          Behavior: Behavior normal          Thought Content: Thought content normal          Judgment: Judgment normal      Additional Data:     Lab Results: I have personally reviewed pertinent reports  Results from last 7 days   Lab Units 01/12/23  1853   WBC Thousand/uL 6 85   HEMOGLOBIN g/dL 12 7   HEMATOCRIT % 43 3   PLATELETS Thousands/uL 225   NEUTROS PCT % 74   LYMPHS PCT % 18   MONOS PCT % 6   EOS PCT % 1     Results from last 7 days   Lab Units 01/12/23  1853   SODIUM mmol/L 144   POTASSIUM mmol/L 5 2   CHLORIDE mmol/L 108   CO2 mmol/L 28   BUN mg/dL 44*   CREATININE mg/dL 2 26*   ANION GAP mmol/L 8   CALCIUM mg/dL 8 7   ALBUMIN g/dL 2 6*   TOTAL BILIRUBIN mg/dL 0 59   ALK PHOS U/L 81   ALT U/L 14   AST U/L 29   GLUCOSE RANDOM mg/dL 94     Results from last 7 days   Lab Units 01/12/23  1853   INR  1 00                   Imaging: I have personally reviewed pertinent reports  XR humerus LEFT   Final Result by Shima Gunderson MD (01/12 2047)      No acute osseous abnormality  Workstation performed: [id number]         XR shoulder 2+ views LEFT   Final Result by Shima Gunderson MD (01/12 2018)      No acute osseous abnormality  Workstation performed: [id number]         XR clavicle LEFT   Final Result by Shima Gunderson MD (01/12 2033)      No acute osseous abnormality              Workstation performed: [id number]         XR chest 1 view portable    (Results Pending)   XR scapula LEFT    (Results Pending)   VAS lower limb venous duplex study, complete bilateral    (Results Pending)   NM inpatient order    (Results Pending)       EKG, Pathology, and Other Studies Reviewed on Admission:   · XR     Allscripts / Epic Records Reviewed: Yes     ** Please Note: This note has been constructed using a voice recognition system   **

## 2023-01-13 NOTE — CONSULTS
Consultation - Nephrology   Charley Perez 67 y o  male MRN: [id number]  Unit/Bed#: E4 -01 Encounter: [id number]    ASSESSMENT/PLAN:    ARIANNA (POA) on suspected CKD 2/3  -Baseline creatinine: 1 0-1 3   -Creatinine on admission 2 20, most recent creatinine 1 91  -Etiology: Suspect multifactorial, NSAID use, possible volume overload/CRS/renal congestion, less likely AIN from checkpoint inhibitor use, lisinopril use, decreased renal reserve given nephrectomy  -UA: 0-1 RBCs, 0 WBCs, 0-1 hyaline casts, occasional bacteria  -No peripheral eosinophilia, send urine eosinophils  -Renal imaging: CT abdomen pelvis from 11/14/2022 with right renal cysts without hydronephrosis, status post left nephrectomy  -Avoid hypotension, avoid nephrotoxins, avoid NSAIDS  -Trend BMP  -Received Lasix 40 mg IV on admission with 2 L urine output overnight  -Continue to hold lisinopril/HCTZ  -Start Lasix 20 mg IV twice daily    Hypertension/blood pressure  -OP regimen: Norvasc 5 mg daily, lisinopril-hydrochlorothiazide 20-12 5  -Current regimen: Norvasc 5 mg daily  -Recent blood pressures: Acceptable    Renal cell carcinoma  -Diagnosed 2007, with metastatic disease in 2013  -Follows with hematology/oncology as outpatient  -Currently on nivolumab, was started at the end of December 2022    Mild hyperkalemia  -Potassium was 5 2 on admission most recently 5 1, monitor with use of loop diuretics    Acute hypoxic respiratory failure  -No home oxygen requirements, required 2 L supplemental oxygen on admission  -Chest x-ray with possible trace bilateral effusions  -BNP elevated at 4475  -No prior echo available, has TTE pending  -Has lower extremity edema, as well as hypoalbuminemia  -We will discuss additional diuretics  -Pending V/Q study to rule out PE  -Prior CT from 11/2022 with significant worsening of metastatic disease including lung metastases, adenopathy and osseous metastases    Lower extremity edema  -Patient reports these appear baseline, is on Norvasc as outpatient serum albumin is decreased to 2 5  -LE doppler negative  -Consider albumin 25 g 25% x 2 doses    Additional Medical Problems: Left shoulder pain, chronic diarrhea    HISTORY OF PRESENT ILLNESS:  Requesting Physician: Carol Recinos MD  Reason for Consult: ARIANNA    Sunny Gottlieb is a 67y o  year old male who was admitted to [facility] after presenting with left shoulder pain after mechanical fall  He was found to be hypoxic requiring supplemental oxygen, found to have an ARIANNA, he received Lasix overnight  He reports recent NSAID use for shoulder pain with Aleve daily  A renal consultation is requested today for assistance in the management of ARIANNA      PAST MEDICAL HISTORY:  Past Medical History:   Diagnosis Date   • Arthritis    • Cancer ([facility] [address] )     prostate - mets to bone and lung   • History of radiation therapy 2018    SBRT to T10 -2018   • Hyperlipidemia    • Hypertension        PAST SURGICAL HISTORY:  Past Surgical History:   Procedure Laterality Date   • DISTAL ULNA EXCISION Right     excision of tumor and orif with cement and screws   • JOINT REPLACEMENT Bilateral     tkr's   • LUNG SURGERY Right     exc of nodules   • NEPHRECTOMY Left        ALLERGIES:  No Known Allergies    SOCIAL HISTORY:  Social History     Substance and Sexual Activity   Alcohol Use Not Currently    Comment: occasional use     Social History     Substance and Sexual Activity   Drug Use Never     Social History     Tobacco Use   Smoking Status Former   • Packs/day: 1 00   • Types: Cigarettes   • Quit date: 3/28/2003   • Years since quittin 8   Smokeless Tobacco Never       FAMILY HISTORY:  Family History   Problem Relation Age of Onset   • No Known Problems Mother    • No Known Problems Father        MEDICATIONS:    Current Facility-Administered Medications:   •  acetaminophen (TYLENOL) tablet 975 mg, 975 mg, Oral, 4x Daily PRN, IRENE Hobbs  • aluminum-magnesium hydroxide-simethicone (MYLANTA) oral suspension 30 mL, 30 mL, Oral, Q6H PRN, IRENE Gibson  •  amLODIPine (NORVASC) tablet 5 mg, 5 mg, Oral, HS, IRENE Montes  •  cholecalciferol (VITAMIN D3) tablet 1,000 Units, 1,000 Units, Oral, Daily, IRENE Gibson, 1,000 Units at 01/13/23 0844  •  diphenhydrAMINE (BENADRYL) tablet 25 mg, 25 mg, Oral, HS PRN, IRENE Gibson  •  gabapentin (NEURONTIN) capsule 300 mg, 300 mg, Oral, TID, IRENE Gibson, 300 mg at 01/13/23 0843  •  heparin (porcine) subcutaneous injection 5,000 Units, 5,000 Units, Subcutaneous, Q8H [address], 5,000 Units at 01/13/23 0510 **AND** [CANCELED] Platelet count, , , Once, IRENE Gibson  •  HYDROmorphone HCl (DILAUDID) injection 0 2 mg, 0 2 mg, Intravenous, Q4H PRN, IRENE Gibson  •  loperamide (IMODIUM) capsule 2 mg, 2 mg, Oral, 4x Daily PRN, Tabitha Shabazz MD, 2 mg at 01/13/23 1058  •  multivitamin stress formula tablet 1 tablet, 1 tablet, Oral, Daily, IRENE Gibson, 1 tablet at 01/13/23 0844  •  ondansetron (ZOFRAN) injection 4 mg, 4 mg, Intravenous, Q6H PRN, IRENE Gibson  •  oxyCODONE (ROXICODONE) IR tablet 2 5 mg, 2 5 mg, Oral, 4x Daily PRN, IRENE Gibson  •  oxyCODONE (ROXICODONE) IR tablet 5 mg, 5 mg, Oral, 4x Daily PRN, IRENE Gibson  •  simethicone (MYLICON) chewable tablet 80 mg, 80 mg, Oral, 4x Daily PRN, IRENE Gibson  •  traMADol (ULTRAM) tablet 50 mg, 50 mg, Oral, 4x Daily PRN, IRENE Gibson, 50 mg at 01/13/23 0844    REVIEW OF SYSTEMS:  Review of Systems   Constitutional: Positive for fatigue  Respiratory: Negative for shortness of breath  Cardiovascular: Positive for leg swelling  Negative for chest pain  Gastrointestinal: Negative  Genitourinary: Negative  Musculoskeletal:        Left shoulder pain   Neurological: Negative         A complete 10 point review of systems was performed and found to be negative unless otherwise noted above or in the HPI  PHYSICAL EXAM:  Current Weight: Weight - Scale: 116 kg (255 lb 15 3 oz)  First Weight: Weight - Scale: 118 kg (260 lb 12 9 oz)  Vitals:    01/12/23 2248 01/13/23 0325 01/13/23 0600 01/13/23 0734   BP: 131/94 116/59  143/78   BP Location: Right arm Right arm  Right arm   Pulse: 92 94  85   Resp: 18 18  18   Temp: 97 8 °F (36 6 °C) 97 7 °F (36 5 °C)  98 1 °F (36 7 °C)   TempSrc: Temporal Temporal  Temporal   SpO2: 94% 91%  90%   Weight: 116 kg (256 lb 13 4 oz)  116 kg (255 lb 15 3 oz)    Height: 5' 8" (1 727 m)          Intake/Output Summary (Last 24 hours) at 1/13/2023 1103  Last data filed at 1/13/2023 0715  Gross per 24 hour   Intake 890 ml   Output 2275 ml   Net -1385 ml     Physical Exam  Vitals and nursing note reviewed  Constitutional:       General: He is not in acute distress  Appearance: Normal appearance  He is obese  He is not toxic-appearing or diaphoretic  HENT:      Head: Normocephalic and atraumatic  Nose: Nose normal       Mouth/Throat:      Mouth: Mucous membranes are moist    Eyes:      General: No scleral icterus  Cardiovascular:      Rate and Rhythm: Normal rate and regular rhythm  Pulses: Normal pulses  Heart sounds: Normal heart sounds  Pulmonary:      Effort: Pulmonary effort is normal  No respiratory distress  Breath sounds: No stridor  No wheezing or rales  Comments: Decreased to bases bilaterally  Abdominal:      General: Abdomen is flat  There is no distension  Palpations: Abdomen is soft  Tenderness: There is no abdominal tenderness  Musculoskeletal:      Cervical back: Neck supple  Right lower leg: Edema present  Left lower leg: Edema present  Skin:     General: Skin is warm and dry  Capillary Refill: Capillary refill takes less than 2 seconds  Neurological:      General: No focal deficit present        Mental Status: He is alert and oriented to person, place, and time  Psychiatric:         Mood and Affect: Mood normal           Invasive Devices:      Lab Results:   Results from last 7 days   Lab Units 01/13/23  0620 01/13/23  0427 01/12/23  1853   WBC Thousand/uL 7 05  --  6 85   HEMOGLOBIN g/dL 12 5  --  12 7   HEMATOCRIT % 42 6  --  43 3   PLATELETS Thousands/uL 254  --  225   POTASSIUM mmol/L  --  5 1 5 2   CHLORIDE mmol/L  --  108 108   CO2 mmol/L  --  27 28   BUN mg/dL  --  41* 44*   CREATININE mg/dL  --  1 91* 2 26*   CALCIUM mg/dL  --  8 6 8 7   ALK PHOS U/L  --  74 81   ALT U/L  --  11* 14   AST U/L  --  30 29       I have personally reviewed the blood work as stated above and in my note  I have personally reviewed CXR imaging studies  I have personally reviewed internal medicine note

## 2023-01-13 NOTE — PLAN OF CARE
Problem: PHYSICAL THERAPY ADULT  Goal: Performs mobility at highest level of function for planned discharge setting  See evaluation for individualized goals  Description: Treatment/Interventions: Functional transfer training, LE strengthening/ROM, Elevations, Therapeutic exercise, Endurance training, Patient/family training, Bed mobility, Gait training          See flowsheet documentation for full assessment, interventions and recommendations  Outcome: Progressing  Note: Prognosis: Fair  Problem List: Decreased endurance, Impaired balance, Decreased mobility, Decreased safety awareness, Orthopedic restrictions  Assessment: Leonard Devries is a 67 y o  [facility] for Acute respiratory failure with hypoxia ([facility] [address] )  Pt  has a past medical history of Arthritis, Cancer ([facility] [address] ), History of radiation therapy (07/27/2018), Hyperlipidemia, and Hypertension    PT was consulted and pt was seen on 1/13/2023 for mobility assessment and d/c planning  Pt presents with impaired mobility, decreased balance, limited activity tolerance, and impaired functional mobility  Pt is currently functioning at [facility] for all transfers and gait using SPC, with excessive lateral sway, very limited single limb stance R>L  Pt will benefit from continued skilled IP PT to address the above mentioned impairments and initiate stair training in order to decrease risk of fall and maximize functional independence when completing mobility and ADLs  At this time PT recommendations for d/c are rehab vs home pending progress  Barriers to Discharge: Decreased caregiver support     PT Discharge Recommendation: Post acute rehabilitation services (vs home health pending progress)    See flowsheet documentation for full assessment

## 2023-01-13 NOTE — ASSESSMENT & PLAN NOTE
Lab Results   Component Value Date    EGFR 27 01/12/2023    EGFR 52 12/28/2022    EGFR 60 11/11/2022    CREATININE 2 26 (H) 01/12/2023    CREATININE 1 33 (H) 12/28/2022    CREATININE 1 18 11/11/2022     · Creatinine 2 2, baseline 1 0-1 3  · History of left nephrectomy and prostatectomy  · Reports decreased fluid intake and decreased urine output  · Appears fluid overloaded and has elevated BNP >4400  May be cardiorenal  · Hold lisinopril-HCTZ    Will give 1 dose of IV Lasix  · Close intake and output  · Check UA  · Avoid nephrotoxins   · Monitor BMP  · Nephrology consult

## 2023-01-13 NOTE — PLAN OF CARE
Problem: OCCUPATIONAL THERAPY ADULT  Goal: Performs self-care activities at highest level of function for planned discharge setting  See evaluation for individualized goals  Description: Treatment Interventions: ADL retraining, UE strengthening/ROM, Functional transfer training, Endurance training, Cognitive reorientation, Patient/family training, Equipment evaluation/education, Compensatory technique education, Energy conservation, Activityengagement          See flowsheet documentation for full assessment, interventions and recommendations  Note: Limitation: Decreased ADL status, Decreased UE strength, Decreased cognition, Decreased Safe judgement during ADL, Decreased UE ROM, Decreased endurance, Decreased self-care trans, Decreased high-level ADLs  Prognosis: Good  Assessment: Pt is a 67 y o  male seen for OT evaluation s/p admit to [facility] on 1/12/2023 w/ fall w/ left shoulder pain and Acute respiratory failure with hypoxia ([facility] [address] )  X-ray humerus/clavicle: No acute osseous abnormality  Pt utilized sling and NWB, ortho seen pt after and WBAT LE and sling for comfort  Comorbidities affecting pt's functional performance at time of assessment include: chronic diarrhea, h/o left nephrectomy, metastatic renal cell carcinoma, elevated d dimer, ARIANNA on CKD  Personal factors affecting pt at time of IE include:limited home support, difficulty performing ADLS, difficulty performing IADLS  and decreased initiation and engagement   Prior to admission, pt was living alone and reports  per pt independent w/ ADLs, independent w/ functional transfers and mobility w/ SPC, independent w/ IADLs, driving   Upon evaluation: Pt requires MIN assist supine>Sit bed mobility, MIN assist sit<>stand w/ VCs for hand placement and positioning, MIN assist x1 functional mobility w/ RW, MIN assist UB ADLs, MOD assist LB ADLs, MIN assist grooming 2* the following deficits impacting occupational performance: decreased strength and endurance, impaired balance, impaired activity tolerance, impaired functional reach, increased pain L UE  Pt to benefit from continued skilled OT tx while in the hospital to address deficits as defined above and maximize level of functional independence w ADL's and functional mobility  Occupational Performance areas to address include: grooming, bathing/shower, toilet hygiene, dressing, health maintenance, functional mobility, clothing management, cleaning and meal prep  From OT standpoint, recommendation at time of d/c would be short term rehab vs  home w/ family support and HOME OT pending progress  The patient's raw score on the AM-PAC Daily Activity inpatient short form is 17, standardized score is 37 26, less than 39 4  Patients at this level are likely to benefit from discharge to post-acute rehabilitation services  Please refer to the recommendation of the Occupational Therapist for safe discharge planning       OT Discharge Recommendation: Post acute rehabilitation services

## 2023-01-13 NOTE — PROGRESS NOTES
[address] [place] [address]  Progress Note - Anne Payment 1950, 67 y o  male MRN: [id number]  Unit/Bed#: E4 -01 Encounter: [id number]  Primary Care Provider: Clay Nava MD   Date and time admitted to hospital: 1/12/2023  5:37 PM    * Acute respiratory failure with hypoxia ([facility] [address] )  Assessment & Plan  · RN reports O2 sat 83 to 84%, requiring 2LNC  Not maintained on home oxygen  · Multifactorial in setting of pulmonary mets, obesity, deconditioning and concern for acute CHF  Elevated D dimer 4 25,  · NM study ordered, pending  · Echo ordered to evaluate possible CHF  · Diuresing well, now on room air  · IV lasix per nephrology, 20mg IV BID    Injury of left shoulder  Assessment & Plan  · Patient reports stretching out left arm, it got caught on the edge of the wall and hyperextended, heard a snap  Now having pain and decreased ROM  · Sling placed in ED  · CT upper extremity ordered  · LUE weightbearing restrictions   · Orthopedic surgery consult    Fall at home, initial encounter  Assessment & Plan  · Mechanical fall at home yesterday, tripped over an area rug, landed on right side  Denies head strike    Not anticoagulated  · PT OT consult    H/O left nephrectomy  Assessment & Plan  · History of metastatic renal cell cancer s/p left nephrectomy and prostatectomy    Acute kidney injury superimposed on CKD [facility])  Assessment & Plan  Lab Results   Component Value Date    EGFR 33 01/13/2023    EGFR 27 01/12/2023    EGFR 52 12/28/2022    CREATININE 1 91 (H) 01/13/2023    CREATININE 2 26 (H) 01/12/2023    CREATININE 1 33 (H) 12/28/2022     · Creatinine 2 2, baseline 1 0-1 3  · History of left nephrectomy and prostatectomy  · Hold lisinopril-HCTZ  · Continue diuresis with per nephrology  · Renal functions with slight improvement    Metastatic renal cell carcinoma ([facility] [address] )  Assessment & Plan  · History of localized renal cell carcinoma in August 2007, developed metastatic disease in   · Progression of mets to lung, spine, and abdominal lymph nodes  On systemic therapy  · Status post left nephrectomy and prostatectomy  · Followed outpatient by oncology      Elevated D-dimer  Given history of renal cell carcinoma, with metastatic disease patient does have risk factors of developing DVT  Elevated D-dimer for admission  VQ scan gives intermittent possibility of acute PE, will start heparin drip    VTE Pharmacologic Prophylaxis:   Pharmacologic: Heparin  Mechanical VTE Prophylaxis in Place: Yes    Patient Centered Rounds: I have performed bedside rounds with nursing staff today  Discussions with Specialists or Other Care Team Provider: Nephro    Education and Discussions with Family / Patient: brother bedside    Current Length of Stay: 1 day(s)    Current Patient Status: Inpatient   Certification Statement: The patient will continue to require additional inpatient hospital stay due to monitor renal functions, echo, NM study    Discharge Plan: pending    Code Status: Level 1 - Full Code      Subjective:   No events overnight  Reports breathing better  Has good UOP  Denies any complaints  Objective:     Vitals:   Temp (24hrs), Av 8 °F (36 6 °C), Min:97 5 °F (36 4 °C), Max:98 1 °F (36 7 °C)    Temp:  [97 5 °F (36 4 °C)-98 1 °F (36 7 °C)] 97 5 °F (36 4 °C)  HR:  [85-97] 91  Resp:  [15-18] 18  BP: (116-143)/(58-94) 129/74  SpO2:  [89 %-94 %] 90 %  Body mass index is 38 92 kg/m²  Input and Output Summary (last 24 hours): Intake/Output Summary (Last 24 hours) at 2023 1414  Last data filed at 2023 1401  Gross per 24 hour   Intake 890 ml   Output 2800 ml   Net -1910 ml       Physical Exam:     Physical Exam  Vitals and nursing note reviewed  Constitutional:       Appearance: Normal appearance  He is obese  HENT:      Head: Normocephalic and atraumatic  Eyes:      General: No scleral icterus       Conjunctiva/sclera: Conjunctivae normal    Cardiovascular:      Rate and Rhythm: Normal rate and regular rhythm  Pulmonary:      Effort: Pulmonary effort is normal  No respiratory distress  Breath sounds: No wheezing  Comments: Diminished breath sounds bilaterally  Abdominal:      General: Bowel sounds are normal  There is no distension  Palpations: Abdomen is soft  Musculoskeletal:         General: No swelling  Right lower leg: No edema  Left lower leg: No edema  Skin:     General: Skin is warm and dry  Neurological:      General: No focal deficit present  Mental Status: He is alert  Mental status is at baseline  Additional Data:     Labs:    Results from last 7 days   Lab Units 01/13/23  0620   WBC Thousand/uL 7 05   HEMOGLOBIN g/dL 12 5   HEMATOCRIT % 42 6   PLATELETS Thousands/uL 254   NEUTROS PCT % 67   LYMPHS PCT % 23   MONOS PCT % 7   EOS PCT % 2     Results from last 7 days   Lab Units 01/13/23  0427   SODIUM mmol/L 144   POTASSIUM mmol/L 5 1   CHLORIDE mmol/L 108   CO2 mmol/L 27   BUN mg/dL 41*   CREATININE mg/dL 1 91*   ANION GAP mmol/L 9   CALCIUM mg/dL 8 6   ALBUMIN g/dL 2 5*   TOTAL BILIRUBIN mg/dL 0 67   ALK PHOS U/L 74   ALT U/L 11*   AST U/L 30   GLUCOSE RANDOM mg/dL 79     Results from last 7 days   Lab Units 01/12/23  1853   INR  1 00                       * I Have Reviewed All Lab Data Listed Above  * Additional Pertinent Lab Tests Reviewed: Arabella 65 Admission Reviewed    Imaging:    XR chest 1 view portable    Result Date: 1/13/2023  Impression: Blunting in the costophrenic angle suggesting trace costophrenic angle effusions bilaterally, new when compared with November 14, 2022  Crowding of lung markings secondary to low lung volumes  Pulmonary nodules subtle, better visualized on prior CT  Expansile posterior right 7th rib metastatic lesion reidentified  Workstation performed: [id number]     XR clavicle LEFT    Result Date: 1/12/2023  Impression: No acute osseous abnormality   Workstation performed: [id number]     XR scapula LEFT    Result Date: 1/13/2023  Impression: Mixed sclerotic and lytic metastatic tumor mass associated with the scapular neck and glenoid, appears to be similar when compared to CT of November 14, 2022  No acute fracture or dislocation  Workstation performed: [id number]     XR shoulder 2+ views LEFT    Result Date: 1/12/2023  Impression: No acute osseous abnormality  Workstation performed: [id number]     XR humerus LEFT    Result Date: 1/12/2023  Impression: No acute osseous abnormality   Workstation performed: [id number]       Recent Cultures (last 7 days):           Last 24 Hours Medication List:   Current Facility-Administered Medications   Medication Dose Route Frequency Provider Last Rate   • acetaminophen  975 mg Oral 4x Daily PRN IRENE Case     • aluminum-magnesium hydroxide-simethicone  30 mL Oral Q6H PRN IRENE Case     • amLODIPine  5 mg Oral HS IRENE Whipple     • cholecalciferol  1,000 Units Oral Daily IRENE Case     • diphenhydrAMINE  25 mg Oral HS PRN IRENE Case     • furosemide  20 mg Intravenous BID (diuretic) IRENE Whipple     • gabapentin  300 mg Oral TID IRENE Case     • heparin (porcine)  5,000 Units Subcutaneous [facility] IRENE Case     • HYDROmorphone  0 2 mg Intravenous Q4H PRN IRENE Case     • loperamide  2 mg Oral 4x Daily PRN Dottie Nunes MD     • multivitamin stress formula  1 tablet Oral Daily IRENE Case     • ondansetron  4 mg Intravenous Q6H PRN IRENE Case     • oxyCODONE  2 5 mg Oral 4x Daily PRN IRENE Case     • oxyCODONE  5 mg Oral 4x Daily PRN IREEN Case     • simethicone  80 mg Oral 4x Daily PRN IRENE Case     • traMADol  50 mg Oral 4x Daily PRN IRENE Case          Today, Patient Was Seen By: Dottie Nunes MD    ** Please Note: Dictation voice to text software may have been used in the creation of this document   **

## 2023-01-13 NOTE — ASSESSMENT & PLAN NOTE
· Mechanical fall at home yesterday, tripped over an area rug, landed on right side  Denies head strike    Not anticoagulated  · PT OT consult

## 2023-01-13 NOTE — ASSESSMENT & PLAN NOTE
· RN reports O2 sat 83 to 84%, requiring 2LNC  Not maintained on home oxygen  · Multifactorial in setting of pulmonary mets, obesity, deconditioning and concern for acute CHF  Elevated D dimer 4 25, DDX includes PE   · 4+ B/LLE edema  BNP >4400   No baseline on file, no echo on file  · Concern for vascular congestion on CXR  · Respiratory viral panel negative  · Check echo  · Check ambulatory pulse ox prior to discharge

## 2023-01-13 NOTE — ASSESSMENT & PLAN NOTE
· BNP >4400  No baseline on file, no echo on file  · 4+ B/LLE edema  · CXR appears vascularly congested  Follow-up official radiology reading  · Hx of left nephrectomy  Presents with elevated creatinine; possibly cardiorenal ARIANNA  Will  give 1 dose of IV Lasix  · Check echo  · Monitor daily weight, I&O   Low Na diet with fluid restriction

## 2023-01-13 NOTE — ASSESSMENT & PLAN NOTE
Lab Results   Component Value Date    EGFR 33 01/13/2023    EGFR 27 01/12/2023    EGFR 52 12/28/2022    CREATININE 1 91 (H) 01/13/2023    CREATININE 2 26 (H) 01/12/2023    CREATININE 1 33 (H) 12/28/2022     · Creatinine 2 2, baseline 1 0-1 3  · History of left nephrectomy and prostatectomy  · Hold lisinopril-HCTZ  · Continue diuresis with per nephrology  · Renal functions with slight improvement

## 2023-01-14 LAB
ANION GAP SERPL CALCULATED.3IONS-SCNC: 6 MMOL/L (ref 4–13)
APTT PPP: 194 SECONDS (ref 23–37)
APTT PPP: 82 SECONDS (ref 23–37)
APTT PPP: 87 SECONDS (ref 23–37)
BUN SERPL-MCNC: 32 MG/DL (ref 5–25)
CALCIUM SERPL-MCNC: 8.1 MG/DL (ref 8.3–10.1)
CHLORIDE SERPL-SCNC: 106 MMOL/L (ref 96–108)
CO2 SERPL-SCNC: 30 MMOL/L (ref 21–32)
CREAT SERPL-MCNC: 1.6 MG/DL (ref 0.6–1.3)
EOSINOPHIL NFR URNS MANUAL: 0 %
GFR SERPL CREATININE-BSD FRML MDRD: 42 ML/MIN/1.73SQ M
GLUCOSE SERPL-MCNC: 86 MG/DL (ref 65–140)
POTASSIUM SERPL-SCNC: 4.1 MMOL/L (ref 3.5–5.3)
SODIUM SERPL-SCNC: 142 MMOL/L (ref 135–147)

## 2023-01-14 RX ADMIN — B-COMPLEX W/ C & FOLIC ACID TAB 1 TABLET: TAB at 08:23

## 2023-01-14 RX ADMIN — LOPERAMIDE HYDROCHLORIDE 2 MG: 2 CAPSULE ORAL at 11:13

## 2023-01-14 RX ADMIN — GABAPENTIN 300 MG: 300 CAPSULE ORAL at 17:39

## 2023-01-14 RX ADMIN — FUROSEMIDE 20 MG: 10 INJECTION, SOLUTION INTRAVENOUS at 08:23

## 2023-01-14 RX ADMIN — GABAPENTIN 300 MG: 300 CAPSULE ORAL at 22:41

## 2023-01-14 RX ADMIN — FUROSEMIDE 20 MG: 10 INJECTION, SOLUTION INTRAVENOUS at 17:28

## 2023-01-14 RX ADMIN — HEPARIN SODIUM 15 UNITS/KG/HR: 10000 INJECTION, SOLUTION INTRAVENOUS at 08:23

## 2023-01-14 RX ADMIN — Medication 1000 UNITS: at 08:23

## 2023-01-14 RX ADMIN — HEPARIN SODIUM 15 UNITS/KG/HR: 10000 INJECTION, SOLUTION INTRAVENOUS at 18:11

## 2023-01-14 RX ADMIN — GABAPENTIN 300 MG: 300 CAPSULE ORAL at 08:23

## 2023-01-14 NOTE — PROGRESS NOTES
Orthopaedic Surgery - Progress Note  Kevin Rojas (8 y o  male)   : 1950   MRN: [id number]  Date: 2023   Encounter: [id number]   Unit/Bed#: E4 -01    Assessment / Plan  Left pathologic scapula body fracture with minimal displacement and large lytic lesion in glenoid vault    · Recommend sling for comfort  · NWB LUE  · Elbow ROM and shoulder pendulums daily  · Likely nonsurgical treatment  · Will discuss with Dr Dinesh Patrick our orthopaedic oncologist    Subjective  Pt reports moderate left shoulder pain and inability to raise his arm  No interval changes since yesterday  His CT of the shoulder shows a pathologic scapular body fracture with minimal displacement  Vitals  Temp:  [97 5 °F (36 4 °C)-98 5 °F (36 9 °C)] 98 5 °F (36 9 °C)  HR:  [] 93  Resp:  [18-20] 20  BP: (115-160)/(70-90) 123/74  Body mass index is 37 64 kg/m²  I/O last 24 hours:   In: -   Out: 2575 [Urine:2575]    Ortho Exam - Left Upper Extremity  · Diffuse shoulder tenderness  · Active FE 20, ER 60  · Passive   · Strength testing limited by shoulder pain  · Sensation intact throughout LUE  · 2+ radial pulse    Lab Results  (I have personally reviewed pertinent lab results )  Results from last 7 days   Lab Units 23  1752 23  0620 23  1853   WBC Thousand/uL 7 54 7 05 6 85   HEMOGLOBIN g/dL 13 0 12 5 12 7   HEMATOCRIT % 44 0 42 6 43 3   PLATELETS Thousands/uL 247 254 225     Results from last 7 days   Lab Units 23  0954 23  0057 23  1752 23  1853   PTT seconds 87* 194* 37 34   INR   --   --  1 05 1 00     Results from last 7 days   Lab Units 23  0516 23  0427 23  1853   POTASSIUM mmol/L 4 1 5 1 5 2   CHLORIDE mmol/L 106 108 108   CO2 mmol/L 30 27 28   BUN mg/dL 32* 41* 44*   CREATININE mg/dL 1 60* 1 91* 2 26*   EGFR ml/min/1 73sq m 42 33 27   CALCIUM mg/dL 8 1* 8 6 8 7   ALK PHOS U/L  --  74 81   ALT U/L  --  11* 14   AST U/L  --  30 29               Carl Ines Dougherty MD

## 2023-01-14 NOTE — ASSESSMENT & PLAN NOTE
· History of localized renal cell carcinoma in August 2007, developed metastatic disease in 2013  · Metastatic disease to the lungs, abdominal lymph nodes  · Follows with oncology Dr Richard Acevedo outpatient  · On previous visits, notes patient with progressive disease currently on tivozanib

## 2023-01-14 NOTE — PROGRESS NOTES
NEPHROLOGY PROGRESS NOTE   Anne Joseph 67 y o  male MRN: [id number]  Unit/Bed#: E4 -01 Encounter: [id number]        ASSESSMENT & PLAN     1  Acute kidney injury-present on admission likely autoregulatory failure, rule out cardiomyopathy-Baseline creatinine is 1-1 3 he has had episodes of acute kidney injury in the past   His peak creatinine was 2 2 and is improved to 1 91  He is nonoliguric and made over 2 L of urine he has a few hyaline casts and a few white blood cells in his urine he has no peripheral eosinophilia  He was on an ACE inhibitor and was taking NSAIDs as well as an outpatient  --Holding lisinopril hydrochlorothiazide, will attempt some gentle diuresis with Lasix 20 mg IV twice daily, blood pressures are stable, wean oxygen     2  Hypertension-on Norvasc 5 mg daily, lisinopril/hydrochlorothiazide 20/12 5 mg daily-holding lisinopril hydrochlorothiazide at this time     3  Renal cell carcinoma-since 2007 and with metastatic disease in 2013-currently following with hematology, currently on nivolumab     4  Mild hyperkalemia-in the setting of autoregulatory failure, ACE inhibition and NSAID use has resolved     5  Acute hypoxic respiratory failure-O2 sat low, BNP elevated, echocardiogram pending, consider multiple side effects associated nivolumab which include nephritis, myocarditis, pericarditis (less likely given current data), edema ongoing evaluation, positive diarrhea, gentle diuresis until workup complete    SUBJECTIVE:    Patient was seen today  Not sob  Feels like he is not urinating more then usual  But otherwise stable    12 point review of systems was otherwise negative besides what is mentioned above      Medications:    Current Facility-Administered Medications:   •  acetaminophen (TYLENOL) tablet 975 mg, 975 mg, Oral, 4x Daily PRN, IRENE Sandoval  •  aluminum-magnesium hydroxide-simethicone (MYLANTA) oral suspension 30 mL, 30 mL, Oral, Q6H PRN, IRENE Sandoval  • amLODIPine (NORVASC) tablet 5 mg, 5 mg, Oral, HS, Rich Blu, CRNP, 5 mg at 01/13/23 2156  •  cholecalciferol (VITAMIN D3) tablet 1,000 Units, 1,000 Units, Oral, Daily, Priscella Umangen, CRNP, 1,000 Units at 01/14/23 3736  •  diphenhydrAMINE (BENADRYL) tablet 25 mg, 25 mg, Oral, HS PRN, SOM WaggonerNP  •  furosemide (LASIX) injection 20 mg, 20 mg, Intravenous, BID (diuretic), Rich Blu, CRNP, 20 mg at 01/14/23 0575  •  gabapentin (NEURONTIN) capsule 300 mg, 300 mg, Oral, TID, Priscella Tu, CRNP, 300 mg at 01/14/23 3311  •  heparin (porcine) 25,000 units in 0 45% NaCl 250 mL infusion (premix), 3-30 Units/kg/hr (Order-Specific), Intravenous, Titrated, Cindi Lackey MD, Last Rate: 17 3 mL/hr at 01/14/23 0823, 15 Units/kg/hr at 01/14/23 0823  •  heparin (porcine) injection 4,600 Units, 4,600 Units, Intravenous, Q6H PRN, Cindi Lackey MD  •  heparin (porcine) injection 9,200 Units, 9,200 Units, Intravenous, Q6H PRN, Cindi Lackey MD  •  HYDROmorphone HCl (DILAUDID) injection 0 2 mg, 0 2 mg, Intravenous, Q4H PRN, IRENE Waggoner  •  loperamide (IMODIUM) capsule 2 mg, 2 mg, Oral, 4x Daily PRN, Cindi Lackey MD, 2 mg at 01/13/23 1058  •  multivitamin stress formula tablet 1 tablet, 1 tablet, Oral, Daily, IRENE Waggoner, 1 tablet at 01/14/23 1478  •  ondansetron (ZOFRAN) injection 4 mg, 4 mg, Intravenous, Q6H PRN, SOM WaggonerNP  •  oxyCODONE (ROXICODONE) IR tablet 2 5 mg, 2 5 mg, Oral, 4x Daily PRN, IRENE Waggoner  •  oxyCODONE (ROXICODONE) IR tablet 5 mg, 5 mg, Oral, 4x Daily PRN, IRENE Waggoner  •  simethicone (MYLICON) chewable tablet 80 mg, 80 mg, Oral, 4x Daily PRN, IRENE Waggoner  •  traMADol (ULTRAM) tablet 50 mg, 50 mg, Oral, 4x Daily PRN, IRENE Waggoner, 50 mg at 01/13/23 0844    OBJECTIVE:    Vitals:    01/13/23 2342 01/14/23 0322 01/14/23 0553 01/14/23 0809   BP: 115/70 119/72  123/74   BP Location: Right arm Right arm  Right arm   Pulse: 70 75  93   Resp: 20 18  20   Temp: 97 9 °F (36 6 °C) 97 7 °F (36 5 °C)  98 5 °F (36 9 °C)   TempSrc: Temporal Temporal  Temporal   SpO2: 94% 94%  92%   Weight:   112 kg (247 lb 9 2 oz)    Height:            Temp:  [97 5 °F (36 4 °C)-98 5 °F (36 9 °C)] 98 5 °F (36 9 °C)  HR:  [] 93  Resp:  [18-20] 20  BP: (115-160)/(70-90) 123/74  SpO2:  [90 %-96 %] 92 %     Body mass index is 37 64 kg/m²  Weight (last 2 days)     Date/Time Weight    01/14/23 0553 112 (247 58)    01/13/23 0600 116 (255 95)    01/12/23 2248 116 (256 84)    01/12/23 1733 118 (260 8)          I/O last 3 completed shifts:   In: 56 [P O :390; IV Piggyback:500]  Out: 4200 [Urine:4200]    I/O this shift:  In: -   Out: 400 [Urine:400]      Physical exam:    General: no acute distress, cooperative  Eyes: conjunctivae pink, anicteric sclerae  ENT: lips and mucous membranes moist, no exudates, normal external ears  Neck: ROM intact, no JVD  Chest: No respiratory distress, no accessory muscle use  CVS: normal rate, non pericardial friction rub  Abdomen: soft, non-tender, non-distended, normoactive bowel sounds  Extremities: edema improved  Skin: no rash  Neuro: awake, alert, oriented, grossly intact  Psych:  Pleasant affect    Invasive Devices:      Lab Results:   Results from last 7 days   Lab Units 01/14/23  0516 01/13/23  1752 01/13/23  0620 01/13/23  0427 01/12/23  2353 01/12/23  1853   WBC Thousand/uL  --  7 54 7 05  --   --  6 85   HEMOGLOBIN g/dL  --  13 0 12 5  --   --  12 7   HEMATOCRIT %  --  44 0 42 6  --   --  43 3   PLATELETS Thousands/uL  --  247 254  --   --  225   POTASSIUM mmol/L 4 1  --   --  5 1  --  5 2   CHLORIDE mmol/L 106  --   --  108  --  108   CO2 mmol/L 30  --   --  27  --  28   BUN mg/dL 32*  --   --  41*  --  44*   CREATININE mg/dL 1 60*  --   --  1 91*  --  2 26*   CALCIUM mg/dL 8 1*  --   --  8 6  --  8 7   ALK PHOS U/L  --   --   --  74  --  81   ALT U/L  --   --   --  11*  --  14 AST U/L  --   --   --  30  --  29   LEUKOCYTES UA   --   --   --   --  Negative  --    BLOOD UA   --   --   --   --  Negative  --          Portions of the record may have been created with voice recognition software  Occasional wrong word or "sound a like" substitutions may have occurred due to the inherent limitations of voice recognition software  Read the chart carefully and recognize, using context, where substitutions have occurred  If you have any questions, please contact the dictating provider

## 2023-01-14 NOTE — CASE MANAGEMENT
Case Management Assessment & Discharge Planning Note    Patient name Moi Moody  Location [facility] 4 /E4 MS 18-* MRN [id number]  : 1950 Date 2023       Current Admission Date: 2023  Current Admission Diagnosis:Acute respiratory failure with hypoxia [facility])   Patient Active Problem List    Diagnosis Date Noted   • Acute kidney injury superimposed on CKD ([facility] ) 2023   • H/O left nephrectomy 2023   • Fall at home, initial encounter 2023   • Injury of left shoulder 2023   • Acute respiratory failure with hypoxia ([facility] ) 2023   • Elevated brain natriuretic peptide (BNP) level 2023   • Elevated d-dimer 2023   • Chronic diarrhea 2023   • COVID-19 2022   • Azotemia 10/31/2019   • Hx of prostatectomy 2019   • History of prostate cancer 2019   • Metastatic renal cell carcinoma ([facility] ) 2018   • Spine metastasis ([facility] ) 2018   • Clear cell adenocarcinoma of kidney, 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   • Bone metastases ([facility] ) 2018   • Lung metastases ([facility] ) 2018      LOS (days): 2  Geometric Mean LOS (GMLOS) (days): 4 30  Days to GMLOS:2 5     OBJECTIVE:    Risk of Unplanned Readmission Score: 14 43         Current admission status: Inpatient  Referral Reason: Rehab    Preferred Pharmacy:   [facility], [address]  [facility]  [address]  [facility] [address]  Phone: [phone] Fax: 20 [address]  [address] [address]  Phone: [phone] Fax: [phone]    [facility] [address]  [address] [address]  Phone: [phone] Fax: [phone] [facility], [address] [place] [address]  [address]  [address] [address]  Phone: [phone] Fax: [phone]    [facility], [address] [facility] [address]  Phone: [phone] Fax: [phone] [address], [address] [facility]  [address] [facility] [address]  Phone: [phone] Fax: [phone]    Primary Care Provider: Clay Nava MD    Primary Insurance: MEDICARE  Secondary Insurance: [place] [address] SHIELD    ASSESSMENT:  Active Health Care Proxies    There are no active Health Care Proxies on file  Readmission Root Cause  30 Day Readmission: No    Patient Information  Admitted from[de-identified] Home  Mental Status: Alert  During Assessment patient was accompanied by: Other-Comment, Son (grandson)  Assessment information provided by[de-identified] Patient, Son  Primary Caregiver: Self  Support Systems: Son, Self, Family members  [place] of Residence: YouHelp do you live in?: [place] entry access options   Select all that apply : Stairs  Number of steps to enter home : 5 (5 in the front and 5 in the rear)  Do the steps have railings?: Yes  Type of Current Residence: [address] [place] home  Upon entering residence, is there a bedroom on the main floor (no further steps)?: No  A bedroom is located on the following floor levels of residence (select all that apply):: 2nd Floor  Upon entering residence, is there a bathroom on the main floor (no further steps)?: No  Indicate which floors of current residence have a bathroom (select all the apply):: 2nd Floor  Number of steps to 2nd floor from main floor: One Flight  In the last 12 months, was there a time when you were not able to pay the mortgage or rent on time?: No  In the last 12 months, was there a time when you did not have a steady place to sleep or slept in a shelter (including now)?: No  Homeless/housing insecurity resource given?: No  Living Arrangements: Lives Alone    Activities of Daily Living Prior to Admission  Functional Status: Independent  Completes ADLs independently?: Yes  Ambulates independently?: Yes  Does patient use assisted devices?: Yes  Assisted Devices (DME) used: Straight Cane  Does patient currently own DME?: Yes  What DME does the patient currently own?: Straight Cane  Does the patient have a history of Short-Term Rehab?: Yes ([facility])  Does patient have a history of HHC?: No  Does patient currently have Angelo Ellsworth?: No         Patient Information Continued  Income Source: Employed (part time as a  for car dealship)  Does patient have prescription coverage?: Yes  Within the past 12 months, you worried that your food would run out before you got the money to buy more : Never true  Within the past 12 months, the food you bought just didn't last and you didn't have money to get more : Never true  Food insecurity resource given?: No  Does patient receive dialysis treatments?: No  Does patient have a history of substance abuse?: No  Does patient have a history of Mental Health Diagnosis?: No         Means of Transportation  Means of Transport to Appts[de-identified] Drives Self  In the past 12 months, has lack of transportation kept you from meetings, work, or from getting things needed for daily living?: No  Was application for public transport provided?: No        DISCHARGE DETAILS:    Discharge planning discussed with[de-identified] Patient and son  Freedom of Choice: Yes  Comments - Freedom of Choice: Agreeable to [facility] rehab  Interested in going to [address] [place] [address]  or [facility]  CM contacted family/caregiver?: Yes  Were Treatment Team discharge recommendations reviewed with patient/caregiver?: Yes  Did patient/caregiver verbalize understanding of patient care needs?: N/A- going to facility  Were patient/caregiver advised of the risks associated with not following Treatment Team discharge recommendations?: Yes    Contacts  Patient Contacts: Eanrest Paredes  Relationship to Patient[de-identified] Family  Contact Method:  In Person  Reason/Outcome: Continuity of Care, Discharge [address]         Is the patient interested in HHC at discharge?: No    DME Referral Provided  Referral made for DME?: No    Other Referral/Resources/Interventions Provided:  Interventions: Short Term Rehab  Referral Comments: [facility], [facility]         Treatment Team Recommendation: Short Term Rehab  Discharge Destination Plan[de-identified] Short Term Rehab  Transport at Discharge : Family      CM sent referrals to [facility], [address] and [facility] via [employer]  Awaiting determination

## 2023-01-14 NOTE — PLAN OF CARE
Problem: Potential for Falls  Goal: Patient will remain free of falls  Description: INTERVENTIONS:  - Educate patient/family on patient safety including physical limitations  - Instruct patient to call for assistance with activity   - Consult OT/PT to assist with strengthening/mobility   - Keep Call bell within reach  - Keep bed low and locked with side rails adjusted as appropriate  - Keep care items and personal belongings within reach  - Initiate and maintain comfort rounds  - Make Fall Risk Sign visible to staff  - Offer Toileting every 2 Hours, in advance of need  - Initiate/Maintain bed alarm  - Obtain necessary fall risk management equipment: alarms  - Apply yellow socks and bracelet for high fall risk patients  - Consider moving patient to room near nurses station  Outcome: Progressing     Problem: PAIN - ADULT  Goal: Verbalizes/displays adequate comfort level or baseline comfort level  Description: Interventions:  - Encourage patient to monitor pain and request assistance  - Assess pain using appropriate pain scale  - Administer analgesics based on type and severity of pain and evaluate response  - Implement non-pharmacological measures as appropriate and evaluate response  - Consider cultural and social influences on pain and pain management  - Notify physician/advanced practitioner if interventions unsuccessful or patient reports new pain  Outcome: Progressing     Problem: DISCHARGE PLANNING  Goal: Discharge to home or other facility with appropriate resources  Description: INTERVENTIONS:  - Identify barriers to discharge w/patient and caregiver  - Arrange for needed discharge resources and transportation as appropriate  - Identify discharge learning needs (meds, wound care, etc )  - Refer to Case Management Department for coordinating discharge planning if the patient needs post-hospital services based on physician/advanced practitioner order or complex needs related to functional status, cognitive ability, or social support system  Outcome: Progressing     Problem: Knowledge Deficit  Goal: Patient/family/caregiver demonstrates understanding of disease process, treatment plan, medications, and discharge instructions  Description: Complete learning assessment and assess knowledge base    Interventions:  - Provide teaching at level of understanding  - Provide teaching via preferred learning methods  Outcome: Progressing     Problem: CARDIOVASCULAR - ADULT  Goal: Maintains optimal cardiac output and hemodynamic stability  Description: INTERVENTIONS:  - Monitor I/O, vital signs and rhythm  - Monitor for S/S and trends of decreased cardiac output  - Administer and titrate ordered vasoactive medications to optimize hemodynamic stability  - Assess quality of pulses, skin color and temperature  - Assess for signs of decreased coronary artery perfusion  - Instruct patient to report change in severity of symptoms  Outcome: Progressing  Goal: Absence of cardiac dysrhythmias or at baseline rhythm  Description: INTERVENTIONS:  - Continuous cardiac monitoring, vital signs, obtain 12 lead EKG if ordered  - Administer antiarrhythmic and heart rate control medications as ordered  - Monitor electrolytes and administer replacement therapy as ordered  Outcome: Progressing     Problem: RESPIRATORY - ADULT  Goal: Achieves optimal ventilation and oxygenation  Description: INTERVENTIONS:  - Assess for changes in respiratory status  - Assess for changes in mentation and behavior  - Position to facilitate oxygenation and minimize respiratory effort  - Oxygen administered by appropriate delivery if ordered  - Initiate smoking cessation education as indicated  - Encourage broncho-pulmonary hygiene including cough, deep breathe, Incentive Spirometry  - Assess the need for suctioning and aspirate as needed  - Assess and instruct to report SOB or any respiratory difficulty  - Respiratory Therapy support as indicated  Outcome: Progressing Problem: MUSCULOSKELETAL - ADULT  Goal: Maintain proper alignment of affected body part  Description: INTERVENTIONS:  - Support, maintain and protect limb and body alignment  - Provide patient/ family with appropriate education  Outcome: Progressing     Problem: MOBILITY - ADULT  Goal: Maintain or return to baseline ADL function  Description: INTERVENTIONS:  -  Assess patient's ability to carry out ADLs; assess patient's baseline for ADL function and identify physical deficits which impact ability to perform ADLs (bathing, care of mouth/teeth, toileting, grooming, dressing, etc )  - Assess/evaluate cause of self-care deficits   - Assess range of motion  - Assess patient's mobility; develop plan if impaired  - Assess patient's need for assistive devices and provide as appropriate  - Encourage maximum independence but intervene and supervise when necessary  - Involve family in performance of ADLs  - Assess for home care needs following discharge   - Consider OT consult to assist with ADL evaluation and planning for discharge  - Provide patient education as appropriate  Outcome: Progressing  Goal: Maintains/Returns to pre admission functional level  Description: INTERVENTIONS:  - Perform BMAT or MOVE assessment daily    - Set and communicate daily mobility goal to care team and patient/family/caregiver  - Collaborate with rehabilitation services on mobility goals if consulted  - Perform Range of Motion  times a day  - Reposition patient every  hours    - Dangle patient  times a day  - Stand patient  times a day  - Ambulate patient  times a day  - Out of bed to chair  times a day   - Out of bed for meals times a day  - Out of bed for toileting  - Record patient progress and toleration of activity level   Outcome: Progressing

## 2023-01-14 NOTE — ASSESSMENT & PLAN NOTE
· Initially requiring 2LNC  Not maintained on home oxygen     · Multifactorial in setting of pulmonary mets, obesity, deconditioning and concern for acute CHF  · Elevated D-dimer, NM study showing indeterminate study, duplex ultrasound lower extremity negative for DVT  · 2D echo ordered, evaluate EF, valves and right heart strain  · Consider CTA PE once renal functions improved  · Currently diuresing well, requiring 2 L nasal cannula  · Continue heparin drip due to possible pulmonary embolism in the setting of active malignancy

## 2023-01-14 NOTE — PROGRESS NOTES
[address] [facility]  Progress Note - Gavi Ser 1950, 67 y o  male MRN: [id number]  Unit/Bed#: E4 -01 Encounter: [id number]  Primary Care Provider: Kye Mojica MD   Date and time admitted to hospital: 1/12/2023  5:37 PM    * Acute respiratory failure with hypoxia ([facility] )  Assessment & Plan  · Initially requiring 2LNC  Not maintained on home oxygen  · Multifactorial in setting of pulmonary mets, obesity, deconditioning and concern for acute CHF  · Elevated D-dimer, NM study showing indeterminate study, duplex ultrasound lower extremity negative for DVT  · 2D echo ordered, evaluate EF, valves and right heart strain  · Consider CTA PE once renal functions improved  · Currently diuresing well, requiring 2 L nasal cannula  · Continue heparin drip due to possible pulmonary embolism in the setting of active malignancy    Injury of left shoulder  Assessment & Plan  · Patient reports stretching out left arm, it got caught on the edge of the wall and hyperextended, heard a snap    Now having pain and decreased ROM  · Sling placed in ED  · X-ray of shoulder with mixed sclerotic and lytic metastatic tumor mass associated with the scapular neck and glenoid  · CT upper extremity read pending  · Orthopedics following    Metastatic renal cell carcinoma ([facility] )  Assessment & Plan  · History of localized renal cell carcinoma in August 2007, developed metastatic disease in 2013  · Metastatic disease to the lungs, abdominal lymph nodes  · Follows with oncology Dr Richard Acevedo outpatient  · On previous visits, notes patient with progressive disease currently on tivozanib    Elevated d-dimer  Assessment & Plan  · Elevated D-dimer 4 2 in setting of ARIANNA  · Lower extremity duplex negative for DVT  · VQ scan shows indeterminate study  · Anticoagulated prophylactically with heparin drip, patient with risk factors for PE with metastatic renal cell carcinoma    Fall at home, initial encounter  Assessment & Plan  · Mechanical fall at home yesterday, tripped over an area rug, landed on right side  Denies head strike  Not anticoagulated  · PT OT consult, recommending short-term rehab on discharge    H/O left nephrectomy  Assessment & Plan  · History of metastatic renal cell cancer s/p left nephrectomy and prostatectomy    Acute kidney injury superimposed on CKD [facility])  Assessment & Plan  Lab Results   Component Value Date    EGFR 42 2023    EGFR 33 2023    EGFR 27 2023    CREATININE 1 60 (H) 2023    CREATININE 1 91 (H) 2023    CREATININE 2 26 (H) 2023     · Creatinine 2 2, baseline 1 0-1 3  · History of left nephrectomy and prostatectomy  · Hold lisinopril-HCTZ  · Continue diuresis, good urine output with improving renal functions  · Appreciate nephrology input      VTE Pharmacologic Prophylaxis:   Pharmacologic: Heparin Drip  Mechanical VTE Prophylaxis in Place: Yes    Patient Centered Rounds: I have performed bedside rounds with nursing staff today  Discussions with Specialists or Other Care Team Provider: Orthopedics, nephrology    Current Length of Stay: 2 day(s)    Current Patient Status: Inpatient   Certification Statement: The patient will continue to require additional inpatient hospital stay due to Continue diuresis, pending 2D echo    Discharge Plan: pending    Code Status: Level 1 - Full Code      Subjective:   No events overnight, patient reports having good urine output  He denies any chest pain, shortness of breath, fevers or chills  Objective:     Vitals:   Temp (24hrs), Av °F (36 7 °C), Min:97 7 °F (36 5 °C), Max:98 5 °F (36 9 °C)    Temp:  [97 7 °F (36 5 °C)-98 5 °F (36 9 °C)] 98 5 °F (36 9 °C)  HR:  [] 94  Resp:  [18-20] 20  BP: (115-160)/(70-90) 124/80  SpO2:  [92 %-96 %] 92 %  Body mass index is 37 64 kg/m²  Input and Output Summary (last 24 hours):        Intake/Output Summary (Last 24 hours) at 2023 1300  Last data filed at 2023 1243  Gross per 24 hour   Intake 565 95 ml   Output 2825 ml   Net -2259 05 ml       Physical Exam:     Physical Exam  Vitals and nursing note reviewed  Constitutional:       Appearance: Normal appearance  He is obese  HENT:      Head: Normocephalic and atraumatic  Eyes:      General: No scleral icterus  Conjunctiva/sclera: Conjunctivae normal    Cardiovascular:      Rate and Rhythm: Normal rate and regular rhythm  Pulmonary:      Effort: Pulmonary effort is normal  No respiratory distress  Comments: Diminished breath sounds bilaterally  Abdominal:      General: Bowel sounds are normal  There is no distension  Palpations: Abdomen is soft  Musculoskeletal:         General: No swelling  Right lower leg: No edema  Left lower leg: No edema  Skin:     General: Skin is warm and dry  Neurological:      Mental Status: He is alert  Mental status is at baseline  Additional Data:     Labs:    Results from last 7 days   Lab Units 01/13/23  1752 01/13/23  0620   WBC Thousand/uL 7 54 7 05   HEMOGLOBIN g/dL 13 0 12 5   HEMATOCRIT % 44 0 42 6   PLATELETS Thousands/uL 247 254   NEUTROS PCT %  --  67   LYMPHS PCT %  --  23   MONOS PCT %  --  7   EOS PCT %  --  2     Results from last 7 days   Lab Units 01/14/23  0516 01/13/23  0427   SODIUM mmol/L 142 144   POTASSIUM mmol/L 4 1 5 1   CHLORIDE mmol/L 106 108   CO2 mmol/L 30 27   BUN mg/dL 32* 41*   CREATININE mg/dL 1 60* 1 91*   ANION GAP mmol/L 6 9   CALCIUM mg/dL 8 1* 8 6   ALBUMIN g/dL  --  2 5*   TOTAL BILIRUBIN mg/dL  --  0 67   ALK PHOS U/L  --  74   ALT U/L  --  11*   AST U/L  --  30   GLUCOSE RANDOM mg/dL 86 79     Results from last 7 days   Lab Units 01/13/23  1752   INR  1 05                       * I Have Reviewed All Lab Data Listed Above  * Additional Pertinent Lab Tests Reviewed:  All Labs For Current Hospital Admission Reviewed    Imaging:    XR chest 1 view portable    Result Date: 1/13/2023  Impression: Blunting in the costophrenic angle suggesting trace costophrenic angle effusions bilaterally, new when compared with November 14, 2022  Crowding of lung markings secondary to low lung volumes  Pulmonary nodules subtle, better visualized on prior CT  Expansile posterior right 7th rib metastatic lesion reidentified  Workstation performed: [id number]     XR clavicle LEFT    Result Date: 1/12/2023  Impression: No acute osseous abnormality  Workstation performed: [id number]     XR scapula LEFT    Result Date: 1/13/2023  Impression: Mixed sclerotic and lytic metastatic tumor mass associated with the scapular neck and glenoid, appears to be similar when compared to CT of November 14, 2022  No acute fracture or dislocation  Workstation performed: [id number]     XR shoulder 2+ views LEFT    Result Date: 1/12/2023  Impression: No acute osseous abnormality  Workstation performed: [id number]     XR humerus LEFT    Result Date: 1/12/2023  Impression: No acute osseous abnormality  Workstation performed: [id number]     NM lung ventilation / perfusion    Result Date: 1/13/2023  Impression: The probability for pulmonary embolus is intermediate or indeterminant  Overall the ventilation appears worse but PE is not excluded  The study was marked in [facility] for immediate notification   Workstation performed: [id number]       Recent Cultures (last 7 days):           Last 24 Hours Medication List:   Current Facility-Administered Medications   Medication Dose Route Frequency Provider Last Rate   • acetaminophen  975 mg Oral 4x Daily PRN SOM MendoazNP     • aluminum-magnesium hydroxide-simethicone  30 mL Oral Q6H PRN IRENE Mendoza     • amLODIPine  5 mg Oral HS IRENE Thomas     • cholecalciferol  1,000 Units Oral Daily IRENE Mendoza     • diphenhydrAMINE  25 mg Oral HS PRN SOM MendozaNP     • furosemide  20 mg Intravenous BID (diuretic) IRENE Thomas     • gabapentin  300 mg Oral TID Tona Alba Araceli Joseph     • heparin (porcine)  3-30 Units/kg/hr (Order-Specific) Intravenous Titrated Carol Recinos MD 15 Units/kg/hr (01/14/23 1200)   • heparin (porcine)  4,600 Units Intravenous Q6H PRN Carol Recinos MD     • heparin (porcine)  9,200 Units Intravenous Q6H PRN Carol Recinos MD     • HYDROmorphone  0 2 mg Intravenous Q4H PRN IRENE Hobbs     • loperamide  2 mg Oral 4x Daily PRN Carol Recinos MD     • multivitamin stress formula  1 tablet Oral Daily IRENE Hobbs     • ondansetron  4 mg Intravenous Q6H PRN IRENE Hobbs     • oxyCODONE  2 5 mg Oral 4x Daily PRN IRENE Hobbs     • oxyCODONE  5 mg Oral 4x Daily PRN IRENE Hobbs     • simethicone  80 mg Oral 4x Daily PRN IRENE Hobbs     • traMADol  50 mg Oral 4x Daily PRN IRENE Hobbs          Today, Patient Was Seen By: Carol Recinos MD    ** Please Note: Dictation voice to text software may have been used in the creation of this document   ** Strong peripheral pulses/Capillary refill less/equal to 2 seconds

## 2023-01-14 NOTE — ASSESSMENT & PLAN NOTE
Lab Results   Component Value Date    EGFR 42 01/14/2023    EGFR 33 01/13/2023    EGFR 27 01/12/2023    CREATININE 1 60 (H) 01/14/2023    CREATININE 1 91 (H) 01/13/2023    CREATININE 2 26 (H) 01/12/2023     · Creatinine 2 2, baseline 1 0-1 3  · History of left nephrectomy and prostatectomy  · Hold lisinopril-HCTZ  · Continue diuresis, good urine output with improving renal functions  · Appreciate nephrology input

## 2023-01-14 NOTE — ASSESSMENT & PLAN NOTE
· Patient reports stretching out left arm, it got caught on the edge of the wall and hyperextended, heard a snap    Now having pain and decreased ROM  · Sling placed in ED  · X-ray of shoulder with mixed sclerotic and lytic metastatic tumor mass associated with the scapular neck and glenoid  · CT upper extremity read pending  · Orthopedics following

## 2023-01-14 NOTE — ASSESSMENT & PLAN NOTE
· Mechanical fall at home yesterday, tripped over an area rug, landed on right side  Denies head strike    Not anticoagulated  · PT OT consult, recommending short-term rehab on discharge

## 2023-01-14 NOTE — PLAN OF CARE
Problem: Potential for Falls  Goal: Patient will remain free of falls  Description: INTERVENTIONS:  - Educate patient/family on patient safety including physical limitations  - Instruct patient to call for assistance with activity   - Consult OT/PT to assist with strengthening/mobility   - Keep Call bell within reach  - Keep bed low and locked with side rails adjusted as appropriate  - Keep care items and personal belongings within reach  - Initiate and maintain comfort rounds  - Make Fall Risk Sign visible to staff  - Offer Toileting every 2 Hours, in advance of need  - Initiate/Maintain bed alarm  - Obtain necessary fall risk management equipment: alarms  - Apply yellow socks and bracelet for high fall risk patients  - Consider moving patient to room near nurses station  Outcome: Progressing     Problem: PAIN - ADULT  Goal: Verbalizes/displays adequate comfort level or baseline comfort level  Description: Interventions:  - Encourage patient to monitor pain and request assistance  - Assess pain using appropriate pain scale  - Administer analgesics based on type and severity of pain and evaluate response  - Implement non-pharmacological measures as appropriate and evaluate response  - Consider cultural and social influences on pain and pain management  - Notify physician/advanced practitioner if interventions unsuccessful or patient reports new pain  Outcome: Progressing     Problem: DISCHARGE PLANNING  Goal: Discharge to home or other facility with appropriate resources  Description: INTERVENTIONS:  - Identify barriers to discharge w/patient and caregiver  - Arrange for needed discharge resources and transportation as appropriate  - Identify discharge learning needs (meds, wound care, etc )  - Refer to Case Management Department for coordinating discharge planning if the patient needs post-hospital services based on physician/advanced practitioner order or complex needs related to functional status, cognitive ability, or social support system  Outcome: Progressing     Problem: Knowledge Deficit  Goal: Patient/family/caregiver demonstrates understanding of disease process, treatment plan, medications, and discharge instructions  Description: Complete learning assessment and assess knowledge base    Interventions:  - Provide teaching at level of understanding  - Provide teaching via preferred learning methods  Outcome: Progressing     Problem: CARDIOVASCULAR - ADULT  Goal: Maintains optimal cardiac output and hemodynamic stability  Description: INTERVENTIONS:  - Monitor I/O, vital signs and rhythm  - Monitor for S/S and trends of decreased cardiac output  - Administer and titrate ordered vasoactive medications to optimize hemodynamic stability  - Assess quality of pulses, skin color and temperature  - Assess for signs of decreased coronary artery perfusion  - Instruct patient to report change in severity of symptoms  Outcome: Progressing  Goal: Absence of cardiac dysrhythmias or at baseline rhythm  Description: INTERVENTIONS:  - Continuous cardiac monitoring, vital signs, obtain 12 lead EKG if ordered  - Administer antiarrhythmic and heart rate control medications as ordered  - Monitor electrolytes and administer replacement therapy as ordered  Outcome: Progressing     Problem: RESPIRATORY - ADULT  Goal: Achieves optimal ventilation and oxygenation  Description: INTERVENTIONS:  - Assess for changes in respiratory status  - Assess for changes in mentation and behavior  - Position to facilitate oxygenation and minimize respiratory effort  - Oxygen administered by appropriate delivery if ordered  - Initiate smoking cessation education as indicated  - Encourage broncho-pulmonary hygiene including cough, deep breathe, Incentive Spirometry  - Assess the need for suctioning and aspirate as needed  - Assess and instruct to report SOB or any respiratory difficulty  - Respiratory Therapy support as indicated  Outcome: Progressing Problem: MUSCULOSKELETAL - ADULT  Goal: Maintain proper alignment of affected body part  Description: INTERVENTIONS:  - Support, maintain and protect limb and body alignment  - Provide patient/ family with appropriate education  Outcome: Progressing     Problem: MOBILITY - ADULT  Goal: Maintain or return to baseline ADL function  Description: INTERVENTIONS:  -  Assess patient's ability to carry out ADLs; assess patient's baseline for ADL function and identify physical deficits which impact ability to perform ADLs (bathing, care of mouth/teeth, toileting, grooming, dressing, etc )  - Assess/evaluate cause of self-care deficits   - Assess range of motion  - Assess patient's mobility; develop plan if impaired  - Assess patient's need for assistive devices and provide as appropriate  - Encourage maximum independence but intervene and supervise when necessary  - Involve family in performance of ADLs  - Assess for home care needs following discharge   - Consider OT consult to assist with ADL evaluation and planning for discharge  - Provide patient education as appropriate  Outcome: Progressing  Goal: Maintains/Returns to pre admission functional level  Description: INTERVENTIONS:  - Perform BMAT or MOVE assessment daily    - Set and communicate daily mobility goal to care team and patient/family/caregiver  - Collaborate with rehabilitation services on mobility goals if consulted  - Perform Range of Motion 4 times a day  - Reposition patient every 4 hours    - Dangle patient 4 times a day  - Stand patient 4 times a day  - Ambulate patient 4 times a day  - Out of bed to chair 4 times a day   - Out of bed for meals 4 times a day  - Out of bed for toileting  - Record patient progress and toleration of activity level   Outcome: Progressing

## 2023-01-15 LAB
ANION GAP SERPL CALCULATED.3IONS-SCNC: 6 MMOL/L (ref 4–13)
APTT PPP: 80 SECONDS (ref 23–37)
BUN SERPL-MCNC: 28 MG/DL (ref 5–25)
CALCIUM SERPL-MCNC: 8.8 MG/DL (ref 8.3–10.1)
CHLORIDE SERPL-SCNC: 101 MMOL/L (ref 96–108)
CO2 SERPL-SCNC: 33 MMOL/L (ref 21–32)
CREAT SERPL-MCNC: 1.46 MG/DL (ref 0.6–1.3)
GFR SERPL CREATININE-BSD FRML MDRD: 47 ML/MIN/1.73SQ M
GLUCOSE SERPL-MCNC: 109 MG/DL (ref 65–140)
GLUCOSE SERPL-MCNC: 125 MG/DL (ref 65–140)
POTASSIUM SERPL-SCNC: 3.9 MMOL/L (ref 3.5–5.3)
SODIUM SERPL-SCNC: 140 MMOL/L (ref 135–147)

## 2023-01-15 RX ORDER — ACETAMINOPHEN 325 MG/1
975 TABLET ORAL 4 TIMES DAILY PRN
Status: DISCONTINUED | OUTPATIENT
Start: 2023-01-15 | End: 2023-01-19 | Stop reason: HOSPADM

## 2023-01-15 RX ORDER — OXYCODONE HYDROCHLORIDE 5 MG/1
2.5 TABLET ORAL 4 TIMES DAILY PRN
Status: DISCONTINUED | OUTPATIENT
Start: 2023-01-15 | End: 2023-01-19 | Stop reason: HOSPADM

## 2023-01-15 RX ADMIN — GABAPENTIN 300 MG: 300 CAPSULE ORAL at 21:38

## 2023-01-15 RX ADMIN — B-COMPLEX W/ C & FOLIC ACID TAB 1 TABLET: TAB at 09:41

## 2023-01-15 RX ADMIN — FUROSEMIDE 20 MG: 10 INJECTION, SOLUTION INTRAVENOUS at 16:40

## 2023-01-15 RX ADMIN — FUROSEMIDE 20 MG: 10 INJECTION, SOLUTION INTRAVENOUS at 09:42

## 2023-01-15 RX ADMIN — GABAPENTIN 300 MG: 300 CAPSULE ORAL at 09:42

## 2023-01-15 RX ADMIN — GABAPENTIN 300 MG: 300 CAPSULE ORAL at 16:39

## 2023-01-15 RX ADMIN — HEPARIN SODIUM 15 UNITS/KG/HR: 10000 INJECTION, SOLUTION INTRAVENOUS at 09:44

## 2023-01-15 RX ADMIN — Medication 1000 UNITS: at 09:42

## 2023-01-15 NOTE — PROGRESS NOTES
[address] [facility]  Progress Note - Daphney Head 1950, 67 y o  male MRN: [id number]  Unit/Bed#: E4 -01 Encounter: [id number]  Primary Care Provider: Yimi Cavazos MD   Date and time admitted to hospital: 1/12/2023  5:37 PM    * Acute respiratory failure with [facility] ([facility] )  Assessment & Plan  · Found to be hypoxic with O2 sats less than 90 requiring 2L NC, not on home o2 at baseline  · Multifactorial in setting of pulmonary mets, obesity, deconditioning and concern for acute CHF  · Elevated D-dimer, NM study showing indeterminate study, duplex ultrasound lower extremity negative for DVT  · 2D echo ordered and pending, evaluate EF, valves and right heart strain  · Consider CTA PE once renal functions improved  · Currently diuresing well, requiring 2 L nasal cannula  · Continue heparin drip due to possible pulmonary embolism in the setting of active malignancy    Injury of left shoulder  Assessment & Plan  · Patient reports stretching out left arm, it got caught on the edge of the wall and hyperextended, heard a snap    Now having pain and decreased ROM  · Sling placed in ED  · X-ray of shoulder with mixed sclerotic and lytic metastatic tumor mass associated with the scapular neck and glenoid  · CT upper extremity read pending  · Orthopedics following    Metastatic renal cell carcinoma ([facility] )  Assessment & Plan  · History of localized renal cell carcinoma in August 2007, developed metastatic disease in 2013  · Metastatic disease to the lungs, abdominal lymph nodes  · Follows with oncology Dr Maryjane Woo outpatient  · On previous visits, notes patient with progressive disease currently on tivozanib    Elevated d-dimer  Assessment & Plan  · Elevated D-dimer 4 2 in setting of ARIANNA  · Lower extremity duplex negative for DVT  · VQ scan shows indeterminate study  · Anticoagulated prophylactically with heparin drip, patient with risk factors for PE with metastatic renal cell carcinoma    Fall at home, initial encounter  Assessment & Plan  · Mechanical fall at home yesterday, tripped over an area rug, landed on right side  Denies head strike  Not anticoagulated  · PT OT consult, recommending short-term rehab on discharge    H/O left nephrectomy  Assessment & Plan  · History of metastatic renal cell cancer s/p left nephrectomy and prostatectomy    Acute kidney injury superimposed on CKD [facility])  Assessment & Plan  Lab Results   Component Value Date    EGFR 47 01/15/2023    EGFR 42 2023    EGFR 33 2023    CREATININE 1 46 (H) 01/15/2023    CREATININE 1 60 (H) 2023    CREATININE 1 91 (H) 2023     · Creatinine 2 2, baseline 1 0-1 3  · History of left nephrectomy and prostatectomy  · Hold lisinopril-HCTZ  · Continue diuresis, good urine output with improving renal functions  · Appreciate nephrology input        VTE Pharmacologic Prophylaxis:    Pharmacologic: Heparin Drip  Mechanical VTE Prophylaxis in Place: Yes    Patient Centered Rounds: I have performed bedside rounds with nursing staff today  Discussions with Specialists or Other Care Team Provider: nephrology    Education and Discussions with Family / Patient: son in person    Current Length of Stay: 3 day(s)    Current Patient Status: Inpatient   Certification Statement: The patient will continue to require additional inpatient hospital stay due to IV diuresis    Discharge Plan: pending     Code Status: Level 1 - Full Code    Subjective:   Patient reports doing well, appears slightly confused  Continues to have good urine output with net -2 L  Renal functions with slight improvement  Denies any chest pain, shortness of breath, nausea or vomiting  Tolerating diet      Objective:     Vitals:   Temp (24hrs), Av 1 °F (36 7 °C), Min:97 7 °F (36 5 °C), Max:98 8 °F (37 1 °C)    Temp:  [97 7 °F (36 5 °C)-98 8 °F (37 1 °C)] 97 7 °F (36 5 °C)  HR:  [] 94  Resp:  [20] 20  BP: (113-137)/(59-81) 114/60  SpO2: [95 %-96 %] 96 %  Body mass index is 37 17 kg/m²  Input and Output Summary (last 24 hours): Intake/Output Summary (Last 24 hours) at 1/15/2023 1040  Last data filed at 1/15/2023 0255  Gross per 24 hour   Intake 86 5 ml   Output 1500 ml   Net -1413 5 ml       Physical Exam:     Physical Exam  Vitals and nursing note reviewed  Constitutional:       Appearance: Normal appearance  He is obese  HENT:      Head: Normocephalic and atraumatic  Eyes:      General: No scleral icterus  Conjunctiva/sclera: Conjunctivae normal    Cardiovascular:      Rate and Rhythm: Normal rate and regular rhythm  Pulmonary:      Effort: Pulmonary effort is normal  No respiratory distress  Breath sounds: No wheezing  Comments: Diminished breath sounds bilaterally  Abdominal:      General: Bowel sounds are normal  There is no distension  Palpations: Abdomen is soft  Musculoskeletal:         General: No swelling  Right lower leg: No edema  Left lower leg: No edema  Skin:     General: Skin is warm and dry  Neurological:      General: No focal deficit present  Mental Status: He is alert  Mental status is at baseline           Additional Data:     Labs:    Results from last 7 days   Lab Units 01/13/23  1752 01/13/23  0620   WBC Thousand/uL 7 54 7 05   HEMOGLOBIN g/dL 13 0 12 5   HEMATOCRIT % 44 0 42 6   PLATELETS Thousands/uL 247 254   NEUTROS PCT %  --  67   LYMPHS PCT %  --  23   MONOS PCT %  --  7   EOS PCT %  --  2     Results from last 7 days   Lab Units 01/15/23  0853 01/14/23  0516 01/13/23  0427   SODIUM mmol/L 140   < > 144   POTASSIUM mmol/L 3 9   < > 5 1   CHLORIDE mmol/L 101   < > 108   CO2 mmol/L 33*   < > 27   BUN mg/dL 28*   < > 41*   CREATININE mg/dL 1 46*   < > 1 91*   ANION GAP mmol/L 6   < > 9   CALCIUM mg/dL 8 8   < > 8 6   ALBUMIN g/dL  --   --  2 5*   TOTAL BILIRUBIN mg/dL  --   --  0 67   ALK PHOS U/L  --   --  74   ALT U/L  --   --  11*   AST U/L  --   --  30   GLUCOSE RANDOM mg/dL 125   < > 79    < > = values in this interval not displayed  Results from last 7 days   Lab Units 01/13/23  1752   INR  1 05                       * I Have Reviewed All Lab Data Listed Above  * Additional Pertinent Lab Tests Reviewed: [facility] 65 Admission Reviewed    Imaging:    XR chest 1 view portable    Result Date: 1/13/2023  Impression: Blunting in the costophrenic angle suggesting trace costophrenic angle effusions bilaterally, new when compared with November 14, 2022  Crowding of lung markings secondary to low lung volumes  Pulmonary nodules subtle, better visualized on prior CT  Expansile posterior right 7th rib metastatic lesion reidentified  Workstation performed: [id number]     XR clavicle LEFT    Result Date: 1/12/2023  Impression: No acute osseous abnormality  Workstation performed: [id number]     XR scapula LEFT    Result Date: 1/13/2023  Impression: Mixed sclerotic and lytic metastatic tumor mass associated with the scapular neck and glenoid, appears to be similar when compared to CT of November 14, 2022  No acute fracture or dislocation  Workstation performed: [id number]     XR shoulder 2+ views LEFT    Result Date: 1/12/2023  Impression: No acute osseous abnormality  Workstation performed: [id number]     XR humerus LEFT    Result Date: 1/12/2023  Impression: No acute osseous abnormality  Workstation performed: [id number]     NM lung ventilation / perfusion    Result Date: 1/13/2023  Impression: The probability for pulmonary embolus is intermediate or indeterminant  Overall the ventilation appears worse but PE is not excluded  The study was marked in [facility] for immediate notification   Workstation performed: [id number]     CT upper extremity wo contrast left    Result Date: 1/14/2023  Impression: Compared to the 11/14/2022 CT, again seen is a lytic metastasis in the scapular glenoid process, measuring 2 9 x 2 4 x 2 0 cm, with cortical breakthrough and extraosseous extension anteriorly, though without pathologic fracture (series 2 image 36 ) There is a new 2 4 x 2 1 x 1 9 cm lytic metastasis in the left scapular body, with acute pathologic fracture (series 2 image 39 ) There is another new 1 9 x 0 8 x 1 4 cm lytic metastasis in the inferior scapular angle, with pathologic fracture (series 2 images 48 - 55 ) Partially visualized are pulmonary metastatic disease   Workstation performed: [id number]     Recent Cultures (last 7 days):           Last 24 Hours Medication List:   Current Facility-Administered Medications   Medication Dose Route Frequency Provider Last Rate   • acetaminophen  975 mg Oral 4x Daily PRN IRENE Case     • aluminum-magnesium hydroxide-simethicone  30 mL Oral Q6H PRN IRENE Case     • amLODIPine  5 mg Oral HS IRENE Whipple     • cholecalciferol  1,000 Units Oral Daily IRENE Case     • diphenhydrAMINE  25 mg Oral HS PRN IRENE Case     • furosemide  20 mg Intravenous BID (diuretic) IRENE Whipple     • gabapentin  300 mg Oral TID IRENE Case     • heparin (porcine)  3-30 Units/kg/hr (Order-Specific) Intravenous Titrated Dottie Nunes MD 15 Units/kg/hr (01/15/23 0998)   • heparin (porcine)  4,600 Units Intravenous Q6H PRN Dottie Nunes MD     • heparin (porcine)  9,200 Units Intravenous Q6H PRN Dottie Nunes MD     • HYDROmorphone  0 2 mg Intravenous Q4H PRN IRENE Case     • loperamide  2 mg Oral 4x Daily PRN Dottie Nunes MD     • multivitamin stress formula  1 tablet Oral Daily IRENE Case     • ondansetron  4 mg Intravenous Q6H PRN IRENE Case     • oxyCODONE  2 5 mg Oral 4x Daily PRN IRENE Case     • oxyCODONE  5 mg Oral 4x Daily PRN IRENE Case     • simethicone  80 mg Oral 4x Daily PRN IRENE Case     • traMADol  50 mg Oral 4x Daily PRN IRENE Case          Today, Patient Was Seen By: Radhames Morrell MD    ** Please Note: Dictation voice to text software may have been used in the creation of this document   **

## 2023-01-15 NOTE — ASSESSMENT & PLAN NOTE
· Initially requiring 2LNC  Not maintained on home oxygen     · Multifactorial in setting of pulmonary mets, obesity, deconditioning and concern for acute CHF  · Elevated D-dimer, NM study showing indeterminate study, duplex ultrasound lower extremity negative for DVT  · 2D echo ordered and pending, evaluate EF, valves and right heart strain  · Consider CTA PE once renal functions improved  · Currently diuresing well, requiring 2 L nasal cannula  · Continue heparin drip due to possible pulmonary embolism in the setting of active malignancy

## 2023-01-15 NOTE — ASSESSMENT & PLAN NOTE
· Found to be hypoxic with O2 sats less than 90 requiring 2L NC, not on home o2 at baseline  · Multifactorial in setting of pulmonary mets, obesity, deconditioning and concern for acute CHF  · Elevated D-dimer, NM study showing indeterminate study, duplex ultrasound lower extremity negative for DVT  · 2D echo ordered and pending, evaluate EF, valves and right heart strain  · Consider CTA PE once renal functions improved, will discuss with nephrology  · Currently diuresing well, requiring 1-2 L nasal cannula  · Continue heparin drip due to possible pulmonary embolism in the setting of active malignancy

## 2023-01-15 NOTE — PLAN OF CARE
Problem: Potential for Falls  Goal: Patient will remain free of falls  Description: INTERVENTIONS:  - Educate patient/family on patient safety including physical limitations  - Instruct patient to call for assistance with activity   - Consult OT/PT to assist with strengthening/mobility   - Keep Call bell within reach  - Keep bed low and locked with side rails adjusted as appropriate  - Keep care items and personal belongings within reach  - Initiate and maintain comfort rounds  - Make Fall Risk Sign visible to staff  - Offer Toileting every 2 Hours, in advance of need  - Initiate/Maintain bed alarm  - Obtain necessary fall risk management equipment: alarms  - Apply yellow socks and bracelet for high fall risk patients  - Consider moving patient to room near nurses station  Outcome: Progressing     Problem: PAIN - ADULT  Goal: Verbalizes/displays adequate comfort level or baseline comfort level  Description: Interventions:  - Encourage patient to monitor pain and request assistance  - Assess pain using appropriate pain scale  - Administer analgesics based on type and severity of pain and evaluate response  - Implement non-pharmacological measures as appropriate and evaluate response  - Consider cultural and social influences on pain and pain management  - Notify physician/advanced practitioner if interventions unsuccessful or patient reports new pain  Outcome: Progressing     Problem: DISCHARGE PLANNING  Goal: Discharge to home or other facility with appropriate resources  Description: INTERVENTIONS:  - Identify barriers to discharge w/patient and caregiver  - Arrange for needed discharge resources and transportation as appropriate  - Identify discharge learning needs (meds, wound care, etc )  - Refer to Case Management Department for coordinating discharge planning if the patient needs post-hospital services based on physician/advanced practitioner order or complex needs related to functional status, cognitive ability, or social support system  Outcome: Progressing     Problem: CARDIOVASCULAR - ADULT  Goal: Maintains optimal cardiac output and hemodynamic stability  Description: INTERVENTIONS:  - Monitor I/O, vital signs and rhythm  - Monitor for S/S and trends of decreased cardiac output  - Administer and titrate ordered vasoactive medications to optimize hemodynamic stability  - Assess quality of pulses, skin color and temperature  - Assess for signs of decreased coronary artery perfusion  - Instruct patient to report change in severity of symptoms  Outcome: Progressing  Goal: Absence of cardiac dysrhythmias or at baseline rhythm  Description: INTERVENTIONS:  - Continuous cardiac monitoring, vital signs, obtain 12 lead EKG if ordered  - Administer antiarrhythmic and heart rate control medications as ordered  - Monitor electrolytes and administer replacement therapy as ordered  Outcome: Progressing     Problem: RESPIRATORY - ADULT  Goal: Achieves optimal ventilation and oxygenation  Description: INTERVENTIONS:  - Assess for changes in respiratory status  - Assess for changes in mentation and behavior  - Position to facilitate oxygenation and minimize respiratory effort  - Oxygen administered by appropriate delivery if ordered  - Initiate smoking cessation education as indicated  - Encourage broncho-pulmonary hygiene including cough, deep breathe, Incentive Spirometry  - Assess the need for suctioning and aspirate as needed  - Assess and instruct to report SOB or any respiratory difficulty  - Respiratory Therapy support as indicated  Outcome: Progressing     Problem: MUSCULOSKELETAL - ADULT  Goal: Maintain proper alignment of affected body part  Description: INTERVENTIONS:  - Support, maintain and protect limb and body alignment  - Provide patient/ family with appropriate education  Outcome: Progressing     Problem: MOBILITY - ADULT  Goal: Maintain or return to baseline ADL function  Description: INTERVENTIONS:  -  Assess patient's ability to carry out ADLs; assess patient's baseline for ADL function and identify physical deficits which impact ability to perform ADLs (bathing, care of mouth/teeth, toileting, grooming, dressing, etc )  - Assess/evaluate cause of self-care deficits   - Assess range of motion  - Assess patient's mobility; develop plan if impaired  - Assess patient's need for assistive devices and provide as appropriate  - Encourage maximum independence but intervene and supervise when necessary  - Involve family in performance of ADLs  - Assess for home care needs following discharge   - Consider OT consult to assist with ADL evaluation and planning for discharge  - Provide patient education as appropriate  Outcome: Progressing  Goal: Maintains/Returns to pre admission functional level  Description: INTERVENTIONS:  - Perform BMAT or MOVE assessment daily    - Set and communicate daily mobility goal to care team and patient/family/caregiver  - Collaborate with rehabilitation services on mobility goals if consulted  - Perform Range of Motion 4 times a day  - Reposition patient every 2 hours    - Dangle patient 4 times a day  - Stand patient 4 times a day  - Ambulate patient 4 times a day  - Out of bed to chair 4 times a day   - Out of bed for meals 3 times a day  - Out of bed for toileting  - Record patient progress and toleration of activity level   Outcome: Progressing

## 2023-01-15 NOTE — ASSESSMENT & PLAN NOTE
· History of localized renal cell carcinoma in August 2007, developed metastatic disease in 2013  · Metastatic disease to the lungs, abdominal lymph nodes  · Follows with oncology Dr Landy Boss outpatient  · On previous visits, notes patient with progressive disease currently on tivozanib

## 2023-01-15 NOTE — PLAN OF CARE
Problem: Potential for Falls  Goal: Patient will remain free of falls  Description: INTERVENTIONS:  - Educate patient/family on patient safety including physical limitations  - Instruct patient to call for assistance with activity   - Consult OT/PT to assist with strengthening/mobility   - Keep Call bell within reach  - Keep bed low and locked with side rails adjusted as appropriate  - Keep care items and personal belongings within reach  - Initiate and maintain comfort rounds  - Make Fall Risk Sign visible to staff  - Offer Toileting every 2 Hours, in advance of need  - Initiate/Maintain bed alarm  - Obtain necessary fall risk management equipment: alarms  - Apply yellow socks and bracelet for high fall risk patients  - Consider moving patient to room near nurses station  Outcome: Progressing     Problem: PAIN - ADULT  Goal: Verbalizes/displays adequate comfort level or baseline comfort level  Description: Interventions:  - Encourage patient to monitor pain and request assistance  - Assess pain using appropriate pain scale  - Administer analgesics based on type and severity of pain and evaluate response  - Implement non-pharmacological measures as appropriate and evaluate response  - Consider cultural and social influences on pain and pain management  - Notify physician/advanced practitioner if interventions unsuccessful or patient reports new pain  Outcome: Progressing     Problem: DISCHARGE PLANNING  Goal: Discharge to home or other facility with appropriate resources  Description: INTERVENTIONS:  - Identify barriers to discharge w/patient and caregiver  - Arrange for needed discharge resources and transportation as appropriate  - Identify discharge learning needs (meds, wound care, etc )  - Refer to Case Management Department for coordinating discharge planning if the patient needs post-hospital services based on physician/advanced practitioner order or complex needs related to functional status, cognitive ability, or social support system  Outcome: Progressing     Problem: Knowledge Deficit  Goal: Patient/family/caregiver demonstrates understanding of disease process, treatment plan, medications, and discharge instructions  Description: Complete learning assessment and assess knowledge base    Interventions:  - Provide teaching at level of understanding  - Provide teaching via preferred learning methods  Outcome: Progressing     Problem: CARDIOVASCULAR - ADULT  Goal: Maintains optimal cardiac output and hemodynamic stability  Description: INTERVENTIONS:  - Monitor I/O, vital signs and rhythm  - Monitor for S/S and trends of decreased cardiac output  - Administer and titrate ordered vasoactive medications to optimize hemodynamic stability  - Assess quality of pulses, skin color and temperature  - Assess for signs of decreased coronary artery perfusion  - Instruct patient to report change in severity of symptoms  Outcome: Progressing  Goal: Absence of cardiac dysrhythmias or at baseline rhythm  Description: INTERVENTIONS:  - Continuous cardiac monitoring, vital signs, obtain 12 lead EKG if ordered  - Administer antiarrhythmic and heart rate control medications as ordered  - Monitor electrolytes and administer replacement therapy as ordered  Outcome: Progressing     Problem: RESPIRATORY - ADULT  Goal: Achieves optimal ventilation and oxygenation  Description: INTERVENTIONS:  - Assess for changes in respiratory status  - Assess for changes in mentation and behavior  - Position to facilitate oxygenation and minimize respiratory effort  - Oxygen administered by appropriate delivery if ordered  - Initiate smoking cessation education as indicated  - Encourage broncho-pulmonary hygiene including cough, deep breathe, Incentive Spirometry  - Assess the need for suctioning and aspirate as needed  - Assess and instruct to report SOB or any respiratory difficulty  - Respiratory Therapy support as indicated  Outcome: Progressing Problem: MUSCULOSKELETAL - ADULT  Goal: Maintain proper alignment of affected body part  Description: INTERVENTIONS:  - Support, maintain and protect limb and body alignment  - Provide patient/ family with appropriate education  Outcome: Progressing     Problem: MOBILITY - ADULT  Goal: Maintain or return to baseline ADL function  Description: INTERVENTIONS:  -  Assess patient's ability to carry out ADLs; assess patient's baseline for ADL function and identify physical deficits which impact ability to perform ADLs (bathing, care of mouth/teeth, toileting, grooming, dressing, etc )  - Assess/evaluate cause of self-care deficits   - Assess range of motion  - Assess patient's mobility; develop plan if impaired  - Assess patient's need for assistive devices and provide as appropriate  - Encourage maximum independence but intervene and supervise when necessary  - Involve family in performance of ADLs  - Assess for home care needs following discharge   - Consider OT consult to assist with ADL evaluation and planning for discharge  - Provide patient education as appropriate  Outcome: Progressing  Goal: Maintains/Returns to pre admission functional level  Description: INTERVENTIONS:  - Perform BMAT or MOVE assessment daily    - Set and communicate daily mobility goal to care team and patient/family/caregiver     - Out of bed for toileting  - Record patient progress and toleration of activity level   Outcome: Progressing

## 2023-01-15 NOTE — PROGRESS NOTES
NEPHROLOGY PROGRESS NOTE   Denise Cook 67 y o  male MRN: [id number]  Unit/Bed#: E4 -01 Encounter: [id number]        ASSESSMENT & PLAN    1  Acute kidney injury-present on admission likely autoregulatory failure, ruling out cardiomyopathy echo pending-Baseline creatinine is 1-1 3 he has had episodes of acute kidney injury in the past   His peak creatinine was 2 2 and is improved to 1  80   He is nonoliguric and made about 2 L of urine he has a few hyaline casts and a few white blood cells in his urine he has no peripheral eosinophilia   He was on an ACE inhibitor and was taking NSAIDs as well as an outpatient  --Holding lisinopril hydrochlorothiazide, will attempt some gentle diuresis with Lasix 20 mg IV twice daily, blood pressures are stable, wean oxygen, creatinine trending downward     2  Hypertension-on Norvasc 5 mg daily, lisinopril/hydrochlorothiazide 20/12 5 mg daily-holding lisinopril hydrochlorothiazide at this time     3  Renal cell carcinoma metastatic-since 2007 and with metastatic disease in 2013-currently following with hematology, currently on nivolumab     4  Mild hyperkalemia-in the setting of autoregulatory failure, ACE inhibition and NSAID use has resolved     5  Acute hypoxic respiratory failure-O2 sat low, BNP elevated, echocardiogram pending, consider multiple side effects associated nivolumab which include nephritis, myocarditis, pericarditis (less likely given current data), edema ongoing evaluation, positive diarrhea, gentle diuresis until workup complete    DISCUSSION: continue lasix 20 mg iv bid  Weight decreased remains on o2    SUBJECTIVE:    Patient was seen  No cp, sob, fevers, chills  Sitting up  Urinating without difficulty    12 point review of systems was otherwise negative besides what is mentioned above      Medications:    Current Facility-Administered Medications:   •  acetaminophen (TYLENOL) tablet 975 mg, 975 mg, Oral, 4x Daily PRN, IRENE Flores  • aluminum-magnesium hydroxide-simethicone (MYLANTA) oral suspension 30 mL, 30 mL, Oral, Q6H PRN, Milo Kaylynn, CRNP  •  amLODIPine (NORVASC) tablet 5 mg, 5 mg, Oral, HS, Shruthi Matos, CRNP, 5 mg at 01/13/23 2156  •  cholecalciferol (VITAMIN D3) tablet 1,000 Units, 1,000 Units, Oral, Daily, Milo Kaylynn, CRNP, 1,000 Units at 01/15/23 0138  •  diphenhydrAMINE (BENADRYL) tablet 25 mg, 25 mg, Oral, HS PRN, Milo Kaylynn, CRNP  •  furosemide (LASIX) injection 20 mg, 20 mg, Intravenous, BID (diuretic), Shruthi Merritton, CRNP, 20 mg at 01/15/23 1383  •  gabapentin (NEURONTIN) capsule 300 mg, 300 mg, Oral, TID, Milo Kaylynn, CRNP, 300 mg at 01/15/23 8422  •  heparin (porcine) 25,000 units in 0 45% NaCl 250 mL infusion (premix), 3-30 Units/kg/hr (Order-Specific), Intravenous, Titrated, Raoul Jackman MD, Last Rate: 17 3 mL/hr at 01/15/23 0944, 15 Units/kg/hr at 01/15/23 0944  •  heparin (porcine) injection 4,600 Units, 4,600 Units, Intravenous, Q6H PRN, Raoul Jackman MD  •  heparin (porcine) injection 9,200 Units, 9,200 Units, Intravenous, Q6H PRN, Raoul Jackman MD  •  HYDROmorphone HCl (DILAUDID) injection 0 2 mg, 0 2 mg, Intravenous, Q4H PRN, Milo Kaylynn, CRNP  •  loperamide (IMODIUM) capsule 2 mg, 2 mg, Oral, 4x Daily PRN, Raoul Jackman MD, 2 mg at 01/14/23 1113  •  multivitamin stress formula tablet 1 tablet, 1 tablet, Oral, Daily, Milo Kaylynn, CRNP, 1 tablet at 01/15/23 0941  •  ondansetron (ZOFRAN) injection 4 mg, 4 mg, Intravenous, Q6H PRN, Milo Kaylynn, CRNP  •  oxyCODONE (ROXICODONE) IR tablet 2 5 mg, 2 5 mg, Oral, 4x Daily PRN, IRENE Matthews  •  oxyCODONE (ROXICODONE) IR tablet 5 mg, 5 mg, Oral, 4x Daily PRN, IRENE Matthews  •  simethicone (MYLICON) chewable tablet 80 mg, 80 mg, Oral, 4x Daily PRN, IRENE Matthews  •  traMADol (ULTRAM) tablet 50 mg, 50 mg, Oral, 4x Daily PRN, IRENE Matthews, 50 mg at 01/13/23 3114    OBJECTIVE:    Vitals:    01/14/23 2352 01/15/23 0255 01/15/23 0600 01/15/23 0856   BP: 137/81 113/77  114/60   BP Location: Right arm Right arm  Right arm   Pulse: 100 88  94   Resp: 20 20  20   Temp: 98 1 °F (36 7 °C) 97 9 °F (36 6 °C)  97 7 °F (36 5 °C)   TempSrc: Temporal Temporal  Temporal   SpO2: 96% 95%  96%   Weight:   111 kg (244 lb 7 8 oz)    Height:            Temp:  [97 7 °F (36 5 °C)-98 8 °F (37 1 °C)] 97 7 °F (36 5 °C)  HR:  [] 94  Resp:  [20] 20  BP: (113-137)/(59-81) 114/60  SpO2:  [95 %-96 %] 96 %     Body mass index is 37 17 kg/m²  Weight (last 2 days)     Date/Time Weight    01/15/23 0600 111 (244 49)    01/14/23 0553 112 (247 58)    01/13/23 0600 116 (255 95)          I/O last 3 completed shifts: In: 720 [P O :240; I V :326]  Out: 3300 [Urine:3300]    No intake/output data recorded        Physical exam:    General: no acute distress, cooperative  Eyes: conjunctivae pink, anicteric sclerae  ENT: lips and mucous membranes moist, no exudates, normal external ears  Neck: ROM intact, no JVD  Chest: No respiratory distress, no accessory muscle use  CVS: normal rate, non pericardial friction rub  Abdomen: soft, non-tender, non-distended, normoactive bowel sounds  Extremities: no edema of both legs  Skin: no rash  Neuro: awake, alert, oriented, grossly intact  Psych:  Pleasant affect    Invasive Devices:      Lab Results:   Results from last 7 days   Lab Units 01/15/23  0853 01/14/23  0516 01/13/23  1752 01/13/23  0620 01/13/23  0427 01/12/23  2353 01/12/23  1853   WBC Thousand/uL  --   --  7 54 7 05  --   --  6 85   HEMOGLOBIN g/dL  --   --  13 0 12 5  --   --  12 7   HEMATOCRIT %  --   --  44 0 42 6  --   --  43 3   PLATELETS Thousands/uL  --   --  247 254  --   --  225   POTASSIUM mmol/L 3 9 4 1  --   --  5 1  --  5 2   CHLORIDE mmol/L 101 106  --   --  108  --  108   CO2 mmol/L 33* 30  --   --  27  --  28   BUN mg/dL 28* 32*  --   --  41*  --  44*   CREATININE mg/dL 1 46* 1 60*  -- --  1 91*  --  2 26*   CALCIUM mg/dL 8 8 8 1*  --   --  8 6  --  8 7   ALK PHOS U/L  --   --   --   --  74  --  81   ALT U/L  --   --   --   --  11*  --  14   AST U/L  --   --   --   --  30  --  29   LEUKOCYTES UA   --   --   --   --   --  Negative  --    BLOOD UA   --   --   --   --   --  Negative  --          Portions of the record may have been created with voice recognition software  Occasional wrong word or "sound a like" substitutions may have occurred due to the inherent limitations of voice recognition software  Read the chart carefully and recognize, using context, where substitutions have occurred  If you have any questions, please contact the dictating provider

## 2023-01-15 NOTE — ASSESSMENT & PLAN NOTE
· Elevated D-dimer 4 2 in setting of ARIANNA  · Lower extremity duplex negative for DVT  · VQ scan shows indeterminate study  · Anticoagulated prophylactically with heparin drip, patient with risk factors for PE with metastatic renal cell carcinoma

## 2023-01-15 NOTE — ASSESSMENT & PLAN NOTE
Lab Results   Component Value Date    EGFR 47 01/15/2023    EGFR 42 01/14/2023    EGFR 33 01/13/2023    CREATININE 1 46 (H) 01/15/2023    CREATININE 1 60 (H) 01/14/2023    CREATININE 1 91 (H) 01/13/2023     · Creatinine 2 2, baseline 1 0-1 3  · History of left nephrectomy and prostatectomy  · Hold lisinopril-HCTZ  · Continue diuresis, good urine output with improving renal functions  · Appreciate nephrology input

## 2023-01-16 ENCOUNTER — APPOINTMENT (INPATIENT)
Dept: NON INVASIVE DIAGNOSTICS | Facility: HOSPITAL | Age: 73
End: 2023-01-16

## 2023-01-16 ENCOUNTER — APPOINTMENT (INPATIENT)
Dept: CT IMAGING | Facility: HOSPITAL | Age: 73
End: 2023-01-16

## 2023-01-16 LAB
ANION GAP SERPL CALCULATED.3IONS-SCNC: 9 MMOL/L (ref 4–13)
AORTIC VALVE MEAN VELOCITY: 9.1 M/S
APICAL FOUR CHAMBER EJECTION FRACTION: 66 %
APTT PPP: 79 SECONDS (ref 23–37)
AV AREA BY CONTINUOUS VTI: 2.7 CM2
AV AREA PEAK VELOCITY: 2.5 CM2
AV LVOT MEAN GRADIENT: 2 MMHG
AV LVOT PEAK GRADIENT: 5 MMHG
AV MEAN GRADIENT: 4 MMHG
AV PEAK GRADIENT: 7 MMHG
AV VALVE AREA: 2.71 CM2
AV VELOCITY RATIO: 0.88
BUN SERPL-MCNC: 27 MG/DL (ref 5–25)
CALCIUM SERPL-MCNC: 8.8 MG/DL (ref 8.3–10.1)
CHLORIDE SERPL-SCNC: 99 MMOL/L (ref 96–108)
CO2 SERPL-SCNC: 31 MMOL/L (ref 21–32)
CREAT SERPL-MCNC: 1.36 MG/DL (ref 0.6–1.3)
DOP CALC AO PEAK VEL: 1.3 M/S
DOP CALC AO VTI: 21.95 CM
DOP CALC LVOT AREA: 2.83 CM2
DOP CALC LVOT DIAMETER: 1.9 CM
DOP CALC LVOT PEAK VEL VTI: 21 CM
DOP CALC LVOT PEAK VEL: 1.14 M/S
DOP CALC LVOT STROKE INDEX: 27.5 ML/M2
DOP CALC LVOT STROKE VOLUME: 59.51 CM3
E WAVE DECELERATION TIME: 231 MS
FRACTIONAL SHORTENING: 28 % (ref 28–44)
GFR SERPL CREATININE-BSD FRML MDRD: 51 ML/MIN/1.73SQ M
GLUCOSE SERPL-MCNC: 113 MG/DL (ref 65–140)
INTERVENTRICULAR SEPTUM IN DIASTOLE (PARASTERNAL SHORT AXIS VIEW): 1.5 CM
INTERVENTRICULAR SEPTUM: 1.5 CM (ref 0.6–1.1)
LAAS-AP2: 20.1 CM2
LAAS-AP4: 24.1 CM2
LEFT ATRIUM AREA SYSTOLE SINGLE PLANE A4C: 23.1 CM2
LEFT INTERNAL DIMENSION IN SYSTOLE: 2.9 CM (ref 2.1–4)
LEFT VENTRICULAR INTERNAL DIMENSION IN DIASTOLE: 4 CM (ref 3.5–6)
LEFT VENTRICULAR POSTERIOR WALL IN END DIASTOLE: 1.4 CM
LEFT VENTRICULAR STROKE VOLUME: 39 ML
LVSV (TEICH): 39 ML
MV E'TISSUE VEL-LAT: 10 CM/S
MV E'TISSUE VEL-SEP: 7 CM/S
MV PEAK A VEL: 1.03 M/S
MV PEAK E VEL: 49 CM/S
MV STENOSIS PRESSURE HALF TIME: 67 MS
MV VALVE AREA P 1/2 METHOD: 3.28 CM2
POTASSIUM SERPL-SCNC: 4.3 MMOL/L (ref 3.5–5.3)
RA PRESSURE ESTIMATED: 5 MMHG
RIGHT ATRIUM AREA SYSTOLE A4C: 16.8 CM2
RIGHT VENTRICLE ID DIMENSION: 5 CM
RV PSP: 29 MMHG
SL CV LEFT ATRIUM LENGTH A2C: 5.5 CM
SL CV LV EF: 65
SL CV PED ECHO LEFT VENTRICLE DIASTOLIC VOLUME (MOD BIPLANE) 2D: 71 ML
SL CV PED ECHO LEFT VENTRICLE SYSTOLIC VOLUME (MOD BIPLANE) 2D: 32 ML
SODIUM SERPL-SCNC: 139 MMOL/L (ref 135–147)
TR MAX PG: 24 MMHG
TR PEAK VELOCITY: 2.4 M/S
TRICUSPID ANNULAR PLANE SYSTOLIC EXCURSION: 1.2 CM
TRICUSPID VALVE PEAK REGURGITATION VELOCITY: 2.43 M/S

## 2023-01-16 RX ORDER — SODIUM CHLORIDE, SODIUM GLUCONATE, SODIUM ACETATE, POTASSIUM CHLORIDE, MAGNESIUM CHLORIDE, SODIUM PHOSPHATE, DIBASIC, AND POTASSIUM PHOSPHATE .53; .5; .37; .037; .03; .012; .00082 G/100ML; G/100ML; G/100ML; G/100ML; G/100ML; G/100ML; G/100ML
50 INJECTION, SOLUTION INTRAVENOUS CONTINUOUS
Status: DISCONTINUED | OUTPATIENT
Start: 2023-01-16 | End: 2023-01-16

## 2023-01-16 RX ORDER — SODIUM CHLORIDE, SODIUM GLUCONATE, SODIUM ACETATE, POTASSIUM CHLORIDE, MAGNESIUM CHLORIDE, SODIUM PHOSPHATE, DIBASIC, AND POTASSIUM PHOSPHATE .53; .5; .37; .037; .03; .012; .00082 G/100ML; G/100ML; G/100ML; G/100ML; G/100ML; G/100ML; G/100ML
50 INJECTION, SOLUTION INTRAVENOUS CONTINUOUS
Status: DISCONTINUED | OUTPATIENT
Start: 2023-01-17 | End: 2023-01-17

## 2023-01-16 RX ADMIN — TRAMADOL HYDROCHLORIDE 50 MG: 50 TABLET, COATED ORAL at 17:52

## 2023-01-16 RX ADMIN — FUROSEMIDE 20 MG: 10 INJECTION, SOLUTION INTRAVENOUS at 09:01

## 2023-01-16 RX ADMIN — HEPARIN SODIUM 15 UNITS/KG/HR: 10000 INJECTION, SOLUTION INTRAVENOUS at 15:50

## 2023-01-16 RX ADMIN — Medication 1000 UNITS: at 09:01

## 2023-01-16 RX ADMIN — B-COMPLEX W/ C & FOLIC ACID TAB 1 TABLET: TAB at 09:01

## 2023-01-16 RX ADMIN — LOPERAMIDE HYDROCHLORIDE 2 MG: 2 CAPSULE ORAL at 17:52

## 2023-01-16 RX ADMIN — HEPARIN SODIUM 15 UNITS/KG/HR: 10000 INJECTION, SOLUTION INTRAVENOUS at 01:00

## 2023-01-16 RX ADMIN — GABAPENTIN 300 MG: 300 CAPSULE ORAL at 22:27

## 2023-01-16 RX ADMIN — AMLODIPINE BESYLATE 5 MG: 5 TABLET ORAL at 22:27

## 2023-01-16 RX ADMIN — SODIUM CHLORIDE, SODIUM GLUCONATE, SODIUM ACETATE, POTASSIUM CHLORIDE, MAGNESIUM CHLORIDE, SODIUM PHOSPHATE, DIBASIC, AND POTASSIUM PHOSPHATE 50 ML/HR: .53; .5; .37; .037; .03; .012; .00082 INJECTION, SOLUTION INTRAVENOUS at 12:41

## 2023-01-16 RX ADMIN — GABAPENTIN 300 MG: 300 CAPSULE ORAL at 09:01

## 2023-01-16 RX ADMIN — GABAPENTIN 300 MG: 300 CAPSULE ORAL at 15:51

## 2023-01-16 NOTE — ASSESSMENT & PLAN NOTE
· History of localized renal cell carcinoma in August 2007, developed metastatic disease in 2013  · Metastatic disease to the lungs, abdominal lymph nodes  · Follows with oncology Dr Janette Bass outpatient  · On previous visits, notes patient with progressive disease currently on tivozanib

## 2023-01-16 NOTE — ASSESSMENT & PLAN NOTE
· Patient reports stretching out left arm, it got caught on the edge of the wall and hyperextended, heard a snap    Now having pain and decreased ROM  · Sling placed in ED  · X-ray of shoulder with mixed sclerotic and lytic metastatic tumor mass associated with the scapular neck and glenoid  · CT upper extremity read pending  · Appreciate ortho input, no surgical intervention recommended

## 2023-01-16 NOTE — PROGRESS NOTES
[facility]  [place] [facility] [place] 1950, 67 [facility]  male MRN: [id number]  Unit/Bed#: E4 -01 Encounter: [id number]  Primary Care Provider: Tahir Ramey MD   Date and time admitted to hospital: 1/12/2023  5:37 PM    * Acute respiratory failure with hypoxia ([facility] [address] )  Assessment & Plan  · Found to be hypoxic with O2 sats less than 90 requiring 2L NC, not on home o2 at baseline  · Multifactorial in setting of pulmonary mets, obesity, deconditioning and concern for acute CHF  · Elevated D-dimer, NM study showing indeterminate study, duplex ultrasound lower extremity negative for DVT  · 2D echo ordered and pending, evaluate EF, valves and right heart strain  · Consider CTA PE once renal functions improved, will discuss with nephrology  · Currently diuresing well, requiring 1-2 L nasal cannula  · Continue heparin drip due to possible pulmonary embolism in the setting of active malignancy    Injury of left shoulder  Assessment & Plan  · Patient reports stretching out left arm, it got caught on the edge of the wall and hyperextended, heard a snap    Now having pain and decreased ROM  · Sling placed in ED  · X-ray of shoulder with mixed sclerotic and lytic metastatic tumor mass associated with the scapular neck and glenoid  · CT upper extremity read pending  · Appreciate ortho input, no surgical intervention recommended    [facility]  Assessment & Plan  · History of localized renal cell carcinoma in August 2007, developed metastatic disease in 2013  · Metastatic disease to the lungs, abdominal lymph nodes  · Follows with oncology Dr Abdiel Flores outpatient  · On previous visits, notes patient with progressive disease currently on tivozanib    Elevated d-dimer  Assessment & Plan  · Elevated D-dimer 4 2 in setting of ARIANNA  · Lower extremity duplex negative for DVT  · VQ scan shows indeterminate study  · Anticoagulated prophylactically with heparin drip, patient with risk factors for PE with metastatic renal cell carcinoma  · Consider CTA PE study once renal functions stabilize    Fall at home, initial encounter  Assessment & Plan  · Mechanical fall at home yesterday, tripped over an area rug, landed on right side  Denies head strike  Not anticoagulated  · PT OT consult, recommending short-term rehab on discharge    H/O left nephrectomy  Assessment & Plan  · History of metastatic renal cell cancer s/p left nephrectomy and prostatectomy    Acute kidney injury superimposed on CKD [facility])  Assessment & Plan  Lab Results   Component Value Date    EGFR 51 2023    EGFR 47 01/15/2023    EGFR 42 2023    CREATININE 1 36 (H) 2023    CREATININE 1 46 (H) 01/15/2023    CREATININE 1 60 (H) 2023     · Creatinine 2 2, baseline 1 0-1 3  · History of left nephrectomy and prostatectomy  · Hold lisinopril-HCTZ  · Continue diuresis, good urine output with improving renal functions  · Appreciate nephrology input        VTE Pharmacologic Prophylaxis:   Pharmacologic: Heparin Drip  Mechanical VTE Prophylaxis in Place: Yes    Patient Centered Rounds: I have performed bedside rounds with nursing staff today  Current Length of Stay: 4 day(s)    Current Patient Status: Inpatient   Certification Statement: The patient will continue to require additional inpatient hospital stay due to pending diuresis, will need STR, 2D echo pending    Discharge Plan: pending    Code Status: Level 1 - Full Code      Subjective:   No events overnight    Objective:     Vitals:   Temp (24hrs), Av °F (36 7 °C), Min:97 8 °F (36 6 °C), Max:98 3 °F (36 8 °C)    Temp:  [97 8 °F (36 6 °C)-98 3 °F (36 8 °C)] 98 3 °F (36 8 °C)  HR:  [85-99] 92  Resp:  [18-20] 18  BP: (101-138)/(55-86) 121/55  SpO2:  [94 %-98 %] 96 %  Body mass index is 36 8 kg/m²  Input and Output Summary (last 24 hours):        Intake/Output Summary (Last 24 hours) at 2023 1147  Last data filed at 2023 0540  Gross per 24 hour   Intake 519 29 ml   Output 950 ml   Net -430 71 ml       Physical Exam:     Physical Exam  Vitals and nursing note reviewed  Constitutional:       Appearance: Normal appearance  He is obese  HENT:      Head: Normocephalic and atraumatic  Eyes:      General: No scleral icterus  Conjunctiva/sclera: Conjunctivae normal    Cardiovascular:      Rate and Rhythm: Normal rate and regular rhythm  Pulmonary:      Effort: Pulmonary effort is normal  No respiratory distress  Breath sounds: No wheezing  Comments: Diminished breath sounds bilaterally  Abdominal:      General: Bowel sounds are normal  There is no distension  Palpations: Abdomen is soft  Musculoskeletal:         General: No swelling  Right lower leg: No edema  Left lower leg: No edema  Skin:     General: Skin is warm and dry  Neurological:      General: No focal deficit present  Mental Status: He is alert  Mental status is at baseline  Additional Data:     Labs:    Results from last 7 days   Lab Units 01/13/23  1752 01/13/23  0620   WBC Thousand/uL 7 54 7 05   HEMOGLOBIN g/dL 13 0 12 5   HEMATOCRIT % 44 0 42 6   PLATELETS Thousands/uL 247 254   NEUTROS PCT %  --  67   LYMPHS PCT %  --  23   MONOS PCT %  --  7   EOS PCT %  --  2     Results from last 7 days   Lab Units 01/16/23  0942 01/14/23  0516 01/13/23  0427   SODIUM mmol/L 139   < > 144   POTASSIUM mmol/L 4 3   < > 5 1   CHLORIDE mmol/L 99   < > 108   CO2 mmol/L 31   < > 27   BUN mg/dL 27*   < > 41*   CREATININE mg/dL 1 36*   < > 1 91*   ANION GAP mmol/L 9   < > 9   CALCIUM mg/dL 8 8   < > 8 6   ALBUMIN g/dL  --   --  2 5*   TOTAL BILIRUBIN mg/dL  --   --  0 67   ALK PHOS U/L  --   --  74   ALT U/L  --   --  11*   AST U/L  --   --  30   GLUCOSE RANDOM mg/dL 113   < > 79    < > = values in this interval not displayed       Results from last 7 days   Lab Units 01/13/23  1752   INR  1 05     Results from last 7 days   Lab Units 01/15/23  1154   POC GLUCOSE mg/dl 109                   * I Have Reviewed All Lab Data Listed Above  * Additional Pertinent Lab Tests Reviewed: [facility] Admission Reviewed    Imaging:    none    Recent Cultures (last 7 days):           Last 24 Hours Medication List:   Current Facility-Administered Medications   Medication Dose Route Frequency Provider Last Rate   • acetaminophen  975 mg Oral 4x Daily PRN Lupe Mauri, CRNP     • aluminum-magnesium hydroxide-simethicone  30 mL Oral Q6H PRN Lupe Mauri, CRNP     • amLODIPine  5 mg Oral HS Byrd Grams, CRNP     • cholecalciferol  1,000 Units Oral Daily Lupe Mauri, CRNP     • diphenhydrAMINE  25 mg Oral HS PRN Luep Mauri, CRNP     • furosemide  20 mg Intravenous BID (diuretic) Byrd Grams, CRNP     • gabapentin  300 mg Oral TID Lupe Mauri, CRNP     • heparin (porcine)  3-30 Units/kg/hr (Order-Specific) Intravenous Titrated Demetra Randhawa MD 15 Units/kg/hr (01/16/23 0729)   • heparin (porcine)  4,600 Units Intravenous Q6H PRN Demetra Randhawa MD     • heparin (porcine)  9,200 Units Intravenous Q6H PRN Demetra Randhawa MD     • HYDROmorphone  0 2 mg Intravenous Q4H PRN Lupe Mauri, CRNP     • loperamide  2 mg Oral 4x Daily PRN Michelle Rowe MD     • multivitamin stress formula  1 tablet Oral Daily Lupe Mauri, CRNP     • ondansetron  4 mg Intravenous Q6H PRN Lupe Mauri, CRNP     • oxyCODONE  2 5 mg Oral 4x Daily PRN Lupe Mauri, CRNP     • oxyCODONE  5 mg Oral 4x Daily PRN Lupe Mauri, CRNP     • simethicone  80 mg Oral 4x Daily PRN Lupe Mauri, CRNP     • traMADol  50 mg Oral 4x Daily PRN Lupe Mauri, CRNP          Today, Patient Was Seen By: Demetra Randhawa MD    ** Please Note: Dictation voice to text software may have been used in the creation of this document   **

## 2023-01-16 NOTE — PLAN OF CARE
Problem: Potential for Falls  Goal: Patient will remain free of falls  Description: INTERVENTIONS:  - Educate patient/family on patient safety including physical limitations  - Instruct patient to call for assistance with activity   - Consult OT/PT to assist with strengthening/mobility   - Keep Call bell within reach  - Keep bed low and locked with side rails adjusted as appropriate  - Keep care items and personal belongings within reach  - Initiate and maintain comfort rounds  - Make Fall Risk Sign visible to staff  - Offer Toileting every 2 Hours, in advance of need  - Initiate/Maintain bed alarm  - Obtain necessary fall risk management equipment: alarms  - Apply yellow socks and bracelet for high fall risk patients  - Consider moving patient to room near nurses station  1/16/2023 1157 by Ashli Rodgers RN  Outcome: Progressing  1/16/2023 1156 by Ashli Rodgers RN  Outcome: Progressing     Problem: PAIN - ADULT  Goal: Verbalizes/displays adequate comfort level or baseline comfort level  Description: Interventions:  - Encourage patient to monitor pain and request assistance  - Assess pain using appropriate pain scale  - Administer analgesics based on type and severity of pain and evaluate response  - Implement non-pharmacological measures as appropriate and evaluate response  - Consider cultural and social influences on pain and pain management  - Notify physician/advanced practitioner if interventions unsuccessful or patient reports new pain  1/16/2023 1157 by Ashli Rodgers RN  Outcome: Progressing  1/16/2023 1156 by Ashli Rodgers RN  Outcome: Progressing     Problem: DISCHARGE PLANNING  Goal: Discharge to home or other facility with appropriate resources  Description: INTERVENTIONS:  - Identify barriers to discharge w/patient and caregiver  - Arrange for needed discharge resources and transportation as appropriate  - Identify discharge learning needs (meds, wound care, etc )  - Refer to Case Management Department for coordinating discharge planning if the patient needs post-hospital services based on physician/advanced practitioner order or complex needs related to functional status, cognitive ability, or social support system  1/16/2023 1157 by Maylin Brooks RN  Outcome: Progressing  1/16/2023 1156 by Maylin Brooks RN  Outcome: Progressing     Problem: Knowledge Deficit  Goal: Patient/family/caregiver demonstrates understanding of disease process, treatment plan, medications, and discharge instructions  Description: Complete learning assessment and assess knowledge base    Interventions:  - Provide teaching at level of understanding  - Provide teaching via preferred learning methods  1/16/2023 1157 by Maylin Brooks RN  Outcome: Progressing  1/16/2023 1156 by Maylin Brooks RN  Outcome: Progressing     Problem: CARDIOVASCULAR - ADULT  Goal: Maintains optimal cardiac output and hemodynamic stability  Description: INTERVENTIONS:  - Monitor I/O, vital signs and rhythm  - Monitor for S/S and trends of decreased cardiac output  - Administer and titrate ordered vasoactive medications to optimize hemodynamic stability  - Assess quality of pulses, skin color and temperature  - Assess for signs of decreased coronary artery perfusion  - Instruct patient to report change in severity of symptoms  1/16/2023 1157 by Maylin Brooks RN  Outcome: Progressing  1/16/2023 1156 by Maylin Brooks RN  Outcome: Progressing  Goal: Absence of cardiac dysrhythmias or at baseline rhythm  Description: INTERVENTIONS:  - Continuous cardiac monitoring, vital signs, obtain 12 lead EKG if ordered  - Administer antiarrhythmic and heart rate control medications as ordered  - Monitor electrolytes and administer replacement therapy as ordered  1/16/2023 1157 by Maylin Brooks RN  Outcome: Progressing  1/16/2023 1156 by Maylin Brooks RN  Outcome: Progressing     Problem: RESPIRATORY - ADULT  Goal: Achieves optimal ventilation and oxygenation  Description: INTERVENTIONS:  - Assess for changes in respiratory status  - Assess for changes in mentation and behavior  - Position to facilitate oxygenation and minimize respiratory effort  - Oxygen administered by appropriate delivery if ordered  - Initiate smoking cessation education as indicated  - Encourage broncho-pulmonary hygiene including cough, deep breathe, Incentive Spirometry  - Assess the need for suctioning and aspirate as needed  - Assess and instruct to report SOB or any respiratory difficulty  - Respiratory Therapy support as indicated  1/16/2023 1157 by Shelli Fitzgerald RN  Outcome: Progressing  1/16/2023 1156 by Shelli Fitzgerald RN  Outcome: Progressing     Problem: MUSCULOSKELETAL - ADULT  Goal: Maintain proper alignment of affected body part  Description: INTERVENTIONS:  - Support, maintain and protect limb and body alignment  - Provide patient/ family with appropriate education  1/16/2023 1157 by Shelli Fitzgerald RN  Outcome: Progressing  1/16/2023 1156 by Shelli Fitzgerald RN  Outcome: Progressing     Problem: MOBILITY - ADULT  Goal: Maintain or return to baseline ADL function  Description: INTERVENTIONS:  -  Assess patient's ability to carry out ADLs; assess patient's baseline for ADL function and identify physical deficits which impact ability to perform ADLs (bathing, care of mouth/teeth, toileting, grooming, dressing, etc )  - Assess/evaluate cause of self-care deficits   - Assess range of motion  - Assess patient's mobility; develop plan if impaired  - Assess patient's need for assistive devices and provide as appropriate  - Encourage maximum independence but intervene and supervise when necessary  - Involve family in performance of ADLs  - Assess for home care needs following discharge   - Consider OT consult to assist with ADL evaluation and planning for discharge  - Provide patient education as appropriate  1/16/2023 1157 by Shelli Fitzgerald RN  Outcome: Progressing  1/16/2023 1156 by Rannie Don, RN  Outcome: Progressing  Goal: Maintains/Returns to pre admission functional level  Description: INTERVENTIONS:  - Perform BMAT or MOVE assessment daily    - Set and communicate daily mobility goal to care team and patient/family/caregiver     - Collaborate with rehabilitation services on mobility goals if consulted  - Ambulate patient 3 times a day  - Out of bed to chair 3 times a day   - Out of bed for meals 3 times a day  - Out of bed for toileting  - Record patient progress and toleration of activity level   1/16/2023 1157 by Bernabe Thacker, RN  Outcome: Progressing  1/16/2023 1156 by Bernabe Thacker, RN  Outcome: Progressing

## 2023-01-16 NOTE — PROGRESS NOTES
NEPHROLOGY PROGRESS NOTE   Walter Kennedy 67 y o  male MRN: [id number]  Unit/Bed#: E4 -01 Encounter: [id number]  Reason for Consult: Acute kidney injury on CKD    ASSESSMENT/PLAN:  Acute kidney injury on CKD: history of left-sided nephrectomy  Suspect secondary to autoregulatory failure  -Baseline creatinine 1 0-1 3   -Creatinine 2 2, improved to 1 36   -Holding lisinopril/HCTZ  -Diuretics placed on hold in preparation for CT scan with contrast   -Currently receiving precontrast hydration  -UA with 0-1 RBCs, 0-1 WBCs, 0-1 hyaline casts  -Urine eosinophils 0%  -Reviewed his renal imaging with right renal cyst, status post left nephrectomy  -Avoiding NSAIDs, reports taking while at home  -Recommend avoiding nephrotoxins, hypotension, IV contrast   -I/O  Metastatic renal cell carcinoma: With mets to lungs and abdominal lymph nodes  Following with oncology as an outpatient  Receiving current treatment with tivozanib  Mild hyperkalemia: In the setting of autoregulatory failure, ACE inhibition, and previous NSAID use  (resolved)    Blood pressure: Remains well controlled  -Current medications: Amlodipine 5 mg daily, Lasix 20 mg IV twice daily   -Home medications: Lisinopril-hydrochlorothiazide 20-12 5 mg daily  -recommend avoiding hypotension or high fluctuations in blood pressure   -Recommend holding parameters and antihypertensive for systolic blood pressure less than 130  Acute respiratory failure with hypoxia:  -BNP elevated, D-dimer elevated  -CTA chest PE study ordered  Nuclear med scan with intermediate are indeterminate probability of pulmonary embolus   -Echocardiogram is pending  Left shoulder injury: Continues to utilize sling  -X-ray with mixed sclerotic and lytic metastatic tumor mass associated with the scapular neck and glenoid    -CT upper extremity shows lytic metastasis in the scapular glenoid process with cortical breakthrough and extraosseous extension anteriorly, new lytic lesion in the left scapular body, new lesion in the inferior scapular angle  -Orthopedic team following, no surgical intervention recommended at this time  Other: Fall    Disposition: Requiring additional stay due to medical needs  SUBJECTIVE:  The patient is sitting out of bed in his chair  He denies chest discomfort or shortness of breath  He denies nausea, vomiting, diarrhea, or issues with urination  He reports having a good appetite  He reports improvement in his lower extremity edema      OBJECTIVE:  Current Weight: Weight - Scale: 110 kg (242 lb)  Vitals:    01/16/23 0321 01/16/23 0600 01/16/23 0850 01/16/23 1015   BP: 138/86  121/55 121/55   BP Location: Right arm  Left arm    Pulse: 95  96 92   Resp: 18  18    Temp: 98 °F (36 7 °C)  98 3 °F (36 8 °C)    TempSrc: Temporal  Temporal    SpO2: 94%  96%    Weight:  110 kg (242 lb 1 oz)  110 kg (242 lb)   Height:    5' 8" (1 727 m)       Intake/Output Summary (Last 24 hours) at 1/16/2023 1306  Last data filed at 1/16/2023 0540  Gross per 24 hour   Intake 519 29 ml   Output 550 ml   Net -30 71 ml     General: NAD  Skin: warm, dry, intact, no rash  HEENT: Moist mucous membranes, sclera anicteric, normocephalic, atraumatic  Neck: No apparent JVD appreciated  Chest: lung sounds clear B/L  CVS:Regular rate and rhythm, no murmer   Abdomen: Soft, round, non-tender, +BS, obese  Extremities: B/L LE edema present  Neuro: alert and oriented  Psych: appropriate mood and affect     Medications:    Current Facility-Administered Medications:   •  acetaminophen (TYLENOL) tablet 975 mg, 975 mg, Oral, 4x Daily PRN, IRENE Jones  •  aluminum-magnesium hydroxide-simethicone (MYLANTA) oral suspension 30 mL, 30 mL, Oral, Q6H PRN, IRENE Jones  •  amLODIPine (NORVASC) tablet 5 mg, 5 mg, Oral, HS, Tamea Press, CRNP, 5 mg at 01/13/23 2156  •  cholecalciferol (VITAMIN D3) tablet 1,000 Units, 1,000 Units, Oral, Daily, IRENE Jones, 1,000 Units at 01/16/23 0901  •  diphenhydrAMINE (BENADRYL) tablet 25 mg, 25 mg, Oral, HS PRN, IRENE Avilez  •  gabapentin (NEURONTIN) capsule 300 mg, 300 mg, Oral, TID, IRENE Avilez, 300 mg at 01/16/23 0901  •  heparin (porcine) 25,000 units in 0 45% NaCl 250 mL infusion (premix), 3-30 Units/kg/hr (Order-Specific), Intravenous, Titrated, Anna Crawford MD, Last Rate: 17 3 mL/hr at 01/16/23 0729, 15 Units/kg/hr at 01/16/23 0729  •  heparin (porcine) injection 4,600 Units, 4,600 Units, Intravenous, Q6H PRN, Anna Crawford MD  •  heparin (porcine) injection 9,200 Units, 9,200 Units, Intravenous, Q6H PRN, Anna Crawford MD  •  HYDROmorphone HCl (DILAUDID) injection 0 2 mg, 0 2 mg, Intravenous, Q4H PRN, IRENE Avilez  •  loperamide (IMODIUM) capsule 2 mg, 2 mg, Oral, 4x Daily PRN, Anna Crawford MD, 2 mg at 01/14/23 1113  •  multi-electrolyte (PLASMALYTE-A/ISOLYTE-S PH 7 4) IV solution, 50 mL/hr, Intravenous, Continuous, Anna Crawford MD, Last Rate: 50 mL/hr at 01/16/23 1241, 50 mL/hr at 01/16/23 1241  •  multivitamin stress formula tablet 1 tablet, 1 tablet, Oral, Daily, IRENE Avilez, 1 tablet at 01/16/23 0901  •  ondansetron (ZOFRAN) injection 4 mg, 4 mg, Intravenous, Q6H PRN, IRENE Avilez  •  oxyCODONE (ROXICODONE) IR tablet 2 5 mg, 2 5 mg, Oral, 4x Daily PRN, IRENE Avilez  •  oxyCODONE (ROXICODONE) IR tablet 5 mg, 5 mg, Oral, 4x Daily PRN, IRENE Avilez  •  simethicone (MYLICON) chewable tablet 80 mg, 80 mg, Oral, 4x Daily PRN, IRENE Avilez  •  traMADol (ULTRAM) tablet 50 mg, 50 mg, Oral, 4x Daily PRN, IRENE Avilez, 50 mg at 01/13/23 0844    Laboratory Results:  Results from last 7 days   Lab Units 01/16/23  0942 01/15/23  0853 01/14/23  0516 01/13/23  1752 01/13/23  0620 01/13/23  0427 01/12/23  1853   WBC Thousand/uL  --   --   --  7 54 7 05  --  6 85   HEMOGLOBIN g/dL  --   --   --  13 0 12 5  --  12 7 HEMATOCRIT %  --   --   --  44 0 42 6  --  43 3   PLATELETS Thousands/uL  --   --   --  247 254  --  225   SODIUM mmol/L 139 140 142  --   --  144 144   POTASSIUM mmol/L 4 3 3 9 4 1  --   --  5 1 5 2   CHLORIDE mmol/L 99 101 106  --   --  108 108   CO2 mmol/L 31 33* 30  --   --  27 28   BUN mg/dL 27* 28* 32*  --   --  41* 44*   CREATININE mg/dL 1 36* 1 46* 1 60*  --   --  1 91* 2 26*   CALCIUM mg/dL 8 8 8 8 8 1*  --   --  8 6 8 7   ALK PHOS U/L  --   --   --   --   --  74 81   ALT U/L  --   --   --   --   --  11* 14   AST U/L  --   --   --   --   --  30 29

## 2023-01-16 NOTE — ASSESSMENT & PLAN NOTE
Lab Results   Component Value Date    EGFR 51 01/16/2023    EGFR 47 01/15/2023    EGFR 42 01/14/2023    CREATININE 1 36 (H) 01/16/2023    CREATININE 1 46 (H) 01/15/2023    CREATININE 1 60 (H) 01/14/2023     · Creatinine 2 2, baseline 1 0-1 3  · History of left nephrectomy and prostatectomy  · Hold lisinopril-HCTZ  · Continue diuresis, good urine output with improving renal functions  · Appreciate nephrology input

## 2023-01-16 NOTE — ASSESSMENT & PLAN NOTE
· Elevated D-dimer 4 2 in setting of ARIANNA  · Lower extremity duplex negative for DVT  · VQ scan shows indeterminate study  · Anticoagulated prophylactically with heparin drip, patient with risk factors for PE with metastatic renal cell carcinoma  · Consider CTA PE study once renal functions stabilize

## 2023-01-16 NOTE — ARC ADMISSION
Referral received for consideration of patient for ARC placement  Reviewed with [facility] physician and we will continue to follow pending medical stability, functional progress and bed availability  CM updated

## 2023-01-17 ENCOUNTER — APPOINTMENT (INPATIENT)
Dept: CT IMAGING | Facility: HOSPITAL | Age: 73
End: 2023-01-17

## 2023-01-17 LAB
ANION GAP SERPL CALCULATED.3IONS-SCNC: 10 MMOL/L (ref 4–13)
APTT PPP: 70 SECONDS (ref 23–37)
BUN SERPL-MCNC: 26 MG/DL (ref 5–25)
CALCIUM SERPL-MCNC: 8.7 MG/DL (ref 8.3–10.1)
CHLORIDE SERPL-SCNC: 100 MMOL/L (ref 96–108)
CO2 SERPL-SCNC: 30 MMOL/L (ref 21–32)
CREAT SERPL-MCNC: 1.21 MG/DL (ref 0.6–1.3)
GFR SERPL CREATININE-BSD FRML MDRD: 59 ML/MIN/1.73SQ M
GLUCOSE SERPL-MCNC: 94 MG/DL (ref 65–140)
POTASSIUM SERPL-SCNC: 3.8 MMOL/L (ref 3.5–5.3)
SODIUM SERPL-SCNC: 140 MMOL/L (ref 135–147)

## 2023-01-17 RX ORDER — IODIXANOL 320 MG/ML
100 INJECTION, SOLUTION INTRAVASCULAR
Status: COMPLETED | OUTPATIENT
Start: 2023-01-17 | End: 2023-01-17

## 2023-01-17 RX ORDER — HEPARIN SODIUM 5000 [USP'U]/ML
5000 INJECTION, SOLUTION INTRAVENOUS; SUBCUTANEOUS EVERY 8 HOURS SCHEDULED
Status: DISCONTINUED | OUTPATIENT
Start: 2023-01-17 | End: 2023-01-19 | Stop reason: HOSPADM

## 2023-01-17 RX ADMIN — LOPERAMIDE HYDROCHLORIDE 2 MG: 2 CAPSULE ORAL at 09:24

## 2023-01-17 RX ADMIN — HEPARIN SODIUM 5000 UNITS: 5000 INJECTION INTRAVENOUS; SUBCUTANEOUS at 22:03

## 2023-01-17 RX ADMIN — GABAPENTIN 300 MG: 300 CAPSULE ORAL at 20:17

## 2023-01-17 RX ADMIN — TRAMADOL HYDROCHLORIDE 50 MG: 50 TABLET, COATED ORAL at 20:17

## 2023-01-17 RX ADMIN — LOPERAMIDE HYDROCHLORIDE 2 MG: 2 CAPSULE ORAL at 20:17

## 2023-01-17 RX ADMIN — AMLODIPINE BESYLATE 5 MG: 5 TABLET ORAL at 22:01

## 2023-01-17 RX ADMIN — HEPARIN SODIUM 5000 UNITS: 5000 INJECTION INTRAVENOUS; SUBCUTANEOUS at 15:04

## 2023-01-17 RX ADMIN — IODIXANOL 93 ML: 320 INJECTION, SOLUTION INTRAVASCULAR at 08:08

## 2023-01-17 RX ADMIN — SODIUM CHLORIDE, SODIUM GLUCONATE, SODIUM ACETATE, POTASSIUM CHLORIDE, MAGNESIUM CHLORIDE, SODIUM PHOSPHATE, DIBASIC, AND POTASSIUM PHOSPHATE 50 ML/HR: .53; .5; .37; .037; .03; .012; .00082 INJECTION, SOLUTION INTRAVENOUS at 02:48

## 2023-01-17 RX ADMIN — HEPARIN SODIUM 15 UNITS/KG/HR: 10000 INJECTION, SOLUTION INTRAVENOUS at 09:15

## 2023-01-17 RX ADMIN — GABAPENTIN 300 MG: 300 CAPSULE ORAL at 16:37

## 2023-01-17 RX ADMIN — B-COMPLEX W/ C & FOLIC ACID TAB 1 TABLET: TAB at 09:05

## 2023-01-17 RX ADMIN — GABAPENTIN 300 MG: 300 CAPSULE ORAL at 09:05

## 2023-01-17 RX ADMIN — Medication 1000 UNITS: at 09:05

## 2023-01-17 NOTE — PLAN OF CARE
Problem: Potential for Falls  Goal: Patient will remain free of falls  Description: INTERVENTIONS:  - Educate patient/family on patient safety including physical limitations  - Instruct patient to call for assistance with activity   - Consult OT/PT to assist with strengthening/mobility   - Keep Call bell within reach  - Keep bed low and locked with side rails adjusted as appropriate  - Keep care items and personal belongings within reach  - Initiate and maintain comfort rounds  - Make Fall Risk Sign visible to staff  - Offer Toileting every 2 Hours, in advance of need  - Initiate/Maintain bed alarm  - Obtain necessary fall risk management equipment: alarms  - Apply yellow socks and bracelet for high fall risk patients  - Consider moving patient to room near nurses station  Outcome: Progressing     Problem: PAIN - ADULT  Goal: Verbalizes/displays adequate comfort level or baseline comfort level  Description: Interventions:  - Encourage patient to monitor pain and request assistance  - Assess pain using appropriate pain scale  - Administer analgesics based on type and severity of pain and evaluate response  - Implement non-pharmacological measures as appropriate and evaluate response  - Consider cultural and social influences on pain and pain management  - Notify physician/advanced practitioner if interventions unsuccessful or patient reports new pain  Outcome: Progressing     Problem: DISCHARGE PLANNING  Goal: Discharge to home or other facility with appropriate resources  Description: INTERVENTIONS:  - Identify barriers to discharge w/patient and caregiver  - Arrange for needed discharge resources and transportation as appropriate  - Identify discharge learning needs (meds, wound care, etc )  - Refer to Case Management Department for coordinating discharge planning if the patient needs post-hospital services based on physician/advanced practitioner order or complex needs related to functional status, cognitive ability, or social support system  Outcome: Progressing     Problem: Knowledge Deficit  Goal: Patient/family/caregiver demonstrates understanding of disease process, treatment plan, medications, and discharge instructions  Description: Complete learning assessment and assess knowledge base    Interventions:  - Provide teaching at level of understanding  - Provide teaching via preferred learning methods  Outcome: Progressing     Problem: CARDIOVASCULAR - ADULT  Goal: Maintains optimal cardiac output and hemodynamic stability  Description: INTERVENTIONS:  - Monitor I/O, vital signs and rhythm  - Monitor for S/S and trends of decreased cardiac output  - Administer and titrate ordered vasoactive medications to optimize hemodynamic stability  - Assess quality of pulses, skin color and temperature  - Assess for signs of decreased coronary artery perfusion  - Instruct patient to report change in severity of symptoms  Outcome: Progressing  Goal: Absence of cardiac dysrhythmias or at baseline rhythm  Description: INTERVENTIONS:  - Continuous cardiac monitoring, vital signs, obtain 12 lead EKG if ordered  - Administer antiarrhythmic and heart rate control medications as ordered  - Monitor electrolytes and administer replacement therapy as ordered  Outcome: Progressing     Problem: RESPIRATORY - ADULT  Goal: Achieves optimal ventilation and oxygenation  Description: INTERVENTIONS:  - Assess for changes in respiratory status  - Assess for changes in mentation and behavior  - Position to facilitate oxygenation and minimize respiratory effort  - Oxygen administered by appropriate delivery if ordered  - Initiate smoking cessation education as indicated  - Encourage broncho-pulmonary hygiene including cough, deep breathe, Incentive Spirometry  - Assess the need for suctioning and aspirate as needed  - Assess and instruct to report SOB or any respiratory difficulty  - Respiratory Therapy support as indicated  Outcome: Progressing Problem: MUSCULOSKELETAL - ADULT  Goal: Maintain proper alignment of affected body part  Description: INTERVENTIONS:  - Support, maintain and protect limb and body alignment  - Provide patient/ family with appropriate education  Outcome: Progressing     Problem: MOBILITY - ADULT  Goal: Maintain or return to baseline ADL function  Description: INTERVENTIONS:  -  Assess patient's ability to carry out ADLs; assess patient's baseline for ADL function and identify physical deficits which impact ability to perform ADLs (bathing, care of mouth/teeth, toileting, grooming, dressing, etc )  - Assess/evaluate cause of self-care deficits   - Assess range of motion  - Assess patient's mobility; develop plan if impaired  - Assess patient's need for assistive devices and provide as appropriate  - Encourage maximum independence but intervene and supervise when necessary  - Involve family in performance of ADLs  - Assess for home care needs following discharge   - Consider OT consult to assist with ADL evaluation and planning for discharge  - Provide patient education as appropriate  Outcome: Progressing  Goal: Maintains/Returns to pre admission functional level  Description: INTERVENTIONS:  - Perform BMAT or MOVE assessment daily    - Set and communicate daily mobility goal to care team and patient/family/caregiver     - Collaborate with rehabilitation services on mobility goals if consulted  - Ambulate patient 3 times a day  - Out of bed to chair 3 times a day   - Out of bed for meals 3 times a day  - Out of bed for toileting  - Record patient progress and toleration of activity level   Outcome: Progressing

## 2023-01-17 NOTE — ASSESSMENT & PLAN NOTE
· History of localized renal cell carcinoma in August 2007, developed metastatic disease in 2013  · Metastatic disease to the lungs, abdominal lymph nodes  · Follows with oncology Dr Lorraine Mccallum outpatient  · On previous visits, notes patient with progressive disease currently on tivozanib    Repeat CTA showed enlargement of one right lower lobe metastasis, bone metastatis worrying for progression

## 2023-01-17 NOTE — PROGRESS NOTES
NEPHROLOGY PROGRESS NOTE   Gavi Buchanan 67 y o  male MRN: [id number]  Unit/Bed#: E4 -01 Encounter: [id number]  Reason for Consult: ARIANNA, POA, on CKD 2/3    ASSESSMENT/PLAN:  ARIANNA (POA):  Suspect prerenal and autoregulatory failure  -Admission creatinine 2 20  Today's creatinine 1 21, at baseline  -Peak creatinine 2 26 on 1/12/2023  -baseline creatinine 1-1 3  -Acid base and electrolytes stable    -workup: UA bland  Urine eosinophils 0%  -volume status euvolemic/mild hypervolemic  -nonoliguric   -Plan:  • Renal function stable and at baseline  S/p CTA today  Will need to trend BMP   • clinically, no acute indication for renal replacement therapy (dialysis)  • Continue to hold lisinopril and HCTZ  • S/p IV hydration for CTA  • Strict I/Os, daily weights  • Avoid nephrotoxins, NSAIDs, IV contrast if possible  • Avoid hypotension  Maintain MAP >65  • Trend BMP  • Adjust meds to appropriate GFR  • Optimize hemodynamic status to avoid delay in renal recovery  • d/w Dr Leandro Nguyen    Chronic kidney disease II/IIIA:    -Etiology:  Suspect due to left nephrectomy  -Baseline creatinine 1-1 3  -Outpatient Nephrologist:  none  -Will need follow-up on discharge for long-term management    Metastatic renal cell carcinoma:  -RCC initially diagnosed in 2007, now metastatic since 2013  -s/p left nephrectomy  -On chemotherapy since end of December 2022  -Results of CTA as outlined below  - Followed by hematology    Hypertension/Volume status:  -BP acceptable  -volume status euvolemic/mildly hypervolemic  -Home medications: Amlodipine 5 mg daily, lisinopril-HCTZ 20-12 5 mg daily  -Current medications: Amlodipine 5 mg daily  -Continue to hold diuretics and lisinopril  Avoid ACEi/ARB  -Optimize hemodynamic status to avoid delay in renal recovery  -recommend hold parameters on antihypertensive's for SBP <130 mmHg  -Avoid hypotension or fluctuations in blood pressure    Maintain MAP >65  -low sodium (2 gm) diet  -continue to trend    Bone Mineral Disease of CKD:  -monitor and follow phosphorus, PTH, calcium, magnesium as outpatient    Acute hypoxic respiratory failure:  -Respiratory status stable  -BNP elevated, D-dimer elevated  -CTA chest with no evidence of PE with enlargement of right lower lobe metastases possible progression of bony metastases  -Echo 1/16/2023: EF 65%, G1DD, trace MR, trace TR, IVC normal size  -Management per primary team    Left shoulder injury:  -Sling in place  Continue immobilization  -Plain radiograph with mixed sclerotic and lytic metastatic tumor mass associated with scapular neck and glenoid  -CT LUE with lytic metastases in the scapular glenoid process with cortical breakthrough extraosseous extension anteriorly, new lytic lesion in the left scapular body, new lesion in the inferior scapular angle  -Orthopedic surgery following  No surgical intervention at this time    SUBJECTIVE:  Patient without complaints  Creatinine at baseline at 1 2  s/p CTA 1/17/2023 with no evidence of PE  Adequate urine output  VSS    A complete review of systems was performed  Pertinent positives and negatives noted in the HPI  Otherwise the review of systems was negative  OBJECTIVE:  Current Weight: Weight - Scale: 113 kg (249 lb 9 oz)  Vitals:    01/16/23 1526 01/16/23 2211 01/17/23 0600 01/17/23 0735   BP: 126/62 133/79  141/68   BP Location: Right arm Left arm  Left arm   Pulse: 99 93  94   Resp: 18 18  18   Temp: 98 °F (36 7 °C) 98 2 °F (36 8 °C)  98 5 °F (36 9 °C)   TempSrc: Temporal Temporal  Temporal   SpO2: 95% 90%  92%   Weight:   113 kg (249 lb 9 oz)    Height:           Intake/Output Summary (Last 24 hours) at 1/17/2023 1441  Last data filed at 1/17/2023 0250  Gross per 24 hour   Intake 665 ml   Output 700 ml   Net -35 ml     General:  Awake, alert, appears comfortable and in no acute distress  Nontoxic    Skin:  No rash, warm, good skin turgor   Eyes:  PERRL, EOMI, sclerae nonicteric   no periorbital edema ENT:  Moist mucous membranes  Neck:  Trachea midline, symmetric  No JVD  Chest:  Clear to auscultation bilaterally without wheezes, crackles or rhonchi  Decreased breath sounds left base  CVS:  Regular rate and rhythm without murmur, gallop or rub  S1 and S2 identified and normal   No S3, S4    Abdomen:  Soft, nontender, nondistended without masses  Normal bowel sounds x 4 quadrants  No bruit  Extremities:  Warm, pink, motor and sensory intact and well perfused  No cyanosis, pallor  trace-1+ BLE edema  Chronic venous stasis changes  Neuro:  Awake, alert, oriented x3  Grossly intact  Psych:  Appropriate affect  Mentating appropriately    Normal mental status exam    Medications:    Current Facility-Administered Medications:   •  acetaminophen (TYLENOL) tablet 975 mg, 975 mg, Oral, 4x Daily PRN, IRENE Marrero  •  aluminum-magnesium hydroxide-simethicone (MYLANTA) oral suspension 30 mL, 30 mL, Oral, Q6H PRN, IRENE Marrero  •  amLODIPine (NORVASC) tablet 5 mg, 5 mg, Oral, HS, Leafy Mathews, CRNP, 5 mg at 01/16/23 2227  •  cholecalciferol (VITAMIN D3) tablet 1,000 Units, 1,000 Units, Oral, Daily, IRENE Marrero, 1,000 Units at 01/17/23 1338  •  diphenhydrAMINE (BENADRYL) tablet 25 mg, 25 mg, Oral, HS PRN, IRENE Marrero  •  gabapentin (NEURONTIN) capsule 300 mg, 300 mg, Oral, TID, IRENE Marrero, 300 mg at 01/17/23 9972  •  heparin (porcine) subcutaneous injection 5,000 Units, 5,000 Units, Subcutaneous, Q8H [address], Herb Orta MD  •  HYDROmorphone HCl (DILAUDID) injection 0 2 mg, 0 2 mg, Intravenous, Q4H PRN, IRENE Marrero  •  loperamide (IMODIUM) capsule 2 mg, 2 mg, Oral, 4x Daily PRN, Deepa Ramey MD, 2 mg at 01/17/23 9359  •  multivitamin stress formula tablet 1 tablet, 1 tablet, Oral, Daily, IRENE Marrero, 1 tablet at 01/17/23 0905  •  ondansetron (ZOFRAN) injection 4 mg, 4 mg, Intravenous, Q6H PRN, Kirstin Browning, CRNP  •  oxyCODONE (ROXICODONE) IR tablet 2 5 mg, 2 5 mg, Oral, 4x Daily PRN, Maria Dolores Antonino, CRNP  •  oxyCODONE (ROXICODONE) IR tablet 5 mg, 5 mg, Oral, 4x Daily PRN, Maria Dolores Antonino, CRNP  •  simethicone (MYLICON) chewable tablet 80 mg, 80 mg, Oral, 4x Daily PRN, Maria Dolores Antonino, CRNP  •  traMADol (ULTRAM) tablet 50 mg, 50 mg, Oral, 4x Daily PRN, Maria Dolores Antonino, CRNP, 50 mg at 01/16/23 1752    Laboratory Results:  Results from last 7 days   Lab Units 01/17/23  0519 01/16/23  0942 01/15/23  0853 01/14/23  0516 01/13/23  1752 01/13/23  0620 01/13/23  0427 01/12/23  1853   WBC Thousand/uL  --   --   --   --  7 54 7 05  --  6 85   HEMOGLOBIN g/dL  --   --   --   --  13 0 12 5  --  12 7   HEMATOCRIT %  --   --   --   --  44 0 42 6  --  43 3   PLATELETS Thousands/uL  --   --   --   --  247 254  --  225   SODIUM mmol/L 140 139 140 142  --   --  144 144   POTASSIUM mmol/L 3 8 4 3 3 9 4 1  --   --  5 1 5 2   CHLORIDE mmol/L 100 99 101 106  --   --  108 108   CO2 mmol/L 30 31 33* 30  --   --  27 28   BUN mg/dL 26* 27* 28* 32*  --   --  41* 44*   CREATININE mg/dL 1 21 1 36* 1 46* 1 60*  --   --  1 91* 2 26*   CALCIUM mg/dL 8 7 8 8 8 8 8 1*  --   --  8 6 8 7       I have personally reviewed the blood work as stated above and in my note  I have personally reviewed internal Medicine, co-consultants and previous nephrology notes

## 2023-01-17 NOTE — OCCUPATIONAL THERAPY NOTE
Occupational Therapy Progress Note     Patient Name: Gavi Buchanan  QHGNV'X Date: 1/17/2023  Problem List  Principal Problem:    Acute respiratory failure with hypoxia [facility])  Active Problems:    Metastatic renal cell carcinoma (HCC)    Acute kidney injury superimposed on CKD ([facility] )    H/O left nephrectomy    Fall at home, initial encounter    Injury of left shoulder    Elevated brain natriuretic peptide (BNP) level    Elevated d-dimer    Chronic diarrhea              01/17/23 1416   OT Last Visit   OT Visit Date 01/17/23   Note Type   Note Type Treatment   Pain Assessment   Pain Assessment Tool 0-10   Pain Score 8   Pain Location/Orientation Orientation: Left; Location: Arm  (L shoulder)   Pain Onset/Description   (8/10 with movement, 2/10 at rest )   Hospital Pain Intervention(s) Repositioned; Ambulation/increased activity; Rest   Restrictions/Precautions   Weight Bearing Precautions Per Order Yes   LUE Weight Bearing Per Order NWB   Braces or Orthoses Sling   Other Precautions Chair Alarm; Bed Alarm; Fall Risk;Pain;WBS   ADL   Where Assessed Edge of bed  (EOB and chair)   UB Dressing Assistance 4  Minimal Assistance   UB Dressing Deficit Verbal cueing;Supervision/safety; Increased time to complete; Thread LUE; Fasteners;Pull around back   LB Dressing Assistance 3  Moderate Assistance   LB Dressing Deficit Verbal cueing;Supervision/safety; Increased time to complete;Tie shoes; Thread LLE into underwear;Don/doff R shoe;Steadying   Functional Standing Tolerance   Time ~5 minutes   Activity Pendulum swings and donnining underwear   Comments Close Supervision required for safety and steadying as needed  Transfers   Sit to Stand 4  Minimal assistance   Additional items Assist x 1; Armrests; Increased time required;Verbal cues   Stand to Sit 5  Supervision   Additional items Armrests; Increased time required;Verbal cues   Additional Comments VC for adherence to NWB of L shoulder, hand placement, and safe transfer techniques  Functional Mobility   Functional Mobility 4  Minimal assistance   Additional Comments Ax1   Additional items SPC   Therapeutic Exercise - ROM   UE-ROM Yes   ROM - Left Upper Extremities    L Shoulder   (L shldr pendulums completed while standing)   L Elbow Elbow flexion;Elbow extension;AROM  (2x10)   L Wrist Wrist flexion;Wrist extension;AROM  (2x10)   L Position Seated   LUE ROM Comment AROM pronation/supination: 2x10  Exercises performed seated in bed side chair  Subjective   Subjective "I'm left handed so I have to get used to not using it as much"   Cognition   Overall Cognitive Status [facility]   Arousal/Participation Alert; Cooperative   Attention Within functional limits   Orientation Level Oriented X4   Memory Within functional limits   Following Commands Follows all commands and directions without difficulty   Activity Tolerance   Activity Tolerance Patient tolerated treatment well   Medical Staff Mary Pérez, RN   Assessment   Assessment Pt seen for OT treatment session focusing on functional activity tolerance, functional transfers/mobility and ADLs, UE AROM exercises, and patient education  Pt alert and cooperative  At start of session, pt was sitting in bed side chair  LB dressing completed at EOB (donning socks and shoes) and in bed side chair (donning underwear) with Fair dynamic sitting balance and Mod A to thread LLE into underwear, pull up socks, fit B/L feet into shoes, and tie shoes 2* limited functional reach and pain in L shoulder  VC required for pt to adhere to NWB through L shoulder  UB dressing completed with Min A for threading gown through LUE, and fastener/tie management  Education and handout provided for best one-handed dressing techniques with pt verbalizing understanding  Min A for sit>stand transfer 2* LUE WBS, limited strength, and overall weakness  Pt with Fair- static standing balance  Supervision required for stand>sit transfer, VC for hand placement and adherence to WBS   Pt completed functional mobility with Min A and use of SPC 2* decreased balance and use of non-dominant hand with cane  Pt with Poor+ dynamic standing balance  Per ortho consult, pt to complete elbow ROM and shldr pendulums daily  Pt educated on and completed 2 sets of 30 sec  Pendulum exercises for L shoulder with Supervision to ensure safety  Seated rest breaks required between sets 2* fatigue  Pt completed 2x10 L elbow, wrist, and forearm AROM exercises  OT to continue to follow pt during hospital stay to address ADLs, functional mobility/transfers, UE ROM/strengthening  Recommend STR upon d/c    Plan   Treatment Interventions ADL retraining;Functional transfer training;UE strengthening/ROM; Endurance training;Patient/family training;Equipment evaluation/education; Energy conservation; Activityengagement   Goal Expiration Date 01/27/23   OT Treatment Day 1   OT Frequency 3-5x/wk   Recommendation   OT Discharge Recommendation Post acute rehabilitation services   Additional Comments  The patient's raw score on the AM-PAC Daily Activity inpatient short form is 18, standardized score is 38 66, less than 39 4  Patients at this level are likely to benefit from discharge to post-acute rehabilitation services  Please refer to the recommendation of the Occupational Therapist for safe discharge planning     AM-PAC Daily Activity Inpatient   Lower Body Dressing 2   Bathing 3   Toileting 3   Upper Body Dressing 3   Grooming 3   Eating 4   Daily Activity Raw Score 18   Daily Activity Standardized Score (Calc for Raw Score >=11) 38 66   [facility] Applied Cognition Inpatient   Following a Speech/Presentation 4   Understanding Ordinary Conversation 4   Taking Medications 4   Remembering Where Things Are Placed or Put Away 4   Remembering List of 4-5 Errands 4   Taking Care of Complicated Tasks 4   Applied Cognition Raw Score 24   Applied Cognition Standardized Score 62 21   End of Consult   Patient Position at End of Consult Bedside chair;Bed/Chair alarm activated; All needs within reach            Cheyenne Braden, OTS

## 2023-01-17 NOTE — ASSESSMENT & PLAN NOTE
· Patient reports stretching out left arm, it got caught on the edge of the wall and hyperextended, heard a snap    Now having pain and decreased ROM  · Sling placed in ED  · X-ray of shoulder with mixed sclerotic and lytic metastatic tumor mass associated with the scapular neck and glenoid  · CT upper extremity showing pathological fracture with new metastasis  · Appreciate ortho input, no surgical intervention recommended

## 2023-01-17 NOTE — CONSULTS
PHYSICAL MEDICINE AND REHABILITATION CONSULT NOTE  Walter Kennedy 67 y o  male MRN: [id number]  Unit/Bed#: E4 -01 Encounter: [id number]    Requested by (Physician/Service): Ayad Guerrier MD  Reason for Consultation:  Assessment of rehabilitation needs    Assessment:  Rehabilitation Diagnosis:   • Left scapula pathologic fractures in the setting of metastatic renal cell carcinoma   • Acute respiratory failure with hypoxia   • Impaired mobility and self care  • Impaired cognition     Recommendations:  Rehabilitation Plan:  • Continue PT/OT while on acute care  • The patient is a candidate for acute inpatient rehabilitation once medically cleared  • Covid-19 Testing: [facility] inpatient rehabilitation units require testing within 48 hours of all potential admissions at this time  *Re-testing is NOT required for patients recovering from COVID-19 infection if isolation has been discontinued per CDC criteria  Medical Co-morbidities Plan:  · Elevated D-dimer   · Metastatic renal cell carcinoma with hx left nephrectomy   · ARIANNA on CKD  · Hyperkalemia   · Hypertension   · DVT ppx: SCD    Thank you for this consultation  Do not hesitate to contact service with further questions  IRENE Sofia  PM&R    Total time spent:  1 hour with more than 50% spent counseling/coordinating care  Counseling includes extended discussion with patient (+/- family/relevant historian)  re: history, exam, function, mood, rehabilitation management plan, medical co-morbidities plan, and disposition options  Additional time spent with thorough chart review in EMR, reviewing recent medications, labs, imaging, and management plan of primary service         History of Present Illness:  Walter Kennedy is a 67 y o  male with a PMH of metastatic renal cell carcinoma diagnosed on 2007 and metastatic since 2013 with hx of left nephrectomy and prostatectomy, chronic diarrhea who presented to the [address] [place] [address] Anu Lanier on 1/12/23 with left shoulder pain and decreased ROM  He reported a fall onto his right side as well  He was found to have ARIANNA on CKD, hypoxia and elevated d-dimer  X-ray of the left shoulder was negative for acute osseous abnormality  CT of the LUE showed a new lytic metastasis left scapular body with acute pathologic fracture and new lytic metastasis in the inferior scapular angle with pathologic fracture  CTA PE study on 1/17/23 showed new moderate left pleural effusion with moderate compressive atelectasis of the left lower lobe  Enlargement of one right lower lobe metastasis with no change in multiple additional lung metastasis  Re-demonstration of multiple bone metastases with enlarging soft tissue metastases about the left scapula measuring up to 6 1 cm with new pathologic fracture of the left scapula  Re-demonstration of metastatic nodes in the upper abdomen  There was no surgical intervention for the pathologic fractures  Per orthopedics he is NWB in a sling to the LUE, okay to due elbow ROM and shoulder pendulums daily  He will need to follow up with Dr Ysabel Kirk, orthopedic oncologist in 4 weeks for treatment options  Nephrology is following for ARIANNA on CKD 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  PM&R are consulted for rehabilitation recommendations  The patient was seen in his room  He has some pain in his left shoulder however reports it is manageable  He has a history of neuropathy in his feet bilaterally  He reports he used to stand on his feet for long periods for time in the pain  He is currently working part time which involves driving however he is not sure he will be able to return at this time  He reports a very supportive neighbor who can come over as needed and a supportive son however he does live alone and will not have 24/7 supervision  Review of Systems: 10 point ROS negative except for what is noted in HPI    Function:  Prior level of function and living situation:  The patient lives in a two level home alone  He was independent  He has supportive son and grandson who live nearby and can assist as needed  Current level of function:  Physical Therapy:  Minimal assist for transfers and ambulation going 20 feet x 2  Occupational Therapy: Supervision for eating, minimal assist for grooming, UB bathing/dressing, LB bathing and toileting, moderate assist for LB dressing  Physical Exam:  /68 (BP Location: Left arm)   Pulse 94   Temp 98 5 °F (36 9 °C) (Temporal)   Resp 18   Ht 5' 8" (1 727 m)   Wt 113 kg (249 lb 9 oz)   SpO2 92%   BMI 37 95 kg/m²        Intake/Output Summary (Last 24 hours) at 2023 1359  Last data filed at 2023 0250  Gross per 24 hour   Intake 733 33 ml   Output 700 ml   Net 33 33 ml       Body mass index is 37 95 kg/m²  Physical Exam  Constitutional:       Appearance: Normal appearance  HENT:      Head: Normocephalic and atraumatic  Right Ear: External ear normal       Left Ear: External ear normal       Nose: Nose normal       Mouth/Throat:      Mouth: Mucous membranes are moist       Pharynx: Oropharynx is clear  Pulmonary:      Effort: Pulmonary effort is normal  No respiratory distress  Musculoskeletal:      Comments: LUE limited due to WB status and arm in sling otherwise ROM WNL   Skin:     General: Skin is warm and dry  Neurological:      Mental Status: He is alert and oriented to person, place, and time  Psychiatric:         Mood and Affect: Mood normal          Social History:    Social History     Socioeconomic History   • Marital status:       Spouse name: None   • Number of children: 1   • Years of education: 15   • Highest education level: None   Occupational History   • Occupation: retired     Comment: P/T    Tobacco Use   • Smoking status: Former     Packs/day: 1 00     Types: Cigarettes     Quit date: 3/28/2003     Years since quittin 8   • Smokeless tobacco: Never   Vaping Use • Vaping Use: Never used   Substance and Sexual Activity   • Alcohol use: Not Currently     Comment: occasional use   • Drug use: Never   • Sexual activity: None   Other Topics Concern   • None   Social History Narrative    Lives at home alone     Social Determinants of Health     Financial Resource Strain: Not on file   Food Insecurity: No Food Insecurity   • Worried About Running Out of Food in the Last Year: Never true   • Ran Out of Food in the Last Year: Never true   Transportation Needs: Unknown   • Lack of Transportation (Medical): Not on file   • Lack of Transportation (Non-Medical):  No   Physical Activity: Not on file   Stress: Not on file   Social Connections: Not on file   Intimate Partner Violence: Not on file   Housing Stability: Unknown   • Unable to Pay for Housing in the Last Year: No   • Number of Places Lived in the Last Year: Not on file   • Unstable Housing in the Last Year: No        Family History:    Family History   Problem Relation Age of Onset   • No Known Problems Mother    • No Known Problems Father          Medications:     Current Facility-Administered Medications:   •  acetaminophen (TYLENOL) tablet 975 mg, 975 mg, Oral, 4x Daily PRN, SOM MontenegroNP  •  aluminum-magnesium hydroxide-simethicone (MYLANTA) oral suspension 30 mL, 30 mL, Oral, Q6H PRN, Julia Lorin CRNP  •  amLODIPine (NORVASC) tablet 5 mg, 5 mg, Oral, HS, Mertie Zeus, SOMNP, 5 mg at 01/16/23 2227  •  cholecalciferol (VITAMIN D3) tablet 1,000 Units, 1,000 Units, Oral, Daily, SOM MontenegroNP, 1,000 Units at 01/17/23 6808  •  diphenhydrAMINE (BENADRYL) tablet 25 mg, 25 mg, Oral, HS PRN, Julia Lorin CRNP  •  gabapentin (NEURONTIN) capsule 300 mg, 300 mg, Oral, TID, Julia Lorin CRNP, 300 mg at 01/17/23 0905  •  heparin (porcine) subcutaneous injection 5,000 Units, 5,000 Units, Subcutaneous, Q8H [address], Saul Sewell MD  •  HYDROmorphone HCl (DILAUDID) injection 0 2 mg, 0 2 mg, Intravenous, Q4H PRN, SOM MartinezNP  •  loperamide (IMODIUM) capsule 2 mg, 2 mg, Oral, 4x Daily PRN, Nazanin Ibrahim MD, 2 mg at 01/17/23 4265  •  multivitamin stress formula tablet 1 tablet, 1 tablet, Oral, Daily, SOM MartinezNP, 1 tablet at 01/17/23 3400  •  ondansetron (ZOFRAN) injection 4 mg, 4 mg, Intravenous, Q6H PRN, SOM MartinezNP  •  oxyCODONE (ROXICODONE) IR tablet 2 5 mg, 2 5 mg, Oral, 4x Daily PRN, SOM MartinezNP  •  oxyCODONE (ROXICODONE) IR tablet 5 mg, 5 mg, Oral, 4x Daily PRN, IRENE Martinez  •  simethicone (MYLICON) chewable tablet 80 mg, 80 mg, Oral, 4x Daily PRN, SOM MartinezNP  •  traMADol (ULTRAM) tablet 50 mg, 50 mg, Oral, 4x Daily PRN, SOM MartinezNP, 50 mg at 01/16/23 5799    Past Medical History:     Past Medical History:   Diagnosis Date   • Arthritis    • Cancer [facility])     prostate - mets to bone and lung   • History of radiation therapy 07/27/2018    SBRT to T10 7/18-7/27/2018   • Hyperlipidemia    • Hypertension         Past Surgical History:     Past Surgical History:   Procedure Laterality Date   • DISTAL ULNA EXCISION Right     excision of tumor and orif with cement and screws   • JOINT REPLACEMENT Bilateral     tkr's   • LUNG SURGERY Right     exc of nodules   • NEPHRECTOMY Left          Allergies:     No Known Allergies        LABORATORY RESULTS:      Lab Results   Component Value Date    HGB 13 0 01/13/2023    HGB 13 6 12/10/2015    HCT 44 0 01/13/2023    HCT 41 6 12/10/2015    WBC 7 54 01/13/2023    WBC 8 90 12/10/2015     Lab Results   Component Value Date    BUN 26 (H) 01/17/2023    BUN 19 12/10/2015     12/10/2015    K 3 8 01/17/2023    K 3 7 12/10/2015     01/17/2023     (H) 12/10/2015    GLUCOSE 102 12/10/2015    CREATININE 1 21 01/17/2023    CREATININE 1 25 12/10/2015     Lab Results   Component Value Date    PROTIME 13 7 01/13/2023    INR 1 05 01/13/2023        DIAGNOSTIC STUDIES: Reviewed  XR chest 1 view portable    Result Date: 1/13/2023  Impression: Blunting in the costophrenic angle suggesting trace costophrenic angle effusions bilaterally, new when compared with November 14, 2022  Crowding of lung markings secondary to low lung volumes  Pulmonary nodules subtle, better visualized on prior CT  Expansile posterior right 7th rib metastatic lesion reidentified  Workstation performed: [id number]     XR clavicle LEFT    Result Date: 1/12/2023  Impression: No acute osseous abnormality  Workstation performed: [id number]     XR scapula LEFT    Result Date: 1/13/2023  Impression: Mixed sclerotic and lytic metastatic tumor mass associated with the scapular neck and glenoid, appears to be similar when compared to CT of November 14, 2022  No acute fracture or dislocation  Workstation performed: [id number]     XR shoulder 2+ views LEFT    Result Date: 1/12/2023  Impression: No acute osseous abnormality  Workstation performed: [id number]     XR humerus LEFT    Result Date: 1/12/2023  Impression: No acute osseous abnormality  Workstation performed: [id number]     NM lung ventilation / perfusion    Result Date: 1/13/2023  Impression: The probability for pulmonary embolus is intermediate or indeterminant  Overall the ventilation appears worse but PE is not excluded  The study was marked in [facility] for immediate notification  Workstation performed: [id number]     CTA chest pe study    Result Date: 1/17/2023  Impression: No pulmonary embolus  New moderate left pleural effusion with moderate compressive atelectasis of the left lower lobe  Enlargement of one right lower lobe metastasis with no change in multiple additional lung metastases  Redemonstration of multiple bone metastases with enlarging soft tissue metastases about the left scapula measuring up to 6 1 cm with new pathologic fracture of the left scapula  Redemonstration of metastatic nodes in the upper abdomen   Workstation performed: [id number]     CT upper extremity wo contrast left    Result Date: 1/14/2023  Impression: Compared to the 11/14/2022 CT, again seen is a lytic metastasis in the scapular glenoid process, measuring 2 9 x 2 4 x 2 0 cm, with cortical breakthrough and extraosseous extension anteriorly, though without pathologic fracture (series 2 image 36 ) There is a new 2 4 x 2 1 x 1 9 cm lytic metastasis in the left scapular body, with acute pathologic fracture (series 2 image 39 ) There is another new 1 9 x 0 8 x 1 4 cm lytic metastasis in the inferior scapular angle, with pathologic fracture (series 2 images 48 - 55 ) Partially visualized are pulmonary metastatic disease   Workstation performed: [id number]

## 2023-01-17 NOTE — PROGRESS NOTES
Orthopaedic Surgery - Progress Note  Yamilet Bailey (83 y o  male)   : 1950   MRN: [id number]  Date: 2023   Encounter: [id number]   Unit/Bed#: E4 -01    Assessment / Plan  Left pathologic scapula body fracture with minimal displacement and large lytic lesion in glenoid vault    · Continue non surgical management  · Recommend sling for comfort  · NWB LUE  · Elbow ROM and shoulder pendulums daily  · Dr Neel Barker did discuss Dieter's case with Dr Samira Sparks our orthopaedic oncologist who felt the current treatment recommendations were appropriate and that the next step would be following up with oncology for further treatment options  · Patient can schedule follow up with Dr Samira Sparks in 4 weeks for reevaluation  Subjective  Pt continues with mild to moderate left shoulder pain and inability to raise his arm  No interval changes since last seen 2 days ago  His CT of the shoulder shows a pathologic scapular body fracture with minimal displacement  Vitals  Temp:  [98 °F (36 7 °C)-98 5 °F (36 9 °C)] 98 5 °F (36 9 °C)  HR:  [93-99] 94  Resp:  [18] 18  BP: (126-141)/(62-79) 141/68  Body mass index is 37 95 kg/m²  I/O last 24 hours: In: 733 3 [P O :240;  I V :493 3]  Out: 700 [Urine:700]    Ortho Exam - Left Upper Extremity  · Diffuse shoulder tenderness  · Active FE 20, ER 60  · Passive   · Strength testing limited by shoulder pain  · Sensation intact throughout LUE  · 2+ radial pulse    Lab Results  (I have personally reviewed pertinent lab results )  Results from last 7 days   Lab Units 23  1752 23  0620 23  1853   WBC Thousand/uL 7 54 7 05 6 85   HEMOGLOBIN g/dL 13 0 12 5 12 7   HEMATOCRIT % 44 0 42 6 43 3   PLATELETS Thousands/uL 247 254 225     Results from last 7 days   Lab Units 23  0519 23  0658 01/15/23  0503 23  1617 23  0954 23  0057 23  1752 23  1853   PTT seconds 70* 79* 80* 82* 87* 194* 37 34   INR   --   --   --   -- --   --  1 05 1 00     Results from last 7 days   Lab Units 01/17/23  0519 01/16/23  0942 01/15/23  0853 01/14/23  0516 01/13/23  0427 01/12/23  1853   POTASSIUM mmol/L 3 8 4 3 3 9 4 1 5 1 5 2   CHLORIDE mmol/L 100 99 101 106 108 108   CO2 mmol/L 30 31 33* 30 27 28   BUN mg/dL 26* 27* 28* 32* 41* 44*   CREATININE mg/dL 1 21 1 36* 1 46* 1 60* 1 91* 2 26*   EGFR ml/min/1 73sq m 59 51 47 42 33 27   CALCIUM mg/dL 8 7 8 8 8 8 8 1* 8 6 8 7   ALK PHOS U/L  --   --   --   --  74 81   ALT U/L  --   --   --   --  11* 14   AST U/L  --   --   --   --  30 [address], PA-C

## 2023-01-17 NOTE — ASSESSMENT & PLAN NOTE
Lab Results   Component Value Date    EGFR 59 01/17/2023    EGFR 51 01/16/2023    EGFR 47 01/15/2023    CREATININE 1 21 01/17/2023    CREATININE 1 36 (H) 01/16/2023    CREATININE 1 46 (H) 01/15/2023     · Creatinine 2 2, baseline 1 0-1 3  · History of left nephrectomy and prostatectomy  · Hold lisinopril-HCTZ, resume when appropriate per nephrology  · Renal functions improved, near baseline

## 2023-01-17 NOTE — ASSESSMENT & PLAN NOTE
· Elevated D-dimer 4 2 in setting of ARIANNA  · Lower extremity duplex negative for DVT  · VQ scan shows indeterminate study  · CTA chest neg for PE

## 2023-01-17 NOTE — PLAN OF CARE
Problem: OCCUPATIONAL THERAPY ADULT  Goal: Performs self-care activities at highest level of function for planned discharge setting  See evaluation for individualized goals  Description: Treatment Interventions: ADL retraining, UE strengthening/ROM, Functional transfer training, Endurance training, Cognitive reorientation, Patient/family training, Equipment evaluation/education, Compensatory technique education, Energy conservation, Activityengagement          See flowsheet documentation for full assessment, interventions and recommendations  Outcome: Progressing  Note: Limitation: Decreased ADL status, Decreased UE strength, Decreased cognition, Decreased Safe judgement during ADL, Decreased UE ROM, Decreased endurance, Decreased self-care trans, Decreased high-level ADLs  Prognosis: Good  Assessment: Pt seen for OT treatment session focusing on functional activity tolerance, functional transfers/mobility and ADLs, UE AROM exercises, and patient education  Pt alert and cooperative  At start of session, pt was sitting in bed side chair  LB dressing completed at EOB (donning socks and shoes) and in bed side chair (donning underwear) with Fair dynamic sitting balance and Mod A to thread LLE into underwear, pull up socks, fit B/L feet into shoes, and tie shoes 2* limited functional reach and pain in L shoulder  VC required for pt to adhere to NWB through L shoulder  UB dressing completed with Min A for threading gown through LUE, and fastener/tie management  Education and handout provided for best one-handed dressing techniques with pt verbalizing understanding  Min A for sit>stand transfer 2* LUE WBS, limited strength, and overall weakness  Pt with Fair- static standing balance  Supervision required for stand>sit transfer, VC for hand placement and adherence to WBS  Pt completed functional mobility with Min A and use of SPC 2* decreased balance and use of non-dominant hand with cane   Pt with Poor+ dynamic standing balance  Per ortho consult, pt to complete elbow ROM and shldr pendulums daily  Pt educated on and completed 2 sets of 30 sec  Pendulum exercises for L shoulder with Supervision to ensure safety  Seated rest breaks required between sets 2* fatigue  Pt completed 2x10 L elbow, wrist, and forearm AROM exercises  OT to continue to follow pt during hospital stay to address ADLs, functional mobility/transfers, UE ROM/strengthening   Recommend STR upon d/c      OT Discharge Recommendation: Post acute rehabilitation services

## 2023-01-17 NOTE — ASSESSMENT & PLAN NOTE
· Found to be hypoxic with O2 sats less than 90 requiring 2L NC, not on home o2 at baseline  · Multifactorial in setting of pulmonary mets, obesity, deconditioning and concern for acute CHF  · Elevated D-dimer, NM study showing indeterminate study, duplex ultrasound lower extremity negative for DVT  · 2D echo with normal EF, GD 1 DD  · CTA neg for PE  · Requiring 1-2 L nasal cannula, will need home o2 study prior to discharge if not going to rehab

## 2023-01-17 NOTE — PROGRESS NOTES
[facility]  Progress Note - Beckie Grubbs 1950, 67 y o  male MRN: [id number]  Unit/Bed#: E4 -01 Encounter: [id number]  Primary Care Provider: Mariya Rosales MD   Date and time admitted to hospital: 1/12/2023  5:37 PM    * Acute respiratory failure with hypoxia ([facility] [address] )  Assessment & Plan  · Found to be hypoxic with O2 sats less than 90 requiring 2L NC, not on home o2 at baseline  · Multifactorial in setting of pulmonary mets, obesity, deconditioning and concern for acute CHF  · Elevated D-dimer, NM study showing indeterminate study, duplex ultrasound lower extremity negative for DVT  · 2D echo with normal EF, GD 2 DD  · CTA neg for PE  · Requiring 1-2 L nasal cannula, will need home o2 study prior to discharge if not going to rehab    Injury of left shoulder  Assessment & Plan  · Patient reports stretching out left arm, it got caught on the edge of the wall and hyperextended, heard a snap    Now having pain and decreased ROM  · Sling placed in ED  · X-ray of shoulder with mixed sclerotic and lytic metastatic tumor mass associated with the scapular neck and glenoid  · CT upper extremity showing pathological fracture of the scapula  · Appreciate ortho input, no surgical intervention recommended    Metastatic renal cell carcinoma ([facility] [address] )  Assessment & Plan  · History of localized renal cell carcinoma in August 2007, developed metastatic disease in 2013  · Metastatic disease to the lungs, abdominal lymph nodes  · Follows with oncology Dr Ana Patel outpatient  · On previous visits, notes patient with progressive disease currently on tivozanib    Repeat CTA showed enlargement of one right lower lobe metastasis, bone metastatis worrying for progression    Elevated d-dimer  Assessment & Plan  · Elevated D-dimer 4 2 in setting of ARIANNA  · Lower extremity duplex negative for DVT  · VQ scan shows indeterminate study  · CTA chest neg for PE    Fall at home, initial encounter  Assessment & Plan  · Mechanical fall at home yesterday, tripped over an area rug, landed on right side  Denies head strike  Not anticoagulated  · PT OT consult, recommending short-term rehab on discharge    H/O left nephrectomy  Assessment & Plan  · History of metastatic renal cell cancer s/p left nephrectomy and prostatectomy    Acute kidney injury superimposed on CKD [facility])  Assessment & Plan  Lab Results   Component Value Date    EGFR 59 2023    EGFR 51 2023    EGFR 47 01/15/2023    CREATININE 1 21 2023    CREATININE 1 36 (H) 2023    CREATININE 1 46 (H) 01/15/2023     · Creatinine 2 2, baseline 1 0-1 3  · History of left nephrectomy and prostatectomy  · Hold lisinopril-HCTZ, resume when appropriate per nephrology  · Renal functions improved, near baseline        VTE Pharmacologic Prophylaxis:   Pharmacologic: Heparin  Mechanical VTE Prophylaxis in Place: Yes    Patient Centered Rounds: I have performed bedside rounds with nursing staff today  Discussions with Specialists or Other Care Team Provider: nephrology    Current Length of Stay: 5 day(s)    Current Patient Status: Inpatient   Certification Statement: The patient will continue to require additional inpatient hospital stay due to monitor renal functions, STR    Discharge Plan: pending, 24-48 hours    Code Status: Level 1 - Full Code      Subjective:   Patient reports doing well overall, no complaints or events overnight  Continues to have good urine output, tolerated CT scan this morning without issue  Discussed discharge planning and agreeable  Objective:     Vitals:   Temp (24hrs), Av 2 °F (36 8 °C), Min:98 °F (36 7 °C), Max:98 5 °F (36 9 °C)    Temp:  [98 °F (36 7 °C)-98 5 °F (36 9 °C)] 98 5 °F (36 9 °C)  HR:  [93-99] 94  Resp:  [18] 18  BP: (126-141)/(62-79) 141/68  SpO2:  [90 %-95 %] 92 %  Body mass index is 37 95 kg/m²  Input and Output Summary (last 24 hours):        Intake/Output Summary (Last 24 hours) at 2023 [facility] filed at 1/17/2023 0250  Gross per 24 hour   Intake 733 33 ml   Output 700 ml   Net 33 33 ml       Physical Exam:     Physical Exam  Vitals and nursing note reviewed  Constitutional:       Appearance: Normal appearance  He is obese  HENT:      Head: Normocephalic and atraumatic  Eyes:      General: No scleral icterus  Conjunctiva/sclera: Conjunctivae normal    Cardiovascular:      Rate and Rhythm: Normal rate and regular rhythm  Pulmonary:      Effort: Pulmonary effort is normal  No respiratory distress  Breath sounds: No wheezing  Comments: Diminished breath sounds bilaterally  Abdominal:      General: Bowel sounds are normal  There is no distension  Palpations: Abdomen is soft  Musculoskeletal:         General: No swelling  Right lower leg: No edema  Left lower leg: No edema  Skin:     General: Skin is warm and dry  Neurological:      General: No focal deficit present  Mental Status: He is alert  Mental status is at baseline  Additional Data:     Labs:    Results from last 7 days   Lab Units 01/13/23  1752 01/13/23  0620   WBC Thousand/uL 7 54 7 05   HEMOGLOBIN g/dL 13 0 12 5   HEMATOCRIT % 44 0 42 6   PLATELETS Thousands/uL 247 254   NEUTROS PCT %  --  67   LYMPHS PCT %  --  23   MONOS PCT %  --  7   EOS PCT %  --  2     Results from last 7 days   Lab Units 01/17/23  0519 01/14/23  0516 01/13/23  0427   SODIUM mmol/L 140   < > 144   POTASSIUM mmol/L 3 8   < > 5 1   CHLORIDE mmol/L 100   < > 108   CO2 mmol/L 30   < > 27   BUN mg/dL 26*   < > 41*   CREATININE mg/dL 1 21   < > 1 91*   ANION GAP mmol/L 10   < > 9   CALCIUM mg/dL 8 7   < > 8 6   ALBUMIN g/dL  --   --  2 5*   TOTAL BILIRUBIN mg/dL  --   --  0 67   ALK PHOS U/L  --   --  74   ALT U/L  --   --  11*   AST U/L  --   --  30   GLUCOSE RANDOM mg/dL 94   < > 79    < > = values in this interval not displayed       Results from last 7 days   Lab Units 01/13/23  1752   INR  1 05     Results from last 7 days   Lab Units 01/15/23  1154   POC GLUCOSE mg/dl 109                   * I Have Reviewed All Lab Data Listed Above  * Additional Pertinent Lab Tests Reviewed: Arabella 66 Admission Reviewed    Imaging:    XR chest 1 view portable    Result Date: 1/13/2023  Impression: Blunting in the costophrenic angle suggesting trace costophrenic angle effusions bilaterally, new when compared with November 14, 2022  Crowding of lung markings secondary to low lung volumes  Pulmonary nodules subtle, better visualized on prior CT  Expansile posterior right 7th rib metastatic lesion reidentified  Workstation performed: [id number]     XR clavicle LEFT    Result Date: 1/12/2023  Impression: No acute osseous abnormality  Workstation performed: [id number]     XR scapula LEFT    Result Date: 1/13/2023  Impression: Mixed sclerotic and lytic metastatic tumor mass associated with the scapular neck and glenoid, appears to be similar when compared to CT of November 14, 2022  No acute fracture or dislocation  Workstation performed: [id number]     XR shoulder 2+ views LEFT    Result Date: 1/12/2023  Impression: No acute osseous abnormality  Workstation performed: [id number]     XR humerus LEFT    Result Date: 1/12/2023  Impression: No acute osseous abnormality  Workstation performed: [id number]     NM lung ventilation / perfusion    Result Date: 1/13/2023  Impression: The probability for pulmonary embolus is intermediate or indeterminant  Overall the ventilation appears worse but PE is not excluded  The study was marked in [facility] for immediate notification  Workstation performed: [id number]     CTA chest pe study    Result Date: 1/17/2023  Impression: No pulmonary embolus  New moderate left pleural effusion with moderate compressive atelectasis of the left lower lobe  Enlargement of one right lower lobe metastasis with no change in multiple additional lung metastases   Redemonstration of multiple bone metastases with enlarging soft tissue metastases about the left scapula measuring up to 6 1 cm with new pathologic fracture of the left scapula  Redemonstration of metastatic nodes in the upper abdomen  Workstation performed: [id number]     CT upper extremity wo contrast left    Result Date: 1/14/2023  Impression: Compared to the 11/14/2022 CT, again seen is a lytic metastasis in the scapular glenoid process, measuring 2 9 x 2 4 x 2 0 cm, with cortical breakthrough and extraosseous extension anteriorly, though without pathologic fracture (series 2 image 36 ) There is a new 2 4 x 2 1 x 1 9 cm lytic metastasis in the left scapular body, with acute pathologic fracture (series 2 image 39 ) There is another new 1 9 x 0 8 x 1 4 cm lytic metastasis in the inferior scapular angle, with pathologic fracture (series 2 images 48 - 55 ) Partially visualized are pulmonary metastatic disease   Workstation performed: [id number]       Recent Cultures (last 7 days):           Last 24 Hours Medication List:   Current Facility-Administered Medications   Medication Dose Route Frequency Provider Last Rate   • acetaminophen  975 mg Oral 4x Daily PRN IRENE Jones     • aluminum-magnesium hydroxide-simethicone  30 mL Oral Q6H PRN IRENE Jones     • amLODIPine  5 mg Oral HS IRENE Lion     • cholecalciferol  1,000 Units Oral Daily IRENE Jones     • diphenhydrAMINE  25 mg Oral HS PRN IRENE Jones     • gabapentin  300 mg Oral TID IRENE Jones     • heparin (porcine)  5,000 Units Subcutaneous [facility] Ramila Huynh MD     • HYDROmorphone  0 2 mg Intravenous Q4H PRN IRENE Jones     • loperamide  2 mg Oral 4x Daily PRN Ramila Huynh MD     • multivitamin stress formula  1 tablet Oral Daily IRENE Jones     • ondansetron  4 mg Intravenous Q6H PRN IRENE Jones     • oxyCODONE  2 5 mg Oral 4x Daily PRN IRENE Jones     • oxyCODONE  5 mg Oral 4x Daily PRN IRENE Dasilva     • simethicone  80 mg Oral 4x Daily PRN IRENE Dasilva     • traMADol  50 mg Oral 4x Daily PRN IRENE Dasilva          Today, Patient Was Seen By: Robert Carlin MD    ** Please Note: Dictation voice to text software may have been used in the creation of this document   **

## 2023-01-18 PROBLEM — M84.512A: Status: ACTIVE | Noted: 2023-01-12

## 2023-01-18 PROBLEM — I50.31 ACUTE DIASTOLIC (CONGESTIVE) HEART FAILURE (HCC): Status: ACTIVE | Noted: 2023-01-18

## 2023-01-18 LAB
ANION GAP SERPL CALCULATED.3IONS-SCNC: 7 MMOL/L (ref 4–13)
BASOPHILS # BLD AUTO: 0.01 THOUSANDS/ÂΜL (ref 0–0.1)
BASOPHILS NFR BLD AUTO: 0 % (ref 0–1)
BUN SERPL-MCNC: 24 MG/DL (ref 5–25)
CALCIUM SERPL-MCNC: 8.4 MG/DL (ref 8.4–10.2)
CHLORIDE SERPL-SCNC: 99 MMOL/L (ref 96–108)
CO2 SERPL-SCNC: 31 MMOL/L (ref 21–32)
CREAT SERPL-MCNC: 1.17 MG/DL (ref 0.6–1.3)
EOSINOPHIL # BLD AUTO: 0.15 THOUSAND/ÂΜL (ref 0–0.61)
EOSINOPHIL NFR BLD AUTO: 2 % (ref 0–6)
ERYTHROCYTE [DISTWIDTH] IN BLOOD BY AUTOMATED COUNT: 17.8 % (ref 11.6–15.1)
GFR SERPL CREATININE-BSD FRML MDRD: 61 ML/MIN/1.73SQ M
GLUCOSE SERPL-MCNC: 89 MG/DL (ref 65–140)
HCT VFR BLD AUTO: 39 % (ref 36.5–49.3)
HGB BLD-MCNC: 11.7 G/DL (ref 12–17)
IMM GRANULOCYTES # BLD AUTO: 0.02 THOUSAND/UL (ref 0–0.2)
IMM GRANULOCYTES NFR BLD AUTO: 0 % (ref 0–2)
LYMPHOCYTES # BLD AUTO: 1.5 THOUSANDS/ÂΜL (ref 0.6–4.47)
LYMPHOCYTES NFR BLD AUTO: 22 % (ref 14–44)
MCH RBC QN AUTO: 27.6 PG (ref 26.8–34.3)
MCHC RBC AUTO-ENTMCNC: 30 G/DL (ref 31.4–37.4)
MCV RBC AUTO: 92 FL (ref 82–98)
MONOCYTES # BLD AUTO: 0.58 THOUSAND/ÂΜL (ref 0.17–1.22)
MONOCYTES NFR BLD AUTO: 9 % (ref 4–12)
NEUTROPHILS # BLD AUTO: 4.48 THOUSANDS/ÂΜL (ref 1.85–7.62)
NEUTS SEG NFR BLD AUTO: 67 % (ref 43–75)
NRBC BLD AUTO-RTO: 0 /100 WBCS
PLATELET # BLD AUTO: 183 THOUSANDS/UL (ref 149–390)
PMV BLD AUTO: 9.5 FL (ref 8.9–12.7)
POTASSIUM SERPL-SCNC: 3.6 MMOL/L (ref 3.5–5.3)
RBC # BLD AUTO: 4.24 MILLION/UL (ref 3.88–5.62)
SODIUM SERPL-SCNC: 137 MMOL/L (ref 135–147)
WBC # BLD AUTO: 6.74 THOUSAND/UL (ref 4.31–10.16)

## 2023-01-18 RX ORDER — FUROSEMIDE 40 MG/1
40 TABLET ORAL DAILY
Status: DISCONTINUED | OUTPATIENT
Start: 2023-01-18 | End: 2023-01-19 | Stop reason: HOSPADM

## 2023-01-18 RX ADMIN — HEPARIN SODIUM 5000 UNITS: 5000 INJECTION INTRAVENOUS; SUBCUTANEOUS at 21:47

## 2023-01-18 RX ADMIN — HEPARIN SODIUM 5000 UNITS: 5000 INJECTION INTRAVENOUS; SUBCUTANEOUS at 05:22

## 2023-01-18 RX ADMIN — LOPERAMIDE HYDROCHLORIDE 2 MG: 2 CAPSULE ORAL at 23:02

## 2023-01-18 RX ADMIN — TRAMADOL HYDROCHLORIDE 50 MG: 50 TABLET, COATED ORAL at 05:20

## 2023-01-18 RX ADMIN — GABAPENTIN 300 MG: 300 CAPSULE ORAL at 16:04

## 2023-01-18 RX ADMIN — TRAMADOL HYDROCHLORIDE 50 MG: 50 TABLET, COATED ORAL at 21:46

## 2023-01-18 RX ADMIN — HEPARIN SODIUM 5000 UNITS: 5000 INJECTION INTRAVENOUS; SUBCUTANEOUS at 13:59

## 2023-01-18 RX ADMIN — Medication 1000 UNITS: at 09:04

## 2023-01-18 RX ADMIN — LOPERAMIDE HYDROCHLORIDE 2 MG: 2 CAPSULE ORAL at 09:09

## 2023-01-18 RX ADMIN — FUROSEMIDE 40 MG: 40 TABLET ORAL at 13:58

## 2023-01-18 RX ADMIN — GABAPENTIN 300 MG: 300 CAPSULE ORAL at 21:46

## 2023-01-18 RX ADMIN — B-COMPLEX W/ C & FOLIC ACID TAB 1 TABLET: TAB at 09:04

## 2023-01-18 RX ADMIN — GABAPENTIN 300 MG: 300 CAPSULE ORAL at 09:04

## 2023-01-18 NOTE — ASSESSMENT & PLAN NOTE
· Mechanical fall at home yesterday, tripped over an area rug, landed on right side  Denies head strike    Not anticoagulated  · PT OT consult, recommending short-term rehab on discharge  · CM to follow for STR

## 2023-01-18 NOTE — PROGRESS NOTES
[address] [place] [address]  Progress Note - Lotus Olson 1950, 67 y o  male MRN: [id number]  Unit/Bed#: E4 -01 Encounter: [id number]  Primary Care Provider: Tanvi Ritter MD   Date and time admitted to hospital: 1/12/2023  5:37 PM    * Acute respiratory failure with hypoxia ([facility] [address] )  Assessment & Plan  · Found to be hypoxic with O2 sats less than 90 requiring 2L NC, not on home o2 at baseline  · Multifactorial in setting of pulmonary mets, obesity, deconditioning and concern for acute CHF  · Elevated D-dimer, NM study showing indeterminate study, duplex ultrasound lower extremity negative for DVT  · 2D echo with normal EF, GD 1 DD  · CTA neg for PE  · Symptoms improved with diuresis, nephrology recommending lasix 40mg daily on discharge  · Requiring 1-2 L nasal cannula, will need home o2 study prior to discharge if not going to rehab    Injury of left shoulder  Assessment & Plan  · Patient reports stretching out left arm, it got caught on the edge of the wall and hyperextended, heard a snap    Now having pain and decreased ROM  · Sling placed in ED  · X-ray of shoulder with mixed sclerotic and lytic metastatic tumor mass associated with the scapular neck and glenoid  · CT upper extremity showing pathological fracture with new metastasis  · Appreciate ortho input, no surgical intervention recommended    Metastatic renal cell carcinoma ([facility] [address] )  Assessment & Plan  · History of localized renal cell carcinoma in August 2007, developed metastatic disease in 2013  · Metastatic disease to the lungs, abdominal lymph nodes  · Follows with oncology Dr Perla Winter outpatient  · On previous visits, notes patient with progressive disease currently on tivozanib  · Repeat CTA showed enlargement of one right lower lobe metastasis, bone metastatis worrying for progression    Elevated d-dimer  Assessment & Plan  · Elevated D-dimer 4 2 in setting of ARIANNA  · Lower extremity duplex negative for DVT  · VQ scan shows indeterminate study  · CTA chest neg for PE  · No indication for anticoagulation    Fall at home, initial encounter  Assessment & Plan  · Mechanical fall at home yesterday, tripped over an area rug, landed on right side  Denies head strike  Not anticoagulated  · PT OT consult, recommending short-term rehab on discharge  · CM to follow for STR    H/O left nephrectomy  Assessment & Plan  · History of metastatic renal cell cancer s/p left nephrectomy and prostatectomy    Acute kidney injury superimposed on CKD [facility])  Assessment & Plan  Lab Results   Component Value Date    EGFR 61 2023    EGFR 59 2023    EGFR 51 2023    CREATININE 1 17 2023    CREATININE 1 21 2023    CREATININE 1 36 (H) 2023     · Creatinine 2 2, baseline 1 0-1 3  · History of left nephrectomy and prostatectomy  · Rsume lisinopril on discharge  · Renal fxns back to baseline    Acute diastolic chf, as evidenced by echo, bnp, wt  Now resolved  Treated with IV lasix, transition to po      VTE Pharmacologic Prophylaxis:   Pharmacologic: Heparin  Mechanical VTE Prophylaxis in Place: Yes    Patient Centered Rounds: I have performed bedside rounds with nursing staff today  Discussions with Specialists or Other Care Team Provider: Nephrology    Current Length of Stay: 6 day(s)    Current Patient Status: Inpatient   Certification Statement: The patient will continue to require additional inpatient hospital stay due to pending placement    Discharge Plan: pending    Code Status: Level 1 - Full Code      Subjective:   No events overnight, no complaints  Tolerating diet, sleeping well without issues  Objective:     Vitals:   Temp (24hrs), Av 3 °F (36 8 °C), Min:97 °F (36 1 °C), Max:99 4 °F (37 4 °C)    Temp:  [97 °F (36 1 °C)-99 4 °F (37 4 °C)] 98 1 °F (36 7 °C)  HR:  [] 94  Resp:  [20-22] 20  BP: (113-147)/(61-68) 113/61  SpO2:  [90 %-95 %] 93 %  Body mass index is 37 68 kg/m²       Input and Output Summary (last 24 hours): Intake/Output Summary (Last 24 hours) at 1/18/2023 1322  Last data filed at 1/18/2023 0518  Gross per 24 hour   Intake 280 ml   Output 700 ml   Net -420 ml       Physical Exam:     Physical Exam  Vitals and nursing note reviewed  Constitutional:       Appearance: Normal appearance  He is obese  HENT:      Head: Normocephalic and atraumatic  Eyes:      General: No scleral icterus  Conjunctiva/sclera: Conjunctivae normal    Cardiovascular:      Rate and Rhythm: Normal rate and regular rhythm  Pulmonary:      Effort: Pulmonary effort is normal  No respiratory distress  Comments: Diminished breath sounds bilaterally  Abdominal:      General: Bowel sounds are normal  There is no distension  Palpations: Abdomen is soft  Musculoskeletal:         General: No swelling  Right lower leg: No edema  Left lower leg: No edema  Skin:     General: Skin is warm and dry  Neurological:      General: No focal deficit present  Mental Status: He is alert  Mental status is at baseline  Additional Data:     Labs:    Results from last 7 days   Lab Units 01/18/23  0517   WBC Thousand/uL 6 74   HEMOGLOBIN g/dL 11 7*   HEMATOCRIT % 39 0   PLATELETS Thousands/uL 183   NEUTROS PCT % 67   LYMPHS PCT % 22   MONOS PCT % 9   EOS PCT % 2     Results from last 7 days   Lab Units 01/18/23  0517 01/14/23  0516 01/13/23  0427   SODIUM mmol/L 137   < > 144   POTASSIUM mmol/L 3 6   < > 5 1   CHLORIDE mmol/L 99   < > 108   CO2 mmol/L 31   < > 27   BUN mg/dL 24   < > 41*   CREATININE mg/dL 1 17   < > 1 91*   ANION GAP mmol/L 7   < > 9   CALCIUM mg/dL 8 4   < > 8 6   ALBUMIN g/dL  --   --  2 5*   TOTAL BILIRUBIN mg/dL  --   --  0 67   ALK PHOS U/L  --   --  74   ALT U/L  --   --  11*   AST U/L  --   --  30   GLUCOSE RANDOM mg/dL 89   < > 79    < > = values in this interval not displayed       Results from last 7 days   Lab Units 01/13/23  1752   INR  1 05     Results from last 7 days   Lab Units 01/15/23  1154   POC GLUCOSE mg/dl 109                   * I Have Reviewed All Lab Data Listed Above  * Additional Pertinent Lab Tests Reviewed: Arabella 66 Admission Reviewed    Imaging:    XR chest 1 view portable    Result Date: 1/13/2023  Impression: Blunting in the costophrenic angle suggesting trace costophrenic angle effusions bilaterally, new when compared with November 14, 2022  Crowding of lung markings secondary to low lung volumes  Pulmonary nodules subtle, better visualized on prior CT  Expansile posterior right 7th rib metastatic lesion reidentified  Workstation performed: [id number]     XR clavicle LEFT    Result Date: 1/12/2023  Impression: No acute osseous abnormality  Workstation performed: [id number]     XR scapula LEFT    Result Date: 1/13/2023  Impression: Mixed sclerotic and lytic metastatic tumor mass associated with the scapular neck and glenoid, appears to be similar when compared to CT of November 14, 2022  No acute fracture or dislocation  Workstation performed: [id number]     XR shoulder 2+ views LEFT    Result Date: 1/12/2023  Impression: No acute osseous abnormality  Workstation performed: [id number]     XR humerus LEFT    Result Date: 1/12/2023  Impression: No acute osseous abnormality  Workstation performed: [id number]     NM lung ventilation / perfusion    Result Date: 1/13/2023  Impression: The probability for pulmonary embolus is intermediate or indeterminant  Overall the ventilation appears worse but PE is not excluded  The study was marked in [facility] for immediate notification  Workstation performed: [id number]     CTA chest pe study    Result Date: 1/17/2023  Impression: No pulmonary embolus  New moderate left pleural effusion with moderate compressive atelectasis of the left lower lobe  Enlargement of one right lower lobe metastasis with no change in multiple additional lung metastases   Redemonstration of multiple bone metastases with enlarging soft tissue metastases about the left scapula measuring up to 6 1 cm with new pathologic fracture of the left scapula  Redemonstration of metastatic nodes in the upper abdomen  Workstation performed: [id number]     CT upper extremity wo contrast left    Result Date: 1/14/2023  Impression: Compared to the 11/14/2022 CT, again seen is a lytic metastasis in the scapular glenoid process, measuring 2 9 x 2 4 x 2 0 cm, with cortical breakthrough and extraosseous extension anteriorly, though without pathologic fracture (series 2 image 36 ) There is a new 2 4 x 2 1 x 1 9 cm lytic metastasis in the left scapular body, with acute pathologic fracture (series 2 image 39 ) There is another new 1 9 x 0 8 x 1 4 cm lytic metastasis in the inferior scapular angle, with pathologic fracture (series 2 images 48 - 55 ) Partially visualized are pulmonary metastatic disease   Workstation performed: [id number]       Recent Cultures (last 7 days):           Last 24 Hours Medication List:   Current Facility-Administered Medications   Medication Dose Route Frequency Provider Last Rate   • acetaminophen  975 mg Oral 4x Daily PRN IRENE Sr     • aluminum-magnesium hydroxide-simethicone  30 mL Oral Q6H PRN IRENE Sr     • amLODIPine  5 mg Oral HS MusaIRENE Lane     • cholecalciferol  1,000 Units Oral Daily IRENE Sr     • diphenhydrAMINE  25 mg Oral HS PRN IRENE Sr     • furosemide  40 mg Oral Daily IRENE Negron     • gabapentin  300 mg Oral TID IRENE Sr     • heparin (porcine)  5,000 Units Subcutaneous [facility] Sheila Taylor MD     • HYDROmorphone  0 2 mg Intravenous Q4H PRN IRENE Sr     • loperamide  2 mg Oral 4x Daily PRN Sheila Taylor MD     • multivitamin stress formula  1 tablet Oral Daily IRENE Sr     • ondansetron  4 mg Intravenous Q6H PRN IRENE Sr     • oxyCODONE  2 5 mg Oral 4x Daily PRN Monisha Mcclellan IRENE     • oxyCODONE  5 mg Oral 4x Daily PRN IRENE Waggoner     • simethicone  80 mg Oral 4x Daily PRN IRENE Waggoner     • traMADol  50 mg Oral 4x Daily PRN IRENE Waggoner          Today, Patient Was Seen By: Cindi Lackey MD    ** Please Note: Dictation voice to text software may have been used in the creation of this document   **

## 2023-01-18 NOTE — PROGRESS NOTES
NEPHROLOGY PROGRESS NOTE   Daphney Head 67 y o  male MRN: [id number]  Unit/Bed#: E4 -01 Encounter: [id number]  Reason for Consult: ARIANNA on CKD    ASSESSMENT/PLAN:  Acute kidney injury on CKD: history of left-sided nephrectomy  Suspect secondary to autoregulatory failure  -Baseline creatinine 1 0-1 3   -Creatinine 2 26 on 1/12/2023   -Creatinine is currently at baseline, 1 17   -Status post CTA 1/17/2022   -Holding lisinopril/HCTZ  May resume at discharge    -on IV Lasix 20 mg BID this admission  -UA with 0-1 RBCs, 0-1 WBCs, 0-1 hyaline casts  -Urine eosinophils 0%  -Reviewed his renal imaging with right renal cyst, status post left nephrectomy  -Avoiding NSAIDs, reports taking while at home  -Recommend avoiding nephrotoxins, hypotension, IV contrast   -I/O      Metastatic renal cell carcinoma: With mets to lungs and abdominal lymph nodes  Following with oncology as an outpatient  Receiving current treatment with tivozanib       Mild hyperkalemia: In the setting of autoregulatory failure, ACE inhibition, and previous NSAID use  (resolved)     Blood pressure: Remains well controlled  -Current medications: Amlodipine 5 mg daily   -Home medications: Lisinopril-hydrochlorothiazide 20-12 5 mg daily  -recommend avoiding hypotension or high fluctuations in blood pressure   -Recommend holding parameters and antihypertensive for systolic blood pressure less than 130      Acute respiratory failure with hypoxia:  -BNP elevated, D-dimer elevated  - Nuclear med scan with intermediate are indeterminate probability of pulmonary embolus  -CTA negative for embolus, new moderate left pleural effusion with moderate compressive atelectasis of the left lower lobe  Enlargement of 1 right lower lobe metastasis with no change in multiple additional lung metastasis    -Echocardiogram with EF of 65% and grade 1 abnormal relaxation   -Will start lasix 40 mg po daily       Left shoulder injury: Continues to utilize sling   -X-ray with mixed sclerotic and lytic metastatic tumor mass associated with the scapular neck and glenoid  -CT upper extremity shows lytic metastasis in the scapular glenoid process with cortical breakthrough and extraosseous extension anteriorly, new lytic lesion in the left scapular body, new lesion in the inferior scapular angle  -Orthopedic team following, no surgical intervention recommended at this time      Other: Fall    Disposition: Okay to discharge from renal once medically cleared  Will need follow-up with nephrology at discharge with BMP in 1 week  Will start Lasix 40 mg daily and may resume ACE inhibition at discharge  SUBJECTIVE:  Patient is resting in his chair  He reports feeling well  He denies chest discomfort or shortness of breath  He remains on room air  He continues to have lower extremity edema  He denies issues with urination      OBJECTIVE:  Current Weight: Weight - Scale: 112 kg (247 lb 12 8 oz)  Vitals:    01/17/23 2008 01/18/23 0014 01/18/23 0600 01/18/23 0807   BP: 127/63 127/66  113/61   BP Location: Left arm Left arm  Right arm   Pulse: 86 97  94   Resp: 22 22  20   Temp: (!) 97 °F (36 1 °C) 99 4 °F (37 4 °C)  98 1 °F (36 7 °C)   TempSrc: Temporal Temporal  Temporal   SpO2: 94% 90%  93%   Weight:   112 kg (247 lb 12 8 oz)    Height:           Intake/Output Summary (Last 24 hours) at 1/18/2023 1236  Last data filed at 1/18/2023 0518  Gross per 24 hour   Intake 280 ml   Output 700 ml   Net -420 ml     General: NAD  Skin: warm, dry, intact, no rash  HEENT: Moist mucous membranes, sclera anicteric, normocephalic, atraumatic  Neck: No apparent JVD appreciated  Chest: lung sounds clear B/L, on RA   CVS:Regular rate and rhythm, no murmer   Abdomen: Soft, round, non-tender, +BS  Extremities: B/L LE edema present, sling to left upper extremity  Neuro: alert and oriented  Psych: appropriate mood and affect     Medications:    Current Facility-Administered Medications:   • acetaminophen (TYLENOL) tablet 975 mg, 975 mg, Oral, 4x Daily PRN, IRENE Elizalde  •  aluminum-magnesium hydroxide-simethicone (MYLANTA) oral suspension 30 mL, 30 mL, Oral, Q6H PRN, IRENE Elizalde  •  amLODIPine (NORVASC) tablet 5 mg, 5 mg, Oral, HS, Alaoseas Arguelles, SOMNP, 5 mg at 01/17/23 2201  •  cholecalciferol (VITAMIN D3) tablet 1,000 Units, 1,000 Units, Oral, Daily, IRENE Elizalde, 1,000 Units at 01/18/23 9977  •  diphenhydrAMINE (BENADRYL) tablet 25 mg, 25 mg, Oral, HS PRN, IRENE Elizalde  •  gabapentin (NEURONTIN) capsule 300 mg, 300 mg, Oral, TID, IRENE Elizalde, 300 mg at 01/18/23 9380  •  heparin (porcine) subcutaneous injection 5,000 Units, 5,000 Units, Subcutaneous, Q8H [address], Beata De Souza MD, 5,000 Units at 01/18/23 0522  •  HYDROmorphone HCl (DILAUDID) injection 0 2 mg, 0 2 mg, Intravenous, Q4H PRN, IRENE Elizalde  •  loperamide (IMODIUM) capsule 2 mg, 2 mg, Oral, 4x Daily PRN, Beata De Souza MD, 2 mg at 01/18/23 0909  •  multivitamin stress formula tablet 1 tablet, 1 tablet, Oral, Daily, IRENE Elizalde, 1 tablet at 01/18/23 5516  •  ondansetron (ZOFRAN) injection 4 mg, 4 mg, Intravenous, Q6H PRN, IRENE Elizalde  •  oxyCODONE (ROXICODONE) IR tablet 2 5 mg, 2 5 mg, Oral, 4x Daily PRN, IRENE Elizalde  •  oxyCODONE (ROXICODONE) IR tablet 5 mg, 5 mg, Oral, 4x Daily PRN, IRENE Elizalde  •  simethicone (MYLICON) chewable tablet 80 mg, 80 mg, Oral, 4x Daily PRN, IRENE Elizalde  •  traMADol (ULTRAM) tablet 50 mg, 50 mg, Oral, 4x Daily PRN, Sandy Bearden, IRENE, 50 mg at 01/18/23 0520    Laboratory Results:  Results from last 7 days   Lab Units 01/18/23  0517 01/17/23  0519 01/16/23  0942 01/14/23  0516 01/13/23  1752 01/13/23  0620 01/13/23  0427 01/12/23  1853   WBC Thousand/uL 6 74  --   --   --  7 54 7 05  --  6 85   HEMOGLOBIN g/dL 11 7*  --   --   --  13 0 12 5  --  12 7   HEMATOCRIT % 39 0  --   --   --  44 0 42 6  --  43 3   PLATELETS Thousands/uL 183  --   --   --  247 254  --  225   SODIUM mmol/L 137 140 139   < >  --   --  144 144   POTASSIUM mmol/L 3 6 3 8 4 3   < >  --   --  5 1 5 2   CHLORIDE mmol/L 99 100 99   < >  --   --  108 108   CO2 mmol/L 31 30 31   < >  --   --  27 28   BUN mg/dL 24 26* 27*   < >  --   --  41* 44*   CREATININE mg/dL 1 17 1 21 1 36*   < >  --   --  1 91* 2 26*   CALCIUM mg/dL 8 4 8 7 8 8   < >  --   --  8 6 8 7   ALK PHOS U/L  --   --   --   --   --   --  74 81   ALT U/L  --   --   --   --   --   --  11* 14   AST U/L  --   --   --   --   --   --  30 29    < > = values in this interval not displayed

## 2023-01-18 NOTE — CASE MANAGEMENT
Case Management Discharge Planning Note    Patient name Steven Sharp  Location [facility] 4 /E4 MS 18-* MRN [id number]  : 1950 Date 2023       Current Admission Date: 2023  Current Admission Diagnosis:Acute respiratory failure with hypoxia [facility])   Patient Active Problem List    Diagnosis Date Noted   • Acute diastolic (congestive) heart failure ([facility] ) 2023   • Acute kidney injury superimposed on CKD ([facility] [address] [facility] ) 2023   • H/O left nephrectomy 2023   • Fall at home, initial encounter 2023   • Injury of left shoulder 2023   • Acute respiratory failure with hypoxia ([facility] [address] ) 2023   • Elevated brain natriuretic peptide (BNP) level 2023   • Elevated d-dimer 2023   • Chronic diarrhea 2023   • COVID-19 2022   • Azotemia 10/31/2019   • Hx of prostatectomy 2019   • History of prostate cancer 2019   • Metastatic renal cell carcinoma ([facility] [address] ) 2018   • [facility] 2018   • Clear cell adenocarcinoma of kidney, 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   • Bone metastases ([facility] [address] ) 2018   • Lung metastases ([facility] [facility] ) 2018      LOS (days): 6  Geometric Mean LOS (GMLOS) (days): 4 30  Days to GMLOS:-1 5     OBJECTIVE:  Risk of Unplanned Readmission Score: 11 81         Current admission status: Inpatient   Preferred Pharmacy:   [facility], [address] [facility]  [address]  [facility] [address]  Phone: [phone] Fax: [address] [place] [address]  [address]  [address] [facility] [address]  Phone: [phone] Fax: [phone]    [facility] [address]  [address] [place] [address]  Phone: [phone] Fax: [phone]    [address] [place] [address], [address] [place] [address]  [address]  [address] [place] [address]  Phone: [phone] Fax: [phone]    [facility], [address] [place] [address]  Phone: [phone] Fax: [phone] Raina Shepard, [address] [facility]  [address] [facility] [address]  Phone: [phone] Fax: [phone]    Primary Care Provider: Krish Cage MD    Primary Insurance: MEDICARE  Secondary Insurance: [address]    DISCHARGE DETAILS:                                 Additional Comments: Met with pt and gave him choice list of SNF for [facility] , [facility] and [facility]  Pt stated he will discuss with his son and get back to this LSW

## 2023-01-18 NOTE — ASSESSMENT & PLAN NOTE
Lab Results   Component Value Date    EGFR 61 01/18/2023    EGFR 59 01/17/2023    EGFR 51 01/16/2023    CREATININE 1 17 01/18/2023    CREATININE 1 21 01/17/2023    CREATININE 1 36 (H) 01/16/2023     · Creatinine 2 2, baseline 1 0-1 3  · History of left nephrectomy and prostatectomy  · Rsume lisinopril on discharge  · Renal fxns back to baseline

## 2023-01-18 NOTE — ASSESSMENT & PLAN NOTE
· History of localized renal cell carcinoma in August 2007, developed metastatic disease in 2013  · Metastatic disease to the lungs, abdominal lymph nodes  · Follows with oncology Dr Naomi Galvan outpatient  · On previous visits, notes patient with progressive disease currently on tivozanib  · Repeat CTA showed enlargement of one right lower lobe metastasis, bone metastatis worrying for progression

## 2023-01-18 NOTE — PLAN OF CARE
Patient's wife called reporting bloody area on big toe nail & puss for the last 2 months, couple more toes     Acute Illness   Acute illness concerns: Big toe nail possible infection  Onset: a month ago    Fever: no    Chills/Sweats: NO    Headache (location?): no    Sinus Pressure:no    Conjunctivitis:  no    Ear Pain: no    Rhinorrhea: no    Congestion: no    Sore Throat: no     Cough: no    Wheeze: no    Decreased Appetite: no    Nausea: no    Vomiting: no    Diarrhea:  no    Dysuria/Freq.: no    Fatigue/Achiness: YES    Sick/Strep Exposure: no     Therapies Tried and outcome: none       Recent Medication changes: none    Home Care information given: none  Appointment made for patient. Double booked due to possible toe infection    References used: Telephone Triage Protocols for Nurses fifth edition pg 257      Please advise as appropriate with further recommendations as appropriate.    Andreia Ramirez, RN  Triage RN  [facility]       Problem: Potential for Falls  Goal: Patient will remain free of falls  Description: INTERVENTIONS:  - Educate patient/family on patient safety including physical limitations  - Instruct patient to call for assistance with activity   - Consult OT/PT to assist with strengthening/mobility   - Keep Call bell within reach  - Keep bed low and locked with side rails adjusted as appropriate  - Keep care items and personal belongings within reach  - Initiate and maintain comfort rounds  - Make Fall Risk Sign visible to staff  - Offer Toileting every 2 Hours, in advance of need  - Initiate/Maintain bed alarm  - Obtain necessary fall risk management equipment: alarms  - Apply yellow socks and bracelet for high fall risk patients  - Consider moving patient to room near nurses station  Outcome: Progressing     Problem: PAIN - ADULT  Goal: Verbalizes/displays adequate comfort level or baseline comfort level  Description: Interventions:  - Encourage patient to monitor pain and request assistance  - Assess pain using appropriate pain scale  - Administer analgesics based on type and severity of pain and evaluate response  - Implement non-pharmacological measures as appropriate and evaluate response  - Consider cultural and social influences on pain and pain management  - Notify physician/advanced practitioner if interventions unsuccessful or patient reports new pain  Outcome: Progressing     Problem: DISCHARGE PLANNING  Goal: Discharge to home or other facility with appropriate resources  Description: INTERVENTIONS:  - Identify barriers to discharge w/patient and caregiver  - Arrange for needed discharge resources and transportation as appropriate  - Identify discharge learning needs (meds, wound care, etc )  - Refer to Case Management Department for coordinating discharge planning if the patient needs post-hospital services based on physician/advanced practitioner order or complex needs related to functional status, cognitive ability, or social support system  Outcome: Progressing     Problem: Knowledge Deficit  Goal: Patient/family/caregiver demonstrates understanding of disease process, treatment plan, medications, and discharge instructions  Description: Complete learning assessment and assess knowledge base    Interventions:  - Provide teaching at level of understanding  - Provide teaching via preferred learning methods  Outcome: Progressing     Problem: CARDIOVASCULAR - ADULT  Goal: Maintains optimal cardiac output and hemodynamic stability  Description: INTERVENTIONS:  - Monitor I/O, vital signs and rhythm  - Monitor for S/S and trends of decreased cardiac output  - Administer and titrate ordered vasoactive medications to optimize hemodynamic stability  - Assess quality of pulses, skin color and temperature  - Assess for signs of decreased coronary artery perfusion  - Instruct patient to report change in severity of symptoms  Outcome: Progressing  Goal: Absence of cardiac dysrhythmias or at baseline rhythm  Description: INTERVENTIONS:  - Continuous cardiac monitoring, vital signs, obtain 12 lead EKG if ordered  - Administer antiarrhythmic and heart rate control medications as ordered  - Monitor electrolytes and administer replacement therapy as ordered  Outcome: Progressing     Problem: RESPIRATORY - ADULT  Goal: Achieves optimal ventilation and oxygenation  Description: INTERVENTIONS:  - Assess for changes in respiratory status  - Assess for changes in mentation and behavior  - Position to facilitate oxygenation and minimize respiratory effort  - Oxygen administered by appropriate delivery if ordered  - Initiate smoking cessation education as indicated  - Encourage broncho-pulmonary hygiene including cough, deep breathe, Incentive Spirometry  - Assess the need for suctioning and aspirate as needed  - Assess and instruct to report SOB or any respiratory difficulty  - Respiratory Therapy support as indicated  Outcome: Progressing Problem: MUSCULOSKELETAL - ADULT  Goal: Maintain proper alignment of affected body part  Description: INTERVENTIONS:  - Support, maintain and protect limb and body alignment  - Provide patient/ family with appropriate education  Outcome: Progressing     Problem: MOBILITY - ADULT  Goal: Maintain or return to baseline ADL function  Description: INTERVENTIONS:  -  Assess patient's ability to carry out ADLs; assess patient's baseline for ADL function and identify physical deficits which impact ability to perform ADLs (bathing, care of mouth/teeth, toileting, grooming, dressing, etc )  - Assess/evaluate cause of self-care deficits   - Assess range of motion  - Assess patient's mobility; develop plan if impaired  - Assess patient's need for assistive devices and provide as appropriate  - Encourage maximum independence but intervene and supervise when necessary  - Involve family in performance of ADLs  - Assess for home care needs following discharge   - Consider OT consult to assist with ADL evaluation and planning for discharge  - Provide patient education as appropriate  Outcome: Progressing  Goal: Maintains/Returns to pre admission functional level  Description: INTERVENTIONS:  - Perform BMAT or MOVE assessment daily    - Set and communicate daily mobility goal to care team and patient/family/caregiver     - Collaborate with rehabilitation services on mobility goals if consulted  - Ambulate patient 3 times a day  - Out of bed to chair 3 times a day   - Out of bed for meals 3 times a day  - Out of bed for toileting  - Record patient progress and toleration of activity level   Outcome: Progressing

## 2023-01-18 NOTE — ARC ADMISSION
Reviewed patient with [facility] physician and patient has been pre-approved for [facility] placement pending medical stability and bed availability  CM updated

## 2023-01-18 NOTE — PLAN OF CARE
Problem: Potential for Falls  Goal: Patient will remain free of falls  Description: INTERVENTIONS:  - Educate patient/family on patient safety including physical limitations  - Instruct patient to call for assistance with activity   - Consult OT/PT to assist with strengthening/mobility   - Keep Call bell within reach  - Keep bed low and locked with side rails adjusted as appropriate  - Keep care items and personal belongings within reach  - Initiate and maintain comfort rounds  - Make Fall Risk Sign visible to staff  - Offer Toileting every 2 Hours, in advance of need  - Initiate/Maintain bed alarm  - Obtain necessary fall risk management equipment: alarms  - Apply yellow socks and bracelet for high fall risk patients  - Consider moving patient to room near nurses station  1/18/2023 1133 by Marek Baxter RN  Outcome: Progressing  1/18/2023 1052 by Marek Baxter RN  Outcome: Progressing     Problem: PAIN - ADULT  Goal: Verbalizes/displays adequate comfort level or baseline comfort level  Description: Interventions:  - Encourage patient to monitor pain and request assistance  - Assess pain using appropriate pain scale  - Administer analgesics based on type and severity of pain and evaluate response  - Implement non-pharmacological measures as appropriate and evaluate response  - Consider cultural and social influences on pain and pain management  - Notify physician/advanced practitioner if interventions unsuccessful or patient reports new pain  1/18/2023 1133 by Marek Baxter RN  Outcome: Progressing  1/18/2023 1052 by Marek Baxter RN  Outcome: Progressing     Problem: DISCHARGE PLANNING  Goal: Discharge to home or other facility with appropriate resources  Description: INTERVENTIONS:  - Identify barriers to discharge w/patient and caregiver  - Arrange for needed discharge resources and transportation as appropriate  - Identify discharge learning needs (meds, wound care, etc )  - Refer to Case Management Department for coordinating discharge planning if the patient needs post-hospital services based on physician/advanced practitioner order or complex needs related to functional status, cognitive ability, or social support system  1/18/2023 1133 by Rita Mcqueen RN  Outcome: Progressing  1/18/2023 1052 by Rita Mcqueen RN  Outcome: Progressing     Problem: Knowledge Deficit  Goal: Patient/family/caregiver demonstrates understanding of disease process, treatment plan, medications, and discharge instructions  Description: Complete learning assessment and assess knowledge base    Interventions:  - Provide teaching at level of understanding  - Provide teaching via preferred learning methods  1/18/2023 1133 by Rita Mcqueen RN  Outcome: Progressing  1/18/2023 1052 by Rita Mcqueen RN  Outcome: Progressing     Problem: CARDIOVASCULAR - ADULT  Goal: Maintains optimal cardiac output and hemodynamic stability  Description: INTERVENTIONS:  - Monitor I/O, vital signs and rhythm  - Monitor for S/S and trends of decreased cardiac output  - Administer and titrate ordered vasoactive medications to optimize hemodynamic stability  - Assess quality of pulses, skin color and temperature  - Assess for signs of decreased coronary artery perfusion  - Instruct patient to report change in severity of symptoms  1/18/2023 1133 by Rita Mcqueen RN  Outcome: Progressing  1/18/2023 1052 by Rita Mcqueen RN  Outcome: Progressing  Goal: Absence of cardiac dysrhythmias or at baseline rhythm  Description: INTERVENTIONS:  - Continuous cardiac monitoring, vital signs, obtain 12 lead EKG if ordered  - Administer antiarrhythmic and heart rate control medications as ordered  - Monitor electrolytes and administer replacement therapy as ordered  1/18/2023 1133 by Rita Mcqueen RN  Outcome: Progressing  1/18/2023 1052 by Rita Mcqueen RN  Outcome: Progressing     Problem: RESPIRATORY - ADULT  Goal: Achieves optimal ventilation and oxygenation  Description: INTERVENTIONS:  - Assess for changes in respiratory status  - Assess for changes in mentation and behavior  - Position to facilitate oxygenation and minimize respiratory effort  - Oxygen administered by appropriate delivery if ordered  - Initiate smoking cessation education as indicated  - Encourage broncho-pulmonary hygiene including cough, deep breathe, Incentive Spirometry  - Assess the need for suctioning and aspirate as needed  - Assess and instruct to report SOB or any respiratory difficulty  - Respiratory Therapy support as indicated  1/18/2023 1133 by Janet Elizabeth RN  Outcome: Progressing  1/18/2023 1052 by Janet Elizabeth RN  Outcome: Progressing     Problem: MUSCULOSKELETAL - ADULT  Goal: Maintain proper alignment of affected body part  Description: INTERVENTIONS:  - Support, maintain and protect limb and body alignment  - Provide patient/ family with appropriate education  1/18/2023 1133 by Janet Elizabeth RN  Outcome: Progressing  1/18/2023 1052 by Janet Elizabeth RN  Outcome: Progressing     Problem: MOBILITY - ADULT  Goal: Maintain or return to baseline ADL function  Description: INTERVENTIONS:  -  Assess patient's ability to carry out ADLs; assess patient's baseline for ADL function and identify physical deficits which impact ability to perform ADLs (bathing, care of mouth/teeth, toileting, grooming, dressing, etc )  - Assess/evaluate cause of self-care deficits   - Assess range of motion  - Assess patient's mobility; develop plan if impaired  - Assess patient's need for assistive devices and provide as appropriate  - Encourage maximum independence but intervene and supervise when necessary  - Involve family in performance of ADLs  - Assess for home care needs following discharge   - Consider OT consult to assist with ADL evaluation and planning for discharge  - Provide patient education as appropriate  1/18/2023 1133 by David Daniel Krish Gasca RN  Outcome: Progressing  1/18/2023 1052 by Terrance Newton RN  Outcome: Progressing  Goal: Maintains/Returns to pre admission functional level  Description: INTERVENTIONS:  - Perform BMAT or MOVE assessment daily    - Set and communicate daily mobility goal to care team and patient/family/caregiver     - Collaborate with rehabilitation services on mobility goals if consulted  - Ambulate patient 3 times a day  - Out of bed to chair 3 times a day   - Out of bed for meals 3 times a day  - Out of bed for toileting  - Record patient progress and toleration of activity level   1/18/2023 1133 by Terrance Newton RN  Outcome: Progressing  1/18/2023 1052 by Terrance Newton RN  Outcome: Progressing     Problem: Prexisting or High Potential for Compromised Skin Integrity  Goal: Skin integrity is maintained or improved  Description: INTERVENTIONS:  - Identify patients at risk for skin breakdown  - Assess and monitor skin integrity  - Assess and monitor nutrition and hydration status  - Monitor labs   - Assess for incontinence   - Turn and reposition patient  - Assist with mobility/ambulation  - Relieve pressure over bony prominences  - Avoid friction and shearing  - Provide appropriate hygiene as needed including keeping skin clean and dry  - Evaluate need for skin moisturizer/barrier cream  - Collaborate with interdisciplinary team   - Patient/family teaching  - Consider wound care consult   1/18/2023 1133 by Terrance Newton RN  Outcome: Progressing  1/18/2023 1052 by Terrance Newton RN  Outcome: Progressing

## 2023-01-18 NOTE — PLAN OF CARE
Problem: PHYSICAL THERAPY ADULT  Goal: Performs mobility at highest level of function for planned discharge setting  See evaluation for individualized goals  Description: Treatment/Interventions: Functional transfer training, LE strengthening/ROM, Therapeutic exercise, Endurance training, Patient/family training, Equipment eval/education, Gait training, Spoke to nursing          See flowsheet documentation for full assessment, interventions and recommendations  Outcome: Progressing  Note: Prognosis: Fair  Problem List: Decreased strength, Decreased range of motion, Decreased endurance, Impaired balance, Decreased mobility, Decreased safety awareness, Pain, Orthopedic restrictions  Assessment: Pt seen for PT treatment session  Pt  rec'd out f bed in chair upon arrival   Pt reports pain R posterior shoulder 3 a rest and 7 with movement  Pt  Performs arom for L elbow, wrist and hand 10 x 2 reps with cues for correct technique ad positioning of L ue and L ue pendulum exercises 2 x 10 reps demonstrating proper technique and performance  Pt tolerated ue exercises well not reports of increased pain reported  Pt demonstrating difficulty with sit to stand transfers this sessio requiring mod assist x1, pt with posterior lob upon standing requiring mod assist to correct  Pt progressed with ambulation distances to 20',30', 45' with use of straight cane and min assist x1  Pt  Demonstrates unsteady gait with inconsistent kirti, decreased left heel strike and push off due to l foot drop, left le circumduction during swing through  Pt  Requiring min assist x1 for ambulation due to decreased balance, gait deviations, safety and decreased mobility, activity tolerance and endurance  Pt  Remains functioning below baseline level of mobility, unable to ambulate household distances and at increased risk for falls    STR is recommended at d/c   Barriers to Discharge: Decreased caregiver support     PT Discharge Recommendation: Post acute rehabilitation services    See flowsheet documentation for full assessment

## 2023-01-18 NOTE — PROGRESS NOTES
PHYSICAL MEDICINE AND REHABILITATION   PREADMISSION ASSESSMENT     Projected [facility] Diagnoses:  Impairment of mobility, safety and Activities of Daily Living (ADLs) due to Orthopedic Disorders:  08 9  Other Orthopedic Left scapula pathologic fractures     Etiologic: Left scapula pathologic fractures in the setting of metastatic renal cell carcinoma   Date of Onset: 1/12/2023  Date of surgery: N/A    PATIENT INFORMATION  Name: Marnie Alfaro Phone #: [phone] (home) [phone] (work)  Address: [address] [place] [id number]  YOB: 1950 Age: 67 y o  SS#   Marital Status:    Ethnicity:   Employment Status: currently employed part-time  Extended Emergency Contact Information  Primary Emergency Contact: Omar Jimenez  Address: [address] [place] [address], [address]           [address] of [employer] Phone: [phone]  Relation: Son  Secondary Emergency Contact: Harjeet Jiemnez  Address: [address] [place] [address] of [employer] Phone: [phone]  Relation: Grandchild  Advance Directive: Level 1 - Full Code, Unknown Advanced Directives    INSURANCE/COVERAGE:     Primary Payor: Asmita Sims / Plan: MEDICARE A AND B / Product Type: Medicare A & B Fee for Service /   Secondary Payer:  Self-pay   Payer Contact:  Payer Contact:   Contact Phone:  Contact Phone:     [employer] Stamp #: [id number]  Medicare Days: 60/30/60  Medical Record #: [id number]    REFERRAL SOURCE:   Referring provider: Dhruv Hammond MD  Referring facility: [address] [place] [address]   Room: [place] [address] /E4 -*  PCP: Carlo Pham MD PCP phone number: [phone]    MEDICAL INFORMATION  HPI: As per PM&R - "Marnie Alfaro is a 67 y o  male with a PMH of metastatic renal cell carcinoma diagnosed on 2007 and metastatic since 2013 with hx of left nephrectomy and prostatectomy, chronic diarrhea who presented to the [address] on 1/12/23 with left shoulder pain and decreased ROM  He reported a fall onto his right side as well  He was found to have ARIANNA on CKD, hypoxia and elevated d-dimer  X-ray of the left shoulder was negative for acute osseous abnormality  CT of the LUE showed a new lytic metastasis left scapular body with acute pathologic fracture and new lytic metastasis in the inferior scapular angle with pathologic fracture  CTA PE study on 1/17/23 showed new moderate left pleural effusion with moderate compressive atelectasis of the left lower lobe  Enlargement of one right lower lobe metastasis with no change in multiple additional lung metastasis  Re-demonstration of multiple bone metastases with enlarging soft tissue metastases about the left scapula measuring up to 6 1 cm with new pathologic fracture of the left scapula  Re-demonstration of metastatic nodes in the upper abdomen  There was no surgical intervention for the pathologic fractures  Per orthopedics he is NWB in a sling to the LUE, okay to do elbow ROM and shoulder pendulums daily  He will need to follow up with Dr Arturo Bernal, orthopedic oncologist in 4 weeks for treatment options  Nephrology is following for ARIANNA on CKD 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 " As per Nephology - "Patient is status post IV dye administration 3 days ago with stable renal function  Renal function stable with starting Lasix 40 mg daily "  The patient has some pain in his left shoulder however reports it is manageable  He has a history of neuropathy in his feet bilaterally  He reports he used to stand on his feet for long periods for time in the pain  He is currently working part time which involves driving however he is not sure he will be able to return at this time  He reports a very supportive neighbor who can come over as needed and a supportive son however he does live alone and will not have 24/7 supervision   He has been deemed hemodynamically stable at this time  PT/OT therapies were consulted and continue to follow patient at this time  PM&R was consulted and are recommending inpatient acute rehab when medically stable  All involved medical disciplines feel/agree patient is medically ready for discharge at this time  Inpatient acute rehabilitation physician was consulted  Upon review of patient’s case and correspondence with PT/OT therapies and PM&R services, [facility] physician feels he will benefit and is a good candidate / appropriate for inpatient acute rehab at this time  He has demonstrated the willingness / desire and tolerance to participate in the required 3 hours or more of therapies per day  COVID vaccination status UNKNOWN, COVID testing completed 1/19/23 - NEGATIVE    Past Medical History:   Past Surgical History:    Allergies:     Past Medical History:   Diagnosis Date   • Arthritis    • Cancer ([facility] [address] )     prostate - mets to bone and lung   • History of radiation therapy 07/27/2018    SBRT to T10 7/18-7/27/2018   • Hyperlipidemia    • Hypertension     Past Surgical History:   Procedure Laterality Date   • DISTAL ULNA EXCISION Right     excision of tumor and orif with cement and screws   • JOINT REPLACEMENT Bilateral     tkr's   • LUNG SURGERY Right     exc of nodules   • NEPHRECTOMY Left      No Known Allergies      Medical/functional conditions requiring inpatient rehabilitation: Impaired balance, ambulation and mobility and ADL self-care    Risk for medical/clinical complications: Falls, uncontrolled pain, DVT/PE, infection, skin breakdown    Comorbidities   • Elevated D-dimer   • Metastatic renal cell carcinoma with hx left nephrectomy   • ARIANNA on CKD  • Hyperkalemia   • Hypertension   • DVT ppx: SCD    CURRENT VITAL SIGNS:   Temp:  [97 9 °F (36 6 °C)-99 °F (37 2 °C)] 97 9 °F (36 6 °C)  HR:  [] 94  Resp:  [18] 18  BP: (104-128)/(57-67) 104/65   Intake/Output Summary (Last 24 hours) at 1/19/2023 1237  Last data filed at 1/19/2023 0806  Gross per 24 hour   Intake 480 ml   Output 1200 ml   Net -720 ml        LABORATORY RESULTS:      Lab Results   Component Value Date    HGB 11 5 (L) 01/19/2023    HGB 13 6 12/10/2015    HCT 38 0 01/19/2023    HCT 41 6 12/10/2015    WBC 6 81 01/19/2023    WBC 8 90 12/10/2015     Lab Results   Component Value Date    BUN 24 01/19/2023    BUN 19 12/10/2015     12/10/2015    K 4 4 01/19/2023    K 3 7 12/10/2015     01/19/2023     (H) 12/10/2015    GLUCOSE 102 12/10/2015    CREATININE 1 21 01/19/2023    CREATININE 1 25 12/10/2015     Lab Results   Component Value Date    PROTIME 13 7 01/13/2023    INR 1 05 01/13/2023        DIAGNOSTIC STUDIES:  XR chest 1 view portable    Result Date: 1/13/2023  Impression: Blunting in the costophrenic angle suggesting trace costophrenic angle effusions bilaterally, new when compared with November 14, 2022  Crowding of lung markings secondary to low lung volumes  Pulmonary nodules subtle, better visualized on prior CT  Expansile posterior right 7th rib metastatic lesion reidentified  Workstation performed: [id number]     XR clavicle LEFT    Result Date: 1/12/2023  Impression: No acute osseous abnormality  Workstation performed: [id number]     XR scapula LEFT    Result Date: 1/13/2023  Impression: Mixed sclerotic and lytic metastatic tumor mass associated with the scapular neck and glenoid, appears to be similar when compared to CT of November 14, 2022  No acute fracture or dislocation  Workstation performed: [id number]     XR shoulder 2+ views LEFT    Result Date: 1/12/2023  Impression: No acute osseous abnormality  Workstation performed: [id number]     XR humerus LEFT    Result Date: 1/12/2023  Impression: No acute osseous abnormality  Workstation performed: [id number]     NM lung ventilation / perfusion    Result Date: 1/13/2023  Impression: The probability for pulmonary embolus is intermediate or indeterminant    Overall the ventilation appears worse but PE is not excluded  The study was marked in [facility] for immediate notification  Workstation performed: [id number]     CTA chest pe study    Result Date: 2023  Impression: No pulmonary embolus  New moderate left pleural effusion with moderate compressive atelectasis of the left lower lobe  Enlargement of one right lower lobe metastasis with no change in multiple additional lung metastases  Redemonstration of multiple bone metastases with enlarging soft tissue metastases about the left scapula measuring up to 6 1 cm with new pathologic fracture of the left scapula  Redemonstration of metastatic nodes in the upper abdomen  Workstation performed: [id number]     CT upper extremity wo contrast left    Result Date: 2023  Impression: Compared to the 2022 CT, again seen is a lytic metastasis in the scapular glenoid process, measuring 2 9 x 2 4 x 2 0 cm, with cortical breakthrough and extraosseous extension anteriorly, though without pathologic fracture (series 2 image 36 ) There is a new 2 4 x 2 1 x 1 9 cm lytic metastasis in the left scapular body, with acute pathologic fracture (series 2 image 39 ) There is another new 1 9 x 0 8 x 1 4 cm lytic metastasis in the inferior scapular angle, with pathologic fracture (series 2 images 48 - 55 ) Partially visualized are pulmonary metastatic disease   Workstation performed: [id number]       PRECAUTIONS/SPECIAL NEEDS:  Tobacco:   Social History     Tobacco Use   Smoking Status Former   • Packs/day: 1 00   • Types: Cigarettes   • Quit date: 3/28/2003   • Years since quittin 8   Smokeless Tobacco Never   , Alcohol:    Social History     Substance and Sexual Activity   Alcohol Use Not Currently    Comment: occasional use    Weight Bearing Precautions:  non-weight bearing Left Upper Extremity, Splints/Braces: Left upper extremity SLING, Anticoagulation:  heparin, Edema Management, Safety Concerns, Pain Management, Dietary Restrictions: Cardiac Diet with Fluid Restriction 1500 ML and Fall precautions  MEDICATIONS:     Current Facility-Administered Medications:   •  acetaminophen (TYLENOL) tablet 975 mg, 975 mg, Oral, 4x Daily PRN, IRENE Martinez  •  aluminum-magnesium hydroxide-simethicone (MYLANTA) oral suspension 30 mL, 30 mL, Oral, Q6H PRN, IRENE Martinez  •  amLODIPine (NORVASC) tablet 5 mg, 5 mg, Oral, HS, Orvis Apa, CRNP, 5 mg at 01/17/23 2201  •  cholecalciferol (VITAMIN D3) tablet 1,000 Units, 1,000 Units, Oral, Daily, IRENE Martinez, 1,000 Units at 01/19/23 0932  •  diphenhydrAMINE (BENADRYL) tablet 25 mg, 25 mg, Oral, HS PRN, IRENE Martinez  •  furosemide (LASIX) tablet 40 mg, 40 mg, Oral, Daily, Carlos Ricks, SOMNP, 40 mg at 01/18/23 1358  •  gabapentin (NEURONTIN) capsule 300 mg, 300 mg, Oral, TID, IRENE Martinez, 300 mg at 01/19/23 0932  •  heparin (porcine) subcutaneous injection 5,000 Units, 5,000 Units, Subcutaneous, Q8H [facility], Nazanin Ibrahim MD, 5,000 Units at 01/19/23 0515  •  HYDROmorphone HCl (DILAUDID) injection 0 2 mg, 0 2 mg, Intravenous, Q4H PRN, IRENE Martinez  •  loperamide (IMODIUM) capsule 2 mg, 2 mg, Oral, 4x Daily PRN, Nazanin Ibrahim MD, 2 mg at 01/18/23 2302  •  multivitamin stress formula tablet 1 tablet, 1 tablet, Oral, Daily, IRENE Martinez, 1 tablet at 01/19/23 0931  •  ondansetron (ZOFRAN) injection 4 mg, 4 mg, Intravenous, Q6H PRN, IRENE Martinez  •  oxyCODONE (ROXICODONE) IR tablet 2 5 mg, 2 5 mg, Oral, 4x Daily PRN, IRENE Martinez  •  oxyCODONE (ROXICODONE) IR tablet 5 mg, 5 mg, Oral, 4x Daily PRN, IRENE Martinez  •  simethicone (MYLICON) chewable tablet 80 mg, 80 mg, Oral, 4x Daily PRN, IRENE Martinez  •  traMADol (ULTRAM) tablet 50 mg, 50 mg, Oral, 4x Daily PRN, IRENE Martinez, 50 mg at 01/18/23 5984    SKIN INTEGRITY:   no rashes, no erythema, no peripheral edema, Redness noted on lower extremities    Wound, pink in color on perineum which a moisture barrier is being used  PRIOR LEVEL OF FUNCTION:  He lives in a(n) single family home  Charley Perez is  and lives alone  Self Care: Independent, Indoor Mobility: uses Single [address], [address] (in/outdoor): uses Single [address] and Cognition: Independent    FALLS IN THE LAST 6 MONTHS: 1-4    HOME ENVIRONMENT:  The living area: bedroom on 2nd floor and bathroom on 2nd floor  There are 5 steps to enter the home  The patient will not have 24 hour supervision/physical assistance available upon discharge  PREVIOUS DME:  Equipment in home (previous DME): Shower Chair, Rolling Walker, Crutches and Single [address]    FUNCTIONAL STATUS:  Physical Therapy Occupational Therapy Speech Therapy       As per PT - 1/18/2023  General   Chart Reviewed Yes   Family/Caregiver Present No   Cognition   Overall Cognitive Status WFL   Arousal/Participation Alert; Responsive; Cooperative   Attention Within functional limits   Orientation Level Oriented X4   Memory Within functional limits   Following Commands Follows all commands and directions without difficulty   Subjective   Subjective I have little pain at rest, but it increases with movement  Bed Mobility   Additional Comments pt out of bed upon arrival    Transfers   Sit to Stand 3  Moderate assistance   Additional items Assist x 1; Armrests; Increased time required;Verbal cues   Stand to Sit 4  Minimal assistance   Additional items Assist x 1; Armrests; Verbal cues   Stand pivot 4  Minimal assistance   Additional items Assist x 2; Increased time required;Verbal cues   Ambulation/Elevation   Gait pattern Antalgic;Decreased L stance; Short stride; Excessively slow; Inconsistent kirti; Improper Weight shift  (lateral sway,displacement)   Gait Assistance 4  Minimal assist   Additional items Assist x 1;Verbal cues   Assistive Device Straight cane   Distance 30' x1, 45' x1, 20' 1   Balance   Static Sitting Good   Dynamic Sitting Fair + Static Standing Fair -   Dynamic Standing Poor +   Ambulatory Poor +   Activity Tolerance   Activity Tolerance Patient limited by fatigue   Exercises   Hip Flexion Sitting;10 reps;AROM; Bilateral   Knee AROM Long Arc Quad Sitting;10 reps;AROM; Bilateral   UE Exercise Standing;10 reps; Left  (10 x2 reps and pendulum exercises flex/ exten, abd /add , and circumduction cw and ccw   elbow arom flexion/ extension, supination  pronation, wrist radial and ulnar deviation, finger flexion/ extension and abd /add  10 reps x2 )   Assessment   Prognosis Fair   Problem List Decreased strength;Decreased range of motion;Decreased endurance; Impaired balance;Decreased mobility; Decreased safety awareness;Pain;Orthopedic restrictions   Assessment Pt seen for PT treatment session  Pt  rec'd out f bed in chair upon arrival   Pt reports pain R posterior shoulder 3 a rest and 7 with movement  Pt  Performs arom for L elbow, wrist and hand 10 x 2 reps with cues for correct technique ad positioning of L ue and L ue pendulum exercises 2 x 10 reps demonstrating proper technique and performance  Pt tolerated ue exercises well not reports of increased pain reported  Pt demonstrating difficulty with sit to stand transfers this sessio requiring mod assist x1, pt with posterior lob upon standing requiring mod assist to correct  Pt progressed with ambulation distances to 20',30', 45' with use of straight cane and min assist x1  Pt  Demonstrates unsteady gait with inconsistent kirti, decreased left heel strike and push off due to l foot drop, left le circumduction during swing through  Pt  Requiring min assist x1 for ambulation due to decreased balance, gait deviations, safety and decreased mobility, activity tolerance and endurance  Pt  Remains functioning below baseline level of mobility, unable to ambulate household distances and at increased risk for falls  STR is recommended at d/c             As per OT - 1/17/2023  ADL   Where Assessed Edge of bed  (EOB and chair)   UB Dressing Assistance 4  Minimal Assistance   UB Dressing Deficit Verbal cueing;Supervision/safety; Increased time to complete; Thread LUE; Fasteners;Pull around back   LB Dressing Assistance 3  Moderate Assistance   LB Dressing Deficit Verbal cueing;Supervision/safety; Increased time to complete;Tie shoes; Thread LLE into underwear;Don/doff R shoe;Steadying   Functional Standing Tolerance   Time ~5 minutes   Activity Pendulum swings and donnining underwear   Comments Close Supervision required for safety and steadying as needed  Transfers   Sit to Stand 4  Minimal assistance   Additional items Assist x 1; Armrests; Increased time required;Verbal cues   Stand to Sit 5  Supervision   Additional items Armrests; Increased time required;Verbal cues   Additional Comments VC for adherence to NWB of L shoulder, hand placement, and safe transfer techniques  Functional Mobility   Functional Mobility 4  Minimal assistance   Additional Comments Ax1   Additional items SPC   Therapeutic Exercise - ROM   UE-ROM Yes   ROM - Left Upper Extremities    L Shoulder    (L shldr pendulums completed while standing)   L Elbow Elbow flexion;Elbow extension;AROM  (2x10)   L Wrist Wrist flexion;Wrist extension;AROM  (2x10)   L Position Seated   LUE ROM Comment AROM pronation/supination: 2x10  Exercises performed seated in bed side chair  Subjective   Subjective "I'm left handed so I have to get used to not using it as much"   Cognition   Overall Cognitive Status [facility]   Arousal/Participation Alert; Cooperative   Attention Within functional limits   Orientation Level Oriented X4   Memory Within functional limits   Following Commands Follows all commands and directions without difficulty   Activity Tolerance   Activity Tolerance Patient tolerated treatment well   Medical Staff Mary 60, RN   Assessment   Assessment Pt seen for OT treatment session focusing on functional activity tolerance, functional transfers/mobility and ADLs, UE AROM exercises, and patient education  Pt alert and cooperative  At start of session, pt was sitting in bed side chair  LB dressing completed at EOB (donning socks and shoes) and in bed side chair (donning underwear) with Fair dynamic sitting balance and Mod A to thread LLE into underwear, pull up socks, fit B/L feet into shoes, and tie shoes 2* limited functional reach and pain in L shoulder  VC required for pt to adhere to NWB through L shoulder  UB dressing completed with Min A for threading gown through LUE, and fastener/tie management  Education and handout provided for best one-handed dressing techniques with pt verbalizing understanding  Min A for sit>stand transfer 2* LUE WBS, limited strength, and overall weakness  Pt with Fair- static standing balance  Supervision required for stand>sit transfer, VC for hand placement and adherence to WBS  Pt completed functional mobility with Min A and use of SPC 2* decreased balance and use of non-dominant hand with cane  Pt with Poor+ dynamic standing balance  Per ortho consult, pt to complete elbow ROM and shldr pendulums daily  Pt educated on and completed 2 sets of 30 sec  Pendulum exercises for L shoulder with Supervision to ensure safety  Seated rest breaks required between sets 2* fatigue  Pt completed 2x10 L elbow, wrist, and forearm AROM exercises  OT to continue to follow pt during hospital stay to address ADLs, functional mobility/transfers, UE ROM/strengthening  Recommend STR upon d/c            N/A     CARE SCORES:  Self Care:  Eatin: Supervision or touching  assistance  Oral hygiene: 04: Supervision or touching  assistance  Toilet hygiene: 04: Supervision or touching  assistance  Shower/bathing self: 04: Supervision or touching  assistance  Upper body dressin: Supervision or touching  assistance  Lower body dressin: Partial/moderate assistance  Putting on/taking off footwear: 04: Supervision or touching assistance  Transfers:  Roll left and right: 04: Supervision or touching  assistance  Sit to lyin: Supervision or touching  assistance  Lying to sitting on side of bed: 04: Supervision or touching  assistance  Sit to stand: 04: Supervision or touching  assistance  Chair/bed to chair transfer: 04: Supervision or touching  assistance  Toilet transfer: 04: Supervision or touching  assistance  Mobility:  Walk 10 ft: 04: Supervision or touching  assistance  Walk 50 ft with two turns: 10: Not attempted due to environmental limitations  Walk 150ft: 04: Supervision or touching  assistance    CURRENT GAP IN FUNCTION  Prior to Admission: Functional Status: Mobility/Ambulation: independent with assistive device with mobility in the home on linoleum/hardwood with straight cane for home distances for unknown length of distance     ADL's: independent for UE/LE dressing, toileting, brush teeth, and washface with no assistive devices  Orientation, Memory, Problem Solving: independent    Estimated length of stay: 10 to 14 days    Anticipated Post-Discharge Disposition/Treatment  Disposition: Return to previous home/apartment  Outpatient Services: Physical Therapy and Occupational Therapy    BARRIERS TO DISCHARGE  Weakness, Pain, Balance Difficulty, Fatigue, Home Accessibility, Caregiver Accessibility, Financial Resources, Equipment Needs, Resource Availability and Lives Alone    INTERVENTIONS FOR DISCHARGE  Adaptive equipment, Patient/Family/Caregiver Education, [employer], Financial Assistance, Arrange DME needs, Medication Changes as per MD recommendations, Therapy exercises, Center of balance support  and Energy conservation education     REQUIRED THERAPY:  Patient will require PT and OT 90 minutes each per day, five days per week to achieve rehab goals       REQUIRED FUNCTIONAL AND MEDICAL MANAGEMENT FOR INPATIENT REHABILITATION:  Pain Management: Overall pain is well controlled, Deep Vein Thrombosis (DVT) Prophylaxis:  heparin and SCD's while in bed, Nursing education and bowel/bladder management, internal medicine to manage/monitor medical conditions, PM&R to maximize function and provide medical oversight, PT/OT intervention, patient/family education/training, and any consults as needed  RECOMMENDED LEVEL OF CARE: As per PM&R - "Tobin Pérez is a 67 y o  male with a PMH of metastatic renal cell carcinoma diagnosed on 2007 and metastatic since 2013 with hx of left nephrectomy and prostatectomy, chronic diarrhea who presented to the [address] [place], [place] [address] on 1/12/23 with left shoulder pain and decreased ROM  He reported a fall onto his right side as well  He was found to have ARIANNA on CKD, hypoxia and elevated d-dimer  X-ray of the left shoulder was negative for acute osseous abnormality  CT of the LUE showed a new lytic metastasis left scapular body with acute pathologic fracture and new lytic metastasis in the inferior scapular angle with pathologic fracture  CTA PE study on 1/17/23 showed new moderate left pleural effusion with moderate compressive atelectasis of the left lower lobe  Enlargement of one right lower lobe metastasis with no change in multiple additional lung metastasis  Re-demonstration of multiple bone metastases with enlarging soft tissue metastases about the left scapula measuring up to 6 1 cm with new pathologic fracture of the left scapula  Re-demonstration of metastatic nodes in the upper abdomen  There was no surgical intervention for the pathologic fractures  Per orthopedics he is NWB in a sling to the LUE, okay to do elbow ROM and shoulder pendulums daily  He will need to follow up with Dr Giovanny Randle, orthopedic oncologist in 4 weeks for treatment options  Nephrology is following for ARIANNA on CKD 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 " As per Nephology - "Patient is status post IV dye administration 3 days ago with stable renal function    Renal function stable with starting Lasix 40 mg daily "  The patient has some pain in his left shoulder however reports it is manageable  He has a history of neuropathy in his feet bilaterally  He reports he used to stand on his feet for long periods for time in the pain  He is currently working part time which involves driving however he is not sure he will be able to return at this time  He reports a very supportive neighbor who can come over as needed and a supportive son however he does live alone and will not have 24/7 supervision  Prior to admission / hospital stay patient was Modified Independent with all ADLs / functional mobility with [address] / IADLs  Currently with PT therapies: Patient is Min A with bed mobility, transfers and ambulation  Currently with OT therapies: Patient is Min A with eating, grooming, bathing and dressing due to NWB of Left Upper Extremity  Nursing is being recommended for medication distribution / management, bowel / bladder management and educational purposes, internal medicine to continue to monitor and manage medical conditions, PM&R to maximize  function and provide medical oversight, and inpatient rehab to maximize self care, mobility, strength and endurance upon discharge to home

## 2023-01-18 NOTE — ASSESSMENT & PLAN NOTE
· Found to be hypoxic with O2 sats less than 90 requiring 2L NC, not on home o2 at baseline  · Multifactorial in setting of pulmonary mets, obesity, deconditioning and concern for acute CHF  · Elevated D-dimer, NM study showing indeterminate study, duplex ultrasound lower extremity negative for DVT  · 2D echo with normal EF, GD 1 DD  · CTA neg for PE  · Symptoms improved with diuresis, nephrology recommending lasix 40mg daily on discharge  · Requiring 1-2 L nasal cannula, will need home o2 study prior to discharge if not going to rehab

## 2023-01-18 NOTE — PLAN OF CARE
Problem: Potential for Falls  Goal: Patient will remain free of falls  Description: INTERVENTIONS:  - Educate patient/family on patient safety including physical limitations  - Instruct patient to call for assistance with activity   - Consult OT/PT to assist with strengthening/mobility   - Keep Call bell within reach  - Keep bed low and locked with side rails adjusted as appropriate  - Keep care items and personal belongings within reach  - Initiate and maintain comfort rounds  - Make Fall Risk Sign visible to staff  - Offer Toileting every 2 Hours, in advance of need  - Initiate/Maintain bed alarm  - Obtain necessary fall risk management equipment: alarms  - Apply yellow socks and bracelet for high fall risk patients  - Consider moving patient to room near nurses station  Outcome: Progressing     Problem: PAIN - ADULT  Goal: Verbalizes/displays adequate comfort level or baseline comfort level  Description: Interventions:  - Encourage patient to monitor pain and request assistance  - Assess pain using appropriate pain scale  - Administer analgesics based on type and severity of pain and evaluate response  - Implement non-pharmacological measures as appropriate and evaluate response  - Consider cultural and social influences on pain and pain management  - Notify physician/advanced practitioner if interventions unsuccessful or patient reports new pain  Outcome: Progressing     Problem: DISCHARGE PLANNING  Goal: Discharge to home or other facility with appropriate resources  Description: INTERVENTIONS:  - Identify barriers to discharge w/patient and caregiver  - Arrange for needed discharge resources and transportation as appropriate  - Identify discharge learning needs (meds, wound care, etc )  - Refer to Case Management Department for coordinating discharge planning if the patient needs post-hospital services based on physician/advanced practitioner order or complex needs related to functional status, cognitive ability, or social support system  Outcome: Progressing     Problem: Knowledge Deficit  Goal: Patient/family/caregiver demonstrates understanding of disease process, treatment plan, medications, and discharge instructions  Description: Complete learning assessment and assess knowledge base    Interventions:  - Provide teaching at level of understanding  - Provide teaching via preferred learning methods  Outcome: Progressing     Problem: CARDIOVASCULAR - ADULT  Goal: Maintains optimal cardiac output and hemodynamic stability  Description: INTERVENTIONS:  - Monitor I/O, vital signs and rhythm  - Monitor for S/S and trends of decreased cardiac output  - Administer and titrate ordered vasoactive medications to optimize hemodynamic stability  - Assess quality of pulses, skin color and temperature  - Assess for signs of decreased coronary artery perfusion  - Instruct patient to report change in severity of symptoms  Outcome: Progressing  Goal: Absence of cardiac dysrhythmias or at baseline rhythm  Description: INTERVENTIONS:  - Continuous cardiac monitoring, vital signs, obtain 12 lead EKG if ordered  - Administer antiarrhythmic and heart rate control medications as ordered  - Monitor electrolytes and administer replacement therapy as ordered  Outcome: Progressing     Problem: RESPIRATORY - ADULT  Goal: Achieves optimal ventilation and oxygenation  Description: INTERVENTIONS:  - Assess for changes in respiratory status  - Assess for changes in mentation and behavior  - Position to facilitate oxygenation and minimize respiratory effort  - Oxygen administered by appropriate delivery if ordered  - Initiate smoking cessation education as indicated  - Encourage broncho-pulmonary hygiene including cough, deep breathe, Incentive Spirometry  - Assess the need for suctioning and aspirate as needed  - Assess and instruct to report SOB or any respiratory difficulty  - Respiratory Therapy support as indicated  Outcome: Progressing Problem: MUSCULOSKELETAL - ADULT  Goal: Maintain proper alignment of affected body part  Description: INTERVENTIONS:  - Support, maintain and protect limb and body alignment  - Provide patient/ family with appropriate education  Outcome: Progressing     Problem: MOBILITY - ADULT  Goal: Maintain or return to baseline ADL function  Description: INTERVENTIONS:  -  Assess patient's ability to carry out ADLs; assess patient's baseline for ADL function and identify physical deficits which impact ability to perform ADLs (bathing, care of mouth/teeth, toileting, grooming, dressing, etc )  - Assess/evaluate cause of self-care deficits   - Assess range of motion  - Assess patient's mobility; develop plan if impaired  - Assess patient's need for assistive devices and provide as appropriate  - Encourage maximum independence but intervene and supervise when necessary  - Involve family in performance of ADLs  - Assess for home care needs following discharge   - Consider OT consult to assist with ADL evaluation and planning for discharge  - Provide patient education as appropriate  Outcome: Progressing  Goal: Maintains/Returns to pre admission functional level  Description: INTERVENTIONS:  - Perform BMAT or MOVE assessment daily    - Set and communicate daily mobility goal to care team and patient/family/caregiver     - Collaborate with rehabilitation services on mobility goals if consulted  - Ambulate patient 3 times a day  - Out of bed to chair 3 times a day   - Out of bed for meals 3 times a day  - Out of bed for toileting  - Record patient progress and toleration of activity level   Outcome: Progressing     Problem: Prexisting or High Potential for Compromised Skin Integrity  Goal: Skin integrity is maintained or improved  Description: INTERVENTIONS:  - Identify patients at risk for skin breakdown  - Assess and monitor skin integrity  - Assess and monitor nutrition and hydration status  - Monitor labs   - Assess for incontinence   - Turn and reposition patient  - Assist with mobility/ambulation  - Relieve pressure over bony prominences  - Avoid friction and shearing  - Provide appropriate hygiene as needed including keeping skin clean and dry  - Evaluate need for skin moisturizer/barrier cream  - Collaborate with interdisciplinary team   - Patient/family teaching  - Consider wound care consult   Outcome: Progressing

## 2023-01-18 NOTE — PHYSICAL THERAPY NOTE
PHYSICAL THERAPY NOTE          Patient Name: Donaldo Roth  ITYJL'P Date: 1/18/2023 01/18/23 1216   Note Type   Note Type Treatment   Pain Assessment   Pain Assessment Tool 0-10   Pain Score 3  (at rest, 7 wth movement)   Pain Location/Orientation Orientation: Left; Location: Arm  (psoterior shouder scapula)   [facility] Pain Intervention(s) Ambulation/increased activity; Emotional support; Rest   Restrictions/Precautions   LUE Weight Bearing Per Order NWB   Braces or Orthoses Sling  (prn for comfort)   Other Precautions Chair Alarm; Bed Alarm;WBS;Fall Risk;Pain   General   Chart Reviewed Yes   Family/Caregiver Present No   Cognition   Overall Cognitive Status WFL   Arousal/Participation Alert; Responsive; Cooperative   Attention Within functional limits   Orientation Level Oriented X4   Memory Within functional limits   Following Commands Follows all commands and directions without difficulty   Subjective   Subjective I have little pain at rest, but it increases with movement  Bed Mobility   Additional Comments pt out of bed upon arrival    Transfers   Sit to Stand 3  Moderate assistance   Additional items Assist x 1; Armrests; Increased time required;Verbal cues   Stand to Sit 4  Minimal assistance   Additional items Assist x 1; Armrests; Verbal cues   Stand pivot 4  Minimal assistance   Additional items Assist x 2; Increased time required;Verbal cues   Ambulation/Elevation   Gait pattern Antalgic;Decreased L stance; Short stride; Excessively slow; Inconsistent kirti; Improper Weight shift  (lateral sway,displacement)   Gait Assistance 4  Minimal assist   Additional items Assist x 1;Verbal cues   Assistive Device Straight cane   Distance 30' x1, 45' x1, 20' 1   Balance   Static Sitting Good   Dynamic Sitting Fair +   Static Standing Fair -   Dynamic Standing Poor +   Ambulatory Poor +   Activity Tolerance   Activity Tolerance Patient limited by fatigue   Exercises   Hip Flexion Sitting;10 reps;AROM; Bilateral   Knee AROM Long Arc Quad Sitting;10 reps;AROM; Bilateral   UE Exercise Standing;10 reps; Left  (10 x2 reps and pendulum exercises flex/ exten, abd /add , and circumduction cw and ccw   elbow arom flexion/ extension, supination  pronation, wrist radial and ulnar deviation, finger flexion/ extension and abd /add  10 reps x2 )   Assessment   Prognosis Fair   Problem List Decreased strength;Decreased range of motion;Decreased endurance; Impaired balance;Decreased mobility; Decreased safety awareness;Pain;Orthopedic restrictions   Assessment Pt seen for PT treatment session  Pt  rec'd out f bed in chair upon arrival   Pt reports pain R posterior shoulder 3 a rest and 7 with movement  Pt  Performs arom for L elbow, wrist and hand 10 x 2 reps with cues for correct technique ad positioning of L ue and L ue pendulum exercises 2 x 10 reps demonstrating proper technique and performance  Pt tolerated ue exercises well not reports of increased pain reported  Pt demonstrating difficulty with sit to stand transfers this sessio requiring mod assist x1, pt with posterior lob upon standing requiring mod assist to correct  Pt progressed with ambulation distances to 20',30', 45' with use of straight cane and min assist x1  Pt  Demonstrates unsteady gait with inconsistent kirti, decreased left heel strike and push off due to l foot drop, left le circumduction during swing through  Pt  Requiring min assist x1 for ambulation due to decreased balance, gait deviations, safety and decreased mobility, activity tolerance and endurance  Pt  Remains functioning below baseline level of mobility, unable to ambulate household distances and at increased risk for falls  STR is recommended at d/c     Goals   Patient Goals to get better   STG Expiration Date 01/23/23   PT Treatment Day 1   Plan   Treatment/Interventions Functional transfer training;LE strengthening/ROM; Therapeutic exercise; Endurance training;Patient/family training;Equipment eval/education;Gait training;Spoke to nursing   Progress Progressing toward goals   PT Frequency 3-5x/wk   Recommendation   PT Discharge Recommendation Post acute rehabilitation services   AM-PAC Basic Mobility Inpatient   Turning in Flat Bed Without Bedrails 3   Lying on Back to Sitting on Edge of Flat Bed Without Bedrails 3   Moving Bed to Chair 3   Standing Up From Chair Using Arms 3   Walk in Room 3   Climb 3-5 Stairs With Railing 2   Basic Mobility Inpatient Raw Score 17   Basic Mobility Standardized Score 39 67   Highest Level Of Mobility   -HLM Goal 5: Stand one or more mins   -HLM Achieved 7: Walk 25 feet or more   Education   Education Provided Mobility training;Home exercise program;Assistive device   Patient Demonstrates acceptance/verbal understanding;Explanation/teachback used   End of Consult   Patient Position at End of Consult Bedside chair;Bed/Chair alarm activated; All needs within reach   End of Consult Comments sling R ue Haskel Bamberger

## 2023-01-18 NOTE — ASSESSMENT & PLAN NOTE
· Elevated D-dimer 4 2 in setting of ARIANNA  · Lower extremity duplex negative for DVT  · VQ scan shows indeterminate study  · CTA chest neg for PE  · No indication for anticoagulation

## 2023-01-19 ENCOUNTER — HOSPITAL ENCOUNTER (INPATIENT)
Facility: HOSPITAL | Age: 73
LOS: 15 days | Discharge: HOME | End: 2023-02-03
Attending: PHYSICAL MEDICINE & REHABILITATION | Admitting: PHYSICAL MEDICINE & REHABILITATION

## 2023-01-19 VITALS
BODY MASS INDEX: 36.89 KG/M2 | SYSTOLIC BLOOD PRESSURE: 104 MMHG | WEIGHT: 243.39 LBS | HEIGHT: 68 IN | HEART RATE: 94 BPM | RESPIRATION RATE: 18 BRPM | TEMPERATURE: 97.9 F | OXYGEN SATURATION: 90 % | DIASTOLIC BLOOD PRESSURE: 65 MMHG

## 2023-01-19 DIAGNOSIS — Z91.89 CARDIOVASCULAR RISK FACTOR: ICD-10-CM

## 2023-01-19 DIAGNOSIS — E53.8 LOW SERUM VITAMIN B12: ICD-10-CM

## 2023-01-19 DIAGNOSIS — C79.51 SPINE METASTASIS (HCC): ICD-10-CM

## 2023-01-19 DIAGNOSIS — C78.01 MALIGNANT NEOPLASM METASTATIC TO RIGHT LUNG (HCC): ICD-10-CM

## 2023-01-19 DIAGNOSIS — G47.34 NOCTURNAL HYPOXEMIA: ICD-10-CM

## 2023-01-19 DIAGNOSIS — I50.31 ACUTE DIASTOLIC (CONGESTIVE) HEART FAILURE (HCC): ICD-10-CM

## 2023-01-19 DIAGNOSIS — C64.2 RENAL CELL CARCINOMA OF LEFT KIDNEY METASTATIC TO OTHER SITE (HCC): ICD-10-CM

## 2023-01-19 DIAGNOSIS — C79.51 BONE METASTASES (HCC): ICD-10-CM

## 2023-01-19 DIAGNOSIS — M84.512D: Primary | ICD-10-CM

## 2023-01-19 DIAGNOSIS — J96.01 ACUTE RESPIRATORY FAILURE WITH HYPOXIA (HCC): ICD-10-CM

## 2023-01-19 DIAGNOSIS — J90 PLEURAL EFFUSION: ICD-10-CM

## 2023-01-19 PROBLEM — N18.31 STAGE 3A CHRONIC KIDNEY DISEASE (HCC): Status: ACTIVE | Noted: 2023-01-12

## 2023-01-19 PROBLEM — I50.32 CHRONIC DIASTOLIC (CONGESTIVE) HEART FAILURE (HCC): Status: ACTIVE | Noted: 2023-01-18

## 2023-01-19 PROBLEM — L30.8 DERMATITIS ASSOCIATED WITH MOISTURE: Status: ACTIVE | Noted: 2023-01-01

## 2023-01-19 LAB
ANION GAP SERPL CALCULATED.3IONS-SCNC: 3 MMOL/L (ref 4–13)
BASOPHILS # BLD AUTO: 0.02 THOUSANDS/ÂΜL (ref 0–0.1)
BASOPHILS NFR BLD AUTO: 0 % (ref 0–1)
BUN SERPL-MCNC: 24 MG/DL (ref 5–25)
CALCIUM SERPL-MCNC: 8.4 MG/DL (ref 8.4–10.2)
CHLORIDE SERPL-SCNC: 100 MMOL/L (ref 96–108)
CO2 SERPL-SCNC: 35 MMOL/L (ref 21–32)
CREAT SERPL-MCNC: 1.21 MG/DL (ref 0.6–1.3)
EOSINOPHIL # BLD AUTO: 0.16 THOUSAND/ÂΜL (ref 0–0.61)
EOSINOPHIL NFR BLD AUTO: 2 % (ref 0–6)
ERYTHROCYTE [DISTWIDTH] IN BLOOD BY AUTOMATED COUNT: 17.8 % (ref 11.6–15.1)
GFR SERPL CREATININE-BSD FRML MDRD: 59 ML/MIN/1.73SQ M
GLUCOSE SERPL-MCNC: 85 MG/DL (ref 65–140)
HCT VFR BLD AUTO: 38 % (ref 36.5–49.3)
HGB BLD-MCNC: 11.5 G/DL (ref 12–17)
IMM GRANULOCYTES # BLD AUTO: 0.02 THOUSAND/UL (ref 0–0.2)
IMM GRANULOCYTES NFR BLD AUTO: 0 % (ref 0–2)
LYMPHOCYTES # BLD AUTO: 1.57 THOUSANDS/ÂΜL (ref 0.6–4.47)
LYMPHOCYTES NFR BLD AUTO: 23 % (ref 14–44)
MCH RBC QN AUTO: 28.1 PG (ref 26.8–34.3)
MCHC RBC AUTO-ENTMCNC: 30.3 G/DL (ref 31.4–37.4)
MCV RBC AUTO: 93 FL (ref 82–98)
MONOCYTES # BLD AUTO: 0.52 THOUSAND/ÂΜL (ref 0.17–1.22)
MONOCYTES NFR BLD AUTO: 8 % (ref 4–12)
NEUTROPHILS # BLD AUTO: 4.52 THOUSANDS/ÂΜL (ref 1.85–7.62)
NEUTS SEG NFR BLD AUTO: 67 % (ref 43–75)
NRBC BLD AUTO-RTO: 0 /100 WBCS
PLATELET # BLD AUTO: 190 THOUSANDS/UL (ref 149–390)
PMV BLD AUTO: 10.6 FL (ref 8.9–12.7)
POTASSIUM SERPL-SCNC: 4.4 MMOL/L (ref 3.5–5.3)
RBC # BLD AUTO: 4.09 MILLION/UL (ref 3.88–5.62)
SARS-COV-2 RNA RESP QL NAA+PROBE: NEGATIVE
SODIUM SERPL-SCNC: 138 MMOL/L (ref 135–147)
WBC # BLD AUTO: 6.81 THOUSAND/UL (ref 4.31–10.16)

## 2023-01-19 RX ORDER — GABAPENTIN 300 MG/1
300 CAPSULE ORAL 3 TIMES DAILY
Status: DISCONTINUED | OUTPATIENT
Start: 2023-01-19 | End: 2023-02-03 | Stop reason: HOSPADM

## 2023-01-19 RX ORDER — DIPHENHYDRAMINE HCL 25 MG
25 TABLET ORAL
Status: DISCONTINUED | OUTPATIENT
Start: 2023-01-19 | End: 2023-02-03 | Stop reason: HOSPADM

## 2023-01-19 RX ORDER — ONDANSETRON 4 MG/1
4 TABLET, ORALLY DISINTEGRATING ORAL EVERY 6 HOURS PRN
Status: DISCONTINUED | OUTPATIENT
Start: 2023-01-19 | End: 2023-02-03 | Stop reason: HOSPADM

## 2023-01-19 RX ORDER — OXYCODONE HYDROCHLORIDE 5 MG/1
5 TABLET ORAL 4 TIMES DAILY PRN
Status: DISCONTINUED | OUTPATIENT
Start: 2023-01-19 | End: 2023-01-21

## 2023-01-19 RX ORDER — FUROSEMIDE 40 MG/1
40 TABLET ORAL DAILY
Status: DISCONTINUED | OUTPATIENT
Start: 2023-01-20 | End: 2023-01-31

## 2023-01-19 RX ORDER — HEPARIN SODIUM 5000 [USP'U]/ML
5000 INJECTION, SOLUTION INTRAVENOUS; SUBCUTANEOUS EVERY 8 HOURS SCHEDULED
Status: DISCONTINUED | OUTPATIENT
Start: 2023-01-19 | End: 2023-02-03 | Stop reason: HOSPADM

## 2023-01-19 RX ORDER — LOPERAMIDE HYDROCHLORIDE 2 MG/1
2 CAPSULE ORAL 4 TIMES DAILY PRN
Status: DISCONTINUED | OUTPATIENT
Start: 2023-01-19 | End: 2023-02-03 | Stop reason: HOSPADM

## 2023-01-19 RX ORDER — FUROSEMIDE 40 MG/1
40 TABLET ORAL DAILY
Refills: 0 | Status: ON HOLD
Start: 2023-01-19

## 2023-01-19 RX ORDER — TRAMADOL HYDROCHLORIDE 50 MG/1
50 TABLET ORAL EVERY 6 HOURS PRN
Status: DISCONTINUED | OUTPATIENT
Start: 2023-01-19 | End: 2023-01-21

## 2023-01-19 RX ORDER — SIMETHICONE 80 MG
80 TABLET,CHEWABLE ORAL 4 TIMES DAILY PRN
Status: DISCONTINUED | OUTPATIENT
Start: 2023-01-19 | End: 2023-02-03 | Stop reason: HOSPADM

## 2023-01-19 RX ORDER — NYSTATIN 100000 [USP'U]/G
POWDER TOPICAL 2 TIMES DAILY
Status: DISCONTINUED | OUTPATIENT
Start: 2023-01-19 | End: 2023-02-03 | Stop reason: HOSPADM

## 2023-01-19 RX ORDER — AMLODIPINE BESYLATE 5 MG/1
5 TABLET ORAL
Status: DISCONTINUED | OUTPATIENT
Start: 2023-01-19 | End: 2023-01-23

## 2023-01-19 RX ORDER — MAGNESIUM HYDROXIDE/ALUMINUM HYDROXICE/SIMETHICONE 120; 1200; 1200 MG/30ML; MG/30ML; MG/30ML
30 SUSPENSION ORAL EVERY 6 HOURS PRN
Status: DISCONTINUED | OUTPATIENT
Start: 2023-01-19 | End: 2023-02-03 | Stop reason: HOSPADM

## 2023-01-19 RX ORDER — MELATONIN
1000 DAILY
Status: DISCONTINUED | OUTPATIENT
Start: 2023-01-20 | End: 2023-02-03 | Stop reason: HOSPADM

## 2023-01-19 RX ORDER — ACETAMINOPHEN 325 MG/1
975 TABLET ORAL 4 TIMES DAILY PRN
Status: DISCONTINUED | OUTPATIENT
Start: 2023-01-19 | End: 2023-02-03 | Stop reason: HOSPADM

## 2023-01-19 RX ADMIN — GABAPENTIN 300 MG: 300 CAPSULE ORAL at 21:29

## 2023-01-19 RX ADMIN — HEPARIN SODIUM 5000 UNITS: 5000 INJECTION INTRAVENOUS; SUBCUTANEOUS at 05:15

## 2023-01-19 RX ADMIN — HEPARIN SODIUM 5000 UNITS: 5000 INJECTION INTRAVENOUS; SUBCUTANEOUS at 21:29

## 2023-01-19 RX ADMIN — GABAPENTIN 300 MG: 300 CAPSULE ORAL at 09:32

## 2023-01-19 RX ADMIN — OXYCODONE HYDROCHLORIDE 5 MG: 5 TABLET ORAL at 21:37

## 2023-01-19 RX ADMIN — B-COMPLEX W/ C & FOLIC ACID TAB 1 TABLET: TAB at 09:31

## 2023-01-19 RX ADMIN — GABAPENTIN 300 MG: 300 CAPSULE ORAL at 16:46

## 2023-01-19 RX ADMIN — ACETAMINOPHEN 975 MG: 325 TABLET ORAL at 16:50

## 2023-01-19 RX ADMIN — NYSTATIN: 100000 POWDER TOPICAL at 21:29

## 2023-01-19 RX ADMIN — Medication 1000 UNITS: at 09:32

## 2023-01-19 NOTE — ASSESSMENT & PLAN NOTE
Large pathologic scapular fracture with minimal displacement  Large lytic lesion in glenoid vault  Management non-operatively  Sling for comfort  NWB  Ok for ROM in elbow, wrist, hand  Pendulums daily for now  Pain control as below  Outpatient f/u with Ish Dailey in 4 weeks from injury (next week)  - Dr Margarita Dailey aware of discharge date and will get him scheduled  1/25 - acute change in sensation in supraclavicular distribution  Unable to check strength  1/26 MRI  Shoulder:   ROTATOR CUFF: Full-thickness partial width tear anterior supraspinatus insertion measuring 1 1 cm AP by 0 7 cm medial lateral superimposed upon moderate tendinosis  Moderate subscapularis insertional tendinosis with low-grade articular sided fraying  Mild infraspinatus insertional tendinosis  Edema within the teres minor muscle, likely related to denervation from mass effect of tumor      SUBACROMIAL/SUBDELTOID BURSA: There is mild subacromial/subdeltoid bursitis       LONG HEAD OF BICEPS TENDON: Mild tendinosis and tenosynovitis      GLENOID LABRUM: Complex tearing anterior inferior labrum      GLENOHUMERAL JOINT: Mild osteoarthritis     ACROMIOCLAVICULAR JOINT:  There is moderate osteoarthritis      BONES: As seen on multiple prior examinations, there is pathologic fracture of the scapular body   Multiple lesions again noted arising from the scapular body and glenoid, consistent with metastatic disease  The largest component of the tumor with   extraosseous extension measures approximately 5 9 x 4 7 x 5 7 cm (series 901, image 13)  This is located along the mid to inferior scapula  A 2nd component of tumor extends posteriorly along the superior aspect of the scapula measuring approximately   3 2 x 2 5 x 3 7 cm (series 401, image 15)  These tumors result in mass effect on the subscapularis, teres minor, and infraspinatus

## 2023-01-19 NOTE — ASSESSMENT & PLAN NOTE
Monitor performance in therapy  If he develops new pain, particular in long bones, would image    - Since his fall has had new R hip pain primarily with weight bearing   - 2022 CT CAP showed lytic lesion superior to the right hip are identified as well as the posterior column of the acetabulum  - XR of R hip without significant changes  Better visualized on CT   2020 Bone Scan showed lytic lesions:   left lateral scapula inferior to the glenoid     right posterior and lateral ribs corresponding to expansile lytic lesions on CT  (recent Ct with expansive R 7th rib met)   left posterior iliac bone and right iliac wing corresponding to lytic lesions on CT    r nonspecific tibial finding    Outpatient f/u with Dr Giovanny Randle with [facility]o [facility]

## 2023-01-19 NOTE — ASSESSMENT & PLAN NOTE
Chronically on Lomotil and Imodium  Monitor  Close continence care  - Barrier cream  Had MASD with fungal component on admission which is improving with EMILIA

## 2023-01-19 NOTE — ASSESSMENT & PLAN NOTE
Wt Readings from Last 3 Encounters:   02/02/23 108 kg (238 lb 11 2 oz)   01/19/23 110 kg (243 lb 6 2 oz)   12/30/22 121 kg (265 lb 10 5 oz)     Seen on Echo during this hospitalization  S/P IV diuresis for exacerbation  Now back on room air   1/31 with increased LAYNE and ? 88% sat in therapy  Crackles at base   - IM gave extra Lasix with improvement   - CXR ordered 2/1 to eval prior to discharge - stable L pleural effusion with atelectasis  Closely monitor I/O, daily weights, volume status  Cardiac diet with fluid restriction  Continue: Lasix 40mg daily   - Previously on ACE, HCTZ - holding for now and f/u with PCP at discharge   - Was not on BB  Outpatient f/u with PCP

## 2023-01-19 NOTE — DISCHARGE SUMMARY
[facility]  Discharge- Charley Perez 1950, 67 y o  male MRN: [id number]  Unit/Bed#: E4 -01 Encounter: [id number]  Primary Care Provider: Zeyad Casarez MD   Date and time admitted to hospital: 1/12/2023  5:37 PM    * Acute respiratory failure with hypoxia ([facility] [address] )  Assessment & Plan  · Found to be hypoxic with O2 sats less than 90 requiring 2L NC, not on home o2 at baseline  · Multifactorial in setting of pulmonary mets, obesity, deconditioning and concern for acute CHF  · Elevated D-dimer, NM study showing indeterminate study, duplex ultrasound lower extremity negative for DVT  · 2D echo with normal EF, GD 1 DD  · CTA neg for PE  · Symptoms improved with diuresis, nephrology recommending lasix 40mg daily on discharge  · On room air, o2 sats around 90%, discharged to rehab, may need home o2 eval prior to discharge home    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  Assessment & Plan  · Patient reports stretching out left arm, it got caught on the edge of the wall and hyperextended, heard a snap    Now having pain and decreased ROM  · Sling placed in ED  · X-ray of shoulder with mixed sclerotic and lytic metastatic tumor mass associated with the scapular neck and glenoid  · CT upper extremity showing pathological fracture with new metastasis  · Appreciate ortho input, no surgical intervention recommended    Metastatic renal cell carcinoma ([facility] [address] )  Assessment & Plan  · History of localized renal cell carcinoma in August 2007, developed metastatic disease in 2013  · Metastatic disease to the lungs, abdominal lymph nodes  · Follows with oncology Dr Marcelo Holley outpatient  · On previous visits, notes patient with progressive disease currently on tivozanib  · Repeat CTA showed enlargement of one right lower lobe metastasis, bone metastatis worrying for progression  · Recommend to follow up with Oncology outpatient to determine when to resume therapy    Elevated d-dimer  Assessment & Plan  · Elevated D-dimer 4 2 in setting of ARIANNA  · Lower extremity duplex negative for DVT  · VQ scan shows indeterminate study  · CTA chest neg for PE  · No indication for anticoagulation    Fall at home, initial encounter  Assessment & Plan  · Mechanical fall at home yesterday, tripped over an area rug, landed on right side  Denies head strike  Not anticoagulated  · PT OT consult, recommending short-term rehab on discharge  · CM to follow for STR    H/O left nephrectomy  Assessment & Plan  · History of metastatic renal cell cancer s/p left nephrectomy and prostatectomy    Acute kidney injury superimposed on CKD [facility])  Assessment & Plan  Lab Results   Component Value Date    EGFR 59 01/19/2023    EGFR 61 01/18/2023    EGFR 59 01/17/2023    CREATININE 1 21 01/19/2023    CREATININE 1 17 01/18/2023    CREATININE 1 21 01/17/2023     · Creatinine 2 2, baseline 1 0-1 3  · History of left nephrectomy and prostatectomy  · BP currently stable, would hold lisinopril and hctz on discharge  · Renal fxns back to baseline        Discharging Physician / Practitioner: Deepa Ramey MD  PCP: Jono De Leon MD  Admission Date:   Admission Orders (From admission, onward)     Ordered        01/12/23 2056  INPATIENT ADMISSION  Once                      Discharge Date: 01/19/23    Medical Problems     Resolved Problems  Date Reviewed: 1/18/2023   None         Consultations During Hospital Stay:  · Nephrology  · Orthopedics    Procedures Performed:   · none    Significant Findings / Test Results:   XR chest 1 view portable    Result Date: 1/13/2023  Impression: Blunting in the costophrenic angle suggesting trace costophrenic angle effusions bilaterally, new when compared with November 14, 2022  Crowding of lung markings secondary to low lung volumes  Pulmonary nodules subtle, better visualized on prior CT  Expansile posterior right 7th rib metastatic lesion reidentified   Workstation performed: [id number] XR clavicle LEFT    Result Date: 1/12/2023  Impression: No acute osseous abnormality  Workstation performed: [id number]     XR scapula LEFT    Result Date: 1/13/2023  Impression: Mixed sclerotic and lytic metastatic tumor mass associated with the scapular neck and glenoid, appears to be similar when compared to CT of November 14, 2022  No acute fracture or dislocation  Workstation performed: [id number]     XR shoulder 2+ views LEFT    Result Date: 1/12/2023  Impression: No acute osseous abnormality  Workstation performed: [id number]     XR humerus LEFT    Result Date: 1/12/2023  Impression: No acute osseous abnormality  Workstation performed: [id number]     NM lung ventilation / perfusion    Result Date: 1/13/2023  Impression: The probability for pulmonary embolus is intermediate or indeterminant  Overall the ventilation appears worse but PE is not excluded  The study was marked in [facility] for immediate notification  Workstation performed: [id number]     CTA chest pe study    Result Date: 1/17/2023  Impression: No pulmonary embolus  New moderate left pleural effusion with moderate compressive atelectasis of the left lower lobe  Enlargement of one right lower lobe metastasis with no change in multiple additional lung metastases  Redemonstration of multiple bone metastases with enlarging soft tissue metastases about the left scapula measuring up to 6 1 cm with new pathologic fracture of the left scapula  Redemonstration of metastatic nodes in the upper abdomen   Workstation performed: [id number]     CT upper extremity wo contrast left    Result Date: 1/14/2023  · Impression: Compared to the 11/14/2022 CT, again seen is a lytic metastasis in the scapular glenoid process, measuring 2 9 x 2 4 x 2 0 cm, with cortical breakthrough and extraosseous extension anteriorly, though without pathologic fracture (series 2 image 36 ) There is a new 2 4 x 2 1 x 1 9 cm lytic metastasis in the left scapular body, with acute pathologic fracture (series 2 image 39 ) There is another new 1 9 x 0 8 x 1 4 cm lytic metastasis in the inferior scapular angle, with pathologic fracture (series 2 images 48 - 55 ) Partially visualized are pulmonary metastatic disease  Workstation performed: [id number]   ·     Incidental Findings:   · none     Test Results Pending at Discharge (will require follow up):   · none     Outpatient Tests Requested:  · BMP 1 week    Complications:  none     Reason for Admission: Shoulder Pain    Hospital Course:     Marnie Alfaro is a 67 y o  male patient who originally presented to the hospital on 1/12/2023 due to patient initially presented to the ED with worsening left shoulder pain with difficult range of motion  He was also found to be hypoxic with O2 sats in the lower 80s  Chest x-ray showed pleural effusions, pulmonary nodules  He does have a history of metastatic renal cell carcinoma and follows with oncology in outpatient setting  His renal functions were worse and off from baseline  Suspected possible cardiorenal   But cannot rule out pulmonary embolism  VQ scan showed intermediate probability for pulmonary embolism  He was started on a heparin drip  He was diuresed for diastolic heart failure, 2D echo complete showed normal EF, no significant valvular abnormality and no right heart strain  As his renal functions improved, discussed with nephrology and CTA PE study was ordered  PE was ruled out, and anticoagulation was stopped  As patient's oxygen status improved, he required anywhere between 1 to 2 L nasal cannula  Nephrology recommended Lasix 40 mg once daily on discharge, repeat BMP in 1 week  Given his shoulder pain, x-ray imaging showing multiple lytic lesions involving his left scapula neck and glenoid  CT upper extremity ordered by Ortho does show lytic metastasis, involving the scapular glenoid process, new lytic lesion of the scapular body and lytic metastasis of the inferior scapular angle  Ortho did not recommend any surgical intervention, to follow-up with oncology for recommendations  His chemotherapy was held and recommended to follow-up with Dr Linder Boast to consider resuming  Patient does appear to have worsening metastasis per imaging  As patient improved, PT and OT recommended rehab and discharged to [address] [place] [address] acute rehab  Please see above list of diagnoses and related plan for additional information  Condition at Discharge: fair     Discharge Day Visit / Exam:     Subjective:    No events overnight  Tolerating diet, more awake and alert  Denies any cp, sob  No complaints today  Discharged to [address] [place] [address] today  Vitals: Blood Pressure: 104/65 (01/19/23 0806)  Pulse: 94 (01/19/23 0806)  Temperature: 97 9 °F (36 6 °C) (01/19/23 0806)  Temp Source: Temporal (01/19/23 0806)  Respirations: 18 (01/19/23 0806)  Height: 5' 8" (172 7 cm) (01/16/23 1015)  Weight - Scale: 110 kg (243 lb 6 2 oz) (01/19/23 0551)  SpO2: 90 % (01/19/23 0806)  Exam:   Physical Exam  Vitals and nursing note reviewed  Constitutional:       Appearance: Normal appearance  He is obese  HENT:      Head: Normocephalic and atraumatic  Eyes:      General: No scleral icterus  Conjunctiva/sclera: Conjunctivae normal    Cardiovascular:      Rate and Rhythm: Normal rate and regular rhythm  Pulmonary:      Effort: Pulmonary effort is normal  No respiratory distress  Comments: Diminished breath sounds bilaterally  Abdominal:      General: Bowel sounds are normal  There is no distension  Palpations: Abdomen is soft  Musculoskeletal:         General: No swelling  Right lower leg: No edema  Left lower leg: No edema  Skin:     General: Skin is warm and dry  Neurological:      General: No focal deficit present  Mental Status: He is alert  Mental status is at baseline         Discussion with Family: none    Discharge instructions/Information to patient and family:   See after visit summary for information provided to patient and family  Provisions for Follow-Up Care:  See after visit summary for information related to follow-up care and any pertinent home health orders  Disposition:     Acute Rehab at [facility]    Planned Readmission: none     Discharge Statement:  I spent 40 minutes discharging the patient  This time was spent on the day of discharge  I had direct contact with the patient on the day of discharge  Greater than 50% of the total time was spent examining patient, answering all patient questions, arranging and discussing plan of care with patient as well as directly providing post-discharge instructions  Additional time then spent on discharge activities  Discharge Medications:  See after visit summary for reconciled discharge medications provided to patient and family        ** Please Note: This note has been constructed using a voice recognition system **

## 2023-01-19 NOTE — CASE MANAGEMENT
Case Management Discharge Planning Note    Patient name Donna Calabrese  Location East 4 /E4 MS 18-* MRN [id number]  : 1950 Date 2023       Current Admission Date: 2023  Current Admission Diagnosis:Acute respiratory failure with hypoxia [facility])   Patient Active Problem List    Diagnosis Date Noted   • Acute diastolic (congestive) heart failure ([facility] [address] ) 2023   • Acute kidney injury superimposed on CKD ([facility] [address] ) 2023   • H/O left nephrectomy 2023   • Fall at home, initial encounter 2023   • 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 2023   • Acute respiratory failure with hypoxia ([facility] [address] ) 2023   • Elevated brain natriuretic peptide (BNP) level 2023   • Elevated d-dimer 2023   • Chronic diarrhea 2023   • COVID-19 2022   • Azotemia 10/31/2019   • Hx of prostatectomy 2019   • History of prostate cancer 2019   • Metastatic renal cell carcinoma ([facility] [address] ) 2018   • Spine metastasis ([facility] [address] ) 2018   • Clear cell adenocarcinoma of kidney, left ([facility] [address] ) 2018   • Bone metastases ([facility] [address] ) 2018   • Lung metastases ([facility] [address] ) 2018      LOS (days): 7  Geometric Mean LOS (GMLOS) (days): 4 30  Days to GMLOS:-2 3     OBJECTIVE:  Risk of Unplanned Readmission Score: 12 38         Current admission status: Inpatient   Preferred Pharmacy:   [facility], [address] [facility]  [address]  [place]  [place] [address]  Phone: [phone] Fax: [address] [place] [address]  [facility] [address]  Phone: [phone] Fax: [phone]    [facility], [place] - [address] [facility]  [address] [address]  Phone: [phone] Fax: [phone]    [address] [place] [address], [facility] [address] [place] [address]  [address]  [address] [id number]  Phone: [phone] Fax: [phone]    [facility], [address] [place] [address]  Phone: [phone] Fax: [phone] [facility], [address] [place] [address]  [address] [facility] [address]  Phone: [phone] Fax: [phone]    Primary Care Provider: Gina Dumas MD    Primary Insurance: MEDICARE  Secondary Insurance: [address] SHIELD    DISCHARGE DETAILS:                                                    Treatment Team Recommendation: Acute Hospital Transfer  Discharge Destination Plan[de-identified] Acute Hospital Transfer  Transport at Discharge : Wheel[facility]  Dispatcher Contacted: Yes        ETA of Transport (Date): 01/19/23                          Additional Comments: Pt selected  Acute  MD is planning on discharging today  Pt stated he will need w/c [facility] and understand he will get bill for this service  Booked w/c [facility] and will f/u with  times  Asked MD to order COVID test and informed RN pt would like to take shower      Accepting Facility Name, [facility] : [address] [facility]  Receiving Facility/Agency Phone Number: [phone]

## 2023-01-19 NOTE — ASSESSMENT & PLAN NOTE
Lab Results   Component Value Date    EGFR 59 01/19/2023    EGFR 61 01/18/2023    EGFR 59 01/17/2023    CREATININE 1 21 01/19/2023    CREATININE 1 17 01/18/2023    CREATININE 1 21 01/17/2023     · Creatinine 2 2, baseline 1 0-1 3  · History of left nephrectomy and prostatectomy  · BP currently stable, would hold lisinopril and hctz on discharge  · Renal fxns back to baseline

## 2023-01-19 NOTE — DISCHARGE INSTR - AVS FIRST PAGE
Acute Kidney Injury (ARIANNA)  You have been diagnosed with Acute Kidney Injury (ARIANNA)  The following information has been developed to provide you with information about ARIANNA and treatment  What is Acute Kidney Injury (ARIANNA)? ARIANNA occurs when kidney function decreases over a short period of time  This condition causes a buildup of waste products in the blood and can cause fluid to build up causing swelling in the legs and shortness of breath  Sometimes called Acute Kidney Failure or Acute Renal Failure, ARIANNA is often reversible if it is found and treated quickly  How do you know if you have ARIANNA? ARIANNA is diagnosed by assessing kidney function  This is done by obtaining a blood test to measure the blood level of creatinine  Decreased urine output can sometimes also indicate ARIANNA  Who is at risk for ARIANNA? ARIANNA can happen to anyone but usually happens to people who are already sick and may be in the hospital  People are at higher risk for ARIANNA if they have any of the following:  age 72 years or older  high or low blood pressure  underlying kidney disease (e g , Chronic Kidney Disease (CKD)  peripheral vascular disease (hardening of arteries)  chronic diseases such as liver disease, heart disease and diabetes  a single kidney    What are the symptoms of ARIANNA? You may or may not have the symptoms to suggest you have kidney injury until the ARIANNA has progressed  Some of the symptoms are listed below:  Not making enough urine  Increased swelling in legs   Feeling tired  Trouble breathing or shortness of breath  Nausea  New or worsening confusion    What causes ARIANNA?   The causes are divided into three categories:  Not enough blood flowing to the kidneys (e g , low blood pressure, bleeding, diarrhea, dehydration)  Injury directly to the kidneys (e g , blood clots, severe infections such as sepsis, medicine toxicity, IV contrast dye used for cardiac catheterization or CT scans)  Blockage to the tubes (ureters) that drain the urine from the kidneys (e g , enlarged prostate, kidney stones, blood clots)    What is the treatment for ARIANNA? The treatment for ARIANNA depends on correcting what caused it  Treatment usually involves removing the cause and measures to prevent further injury to the kidneys  This may require the use of intravenous fluids or medications and/or temporary dialysis  Dialysis is a process using a machine that does the job of the kidneys to remove waste and help correct the electrolyte and fluid balance while the kidneys are recovering  If dialysis is needed to treat ARIANNA, the doctor will assess daily to see if the kidneys are showing signs of recovery  The daily assessments determine how long dialysis needs to continue  Depending on the cause and the extent of damage, an episode of ARIANNA may resolve in a few days to several weeks to several months  What are the long term effects of having an episode of ARIANNA? People who have one episode of ARIANNA are at an increased risk of having another episode of ARIANNA as well as other health problems such as kidney disease, stroke, and heart disease  In a small number of people who had unrecognized kidney disease, an ARIANNA episode may result in Chronic Kidney Disease (CKD) which requires lifelong monitoring and treatment  How do you prevent future episodes of ARIANNA? Make sure to follow up with the kidney doctor after hospital discharge and obtain blood work to reassess and monitor kidney function  If you have diabetes, keep your blood sugar in goal range and keep appointments with your diabetes specialist    If you have high blood pressure, have your blood pressure checked regularly to make sure it is in target range  If you take blood pressure medicine called ACEIs or ARBs (e g , Lisinopril, Enalapril, Diovan, Losartan), your doctor may tell you to skip a dose or two if you have severe dehydration and your blood pressure is running low    Avoid using medicines such as NSAIDs (Nonsteroidal Anti-inflammatory Drugs) and Kim-2 Inhibitors (a type of NSAID) that may be harmful to kidney function  These may include the medicines listed in the table that follows  Examples of NSAIDS and Kim-2 Inhibitors   Talk to your doctor or healthcare provider before stopping any medicine ordered for you  Celecoxib (CELEBREX) Ketoprofen (ORUDIS, ORUVAIL)   Diclofenac (VOLTAREN, CATAFLAM) Ketorolac (TORADOL)   Diflunisal (DOLOBID) Meloxicam (MOBIC)   Etodolac (LODINE) Nabumetone (RELAFEN)   Fenoprofen (NALFON) Naproxen (ALEVE, NAPROSYN,    NAPRELAN, ANAPROX)   Flurbiprofen (ANSAID) Oxaprozin (DAYPRO)   Ibuprofen (MOTRIN, ADVIL) Piroxicam (FELDENE)   Indomethacin (INDOCIN) Sulindac (CLINORIL)    Tolmetin (Ferro Drown)     Is there a special diet for people with ARIANNA? People with ARIANNA and/or other kidney disease often have high potassium and phosphorus levels in their blood  To protect the kidneys from further injury and to avoid complications, most doctors recommend following a healthy diet choosing foods low potassium and low phosphorus  Limiting dietary potassium to 2 5 grams/day and phosphorus to 800 milligrams/day is recommended  A dietitian can help you with learning more about this type of diet  The tables on the following page may help you to choose lower potassium and phosphorus foods  The following websites are also good sources of information:  Brian at  org/nutrition/Kidney-Disease-Stages-1-4   [employer]   https://www niddk nih gov/health-information/kidney-disease/chronic-kidney-disease-ckd/eating-nutrition  https://[facility] org/health/articles/15641-renal-diet-basics  [employer] com cy    If you have any questions or concerns about your condition, please contact your doctor or healthcare provider    These tables may help you to choose lower potassium and phosphorus foods    AVOID these higher phosphorus* foods: CHOOSE these lower phosphorus* foods:   Milk, pudding , yogurt made from animals and from many soy varieties Rice milk (unfortified), nondairy creamer   Hard cheeses, ricotta, cottage cheese, fat-free cream cheese Regular and low-fat cream cheese   Ice cream, frozen yogurt Sherbet, frozen fruit pops, sorbet   Soups made with milk, dried peas, beans, lentils or other high phosphorus ingredients Soups made with broth, are water-based, or other lower phosphorus ingredients   Whole grains, including whole-grain breads, crackers, cereal, rice and pasta, corn tortillas Refined grains, including white bread, crackers, cereals, rice and pasta   Quick breads, biscuits, cornbread, muffins, pancakes, waffles, granola, wheat germ Homemade refined (white) dinner rolls, bagels, English muffins, sugar cookies, shortbread cookies, dary food cake   Dried peas (split, black-eyed), beans (black, garbanzo, lima, kidney, navy, vasques), lentils Green peas (canned, frozen), green beans, wax beans   Organ meats, walleye, [place], sardines Lean beef, pork, lamb, poultry, other fish   Nuts and seeds Popcorn   Peanut butter, other nut butters; tofu, veggie or soy burgers Jam, jelly, honey   Chocolate, including chocolate drinks Carob (chocolate-flavored) candy, hard candy,  gumdrops   [place], pepper-type sodas, flavored elkins, bottled teas, beer Lemon-lime soda, ginger ale or root beer, plain water, cream soda, grape soda   *Always read labels and avoid foods with ingredients containing "phos"  AVOID these higher potassium foods: CHOOSE these lower potassium foods:      Milk (fat free, low fat, whole, buttermilk, Soy), yogurt    Regular and low-fat cream cheese      Beans (white, [place]), [place] sprouts, spinach Swiss       chard, broccoli, avocado, artichoke, potatoes, sweet      potatoes, tomatoes/tomato sauce, beet greens      Green beans, alfalfa sprouts, bamboo shoots (canned), cabbage, carrots, cauliflower, corn, cucumber, eggplant,      endive, lettuce, mushrooms, onions, radishes,  watercress,       water chestnuts (canned), rice, peas      Halibut, tuna, cod, snapper, tuna fish, turkey    Egg, lean beef, pork, lamb, shellfish, chicken       Banana, papaya, orange, cantaloupe, dates, raisins and      other dried fruit, pomegranate, avocado      Apple, applesauce, blackberries, raspberries, pears,     watermelon, cucumbers, blueberries, cranberries,       peaches      Almonds, peanuts, hazelnuts, [place], cashew, mixed,      seeds (sunflower, pumpkin)     Homemade refined (white) dinner rolls, bagels,      English muffins, flour tortilla, crackers, anselmo      crackers, popcorn, pretzels, spaghetti or macaroni,       hummus      Tomato or vegetable juice, prune juice    Papaya, reynaldo, or pear nectar, cranberry juice cocktail      Molasses

## 2023-01-19 NOTE — CASE MANAGEMENT
Case Management Discharge Planning Note    Patient name Ebb Sep  Location East 4 /E4 MS 18-* MRN [id number]  : 1950 Date 2023       Current Admission Date: 2023  Current Admission Diagnosis:Acute respiratory failure with hypoxia [facility])   Patient Active Problem List    Diagnosis Date Noted   • Acute diastolic (congestive) heart failure ([facility] ) 2023   • Acute kidney injury superimposed on CKD ([facility] [address] [facility] ) 2023   • H/O left nephrectomy 2023   • Fall at home, initial encounter 2023   • 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 2023   • Acute respiratory failure with hypoxia ([facility] ) 2023   • Elevated brain natriuretic peptide (BNP) level 2023   • Elevated d-dimer 2023   • Chronic diarrhea 2023   • COVID-19 2022   • Azotemia 10/31/2019   • Hx of prostatectomy 2019   • History of prostate cancer 2019   • Metastatic renal cell carcinoma ([facility] ) 2018   • Spine metastasis ([facility] [facility] ) 2018   • Clear cell adenocarcinoma of kidney, left ([facility] [facility] ) 2018   • Bone metastases ([facility] [facility] ) 2018   • Lung metastases ([facility] [facility] ) 2018      LOS (days): 7  Geometric Mean LOS (GMLOS) (days): 4 30  Days to GMLOS:-2 3     OBJECTIVE:  Risk of Unplanned Readmission Score: 12 54         Current admission status: Inpatient   Preferred Pharmacy:   [facility], [address] [facility]  [address]  [facility] [address]  Phone: [phone] Fax: [address] [place] [address]  Phone: [phone] Fax: [phone]    [facility], [place] - [address]  [address] [facility] [address]  Phone: [phone] Fax: [phone]    [address] [place] [address], [facility] [address] [place] [address]  [address]  [address] [address]  Phone: [phone] Fax: [phone]    [facility], [address] [place] [address]  Phone: [phone] Fax: [phone] [facility], [address] [place] [address]  Phone: [phone] Fax: [phone]    Primary Care Provider: Radha Sandra MD    Primary Insurance: MEDICARE  Secondary Insurance: Renell Colder SHIELD    DISCHARGE DETAILS:                                    Transported by (Company and Unit #): [employer]     ETA of Transport (Time): 1500                       Additional Comments: COVID neg and pickup time is 1500  RN, Acute, pt and son are aware of  time  Made pt aware he will get a bill for w/c [place]

## 2023-01-19 NOTE — ASSESSMENT & PLAN NOTE
· Found to be hypoxic with O2 sats less than 90 requiring 2L NC, not on home o2 at baseline  · Multifactorial in setting of pulmonary mets, obesity, deconditioning and concern for acute CHF  · Elevated D-dimer, NM study showing indeterminate study, duplex ultrasound lower extremity negative for DVT  · 2D echo with normal EF, GD 1 DD  · CTA neg for PE  · Symptoms improved with diuresis, nephrology recommending lasix 40mg daily on discharge  · On room air, o2 sats around 90%, discharged to rehab, may need home o2 eval prior to discharge home

## 2023-01-19 NOTE — ASSESSMENT & PLAN NOTE
With recent AHRF in setting of volume overload and acute diastolic heart failure and ARIANNA  Now on room air, except does require O2 occasionally with activity/exertion up to 2L  Has pleural effusion in addition - stable on 2/1 imaging     Closely monitor respiratory status  Pulmonary toilet  Outpatient f/u with Oncology and Pulmonology

## 2023-01-19 NOTE — ASSESSMENT & PLAN NOTE
· History of localized renal cell carcinoma in August 2007, developed metastatic disease in 2013  · Metastatic disease to the lungs, abdominal lymph nodes  · Follows with oncology Dr Luisito Stallings outpatient  · On previous visits, notes patient with progressive disease currently on tivozanib  · Repeat CTA showed enlargement of one right lower lobe metastasis, bone metastatis worrying for progression  · Recommend to follow up with Oncology outpatient to determine when to resume therapy

## 2023-01-19 NOTE — ASSESSMENT & PLAN NOTE
Originally 2007    Mets in 2013  On systemic therapy  S/p L nephrectomy and prostatectomy  Will need outpatient f/u with Oncology

## 2023-01-19 NOTE — H&P
PHYSICAL MEDICINE AND REHABILITATION H&P/ADMISSION NOTE  Denise Cook 67 y o  male MRN: [id number]  Unit/Bed#: -02 Encounter: [id number]     Rehab Diagnosis: Impairment of mobility, safety and Activities of Daily Living (ADLs) due to Orthopedic Disorders:  08 9  Other Orthopedic L scapular pathologic fracture 2/2 lytic mets    History of Present Illness:   Denise Cook is a 67 y o  male with medical history of localized RCC in 2007 with mets in 2013 , on systemic therapy s/p L nephrectomy and prostatectomy, chronic diarrhea, CKD (Crea 1-1 3) who presented to the [address] [place] [address] on 1/12 with L shoulder pain after a fall with arm abducted  XRs of left shoulder, humerus, scapula, and clavicle showed lytic metastatic tumor mass within the scapular neck and glenoid with no acute fractures or dislocations  CT was ordered and he was made NWB by Ortho  CT showed large pathologic scapula fracture with minimal displacement and a large lytic lesion in glenoid vault  Ortho discussed with Anirudh Carpenter - Dr Mendoza Mendez who recommended sling for comfort and non-surgical management  They will follow-up outpatient  On admission also noted to have ARIANNA and Nephro was consulted who felt likely 2/2 autoregulatory failure  They started him on Lasix and help his Lisinopril and HCTZ  Echo was ordered which showed normal EF with G2DD  ARIANNA resolve with diuresis  Lasix 40mg oral daily  Loretta Blase He also was newly requiring O2 on admission - but that improved diuresis  Given his cancer, D-dimer elevated, NM study was ordered which was indeterminant  LE dopplers were negative  CTA PE was ordered once renal function improved - this was negative  Heparin gtt discontinued It did however show enlargement of his RLL mets and bone mets concerning for progression  He will follow-up with his Oncologist as an outpatient to discuss resuming therapy  Admitted to the [facility] on 1/19  Subjective:  No new issues   Pain primarily in L shoulder  Chronically has some L lower back pain, but that is better - he attributes it to his broken drivers seat at work  He has some pain in his R hip - primarily when he weight bears  It I tolerable  At home his oncologist prescribes him tramadol and he takes 2 in the AM and 2 in the PM  He denies any new CP, SOB, issues with urination  Incontinence with bowel here is mostly related to mobility  No new N/V, fevers, chills, abdominal pain  Review of Systems: A 10-point review of systems was performed  Negative except as listed above  Plan:     * 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  Assessment & Plan  Large pathologic scapular fracture with minimal displacement  Large lytic lesion in glenoid vault  Management non-operatively  Sling for comfort  NWB  Ok for ROM in elbow, wrist, hand  Pendulums daily for now  Pain control as below  Outpatient f/u with Ish Dailey in 4 weeks  Dermatitis associated with moisture  Assessment & Plan  Soap, water to buttocks TID and PRN followed by barrier cream   Nystatin powder R groin    Chronic diastolic (congestive) heart failure (HCC)  Assessment & Plan  Wt Readings from Last 3 Encounters:   01/19/23 110 kg (243 lb 6 2 oz)   12/30/22 121 kg (265 lb 10 5 oz)   12/19/22 115 kg (253 lb)     Seen on Echo during this hospitalization  S/P IV diuresis for exacerbation  Now back on room air   Closely monitor I/O, daily weights, volume status  Cardiac diet with fluid restriction  Continue: Lasix 40mg daily   - Previously on ACE, may be able to restart   - Was not on BB  Outpatient f/u with PCP  Chronic diarrhea  Assessment & Plan  Chronically on Lomotil  Monitor  Close continence care       Stage 3a chronic kidney disease [facility])  Assessment & Plan  Lab Results   Component Value Date    EGFR 59 01/19/2023    EGFR 61 01/18/2023    EGFR 59 01/17/2023    CREATININE 1 21 01/19/2023    CREATININE 1 17 01/18/2023    CREATININE 1 21 01/17/2023     Stage 2-3A  Crea baseline 1-1 3  Improved recently after ARIANNA 2/2 autoregulatory issues/congestion/volume overload  Now on Lasix daily monitor  Was on Lisinopril and HCTZ at home, currently being held  - Nephro says these can be restarted as necessary  Monitoring BP  Avoid nephrotoxic meds  Outpatient f/u     Metastatic renal cell carcinoma [facility])  Assessment & Plan  Originally 2007  Mets in 2013  On systemic therapy  S/p L nephrectomy and prostatectomy  Will need outpatient f/u with Oncology     Spine metastasis [facility])  Assessment & Plan  In most recent CT CAP in 2022 showed lytic/sclerotic lesions T4, T6, and scattered lesions in lumbar spine   NM bone scan in 2020 showed:  Scattered exophytic foci of radiotracer uptake in the spine corresponding to degenerative changes on CT  Ct C-Spine in 6/2022 showed no lesions  He has pelvic/iliac lytic lesions  Monitor neuro status and for worsening back pain  Pain management as below  Lung metastases ([facility] [address] )  Assessment & Plan  With recent AHRF in setting of volume overload and acute diastolic heart failure and ARIANNA  Now on room air  Closely monitor respiratory status  Pulmonary toilet  Outpatient f/u with Oncology    Bone metastases [facility])  Assessment & Plan  Monitor performance in therapy  If he develops new pain, particular in long bones, would image    - Since his fall has had new R hip pain primarily with weight bearing   - 2022 CT CAP showed lytic lesion superior to the right hip are identified as well as the posterior column of the acetabulum  - will check XR here prior to starting therapies     2020 Bone Scan showed lytic lesions:   left lateral scapula inferior to the glenoid     right posterior and lateral ribs corresponding to expansile lytic lesions on CT  (recent Ct with expansive R 7th rib met)   left posterior iliac bone and right iliac wing corresponding to lytic lesions on CT    r nonspecific tibial finding    Outpatient f/u with   Kashif Tubbs with Ortho onc  Health Maintenance  #Delirium/Sleep: At risk  Environmental interventions  Optimize pain, bowel, bladder, sleep-wake cycle management  #Pain: Tylenol PRN, Gabapentin 300mg TID, Oxycodone 5mg PRN severe pain, tramadol 50mg PRN moderate pain  #Bowel: Last BM 1/17/2023, incontinent and watery  Chronic diarrhea, see above  #Bladder: Voiding and continent  #Skin/Pressure Injury Prevention: Turn Q2hr in bed, with weight shifts Q23-50tyr in wheelchair  Float heels in bed  #DVT Prophylaxis: HSQ,SCDs  #GI Prophylaxis: Not indicated  #Code Status:  Full Code  #FEN: Cardiac diet with 1500cc fluid restriction  #Dispo:  ELOS 7-14 days with goal to discharge home  May need some assistance given restriction in LUE, but will try to optimize/maximize his functional independence    Drug regimen reviewed, all potential adverse effects identified and addressed:    Scheduled Meds:  Current Facility-Administered Medications   Medication Dose Route Frequency Provider Last Rate   • acetaminophen  975 mg Oral 4x Daily PRN Bryant Segura MD     • aluminum-magnesium hydroxide-simethicone  30 mL Oral Q6H PRN Bryant Segura MD     • amLODIPine  5 mg Oral HS Bryant Segura MD     • [START ON 1/20/2023] cholecalciferol  1,000 Units Oral Daily Bryant Segura MD     • diphenhydrAMINE  25 mg Oral HS PRN Bryant Segura MD     • [START ON 1/20/2023] furosemide  40 mg Oral Daily Bryant Segura MD     • gabapentin  300 mg Oral TID Bryant Segura MD     • heparin (porcine)  5,000 Units Subcutaneous [facility] Bryant Segura MD     • loperamide  2 mg Oral 4x Daily PRN Bryant Segura MD     • [START ON 1/20/2023] multivitamin stress formula  1 tablet Oral Daily Bryant Segura MD     • ondansetron  4 mg Oral Q6H PRN Bryant Segura MD     • oxyCODONE  5 mg Oral 4x Daily PRN Bryant Segura MD     • oxyCODONE  2 5 mg Oral 4x Daily PRN Bryant Segura MD     • simethicone  80 mg Oral 4x Daily PRN Bryant Segura MD Restrictions include:  NWB LUE  Pendulums only  Ok for elbow/wrist/hand ROM  Sling for comfort  Fall precautions      Functional History/Home Set-up - Prior to Admission:     Lives alone in [facility] with bed/bath upstairs  Has shower chair, grab bars, walker, cane, crutches  Was ind with ADLs, functional mobility, IADLs, medication management, meal prep  Able to prep  Part time employment as  at [employer] [facility]:  Mobility: Barry 39' with straight cane ambulation  Transfers: min-modA  ADLs: [place] UB, modA LB  Physical Exam:  Temp:  [97 7 °F (36 5 °C)-99 °F (37 2 °C)] 97 7 °F (36 5 °C)  HR:  [] 94  Resp:  [18] 18  BP: (104-127)/(57-65) 127/62  SpO2:  [90 %-92 %] 92 %      Gen: No acute distress, Well-nourished, well-appearing  HEENT: Moist mucus membranes, Normocephalic/Atraumatic  Cardiovascular: Regular rate, rhythm, S1/S2  Distal pulses palpable  Heme/Extr: Generalized edema  LE edema 2+, L arm dependent edema  Pulmonary: Non-labored breathing  Lungs CTAB  : No williamson  GI: Soft, non-tender, non-distended  BS+  MSK: PROM is WFL in all extremities  Did not test L shoulder  No effusions or deformities  Bulk is symmetric  See below for MMT scores  Integumentary: Skin is warm, dry  Maceration in groin and buttocks  Neuro: AAOx3, Sensation intact to light touch throughout  Speech is intact  Appropriate to questioning  MMT:   Strength:   Right  Left  Site  Right  Left  Site    5 NT S Ab: Shoulder Abductors  5  5  HF: Hip Flexors    5 4 EF: Elbow Flexors  5  5 KF: Knee Flexors    5  4  EE: Elbow Extensors  5  5  KE: Knee Extensors    5  5  WE: Wrist Extensors  5  5  DR: Dorsi Flexors    5  5  FF: Finger Flexors  5  5  PF: Plantar Flexors    5  4  HI: Hand Intrinsics  5  5  EHL: Extensor Hallucis Longus   Psych: Normal mood and affect       Laboratory:    Results from last 7 days   Lab Units 01/19/23  0539 01/18/23  0517 01/13/23  1752   HEMOGLOBIN g/dL 11 5* 11 7* 13 0   HEMATOCRIT % 38 0 39 0 44 0   WBC Thousand/uL 6 81 6 74 7 54     Results from last 7 days   Lab Units 01/19/23  0539 01/18/23  0517 01/17/23  0519 01/14/23  0516 01/13/23  0427 01/12/23  1853   BUN mg/dL 24 24 26*   < > 41* 44*   SODIUM mmol/L 138 137 140   < > 144 144   POTASSIUM mmol/L 4 4 3 6 3 8   < > 5 1 5 2   CHLORIDE mmol/L 100 99 100   < > 108 108   CREATININE mg/dL 1 21 1 17 1 21   < > 1 91* 2 26*   AST U/L  --   --   --   --  30 29   ALT U/L  --   --   --   --  11* 14    < > = values in this interval not displayed  Results from last 7 days   Lab Units 01/13/23  1752 01/12/23  1853   PROTIME seconds 13 7 13 2   INR  1 05 1 00        Wt Readings from Last 1 Encounters:   01/19/23 110 kg (243 lb 6 2 oz)     Estimated body mass index is 34 92 kg/m² as calculated from the following:    Height as of this encounter: 5' 10" (1 778 m)  Weight as of an earlier encounter on 1/19/23: 110 kg (243 lb 6 2 oz)  Imaging: reviewed   1/12 CXR:  Blunting in the costophrenic angle suggesting trace costophrenic angle effusions bilaterally, new when compared with November 14, 2022      Crowding of lung markings secondary to low lung volumes  Pulmonary nodules subtle, better visualized on prior CT  Expansile posterior right 7th rib metastatic lesion reidentified  1/12 XR L Shoulder:  No acute osseous abnormality  1/12 XR L Clavicle:  No acute osseous abnormality  1/12 XR L Scapula:  Mixed sclerotic and lytic metastatic tumor mass associated with the scapular neck and glenoid, appears to be similar when compared to CT of November 14, 2022      No acute fracture or dislocation  1/12 XR L humerus:  No acute osseous abnormality  1/12 LE venous dopplers:  Impression:  RIGHT LOWER LIMB:  No evidence of acute or chronic deep vein thrombosis  No evidence of superficial thrombophlebitis noted  Doppler evaluation shows a normal response to augmentation maneuvers    Popliteal, posterior tibial and anterior tibial arterial Doppler waveforms are  biphasic  LEFT LOWER LIMB:  No evidence of acute or chronic deep vein thrombosis  No evidence of superficial thrombophlebitis noted  Doppler evaluation shows a normal response to augmentation maneuvers  Popliteal, posterior tibial and anterior tibial arterial Doppler waveforms are  biphasic  1/13 V/Q:  The probability for pulmonary embolus is intermediate or indeterminant  Overall the ventilation appears worse but PE is not excluded      1/13 CT UE:  Compared to the 11/14/2022 CT, again seen is a lytic metastasis in the scapular glenoid process, measuring 2 9 x 2 4 x 2 0 cm, with cortical breakthrough and extraosseous extension anteriorly, though without pathologic fracture (series 2 image 36 )     There is a new 2 4 x 2 1 x 1 9 cm lytic metastasis in the left scapular body, with acute pathologic fracture (series 2 image 39 )      There is another new 1 9 x 0 8 x 1 4 cm lytic metastasis in the inferior scapular angle, with pathologic fracture (series 2 images 48 - 55 )     Partially visualized are pulmonary metastatic disease       1/17 CTA Chest PE:  No pulmonary embolus      New moderate left pleural effusion with moderate compressive atelectasis of the left lower lobe      Enlargement of one right lower lobe metastasis with no change in multiple additional lung metastases      Redemonstration of multiple bone metastases with enlarging soft tissue metastases about the left scapula measuring up to 6 1 cm with new pathologic fracture of the left scapula      Redemonstration of metastatic nodes in the upper abdomen  Rehabilitation Prognosis: good     Tolerance for three hours of therapy a day: good     Family/Patient Goals:  Patient/family's goals: Return to previous home/apartment      Patient will receive PT and OT 90 minutes each per day, five days per week to achieve rehab goals or participate in 900 minutes of therapy within a 7 day week period  Mobility Goals: Modified [place]  Transfer Goals: Modified [place]  Activities of Daily Living (ADLs) Goals: Supervision / Standby Assist    Discharge Planning:  Rehabilitation and discharge goals discussed with the patient and/or family  Case Managment and Social Work to review patient/family resources and to coordinate Discharge Planning  Estimated length of stay: 1-2 weeks    Patient and Family Education and Training:  Rehabilitation and discharge goals discussed with the patient and/or family  Patient/family education/training needs to be discussed in weekly team meeting  Equipment/DME needs: Therapy teams to assess and evaluate for additional equipment/DME needs throughout rehabilitation stay    Past Medical History:   Past Surgical History:   Family History:   Social history:   Past Medical History:   Diagnosis Date   • Arthritis    • Cancer ([facility] [address] )     prostate - mets to bone and lung   • History of radiation therapy 2018    SBRT to T10 -2018   • Hyperlipidemia    • Hypertension     Past Surgical History:   Procedure Laterality Date   • DISTAL ULNA EXCISION Right     excision of tumor and orif with cement and screws   • JOINT REPLACEMENT Bilateral     tkr's   • LUNG SURGERY Right     exc of nodules   • NEPHRECTOMY Left      Family History   Problem Relation Age of Onset   • No Known Problems Mother    • No Known Problems Father       Social History     Socioeconomic History   • Marital status:       Spouse name: None   • Number of children: 1   • Years of education: 15   • Highest education level: None   Occupational History   • Occupation: retired     Comment: P/T    Tobacco Use   • Smoking status: Former     Packs/day: 1 00     Types: Cigarettes     Quit date: 3/28/2003     Years since quittin 8   • Smokeless tobacco: Never   Vaping Use   • Vaping Use: Never used   Substance and Sexual Activity   • Alcohol use: Not Currently     Comment: occasional use   • Drug use: Never   • Sexual activity: Not Currently   Other Topics Concern   • None   Social History Narrative    Lives at home alone     Social Determinants of Health     Financial Resource Strain: Not on file   Food Insecurity: No Food Insecurity   • Worried About Running Out of Food in the Last Year: Never true   • Ran Out of Food in the Last Year: Never true   Transportation Needs: No Transportation Needs   • Lack of Transportation (Medical): No   • Lack of Transportation (Non-Medical): No   Physical Activity: Not on file   Stress: Not on file   Social Connections: Not on file   Intimate Partner Violence: Not on file   Housing Stability: Unknown   • Unable to Pay for Housing in the Last Year: No   • Number of Places Lived in the Last Year: Not on file   • Unstable Housing in the Last Year: No          Current Medical Diagnosis Allergies   Patient Active Problem List   Diagnosis   • Clear cell adenocarcinoma of kidney, left ([facility] [address] )   • Bone metastases ([facility] [address] )   • Lung metastases ([facility] [address] )   • Spine metastasis ([facility] [address] )   • Metastatic renal cell carcinoma ([facility] [address] )   • Hx of prostatectomy   • History of prostate cancer   • Azotemia   • COVID-19   • Acute kidney injury superimposed on CKD ([facility] [address] )   • H/O left nephrectomy   • Fall at home, initial encounter   • 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   • Acute respiratory failure with hypoxia (HCC)   • Elevated brain natriuretic peptide (BNP) level   • Elevated d-dimer   • Chronic diarrhea   • Acute diastolic (congestive) heart failure (HCC)    No Known Allergies        Medical Necessity Criteria for ARC Admission: He is of High Risk due to the following comorbid conditions: underlying lytic lesions with risk for pathologic fractures, ARIANNA, Pain, Neuropathy, high risk of VTE, HTN, metastatic renal cell carcinoma with history of L nephrectomy, risk of falls, cancer related pain, G2DD  Rosario Sears  In addition, the preadmission screen, post-admission physical evaluation, overall plan of care and admissions order demonstrate a reasonable expectation that the following criteria were met at the time of admission to the [facility]  1  The patient requires active and ongoing therapeutic intervention of multiple therapy disciplines (physical therapy, occupational therapy, speech-language pathology, or prosthetics/orthotics), one of which is physical or occupational therapy  2  Patient requires an intensive rehabilitation therapy program, as defined in Chapter 1, section 110 2 2 of the CMS Medicare Policy Manual  This intensive rehabilitation therapy program will consist of at least 3 hours of therapy per day at least 5 days per week or at least 15 hours of intensive rehabilitation therapy within a 7 consecutive day period, beginning with the date of admission to the [facility]  3  The patient is reasonably expected to actively participate in, and benefit significantly from, the intensive rehabilitation therapy program as defined in Chapter 1, section 110 2 2 of the CMS Medicare Policy Manual at this time of admission to the [facility]  He can reasonably be expected to make measurable improvement (that will be of practical value to improve the patient’s functional capacity or adaptation to impairments) as a result of the rehabilitation treatment, as defined in section 110 3, and such improvement can be expected to be made within the prescribed period of time  As noted in the CMS Medicare Policy Manual, the patient need not be expected to achieve complete independence in the domain of self-care nor be expected to return to his or her prior level of functioning in order to meet this standard  4  The patient must require physician supervision by a rehabilitation physician   As such, a rehabilitation physician will conduct face-to-face visits with the patient at least 3 days per week throughout the patient’s stay in the [facility] to assess the patient both medically and functionally, as well as to modify the course of treatment as needed to maximize the patient’s capacity to benefit from the rehabilitation process  5  The patient requires an intensive and coordinated interdisciplinary approach to providing rehabilitation, as defined in Chapter 1, section 110 2 5 of the CMS Medicare Policy Manual  This will be achieved through periodic team conferences, conducted at least once in a 7-day period, and comprising of an interdisciplinary team of medical professionals consisting of: a rehabilitation physician, registered nurse,  and/or , and a licensed/certified therapist from each therapy discipline involved in treating the patient  Garth Vance MD  Physical Medicine and Rehabilitation    ** Please Note: Fluency Direct voice to text software may have been used in the creation of this document   **

## 2023-01-19 NOTE — TREATMENT PLAN
Patient is status post IV dye administration 3 days ago with stable renal function  Renal function stable with starting Lasix 40 mg daily  Nephrology will sign off  Please call or text with questions  Patient should have BMP prior to office visit with nephrology as an outpatient  Message sent to office to coordinate follow-up

## 2023-01-19 NOTE — ASSESSMENT & PLAN NOTE
In most recent CT CAP in 2022 showed lytic/sclerotic lesions T4, T6, and scattered lesions in lumbar spine   NM bone scan in 2020 showed:  Scattered exophytic foci of radiotracer uptake in the spine corresponding to degenerative changes on CT  Ct C-Spine in 6/2022 showed no lesions  He has pelvic/iliac lytic lesions  Monitor neuro status and for worsening back pain  Pain management as below  - Oncologist prescribes pain medication   - Going home on oxycodone as tramadol did not provide adequate relief  Giving 10 day supply 5-7 5mg Q6hr PRN  - May benefit from outpatient eval for Palliative  He wanted to hold off after a brief discussion while here

## 2023-01-19 NOTE — ASSESSMENT & PLAN NOTE
Lab Results   Component Value Date    EGFR 76 02/02/2023    EGFR 82 01/30/2023    EGFR 77 01/26/2023    CREATININE 0 98 02/02/2023    CREATININE 0 92 01/30/2023    CREATININE 0 97 01/26/2023     Stage 2-3A  Crea baseline 1-1 3  Improved recently after ARIANNA 2/2 autoregulatory issues/congestion/volume overload  Now on Lasix daily monitor  Was on Lisinopril and HCTZ at home, currently being held  - Nephro says these can be restarted as necessary  Monitoring BP    - Would follow-up PCP  Continue to hold for now     Avoid nephrotoxic meds  Outpatient f/u

## 2023-01-20 ENCOUNTER — APPOINTMENT (OUTPATIENT)
Dept: RADIOLOGY | Facility: HOSPITAL | Age: 73
End: 2023-01-20

## 2023-01-20 PROBLEM — J90 PLEURAL EFFUSION: Status: ACTIVE | Noted: 2023-01-20

## 2023-01-20 PROBLEM — G62.9 NEUROPATHY: Status: ACTIVE | Noted: 2023-01-20

## 2023-01-20 PROBLEM — I10 HTN (HYPERTENSION): Status: ACTIVE | Noted: 2023-01-20

## 2023-01-20 RX ORDER — LIDOCAINE 50 MG/G
1 PATCH TOPICAL DAILY
Status: DISCONTINUED | OUTPATIENT
Start: 2023-01-20 | End: 2023-01-25

## 2023-01-20 RX ADMIN — FUROSEMIDE 40 MG: 40 TABLET ORAL at 08:21

## 2023-01-20 RX ADMIN — GABAPENTIN 300 MG: 300 CAPSULE ORAL at 20:13

## 2023-01-20 RX ADMIN — LIDOCAINE 1 PATCH: 50 PATCH CUTANEOUS at 15:14

## 2023-01-20 RX ADMIN — TRAMADOL HYDROCHLORIDE 50 MG: 50 TABLET, FILM COATED ORAL at 19:59

## 2023-01-20 RX ADMIN — B-COMPLEX W/ C & FOLIC ACID TAB 1 TABLET: TAB at 08:21

## 2023-01-20 RX ADMIN — ACETAMINOPHEN 975 MG: 325 TABLET ORAL at 08:22

## 2023-01-20 RX ADMIN — HEPARIN SODIUM 5000 UNITS: 5000 INJECTION INTRAVENOUS; SUBCUTANEOUS at 13:21

## 2023-01-20 RX ADMIN — GABAPENTIN 300 MG: 300 CAPSULE ORAL at 15:15

## 2023-01-20 RX ADMIN — HEPARIN SODIUM 5000 UNITS: 5000 INJECTION INTRAVENOUS; SUBCUTANEOUS at 05:34

## 2023-01-20 RX ADMIN — GABAPENTIN 300 MG: 300 CAPSULE ORAL at 08:21

## 2023-01-20 RX ADMIN — TRAMADOL HYDROCHLORIDE 50 MG: 50 TABLET, FILM COATED ORAL at 13:22

## 2023-01-20 RX ADMIN — AMLODIPINE BESYLATE 5 MG: 5 TABLET ORAL at 21:50

## 2023-01-20 RX ADMIN — OXYCODONE HYDROCHLORIDE 5 MG: 5 TABLET ORAL at 05:36

## 2023-01-20 RX ADMIN — NYSTATIN: 100000 POWDER TOPICAL at 18:32

## 2023-01-20 RX ADMIN — HEPARIN SODIUM 5000 UNITS: 5000 INJECTION INTRAVENOUS; SUBCUTANEOUS at 21:50

## 2023-01-20 RX ADMIN — NYSTATIN: 100000 POWDER TOPICAL at 08:26

## 2023-01-20 RX ADMIN — LOPERAMIDE HYDROCHLORIDE 2 MG: 2 CAPSULE ORAL at 13:25

## 2023-01-20 RX ADMIN — ACETAMINOPHEN 975 MG: 325 TABLET ORAL at 21:52

## 2023-01-20 RX ADMIN — CHOLECALCIFEROL TAB 25 MCG (1000 UNIT) 1000 UNITS: 25 TAB at 08:22

## 2023-01-20 NOTE — ASSESSMENT & PLAN NOTE
Hx of SBRT to T6, T10, and T4 between 1296-6982  Encourage adequate pain control  Follows with Dr Ana Patel (Oncology) as outpatient

## 2023-01-20 NOTE — ASSESSMENT & PLAN NOTE
Likely secondary to metastatic disease  CTA PE study on 1/17: New moderate L pleural effusion with moderate compressive atelectasis of left lower lobe  Currently maintaining on RA  Monitor spot pulse ox  Encourage pulmonary toileting  Continue Lasix 40mg daily

## 2023-01-20 NOTE — PCC NURSING
Mari Jacobs is a 67 y o  male with medical history of localized RCC in 2007 with mets in 2013 , on systemic therapy s/p L nephrectomy and prostatectomy, chronic diarrhea, CKD (Crea 1-1 3) who presented to the [address] [place] [address] on 1/12 with L shoulder pain after a fall with arm abducted  XRs of left shoulder, humerus, scapula, and clavicle showed lytic metastatic tumor mass within the scapular neck and glenoid with no acute fractures or dislocations  CT was ordered and he was made NWB by Ortho  CT showed large pathologic scapula fracture with minimal displacement and a large lytic lesion in glenoid vault  Ortho discussed with [facility] - Dr Hue Anguiano who recommended sling for comfort and non-surgical management  They will follow-up outpatient  On admission also noted to have ARIANNA and Nephro was consulted who felt likely 2/2 autoregulatory failure  They started him on Lasix and held his Lisinopril and HCTZ  Echo was ordered which showed normal EF with G2DD  ARIANNA resolve with diuresis  Lasix 40mg oral daily  Naoma Mood He also was newly requiring O2 on admission - but that improved diuresis  Given his cancer, D-dimer elevated, NM study was ordered which was indeterminant  LE dopplers were negative  CTA PE was ordered once renal function improved - this was negative  Heparin gtt discontinued It did however show enlargement of his RLL mets and bone mets concerning for progression  He will follow-up with his Oncologist as an outpatient to discuss resuming therapy  Admitted to the [facility] on 1/19  Overnight noc ox performed on 1/24 and showed desat <89% for 8 hours and 12 minutes, as low as 74%  Overnight pulse oximetry study again on 2/2/23 before discharge  Apply 2L O2 at HS  Pt on Gabapentin 300mg TID for neuropathy, on Lasix 40mg daily for HTN and pleural effusion, monitor daily weights and I&Os, 1500FR for CHF  Pt is NWB to jeremias CUADRA for comfort  We will monitor pt's vital signs & lab results  Pt will have adequate pain control to fully participate in therapies  Pt will have safe transfers with  the call bell & hourly rounding to prevent falls  Pt will be educated on importance of T/R & offloading weight while in bed/chair to prevent pressure injury  Pt will be educated on energy conservation & encouraged independence with ADL's

## 2023-01-20 NOTE — PROGRESS NOTES
01/20/23 1000   Rehab Team Goals   Transfer Team Goal Patient will be independent with transfers with least restrictive device upon completion of rehab program   Locomotion Team Goal Patient will be independent with locomotion with least restrictive device upon completion of rehab program   Rehab Team Interventions   PT Interventions Gait Training; Therapeutic Exercise;Neuromuscualr Reeducation;Transfer Training;Bed Mobility; Patient/Family Education   PT Transfer Goal   Roll left and right Goal 88  Not attempted due to medical condition or safety concerns   Sit to lying Goal 06  Independent - Patient completes the activity by him/herself with no assistance from a helper  Lying to sitting on side of bed Goal 06  Independent - Patient completes the activity by him/herself with no assistance from a helper  Sit to stand Goal 06  Independent - Patient completes the activity by him/herself with no assistance from a helper  Chair/bed-to-chair transfer Goal 06  Independent - Patient completes the activity by him/herself with no assistance from a helper  Car Transfer Goal 05  Setup or clean-up assistance - [place] SETS UP or CLEANS UP, patient completes activity  [place] assists only prior to or following the activity  Assistive Device   (LRAD)   Safety Precautions NWB  (L UE)   Environment Level Surface; Well Lit   Locomotion Goal   Primary discharge mode of locomotion Walking   Target Walk Distance 150 ft   Assist Device   (LRAD)   Environment Level Surface; Well Lit   Walk 10 feet Goal 06  Independent - Patient completes the activity by him/herself with no assistance from a helper  Walk 50 feet with 2 turns Goal 06  Independent - Patient completes the activity by him/herself with no assistance from a helper  Walk 150 feet Goal 04  Supervision or touching assistance- [place] provides VERBAL CUES or supervision throughout activity  Walking 10 feet on uneven surface 04   Supervision or touching assistance- [place] provides VERBAL CUES or supervision throughout activity  Walking Goal Status Ongoing; Target goal - two weeks   Wheel 50 feet with 2 turns Goal 09  Not applicable   Wheel 354 feet Goal 09  Not applicable   Stairs Goal   1 step or curb goal 04  Supervision or touching assistance- [place] provides VERBAL CUES or supervision throughout activity  4 steps Goal 04  Supervision or touching assistance- [place] provides VERBAL CUES or supervision throughout activity  12 steps Goal 04  Supervision or touching assistance- [place] provides VERBAL CUES or supervision throughout activity  Assist Level Supervision   Number of Stairs 12   Technique Non-reciprocal   Hand Rail Right   Status Ongoing; Target goal - two weeks   Object Retrieval Goal   Picking up object Goal 06  Independent - Patient completes the activity by him/herself with no assistance from a helper     Assistive Device  Reacher   Small Object Picked Up marker

## 2023-01-20 NOTE — ASSESSMENT & PLAN NOTE
Likely secondary to metastatic disease  CTA PE study on 1/17: New moderate L pleural effusion with moderate compressive atelectasis of left lower lobe  Does need oxygen intermittently during the day with activity    - Ambulatory sats with desaturation on 2/2  Encourage pulmonary toileting, incentive spirometry  Continue Lasix 40mg daily  - 1/31 required extra dose  - 2/1C CXR: 1   Small left pleural effusion with associated atelectasis  2   Redemonstration of multiple metastatic pulmonary nodules as seen on prior cross-sectional imaging  3   Stable appearance of metastatic osseous lesions involving the right posterior 7th rib and left scapula  Pathological left scapular fracture is better demonstrate on recent CT

## 2023-01-20 NOTE — ASSESSMENT & PLAN NOTE
Presented on 1/12 after a mechanical fall with L shoulder pain  CT LUE: lytic metastasis int he scapular glenoid process measuring 2 9 x 2 4 x 2 0cm with cortical breakthrough and extraosseous extension anteriorly without pathologic fracture  New 2 4 x 2 1 x 1 9cm lytic metastasis in the L scapular body with acute pathologic fracture  New 1 9x0 8x1 4cm lytic metastasis in the inferior scapular angle with pathologic fracture  Non-surgical intervention per Ortho  NWB to LUE  Sling for comfort  Neurovascular checks Q shift  Ensure adequate pain control  Follow-up with Dr Cristo Huertas (Orthopedics) in 4 weeks  Primary team following  PT/OT

## 2023-01-20 NOTE — ASSESSMENT & PLAN NOTE
Wt Readings from Last 3 Encounters:   01/20/23 110 kg (242 lb 15 2 oz)   01/19/23 110 kg (243 lb 6 2 oz)   12/30/22 121 kg (265 lb 10 5 oz)     ECHO on 1/16 showed EF 65%, no RWMA, G1DD  Treated with Lasix in acute setting for volume overload  BNP 4475 on admission  Continue Lasix 40mg daily  Monitor daily weights and I&Os  Continue 1500FR

## 2023-01-20 NOTE — ASSESSMENT & PLAN NOTE
Home: Amlodipine 5mg daily Lasix 40mg daily, was on Lisinopril/HCTZ 20-12 5mg daily  Here: Same without Lisinopril/HCTZ, and now amlodipine at night discontinued for hypotension   Monitor and adjust as appropriate  IM following and assisting with management

## 2023-01-20 NOTE — PROGRESS NOTES
01/20/23 0700   Patient Data   Rehab Impairment Impairment of mobility, safety and Activities of Daily Living (ADLs) due to Orthopedic Disorders:  08 9  Other Orthopedic Left scapula pathologic fractures   Etiologic Diagnosis Left scapula pathologic fractures in the setting of metastatic renal cell carcinoma   Date of Onset 01/12/23   Support System   Name Stanley Pierce  (Ciarra Bryson  Has grandson that lives closer )   Relationship son   Able to provide 24 hour supervision No   Able to provide physical help? No   Home Setup   Type of Home Multi Level  (bed/bath 2nd flr)   Method of Entry Stairs;Hand Rail Left  (in back, LHr ascending)   Number of Stairs 4   Number of Stairs in Home   (10steps to 2nd flr with RHR ascending)   In Home Hand Rail Right  (ascending)   First Floor Bathroom None  (has BSC for 1st flr if needed)   Second Floor Bathroom Full; Shower;Door;Grab Bars   Second Black & Pichardo bars in tub/shower; Shower chair   First Floor Setup Available Yes  (pt reports he wantsto go upstairs)   Home Modifications Necessary?   (pending progress)   Home Modification Comment pending progress   Available Equipment Shower Chair;Roller Walker;Single Point Cane;Bedside Commode  (crutches)   Baseline Information   Vocation Work Part Time  ( for [employer])   Transportation    Prior Device(s) Used Single Point Cane   Prior IADL Participation   Money Management Identify Money;Estimate Costs;Estimate Change;Combine [employer]  (writes checks and pays online)   Meal Preparation Full Participation;Stove top; Oven   Laundry Full Participation  (services comes once a week but pt also completes  Located in basement  with 10 steps going down)   Home Cleaning Full Participation;Vacuum;Dusting;Sweeping  (interested in getting cleaning service for DC)   Prior Level of Function   Self-Care 3   Independent - Patient completed the activities by him/herself, with or without an assistive device, with no assistance from a helper  Indoor-Mobility (Ambulation) 3  Independent - Patient completed the activities by him/herself, with or without an assistive device, with no assistance from a helper  Stairs 3  Independent - Patient completed the activities by him/herself, with or without an assistive device, with no assistance from a helper  Functional Cognition 3  Independent - Patient completed the activities by him/herself, with or without an assistive device, with no assistance from a helper  Prior Assistance Needed for   (indep with med mngmnt)   Prior Device Used   [facility])   Falls in the Last Year   Number of falls in the past 12 months 5   Type of Injury Associated with Fall Major injury  (1 being current admission)   Patient Preference   Nickname (Patient Preference) Mikal Loyd   Psychosocial   Psychosocial (WDL) WDL   Restrictions/Precautions   Precautions Fall Risk;Pain   LUE Weight Bearing Per Order (S)  NWB  (ok for pendulums daily per ortho)   Braces or Orthoses Sling  (for comfort)   Pain Assessment   Pain Assessment Tool 0-10   Pain Score 8   Pain Location/Orientation Orientation: Left; Location: Shoulder  (scapula)   Pain Radiating Towards down the arm   Pain Onset/Description Frequency: Intermittent   Patient's Stated Pain Goal No pain   Eating Assessment   Type of Assistance Needed Set-up / clean-up   Eating CARE Score 5   Tub/Shower Transfer   Limitations Noted In Balance; Endurance;UE Strength;LE Strength;ROM   Adaptive Equipment Grab Bars;Seat with out Back   Assessed Shower   Findings modA SPT to shower with use of GB  Pt reports he has one GB outside of shower only  Shower/Bathe Self   Type of Assistance Needed Physical assistance   Physical Assistance Level 51%-75%   Comment completed shower this session seated on shower chair  Pt able to use RUE to wash UB, uppoer thigs  Pt req TA to wash feet RUE, and buttocks   Pt reports PTA he is L handed and uses L hand to wash bottom  Throughout req vc to no use LUE   Shower/Bathe Self CARE Score 2   Bathing   Assessed Bath Style Shower   Anticipated D/C Bath Style Sponge Bath; Shower  (pending progress)   Able to Celena Andrzej No   Able to [employer] Temperature No   Able to Wash/Rinse/Dry (body part) Left Arm;L Upper Leg;R Upper Leg;Chest;Abdomen;Perineal Area   Limitations Noted in Balance; Endurance;ROM; Timeliness;Strength   Positioning Seated;Standing   Adaptive Equipment Shower Bars; Shower Seat;Hand [employer]   Remove UB Clothes Pullover Shirt   Don UB Clothes Pullover Shirt   Type of Assistance Needed Physical assistance   Physical Assistance Level 26%-50%   Comment pt able to thread shirt thorugh LUE and head  Req A for ovr head and adjustment, vc to maintain LUE NMW   Upper Body Dressing CARE Score 3   Remove LB Clothes Undergarment;Pants;Socks   Don LB Clothes Undergarment;Pants;Socks   Type of Assistance Needed Physical assistance   Physical Assistance Level 76% or more   Comment req TA for threading pants  Vc for LUE NMW when completing CM   Lower Body Dressing CARE Score 2   Limitations Noted In Balance; Endurance;Strength;ROM; Timeliness; Safety   Positioning Supported Sit;Standing   Putting On/Taking Off Footwear   Type of Assistance Needed Physical assistance   Physical Assistance Level Total assistance   Putting On/Taking Off Footwear CARE Score 1   Toileting Hygiene   Type of Assistance Needed Physical assistance   Physical Assistance Level 51%-75%   Toileting Hygiene CARE Score 2   Toilet Transfer   Surface Assessed Standard Toilet  (recommend BSC over toilet)   Transfer Technique Standard   Limitations Noted In Balance; Endurance;LE Strength;UE Strength   Adaptive Equipment Grab Bar;Ivan Walker   Type of Assistance Needed Physical assistance   Physical Assistance Level 26%-50%   Comment modA with ivan walker   Toilet Transfer CARE Score 3   Toileting   Able to Pull Clothing down yes, up no  Transfer Bed/Chair/Wheelchair   Limitations Noted In Balance; Endurance;UE Strength;LE Strength   Adaptive Equipment Bright Walker;Cane   Type of Assistance Needed Physical assistance   Physical Assistance Level 26%-50%   Comment req modA for SPT with use of SPC  Barry with bright walker   Chair/Bed-to-Chair Transfer CARE Score 3   Lying to Sitting on Side of Bed   Type of Assistance Needed Physical assistance   Physical Assistance Level 51%-75%   Comment for trunk when HOB flat and no use of bedrail   Lying to Sitting on Side of Bed CARE Score 2   Sit to Stand   Type of Assistance Needed Physical assistance   Physical Assistance Level 26%-50%   Comment with SPC and bright walker   Sit to Stand CARE Score 3   Walk 10 Feet   Type of Assistance Needed Physical assistance   Physical Assistance Level 51%-75%   Comment trialed short fx mobility to bathroom with SPC  Pt observed with L drop foot and dec balance  Barry/modA with bright walker  Recommend wheel into bathroom for now for safety until PT assess   Walk 10 Feet CARE Score 2   Comprehension   Assist Devices Glasses   Auditory Complex   Visual Complex   QI: Comprehension 4  Undestands: Clear comprehension without cues or repetitions   Comprehension (FIM) 6 - Understands complex/abstract but requires  glasses for visual comp   Expression   Verbal Complex   Non-Verbal Complex   Intelligibility Sentence   QI: Expression 4   Express complex messages without difficulty and with speech that is clear and easy to [place]   Expression (FIM) 6 - Expresses complex/abstract but requires:  more time   LLE Assessment   LLE Assessment (S)    (drop foot)   RUE Assessment   RUE Assessment WFL   LUE Assessment   LUE Assessment   (DNT NWB)   Sensation   Light Touch Partial deficits in the RLE;Partial deficits in the LLE;Partial deficits in the RUE;Partial deficits in the LUE  (reports tinglin in hands and feet)   Cognition   Overall Cognitive Status [facility]   Arousal/Participation Alert; Responsive; Cooperative   Attention Within functional limits   Orientation Level Oriented X4   Memory Within functional limits   Following Commands Follows all commands and directions without difficulty   Discharge Information   Vocational Plan Part Time  (would like to go back to work but does not know if he could)   Barriers to Return to [employer]; Endurance   Patient's Discharge Plan Return home as independent as possible   Patient's Rehab Expectations "return back to what I was doing"   Barriers to Discharge Home Decreased Strength;Decreased Endurance   Impressions Pt is a 67 y o  male with a PMH of metastatic renal cell carcinoma diagnosed on 2007 and metastatic since 2013 with hx of left nephrectomy and prostatectomy, chronic diarrhea who presented to the [address] [place] [address] on 1/12/23 with left shoulder pain and decreased ROM  He reported a fall onto his right side as well  He was found to have ARIANNA on CKD, hypoxia and elevated d-dimer  X-ray of the left shoulder was negative for acute osseous abnormality  CT of the LUE showed a new lytic metastasis left scapular body with acute pathologic fracture and new lytic metastasis in the inferior scapular angle with pathologic fracture  Re-demonstration of multiple bone metastases with enlarging soft tissue metastases about the left scapula  There was no surgical intervention for the pathologic fractures  Per orthopedics he is NWB in a sling to the LUE, okay to do elbow ROM and shoulder pendulums daily  Pt supine in bed upon arrival, reports that he lives alone but has son and grandson who live close that can assist but cannot provide supervision  Pt was working PT as a , lives lone, +  Pt at this time req modA overall for ADLs, pt observed with L drop foot and dec balance  Therapist educated pt on rehab process, goal setting and ELOS which pt verbalized understanding   Pt presents with the following performance component deficits impacting ADL/IADL skills: weakness, impaired balance, decreased endurance, decreased coordination, increased fall risk, new onset of impairment of functional mobility, decreased ADLS, decreased IADLS, decreased activity tolerance,  Pt to continue to benefit from continued acute rehab OT services during hospital stay to address defined deficits and to maximize level of functional independence in the following Occupational Performance areas: grooming, bathing/shower, toilet hygiene, dressing, medication management, functional mobility, community mobility, clothing management, cleaning, meal prep and household maintenance  Pt presents with good rehab potential  This evaluation requires clinical decision making of moderate complexity, because the patient presents with comorbidites that affect occupational performance and required significant modification of tasks or assistance with consideration of multiple treatment options  Pt is unsafe to D/C home at this time, recommending ELOS 2 weeks to achieve Miguelito level goals with least restrictive device to address these areas and resume prior occupational roles to maximize independence to reduce risk of fall and decrease risk of readmission          OT Therapy Minutes   OT Time In 0700   OT Time Out 0830   OT Total Time (minutes) 90   OT Mode of treatment - Individual (minutes) 90   OT Mode of treatment - Concurrent (minutes) 0   OT Mode of treatment - Group (minutes) 0   OT Mode of treatment - Co-treat (minutes) 0   OT Mode of Treatment - Total time(minutes) 90 minutes   OT Cumulative Minutes 90   Cumulative Minutes   Cumulative therapy minutes 90

## 2023-01-20 NOTE — PROGRESS NOTES
01/20/23 0700   Rehab Team Goals   ADL Team Goal Patient will be independent with ADLs with least restrictive device upon completion of rehab program   Rehab Team Interventions   OT Interventions Self Care; Therapeutic Exercise; Energy Conservation;Group Therapy; Patient/Family Education   Eating Goal   Eating Goal 06  Independent - Patient completes the activity by him/herself with no assistance from a helper  Status Ongoing; Target goal - two weeks   Interventions Optimal Position   Tub/Shower Transfer Goal   Method Tub Shower  (Miguelito vs SB at DC pending progress)   Assist Device Seat with Back;Hand Held [employer]   Status Ongoing; Target goal - two weeks   Interventions ADL Training   Bathing Goal   Shower/bathe self Goal 06  Independent - Patient completes the activity by him/herself with no assistance from a helper  Environment Seated;Standing;Tub;Sponge Bath   Adaptive Equipment Long Handle Sponge;Seat with back;Grab Bar;Hand Held Shower   Safety Precautions Safety Precaution;NWB   Status Ongoing; Target goal - two weeks   Intervention ADL Training; Therapeutic Exercise;Assistive Device   Upper Body Dressing Goal   Upper body dressing Goal 06  Independent - Patient completes the activity by him/herself with no assistance from a helper  Task Upper Body;Arms in/out; Over Head  (sling)   Environment Seated;Standing   Safety Precautions Safety Precaution;NWB   Status Ongoing; Target goal - two weeks   Intervention Balance Work; Therapeutic Exercise; Tolerance Work   Lower Body Dressing Goal   Lower body dressing Goal 06  Independent - Patient completes the activity by him/herself with no assistance from a helper  Putting on/taking off footwear Goal 06  Independent - Patient completes the activity by him/herself with no assistance from a helper  Task Lower Body;Shoe/Slipper;Socks;Pants; Undergarment   Adaptive Equipment Sock Aide;Reacher; Shoe Horn;Dressing Stick; Elastic Laces   Environment Seated;Standing Safety Precautions Safety Precaution   Status Ongoing; Target goal - two weeks   Intervention Balance Work; Therapeutic Exercise; Tolerance Work   Toileting Transfer Goal   Toilet transfer Goal 06  Independent - Patient completes the activity by him/herself with no assistance from a helper  Assistive Device [employer]; [facility] Commode   Safety Safety Precaution   Status Ongoing; Target goal - two weeks   Intervention ADL Training;Balance Work;Assistive Device   Toileting Goal   Toileting hygiene Goal 06  Independent - Patient completes the activity by him/herself with no assistance from a helper  Task Pants Up;Pants Down;Hygiene   Assistive Device (S)  Toilet Aide   Safety Balance   Status Ongoing; Target goal - two weeks   Intervention ADL Training;Balance Work;Assistive Device   Comprehension Goal   Comprehension Assist Level Moderate [place]   Assist Device Glasses   Status Ongoing; Target goal - two weeks   Expression Goal   Expression Assist Level Independant   Status Ongoing; Target goal - two weeks   Meal Prep and Kitchen Mobility   Assist Level Independent   Status Ongoing; Target goal - two weeks   Medication Management   Assist Level Independent   Status Ongoing; Target goal - two weeks   Laundry   Assist Level   (family can assist at DC as laundry is in basement)

## 2023-01-20 NOTE — ASSESSMENT & PLAN NOTE
Diagnosed in 2007 with metastatic disease in 2013  Hx of L nephrectomy in 2007 and prostatectomy  Progressive disease  Newly started on Nivolumab in 12/022  Follows with Dr Esther Lane (Oncology) as outpatient - follow-up scheduled for February

## 2023-01-20 NOTE — ASSESSMENT & PLAN NOTE
CTA PE study on 1/17: Enlargement of R lower lobe metastases  Developed hypoxia in acute setting - receiving diuretics with improvement  Currently maintaining on RA  Monitor spot pulse ox  Continue Lasix 40mg daily  Encourage pulmonary toileting and IS use  Follows with Dr Mckenna Drake as outpatient

## 2023-01-20 NOTE — ASSESSMENT & PLAN NOTE
Diagnosed with metastatic renal cell carcinoma in 2007  Progressive disease  Bone scan in 2020 showed lytic lesions to L scapula, R posterior and lateral ribs, L posterior iliac bone, and R iliac wing  Most recently started on Nivolumab in 12/2022  Ensure adequate pain control  Continue home Vitamin D supplementation  Follows with Dr Kane Alex (Oncology) as outpatient

## 2023-01-20 NOTE — ASSESSMENT & PLAN NOTE
Lab Results   Component Value Date    EGFR 59 01/19/2023    EGFR 61 01/18/2023    EGFR 59 01/17/2023    CREATININE 1 21 01/19/2023    CREATININE 1 17 01/18/2023    CREATININE 1 21 01/17/2023     Creatinine currently 1 21  Baseline creatinine 1 0-1 3  Hx of L nephrectomy  Developed ARIANNA in acute setting due to autoregulatory failure/volume overload/congestion  Currently on Lasix 40mg daily  Avoid nephrotoxins as able  Encourage adequate hydration  Continue to trend BMP

## 2023-01-20 NOTE — ASSESSMENT & PLAN NOTE
Home regimen: amlodipine 5mg daily and lisinopril/HCTZ 20-12 5mg BID  Currently receiving amlodipine 5mg daily and Lasix 40mg daily  Lisinopril and HCTZ held after receiving CTA on 1/17  Restart as able    Monitor BP with routine VS

## 2023-01-20 NOTE — CONSULTS
[address] [facility] 1950, 67 y o  male MRN: [id number]  Unit/Bed#: [facility] 884-06 Encounter: [id number]  Primary Care Provider: Sari Mcmillan MD   Date and time admitted to hospital: 1/19/2023  3:45 PM    Inpatient consult to Internal Medicine  Consult performed by: IRENE Priest  Consult ordered by: Angela Serrano MD          Neuropathy  Assessment & Plan  Multifactorial  Continue home gabapentin 300mg TID  HTN (hypertension)  Assessment & Plan  Home regimen: amlodipine 5mg daily and lisinopril/HCTZ 20-12 5mg BID  Currently receiving amlodipine 5mg daily and Lasix 40mg daily  Lisinopril and HCTZ held after receiving CTA on 1/17  Restart as able  Monitor BP with routine VS     Pleural effusion  Assessment & Plan  Likely secondary to metastatic disease  CTA PE study on 1/17: New moderate L pleural effusion with moderate compressive atelectasis of left lower lobe  Currently maintaining on RA  Monitor spot pulse ox  Encourage pulmonary toileting  Continue Lasix 40mg daily  Chronic diastolic (congestive) heart failure (HCC)  Assessment & Plan  Wt Readings from Last 3 Encounters:   01/20/23 110 kg (242 lb 15 2 oz)   01/19/23 110 kg (243 lb 6 2 oz)   12/30/22 121 kg (265 lb 10 5 oz)     ECHO on 1/16 showed EF 65%, no RWMA, G1DD  Treated with Lasix in acute setting for volume overload  BNP 4475 on admission  Continue Lasix 40mg daily  Monitor daily weights and I&Os  Continue 1500FR  Chronic diarrhea  Assessment & Plan  Continue home PRN Imodium  Monitor skin for breakdown  Stage 3a chronic kidney disease [facility])  Assessment & Plan  Lab Results   Component Value Date    EGFR 59 01/19/2023    EGFR 61 01/18/2023    EGFR 59 01/17/2023    CREATININE 1 21 01/19/2023    CREATININE 1 17 01/18/2023    CREATININE 1 21 01/17/2023     Creatinine currently 1 21  Baseline creatinine 1 0-1 3  Hx of L nephrectomy    Developed ARIANNA in acute setting due to autoregulatory failure/volume overload/congestion  Currently on Lasix 40mg daily  Avoid nephrotoxins as able  Encourage adequate hydration  Continue to trend BMP  Metastatic renal cell carcinoma ([facility] [address] )  Assessment & Plan  Diagnosed in 2007 with metastatic disease in 2013  Hx of L nephrectomy in 2007 and prostatectomy  Progressive disease  Newly started on Nivolumab in 12/022  Follows with Dr Kaykay Gotti (Oncology) as outpatient - follow-up scheduled for February  Spine metastasis ([facility] [address] )  Assessment & Plan  Hx of SBRT to T6, T10, and T4 between 4835-7706  Encourage adequate pain control  Follows with Dr Kaykay Gotti (Oncology) as outpatient  Lung metastases [facility])  Assessment & Plan  CTA PE study on 1/17: Enlargement of R lower lobe metastases  Developed hypoxia in acute setting - receiving diuretics with improvement  Currently maintaining on RA  Monitor spot pulse ox  Continue Lasix 40mg daily  Encourage pulmonary toileting and IS use  Follows with Dr Kaykay Gotti as outpatient  Bone metastases [facility])  Assessment & Plan  Diagnosed with metastatic renal cell carcinoma in 2007  Progressive disease  Bone scan in 2020 showed lytic lesions to L scapula, R posterior and lateral ribs, L posterior iliac bone, and R iliac wing  Most recently started on Nivolumab in 12/2022  Ensure adequate pain control  Continue home Vitamin D supplementation  Follows with Dr Kaykay Gotti (Oncology) as outpatient  * 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  Assessment & Plan  Presented on 1/12 after a mechanical fall with L shoulder pain  CT LUE: lytic metastasis int he scapular glenoid process measuring 2 9 x 2 4 x 2 0cm with cortical breakthrough and extraosseous extension anteriorly without pathologic fracture  New 2 4 x 2 1 x 1 9cm lytic metastasis in the L scapular body with acute pathologic fracture    New 1 9x0 8x1 4cm lytic metastasis in the inferior scapular angle with pathologic fracture  Non-surgical intervention per Ortho  NWB to LUE  Sling for comfort  Neurovascular checks Q shift  Ensure adequate pain control  Follow-up with Dr Hue Anguiano (Orthopedics) in 4 weeks  Primary team following  PT/OT  History of Present Illness:    Mari Jacobs is a 67 y o  male with a PMH of CHF, CKD stage 3, hx of L sided nephrectomy, prostatectomy, metastatic renal cell carcinoma with bone and lung metastasis who originally presented to [place] [address] on 1/12/2023 after experiencing a mechanical fall with L sided shoulder pain  X-ray of left scapula on 1/12 showed mixed sclerotic and lytic metastatic tumor mass associated with the scapular neck and glenoid, appears to be similar when compared to prior CT  Orthopedics was consulted and recommended further CT to evaluate for pathological fracture  CT of left upper extremity showed lytic metastasis in the scapular glenoid process measuring 2 9 x 2 4 x 2 0 cm with cortical breakthrough and extravasation extension anteriorly without pathological fracture, new 2 4 x 2 1 x 1 9 cm lytic metastasis in the left scapular body with acute pathologic fracture, new 1 9 x 0 8 x 1 4 cm lytic metastasis in the inferior scapular angle with pathologic fracture  Orthopedics decided on nonsurgical management, patient is to be nonweightbearing to left upper extremity and use a sling for comfort  Patient will require follow-up with Dr Hue Anguiano in 4 weeks as an outpatient  Developed hypoxia in ER and was placed on 2L O2 via NC  Chest x-ray showed vascular congestion and BNP was greater than 4000  Echo was obtained and showed EF of 65%, no regional wall motion abnormalities, grade 1 diastolic dysfunction  Patient was diuresed with Lasix with minimal improvement    Eventually, PE study was done to rule out PE and study showed no PE but there was a new moderate left pleural effusion with moderate compressive atelectasis of left lower lobe, enlargement of 1 right lower lobe metastasis with no changes in multiple additional lung mets, redemonstration of metastatic nodes in the upper abdomen  Patient was also noted to have an ARIANNA on admission with a creatinine of 2 26  Nephrology was consulted  ARIANNA was felt to be due to autoregulatory process and improved with Lasix  Lisinopril and hydrochlorothiazide were held after patient received CTA on 1/17 and have yet to be restarted  Admitted to [address] [place] [address] on 1/19/2023; we are consulted for medical clearance  Pt examined while pt lying in bed in pt room  Complaints of burning pain to R hip that radiates down the thigh  Compares this to sciatica and states that he has had this at baseline but not as severe  States that the pain is worse with activity  Had been using a cane and putting most of the weight in his L side with ambulation and feels that now that he is bearing more weight in the R side that he is developing more pain  Denies any pain currently while resting in the bed  Denies any L shoulder pain currently  Denies any lightheadedness, dizziness, SOB, palpitations, chest pain, or abdominal pain  Has a hx of diarrhea since he started chemo and uses Imodium at home for this  Had a BM last night without any issues  Denies any urinary complaints currently  Does have lower extremity edema that appears to be lymphedema on exam   States that his edema currently is similar to baseline  Denies using any compression stockings or wraps  Has used them in the past and experienced difficulty putting them on  States that she has L sided foot drop after experiencing a pinged nerve many years ago and therefore he uses the cane  Also has neuropathy in B/L feet that started after his knee replacements  Lives at home by himself but has neighbors nearby that can help as needed  Manages his own medications  Used to smoke until he found out he had cancer  Drinks on occasion        Review of Systems:    Review of Systems   Constitutional: Negative for appetite change, chills, fatigue and fever  HENT: Negative for congestion, postnasal drip and trouble swallowing  Eyes: Negative for visual disturbance  Respiratory: Negative for cough, chest tightness, shortness of breath, wheezing and stridor  Cardiovascular: Negative for chest pain, palpitations and leg swelling  Gastrointestinal: Negative for abdominal distention, abdominal pain, constipation, diarrhea, nausea and vomiting  LBM 1/19   Genitourinary: Negative for decreased urine volume, difficulty urinating, dysuria, frequency, hematuria and urgency  Musculoskeletal: Positive for gait problem (L sided foot drop, started after a "pinched nerve")  Negative for arthralgias and back pain  R hip burning sensation that radiates to the thigh, improves with rest, worse in therapy   Neurological: Positive for numbness (Numbness/tingling to B/L feet, started after knee replacements years ago)  Negative for dizziness, weakness, light-headedness and headaches  Psychiatric/Behavioral: Negative for dysphoric mood, self-injury, sleep disturbance and suicidal ideas  The patient is not nervous/anxious  All other systems reviewed and are negative  Past Medical and Surgical History:     Past Medical History:   Diagnosis Date   • Arthritis    • Cancer [facility])     prostate - mets to bone and lung   • History of radiation therapy 07/27/2018    SBRT to T10 7/18-7/27/2018   • Hyperlipidemia    • Hypertension        Past Surgical History:   Procedure Laterality Date   • DISTAL ULNA EXCISION Right     excision of tumor and orif with cement and screws   • JOINT REPLACEMENT Bilateral     tkr's   • LUNG SURGERY Right     exc of nodules   • NEPHRECTOMY Left        Meds/Allergies:    all medications and allergies reviewed    Allergies: No Known Allergies    Social History:     Marital Status:      Substance Use History:   Social History Substance and Sexual Activity   Alcohol Use Not Currently    Comment: occasional use     Social History     Tobacco Use   Smoking Status Former   • Packs/day: 1 00   • Types: Cigarettes   • Quit date: 3/28/2003   • Years since quittin 8   Smokeless Tobacco Never     Social History     Substance and Sexual Activity   Drug Use Never       Family History:  Reviewed    Physical Exam:     Vitals:   Blood Pressure: 117/56 (23 0822)  Pulse: 94 (23)  Temperature: 97 8 °F (36 6 °C) (23 05)  Temp Source: Tympanic (23)  Respirations: 18 (23)  Height: 5' 10" (177 8 cm) (23 1557)  Weight - Scale: 110 kg (242 lb 15 2 oz) (23 0600)  SpO2: 93 % (23)    Physical Exam  Vitals and nursing note reviewed  Constitutional:       General: He is not in acute distress  Appearance: Normal appearance  He is obese  He is not ill-appearing  HENT:      Head: Normocephalic and atraumatic  Cardiovascular:      Rate and Rhythm: Normal rate and regular rhythm  Pulses: Normal pulses  Heart sounds: Murmur heard  Systolic murmur is present with a grade of 1/6  No friction rub  Pulmonary:      Effort: Pulmonary effort is normal  No respiratory distress  Breath sounds: Normal breath sounds  No wheezing or rhonchi  Abdominal:      General: Abdomen is flat  Bowel sounds are normal  There is no distension  Palpations: Abdomen is soft  There is no mass  Tenderness: There is no abdominal tenderness  There is no guarding or rebound  Hernia: No hernia is present  Musculoskeletal:         General: No tenderness  Cervical back: Normal range of motion and neck supple  Right lower leg: Edema (lymphedema) present  Left lower leg: Edema (lymphedema) present  Comments: LUE in sling  Skin:     General: Skin is warm and dry  Neurological:      Mental Status: He is alert and oriented to person, place, and time  Psychiatric:         Mood and Affect: Mood normal          Behavior: Behavior normal            Additional Data:     Labs and imaging reviewed  EKG Reviewed - last EKG on 1/12 showed NSR with bifascicular block, QTc 439  M*Modal software was used to dictate this note  It may contain errors with dictating incorrect words or incorrect spelling  Please contact the provider directly with any questions

## 2023-01-20 NOTE — PLAN OF CARE
Problem: NEUROSENSORY - ADULT  Goal: Achieves stable or improved neurological status  Description: INTERVENTIONS  - Monitor and report changes in neurological status  - Monitor vital signs such as temperature, blood pressure, glucose, and any other labs ordered   - Initiate measures to prevent increased intracranial pressure  - Monitor for seizure activity and implement precautions if appropriate      Outcome: Progressing  Goal: Remains free of injury related to seizures activity  Description: INTERVENTIONS  - Maintain airway, patient safety  and administer oxygen as ordered  - Monitor patient for seizure activity, document and report duration and description of seizure to physician/advanced practitioner  - If seizure occurs,  ensure patient safety during seizure  - Reorient patient post seizure  - Seizure pads on all 4 side rails  - Instruct patient/family to notify RN of any seizure activity including if an aura is experienced  - Instruct patient/family to call for assistance with activity based on nursing assessment  - Administer anti-seizure medications if ordered    Outcome: Progressing  Goal: Achieves maximal functionality and self care  Description: INTERVENTIONS  - Monitor swallowing and airway patency with patient fatigue and changes in neurological status  - Encourage and assist patient to increase activity and self care     - Encourage visually impaired, hearing impaired and aphasic patients to use assistive/communication devices  Outcome: Progressing     Problem: CARDIOVASCULAR - ADULT  Goal: Maintains optimal cardiac output and hemodynamic stability  Description: INTERVENTIONS:  - Monitor I/O, vital signs and rhythm  - Monitor for S/S and trends of decreased cardiac output  - Administer and titrate ordered vasoactive medications to optimize hemodynamic stability  - Assess quality of pulses, skin color and temperature  - Assess for signs of decreased coronary artery perfusion  - Instruct patient to report change in severity of symptoms  Outcome: Progressing  Goal: Absence of cardiac dysrhythmias or at baseline rhythm  Description: INTERVENTIONS:  - Continuous cardiac monitoring, vital signs, obtain 12 lead EKG if ordered  - Administer antiarrhythmic and heart rate control medications as ordered  - Monitor electrolytes and administer replacement therapy as ordered  Outcome: Progressing     Problem: RESPIRATORY - ADULT  Goal: Achieves optimal ventilation and oxygenation  Description: INTERVENTIONS:  - Assess for changes in respiratory status  - Assess for changes in mentation and behavior  - Position to facilitate oxygenation and minimize respiratory effort  - Oxygen administered by appropriate delivery if ordered  - Initiate smoking cessation education as indicated  - Encourage broncho-pulmonary hygiene including cough, deep breathe, Incentive Spirometry  - Assess the need for suctioning and aspirate as needed  - Assess and instruct to report SOB or any respiratory difficulty  - Respiratory Therapy support as indicated  Outcome: Progressing     Problem: GASTROINTESTINAL - ADULT  Goal: Minimal or absence of nausea and/or vomiting  Description: INTERVENTIONS:  - Administer IV fluids if ordered to ensure adequate hydration  - Maintain NPO status until nausea and vomiting are resolved  - Nasogastric tube if ordered  - Administer ordered antiemetic medications as needed  - Provide nonpharmacologic comfort measures as appropriate  - Advance diet as tolerated, if ordered  - Consider nutrition services referral to assist patient with adequate nutrition and appropriate food choices  Outcome: Progressing  Goal: Maintains or returns to baseline bowel function  Description: INTERVENTIONS:  - Assess bowel function  - Encourage oral fluids to ensure adequate hydration  - Administer IV fluids if ordered to ensure adequate hydration  - Administer ordered medications as needed  - Encourage mobilization and activity  - Consider nutritional services referral to assist patient with adequate nutrition and appropriate food choices  Outcome: Progressing  Goal: Maintains adequate nutritional intake  Description: INTERVENTIONS:  - Monitor percentage of each meal consumed  - Identify factors contributing to decreased intake, treat as appropriate  - Assist with meals as needed  - Monitor I&O, weight, and lab values if indicated  - Obtain nutrition services referral as needed  Outcome: Progressing  Goal: Establish and maintain optimal ostomy function  Description: INTERVENTIONS:  - Assess bowel function  - Encourage oral fluids to ensure adequate hydration  - Administer IV fluids if ordered to ensure adequate hydration   - Administer ordered medications as needed  - Encourage mobilization and activity  - Nutrition services referral to assist patient with appropriate food choices  - Assess stoma site  - Consider wound care consult   Outcome: Progressing  Goal: Oral mucous membranes remain intact  Description: INTERVENTIONS  - Assess oral mucosa and hygiene practices  - Implement preventative oral hygiene regimen  - Implement oral medicated treatments as ordered  - Initiate Nutrition services referral as needed  Outcome: Progressing     Problem: GENITOURINARY - ADULT  Goal: Maintains or returns to baseline urinary function  Description: INTERVENTIONS:  - Assess urinary function  - Encourage oral fluids to ensure adequate hydration if ordered  - Administer IV fluids as ordered to ensure adequate hydration  - Administer ordered medications as needed  - Offer frequent toileting  - Follow urinary retention protocol if ordered  Outcome: Progressing  Goal: Absence of urinary retention  Description: INTERVENTIONS:  - Assess patient’s ability to void and empty bladder  - Monitor I/O  - Bladder scan as needed  - Discuss with physician/AP medications to alleviate retention as needed  - Discuss catheterization for long term situations as appropriate  Outcome: Progressing  Goal: Urinary catheter remains patent  Description: INTERVENTIONS:  - Assess patency of urinary catheter  - If patient has a chronic williamson, consider changing catheter if non-functioning  - Follow guidelines for intermittent irrigation of non-functioning urinary catheter  Outcome: Progressing     Problem: METABOLIC, FLUID AND ELECTROLYTES - ADULT  Goal: Electrolytes maintained within normal limits  Description: INTERVENTIONS:  - Monitor labs and assess patient for signs and symptoms of electrolyte imbalances  - Administer electrolyte replacement as ordered  - Monitor response to electrolyte replacements, including repeat lab results as appropriate  - Instruct patient on fluid and nutrition as appropriate  Outcome: Progressing  Goal: Fluid balance maintained  Description: INTERVENTIONS:  - Monitor labs   - Monitor I/O and WT  - Instruct patient on fluid and nutrition as appropriate  - Assess for signs & symptoms of volume excess or deficit  Outcome: Progressing  Goal: Glucose maintained within target range  Description: INTERVENTIONS:  - Monitor Blood Glucose as ordered  - Assess for signs and symptoms of hyperglycemia and hypoglycemia  - Administer ordered medications to maintain glucose within target range  - Assess nutritional intake and initiate nutrition service referral as needed  Outcome: Progressing     Problem: SKIN/TISSUE INTEGRITY - ADULT  Goal: Skin Integrity remains intact(Skin Breakdown Prevention)    -Assess extremities for adequate circulation and sensation     Bed Management:  -Have minimal linens on bed & keep smooth, unwrinkled  -Change linens as needed when moist or perspiring      Toileting:  -Offer bedside commode      Activity:    -Encourage activity and walks on unit  -Encourage or provide ROM exercises     -Use appropriate equipment to lift or move patient in bed      Skin Care:  -Avoid use of baby powder, tape, friction and shearing, hot water or constrictive   -Do not massage red bony areas      Goal: Pressure injury heals and does not worsen  Description: Interventions:  - Implement low air loss mattress or specialty surface (Criteria met)    - Apply fecal or urinary incontinence containment device     - Consider nutrition services referral as needed  Outcome: Progressing     Problem: HEMATOLOGIC - ADULT  Goal: Maintains hematologic stability  Description: INTERVENTIONS  - Assess for signs and symptoms of bleeding or hemorrhage  - Monitor labs  - Administer supportive blood products/factors as ordered and appropriate  Outcome: Progressing     Problem: MUSCULOSKELETAL - ADULT  Goal: Maintain or return mobility to safest level of function  Description: INTERVENTIONS:  - Assess patient's ability to carry out ADLs; assess patient's baseline for ADL function and identify physical deficits which impact ability to perform ADLs (bathing, care of mouth/teeth, toileting, grooming, dressing, etc )  - Assess/evaluate cause of self-care deficits   - Assess range of motion  - Assess patient's mobility  - Assess patient's need for assistive devices and provide as appropriate  - Encourage maximum independence but intervene and supervise when necessary  - Involve family in performance of ADLs  - Assess for home care needs following discharge   - Consider OT consult to assist with ADL evaluation and planning for discharge  - Provide patient education as appropriate  Outcome: Progressing  Goal: Maintain proper alignment of affected body part  Description: INTERVENTIONS:  - Support, maintain and protect limb and body alignment  - Provide patient/ family with appropriate education  Outcome: Progressing     Problem: PAIN - ADULT  Goal: Verbalizes/displays adequate comfort level or baseline comfort level  Description: Interventions:  - Encourage patient to monitor pain and request assistance  - Assess pain using appropriate pain scale  - Administer analgesics based on type and severity of pain and evaluate response  - Implement non-pharmacological measures as appropriate and evaluate response  - Consider cultural and social influences on pain and pain management  - Notify physician/advanced practitioner if interventions unsuccessful or patient reports new pain  Outcome: Progressing

## 2023-01-20 NOTE — PROGRESS NOTES
PT Initial Evaluation     01/20/23 1000   Patient Data   Rehab Impairment Impairment of mobility, safety and Activities of Daily Living (ADLs) due to Orthopedic Disorders:  08 9  Other Orthopedic Left scapula pathologic fractures   Etiologic Diagnosis Left scapula pathologic fractures in the setting of metastatic renal cell carcinoma   Date of Onset 01/12/23   Support System   Name Bartolo Parnell   Relationship son   Able to provide 24 hour supervision No   Able to provide physical help? No   Home Setup   Type of Home Multi Level   Method of Entry Stairs;Hand Rail Left  (back entrance where he usually gets in, also 4 at the front with R railing)   Number of Stairs 4   Number of Stairs in Home 10   In Home Hand Rail Right   First Floor Bathroom None  (can have bedside commode)   First Floor Setup Available Yes  (use of commode downstairs)   Home Modification Comment pending progress   Available Equipment Roller Walker;Single Point Cane;Bedside Commode; [address] Work Part Time  ( for a car services)   Transportation    [facility]   Prior Level of Function   Self-Care 3  Independent - Patient completed the activities by him/herself, with or without an assistive device, with no assistance from a helper  Indoor-Mobility (Ambulation) 3  Independent - Patient completed the activities by him/herself, with or without an assistive device, with no assistance from a helper  Stairs 3  Independent - Patient completed the activities by him/herself, with or without an assistive device, with no assistance from a helper  Functional Cognition 3  Independent - Patient completed the activities by him/herself, with or without an assistive device, with no assistance from a helper     Prior Assistance Needed for   (none)   Prior Device Used   (no AD)   Falls in the Last Year   Number of falls in the past 12 months 5  (stated that falls mostly from foot drop)   Type of Injury Associated with Fall Major injury   Patient Preference   Nickname (Patient Preference) [email or web address]   Restrictions/Precautions   Precautions Fall Risk;Pain   LUE Weight Bearing Per Order NWB   LUE ROM Restriction   (pendulums L shoulder, can perform elbow and wrist AROM)   Braces or Orthoses Sling  (for comfort)   Pain Assessment   Pain Score 8   Pain Location/Orientation Orientation: Left; Location: Shoulder   Hospital Pain Intervention(s) Rest  (reported to have pain meds earlier)   [address] 77 stood infront of toilet for urinate   Transfer Bed/Chair/Wheelchair   Limitations Noted In Balance; Endurance   Adaptive Equipment Quad Cane;Bright Walker   Type of Assistance Needed Physical assistance   Physical Assistance Level 26%-50%   Comment trialed [facility] and pt  more comfortable and less out of the way vs    Chair/Bed-to-Chair Transfer CARE Score 3   Roll Left and Right   Comment NWB L UE   Reason if not Attempted Safety concerns   Roll Left and Right CARE Score 88   Sit to Lying   Type of Assistance Needed Supervision   Comment level bed, no rails   Sit to Lying CARE Score 4   Lying to Sitting on Side of Bed   Type of Assistance Needed Physical assistance   Physical Assistance Level 51%-75%   Comment mod A FOR TRUNK ON LEVEL BED AND NOT USING BEDRAIL   Lying to Sitting on Side of Bed CARE Score 2   Sit to Stand   Type of Assistance Needed Physical assistance   Physical Assistance Level 26%-50%   Comment with HW/ WBQC   Sit to Stand CARE Score 3   Picking Up Object   Type of Assistance Needed Physical assistance   Physical Assistance Level 25% or less   Comment using reacher on R hand   Picking Up Object CARE Score 3   Car Transfer   Type of Assistance Needed Physical assistance   Physical Assistance Level 26%-50%   Comment simulated via nu step   Car Transfer CARE Score 3   Ambulation   Primary Mode of Locomotion Prior to Admission Walk   Distance Walked (feet) 64 ft  (with , and [facility] [place] X 20 feet and 40 feet)   Assist Device Quad Cane;Bright Walker   Gait Pattern Antalgic; Inconsistant Dilma; Improper weight shift;Decreased foot clearance;L foot drag  (L foot drag noticeable with fatigue)   Limitations Noted In Endurance;Balance;Strength   Provided Assistance with: Balance   Walk Assist Level Minimum Assist   Walk 10 Feet   Type of Assistance Needed Physical assistance   Physical Assistance Level 51%-75%   Walk 10 Feet CARE Score 2   Walk 50 Feet with Two Turns   Type of Assistance Needed Physical assistance   Physical Assistance Level Total assistance   Walk 50 Feet with Two Turns CARE Score 1   Walk 150 Feet   Reason if not Attempted Safety concerns   Walk 150 Feet CARE Score 88   Walking 10 Feet on Uneven Surfaces   Comment (S)  trial tomorrow if safe   Wheel 50 Feet with Two Turns   Reason if not Attempted Activity not applicable   Wheel 50 Feet with Two Turns CARE Score 9   Wheel 150 Feet   Reason if not Attempted Activity not applicable   Wheel 037 Feet CARE Score 9   Curb or Single Stair   Style negotiated Single stair   Type of Assistance Needed Physical assistance   Physical Assistance Level Total assistance   Comment mod A x 1 with 2nd person standby for safety  Pt  hac=ving diffiuclty initiating to ascend due to R hip pain  Using R railing up and same railing coming down backwards   1 Step (Curb) CARE Score 1   4 Steps   Type of Assistance Needed Physical assistance   Physical Assistance Level Total assistance   Comment mod A x 1 with 2nd person standby for safety  Pt  hac=ving diffiuclty initiating to ascend due to R hip pain  Using R railing up and same railing coming down backwards   4 Steps CARE Score 1   12 Steps   Reason if not Attempted Safety concerns   12 Steps CARE Score 88   Stairs   Type  Steps   # of Steps 8   Weight Bearing Precautions LUE;NWB   Assist Devices Single Rail   Findings mod A x 1 with 2nd person standby for safety   Pt  hac=ving diffiuclty initiating to ascend due to R hip pain  Using R railing up and same railing coming down backwards   Comprehension   QI: Comprehension 4  Undestands: Clear comprehension without cues or repetitions   Expression   QI: Expression 4  Express complex messages without difficulty and with speech that is clear and easy to [place]   RLE Assessment   RLE Assessment WFL   Strength LLE   L Hip Flexion 4/5   L Hip ABduction 4/5   L Hip ADduction 4/5   L Knee Flexion 4/5   L Knee Extension 4/5   L Ankle Dorsiflexion 3-/5  (chronic L foot drop)   L Ankle Plantar Flexion 3/5   Sensation   Light Touch No apparent deficits   Sharp/Dull No apparent deficits   Additional Comments Reported neuropathy on B feet   Cognition   Arousal/Participation Alert; Cooperative   Attention Within functional limits   Memory Within functional limits   Following Commands Follows all commands and directions without difficulty   Objective Measure   PT Findings Reported to have chronic L foot drop ever since his back surgery  Please trial AFO next session if appropriate   Therapeutic Exercise   Therapeutic Exercise/Activity AROM LAQ and hip flex, add squeeze with ball, L ankle DF and PF, gentle B gastroc and hamstring stretching   Discharge Information   Vocational Plan Part Time   Barriers to Return to Vocation Strength; Endurance; Environmental Access   Patient's Discharge Plan return home with family support   Patient's Rehab Expectations "to be as independent as possoble"   Barriers to Discharge Home Limited Family Support; Unsafe Home Setup; Decreased Strength;Decreased Endurance;Pain; Safety Considerations   Impressions Pt  Is a 68 y/o male who was [place] [facility] [place] with L shoulder pain after a fall with arm abducted  XRs of left shoulder, humerus, scapula, and clavicle showed lytic metastatic tumor mass within the scapular neck and glenoid with no acute fractures or dislocations  CT was ordered and he was made NWB by Ortho   CT showed large pathologic scapula fracture with minimal displacement and a large lytic lesion in glenoid vault  Ortho discussed with [facility] - Dr Mendoza Mendez who recommended sling for comfort and non-surgical management  PMHx that can affect progress includes localized RCC with METS on 2013 , L nephrectomoy and protatectomy , chronic diarrhea and CKD and chronic low back pain and bone METS (see H&P)  Pt  Now admitted to [address] [place] [address] & Note and PT eval completed with pt  Presenting with LE weakness L LE >R LE with L foot drop (which pt  Had been having problems for years), dec balance with dec rigthng reactions with L UE in sling and dec activity tolerance with mod to severe pain in L shoulder  Pt  Also presents with R hip pain with wt  Bearing activities which  2022 CT CAP showed lytic lesion superior to the right hip are identified as well as the posterior column of the acetabulum  Pt  Currently is min to mod A with transfers and ambulation using WBQC and mod A for bed mobility and A X 2 for stairs management  Pt  Mostly limited by pain and fatigue  Pt  Fully indep PTA and will benefit from skilled PT to maximize functional mobility and to safely return home mod I level with family support prn  ELOS 2 weeks     PT Therapy Minutes   PT Time In 1000   PT Time Out 1130   PT Total Time (minutes) 90   PT Mode of treatment - Individual (minutes) 90   PT Mode of treatment - Concurrent (minutes) 0   PT Mode of treatment - Group (minutes) 0   PT Mode of treatment - Co-treat (minutes) 0   PT Mode of Treatment - Total time(minutes) 90 minutes   PT Cumulative Minutes 90   Cumulative Minutes   Cumulative therapy minutes 180

## 2023-01-20 NOTE — PROGRESS NOTES
Physical Medicine and Rehabilitation Progress Note  Kevin Rojas 67 y o  male MRN: [id number]  Unit/Bed#: -18 Encounter: [id number]    To Review: Kevin Rojas is a 67 y o  male with medical history of localized RCC in 2007 with mets in 2013 , on systemic therapy s/p L nephrectomy and prostatectomy, chronic diarrhea, CKD (Crea 1-1 3) who presented to the [address] [place] [address] on 1/12 with L shoulder pain after a fall with arm abducted  XRs of left shoulder, humerus, scapula, and clavicle showed lytic metastatic tumor mass within the scapular neck and glenoid with no acute fractures or dislocations  CT was ordered and he was made NWB by Ortho  CT showed large pathologic scapula fracture with minimal displacement and a large lytic lesion in glenoid vault  Ortho discussed with [facility] - Dr Dinesh Patrick who recommended sling for comfort and non-surgical management  They will follow-up outpatient  On admission also noted to have ARIANNA and Nephro was consulted who felt likely 2/2 autoregulatory failure  They started him on Lasix and help his Lisinopril and HCTZ  Echo was ordered which showed normal EF with G2DD  ARIANNA resolve with diuresis  Lasix 40mg oral daily  [facility] He also was newly requiring O2 on admission - but that improved diuresis  Given his cancer, D-dimer elevated, NM study was ordered which was indeterminant  LE dopplers were negative  CTA PE was ordered once renal function improved - this was negative  Heparin gtt discontinued It did however show enlargement of his RLL mets and bone mets concerning for progression  He will follow-up with his Oncologist as an outpatient to discuss resuming therapy  Admitted to the [facility] on 1/19  Chief Complaint: R hip pain still    Interval History/Subjective:  No acute events overnight  [place] therapy went well but was difficult  Still with R hip pain, worse when weight bearing  At rest feels ok  Pain also in L shoulder when working with therapy   He denies any new numbness/tingling/weakness, CP, SOB, fevers, chills, N/V, abdominal pain  Last BM 1/19  ROS:  A 10 point review of systems was negative except for what is noted in the HPI  Today's Changes:  1  Reviewed XR - known iliac and R acetabular lesions present  No new lesions noted  - Discussed with patient and thrapies - if pain worsens, becomes more persistent, low threshold to check CT scan  2  Swollen extremities, third spacing, continue lasix as per IM  Known L pleural effusion Respiratory status is stable  Total visit time: 25 minutes, with more than 50% spent counseling/coordinating care  Counseling includes discussion with patient re: progress in therapies, functional issues observed by therapy staff, and discussion with patient regarding their current medical state and wellbeing  Coordination of patient's care was performed in conjunction with Internal Medicine service to monitor patient's labs, vitals, and management of their comorbidities  Assessment/Plan:    * 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  Assessment & Plan  Large pathologic scapular fracture with minimal displacement  Large lytic lesion in glenoid vault  Management non-operatively  Sling for comfort  NWB  Ok for ROM in elbow, wrist, hand  Pendulums daily for now  Pain control as below  Outpatient f/u with Ish Dailey in 4 weeks  HTN (hypertension)  Assessment & Plan  Home: Amlodipine 5mg daily Lasix 40mg daily, was on Lisinopril/HCTZ 20-12 5mg daily  Here: Same without Lisinopril/HCTZ   Monitor and adjust as appropriate  IM following and assisting with management  Pleural effusion  Assessment & Plan  Likely secondary to metastatic disease  CTA PE study on 1/17: New moderate L pleural effusion with moderate compressive atelectasis of left lower lobe  Currently maintaining on RA  Monitor spot pulse ox  Encourage pulmonary toileting    Continue Lasix 40mg daily     Dermatitis associated with moisture  Assessment & Plan  Soap, water to buttocks TID and PRN followed by barrier cream   Nystatin powder R groin    Chronic diastolic (congestive) heart failure (HCC)  Assessment & Plan  Wt Readings from Last 3 Encounters:   01/20/23 110 kg (242 lb 15 2 oz)   01/19/23 110 kg (243 lb 6 2 oz)   12/30/22 121 kg (265 lb 10 5 oz)     Seen on Echo during this hospitalization  S/P IV diuresis for exacerbation  Now back on room air   Closely monitor I/O, daily weights, volume status  Cardiac diet with fluid restriction  Continue: Lasix 40mg daily   - Previously on ACE, may be able to restart   - Was not on BB  Outpatient f/u with PCP  Chronic diarrhea  Assessment & Plan  Chronically on Lomotil  Monitor  Close continence care  Stage 3a chronic kidney disease [facility])  Assessment & Plan  Lab Results   Component Value Date    EGFR 59 01/19/2023    EGFR 61 01/18/2023    EGFR 59 01/17/2023    CREATININE 1 21 01/19/2023    CREATININE 1 17 01/18/2023    CREATININE 1 21 01/17/2023     Stage 2-3A  Crea baseline 1-1 3  Improved recently after ARIANNA 2/2 autoregulatory issues/congestion/volume overload  Now on Lasix daily monitor  Was on Lisinopril and HCTZ at home, currently being held  - Nephro says these can be restarted as necessary  Monitoring BP  Avoid nephrotoxic meds  Outpatient f/u     Metastatic renal cell carcinoma [facility])  Assessment & Plan  Originally 2007  Mets in 2013  On systemic therapy  S/p L nephrectomy and prostatectomy  Will need outpatient f/u with Oncology     Spine metastasis [facility])  Assessment & Plan  In most recent CT CAP in 2022 showed lytic/sclerotic lesions T4, T6, and scattered lesions in lumbar spine   NM bone scan in 2020 showed:  Scattered exophytic foci of radiotracer uptake in the spine corresponding to degenerative changes on CT  Ct C-Spine in 6/2022 showed no lesions  He has pelvic/iliac lytic lesions    Monitor neuro status and for worsening back pain  Pain management as below  Lung metastases ([address] )  Assessment & Plan  With recent AHRF in setting of volume overload and acute diastolic heart failure and ARIANNA  Now on room air  Has pleural effusion in addition  Closely monitor respiratory status  Pulmonary toilet  Outpatient f/u with Oncology    Bone metastases [facility])  Assessment & Plan  Monitor performance in therapy  If he develops new pain, particular in long bones, would image    - Since his fall has had new R hip pain primarily with weight bearing   - 2022 CT CAP showed lytic lesion superior to the right hip are identified as well as the posterior column of the acetabulum  - will check XR here prior to starting therapies  2020 Bone Scan showed lytic lesions:   left lateral scapula inferior to the glenoid     right posterior and lateral ribs corresponding to expansile lytic lesions on CT  (recent Ct with expansive R 7th rib met)   left posterior iliac bone and right iliac wing corresponding to lytic lesions on CT    r nonspecific tibial finding    Outpatient f/u with Dr Tani Scott with Ortho onc  Health Maintenance  #Delirium/Sleep: At risk  Environmental interventions  Optimize pain, bowel, bladder, sleep-wake cycle management  #Pain: Tylenol PRN, Gabapentin 300mg TID, Oxycodone 5mg PRN severe pain, tramadol 50mg PRN moderate pain  #Bowel: Last BM 1/19/2023, incontinent and watery  Chronic diarrhea, see above  #Bladder: Voiding and continent  #Skin/Pressure Injury Prevention: Turn Q2hr in bed, with weight shifts [id number] in wheelchair  Float heels in bed  #DVT Prophylaxis: HSQ,SCDs  #GI Prophylaxis: Not indicated  #Code Status:  Full Code  #FEN: Cardiac diet with 1500cc fluid restriction  #Dispo:  ELOS 7-14 days with goal to discharge home  May need some assistance given restriction in LUE, but will try to optimize/maximize his functional independence         Objective:    Functional Update:  PT: modA bed mobility, Barry transfers/car transfers, ambulation variable min-total A  Difficulty ascending stairs due to R hip pain  OT:  modA with ADLs    Allergies per EMR    Physical Exam:  Temp:  [97 7 °F (36 5 °C)-98 °F (36 7 °C)] 97 8 °F (36 6 °C)  HR:  [93-95] 94  Resp:  [18] 18  BP: (107-128)/(56-69) 117/56  Oxygen Therapy  SpO2: 93 %    Gen: No acute distress, Well-nourished, well-appearing  HEENT: Moist mucus membranes, Normocephalic/Atraumatic  Cardiovascular: Regular rate, rhythm, S1/S2  Distal pulses palpable  Heme/Extr: BL UE and LE edema   Pulmonary: Non-labored breathing  Lungs diminished bilaterally, but fair air entry no adventitious sounds  : No williamson  GI: Soft, non-tender, non-distended  BS+  MSK: L arm in sling  Integumentary: Skin is warm, dry  Neuro: AAOx3,  Speech is intact  Appropriate to questioning  Psych: Normal mood and affect  Diagnostic Studies: Reviewed:  1/20 XR R Hip:  No acute osseous abnormality      Bilateral iliac and right acetabular lytic lesions better visualized on the prior CT      Laboratory:  Reviewed   Results from last 7 days   Lab Units 01/19/23  0539 01/18/23  0517 01/13/23  1752   HEMOGLOBIN g/dL 11 5* 11 7* 13 0   HEMATOCRIT % 38 0 39 0 44 0   WBC Thousand/uL 6 81 6 74 7 54     Results from last 7 days   Lab Units 01/19/23  0539 01/18/23  0517 01/17/23  0519   BUN mg/dL 24 24 26*   POTASSIUM mmol/L 4 4 3 6 3 8   CHLORIDE mmol/L 100 99 100   CREATININE mg/dL 1 21 1 17 1 21     Results from last 7 days   Lab Units 01/13/23  1752   PROTIME seconds 13 7   INR  1 05        Patient Active Problem List   Diagnosis   • Clear cell adenocarcinoma of kidney, left (HCC)   • Bone metastases (HCC)   • Lung metastases (HCC)   • Spine metastasis (HCC)   • Metastatic renal cell carcinoma (HCC)   • Hx of prostatectomy   • History of prostate cancer   • Azotemia   • COVID-19   • Stage 3a chronic kidney disease ([facility] [address] )   • H/O left nephrectomy   • Fall at home, initial encounter   • 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   • Acute respiratory failure with hypoxia (HCC)   • Elevated brain natriuretic peptide (BNP) level   • Elevated d-dimer   • Chronic diarrhea   • Chronic diastolic (congestive) heart failure (HCC)   • Dermatitis associated with moisture         Medications  Current Facility-Administered Medications   Medication Dose Route Frequency Provider Last Rate   • acetaminophen  975 mg Oral 4x Daily PRN Dorothy Mock MD     • aluminum-magnesium hydroxide-simethicone  30 mL Oral Q6H PRN Dorothy Mock MD     • amLODIPine  5 mg Oral HS Dorothy Mock MD     • cholecalciferol  1,000 Units Oral Daily Dorothy Mock MD     • diphenhydrAMINE  25 mg Oral HS PRN Dorothy Mock MD     • furosemide  40 mg Oral Daily Dorothy Mock MD     • gabapentin  300 mg Oral TID Dorothy Mock MD     • heparin (porcine)  5,000 Units Subcutaneous [facility] Dorothy Mock MD     • loperamide  2 mg Oral 4x Daily PRN Dorothy Mock MD     • multivitamin stress formula  1 tablet Oral Daily Dorothy Mock MD     • nystatin   Topical BID Dorothy Mock MD     • ondansetron  4 mg Oral Q6H PRN Dorothy Mock MD     • oxyCODONE  5 mg Oral 4x Daily PRN Dorothy Mock MD     • oxyCODONE  2 5 mg Oral 4x Daily PRN Dorothy Mock MD     • simethicone  80 mg Oral 4x Daily PRN Dorothy Mock MD     • traMADol  50 mg Oral Q6H PRN Dorothy Mock MD            ** Please Note: Fluency Direct voice to text software may have been used in the creation of this document   **

## 2023-01-21 LAB — GLUCOSE SERPL-MCNC: 97 MG/DL (ref 65–140)

## 2023-01-21 RX ORDER — OXYCODONE HYDROCHLORIDE 5 MG/1
5 TABLET ORAL 4 TIMES DAILY PRN
Status: DISCONTINUED | OUTPATIENT
Start: 2023-01-21 | End: 2023-02-03 | Stop reason: HOSPADM

## 2023-01-21 RX ADMIN — LIDOCAINE 1 PATCH: 50 PATCH CUTANEOUS at 08:23

## 2023-01-21 RX ADMIN — TRAMADOL HYDROCHLORIDE 50 MG: 50 TABLET, FILM COATED ORAL at 16:47

## 2023-01-21 RX ADMIN — OXYCODONE HYDROCHLORIDE 5 MG: 5 TABLET ORAL at 12:20

## 2023-01-21 RX ADMIN — LOPERAMIDE HYDROCHLORIDE 2 MG: 2 CAPSULE ORAL at 21:25

## 2023-01-21 RX ADMIN — LOPERAMIDE HYDROCHLORIDE 2 MG: 2 CAPSULE ORAL at 00:27

## 2023-01-21 RX ADMIN — AMLODIPINE BESYLATE 5 MG: 5 TABLET ORAL at 21:37

## 2023-01-21 RX ADMIN — GABAPENTIN 300 MG: 300 CAPSULE ORAL at 16:46

## 2023-01-21 RX ADMIN — CHOLECALCIFEROL TAB 25 MCG (1000 UNIT) 1000 UNITS: 25 TAB at 08:24

## 2023-01-21 RX ADMIN — Medication 7.5 MG: at 21:24

## 2023-01-21 RX ADMIN — HEPARIN SODIUM 5000 UNITS: 5000 INJECTION INTRAVENOUS; SUBCUTANEOUS at 14:13

## 2023-01-21 RX ADMIN — ACETAMINOPHEN 975 MG: 325 TABLET ORAL at 12:21

## 2023-01-21 RX ADMIN — ACETAMINOPHEN 975 MG: 325 TABLET ORAL at 04:23

## 2023-01-21 RX ADMIN — NYSTATIN: 100000 POWDER TOPICAL at 12:19

## 2023-01-21 RX ADMIN — GABAPENTIN 300 MG: 300 CAPSULE ORAL at 08:24

## 2023-01-21 RX ADMIN — B-COMPLEX W/ C & FOLIC ACID TAB 1 TABLET: TAB at 08:24

## 2023-01-21 RX ADMIN — HEPARIN SODIUM 5000 UNITS: 5000 INJECTION INTRAVENOUS; SUBCUTANEOUS at 06:45

## 2023-01-21 RX ADMIN — TRAMADOL HYDROCHLORIDE 50 MG: 50 TABLET, FILM COATED ORAL at 08:24

## 2023-01-21 RX ADMIN — GABAPENTIN 300 MG: 300 CAPSULE ORAL at 21:17

## 2023-01-21 RX ADMIN — HEPARIN SODIUM 5000 UNITS: 5000 INJECTION INTRAVENOUS; SUBCUTANEOUS at 21:17

## 2023-01-21 NOTE — PROGRESS NOTES
[facility] OT TREATMENT   01/21/23 1223   Pain Assessment   Pain Score 2   Pain Location/Orientation Orientation: Left; Location: Shoulder   Restrictions/Precautions   Precautions Fall Risk;Pain   LUE Weight Bearing Per Order NWB   LUE ROM Restriction   (Pendulums, can peform L arm and wrist AROM)   Braces or Orthoses Sling  (for comfort)   Sit to Stand   Type of Assistance Needed Incidental touching   Physical Assistance Level No physical assistance   Comment with [facility]   Sit to Stand CARE Score 4   Bed-Chair Transfer   Type of Assistance Needed Incidental touching   Physical Assistance Level No physical assistance   Comment with [facility]   Chair/Bed-to-Chair Transfer CARE Score 4   Toileting Hygiene   Type of Assistance Needed Supervision   Physical Assistance Level No physical assistance   Comment seated on toilet   Toileting Hygiene CARE Score 4   Toilet Transfer   Type of Assistance Needed Supervision   Physical Assistance Level No physical assistance   Comment grab bars   Toilet Transfer CARE Score 4   Toilet Transfer   Surface Assessed Standard Toilet   Functional Standing Tolerance   Time 3x7 minute trials   Comments Patient engaged in ball toss activity involving both side stepping and tossing the ball in different directions  This challenged his dynamic balance as well as worked on standing tolerance and improved functional mobility  He had no LOB but did require CGA for safety throughout activity   ROM - Left Upper Extremities    L Shoulder   (Pendulums, 10x horizontal, vertical, and circles)   L Elbow Elbow flexion;Elbow extension;AROM  (Forearm Pro/Sup)   L Wrist Wrist flexion;Wrist extension;AROM;Radial deviation;Ulnar deviation  (Hand: FDP Tendon Glides and Composite Fist formation 2x10)   L Position Seated   LUE ROM Comment AROM of the distal LUE was performed 2x10 reps to maintain AROM of the elbow, forearm, wrist, and digits and reduce stiffness for future functional use     Therapeutic Excerise-Strength   UE Strength Yes   Right Upper Extremity- Strength   R Shoulder Flexion; Extension   R Elbow Elbow flexion;Elbow extension   Equipment Dumbbell   R Weight/Reps/Sets 3#, 3x10   RUE Strength Comment To maximize functional strength, reduce deconditioning, and improve stability with functional mobiltiy, patient engaged in 3x10 RUE bicep curls, chest press, and shoulder flexion exercises  He tolerated well  Cognition   Overall Cognitive Status WFL   Activity Tolerance   Activity Tolerance Patient tolerated treatment well   Assessment   Treatment Assessment Dieter tolerated 90 minute OT session well today  He is progressing well, showing improvements in functional mobility and toileting  He did well with TA to address balance and standing tolerance  He does remain slow with mobility, especially side stepping to the left  Recommend continued OT treatment to address deficits  Patient may benefit from R LE orthosis for drop foot-make PT/Ed aware  Problem List Decreased strength;Decreased range of motion;Decreased endurance; Impaired balance;Decreased mobility; Decreased safety awareness;Pain;Orthopedic restrictions   Plan   Treatment/Interventions ADL retraining; Therapeutic exercise; Functional transfer training; Endurance training;Patient/family training;Equipment eval/education; Bed mobility;Gait training; Compensatory technique education   Progress Progressing toward goals   OT Therapy Minutes   OT Time In 1230   OT Time Out 1400   OT Total Time (minutes) 90   OT Mode of treatment - Individual (minutes) 90   OT Mode of treatment - Concurrent (minutes) 0   OT Mode of treatment - Group (minutes) 0   OT Mode of treatment - Co-treat (minutes) 0   OT Mode of Treatment - Total time(minutes) 90 minutes   OT Cumulative Minutes 180

## 2023-01-21 NOTE — PLAN OF CARE
Problem: NEUROSENSORY - ADULT  Goal: Achieves stable or improved neurological status  Description: INTERVENTIONS  - Monitor and report changes in neurological status  - Monitor vital signs such as temperature, blood pressure, glucose, and any other labs ordered   - Initiate measures to prevent increased intracranial pressure  - Monitor for seizure activity and implement precautions if appropriate      Outcome: Progressing  Goal: Remains free of injury related to seizures activity  Description: INTERVENTIONS  - Maintain airway, patient safety  and administer oxygen as ordered  - Monitor patient for seizure activity, document and report duration and description of seizure to physician/advanced practitioner  - If seizure occurs,  ensure patient safety during seizure  - Reorient patient post seizure  - Seizure pads on all 4 side rails  - Instruct patient/family to notify RN of any seizure activity including if an aura is experienced  - Instruct patient/family to call for assistance with activity based on nursing assessment  - Administer anti-seizure medications if ordered    Outcome: Progressing  Goal: Achieves maximal functionality and self care  Description: INTERVENTIONS  - Monitor swallowing and airway patency with patient fatigue and changes in neurological status  - Encourage and assist patient to increase activity and self care     - Encourage visually impaired, hearing impaired and aphasic patients to use assistive/communication devices  Outcome: Progressing     Problem: CARDIOVASCULAR - ADULT  Goal: Maintains optimal cardiac output and hemodynamic stability  Description: INTERVENTIONS:  - Monitor I/O, vital signs and rhythm  - Monitor for S/S and trends of decreased cardiac output  - Administer and titrate ordered vasoactive medications to optimize hemodynamic stability  - Assess quality of pulses, skin color and temperature  - Assess for signs of decreased coronary artery perfusion  - Instruct patient to report change in severity of symptoms  Outcome: Progressing  Goal: Absence of cardiac dysrhythmias or at baseline rhythm  Description: INTERVENTIONS:  - Continuous cardiac monitoring, vital signs, obtain 12 lead EKG if ordered  - Administer antiarrhythmic and heart rate control medications as ordered  - Monitor electrolytes and administer replacement therapy as ordered  Outcome: Progressing     Problem: RESPIRATORY - ADULT  Goal: Achieves optimal ventilation and oxygenation  Description: INTERVENTIONS:  - Assess for changes in respiratory status  - Assess for changes in mentation and behavior  - Position to facilitate oxygenation and minimize respiratory effort  - Oxygen administered by appropriate delivery if ordered  - Initiate smoking cessation education as indicated  - Encourage broncho-pulmonary hygiene including cough, deep breathe, Incentive Spirometry  - Assess the need for suctioning and aspirate as needed  - Assess and instruct to report SOB or any respiratory difficulty  - Respiratory Therapy support as indicated  Outcome: Progressing     Problem: GASTROINTESTINAL - ADULT  Goal: Minimal or absence of nausea and/or vomiting  Description: INTERVENTIONS:  - Administer IV fluids if ordered to ensure adequate hydration  - Maintain NPO status until nausea and vomiting are resolved  - Nasogastric tube if ordered  - Administer ordered antiemetic medications as needed  - Provide nonpharmacologic comfort measures as appropriate  - Advance diet as tolerated, if ordered  - Consider nutrition services referral to assist patient with adequate nutrition and appropriate food choices  Outcome: Progressing  Goal: Maintains or returns to baseline bowel function  Description: INTERVENTIONS:  - Assess bowel function  - Encourage oral fluids to ensure adequate hydration  - Administer IV fluids if ordered to ensure adequate hydration  - Administer ordered medications as needed  - Encourage mobilization and activity  - Consider nutritional services referral to assist patient with adequate nutrition and appropriate food choices  Outcome: Progressing  Goal: Maintains adequate nutritional intake  Description: INTERVENTIONS:  - Monitor percentage of each meal consumed  - Identify factors contributing to decreased intake, treat as appropriate  - Assist with meals as needed  - Monitor I&O, weight, and lab values if indicated  - Obtain nutrition services referral as needed  Outcome: Progressing  Goal: Establish and maintain optimal ostomy function  Description: INTERVENTIONS:  - Assess bowel function  - Encourage oral fluids to ensure adequate hydration  - Administer IV fluids if ordered to ensure adequate hydration   - Administer ordered medications as needed  - Encourage mobilization and activity  - Nutrition services referral to assist patient with appropriate food choices  - Assess stoma site  - Consider wound care consult   Outcome: Progressing  Goal: Oral mucous membranes remain intact  Description: INTERVENTIONS  - Assess oral mucosa and hygiene practices  - Implement preventative oral hygiene regimen  - Implement oral medicated treatments as ordered  - Initiate Nutrition services referral as needed  Outcome: Progressing     Problem: GENITOURINARY - ADULT  Goal: Maintains or returns to baseline urinary function  Description: INTERVENTIONS:  - Assess urinary function  - Encourage oral fluids to ensure adequate hydration if ordered  - Administer IV fluids as ordered to ensure adequate hydration  - Administer ordered medications as needed  - Offer frequent toileting  - Follow urinary retention protocol if ordered  Outcome: Progressing  Goal: Absence of urinary retention  Description: INTERVENTIONS:  - Assess patient’s ability to void and empty bladder  - Monitor I/O  - Bladder scan as needed  - Discuss with physician/AP medications to alleviate retention as needed  - Discuss catheterization for long term situations as appropriate  Outcome: Progressing  Goal: Urinary catheter remains patent  Description: INTERVENTIONS:  - Assess patency of urinary catheter  - If patient has a chronic williamson, consider changing catheter if non-functioning  - Follow guidelines for intermittent irrigation of non-functioning urinary catheter  Outcome: Progressing     Problem: METABOLIC, FLUID AND ELECTROLYTES - ADULT  Goal: Electrolytes maintained within normal limits  Description: INTERVENTIONS:  - Monitor labs and assess patient for signs and symptoms of electrolyte imbalances  - Administer electrolyte replacement as ordered  - Monitor response to electrolyte replacements, including repeat lab results as appropriate  - Instruct patient on fluid and nutrition as appropriate  Outcome: Progressing  Goal: Fluid balance maintained  Description: INTERVENTIONS:  - Monitor labs   - Monitor I/O and WT  - Instruct patient on fluid and nutrition as appropriate  - Assess for signs & symptoms of volume excess or deficit  Outcome: Progressing  Goal: Glucose maintained within target range  Description: INTERVENTIONS:  - Monitor Blood Glucose as ordered  - Assess for signs and symptoms of hyperglycemia and hypoglycemia  - Administer ordered medications to maintain glucose within target range  - Assess nutritional intake and initiate nutrition service referral as needed  Outcome: Progressing     Problem: SKIN/TISSUE INTEGRITY - ADULT  Goal: Skin Integrity remains intact(Skin Breakdown Prevention)  Description: Assess:    -Assess extremities for adequate circulation and sensation     Bed Management:  -Have minimal linens on bed & keep smooth, unwrinkled      Toileting:  -Offer bedside commode          Skin Care:  -Avoid use of baby powder, tape, friction and shearing, hot water or     Outcome: Progressing    - Consider nutrition services referral as needed  Outcome: Progressing     Problem: HEMATOLOGIC - ADULT  Goal: Maintains hematologic stability  Description: INTERVENTIONS  - Assess for signs and symptoms of bleeding or hemorrhage  - Monitor labs  - Administer supportive blood products/factors as ordered and appropriate  Outcome: Progressing     Problem: MUSCULOSKELETAL - ADULT  Goal: Maintain or return mobility to safest level of function  Description: INTERVENTIONS:  - Assess patient's ability to carry out ADLs; assess patient's baseline for ADL function and identify physical deficits which impact ability to perform ADLs (bathing, care of mouth/teeth, toileting, grooming, dressing, etc )  - Assess/evaluate cause of self-care deficits   - Assess range of motion  - Assess patient's mobility  - Assess patient's need for assistive devices and provide as appropriate  - Encourage maximum independence but intervene and supervise when necessary  - Involve family in performance of ADLs  - Assess for home care needs following discharge   - Consider OT consult to assist with ADL evaluation and planning for discharge  - Provide patient education as appropriate  Outcome: Progressing  Goal: Maintain proper alignment of affected body part  Description: INTERVENTIONS:  - Support, maintain and protect limb and body alignment  - Provide patient/ family with appropriate education  Outcome: Progressing     Problem: PAIN - ADULT  Goal: Verbalizes/displays adequate comfort level or baseline comfort level  Description: Interventions:  - Encourage patient to monitor pain and request assistance  - Assess pain using appropriate pain scale  - Administer analgesics based on type and severity of pain and evaluate response  - Implement non-pharmacological measures as appropriate and evaluate response  - Consider cultural and social influences on pain and pain management  - Notify physician/advanced practitioner if interventions unsuccessful or patient reports new pain  Outcome: Progressing     Problem: MOBILITY - ADULT  Goal: Maintain or return to baseline ADL function  Description: INTERVENTIONS:  -  Assess patient's ability to carry out ADLs; assess patient's baseline for ADL function and identify physical deficits which impact ability to perform ADLs (bathing, care of mouth/teeth, toileting, grooming, dressing, etc )  - Assess/evaluate cause of self-care deficits   - Assess range of motion  - Assess patient's mobility; develop plan if impaired  - Assess patient's need for assistive devices and provide as appropriate  - Encourage maximum independence but intervene and supervise when necessary  - Involve family in performance of ADLs  - Assess for home care needs following discharge   - Consider OT consult to assist with ADL evaluation and planning for discharge  - Provide patient education as appropriate  Outcome: Progressing  Goal: Maintains/Returns to pre admission functional level  Description: INTERVENTIONS:  - Perform BMAT or MOVE assessment daily    - Set and communicate daily mobility goal to care team and patient/family/caregiver     - Collaborate with rehabilitation services on mobility goals if consulted    - Out of bed for toileting  - Record patient progress and toleration of activity level   Outcome: Progressing     Problem: Prexisting or High Potential for Compromised Skin Integrity  Goal: Skin integrity is maintained or improved  Description: INTERVENTIONS:  - Identify patients at risk for skin breakdown  - Assess and monitor skin integrity  - Assess and monitor nutrition and hydration status  - Monitor labs   - Assess for incontinence   - Turn and reposition patient  - Assist with mobility/ambulation  - Relieve pressure over bony prominences  - Avoid friction and shearing  - Provide appropriate hygiene as needed including keeping skin clean and dry  - Evaluate need for skin moisturizer/barrier cream  - Collaborate with interdisciplinary team   - Patient/family teaching  - Consider wound care consult   Outcome: Progressing

## 2023-01-21 NOTE — PROGRESS NOTES
01/21/23 0830   Pain Assessment   Pain Assessment Tool 0-10   Pain Score 4   Pain Location/Orientation Orientation: Left; Location: Shoulder   Restrictions/Precautions   Precautions Fall Risk;Pain   LUE Weight Bearing Per Order NWB   LUE ROM Restriction   (Pendulums, can peform L arm and wrist AROM)   Braces or Orthoses Sling  (for comfort)   Cognition   Overall Cognitive Status WFL   Arousal/Participation Alert; Cooperative   Attention Within functional limits   Orientation Level Oriented X4   Memory Within functional limits   Following Commands Follows all commands and directions without difficulty   Subjective   Subjective Patient presents to therapy today ready for PT     Roll Left and Right   Comment NWB L UE   Reason if not Attempted Safety concerns   Roll Left and Right CARE Score 88   Sit to Lying   Type of Assistance Needed Supervision   Comment Level bed, no use of railings   Sit to Lying CARE Score 4   Lying to Sitting on Side of Bed   Type of Assistance Needed Physical assistance   Physical Assistance Level 51%-75%   Comment Assistance needed to sit up due to NWB L UE   Lying to Sitting on Side of Bed CARE Score 2   Sit to Stand   Type of Assistance Needed Physical assistance   Physical Assistance Level 26%-50%   Comment with [facility]   Sit to Stand CARE Score 3   Bed-Chair Transfer   Type of Assistance Needed Physical assistance   Physical Assistance Level 26%-50%   Comment with [facility] [place]   Chair/Bed-to-Chair Transfer CARE Score 3   Walk 10 Feet   Type of Assistance Needed Physical assistance   Physical Assistance Level 26%-50%   Comment Wide based Quad Cane on R   Walk 10 Feet CARE Score 3   Walk 50 Feet with Two Turns   Type of Assistance Needed Physical assistance   Physical Assistance Level 51%-75%   Comment Wide based Quad Cane on R   Walk 50 Feet with Two Turns CARE Score 2   Walk 150 Feet   Reason if not Attempted Safety concerns   Walk 150 Feet CARE Score 88   Walking 10 Feet on Uneven Surfaces   Comment Patient R LE pain did not allow him to perform increased walking   Reason if not Attempted Safety concerns   Walking 10 Feet on Uneven Surfaces CARE Score 88   Ambulation   Primary Mode of Locomotion Prior to Admission Walk   Distance Walked (feet) 200 ft  (100 x2)   Assist Device [facility]   Gait Pattern Antalgic; Inconsistant Dilma; Improper weight shift;Decreased foot clearance;L foot drag   Limitations Noted In Endurance;Balance;Strength   Provided Assistance with: Balance   Walk Assist Level Minimum Assist   Findings Patient requires min assist to contact guard to improve his safety   Does the patient walk? 2  Yes   Wheel 50 Feet with Two Turns   Reason if not Attempted Activity not applicable   Wheel 50 Feet with Two Turns CARE Score 9   Wheel 150 Feet   Reason if not Attempted Activity not applicable   Wheel 673 Feet CARE Score 9   Wheelchair mobility   Does the patient use a wheelchair? 0   No   Curb or Single Stair   Style negotiated Single stair   Type of Assistance Needed Physical assistance   Physical Assistance Level 76% or more   Comment mod to max assistance x1, R UE on hand rail, forward ascending, sideways descending   1 Step (Curb) CARE Score 2   4 Steps   Type of Assistance Needed Physical assistance   Physical Assistance Level 76% or more   Comment mod to max assistance x1, R UE on hand rail, forward ascending, sideways descending   4 Steps CARE Score 2   12 Steps   Reason if not Attempted Safety concerns   12 Steps CARE Score 88   Stairs   Type Stairs   # of Steps 10   Weight Bearing Precautions LUE   Assist Devices Single Rail   Findings mod to max assistance x1, R UE on hand rail, forward ascending, sideways descending   Toilet Transfer   Type of Assistance Needed Physical assistance   Physical Assistance Level 26%-50%   Comment Mod assist with grab bar; assistance needed for donning brief and pants   Toilet Transfer CARE Score 3   Toilet Transfer   Surface Assessed Standard Toilet Limitations Noted In Confidence;Balance; Endurance;Problem Solving;ROM;Safety; Sensation;UE Strength;LE Strength   Adaptive Equipment Grab Bar   Positioning Concerns LE Support; Arm Rest;Grab Bars;Raised Seat;Cognition; Safety   Findings Decreased LE strength and assistance needed   Therapeutic Interventions   Strengthening Seated for all interventions noted, 20 reps, 3 second holds, completed bilaterally- hip flexion, hip abduction with Yellow band, hip adduction ball squeeze   Flexibility Seated bilateral stretches- hamstring and gastroc, seated bilateral TFL and piriformis stretch- 20 minutes total   Balance Standing- Unsupported standing 30 seconds, 3 sets; step taps with R LE, 20 reps   Neuromuscular Re-Education Seated core rotations- R UE only, 20 reps   Other All transfers, steps, ambulation, functional mobility, and ADL tasks noted in objective above   Assessment   Treatment Assessment Patient tolerated session well today, patient limited by his hip pain and LE pain with increased mobility, patient challenged with his pain with increased activity, specifically steps  Patient made progress with his stair negotiation as noted in objective above, patient able to ascend and descend 10 steps with 1 UE, patient safety in question due to his weakness, pain, and weightbearing precautions for UE  Patient generally deconditioned, patient limited with his ability to bear weight on R LE due to his pre-existing LE pain  Patient fatigued post appointment and sat in chair with all safety measures in place  Patient requires skilled PT to maximize function and decrease fall risk  Family/Caregiver Present No   PT Family training done with: No   Problem List Decreased strength;Decreased range of motion;Decreased endurance; Impaired balance;Decreased mobility; Decreased safety awareness;Pain;Orthopedic restrictions   Barriers to Discharge Decreased caregiver support   PT Barriers   Physical Impairment Decreased strength;Decreased range of motion; Impaired balance;Decreased endurance;Decreased mobility; Decreased coordination;Decreased safety awareness;Orthopedic restrictions; Impaired sensation;Pain   Functional Limitation Car transfers; Ramp negotiation;Stair negotiation;Standing;Transfers; Walking   Plan   Treatment/Interventions Functional transfer training;LE strengthening/ROM; Therapeutic exercise; Endurance training;Patient/family training;Equipment eval/education;Gait training;Spoke to nursing   Progress Progressing toward goals   Recommendation   PT Discharge Recommendation Post acute rehabilitation services   PT Therapy Minutes   PT Time In 0830   PT Time Out 1000   PT Total Time (minutes) 90   PT Mode of treatment - Individual (minutes) 90   PT Mode of treatment - Concurrent (minutes) 0   PT Mode of treatment - Group (minutes) 0   PT Mode of treatment - Co-treat (minutes) 0   PT Mode of Treatment - Total time(minutes) 90 minutes   PT Cumulative Minutes 180   Therapy Time missed   Time missed?  No

## 2023-01-21 NOTE — NURSING NOTE
Pt alert and oriented times four  Medicated with tramadol for left shoulder pain  Left arm in sling  Pts oxygen saturation was 89 and slowly got up to 91 to 92%  Lower extremites edematous, +2  Pt had to void again and saturation again low  Oxygen 2L applied, now satting 93%  Call bell within reach, continue to monitor

## 2023-01-22 RX ADMIN — LIDOCAINE 1 PATCH: 50 PATCH CUTANEOUS at 08:33

## 2023-01-22 RX ADMIN — GABAPENTIN 300 MG: 300 CAPSULE ORAL at 15:26

## 2023-01-22 RX ADMIN — HEPARIN SODIUM 5000 UNITS: 5000 INJECTION INTRAVENOUS; SUBCUTANEOUS at 09:30

## 2023-01-22 RX ADMIN — ACETAMINOPHEN 975 MG: 325 TABLET ORAL at 17:17

## 2023-01-22 RX ADMIN — HEPARIN SODIUM 5000 UNITS: 5000 INJECTION INTRAVENOUS; SUBCUTANEOUS at 13:57

## 2023-01-22 RX ADMIN — Medication 7.5 MG: at 02:43

## 2023-01-22 RX ADMIN — NYSTATIN 1 APPLICATION.: 100000 POWDER TOPICAL at 17:07

## 2023-01-22 RX ADMIN — OXYCODONE HYDROCHLORIDE 5 MG: 5 TABLET ORAL at 08:37

## 2023-01-22 RX ADMIN — CHOLECALCIFEROL TAB 25 MCG (1000 UNIT) 1000 UNITS: 25 TAB at 08:31

## 2023-01-22 RX ADMIN — AMLODIPINE BESYLATE 5 MG: 5 TABLET ORAL at 21:39

## 2023-01-22 RX ADMIN — B-COMPLEX W/ C & FOLIC ACID TAB 1 TABLET: TAB at 08:32

## 2023-01-22 RX ADMIN — NYSTATIN: 100000 POWDER TOPICAL at 08:40

## 2023-01-22 RX ADMIN — OXYCODONE HYDROCHLORIDE 5 MG: 5 TABLET ORAL at 15:26

## 2023-01-22 RX ADMIN — HEPARIN SODIUM 5000 UNITS: 5000 INJECTION INTRAVENOUS; SUBCUTANEOUS at 21:37

## 2023-01-22 RX ADMIN — GABAPENTIN 300 MG: 300 CAPSULE ORAL at 08:33

## 2023-01-22 RX ADMIN — Medication 7.5 MG: at 21:37

## 2023-01-22 RX ADMIN — GABAPENTIN 300 MG: 300 CAPSULE ORAL at 21:37

## 2023-01-22 NOTE — PROGRESS NOTES
01/22/23 1230   Pain Assessment   Pain Assessment Tool 0-10   Pain Score No Pain   Restrictions/Precautions   Precautions Fall Risk   LUE Weight Bearing Per Order NWB   Braces or Orthoses   (sling for comfort when OOB)   Cognition   Overall Cognitive Status [facility]   Arousal/Participation Alert; Cooperative   Attention Within functional limits   Orientation Level Oriented X4   Memory Within functional limits   Following Commands Follows all commands and directions without difficulty   Subjective   Subjective No c/o pain today  Sit to Stand   Type of Assistance Needed Incidental touching   Physical Assistance Level No physical assistance   Comment CG using WBQC   Sit to Stand CARE Score 4   Bed-Chair Transfer   Type of Assistance Needed Incidental touching   Physical Assistance Level No physical assistance   Comment CG using WBQC   Chair/Bed-to-Chair Transfer CARE Score 4   Walk 10 Feet   Type of Assistance Needed Physical assistance   Physical Assistance Level 25% or less   Comment CG/MIn Assist using WBQC with L AFO   Walk 10 Feet CARE Score 3   Walk 50 Feet with Two Turns   Type of Assistance Needed Physical assistance   Physical Assistance Level 25% or less   Comment CG/Min Assist using WBQC with CF and with L AFO   Walk 50 Feet with Two Turns CARE Score 3   Walk 150 Feet   Type of Assistance Needed Physical assistance   Physical Assistance Level 25% or less   Comment Amb 154 feet x 2 with CG/Min Assist using WBQC with CF and with L AFO     Walk 150 Feet CARE Score 3   Walking 10 Feet on Uneven Surfaces   Type of Assistance Needed Incidental touching   Physical Assistance Level No physical assistance   Comment CG using WBQC over foam mat   Walking 10 Feet on Uneven Surfaces CARE Score 4   Ambulation   Primary Mode of Locomotion Prior to Admission Walk   Distance Walked (feet) 154 ft  (154 feet x 2 with CG/Min Assist using WBQC and with CF while wearing L AFO )   Assist Device [facility]   Gait Pattern Inconsistant Dilma;Narrow ANDRÉS; Improper weight shift   Limitations Noted In Balance;Device Management; Endurance; Sequencing;Speed;Strength;Swing   Provided Assistance with: Balance   Walk Assist Level Contact Guard;Minimum Assist   Does the patient walk? 2  Yes   Wheel 50 Feet with Two Turns   Reason if not Attempted Activity not applicable   Wheel 50 Feet with Two Turns CARE Score 9   Wheel 150 Feet   Reason if not Attempted Activity not applicable   Wheel 855 Feet CARE Score 9   Wheelchair mobility   Does the patient use a wheelchair? 0  No   4 Steps   Type of Assistance Needed Physical assistance   Physical Assistance Level 51%-75%   Comment Min /Mod using RHR going up then descending sideways   4 Steps CARE Score 2   12 Steps   Reason if not Attempted Safety concerns   12 Steps CARE Score 88   Picking Up Object   Type of Assistance Needed Physical assistance   Physical Assistance Level 25% or less   Comment using reacher on R   Picking Up Object CARE Score 3   Toilet Transfer   Type of Assistance Needed Incidental touching   Physical Assistance Level No physical assistance   Comment CG using grab bar   Toilet Transfer CARE Score 4   Therapeutic Interventions   Strengthening Seated ther ex on BLE's for LAQ, hip flexion, hip adduction with ball squeezes x 10 reps each  Rest breaks given in between  Equipment Use   NuStep Level 2 x 13 minutes using BLE and RUE and with LUE in a sling  Assessment   Treatment Assessment Patient was very pleasant and very cooperative the entire therapy session  He tolerated therapy session with rest breaks in between  He was wearing sling on LUE  Tried using L AFO today during transfers and ambulation during therapy session and removed it after his therapy session  He reported he amb better today and said it fits his L foot/shoe well and doesn't feel tight  He ambulated 154 feet x 2 with CG using WBQC and with CF and while wearing L AFO  CG with sit to stand transfers and SPT using [facility] up and down 4 steps with RHR going up and descending sideways  No LOB noted  He needed verbal cues with proper hand placements during transfers  He also needed verbal cues with sequencing using WBQC during ambulation  He will benefit from continued skilled PT services to maximize level of function, to be able to return to his PLOF and assist with safe d/c planning  Family/Caregiver Present no   Barriers to Discharge Decreased caregiver support   PT Barriers   Physical Impairment Decreased strength;Decreased endurance; Impaired balance;Decreased mobility; Decreased coordination;Decreased safety awareness;Orthopedic restrictions   Functional Limitation Car transfers; Ramp negotiation;Stair negotiation;Standing;Transfers; Walking   Plan   Treatment/Interventions Functional transfer training;LE strengthening/ROM; Elevations; Therapeutic exercise; Endurance training;Gait training   Progress Progressing toward goals   PT Therapy Minutes   PT Time In 1230   PT Time Out 1400   PT Total Time (minutes) 90   PT Mode of treatment - Individual (minutes) 90   PT Mode of treatment - Concurrent (minutes) 0   PT Mode of treatment - Group (minutes) 0   PT Mode of treatment - Co-treat (minutes) 0   PT Mode of Treatment - Total time(minutes) 90 minutes   PT Cumulative Minutes 270   Therapy Time missed   Time missed?  No

## 2023-01-22 NOTE — PROGRESS NOTES
Physical Medicine and Rehabilitation Progress Note   Call Coverage  Rosalia Marinelli 67 y o  male MRN: [id number]  Unit/Bed#: -02 Encounter: [id number]      Assessment & Plan:     Decline in ADLs and mobility: Functional assessment  Continue PT, OT     PMR On-call focused problem list:  (Additional problems may be listed in primary providers weekly notes)     HTN (hypertension)  Assessment & Plan  BP acceptable   Home: Amlodipine 5mg daily Lasix 40mg daily, was on Lisinopril/HCTZ 20-12 5mg daily  Here: Same without Lisinopril/HCTZ   Monitor and adjust as appropriate  IM following and assisting with management  Pleural effusion  Assessment & Plan  Saturating adequately on RA - provide O2 PRN for O2 <90%  Likely secondary to metastatic disease  CTA PE study on 1/17: New moderate L pleural effusion with moderate compressive atelectasis of left lower lobe  Currently maintaining on RA  Monitor spot pulse ox  Encourage pulmonary toileting  Continue Lasix 40mg daily  Dermatitis associated with moisture  Assessment & Plan  Soap, water to buttocks TID and PRN followed by barrier cream   Nystatin powder R groin    Chronic diastolic (congestive) heart failure (HCC)  Assessment & Plan  Wt Readings from Last 3 Encounters:   01/20/23 110 kg (242 lb 15 2 oz)   01/19/23 110 kg (243 lb 6 2 oz)   12/30/22 121 kg (265 lb 10 5 oz)     Seen on Echo during this hospitalization  S/P IV diuresis for exacerbation  Now back on room air   Closely monitor I/O, daily weights, volume status  Cardiac diet with fluid restriction  Continue: Lasix 40mg daily   - Previously on ACE, may be able to restart   - Was not on BB  Outpatient f/u with PCP  Knee high FRANCESCA raine       Chronic diarrhea  Assessment & Plan  Not currently   Chronically on Lomotil  Monitor  Close continence care       Stage 3a chronic kidney disease [facility])  Assessment & Plan  Lab Results   Component Value Date    EGFR 59 01/19/2023    EGFR 61 01/18/2023    EGFR 59 01/17/2023    CREATININE 1 21 01/19/2023    CREATININE 1 17 01/18/2023    CREATININE 1 21 01/17/2023     Stage 2-3A  Crea baseline 1-1 3  Improved recently after ARIANNA 2/2 autoregulatory issues/congestion/volume overload  Now on Lasix daily monitor  Was on Lisinopril and HCTZ at home, currently being held  - Nephro says these can be restarted as necessary  Monitoring BP  Avoid nephrotoxic meds  Outpatient f/u     Metastatic renal cell carcinoma [facility])  Assessment & Plan  Originally 2007  Mets in 2013  On systemic therapy  S/p L nephrectomy and prostatectomy  Will need outpatient f/u with Oncology     Spine metastasis [facility])  Assessment & Plan  In most recent CT CAP in 2022 showed lytic/sclerotic lesions T4, T6, and scattered lesions in lumbar spine   NM bone scan in 2020 showed:  Scattered exophytic foci of radiotracer uptake in the spine corresponding to degenerative changes on CT  Ct C-Spine in 6/2022 showed no lesions  He has pelvic/iliac lytic lesions  Monitor neuro status and for worsening back pain  Pain management as below  Lung metastases ([address] )  Assessment & Plan  With recent AHRF in setting of volume overload and acute diastolic heart failure and ARIANNA  Now on room air  Has pleural effusion in addition  Closely monitor respiratory status  Pulmonary toilet  Outpatient f/u with Oncology    Bone metastases [facility])  Assessment & Plan  Monitor performance in therapy  If he develops new pain, particular in long bones, would image    - Since his fall has had new R hip pain primarily with weight bearing   - 2022 CT CAP showed lytic lesion superior to the right hip are identified as well as the posterior column of the acetabulum  - will check XR here prior to starting therapies     2020 Bone Scan showed lytic lesions:   left lateral scapula inferior to the glenoid     right posterior and lateral ribs corresponding to expansile lytic lesions on CT  (recent Ct with expansive R 7th rib met)   left posterior iliac bone and right iliac wing corresponding to lytic lesions on CT    r nonspecific tibial finding    Outpatient f/u with Dr Tani Scott with Ortho onc  * 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  Assessment & Plan  Large pathologic scapular fracture with minimal displacement  Large lytic lesion in glenoid vault  Management non-operatively  Sling for comfort  NWB  Ok for ROM in elbow, wrist, hand  Pendulums daily for now  Pain control as below  Outpatient f/u with Kirstin Scott in 4 weeks  Other Medical Issues:  • Pain - radiating down RLE mostly - appears mostly neuropathic at this time; recent imaging reviewed; strength intact; b/b function intact; sub-optimal control with tramadol and current Oxy  o Adjust to Oxy 2 5-5-7 5mg Q4H PRN  o Continue PRN APAP  • VTE risk - heparin and SCDs       Objective: Allergies per EMR  Diagnostic Studies: Reviewed, no new imaging  XR hip/pelv 2-3 vws right if performed   Final Result by Matt Sewell MD (01/20 1048)      No acute osseous abnormality  Bilateral iliac and right acetabular lytic lesions better visualized on the prior CT              Workstation performed: [id number]           See above as well    Laboratory: Labs reviewed  Results from last 7 days   Lab Units 01/19/23  0539 01/18/23  0517   HEMOGLOBIN g/dL 11 5* 11 7*   HEMATOCRIT % 38 0 39 0   WBC Thousand/uL 6 81 6 74     Results from last 7 days   Lab Units 01/19/23  0539 01/18/23  0517 01/17/23  0519   BUN mg/dL 24 24 26*   SODIUM mmol/L 138 137 140   POTASSIUM mmol/L 4 4 3 6 3 8   CHLORIDE mmol/L 100 99 100   CREATININE mg/dL 1 21 1 17 1 21            Drug regimen reviewed, all potential adverse effects identified and addressed:    Scheduled Meds:  Current Facility-Administered Medications   Medication Dose Route Frequency Provider Last Rate   • acetaminophen  975 mg Oral 4x Daily PRN Faustino Hancock MD     • aluminum-magnesium hydroxide-simethicone  30 mL Oral Q6H PRN Deidra Mabry MD     • amLODIPine  5 mg Oral HS Deidra Mabry MD     • cholecalciferol  1,000 Units Oral Daily Deidra Mabry MD     • diphenhydrAMINE  25 mg Oral HS PRN Deidra Mabry MD     • furosemide  40 mg Oral Daily Deidra Mabry MD     • gabapentin  300 mg Oral TID Deidra Mabry MD     • heparin (porcine)  5,000 Units Subcutaneous [facility] Deidra Mabry MD     • lidocaine  1 patch Topical Daily IRENE Presley     • loperamide  2 mg Oral 4x Daily PRN Deidra Mabry MD     • multivitamin stress formula  1 tablet Oral Daily Deidra Mabry MD     • nystatin   Topical BID Deidra Mabry MD     • ondansetron  4 mg Oral Q6H PRN Deidra Mabry MD     • oxyCODONE  5 mg Oral 4x Daily PRN Ryan Quiroga MD     • oxyCODONE  2 5 mg Oral 4x Daily PRN Ryan Quiroga MD     • oxyCODONE  7 5 mg Oral Q4H PRN Ryan Quiroga MD     • simethicone  80 mg Oral 4x Daily PRN Deidra Mabry MD         Chief Complaints:  R leg pain      Subjective: On eval, patient notes some radiating pain down R leg from buttock  He reports some R hip pain as well but that is stable  He reports L shoulder is stable  He reports pain worse with ambulation at times but also randomly  He denies worsening strength, sensation, bowel/bladder function, fever, chills, sweats, SOB, or other new complaints  ROS: A 10 point ROS was performed; negative except as noted above         Physical Exam:  Temp:  [96 5 °F (35 8 °C)-98 1 °F (36 7 °C)] 98 1 °F (36 7 °C)  HR:  [] 100  Resp:  [18-20] 20  BP: (114-138)/(58-64) 114/63  SpO2:  [88 %-95 %] 88 %  Vitals above reviewed on date of encounter    GEN:  Sitting in NAD   HEENT/NECK: Normocephalic, atraumatic, moist mucous membranes   CARDIAC: Regular rate rhythm, no murmers, no rubs, no gallops  LUNGS:  clear to auscultation, no wheezes, rales, or rhonchi  ABDOMEN: Soft, non-tender, non-distended, normal active bowel sounds  EXTREMITIES/SKIN:  Mild BLE edema without calf TTP  NEURO:   MENTAL STATUS: awake, oriented to person, place, time, and situation, MENTAL STATUS:  Appropriate wakefulness and interaction  and Strength/MMT:  Intact BLE  PSYCH:  Affect:  Euthymic       ** Please Note: Fluency Direct voice to text software may have been used in the creation of this document  **    I personally performed the required components and examined the patient myself in person on 1/21/23

## 2023-01-23 LAB
ANION GAP SERPL CALCULATED.3IONS-SCNC: 1 MMOL/L (ref 5–14)
BASOPHILS # BLD AUTO: 0.01 THOUSANDS/ÂΜL (ref 0–0.1)
BASOPHILS NFR BLD AUTO: 0 % (ref 0–1)
BUN SERPL-MCNC: 19 MG/DL (ref 5–25)
CALCIUM SERPL-MCNC: 8.7 MG/DL (ref 8.4–10.2)
CHLORIDE SERPL-SCNC: 100 MMOL/L (ref 96–108)
CO2 SERPL-SCNC: 35 MMOL/L (ref 21–32)
CREAT SERPL-MCNC: 1 MG/DL (ref 0.7–1.5)
EOSINOPHIL # BLD AUTO: 0.1 THOUSAND/ÂΜL (ref 0–0.61)
EOSINOPHIL NFR BLD AUTO: 2 % (ref 0–6)
ERYTHROCYTE [DISTWIDTH] IN BLOOD BY AUTOMATED COUNT: 17.2 % (ref 11.6–15.1)
GFR SERPL CREATININE-BSD FRML MDRD: 74 ML/MIN/1.73SQ M
GLUCOSE SERPL-MCNC: 94 MG/DL (ref 70–99)
HCT VFR BLD AUTO: 38.5 % (ref 36.5–49.3)
HGB BLD-MCNC: 11.5 G/DL (ref 12–17)
IMM GRANULOCYTES # BLD AUTO: 0.01 THOUSAND/UL (ref 0–0.2)
IMM GRANULOCYTES NFR BLD AUTO: 0 % (ref 0–2)
LYMPHOCYTES # BLD AUTO: 1.37 THOUSANDS/ÂΜL (ref 0.6–4.47)
LYMPHOCYTES NFR BLD AUTO: 21 % (ref 14–44)
MCH RBC QN AUTO: 27.8 PG (ref 26.8–34.3)
MCHC RBC AUTO-ENTMCNC: 29.9 G/DL (ref 31.4–37.4)
MCV RBC AUTO: 93 FL (ref 82–98)
MONOCYTES # BLD AUTO: 0.52 THOUSAND/ÂΜL (ref 0.17–1.22)
MONOCYTES NFR BLD AUTO: 8 % (ref 4–12)
NEUTROPHILS # BLD AUTO: 4.45 THOUSANDS/ÂΜL (ref 1.85–7.62)
NEUTS SEG NFR BLD AUTO: 69 % (ref 43–75)
NRBC BLD AUTO-RTO: 0 /100 WBCS
PLATELET # BLD AUTO: 240 THOUSANDS/UL (ref 149–390)
PMV BLD AUTO: 10.4 FL (ref 8.9–12.7)
POTASSIUM SERPL-SCNC: 4.5 MMOL/L (ref 3.5–5.3)
RBC # BLD AUTO: 4.14 MILLION/UL (ref 3.88–5.62)
SODIUM SERPL-SCNC: 136 MMOL/L (ref 135–147)
WBC # BLD AUTO: 6.46 THOUSAND/UL (ref 4.31–10.16)

## 2023-01-23 RX ORDER — LANOLIN ALCOHOL/MO/W.PET/CERES
CREAM (GRAM) TOPICAL 2 TIMES DAILY
Status: DISCONTINUED | OUTPATIENT
Start: 2023-01-23 | End: 2023-02-03 | Stop reason: HOSPADM

## 2023-01-23 RX ADMIN — ACETAMINOPHEN 975 MG: 325 TABLET ORAL at 09:21

## 2023-01-23 RX ADMIN — HEPARIN SODIUM 5000 UNITS: 5000 INJECTION INTRAVENOUS; SUBCUTANEOUS at 21:59

## 2023-01-23 RX ADMIN — Medication: at 12:59

## 2023-01-23 RX ADMIN — Medication 7.5 MG: at 22:01

## 2023-01-23 RX ADMIN — HEPARIN SODIUM 5000 UNITS: 5000 INJECTION INTRAVENOUS; SUBCUTANEOUS at 06:42

## 2023-01-23 RX ADMIN — Medication: at 22:00

## 2023-01-23 RX ADMIN — OXYCODONE HYDROCHLORIDE 5 MG: 5 TABLET ORAL at 17:17

## 2023-01-23 RX ADMIN — FUROSEMIDE 40 MG: 40 TABLET ORAL at 09:20

## 2023-01-23 RX ADMIN — GABAPENTIN 300 MG: 300 CAPSULE ORAL at 21:59

## 2023-01-23 RX ADMIN — Medication 7.5 MG: at 06:49

## 2023-01-23 RX ADMIN — GABAPENTIN 300 MG: 300 CAPSULE ORAL at 09:21

## 2023-01-23 RX ADMIN — B-COMPLEX W/ C & FOLIC ACID TAB 1 TABLET: TAB at 09:20

## 2023-01-23 RX ADMIN — NYSTATIN: 100000 POWDER TOPICAL at 09:21

## 2023-01-23 RX ADMIN — GABAPENTIN 300 MG: 300 CAPSULE ORAL at 17:11

## 2023-01-23 RX ADMIN — LIDOCAINE 1 PATCH: 50 PATCH CUTANEOUS at 09:21

## 2023-01-23 RX ADMIN — ACETAMINOPHEN 975 MG: 325 TABLET ORAL at 17:17

## 2023-01-23 RX ADMIN — NYSTATIN: 100000 POWDER TOPICAL at 17:10

## 2023-01-23 RX ADMIN — CHOLECALCIFEROL TAB 25 MCG (1000 UNIT) 1000 UNITS: 25 TAB at 09:20

## 2023-01-23 RX ADMIN — HEPARIN SODIUM 5000 UNITS: 5000 INJECTION INTRAVENOUS; SUBCUTANEOUS at 13:00

## 2023-01-23 NOTE — ASSESSMENT & PLAN NOTE
Presented on 1/12 after a mechanical fall with L shoulder pain  CT LUE: lytic metastasis int he scapular glenoid process measuring 2 9 x 2 4 x 2 0cm with cortical breakthrough and extraosseous extension anteriorly without pathologic fracture  New 2 4 x 2 1 x 1 9cm lytic metastasis in the L scapular body with acute pathologic fracture  New 1 9x0 8x1 4cm lytic metastasis in the inferior scapular angle with pathologic fracture  Non-surgical intervention per Ortho  NWB to LUE  Sling for comfort  Neurovascular checks Q shift  Ensure adequate pain control  Follow-up with Dr Clarice Levy (Orthopedics) in 4 weeks  Primary team following  PT/OT

## 2023-01-23 NOTE — ASSESSMENT & PLAN NOTE
Wt Readings from Last 3 Encounters:   01/23/23 111 kg (245 lb 9 5 oz)   01/19/23 110 kg (243 lb 6 2 oz)   12/30/22 121 kg (265 lb 10 5 oz)     ECHO on 1/16 showed EF 65%, no RWMA, G1DD  Treated with Lasix in acute setting for volume overload  BNP 4475 on admission  Continue Lasix 40mg daily  Monitor daily weights and I&Os  Continue 1500FR

## 2023-01-23 NOTE — CASE MANAGEMENT
Cm met with pt at bedside to introduce role and complete cm open:    Pt lives alone in 2 story home w/ 5 [address]  Pt reports bedroom is on 2nd floor  Pt was independent with ADL IADLs prior to admission, pt has cane, commode, walker, shower seat and grab bars in home  Pt has family that are local and assist with things as needed  Prior to admission, pt was working part time at [employer] in [place]  Pt reports hx of acute rehab at [facility] and had [facility] in the past but does not recall which agency  Pt reports he receives his medication from the [place], PCP is Dr Lindajo Osler  The patient was educated on the rehabilitation process including therapy program, the interdisciplinary team, and weekly team meetings  Estimated length of stay was reviewed with the patient as well as expectations of discussions of discharge planning  The role of the  was reviewed including providing care coordination, discharge planning and discharge facilitation  IMM was reviewed with the patient and a copy was provided for their reference  The patient verbalized understanding of the information provided and denied any further questions at this time  CM will continue to follow and assist the patient throughout their rehabilitation stay

## 2023-01-23 NOTE — ASSESSMENT & PLAN NOTE
CTA PE study on 1/17: Enlargement of R lower lobe metastases  Developed hypoxia in acute setting - receiving diuretics with improvement  Currently maintaining on RA  Monitor spot pulse ox  Continue Lasix 40mg daily (held for past 2 days due to parameters)  Discontinue amlodipine at this time to continue Lasix  Encourage pulmonary toileting and IS use  Follows with Dr Daniel Vasquez as outpatient

## 2023-01-23 NOTE — ASSESSMENT & PLAN NOTE
Diagnosed with metastatic renal cell carcinoma in 2007  Progressive disease  Bone scan in 2020 showed lytic lesions to L scapula, R posterior and lateral ribs, L posterior iliac bone, and R iliac wing  Most recently started on Nivolumab in 12/2022  Ensure adequate pain control  Continue home Vitamin D supplementation  Follows with Dr Maryjane Woo (Oncology) as outpatient

## 2023-01-23 NOTE — ASSESSMENT & PLAN NOTE
Home regimen: amlodipine 5mg daily and lisinopril/HCTZ 20-12 5mg BID  Currently receiving amlodipine 5mg daily and Lasix 40mg daily  Discontinue amlodipine today due to not receiving Lasix due to holding parameters not being met  Lisinopril and HCTZ held after receiving CTA on 1/17  Restart as able    Monitor BP with routine VS

## 2023-01-23 NOTE — PROGRESS NOTES
01/23/23 0900   Pain Assessment   Pain Assessment Tool 0-10   Pain Score 5   Pain Location/Orientation Orientation: Left; Location: Shoulder   Restrictions/Precautions   Precautions Fall Risk   LUE Weight Bearing Per Order NWB   Braces or Orthoses Sling  (AFO on Left)   Sit to Stand   Type of Assistance Needed Incidental touching   Comment CS   Sit to Stand CARE Score 4   Bed-Chair Transfer   Type of Assistance Needed Incidental touching   Comment CG with [facility]   Chair/Bed-to-Chair Transfer CARE Score 4   Walk 10 Feet   Type of Assistance Needed Incidental touching   Comment CG  WBQC   Walk 10 Feet CARE Score 4   Walk 50 Feet with Two Turns   Type of Assistance Needed Incidental touching   Comment CG with WBQC   Walk 50 Feet with Two Turns CARE Score 4   Walk 150 Feet   Type of Assistance Needed Incidental touching   Comment CG with WBQC   Walk 150 Feet CARE Score 4   Ambulation   Primary Mode of Locomotion Prior to Admission Walk   Distance Walked (feet) 150 ft  (Multiple house hold distances t/o session)   Assist Device [facility]   Gait Pattern Antalgic; Inconsistant Dilma; Slow Dilma;Decreased foot clearance;Trendelenburg   Provided Assistance with: Balance   Walk Assist Level Close Supervision;Contact Guard   Findings Mostly close gaurd , but with fatigue at end of 150 distance pt had some instability which required CG for precautionary   Does the patient walk? 2   Yes   Wheel 50 Feet with Two Turns   Reason if not Attempted Activity not applicable   Wheel 50 Feet with Two Turns CARE Score 9   Wheel 150 Feet   Reason if not Attempted Activity not applicable   Wheel 891 Feet CARE Score 9   Curb or Single Stair   Style negotiated Single stair   Type of Assistance Needed Physical assistance   Physical Assistance Level 25% or less   Comment Min A with single rail   1 Step (Curb) CARE Score 3   4 Steps   Type of Assistance Needed Physical assistance   Physical Assistance Level 25% or less   Comment Min A with single rail   4 Steps CARE Score 3   12 Steps   Reason if not Attempted Safety concerns   12 Steps CARE Score 88   Stairs   Type Stairs   # of Steps 10   Assist Devices Single Rail   Findings First attempted home entry steps which pt would need to use L HR so reached across but pt not able to step up with RLE-   so performed R HR 10 steps , will try 12 tomorrow  descends slight sideways   Therapeutic Interventions   Strengthening at bottom steps,  focused on up/down 4inch box with R hand on L rail ascending with RLE for strengtheing 10 times   Equipment Use   NuStep 8 min warm up and gentle ROM  - No LUE   Assessment   Treatment Assessment 60 min session performed amb with WBQC  due to pts appearance and gt deviations will continue to use this device (pt most likely going to be unsteady with SPC at current time)  Focused on steps which pt will need cont strengthening specially for home entry steps that he needs to use L HR  Would benefit from more trials of house hold distances with 1 longer lap for enduracne but pt states too many reps of 150 seemed to over do it  Cont skilled PT focus on strengthening , balance, stair training and bed mobility  Barriers to Discharge Decreased caregiver support   PT Barriers   Functional Limitation Car transfers; Ramp negotiation;Stair negotiation;Standing;Transfers; Walking   Plan   Progress Progressing toward goals   PT Therapy Minutes   PT Time In 0930   PT Time Out 1030   PT Total Time (minutes) 60   PT Mode of treatment - Individual (minutes) 60   PT Mode of treatment - Concurrent (minutes) 0   PT Mode of treatment - Group (minutes) 0   PT Mode of treatment - Co-treat (minutes) 0   PT Mode of Treatment - Total time(minutes) 60 minutes   PT Cumulative Minutes 330   Therapy Time missed   Time missed?  No

## 2023-01-23 NOTE — PROGRESS NOTES
01/23/23 1100   Pain Assessment   Pain Assessment Tool 0-10   Pain Score 5   Pain Location/Orientation Orientation: Left; Location: Shoulder   Pain Onset/Description Onset: Ongoing;Frequency: Intermittent   Hospital Pain Intervention(s) Repositioned; Rest   Restrictions/Precautions   Precautions Fall Risk   Weight Bearing Restrictions Yes   LUE Weight Bearing Per Order NWB   LUE ROM Restriction   (pendulums, can perform L arm and wrist AROM)   Braces or Orthoses Sling  (AFO on L)   Sit to Stand   Type of Assistance Needed Incidental touching; Adaptive equipment   Physical Assistance Level No physical assistance   Comment CGA w/WBQC   Sit to Stand CARE Score 4   Bed-Chair Transfer   Type of Assistance Needed Incidental touching; Adaptive equipment   Physical Assistance Level No physical assistance   Comment CGA w/WBQC   Chair/Bed-to-Chair Transfer CARE Score 4   Toileting Hygiene   Type of Assistance Needed Physical assistance;Verbal cues; Adaptive equipment   Physical Assistance Level 26%-50%   Comment In stance for CM up/down over hips, continues to require A over L hip  Pt continues to require A for rear hygiene as pt PTA utilized LUE but unable to use LUE now 2* ROM/WBing limitations  RUE AROM is limited  Educated pt on toilet aide to increase indep with R posterior reach but pt resistant stating "I hope to use my L arm in a few days if I can tolerate it " Clarify with Dr Jarred Sanders regarding L shoulder AROM restrictions/orders to see if this is possible  Toileting Hygiene CARE Score 3   Toilet Transfer   Type of Assistance Needed Incidental touching; Adaptive equipment   Physical Assistance Level No physical assistance   Comment CGA fxl mob w/WBQC   Toilet Transfer CARE Score 4   Cognition   Overall Cognitive Status WFL   Arousal/Participation Alert; Cooperative   Attention Within functional limits   Orientation Level Oriented X4   Memory Within functional limits   Following Commands Follows all commands and directions without difficulty   Activity Tolerance   Activity Tolerance Patient tolerated treatment well   Assessment   Treatment Assessment Pt seen for 30min skilled OT session focused on ADL skills retraining (toileting hygiene and CM) and edu on [address] for increased independence w/rear hygiene (toilet aide)  See detailed descriptions of fxl performance above  Pt tolerated session well, although continues to be limited by LUE NWB restriction, decreased LUE AROM, L drop foot, decreased standing balance, LUE pain, BLE swelling, decreased strength, and endurance  Pt would benefit from continued skilled OT focused on ADL retraining (jose armando LB bathing/dressing), toileting (rear hygiene, CM over L hip), med mgmt, fxl mobility w/WBQC, community mobility, IADL retraining (cleaning, meal prep, household maintenance)  Prognosis Fair   Problem List Decreased strength;Decreased range of motion;Decreased endurance; Impaired balance;Decreased mobility; Decreased coordination;Orthopedic restrictions;Pain   Barriers to Discharge Decreased caregiver support   Plan   Treatment/Interventions ADL retraining;Functional transfer training; Therapeutic exercise; Endurance training;Patient/family training;Equipment eval/education; Bed mobility; Compensatory technique education   Progress Progressing toward goals   Recommendation   OT Discharge Recommendation   (pending)   OT Therapy Minutes   OT Time In 1100   OT Time Out 1130   OT Total Time (minutes) 30   OT Mode of treatment - Individual (minutes) 30   OT Mode of treatment - Concurrent (minutes) 0   OT Mode of treatment - Group (minutes) 0   OT Mode of treatment - Co-treat (minutes) 0   OT Mode of Treatment - Total time(minutes) 30 minutes   OT Cumulative Minutes 300   Therapy Time missed   Time missed?  No

## 2023-01-23 NOTE — PROGRESS NOTES
Physical Medicine and Rehabilitation Progress Note  Roberta Blue 67 y o  male MRN: [id number]  Unit/Bed#: -85 Encounter: [id number]    To Review: Roberta Bule is a 67 y o  male with medical history of localized RCC in 2007 with mets in 2013 , on systemic therapy s/p L nephrectomy and prostatectomy, chronic diarrhea, CKD (Crea 1-1 3) who presented to the [address] [place] [address] on 1/12 with L shoulder pain after a fall with arm abducted  XRs of left shoulder, humerus, scapula, and clavicle showed lytic metastatic tumor mass within the scapular neck and glenoid with no acute fractures or dislocations  CT was ordered and he was made NWB by Ortho  CT showed large pathologic scapula fracture with minimal displacement and a large lytic lesion in glenoid vault  Ortho discussed with Amairani Collazo - Dr Melissa Gomez who recommended sling for comfort and non-surgical management  They will follow-up outpatient  On admission also noted to have ARIANNA and Nephro was consulted who felt likely 2/2 autoregulatory failure  They started him on Lasix and help his Lisinopril and HCTZ  Echo was ordered which showed normal EF with G2DD  ARIANNA resolve with diuresis  Lasix 40mg oral daily  Frutoso Spatz He also was newly requiring O2 on admission - but that improved diuresis  Given his cancer, D-dimer elevated, NM study was ordered which was indeterminant  LE dopplers were negative  CTA PE was ordered once renal function improved - this was negative  Heparin gtt discontinued It did however show enlargement of his RLL mets and bone mets concerning for progression  He will follow-up with his Oncologist as an outpatient to discuss resuming therapy  Ad[facility] the [facility] on 1/19  Chief Complaint: R hip pain improved  Gets lower leg LLE pain in L5-S1 distribution now  Interval History/Subjective:  No acute events over the weekend  Lasix not given on Saturday/Sunday due to low BP  He denies any dizziness, lightheadedness, CP, SOB  No new N/V, abdominal pain  He reports that the oxycodone is controlling his pain a bit better - the R hip is better  He now gets more radiating pain on the outer/posterior aspect of his lower leg to his lateral ankle, and less in the "ball" of his hip (but points to his outer hip, not his groin)  He denies any new weakness, numbness, tingling  Last BM 1/22  ROS:  A 10 point review of systems was negative except for what is noted in the HPI  Today's Changes:  1  Discussed IM, he is quite swollen yet  Would continue lasix and they are discontinuing amlodipine  2  Reports history of sciatica  Continue current dose of gabapentin  Can discuss increasing this week with patient  3  Continue current regimen with oxycodone  4  Reviewed labs, which are stable  Total visit time: 25 minutes, with more than 50% spent counseling/coordinating care  Counseling includes discussion with patient re: progress in therapies, functional issues observed by therapy staff, and discussion with patient regarding their current medical state and wellbeing  Coordination of patient's care was performed in conjunction with Internal Medicine service to monitor patient's labs, vitals, and management of their comorbidities  Assessment/Plan:    * 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  Assessment & Plan  Large pathologic scapular fracture with minimal displacement  Large lytic lesion in glenoid vault  Management non-operatively  Sling for comfort  NWB  Ok for ROM in elbow, wrist, hand  Pendulums daily for now  Pain control as below  Outpatient f/u with Marleen Aburto in 4 weeks       Neuropathy  Assessment & Plan  Continue gabapentin 300mg Q8hr    HTN (hypertension)  Assessment & Plan  Home: Amlodipine 5mg daily Lasix 40mg daily, was on Lisinopril/HCTZ 20-12 5mg daily  Here: Same without Lisinopril/HCTZ, and now amlodipine at night discontinued for hypotension   Monitor and adjust as appropriate  IM following and assisting with management  Pleural effusion  Assessment & Plan  Likely secondary to metastatic disease  CTA PE study on 1/17: New moderate L pleural effusion with moderate compressive atelectasis of left lower lobe  Currently maintaining on RA  Monitor spot pulse ox  Encourage pulmonary toileting  Continue Lasix 40mg daily  Dermatitis associated with moisture  Assessment & Plan  Soap, water to buttocks TID and PRN followed by barrier cream   Nystatin powder R groin    Chronic diastolic (congestive) heart failure (HCC)  Assessment & Plan  Wt Readings from Last 3 Encounters:   01/23/23 111 kg (245 lb 9 5 oz)   01/19/23 110 kg (243 lb 6 2 oz)   12/30/22 121 kg (265 lb 10 5 oz)     Seen on Echo during this hospitalization  S/P IV diuresis for exacerbation  Now back on room air   Closely monitor I/O, daily weights, volume status  Cardiac diet with fluid restriction  Continue: Lasix 40mg daily   - Previously on ACE, may be able to restart   - Was not on BB  Outpatient f/u with PCP  Chronic diarrhea  Assessment & Plan  Chronically on Lomotil  Monitor  Close continence care  Stage 3a chronic kidney disease [facility])  Assessment & Plan  Lab Results   Component Value Date    EGFR 74 01/23/2023    EGFR 59 01/19/2023    EGFR 61 01/18/2023    CREATININE 1 00 01/23/2023    CREATININE 1 21 01/19/2023    CREATININE 1 17 01/18/2023     Stage 2-3A  Crea baseline 1-1 3  Improved recently after ARIANNA 2/2 autoregulatory issues/congestion/volume overload  Now on Lasix daily monitor  Was on Lisinopril and HCTZ at home, currently being held  - Nephro says these can be restarted as necessary  Monitoring BP  Avoid nephrotoxic meds  Outpatient f/u     Metastatic renal cell carcinoma [facility])  Assessment & Plan  Originally 2007    Mets in 2013  On systemic therapy  S/p L nephrectomy and prostatectomy  Will need outpatient f/u with Oncology     Spine metastasis [facility])  Assessment & Plan  In most recent CT CAP in 2022 showed lytic/sclerotic lesions T4, T6, and scattered lesions in lumbar spine   NM bone scan in 2020 showed:  Scattered exophytic foci of radiotracer uptake in the spine corresponding to degenerative changes on CT  Ct C-Spine in 6/2022 showed no lesions  He has pelvic/iliac lytic lesions  Monitor neuro status and for worsening back pain  Pain management as below  Lung metastases ([address] )  Assessment & Plan  With recent AHRF in setting of volume overload and acute diastolic heart failure and ARIANNA  Now on room air  Has pleural effusion in addition  Closely monitor respiratory status  Pulmonary toilet  Outpatient f/u with Oncology    Bone metastases [facility])  Assessment & Plan  Monitor performance in therapy  If he develops new pain, particular in long bones, would image    - Since his fall has had new R hip pain primarily with weight bearing   - 2022 CT CAP showed lytic lesion superior to the right hip are identified as well as the posterior column of the acetabulum  - XR of R hip without significant changes  Better visualized on CT   2020 Bone Scan showed lytic lesions:   left lateral scapula inferior to the glenoid     right posterior and lateral ribs corresponding to expansile lytic lesions on CT  (recent Ct with expansive R 7th rib met)   left posterior iliac bone and right iliac wing corresponding to lytic lesions on CT    r nonspecific tibial finding    Outpatient f/u with Dr Madelyn Samayoa with Ortho onc  Health Maintenance  #Delirium/Sleep: At risk  Environmental interventions  Optimize pain, bowel, bladder, sleep-wake cycle management  #Pain: Tylenol PRN, Gabapentin 300mg TID, Oxycodone 5mg PRN severe pain, tramadol 50mg PRN moderate pain  #Bowel: Last BM 1/23/2023, incontinent and watery  Chronic diarrhea, see above  #Bladder: Voiding and continent  #Skin/Pressure Injury Prevention: Turn Q2hr in bed, with weight shifts [id number] in wheelchair  Float heels in bed    #DVT Prophylaxis: HSQ,SCDs  #GI Prophylaxis: Not indicated  #Code Status:  Full Code  #FEN: Cardiac diet with 1500cc fluid restriction  #Dispo:  ELOS 7-14 days with goal to discharge home  May need some assistance given restriction in LUE, but will try to optimize/maximize his functional independence  Objective:    Functional Update:  PT: modA bed mobility, Barry transfers/car transfers, ambulation variable min-total A  Difficulty ascending stairs due to R hip pain  OT:  modA with ADLs    Allergies per EMR    Physical Exam:  Temp:  [97 9 °F (36 6 °C)-99 °F (37 2 °C)] 97 9 °F (36 6 °C)  HR:  [] 97  Resp:  [18-20] 20  BP: (103-131)/(58-63) 103/61  Oxygen Therapy  SpO2: 92 %    Gen: No acute distress, Well-nourished, well-appearing  HEENT: Moist mucus membranes, Normocephalic/Atraumatic  Cardiovascular: Regular rate, rhythm, S1/S2  Distal pulses palpable  Heme/Extr: BL LE 2+ edema  Pulmonary: Non-labored breathing  Lungs CTAB  : No williamson  GI: Soft, non-tender, non-distended  BS+  MSK: L arm in sling  Integumentary: Skin is warm, dry     Neuro: AAOx3, Speech is intact  Appropriate to questioning  Psych: Normal mood and affect         Diagnostic Studies: Reviewed, no new imaging    Laboratory:  Reviewed   Results from last 7 days   Lab Units 01/23/23  0637 01/19/23  0539 01/18/23  0517   HEMOGLOBIN g/dL 11 5* 11 5* 11 7*   HEMATOCRIT % 38 5 38 0 39 0   WBC Thousand/uL 6 46 6 81 6 74     Results from last 7 days   Lab Units 01/23/23  0637 01/19/23  0539 01/18/23  0517   BUN mg/dL 19 24 24   POTASSIUM mmol/L 4 5 4 4 3 6   CHLORIDE mmol/L 100 100 99   CREATININE mg/dL 1 00 1 21 1 17            Patient Active Problem List   Diagnosis   • Clear cell adenocarcinoma of kidney, left (HCC)   • Bone metastases (HCC)   • Lung metastases (HCC)   • Spine metastasis ([facility])   • Metastatic renal cell carcinoma (HCC)   • Hx of prostatectomy   • History of prostate cancer   • Azotemia   • COVID-19   • Stage 3a chronic kidney disease ([facility])   • H/O left nephrectomy   • Fall at home, initial encounter   • 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   • Acute respiratory failure with hypoxia (HCC)   • Elevated brain natriuretic peptide (BNP) level   • Elevated d-dimer   • Chronic diarrhea   • Chronic diastolic (congestive) heart failure (HCC)   • Dermatitis associated with moisture   • Pleural effusion   • HTN (hypertension)   • Neuropathy         Medications  Current Facility-Administered Medications   Medication Dose Route Frequency Provider Last Rate   • acetaminophen  975 mg Oral 4x Daily PRN Akosua Sun MD     • aluminum-magnesium hydroxide-simethicone  30 mL Oral Q6H PRN Akosua Sun MD     • amLODIPine  5 mg Oral HS Akosua Sun MD     • cholecalciferol  1,000 Units Oral Daily Akosua Sun MD     • diphenhydrAMINE  25 mg Oral HS PRN Akosua Sun MD     • furosemide  40 mg Oral Daily Akosua Sun MD     • gabapentin  300 mg Oral TID Akosua Sun MD     • heparin (porcine)  5,000 Units Subcutaneous [facility] Akosua Sun MD     • lidocaine  1 patch Topical Daily IRENE Florez     • loperamide  2 mg Oral 4x Daily PRN Akosua Sun MD     • multivitamin stress formula  1 tablet Oral Daily Akosua Sun MD     • nystatin   Topical BID Akosua Sun MD     • ondansetron  4 mg Oral Q6H PRN Akosua Sun MD     • oxyCODONE  5 mg Oral 4x Daily PRN Dolores Scheuermann, MD     • oxyCODONE  2 5 mg Oral 4x Daily PRN Dolores Scheuermann, MD     • oxyCODONE  7 5 mg Oral Q4H PRN Dolores Scheuermann, MD     • simethicone  80 mg Oral 4x Daily PRN Akosua Sun MD            ** Please Note: Fluency Direct voice to text software may have been used in the creation of this document   **

## 2023-01-23 NOTE — ASSESSMENT & PLAN NOTE
Lab Results   Component Value Date    EGFR 74 01/23/2023    EGFR 59 01/19/2023    EGFR 61 01/18/2023    CREATININE 1 00 01/23/2023    CREATININE 1 21 01/19/2023    CREATININE 1 17 01/18/2023     Creatinine currently 1 00  Baseline creatinine 1 0-1 3  Hx of L nephrectomy  Developed ARIANNA in acute setting due to autoregulatory failure/volume overload/congestion  Currently on Lasix 40mg daily  Avoid nephrotoxins as able  Encourage adequate hydration  Continue to trend BMP

## 2023-01-23 NOTE — ASSESSMENT & PLAN NOTE
Diagnosed in 2007 with metastatic disease in 2013  Hx of L nephrectomy in 2007 and prostatectomy  Progressive disease  Newly started on Nivolumab in 12/022  Follows with Dr Jeanmarie Spurling (Oncology) as outpatient - follow-up scheduled for February

## 2023-01-23 NOTE — PLAN OF CARE
Problem: PAIN - ADULT  Goal: Verbalizes/displays adequate comfort level or baseline comfort level  Description: Interventions:  - Encourage patient to monitor pain and request assistance  - Assess pain using appropriate pain scale  - Administer analgesics based on type and severity of pain and evaluate response  - Implement non-pharmacological measures as appropriate and evaluate response  - Consider cultural and social influences on pain and pain management  - Notify physician/advanced practitioner if interventions unsuccessful or patient reports new pain  Outcome: Progressing     Problem: MOBILITY - ADULT  Goal: Maintain or return to baseline ADL function  Description: INTERVENTIONS:  -  Assess patient's ability to carry out ADLs; assess patient's baseline for ADL function and identify physical deficits which impact ability to perform ADLs (bathing, care of mouth/teeth, toileting, grooming, dressing, etc )  - Assess/evaluate cause of self-care deficits   - Assess range of motion  - Assess patient's mobility; develop plan if impaired  - Assess patient's need for assistive devices and provide as appropriate  - Encourage maximum independence but intervene and supervise when necessary  - Involve family in performance of ADLs  - Assess for home care needs following discharge   - Consider OT consult to assist with ADL evaluation and planning for discharge  - Provide patient education as appropriate  1/23/2023 0732 by Jacqueline De La Garza, RN  Outcome: Progressing  1/23/2023 0731 by Jacqueline De La Garza, RN  Outcome: Progressing

## 2023-01-23 NOTE — PROGRESS NOTES
[facility]  Progress Note - Nba Shannan 1950, 67 y o  male MRN: [id number]  Unit/Bed#: [facility] [address] Encounter: [id number]  Primary Care Provider: Agustin Simth MD   Date and time admitted to hospital: 1/19/2023  3:45 PM    Neuropathy  Assessment & Plan  Multifactorial  Continue home gabapentin 300mg TID  HTN (hypertension)  Assessment & Plan  Home regimen: amlodipine 5mg daily and lisinopril/HCTZ 20-12 5mg BID  Currently receiving amlodipine 5mg daily and Lasix 40mg daily  Discontinue amlodipine today due to not receiving Lasix due to holding parameters not being met  Lisinopril and HCTZ held after receiving CTA on 1/17  Restart as able  Monitor BP with routine VS     Pleural effusion  Assessment & Plan  Likely secondary to metastatic disease  CTA PE study on 1/17: New moderate L pleural effusion with moderate compressive atelectasis of left lower lobe  Currently maintaining on RA  Monitor spot pulse ox  Encourage pulmonary toileting  Continue Lasix 40mg daily  Chronic diastolic (congestive) heart failure (HCC)  Assessment & Plan  Wt Readings from Last 3 Encounters:   01/23/23 111 kg (245 lb 9 5 oz)   01/19/23 110 kg (243 lb 6 2 oz)   12/30/22 121 kg (265 lb 10 5 oz)     ECHO on 1/16 showed EF 65%, no RWMA, G1DD  Treated with Lasix in acute setting for volume overload  BNP 4475 on admission  Continue Lasix 40mg daily  Monitor daily weights and I&Os  Continue 1500FR  Chronic diarrhea  Assessment & Plan  Continue home PRN Imodium  Monitor skin for breakdown  Stage 3a chronic kidney disease [facility])  Assessment & Plan  Lab Results   Component Value Date    EGFR 74 01/23/2023    EGFR 59 01/19/2023    EGFR 61 01/18/2023    CREATININE 1 00 01/23/2023    CREATININE 1 21 01/19/2023    CREATININE 1 17 01/18/2023     Creatinine currently 1 00  Baseline creatinine 1 0-1 3  Hx of L nephrectomy    Developed ARIANNA in acute setting due to autoregulatory failure/volume overload/congestion  Currently on Lasix 40mg daily  Avoid nephrotoxins as able  Encourage adequate hydration  Continue to trend BMP  Metastatic renal cell carcinoma ([facility] [address] )  Assessment & Plan  Diagnosed in 2007 with metastatic disease in 2013  Hx of L nephrectomy in 2007 and prostatectomy  Progressive disease  Newly started on Nivolumab in 12/022  Follows with Dr Kaykay Gotti (Oncology) as outpatient - follow-up scheduled for February  Spine metastasis ([facility] [address] )  Assessment & Plan  Hx of SBRT to T6, T10, and T4 between 7220-0762  Encourage adequate pain control  Follows with Dr Kaykay Gotti (Oncology) as outpatient  Lung metastases [facility])  Assessment & Plan  CTA PE study on 1/17: Enlargement of R lower lobe metastases  Developed hypoxia in acute setting - receiving diuretics with improvement  Currently maintaining on RA  Monitor spot pulse ox  Continue Lasix 40mg daily (held for past 2 days due to parameters)  Discontinue amlodipine at this time to continue Lasix  Encourage pulmonary toileting and IS use  Follows with Dr Kaykay Gotti as outpatient  Bone metastases [facility])  Assessment & Plan  Diagnosed with metastatic renal cell carcinoma in 2007  Progressive disease  Bone scan in 2020 showed lytic lesions to L scapula, R posterior and lateral ribs, L posterior iliac bone, and R iliac wing  Most recently started on Nivolumab in 12/2022  Ensure adequate pain control  Continue home Vitamin D supplementation  Follows with Dr Kaykay Gotti (Oncology) as outpatient  * 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  Assessment & Plan  Presented on 1/12 after a mechanical fall with L shoulder pain  CT LUE: lytic metastasis int he scapular glenoid process measuring 2 9 x 2 4 x 2 0cm with cortical breakthrough and extraosseous extension anteriorly without pathologic fracture  New 2 4 x 2 1 x 1 9cm lytic metastasis in the L scapular body with acute pathologic fracture    New 1 9x0  8x1 4cm lytic metastasis in the inferior scapular angle with pathologic fracture  Non-surgical intervention per Ortho  NWB to LUE  Sling for comfort  Neurovascular checks Q shift  Ensure adequate pain control  Follow-up with Dr Buzz Virk (Orthopedics) in 4 weeks  Primary team following  PT/OT  VTE Pharmacologic Prophylaxis:   Pharmacologic: Heparin  Mechanical VTE Prophylaxis in Place: Yes - sequential compression devices  Current Length of Stay: 4 day(s)    Current Patient Status: Inpatient Rehab     Discharge Plan: As per primary team     Code Status: Level 1 - Full Code    Subjective:   Pt examined while pt sitting in recliner in pt room  Currently denies any further R hip pain  Feels that the Lidoderm patches have improved his pain  Does occasionally have R thigh muscle aches but does not want Bengay at this time  Has been using his RLE more than his baseline and feels that it could be causing the muscle ache  Denies any lightheadedness, dizziness, SOB, or palpitations  Has been requiring O2 occasionally, states that this is more at night time  May consider overnight noc ox if continues after receiving Lasix today  Lower extremity edema stable  Objective:     Vitals:   Temp (24hrs), Av 6 °F (37 °C), Min:97 9 °F (36 6 °C), Max:99 °F (37 2 °C)    Temp:  [97 9 °F (36 6 °C)-99 °F (37 2 °C)] 97 9 °F (36 6 °C)  HR:  [] 97  Resp:  [18-20] 20  BP: (103-131)/(58-63) 103/61  SpO2:  [92 %-94 %] 92 %  Body mass index is 35 24 kg/m²  Review of Systems   Constitutional: Negative for appetite change, chills, fatigue and fever  HENT: Negative for trouble swallowing  Eyes: Negative for visual disturbance  Respiratory: Negative for cough, chest tightness, shortness of breath, wheezing and stridor  Has been de-satting at night time  Denies any dyspnea  Cardiovascular: Negative for chest pain, palpitations and leg swelling     Gastrointestinal: Negative for abdominal distention, abdominal pain, constipation, diarrhea, nausea and vomiting  LBM 1/22   Genitourinary: Negative for difficulty urinating  Musculoskeletal: Positive for myalgias (occasional R thigh muscle aches, worse since using RLE more frequently  Denies any further nerve/burning pain)  Negative for arthralgias and back pain  Neurological: Negative for dizziness, weakness, light-headedness, numbness and headaches  Psychiatric/Behavioral: Negative for dysphoric mood and sleep disturbance  The patient is not nervous/anxious  All other systems reviewed and are negative  Input and Output Summary (last 24 hours): Intake/Output Summary (Last 24 hours) at 1/23/2023 1321  Last data filed at 1/23/2023 0830  Gross per 24 hour   Intake 660 ml   Output 200 ml   Net 460 ml       Physical Exam:     Physical Exam  Vitals and nursing note reviewed  Constitutional:       General: He is not in acute distress  Appearance: Normal appearance  He is obese  He is not ill-appearing  HENT:      Head: Normocephalic and atraumatic  Cardiovascular:      Rate and Rhythm: Normal rate and regular rhythm  Pulses: Normal pulses  Heart sounds: Murmur heard  Systolic murmur is present with a grade of 1/6  No friction rub  Pulmonary:      Effort: Pulmonary effort is normal  No respiratory distress  Breath sounds: Examination of the left-lower field reveals rales  Rales present  No wheezing or rhonchi  Abdominal:      General: Abdomen is flat  Bowel sounds are normal  There is no distension  Palpations: Abdomen is soft  There is no mass  Tenderness: There is no abdominal tenderness  There is no guarding or rebound  Hernia: No hernia is present  Musculoskeletal:      Cervical back: Normal range of motion and neck supple  No tenderness  Right lower leg: Edema (Moderate non-pitting edema) present  Left lower leg: Edema (+1 pitting edema) present  Comments: Wearing sling on LUE  Skin:     General: Skin is warm and dry  Capillary Refill: Capillary refill takes less than 2 seconds  Neurological:      Mental Status: He is alert and oriented to person, place, and time     Psychiatric:         Mood and Affect: Mood normal          Behavior: Behavior normal          Additional Data:     Labs:    Results from last 7 days   Lab Units 01/23/23  0637   WBC Thousand/uL 6 46   HEMOGLOBIN g/dL 11 5*   HEMATOCRIT % 38 5   PLATELETS Thousands/uL 240   NEUTROS PCT % 69   LYMPHS PCT % 21   MONOS PCT % 8   EOS PCT % 2     Results from last 7 days   Lab Units 01/23/23  0637   SODIUM mmol/L 136   POTASSIUM mmol/L 4 5   CHLORIDE mmol/L 100   CO2 mmol/L 35*   BUN mg/dL 19   CREATININE mg/dL 1 00   ANION GAP mmol/L 1*   CALCIUM mg/dL 8 7   GLUCOSE RANDOM mg/dL 94         Results from last 7 days   Lab Units 01/21/23  0600   POC GLUCOSE mg/dl 97               Labs reviewed    Imaging:    Imaging reviewed    Recent Cultures (last 7 days):           Last 24 Hours Medication List:   Current Facility-Administered Medications   Medication Dose Route Frequency Provider Last Rate   • acetaminophen  975 mg Oral 4x Daily PRN Demarco Quiroz MD     • aluminum-magnesium hydroxide-simethicone  30 mL Oral Q6H PRN Demarco Quiroz MD     • cholecalciferol  1,000 Units Oral Daily Demarco Quiroz MD     • diphenhydrAMINE  25 mg Oral HS PRN Demarco Quiroz MD     • furosemide  40 mg Oral Daily Demarco Quiroz MD     • gabapentin  300 mg Oral TID Demarco Quiroz MD     • heparin (porcine)  5,000 Units Subcutaneous [facility] Demarco Quiroz MD     • lidocaine  1 patch Topical Daily IRENE Workman     • loperamide  2 mg Oral 4x Daily PRN Demarco Quiroz MD     • multivitamin stress formula  1 tablet Oral Daily Demarco Quiroz MD     • nystatin   Topical BID Demarco Quiroz MD     • ondansetron  4 mg Oral Q6H PRN Demarco Quiroz MD     • oxyCODONE  5 mg Oral 4x Daily PRN Karishma Eisenberg MD • oxyCODONE  2 5 mg Oral 4x Daily PRN Anisa Kent MD     • oxyCODONE  7 5 mg Oral Q4H PRN Anisa Kent MD     • simethicone  80 mg Oral 4x Daily PRN Faustino Hancock MD     • white petrolatum-mineral oil   Topical BID Leopold Gage, CRNP M*Modal software was used to dictate this note  It may contain errors with dictating incorrect words or incorrect spelling  Please contact the provider directly with any questions

## 2023-01-23 NOTE — PLAN OF CARE
Problem: SKIN/TISSUE INTEGRITY - ADULT  Goal: Skin Integrity remains intact(Skin Breakdown Prevention)  Description: Assess:  -Perform Bert assessment every shift  -Clean and moisturize skin every shift  -Inspect skin when repositioning, toileting, and assisting with ADLS  -Assess under medical devices such as sling every shift  -Assess extremities for adequate circulation and sensation     Bed Management:  -Have minimal linens on bed & keep smooth, unwrinkled  -Change linens as needed when moist or perspiring  -Avoid sitting or lying in one position for more than 2 hours while in bed  -Keep HOB at 30 degrees     Toileting:  -Offer bedside commode  -Assess for incontinence every 2-4 hrs      Activities  -Encourage activity and walks on unit  -Encourage or provide ROM exercises   -Turn and reposition patient every 2 Hours  -Use appropriate equipment to lift or move patient in bed  -Instruct/ Assist with weight shifting every 2 hr when out of bed in chair  -Consider limitation of chair time 2 hour intervals    Skin Care:  -Avoid use of baby powder, tape, friction and shearing, hot water or constrictive clothing  -Relieve pressure over bony prominences using pillows    Next Elliott  Outcome: Progressing

## 2023-01-23 NOTE — ASSESSMENT & PLAN NOTE
Hx of SBRT to T6, T10, and T4 between 4427-3767  Encourage adequate pain control  Follows with Dr Joe Gold (Oncology) as outpatient

## 2023-01-23 NOTE — ASSESSMENT & PLAN NOTE
Continue gabapentin 300mg Q8hr  - TSH WNL more recently  - No recent A1C - 1/30 5 3    - No B12 checked previously - 1/30 206 - started on oral supplement  He also has cancer - could be paraneoplastic in etiology  - ordered Anti-hu Ab  Pending     - Follow-up this lab with PCP after discharge  Depending on what chemotherapy he has had this could also be a contributing factor  In the future, can consider an EMG to more likely describe the type of neuropathy he has to help narrow differential  For now compensatory strategies and control with medication for his discomfort

## 2023-01-23 NOTE — PROGRESS NOTES
01/23/23 0700   Pain Assessment   Pain Assessment Tool 0-10   Pain Score 6   Pain Location/Orientation Orientation: Left; Location: Shoulder   Pain Onset/Description Onset: Ongoing;Frequency: Intermittent   Hospital Pain Intervention(s) Repositioned;Rest;Shower/Bath   Restrictions/Precautions   Precautions Fall Risk   Weight Bearing Restrictions Yes   LUE Weight Bearing Per Order NWB   LUE ROM Restriction   (pendulums, can perform L arm and wrist AROM)   Braces or Orthoses Sling  (LLE AFO)   Eating   Type of Assistance Needed Set-up / clean-up   Physical Assistance Level No physical assistance   Comment S/U A for breakfast meal while seated in recliner at end of OT session (A required 2* LUE restrictions)   Eating CARE Score 5   Oral Hygiene   Type of Assistance Needed Incidental touching   Physical Assistance Level No physical assistance   Comment CGA in unsupported stance at sink for denture care, no LOB   Oral Hygiene CARE Score 4   Grooming   Able To Initiate Tasks;Comb/Brush Hair;Brush/Clean Teeth   Limitation Noted In Strength;Timeliness; Coordination   Findings CGA in unsupported stance at sink for all grooming tasks, no LOB   Shower/Bathe Self   Type of Assistance Needed Physical assistance;Verbal cues; Adaptive equipment   Physical Assistance Level 26%-50%   Comment Pt engaged in showering, able to wash 6/10 parts, washing LUE, chest, abdomen, and B/L upper legs while seated on shower chair w/o back at Sup level  Pt continues to require A to wash RUE 2* LUE ROM/WBing precautions and deficits  CGA in unsupported stance as pt washed ramiro/rear, requiring A for rear thoroughness 2* RUE decreased fxl reach  Seated, pt required A to wash B/L lower legs/feet  Educated pt on [facility] sponge to increase pt independence with washing distal LEs, pt receptive  Shower/Bathe Self CARE Score 3   Bathing   Assessed Bath Style Shower   Anticipated D/C Bath Style Sponge Bath; Shower  (pending pt progress)   Able to Gather/Transport No   Able to [employer] Temperature No   Able to Wash/Rinse/Dry (body part) Left Arm;L Upper Leg;R Upper Leg;Chest;Abdomen;Perineal Area   Limitations Noted in Balance; Endurance;ROM;Strength;Timeliness   Positioning Seated;Standing   Adaptive Equipment Shower Bars; Shower Seat;Hand [employer]   Limitations Noted In Balance; Endurance;ROM;UE Strength;LE Strength   Adaptive Equipment Grab Bars;Seat with out Back   Assessed Shower   Findings CGA side-stepping in/out of walk-in shower to shower chair w/o back, transitioning R hand from QC to shower bar, no LOB   Upper Body Dressing   Type of Assistance Needed Physical assistance;Verbal cues   Physical Assistance Level 26%-50%   Comment Seated, pt able to thread LUE and head through [facility] shirt, continues to require A to fully manage overhead and adjust around chest, and min vc's to maintain LUE NWB  Pt able to doff/don LUE sling at Sup level   Upper Body Dressing CARE Score 3   Lower Body Dressing   Type of Assistance Needed Physical assistance;Verbal cues   Physical Assistance Level 26%-50%   Comment Seated with CS and increased time, pt able to thread underwear and pants over feet using dynamic RUE reach  A required to snap pants closed and for CM up over L hip  Pt able to complete CM down over hips but required [place] to fully doff over feet   Lower Body Dressing CARE Score 3   Putting On/Taking Off Footwear   Type of Assistance Needed Physical assistance   Physical Assistance Level Total assistance   Comment Seated, dependent to doff B/L socks and to don B/L knee-high TEDs, L AFO, and sneakers   Putting On/Taking Off Footwear CARE Score 1   Dressing/Undressing Clothing   Remove UB Clothes Pullover Shirt   Don UB Clothes Pullover Shirt   Remove LB Clothes Pants; Undergarment;Socks   Don LB Clothes Pants; Undergarment;TEDs; Shoes   Limitations Noted In Balance; Coordination; Endurance;ROM; Timeliness;Strength; Safety   Positioning Supported Sit;Standing   Sit to Stand   Type of Assistance Needed Incidental touching; Adaptive equipment   Physical Assistance Level No physical assistance   Comment CGA w/WBQC   Sit to Stand CARE Score 4   Bed-Chair Transfer   Type of Assistance Needed Incidental touching; Adaptive equipment   Physical Assistance Level No physical assistance   Comment CGA fxl mob w/WBQC, no LOB   Chair/Bed-to-Chair Transfer CARE Score 4   Cognition   Overall Cognitive Status WFL   Arousal/Participation Alert; Cooperative   Attention Within functional limits   Orientation Level Oriented X4   Memory Within functional limits   Following Commands Follows all commands and directions without difficulty   Activity Tolerance   Activity Tolerance Patient tolerated treatment well   Medical Staff Made Aware notified nsg Jose Toney that pt applied nystatin powder to groin and abdominal skin folds after showering, and valencia applied calazime cream to sacral area   Assessment   Treatment Assessment Pt seen for 90min skilled OT session focused on ADL skills retraining (shower), LB bathing/dressing skills, and shower transfer, for increased independence w/ADLs and decreased caregiver burden  See detailed descriptions of fxl performance above  Pt tolerated session well, although continues to be limited by LUE NWB restriction, decreased LUE AROM, L drop foot, decreased standing balance, LUE pain, BLE swelling, decreased strength, and endurance  Pt would benefit from continued skilled OT focused on ADL retraining (jose armando LB bathing/dressing), toileting (rear hygiene, CM over L hip), med mgmt, fxl mobility w/WBQC, community mobility, IADL retraining (cleaning, meal prep, household maintenance)  Prognosis Fair   Problem List Decreased strength;Decreased range of motion;Decreased endurance; Impaired balance;Decreased mobility; Decreased coordination;Decreased safety awareness;Pain;Orthopedic restrictions   Barriers to Discharge Decreased caregiver support   Plan Treatment/Interventions ADL retraining;Functional transfer training; Therapeutic exercise; Endurance training;Patient/family training;Equipment eval/education; Bed mobility; Compensatory technique education;Spoke to nursing   Progress Progressing toward goals   Recommendation   OT Discharge Recommendation   (pending pt progress)   OT Therapy Minutes   OT Time In 0700   OT Time Out 0830   OT Total Time (minutes) 90   OT Mode of treatment - Individual (minutes) 90   OT Mode of treatment - Concurrent (minutes) 0   OT Mode of treatment - Group (minutes) 0   OT Mode of treatment - Co-treat (minutes) 0   OT Mode of Treatment - Total time(minutes) 90 minutes   OT Cumulative Minutes 270   Therapy Time missed   Time missed?  No

## 2023-01-24 RX ADMIN — NYSTATIN: 100000 POWDER TOPICAL at 17:16

## 2023-01-24 RX ADMIN — LIDOCAINE 1 PATCH: 50 PATCH CUTANEOUS at 08:33

## 2023-01-24 RX ADMIN — GABAPENTIN 300 MG: 300 CAPSULE ORAL at 08:32

## 2023-01-24 RX ADMIN — B-COMPLEX W/ C & FOLIC ACID TAB 1 TABLET: TAB at 08:32

## 2023-01-24 RX ADMIN — Medication: at 08:37

## 2023-01-24 RX ADMIN — HEPARIN SODIUM 5000 UNITS: 5000 INJECTION INTRAVENOUS; SUBCUTANEOUS at 21:26

## 2023-01-24 RX ADMIN — HEPARIN SODIUM 5000 UNITS: 5000 INJECTION INTRAVENOUS; SUBCUTANEOUS at 15:00

## 2023-01-24 RX ADMIN — Medication: at 22:12

## 2023-01-24 RX ADMIN — HEPARIN SODIUM 5000 UNITS: 5000 INJECTION INTRAVENOUS; SUBCUTANEOUS at 05:39

## 2023-01-24 RX ADMIN — NYSTATIN: 100000 POWDER TOPICAL at 08:33

## 2023-01-24 RX ADMIN — ACETAMINOPHEN 975 MG: 325 TABLET ORAL at 08:31

## 2023-01-24 RX ADMIN — GABAPENTIN 300 MG: 300 CAPSULE ORAL at 21:26

## 2023-01-24 RX ADMIN — Medication 7.5 MG: at 21:26

## 2023-01-24 RX ADMIN — FUROSEMIDE 40 MG: 40 TABLET ORAL at 08:33

## 2023-01-24 RX ADMIN — GABAPENTIN 300 MG: 300 CAPSULE ORAL at 17:15

## 2023-01-24 RX ADMIN — ACETAMINOPHEN 975 MG: 325 TABLET ORAL at 17:18

## 2023-01-24 RX ADMIN — CHOLECALCIFEROL TAB 25 MCG (1000 UNIT) 1000 UNITS: 25 TAB at 08:33

## 2023-01-24 NOTE — ASSESSMENT & PLAN NOTE
Diagnosed in 2007 with metastatic disease in 2013  Hx of L nephrectomy in 2007 and prostatectomy  Progressive disease  Newly started on Nivolumab in 12/022  Follows with Dr Chilo Keen (Oncology) as outpatient - follow-up scheduled for February

## 2023-01-24 NOTE — ASSESSMENT & PLAN NOTE
Presented on 1/12 after a mechanical fall with L shoulder pain  CT LUE: lytic metastasis int he scapular glenoid process measuring 2 9 x 2 4 x 2 0cm with cortical breakthrough and extraosseous extension anteriorly without pathologic fracture  New 2 4 x 2 1 x 1 9cm lytic metastasis in the L scapular body with acute pathologic fracture  New 1 9x0 8x1 4cm lytic metastasis in the inferior scapular angle with pathologic fracture  Non-surgical intervention per Ortho  NWB to LUE  Sling for comfort  Neurovascular checks Q shift  Ensure adequate pain control  Follow-up with Dr Madelyn Samayoa (Orthopedics) in 4 weeks  Primary team following  PT/OT

## 2023-01-24 NOTE — PROGRESS NOTES
[facility]  Progress Note - Moi Moody 1950, 67 y o  male MRN: [id number]  Unit/Bed#: Zandra Beckham 709-60 Encounter: [id number]  Primary Care Provider: Darren Garcia MD   Date and time admitted to hospital: 1/19/2023  3:45 PM    Neuropathy  Assessment & Plan  Multifactorial  Continue home gabapentin 300mg TID  HTN (hypertension)  Assessment & Plan  Home regimen: amlodipine 5mg daily and lisinopril/HCTZ 20-12 5mg BID  Currently receiving Lasix 40mg daily  Discontinue amlodipine on 1/23 due to not receiving Lasix due to holding parameters not being met  Lisinopril and HCTZ held after receiving CTA on 1/17  Restart as able  Monitor BP with routine VS     Pleural effusion  Assessment & Plan  Likely secondary to metastatic disease  CTA PE study on 1/17: New moderate L pleural effusion with moderate compressive atelectasis of left lower lobe  Currently maintaining on RA  Monitor spot pulse ox  Encourage pulmonary toileting  Continue Lasix 40mg daily  Chronic diastolic (congestive) heart failure (HCC)  Assessment & Plan  Wt Readings from Last 3 Encounters:   01/24/23 111 kg (245 lb 6 oz)   01/19/23 110 kg (243 lb 6 2 oz)   12/30/22 121 kg (265 lb 10 5 oz)     ECHO on 1/16 showed EF 65%, no RWMA, G1DD  Treated with Lasix in acute setting for volume overload  BNP 4475 on admission  Continue Lasix 40mg daily  Monitor daily weights and I&Os  Continue 1500FR  Chronic diarrhea  Assessment & Plan  Continue home PRN Imodium  Monitor skin for breakdown  Stage 3a chronic kidney disease [facility])  Assessment & Plan  Lab Results   Component Value Date    EGFR 74 01/23/2023    EGFR 59 01/19/2023    EGFR 61 01/18/2023    CREATININE 1 00 01/23/2023    CREATININE 1 21 01/19/2023    CREATININE 1 17 01/18/2023     Creatinine currently 1 00  Baseline creatinine 1 0-1 3  Hx of L nephrectomy    Developed ARIANNA in acute setting due to autoregulatory failure/volume overload/congestion  Currently on Lasix 40mg daily  Avoid nephrotoxins as able  Encourage adequate hydration  Continue to trend BMP  Metastatic renal cell carcinoma ([facility] [address] )  Assessment & Plan  Diagnosed in 2007 with metastatic disease in 2013  Hx of L nephrectomy in 2007 and prostatectomy  Progressive disease  Newly started on Nivolumab in 12/022  Follows with Dr Johnny Medina (Oncology) as outpatient - follow-up scheduled for February  Spine metastasis ([facility] [address] )  Assessment & Plan  Hx of SBRT to T6, T10, and T4 between 5484-2097  Encourage adequate pain control  Follows with Dr Johnny Medina (Oncology) as outpatient  Lung metastases [facility])  Assessment & Plan  CTA PE study on 1/17: Enlargement of R lower lobe metastases  Developed hypoxia in acute setting - receiving diuretics with improvement  Currently maintaining on RA  Monitor spot pulse ox  Continue Lasix 40mg daily (held over the weekend due to parameters)  Discontinue amlodipine at this time to continue Lasix  Encourage pulmonary toileting and IS use  Follows with Dr Johnny Medina as outpatient  Bone metastases [facility])  Assessment & Plan  Diagnosed with metastatic renal cell carcinoma in 2007  Progressive disease  Bone scan in 2020 showed lytic lesions to L scapula, R posterior and lateral ribs, L posterior iliac bone, and R iliac wing  Most recently started on Nivolumab in 12/2022  Ensure adequate pain control  Continue home Vitamin D supplementation  Follows with Dr Johnny Medina (Oncology) as outpatient  * 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  Assessment & Plan  Presented on 1/12 after a mechanical fall with L shoulder pain  CT LUE: lytic metastasis int he scapular glenoid process measuring 2 9 x 2 4 x 2 0cm with cortical breakthrough and extraosseous extension anteriorly without pathologic fracture  New 2 4 x 2 1 x 1 9cm lytic metastasis in the L scapular body with acute pathologic fracture    New 1 9x0 8x1 4cm lytic metastasis in the inferior scapular angle with pathologic fracture  Non-surgical intervention per Ortho  NWB to LUE  Sling for comfort  Neurovascular checks Q shift  Ensure adequate pain control  Follow-up with Dr Kelley Salter (Orthopedics) in 4 weeks  Primary team following  PT/OT  VTE Pharmacologic Prophylaxis:   Pharmacologic: Heparin  Mechanical VTE Prophylaxis in Place: Yes - sequential compression devices  Current Length of Stay: 5 day(s)    Current Patient Status: Inpatient Rehab     Discharge Plan: As per primary team     Code Status: Level 1 - Full Code    Subjective:   Pt examined while pt sitting in recliner in pt room  Currently denies any R hip pain  Occasionally has R thigh pain that radiates to the knee while in therapy but feels that it is improved since yesterday  Experiences mild aches to the L shoulder after completing therapy sessions  Denies any lightheadedness, dizziness, SOB, or palpitations  Had a BM yesterday without any issues  Denies any SOB but is still requiring O2 at HS  States that he most likely has sleep apnea because when he had his knees done they reported difficulty with oxygenating at night  Admits to snoring at night  Will obtain overnight noc ox to see if he would qualify for O2 nightly  Objective:     Vitals:   Temp (24hrs), Av 3 °F (36 8 °C), Min:97 5 °F (36 4 °C), Max:98 8 °F (37 1 °C)    Temp:  [97 5 °F (36 4 °C)-98 8 °F (37 1 °C)] 98 8 °F (37 1 °C)  HR:  [] 96  Resp:  [18] 18  BP: (105-129)/(55-66) 110/63  SpO2:  [90 %-92 %] 92 %  Body mass index is 35 21 kg/m²  Review of Systems   Constitutional: Negative for appetite change, chills, fatigue and fever  HENT: Negative for trouble swallowing  Respiratory: Positive for apnea (states that he was told he likely has sleep apnea after his knee replacements, admits to snoring)  Negative for cough, shortness of breath, wheezing and stridor      Cardiovascular: Negative for chest pain, palpitations and leg swelling  Gastrointestinal: Negative for abdominal distention, abdominal pain, constipation, diarrhea, nausea and vomiting  LBM 1/23   Genitourinary: Negative for difficulty urinating  Musculoskeletal: Positive for arthralgias (Mild L shoulder pain after completing therapy sessions) and myalgias (R thigh/knee pain, improves with rest, feels that it has improved since yesterday)  Negative for back pain and gait problem  Neurological: Negative for dizziness, weakness, light-headedness, numbness and headaches  Psychiatric/Behavioral: Negative for dysphoric mood and sleep disturbance  The patient is not nervous/anxious  All other systems reviewed and are negative  Input and Output Summary (last 24 hours): Intake/Output Summary (Last 24 hours) at 1/24/2023 8541  Last data filed at 1/24/2023 0539  Gross per 24 hour   Intake --   Output 450 ml   Net -450 ml       Physical Exam:     Physical Exam  Vitals and nursing note reviewed  Constitutional:       General: He is not in acute distress  Appearance: Normal appearance  He is not ill-appearing  HENT:      Head: Normocephalic and atraumatic  Cardiovascular:      Rate and Rhythm: Normal rate and regular rhythm  Pulses: Normal pulses  Heart sounds: Murmur heard  Systolic murmur is present with a grade of 1/6  No friction rub  Pulmonary:      Effort: Pulmonary effort is normal  No respiratory distress  Breath sounds: Normal breath sounds  No wheezing or rhonchi  Abdominal:      General: Abdomen is flat  Bowel sounds are normal  There is no distension  Palpations: Abdomen is soft  There is no mass  Tenderness: There is no abdominal tenderness  There is no guarding or rebound  Hernia: No hernia is present  Musculoskeletal:      Cervical back: Normal range of motion and neck supple  No tenderness  Right lower leg: Edema (lymphedema) present        Left lower leg: Edema (lymphedema) present  Comments: LUE in sling  Skin:     General: Skin is warm and dry  Capillary Refill: Capillary refill takes less than 2 seconds  Neurological:      Mental Status: He is alert and oriented to person, place, and time     Psychiatric:         Mood and Affect: Mood normal          Behavior: Behavior normal          Additional Data:     Labs:    Results from last 7 days   Lab Units 01/23/23  0637   WBC Thousand/uL 6 46   HEMOGLOBIN g/dL 11 5*   HEMATOCRIT % 38 5   PLATELETS Thousands/uL 240   NEUTROS PCT % 69   LYMPHS PCT % 21   MONOS PCT % 8   EOS PCT % 2     Results from last 7 days   Lab Units 01/23/23  0637   SODIUM mmol/L 136   POTASSIUM mmol/L 4 5   CHLORIDE mmol/L 100   CO2 mmol/L 35*   BUN mg/dL 19   CREATININE mg/dL 1 00   ANION GAP mmol/L 1*   CALCIUM mg/dL 8 7   GLUCOSE RANDOM mg/dL 94         Results from last 7 days   Lab Units 01/21/23  0600   POC GLUCOSE mg/dl 97               Labs reviewed    Imaging:    Imaging reviewed    Recent Cultures (last 7 days):           Last 24 Hours Medication List:   Current Facility-Administered Medications   Medication Dose Route Frequency Provider Last Rate   • acetaminophen  975 mg Oral 4x Daily PRN Stew Fernandes MD     • aluminum-magnesium hydroxide-simethicone  30 mL Oral Q6H PRN Stew Fernandes MD     • cholecalciferol  1,000 Units Oral Daily Stew Fernandes MD     • diphenhydrAMINE  25 mg Oral HS PRN Stew Fernandes MD     • furosemide  40 mg Oral Daily Stew Fernandes MD     • gabapentin  300 mg Oral TID Stew Fernandes MD     • heparin (porcine)  5,000 Units Subcutaneous [facility] Stew Fernandes MD     • lidocaine  1 patch Topical Daily IRENE Mcdermott     • loperamide  2 mg Oral 4x Daily PRN Stew Fernandes MD     • multivitamin stress formula  1 tablet Oral Daily Stew Fernandes MD     • nystatin   Topical BID Stew Fernandes MD     • ondansetron  4 mg Oral Q6H PRN Stew Fernandes MD     • oxyCODONE  5 mg Oral 4x Daily PRN Jennifer Cruz Hammad Sandra MD     • oxyCODONE  2 5 mg Oral 4x Daily PRN Wendy Hawkins MD     • oxyCODONE  7 5 mg Oral Q4H PRN Wendy Hawkins MD     • simethicone  80 mg Oral 4x Daily PRN Shelley Faulkner MD     • white petrolatum-mineral oil   Topical BID IRENE Fernandez          M*Modal software was used to dictate this note  It may contain errors with dictating incorrect words or incorrect spelling  Please contact the provider directly with any questions

## 2023-01-24 NOTE — ASSESSMENT & PLAN NOTE
CTA PE study on 1/17: Enlargement of R lower lobe metastases  Developed hypoxia in acute setting - receiving diuretics with improvement  Currently maintaining on RA  Monitor spot pulse ox  Continue Lasix 40mg daily (held over the weekend due to parameters)  Discontinue amlodipine at this time to continue Lasix  Encourage pulmonary toileting and IS use  Follows with Dr Luisito Stallings as outpatient

## 2023-01-24 NOTE — PROGRESS NOTES
01/24/23 0830   Pain Assessment   Pain Assessment Tool 0-10   Pain Score 2   Pain Location/Orientation Orientation: Left; Location: Shoulder; Location: Knee   Pain Onset/Description Onset: Ongoing;Frequency: Intermittent   Patient's Stated Pain Goal No pain   Hospital Pain Intervention(s) Rest   Restrictions/Precautions   Precautions Fall Risk   LUE Weight Bearing Per Order NWB   Braces or Orthoses Sling  (L AFO with gait)   Cognition   Overall Cognitive Status WFL   Arousal/Participation Alert; Cooperative   Attention Within functional limits   Orientation Level Oriented X4   Memory Within functional limits   Following Commands Follows all commands and directions without difficulty   Roll Left and Right   Reason if not Attempted Safety concerns   Roll Left and Right CARE Score 88   Sit to Stand   Type of Assistance Needed Supervision; Adaptive equipment   Comment CS with [facility]   Sit to Stand CARE Score 4   Bed-Chair Transfer   Type of Assistance Needed Incidental touching;Supervision; Adaptive equipment   Comment CS/CGA with [facility]   Chair/Bed-to-Chair Transfer CARE Score 4   Transfer Bed/Chair/Wheelchair   Adaptive Equipment [facility]   Walk 10 Feet   Type of Assistance Needed Incidental touching   Comment CGA with WBQC   Walk 10 Feet CARE Score 4   Walk 50 Feet with Two Turns   Type of Assistance Needed Incidental touching; Adaptive equipment   Comment CGA with [facility]QC   Walk 50 Feet with Two Turns CARE Score 4   Walk 150 Feet   Type of Assistance Needed Incidental touching; Adaptive equipment   Comment CGA with WBQC   Walk 150 Feet CARE Score 4   Ambulation   Primary Mode of Locomotion Prior to Admission Walk   Distance Walked (feet) 150 ft  (15')   Assist Device [place]   Gait Pattern Antalgic; Slow Dilma;Decreased foot clearance; Forward Flexion; Wide ANRDÉS; Improper weight shift;Trendelenburg   Limitations Noted In Balance;Device Management; Heel Strike;Posture;Speed;Strength;Swing   Provided Assistance with: Balance Walk Assist Level Contact Guard   Does the patient walk? 2  Yes   Wheel 50 Feet with Two Turns   Reason if not Attempted Activity not applicable   Wheel 50 Feet with Two Turns CARE Score 9   Wheel 150 Feet   Reason if not Attempted Activity not applicable   Wheel 451 Feet CARE Score 9   Curb or Single Stair   Style negotiated Curb   Type of Assistance Needed Physical assistance;Verbal cues; Adaptive equipment   Physical Assistance Level Total assistance   Comment simulated curb on 6" step with [facility] [place] x2 reps, retro descend  MODA x1 with SBA of second person for safety  1 Step (Curb) CARE Score 1   4 Steps   Type of Assistance Needed Physical assistance;Verbal cues; Adaptive equipment   Physical Assistance Level 25% or less   Comment TIM with RHR x4 steps then LHR x4 steps on 6"  steps  Retro descend, LLE ascending nonreciprocal gait  4 Steps CARE Score 3   12 Steps   Reason if not Attempted Safety concerns   12 Steps CARE Score 88   Stairs   Type Stairs;Curb   # of Steps 8  (x2 curb)   Weight Bearing Precautions Fall Risk;NWB;LUE   Assist Devices Single Rail   Findings pt states 4 steps + curb to enter back door with LHR, Front entrance RHR but pt does not utilize  Picking Up Object   Type of Assistance Needed Incidental touching; Adaptive equipment   Physical Assistance Level No physical assistance   Comment CGA with reacher   Picking Up Object CARE Score 4   Toilet Transfer   Type of Assistance Needed Incidental touching; Adaptive equipment   Comment CAG with [facility] AND KRISHNA pt looking to reach with LUE support   Toilet Transfer CARE Score 4   Toilet Transfer   Surface Assessed Standard Toilet   Therapeutic Interventions   Balance weaving through cones x2 reps fwd with [facility] AND [place], pt then retrieved cones with use of reacher  Assessment   Treatment Assessment Pt participated in skilled PT session with increased focus on gait, increased balance, stair management and safety awareness   Pt cont to work towards increase I with all gait and functional transfers  Pt is limited by ongoing pain and weakness to R hip with noted trendelenburg and NWB to LUE  Pt was able to complete 6" curb step and will cont to benefit from stair negotiation, balance, gait and safety awareness  Cont POC as tolerated  Problem List Decreased strength;Decreased range of motion;Decreased endurance; Impaired balance;Decreased mobility; Decreased coordination;Orthopedic restrictions;Pain   Barriers to Discharge Decreased caregiver support   PT Barriers   Functional Limitation Car transfers; Ramp negotiation;Stair negotiation;Standing;Transfers; Walking   Plan   Treatment/Interventions Functional transfer training;LE strengthening/ROM; Therapeutic exercise; Endurance training;Patient/family training;Bed mobility;Gait training   Progress Progressing toward goals   Recommendation   PT Discharge Recommendation Home with home health rehabilitation   Equipment Recommended   (TBD)   PT Therapy Minutes   PT Time In 0830   PT Time Out 0930   PT Total Time (minutes) 60   PT Mode of treatment - Individual (minutes) 60   PT Mode of treatment - Concurrent (minutes) 0   PT Mode of treatment - Group (minutes) 0   PT Mode of treatment - Co-treat (minutes) 0   PT Mode of Treatment - Total time(minutes) 60 minutes   PT Cumulative Minutes 390   Therapy Time missed   Time missed?  No

## 2023-01-24 NOTE — ASSESSMENT & PLAN NOTE
Hx of SBRT to T6, T10, and T4 between 4207-5458  Encourage adequate pain control  Follows with Dr Sagar Cheung (Oncology) as outpatient

## 2023-01-24 NOTE — PROGRESS NOTES
01/24/23 1400   Pain Assessment   Pain Assessment Tool 0-10   Pain Score 2   Pain Location/Orientation Orientation: Left; Location: Shoulder   Pain Radiating Towards none   Pain Onset/Description Onset: Ongoing   Patient's Stated Pain Goal No pain   Hospital Pain Intervention(s) Repositioned; Rest   Multiple Pain Sites No   Restrictions/Precautions   Precautions Fall Risk;Bed/chair alarms   Weight Bearing Restrictions Yes   LUE Weight Bearing Per Order NWB   Braces or Orthoses Sling   Sit to Lying   Type of Assistance Needed Supervision   Sit to Lying CARE Score 4   Sit to Stand   Type of Assistance Needed Incidental touching; Adaptive equipment   Comment CGA with [address]   Sit to Stand CARE Score 4   Bed-Chair Transfer   Type of Assistance Needed Incidental touching; Adaptive equipment   Comment CGA with [address]   Chair/Bed-to-Chair Transfer CARE Score 4   Kitchen Mobility   Kitchen-Mobility Level Cane  DENISE BEYER)   Kitchen Activity Retrieve items;Transport items   Kitchen Mobility Comments pt reports he has small kitchen with an island and microwave on a counter  Therapist engaged pt in [address] [place] [address] frozen meal from freezer and transport to [facility]  Pt req Ruddy irizarry standing form chair 2* to LOB backwards but able to regain  Pt able to complete task at [facility] level  Son reporting he can assist with meals at SD  Will cont to assess for safety  Cognition   Overall Cognitive Status WFL   Arousal/Participation Alert; Cooperative   Attention Within functional limits   Orientation Level Oriented X4   Memory Within functional limits   Following Commands Follows all commands and directions without difficulty   Activity Tolerance   Activity Tolerance Patient tolerated treatment well   Assessment   Treatment Assessment Engaged pt in brief 30mins of skilled OT services with focus on kitchen mobility   Pt able to complete overall at [facility] with use of QC, can cont ot benefit from further practice, pt reporting he will contact [facility] to determine if she is able to assist with some tasks at home  WIll need to [place] back with pt 1/25  Cont OT POC with focus on activity tolerance, balance, UE TE, to inc fx perofrmance  Prognosis Fair   Problem List Decreased strength;Decreased range of motion;Decreased endurance; Impaired balance;Decreased mobility; Impaired sensation;Orthopedic restrictions;Pain   Barriers to Discharge Decreased caregiver support; Inaccessible home environment   Plan   Treatment/Interventions ADL retraining;Functional transfer training; Therapeutic exercise; Endurance training;Patient/family training;Bed mobility; Compensatory technique education   Progress Progressing toward goals   Recommendation   OT Discharge Recommendation   (pending progress)   OT Therapy Minutes   OT Time In 1400   OT Time Out 1430   OT Total Time (minutes) 30   OT Mode of treatment - Individual (minutes) 30   OT Mode of treatment - Concurrent (minutes) 0   OT Mode of treatment - Group (minutes) 0   OT Mode of treatment - Co-treat (minutes) 0   OT Mode of Treatment - Total time(minutes) 30 minutes   OT Cumulative Minutes 330   Therapy Time missed   Time missed?  No

## 2023-01-24 NOTE — ASSESSMENT & PLAN NOTE
Home regimen: amlodipine 5mg daily and lisinopril/HCTZ 20-12 5mg BID  Currently receiving Lasix 40mg daily  Discontinue amlodipine on 1/23 due to not receiving Lasix due to holding parameters not being met  Lisinopril and HCTZ held after receiving CTA on 1/17  Restart as able    Monitor BP with routine VS

## 2023-01-24 NOTE — PLAN OF CARE
Problem: RESPIRATORY - ADULT  Goal: Achieves optimal ventilation and oxygenation  Description: INTERVENTIONS:  - Assess for changes in respiratory status  - Assess for changes in mentation and behavior  - Position to facilitate oxygenation and minimize respiratory effort  - Oxygen administered by appropriate delivery if ordered  - Initiate smoking cessation education as indicated  - Encourage broncho-pulmonary hygiene including cough, deep breathe, Incentive Spirometry  - Assess the need for suctioning and aspirate as needed  - Assess and instruct to report SOB or any respiratory difficulty  - Respiratory Therapy support as indicated  Outcome: Progressing     Problem: METABOLIC, FLUID AND ELECTROLYTES - ADULT  Goal: Fluid balance maintained  Description: INTERVENTIONS:  - Monitor labs   - Monitor I/O and WT  - Instruct patient on fluid and nutrition as appropriate  - Assess for signs & symptoms of volume excess or deficit  Outcome: Progressing     Problem: MUSCULOSKELETAL - ADULT  Goal: Maintain proper alignment of affected body part  Description: INTERVENTIONS:  - Support, maintain and protect limb and body alignment  - Provide patient/ family with appropriate education  Outcome: Progressing

## 2023-01-24 NOTE — ASSESSMENT & PLAN NOTE
Diagnosed with metastatic renal cell carcinoma in 2007  Progressive disease  Bone scan in 2020 showed lytic lesions to L scapula, R posterior and lateral ribs, L posterior iliac bone, and R iliac wing  Most recently started on Nivolumab in 12/2022  Ensure adequate pain control  Continue home Vitamin D supplementation  Follows with Dr Luisito Stallings (Oncology) as outpatient

## 2023-01-24 NOTE — PROGRESS NOTES
Physical Medicine and Rehabilitation Progress Note  Sunny Gottlieb 67 y o  male MRN: [id number]  Unit/Bed#: [facility] (formerly [facility]) 154-30 Encounter: [id number]    To Review: Sunny Gottlieb is a 67 y o  male with medical history of localized RCC in 2007 with mets in 2013 , on systemic therapy s/p L nephrectomy and prostatectomy, chronic diarrhea, CKD (Crea 1-1 3) who presented to the [address] [place] [address] on 1/12 with L shoulder pain after a fall with arm abducted  XRs of left shoulder, humerus, scapula, and clavicle showed lytic metastatic tumor mass within the scapular neck and glenoid with no acute fractures or dislocations  CT was ordered and he was made NWB by Ortho  CT showed large pathologic scapula fracture with minimal displacement and a large lytic lesion in glenoid vault  Ortho discussed with Dorota Hammer - Dr Itzel Matthew who recommended sling for comfort and non-surgical management  They will follow-up outpatient  On admission also noted to have ARIANNA and Nephro was consulted who felt likely 2/2 autoregulatory failure  They started him on Lasix and help his Lisinopril and HCTZ  Echo was ordered which showed normal EF with G2DD  ARIANNA resolve with diuresis  Lasix 40mg oral daily  Ira Marcelo He also was newly requiring O2 on admission - but that improved diuresis  Given his cancer, D-dimer elevated, NM study was ordered which was indeterminant  LE dopplers were negative  CTA PE was ordered once renal function improved - this was negative  Heparin gtt discontinued It did however show enlargement of his RLL mets and bone mets concerning for progression  He will follow-up with his Oncologist as an outpatient to discuss resuming therapy  Admitted to the [facility] ([facility]) on 1/19  Chief Complaint: No new issues today  Interval History/Subjective:  No acute events overnight  Hip pain has improved as of today  Still with pain that radiates outside of his leg to his lower leg on occasion, but already improved   No new CP, SOB, fevers, chills, N/V, abdominal pain  Last BM 1/23  ROS:  A 10 point review of systems was negative except for what is noted in the HPI  Today's Changes:  1  Continue current pain regimen  2  Continue therapies     Total visit time: 25 minutes, with more than 50% spent counseling/coordinating care  Counseling includes discussion with patient re: progress in therapies, functional issues observed by therapy staff, and discussion with patient regarding their current medical state and wellbeing  Coordination of patient's care was performed in conjunction with Internal Medicine service to monitor patient's labs, vitals, and management of their comorbidities  Assessment/Plan:    * 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  Assessment & Plan  Large pathologic scapular fracture with minimal displacement  Large lytic lesion in glenoid vault  Management non-operatively  Sling for comfort  NWB  Ok for ROM in elbow, wrist, hand  Pendulums daily for now  Pain control as below  Outpatient f/u with Tiara Tubbs in 4 weeks  Neuropathy  Assessment & Plan  Continue gabapentin 300mg Q8hr    HTN (hypertension)  Assessment & Plan  Home: Amlodipine 5mg daily Lasix 40mg daily, was on Lisinopril/HCTZ 20-12 5mg daily  Here: Same without Lisinopril/HCTZ, and now amlodipine at night discontinued for hypotension   Monitor and adjust as appropriate  IM following and assisting with management  Pleural effusion  Assessment & Plan  Likely secondary to metastatic disease  CTA PE study on 1/17: New moderate L pleural effusion with moderate compressive atelectasis of left lower lobe  Currently maintaining on RA  Monitor spot pulse ox  Encourage pulmonary toileting  Continue Lasix 40mg daily      Dermatitis associated with moisture  Assessment & Plan  Soap, water to buttocks TID and PRN followed by barrier cream   Nystatin powder R groin    Chronic diastolic (congestive) heart failure [facility])  Assessment & Plan  Wt Readings from Last 3 Encounters:   01/24/23 111 kg (245 lb 6 oz)   01/19/23 110 kg (243 lb 6 2 oz)   12/30/22 121 kg (265 lb 10 5 oz)     Seen on Echo during this hospitalization  S/P IV diuresis for exacerbation  Now back on room air   Closely monitor I/O, daily weights, volume status  Cardiac diet with fluid restriction  Continue: Lasix 40mg daily   - Previously on ACE, may be able to restart   - Was not on BB  Outpatient f/u with PCP  Chronic diarrhea  Assessment & Plan  Chronically on Lomotil  Monitor  Close continence care  Stage 3a chronic kidney disease [facility])  Assessment & Plan  Lab Results   Component Value Date    EGFR 74 01/23/2023    EGFR 59 01/19/2023    EGFR 61 01/18/2023    CREATININE 1 00 01/23/2023    CREATININE 1 21 01/19/2023    CREATININE 1 17 01/18/2023     Stage 2-3A  Crea baseline 1-1 3  Improved recently after ARIANNA 2/2 autoregulatory issues/congestion/volume overload  Now on Lasix daily monitor  Was on Lisinopril and HCTZ at home, currently being held  - Nephro says these can be restarted as necessary  Monitoring BP  Avoid nephrotoxic meds  Outpatient f/u     Metastatic renal cell carcinoma [facility])  Assessment & Plan  Originally 2007  Mets in 2013  On systemic therapy  S/p L nephrectomy and prostatectomy  Will need outpatient f/u with Oncology     Spine metastasis [facility])  Assessment & Plan  In most recent CT CAP in 2022 showed lytic/sclerotic lesions T4, T6, and scattered lesions in lumbar spine   NM bone scan in 2020 showed:  Scattered exophytic foci of radiotracer uptake in the spine corresponding to degenerative changes on CT  Ct C-Spine in 6/2022 showed no lesions  He has pelvic/iliac lytic lesions  Monitor neuro status and for worsening back pain  Pain management as below       Lung metastases ([address] )  Assessment & Plan  With recent AHRF in setting of volume overload and acute diastolic heart failure and ARIANNA  Now on room air  Has pleural effusion in addition  Closely monitor respiratory status  Pulmonary toilet  Outpatient f/u with Oncology    Bone metastases [facility])  Assessment & Plan  Monitor performance in therapy  If he develops new pain, particular in long bones, would image    - Since his fall has had new R hip pain primarily with weight bearing   - 2022 CT CAP showed lytic lesion superior to the right hip are identified as well as the posterior column of the acetabulum  - XR of R hip without significant changes  Better visualized on CT   2020 Bone Scan showed lytic lesions:   left lateral scapula inferior to the glenoid     right posterior and lateral ribs corresponding to expansile lytic lesions on CT  (recent Ct with expansive R 7th rib met)   left posterior iliac bone and right iliac wing corresponding to lytic lesions on CT    r nonspecific tibial finding    Outpatient f/u with Dr Kelley Salter with Ortho onc  Health Maintenance  #Delirium/Sleep: At risk  Environmental interventions  Optimize pain, bowel, bladder, sleep-wake cycle management  #Pain: Tylenol PRN, Gabapentin 300mg TID, Oxycodone 5mg PRN severe pain, tramadol 50mg PRN moderate pain  #Bowel: Last BM 1/23/2023, incontinent and watery  Chronic diarrhea, see above  #Bladder: Voiding and continent  #Skin/Pressure Injury Prevention: Turn Q2hr in bed, with weight shifts [id number] in wheelchair  Float heels in bed  #DVT Prophylaxis: HSQ,SCDs  #GI Prophylaxis: Not indicated  #Code Status:  Full Code  #FEN: Cardiac diet with 1500cc fluid restriction  #Dispo:  ELOS 7-14 days with goal to discharge home  May need some assistance given restriction in LUE, but will try to optimize/maximize his functional independence  Objective:    Functional Update:  PT: CS-CGA for bed mobility, transfers, ambulation 150' with [employer] with one hand rail  Backs down     OT:  Min-modA    Allergies per EMR    Physical Exam:  Temp:  [97 5 °F (36 4 °C)-98 8 °F (37 1 °C)] 98 8 °F (37 1 °C)  HR:  [] 100  Resp:  [18] 18  BP: (105-129)/(55-66) 110/55  Oxygen Therapy  SpO2: 92 %  O2 Flow Rate (L/min): 2 L/min    Gen: No acute distress, Well-nourished, well-appearing  HEENT: Moist mucus membranes, Normocephalic/Atraumatic  Cardiovascular: Regular rate, rhythm, S1/S2  Distal pulses palpable  Heme/Extr: Stable BL LE edema  Pulmonary: Non-labored breathing  Improved air entry into lungs without adventitious sounds  : No williamson  GI: Soft, non-tender, non-distended  BS+  MSK: Observed doing stairs with a forward on, back off using a L hand rail   Gait is trendelenburg with R glute weakness - compensated  Trouble clearing on the R regardless of the compensation  Integumentary: Skin is warm, dry  Neuro: AAOx3, Speech is intact  Appropriate to questioning  Psych: Normal mood and affect       Diagnostic Studies: Reviewed, no new imaging    Laboratory:  Reviewed   Results from last 7 days   Lab Units 01/23/23  0637 01/19/23  0539 01/18/23  0517   HEMOGLOBIN g/dL 11 5* 11 5* 11 7*   HEMATOCRIT % 38 5 38 0 39 0   WBC Thousand/uL 6 46 6 81 6 74     Results from last 7 days   Lab Units 01/23/23  0637 01/19/23  0539 01/18/23  0517   BUN mg/dL 19 24 24   POTASSIUM mmol/L 4 5 4 4 3 6   CHLORIDE mmol/L 100 100 99   CREATININE mg/dL 1 00 1 21 1 17            Patient Active Problem List   Diagnosis   • Clear cell adenocarcinoma of kidney, left (HCC)   • Bone metastases (HCC)   • Lung metastases (HCC)   • Spine metastasis (HCC)   • Metastatic renal cell carcinoma (HCC)   • Hx of prostatectomy   • History of prostate cancer   • Azotemia   • COVID-19   • Stage 3a chronic kidney disease ([facility] )   • H/O left nephrectomy   • Fall at home, initial encounter   • 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   • Acute respiratory failure with hypoxia (HCC)   • Elevated brain natriuretic peptide (BNP) level   • Elevated d-dimer   • Chronic diarrhea   • Chronic diastolic (congestive) heart failure ([facility] ) • Dermatitis associated with moisture   • Pleural effusion   • HTN (hypertension)   • Neuropathy         Medications  Current Facility-Administered Medications   Medication Dose Route Frequency Provider Last Rate   • acetaminophen  975 mg Oral 4x Daily PRN Boogie Jolly MD     • aluminum-magnesium hydroxide-simethicone  30 mL Oral Q6H PRN Boogie Jolly MD     • cholecalciferol  1,000 Units Oral Daily Boogie Jolly MD     • diphenhydrAMINE  25 mg Oral HS PRN Boogie Jolly MD     • furosemide  40 mg Oral Daily Boogie Jolly MD     • gabapentin  300 mg Oral TID Boogie Jolly MD     • heparin (porcine)  5,000 Units Subcutaneous [facility] Boogie Jolly MD     • lidocaine  1 patch Topical Daily IRENE Okeefe     • loperamide  2 mg Oral 4x Daily PRN Boogie Jolly MD     • multivitamin stress formula  1 tablet Oral Daily Boogie Jolly MD     • nystatin   Topical BID Boogie Jolly MD     • ondansetron  4 mg Oral Q6H PRN Boogie Jolly MD     • oxyCODONE  5 mg Oral 4x Daily PRN Hanna Corral MD     • oxyCODONE  2 5 mg Oral 4x Daily PRN Hanna Corral MD     • oxyCODONE  7 5 mg Oral Q4H PRN Hanna Corral MD     • simethicone  80 mg Oral 4x Daily PRN Boogie Jolly MD     • white petrolatum-mineral oil   Topical BID IRENE Okeefe            ** Please Note: Fluency Direct voice to text software may have been used in the creation of this document   **

## 2023-01-24 NOTE — PROGRESS NOTES
01/24/23 0930   Pain Assessment   Pain Assessment Tool 0-10   Pain Score 2   Pain Location/Orientation Orientation: Left; Location: Shoulder   Hospital Pain Intervention(s) Repositioned   Restrictions/Precautions   Precautions Fall Risk   Weight Bearing Restrictions Yes   LUE Weight Bearing Per Order NWB   Braces or Orthoses Sling  (L LE AFO)   Cognition   Overall Cognitive Status WFL   Arousal/Participation Alert; Cooperative   Attention Within functional limits   Orientation Level Oriented X4   Memory Within functional limits   Following Commands Follows all commands and directions without difficulty   Subjective   Subjective Agreeable to PT  C/o "bunching" sensation plantar surface R foot  Inspected sock/shoe and skin  Minimal improvement with FRANCESCA adjustments  Requested to take sneakers off at end of session for inc relief  Sit to Stand   Type of Assistance Needed Incidental touching; Adaptive equipment   Physical Assistance Level No physical assistance   Comment [facility]   Sit to Stand CARE Score 4   Bed-Chair Transfer   Type of Assistance Needed Incidental touching; Adaptive equipment   Physical Assistance Level No physical assistance   Comment [facility]   Chair/Bed-to-Chair Transfer CARE Score 4   Transfer Bed/Chair/Wheelchair   Adaptive Equipment Quad Cane   Walk 10 Feet   Type of Assistance Needed Incidental touching; Adaptive equipment   Physical Assistance Level No physical assistance   Comment WBQC   Walk 10 Feet CARE Score 4   Walk 50 Feet with Two Turns   Type of Assistance Needed Incidental touching; Adaptive equipment   Physical Assistance Level No physical assistance   Comment WBQC   Walk 50 Feet with Two Turns CARE Score 4   Ambulation   Primary Mode of Locomotion Prior to Admission Walk   Distance Walked (feet) 60 ft  (60ft x1, 20ft x4)   Assist Device [facility]   Gait Pattern Slow Dilma; Inconsistant Dilma; Antalgic; Shuffle;Decreased foot clearance;Trendelenburg;Decreased R stance; Improper weight shift Limitations Noted In Balance; Endurance; Heel Strike;Speed;Strength   Walk Assist Level Close Supervision;Contact Guard   Findings Session focus on shorter distance amb today  Does the patient walk? 2  Yes   Wheel 50 Feet with Two Turns   Reason if not Attempted Activity not applicable   Wheel 50 Feet with Two Turns CARE Score 9   Wheel 150 Feet   Reason if not Attempted Activity not applicable   Wheel 571 Feet CARE Score 9   Wheelchair mobility   Does the patient use a wheelchair? 0  No   Therapeutic Interventions   Strengthening Seated LE TE 20x each: LAQ, hip flex, hip abd, hamstring curl with red theraband, ankle df   Assessment   Treatment Assessment Pt participated in 30 minute PT session immediately following previous 60 minute PT session  See PT note from 8:30am session for additional mobility information/status  Pt with good tolerance to TE and demo improved balance/endurance and indep with short distance amb training today with [facility]  Cont to provide close sup with occasional CGA with turns  Sit/stands largely completed at close sup level however one episode of slight post lean/sway to which PT provided CGA to steady  Cont with focus on household mobility and NATALIYA to ensure safe d/c home at indep level  Left pt reclined in recliner chair, shoes doffed and skin checked due to pt c/o "bunching" sensation at plantar surface R foot  Skin intact, no redness, no creases or noteable issues  Edu on neuropathy and positioning as well as cont importance of skin checks  Pt receptive  Family/Caregiver Present No   Problem List Decreased strength;Decreased range of motion;Decreased endurance; Impaired balance;Decreased mobility; Impaired sensation;Orthopedic restrictions;Pain   Barriers to Discharge Decreased caregiver support; Inaccessible home environment   PT Barriers   Functional Limitation Car transfers; Ramp negotiation;Stair negotiation;Standing;Transfers; Walking   Plan   Treatment/Interventions Functional transfer training;LE strengthening/ROM; Elevations; Therapeutic exercise; Endurance training;Equipment eval/education; Bed mobility;Gait training; Compensatory technique education   Progress Progressing toward goals   Recommendation   PT Discharge Recommendation Home with home health rehabilitation   Equipment Recommended   (TBD)   PT Therapy Minutes   PT Time In 0930   PT Time Out 1000   PT Total Time (minutes) 30   PT Mode of treatment - Individual (minutes) 30   PT Mode of treatment - Concurrent (minutes) 0   PT Mode of treatment - Group (minutes) 0   PT Mode of treatment - Co-treat (minutes) 0   PT Mode of Treatment - Total time(minutes) 30 minutes   PT Cumulative Minutes 360   Therapy Time missed   Time missed?  No

## 2023-01-24 NOTE — PROGRESS NOTES
01/24/23 1030   Pain Assessment   Pain Assessment Tool 0-10   Pain Score 4   Pain Location/Orientation Orientation: Left; Location: Shoulder   Pain Radiating Towards none   Pain Onset/Description Onset: Ongoing   Patient's Stated Pain Goal No pain   Hospital Pain Intervention(s) Rest   Multiple Pain Sites No   Restrictions/Precautions   Precautions Fall Risk;Bed/chair alarms   Weight Bearing Restrictions Yes   LUE Weight Bearing Per Order NWB   Braces or Orthoses Sling   Lifestyle   Autonomy "I don't like sitting, I have been taking myself ot the bathroom" Educ pt on safety and to prevent falls  Communicated iw RN staff to provide bed/chair alarm   Putting On/Taking Off Footwear   Type of Assistance Needed Physical assistance   Physical Assistance Level 51%-75%   Comment pt with DIETER rush anticipate he will req assistance  pt able to don r sneakersSTEPHY with chanda IRENE for L sneaker and AFO   Putting On/Taking Off Footwear CARE Score 2   Sit to Stand   Type of Assistance Needed Incidental touching; Adaptive equipment   Comment c   Sit to Stand CARE Score 4   Bed-Chair Transfer   Type of Assistance Needed Incidental touching; Adaptive equipment   Chair/Bed-to-Chair Transfer CARE Score 4   Toileting Hygiene   Type of Assistance Needed Physical assistance   Physical Assistance Level 26%-50%   Comment pt req A for buttock hygiene  educ pt on use of toilet aid  pt open this session and provided handout  reports he will provide to juliet adames visiting this evening  Toileting Hygiene CARE Score 3   Toilet Transfer   Type of Assistance Needed Incidental touching   Comment CGA SPt with [address]  pt to use BSC on first floor   Toilet Transfer CARE Score 4   Right Upper Extremity- Strength   R Shoulder Flexion; Extension   R Elbow Elbow flexion;Elbow extension   R Position Seated   Equipment Dumbbell  (3#)   R Weight/Reps/Sets 2x15   RUE Strength Comment engaged pt in RUE TE to cont to inc fx strength, pt toelrated well withr est break in between for improved STS and SPT  Left Upper Extremity-Strength   LUE Strength Comment N/A NWB   Cognition   Overall Cognitive Status WFL   Arousal/Participation Alert; Cooperative   Attention Within functional limits   Orientation Level Oriented X4   Memory Within functional limits   Following Commands Follows all commands and directions without difficulty   Activity Tolerance   Activity Tolerance Patient tolerated treatment well   Assessment   Treatment Assessment Engaged pt in 60mins of skilled OT services with focus on toielt aid educ,footwear, mobility to bathroom, RUE TE and speaking with son  Pt receptive this session about toilet aid as therapist educ on LUE prec as he is not able to complete ROM at shoulder per MD order  Provided handout with son to purchase  In addition, educ on use of elastic shoe laces son to purchase  During session therapist spoke with son Bartolo Parnell  Reports he can assist with driving at [place], will purchase elastic shoe laces and toilet aid  Reports he can also assist with laundry, meals  Also reports pt has a neighbor who is an aide that they are going to ask for LB Dressing if needed  Educ son on use of bag and cat litter for easy cleanup which son verbalized understanding  Cont OT POC with focus on activity tolerance, blaanec, UE TE to inc fx performance and inc indep  Prognosis Fair   Problem List Decreased strength;Decreased range of motion;Decreased endurance; Impaired balance;Decreased mobility; Impaired sensation;Orthopedic restrictions;Pain   Barriers to Discharge Decreased caregiver support; Inaccessible home environment   Plan   Treatment/Interventions ADL retraining;Functional transfer training; Therapeutic exercise; Endurance training;Patient/family training;Bed mobility; Compensatory technique education   Progress Progressing toward goals   Recommendation   OT Discharge Recommendation   (pending progress)

## 2023-01-25 ENCOUNTER — TELEPHONE (OUTPATIENT)
Dept: NEPHROLOGY | Facility: HOSPITAL | Age: 73
End: 2023-01-25

## 2023-01-25 PROBLEM — G47.34 NOCTURNAL HYPOXEMIA: Status: ACTIVE | Noted: 2023-01-01

## 2023-01-25 RX ORDER — LIDOCAINE 50 MG/G
2 PATCH TOPICAL DAILY
Status: DISCONTINUED | OUTPATIENT
Start: 2023-01-25 | End: 2023-02-03 | Stop reason: HOSPADM

## 2023-01-25 RX ORDER — LORAZEPAM 0.5 MG/1
1 TABLET ORAL ONCE AS NEEDED
Status: COMPLETED | OUTPATIENT
Start: 2023-01-25 | End: 2023-01-26

## 2023-01-25 RX ADMIN — NYSTATIN: 100000 POWDER TOPICAL at 08:39

## 2023-01-25 RX ADMIN — GABAPENTIN 300 MG: 300 CAPSULE ORAL at 21:25

## 2023-01-25 RX ADMIN — NYSTATIN: 100000 POWDER TOPICAL at 17:40

## 2023-01-25 RX ADMIN — Medication: at 08:39

## 2023-01-25 RX ADMIN — Medication 7.5 MG: at 21:54

## 2023-01-25 RX ADMIN — HEPARIN SODIUM 5000 UNITS: 5000 INJECTION INTRAVENOUS; SUBCUTANEOUS at 21:54

## 2023-01-25 RX ADMIN — OXYCODONE HYDROCHLORIDE 5 MG: 5 TABLET ORAL at 11:22

## 2023-01-25 RX ADMIN — LIDOCAINE 1 PATCH: 50 PATCH CUTANEOUS at 08:39

## 2023-01-25 RX ADMIN — B-COMPLEX W/ C & FOLIC ACID TAB 1 TABLET: TAB at 08:38

## 2023-01-25 RX ADMIN — GABAPENTIN 300 MG: 300 CAPSULE ORAL at 08:38

## 2023-01-25 RX ADMIN — HEPARIN SODIUM 5000 UNITS: 5000 INJECTION INTRAVENOUS; SUBCUTANEOUS at 15:05

## 2023-01-25 RX ADMIN — HEPARIN SODIUM 5000 UNITS: 5000 INJECTION INTRAVENOUS; SUBCUTANEOUS at 06:10

## 2023-01-25 RX ADMIN — Medication: at 22:13

## 2023-01-25 RX ADMIN — ACETAMINOPHEN 975 MG: 325 TABLET ORAL at 17:46

## 2023-01-25 RX ADMIN — LIDOCAINE 2 PATCH: 50 PATCH CUTANEOUS at 15:06

## 2023-01-25 RX ADMIN — FUROSEMIDE 40 MG: 40 TABLET ORAL at 08:38

## 2023-01-25 RX ADMIN — CHOLECALCIFEROL TAB 25 MCG (1000 UNIT) 1000 UNITS: 25 TAB at 08:38

## 2023-01-25 RX ADMIN — GABAPENTIN 300 MG: 300 CAPSULE ORAL at 17:40

## 2023-01-25 NOTE — ASSESSMENT & PLAN NOTE
States that he was told he had sleep apnea in the past during hospitalizations but never had formal work-up  Has been requiring O2 at night  Overnight noc ox performed on 1/24 and showed desat <89% for 8 hours and 12 minutes, as low as 74%  Apply 2L O2 at HS  Repeat study prior to discharge for DME evaluation  Would benefit from Sleep Medicine as outpatient

## 2023-01-25 NOTE — ASSESSMENT & PLAN NOTE
Presented on 1/12 after a mechanical fall with L shoulder pain  CT LUE: lytic metastasis int he scapular glenoid process measuring 2 9 x 2 4 x 2 0cm with cortical breakthrough and extraosseous extension anteriorly without pathologic fracture  New 2 4 x 2 1 x 1 9cm lytic metastasis in the L scapular body with acute pathologic fracture  New 1 9x0 8x1 4cm lytic metastasis in the inferior scapular angle with pathologic fracture  Non-surgical intervention per Ortho  NWB to LUE  Sling for comfort  Neurovascular checks Q shift  Ensure adequate pain control  Follow-up with Dr Jonathan Keller (Orthopedics) in 4 weeks  Primary team following  PT/OT

## 2023-01-25 NOTE — PCC OCCUPATIONAL THERAPY
1/25/23  Pt is making steady gains towards OT goals  Pt at this time req Barry for toileting and bathing 2* to LUE NWB  However, this week we have been trialing toilet wand to inc independence  Anticipate pt to be indep with toilet when using toilet wand  Pt cont to have difficulty with L AFO/footwear which pt reports grandson and son can assist  Pt son to attend FT on Thurs 1230 2/2  Therapist to communicate with son and discuss plan for when family can assist with bathing and don L AFO as pt req assistance  Therapist did provide pt with HHA list, however, pt reports that he would like to trial at home first and will contact if needed  It is anticipated pt will mainly be Miguelito at DC  [facility]  Anticipate pt to req Barry for bathing and don of AFO 2* to LUE NWB  Pt with anticipated dc on 2/3/23 with home PT/OT services  By DC all barriers resolved   Will cont with KHALIF STARR until cleared from MD

## 2023-01-25 NOTE — ASSESSMENT & PLAN NOTE
Wt Readings from Last 3 Encounters:   01/25/23 111 kg (245 lb 6 oz)   01/19/23 110 kg (243 lb 6 2 oz)   12/30/22 121 kg (265 lb 10 5 oz)     ECHO on 1/16 showed EF 65%, no RWMA, G1DD  Treated with Lasix in acute setting for volume overload  BNP 4475 on admission  Continue Lasix 40mg daily  Monitor daily weights and I&Os  Continue 1500FR

## 2023-01-25 NOTE — ASSESSMENT & PLAN NOTE
CTA PE study on 1/17: Enlargement of R lower lobe metastases  Developed hypoxia in acute setting - receiving diuretics with improvement  Currently maintaining on RA  Monitor spot pulse ox  Continue Lasix 40mg daily (held over the weekend due to parameters)  Discontinue amlodipine at this time to continue Lasix  Encourage pulmonary toileting and IS use  Follows with Dr Naomi Galvan as outpatient

## 2023-01-25 NOTE — ASSESSMENT & PLAN NOTE
1/24 Noc ox showed significant desats  Will add night time oxygen  2/2 Noc Ox completed with significant desat   - Will need 2-3L NC overnight      Referral to Pulmonology at discharge

## 2023-01-25 NOTE — PROGRESS NOTES
01/25/23 1105   Pain Assessment   Pain Score 6   Pain Location/Orientation Orientation: Right;Location: Shoulder   Hospital Pain Intervention(s)   (nurse made aware, and given pain meds during session)   Restrictions/Precautions   Precautions Fall Risk;Fluid restriction;Bed/chair alarms   LUE Weight Bearing Per Order NWB   LLE Weight Bearing Per Order   (pendulum only on shoulder, elbow and wirst ok for ROM)   Braces or Orthoses Other (Comment)  (AFO)   Cognition   Arousal/Participation Alert; Cooperative   Attention Within functional limits   Memory Within functional limits   Following Commands Follows all commands and directions without difficulty   Subjective   Subjective Pt  has no new complaints  WOuld likt o have pain meds and wants to use urinal at start of session   Sit to Stand   Type of Assistance Needed Incidental touching; Adaptive equipment   Comment using [facility]   Sit to Stand CARE Score 4   Bed-Chair Transfer   Type of Assistance Needed Incidental touching; Adaptive equipment   Comment using [facility]   Chair/Bed-to-Chair Transfer CARE Score 4   Transfer Bed/Chair/Wheelchair   Adaptive Equipment [facility]   Walk 10 Feet   Type of Assistance Needed Incidental touching; Adaptive equipment;Verbal cues   Comment using [facility], verbal cues for streide length   Walk 10 Feet CARE Score 4   Walk 50 Feet with Two Turns   Type of Assistance Needed Incidental touching; Adaptive equipment   Comment using [facility], verbal cues for streide length   Walk 50 Feet with Two Turns CARE Score 4   Walk 150 Feet   Type of Assistance Needed Incidental touching; Adaptive equipment   Comment using [facility], verbal cues for streide length   Walk 150 Feet CARE Score 4   Walking 10 Feet on Uneven Surfaces   Comment (S)  Please assess up and down ramp tomorrow   Ambulation   Primary Mode of Locomotion Prior to Admission Walk   Distance Walked (feet) 150 ft  (X 2)   Assist Device [facility]   Gait Pattern Antalgic;Decreased foot clearance; Improper weight shift; Step through  (dec stride length)   Limitations Noted In Endurance   Provided Assistance with: Balance   Walk Assist Level Contact Guard   Does the patient walk? 2  Yes   Wheel 50 Feet with Two Turns   Reason if not Attempted Activity not applicable   Wheel 50 Feet with Two Turns CARE Score 9   Wheel 150 Feet   Reason if not Attempted Activity not applicable   Wheel 605 Feet CARE Score 9   Wheelchair mobility   Does the patient use a wheelchair? 0  No   Curb or Single Stair   Style negotiated Single stair   Type of Assistance Needed Physical assistance   Physical Assistance Level 25% or less   Comment using R HR up and same handrail down backwards  First step is the hardest for pt  but after that gets up easier next 3 steps   Pt  leads with L LE up and R LE down  1 Step (Curb) CARE Score 3   4 Steps   Type of Assistance Needed Physical assistance   Physical Assistance Level 25% or less   Comment using R HR up and same handrail down backwards  First step is the hardest for pt  but after that gets up easier next 3 steps   Pt  leads with L LE up and R LE down  4 Steps CARE Score 3   12 Steps   Comment limited by fatigue , can trial tomorrow if apporpriate   Reason if not Attempted Safety concerns   12 Steps CARE Score 88   Stairs   Type Stairs   # of Steps 8   Weight Bearing Precautions NWB;LUE   Assist Devices Single Rail   Findings using R HR up and same handrail down backwards  First step is the hardest for pt  but after that gets up easier next 3 steps   Pt  leads with L LE up and R LE down  Therapeutic Interventions   Flexibility Gentle B hamstring and gastroc stretching   Assessment   Treatment Assessment Pt  seen for short 30 mins session focused on increasing activity tolerance in ambulation and stairs management  Pt  able to ambulate 150 feet with no foot drag noted using AFO on L LE   Pt  reported that with AFO he his foot is more stable but not quite sure if it is keeping his foot from dragging  Pt  also reported that he feels like he does not make so much of an effort picking up L LE with AFO on swing phase of gait  No overt LOB noted during session  Will cont POC in the PM    Problem List Decreased strength;Decreased endurance; Impaired balance;Pain;Orthopedic restrictions;Decreased coordination   Barriers to Discharge Inaccessible home environment;Decreased caregiver support   PT Barriers   Functional Limitation Car transfers;Stair negotiation;Standing;Transfers; Walking   Plan   Treatment/Interventions Functional transfer training;LE strengthening/ROM; Elevations; Therapeutic exercise; Endurance training;Patient/family training;Equipment eval/education; Bed mobility;Gait training   Recommendation   PT Discharge Recommendation Home with home health rehabilitation   Equipment Recommended Other (Comment)  [facility], which pt  advised to purchase it himself because of its lower cost and also educated that he  Might need some other DME in the future (next 5 years ) that is more costly    Pt agreeable)   PT Therapy Minutes   PT Time In 1105   PT Time Out 1135   PT Total Time (minutes) 30   PT Mode of treatment - Individual (minutes) 30   PT Mode of treatment - Concurrent (minutes) 0   PT Mode of treatment - Group (minutes) 0   PT Mode of treatment - Co-treat (minutes) 0   PT Mode of Treatment - Total time(minutes) 30 minutes   PT Cumulative Minutes 450   Therapy Time missed   Time missed?  No

## 2023-01-25 NOTE — PROGRESS NOTES
Physical Medicine and Rehabilitation Progress Note  Flex Dai 67 y o  male MRN: [id number]  Unit/Bed#: [facility] 177-40 Encounter: [id number]    To Review: Flex Dai is a 67 y o  male with medical history of localized RCC in 2007 with mets in 2013 , on systemic therapy s/p L nephrectomy and prostatectomy, chronic diarrhea, CKD (Crea 1-1 3) who presented to the [address] [place] [address] on 1/12 with L shoulder pain after a fall with arm abducted  XRs of left shoulder, humerus, scapula, and clavicle showed lytic metastatic tumor mass within the scapular neck and glenoid with no acute fractures or dislocations  CT was ordered and he was made NWB by Ortho  CT showed large pathologic scapula fracture with minimal displacement and a large lytic lesion in glenoid vault  Ortho discussed with [facility] - Dr Cristo Huertas who recommended sling for comfort and non-surgical management  They will follow-up outpatient  On admission also noted to have ARIANNA and Nephro was consulted who felt likely 2/2 autoregulatory failure  They started him on Lasix and help his Lisinopril and HCTZ  Echo was ordered which showed normal EF with G2DD  ARIANNA resolve with diuresis  Lasix 40mg oral daily  Delona Many He also was newly requiring O2 on admission - but that improved diuresis  Given his cancer, D-dimer elevated, NM study was ordered which was indeterminant  LE dopplers were negative  CTA PE was ordered once renal function improved - this was negative  Heparin gtt discontinued It did however show enlargement of his RLL mets and bone mets concerning for progression  He will follow-up with his Oncologist as an outpatient to discuss resuming therapy  Admitted to the [facility] on 1/19       Chief Complaint: Poor sleep, increased pain in his L shoulder    Interval History/Subjective:  Overnight struggled to sleep due to increased tingling/burning discomfort around his axilla, maybe slightly distal  He also gets it on the top of his shoulder to the outside  He feels like it's in his scapula  It took a bit for the pain medication to kick in - he stated it felt different than th bony pain, more like a nerve pain  He reports getting pain that radiated along his upper trap prior to the fall, but he would typically massage those out and they would improve (Sounded more myofascial)No new CP, SOB, fevers, chills, weakness  He generally feels he is making progress  Last BM 1/25    ROS:  A 10 point review of systems was negative except for what is noted in the HPI  Today's Changes:  1  Adding lidoderm patch to that axilla/chest wall or back  - Will discuss with Orthopedics re? Neural compromise   - Has decreased sensation in a C3-4 distribution  Not really in an axillary, radial distribution  Distal arm sensation intact in median, ulnar, radial, LABC, MABC  I wonder about involvement of his supraclavicular as well  - Discussed with Ortho Trauma attending at [facility] - would recommend MRI of shoulder with bibi, and MRI C-Spine    - ordered  2  Noc Ox showed 8 hours of desaturation  Adding nocturnal oxygen and explained to patient he would need outpatient f/u with Sleep Medicine  He expressed understanding  Total visit time: 25 minutes, with more than 50% spent counseling/coordinating care  Counseling includes discussion with patient re: progress in therapies, functional issues observed by therapy staff, and discussion with patient regarding their current medical state and wellbeing  Coordination of patient's care was performed in conjunction with Internal Medicine service to monitor patient's labs, vitals, and management of their comorbidities  Assessment/Plan:    * 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  Assessment & Plan  Large pathologic scapular fracture with minimal displacement  Large lytic lesion in glenoid vault  Management non-operatively  Sling for comfort  NWB  Ok for ROM in elbow, wrist, hand    Pendulums daily for now  Pain control as below  Outpatient f/u with Kirstin Scott in 4 weeks  Nocturnal hypoxemia  Assessment & Plan  1/24 Noc ox showed significant desats  Will add night time oxygen  Check noc ox 48 hours prior to discharge   Referral to sleep medicine at discharge  Neuropathy  Assessment & Plan  Continue gabapentin 300mg Q8hr  - TSH WNL more recently  - No recent A1C, last done was 5 7   - Could recheck  - No B12 checked  He also has cancer - could be paraneoplastic in etiology  Depending on what chemotherapy he has had this could also be a contributing factor  In the future, can consider an EMG to more likely describe the type of neuropathy he has to help narrow differential  For now compensatory strategies and control with medication for his discomfort  HTN (hypertension)  Assessment & Plan  Home: Amlodipine 5mg daily Lasix 40mg daily, was on Lisinopril/HCTZ 20-12 5mg daily  Here: Same without Lisinopril/HCTZ, and now amlodipine at night discontinued for hypotension   Monitor and adjust as appropriate  IM following and assisting with management  Pleural effusion  Assessment & Plan  Likely secondary to metastatic disease  CTA PE study on 1/17: New moderate L pleural effusion with moderate compressive atelectasis of left lower lobe  Currently maintaining on RA  Monitor spot pulse ox  Encourage pulmonary toileting  Continue Lasix 40mg daily  Dermatitis associated with moisture  Assessment & Plan  Soap, water to buttocks TID and PRN followed by barrier cream   Nystatin powder R groin    Chronic diastolic (congestive) heart failure (HCC)  Assessment & Plan  Wt Readings from Last 3 Encounters:   01/25/23 111 kg (245 lb 6 oz)   01/19/23 110 kg (243 lb 6 2 oz)   12/30/22 121 kg (265 lb 10 5 oz)     Seen on Echo during this hospitalization  S/P IV diuresis for exacerbation    Now back on room air   Closely monitor I/O, daily weights, volume status  Cardiac diet with fluid restriction  Continue: Lasix 40mg daily   - Previously on ACE, may be able to restart   - Was not on BB  Outpatient f/u with PCP  Chronic diarrhea  Assessment & Plan  Chronically on Lomotil  Monitor  Close continence care  Stage 3a chronic kidney disease [facility])  Assessment & Plan  Lab Results   Component Value Date    EGFR 74 01/23/2023    EGFR 59 01/19/2023    EGFR 61 01/18/2023    CREATININE 1 00 01/23/2023    CREATININE 1 21 01/19/2023    CREATININE 1 17 01/18/2023     Stage 2-3A  Crea baseline 1-1 3  Improved recently after ARIANNA 2/2 autoregulatory issues/congestion/volume overload  Now on Lasix daily monitor  Was on Lisinopril and HCTZ at home, currently being held  - Nephro says these can be restarted as necessary  Monitoring BP  Avoid nephrotoxic meds  Outpatient f/u     Metastatic renal cell carcinoma [facility])  Assessment & Plan  Originally 2007  Mets in 2013  On systemic therapy  S/p L nephrectomy and prostatectomy  Will need outpatient f/u with Oncology     Spine metastasis [facility])  Assessment & Plan  In most recent CT CAP in 2022 showed lytic/sclerotic lesions T4, T6, and scattered lesions in lumbar spine   NM bone scan in 2020 showed:  Scattered exophytic foci of radiotracer uptake in the spine corresponding to degenerative changes on CT  Ct C-Spine in 6/2022 showed no lesions  He has pelvic/iliac lytic lesions  Monitor neuro status and for worsening back pain  Pain management as below  Lung metastases ([address] )  Assessment & Plan  With recent AHRF in setting of volume overload and acute diastolic heart failure and ARIANNA  Now on room air  Has pleural effusion in addition  Closely monitor respiratory status  Pulmonary toilet  Outpatient f/u with Oncology    Bone metastases [facility])  Assessment & Plan  Monitor performance in therapy   If he develops new pain, particular in long bones, would image    - Since his fall has had new R hip pain primarily with weight bearing   - 2022 CT CAP showed lytic lesion superior to the right hip are identified as well as the posterior column of the acetabulum  - XR of R hip without significant changes  Better visualized on CT   2020 Bone Scan showed lytic lesions:   left lateral scapula inferior to the glenoid     right posterior and lateral ribs corresponding to expansile lytic lesions on CT  (recent Ct with expansive R 7th rib met)   left posterior iliac bone and right iliac wing corresponding to lytic lesions on CT    r nonspecific tibial finding    Outpatient f/u with Dr Pace Asp with Ortho onc  Health Maintenance  #Delirium/Sleep: At risk  Environmental interventions  Optimize pain, bowel, bladder, sleep-wake cycle management  #Pain: Tylenol PRN, Gabapentin 300mg TID, Oxycodone 5mg PRN severe pain, tramadol 50mg PRN moderate pain  #Bowel: Last BM 1/23/2023, incontinent and watery  Chronic diarrhea, see above  #Bladder: Voiding and continent  #Skin/Pressure Injury Prevention: Turn Q2hr in bed, with weight shifts [id number] in wheelchair  Float heels in bed  #DVT Prophylaxis: HSQ,SCDs  #GI Prophylaxis: Not indicated  #Code Status:  Full Code  #FEN: Cardiac diet with 1500cc fluid restriction  #Dispo:  ELOS 7-14 days with goal to discharge home  May need some assistance given restriction in LUE, but will try to optimize/maximize his functional independence  Objective:    Functional Update:  PT: CS-CGA for bed mobility, transfers, ambulation 150' with Loretta Granville with one hand rail  Backs down  OT:  Min-modA    Allergies per EMR    Physical Exam:  Temp:  [98 °F (36 7 °C)-98 7 °F (37 1 °C)] 98 7 °F (37 1 °C)  HR:  [] 99  Resp:  [18] 18  BP: (117-133)/(60-67) 117/60  Oxygen Therapy  SpO2: 90 %  O2 Flow Rate (L/min): 2 L/min    Gen: No acute distress, Well-nourished, well-appearing  HEENT: Moist mucus membranes, Normocephalic/Atraumatic  Cardiovascular: Regular rate, rhythm, S1/S2  Distal pulses palpable  Heme/Extr: Improved LE edema  Pulmonary: Non-labored breathing  Lungs CTAB  : No williamson  GI: Soft, non-tender, non-distended  BS+  MSK: Full strength in distal LUE  Integumentary: Skin is warm, dry  Neuro: AAOx3, decreased sensation to light touch in C3, C4 distribution  Intact to radial/axillary posterior arm, LABC, MABC, M/R/U more distally  Psych: Normal mood and affect       Diagnostic Studies: Reviewed, no new imaging    Laboratory:  Reviewed   Results from last 7 days   Lab Units 01/23/23  0637 01/19/23  0539   HEMOGLOBIN g/dL 11 5* 11 5*   HEMATOCRIT % 38 5 38 0   WBC Thousand/uL 6 46 6 81     Results from last 7 days   Lab Units 01/23/23  0637 01/19/23  0539   BUN mg/dL 19 24   POTASSIUM mmol/L 4 5 4 4   CHLORIDE mmol/L 100 100   CREATININE mg/dL 1 00 1 21            Patient Active Problem List   Diagnosis   • Clear cell adenocarcinoma of kidney, left (HCC)   • Bone metastases (HCC)   • Lung metastases (HCC)   • Spine metastasis ([facility] [address] )   • Metastatic renal cell carcinoma (HCC)   • Hx of prostatectomy   • History of prostate cancer   • Azotemia   • COVID-19   • Stage 3a chronic kidney disease ([facility] [address] )   • H/O left nephrectomy   • Fall at home, initial encounter   • 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   • Acute respiratory failure with hypoxia (HCC)   • Elevated brain natriuretic peptide (BNP) level   • Elevated d-dimer   • Chronic diarrhea   • Chronic diastolic (congestive) heart failure (HCC)   • Dermatitis associated with moisture   • Pleural effusion   • HTN (hypertension)   • Neuropathy         Medications  Current Facility-Administered Medications   Medication Dose Route Frequency Provider Last Rate   • acetaminophen  975 mg Oral 4x Daily PRN Rossy Plunkett MD     • aluminum-magnesium hydroxide-simethicone  30 mL Oral Q6H PRN Rossy Plunkett MD     • cholecalciferol  1,000 Units Oral Daily Rossy Plunkett MD     • diphenhydrAMINE  25 mg Oral HS PRN Rossy Plunkett MD     • furosemide  40 mg Oral Daily Bryant Segura MD     • gabapentin  300 mg Oral TID Bryant Segura MD     • heparin (porcine)  5,000 Units Subcutaneous [facility] Bryant Segura MD     • lidocaine  1 patch Topical Daily IRENE Olmos     • loperamide  2 mg Oral 4x Daily PRN Bryant Segura MD     • multivitamin stress formula  1 tablet Oral Daily Bryant Segura MD     • nystatin   Topical BID Bryant Segura MD     • ondansetron  4 mg Oral Q6H PRN Bryant Segura MD     • oxyCODONE  5 mg Oral 4x Daily PRN Jose Juan Law MD     • oxyCODONE  2 5 mg Oral 4x Daily PRN Jose Juan Law MD     • oxyCODONE  7 5 mg Oral Q4H PRN Jose Juan Law MD     • simethicone  80 mg Oral 4x Daily PRN Bryant Segura MD     • white petrolatum-mineral oil   Topical BID IRENE Olmos            ** Please Note: Fluency Direct voice to text software may have been used in the creation of this document   **

## 2023-01-25 NOTE — PCC CARE MANAGEMENT
1/25- Pt lives alone in 2 story home w/ 5 NATALIYA  Pt reports bedroom is on 2nd floor  Pt was independent with ADL IADLs prior to admission, pt has cane, commode, walker, shower seat and grab bars in home  Pt has family that are local and assist with things as needed  Prior to admission, pt was working part time at [employer] in [place]  Pt reports hx of acute rehab at [facility] in [place] and had [facility] in the past but does not recall which agency  Pt reports he receives his medication from the [place], PCP is Dr Jane Reyna  2/2- Pt anticipated dc Friday 2/3, w/ [facility] in home  Cm following for any further dc needs

## 2023-01-25 NOTE — PROGRESS NOTES
01/25/23 0830   Pain Assessment   Pain Assessment Tool 0-10   Pain Score 4   Pain Location/Orientation Orientation: Left; Location: Shoulder   Pain Radiating Towards none   Pain Onset/Description Onset: Ongoing   Patient's Stated Pain Goal No pain   Hospital Pain Intervention(s) Rest;Repositioned   Multiple Pain Sites No   Restrictions/Precautions   Precautions Fall Risk;Bed/chair alarms   Weight Bearing Restrictions Yes   LUE Weight Bearing Per Order (S)  NWB  (Ok for pendulums daily Only  No shoulder internal/external rotation  No shoulder abduction or flexion  No active or aarom at shoulder whatsoever nothing passive except for pendulums  Ok for ROM in elbow wrist hand  Absolutely no weightbearing )   Braces or Orthoses Sling   Oral Hygiene   Comment reports he completed with RN staff this AM   Shower/Bathe Self   Type of Assistance Needed Physical assistance   Physical Assistance Level 25% or less   Comment completed shower this session on tub bench  Pt able to use RUE to wash UB, uppoer thighs  G fx rech to wash legs  Pt placed wash cloth n floor to wash feet  Req CGA in stance when washing ramiro area  Therapist assisted with buttock hygiene, educ that he can use toielt aid  Req A to wash RUE 2* to LUE NWB  Throughout shower pt req vc to not use LUE   Shower/Bathe Self CARE Score 3   Bathing   Assessed Bath Style Shower   Anticipated D/C Bath Style Sponge Bath   Able to [place] No   Able to [employer] Temperature No   Able to Wash/Rinse/Dry (body part) Left Arm;L Upper Leg;R Upper Leg;L Lower Leg/Foot;R Lower Leg/Foot; Abdomen; Chest;Perineal Area   Limitations Noted in Balance; Endurance;Strength;Timeliness   Positioning Seated;Standing   Adaptive Equipment Shower Bars;Tub Bench;Hand Held Shower   Tub/Shower Transfer   Limitations Noted In Balance; Endurance;UE Strength;LE Strength   Adaptive Equipment Grab Bars   Assessed Shower   Upper Body Dressing   Type of Assistance Needed Supervision   Comment seated pt able to don shirt and sling   Upper Body Dressing CARE Score 4   Lower Body Dressing   Type of Assistance Needed Physical assistance   Physical Assistance Level 25% or less   Comment seated utilized LHR to don undergarment/pants  A for CM on L side only   Lower Body Dressing CARE Score 3   Putting On/Taking Off Footwear   Type of Assistance Needed Physical assistance   Physical Assistance Level 51%-75%   Comment TA for TEDS  completed don sneakers seated with leg lifted on bed  Pt able to don R sneaker, assistance for laces  Pt req TA for L AFO and sneaker   Putting On/Taking Off Footwear CARE Score 2   Sit to Stand   Type of Assistance Needed Incidental touching; Adaptive equipment   Comment CGA with QC   Sit to Stand CARE Score 4   Bed-Chair Transfer   Type of Assistance Needed Incidental touching; Adaptive equipment   Comment CGA with QC   Chair/Bed-to-Chair Transfer CARE Score 4   Toileting Hygiene   Type of Assistance Needed Physical assistance   Physical Assistance Level 25% or less   Comment req A for buttock after BM  pt reporting son has purchased toilet aid   Toileting Hygiene CARE Score 3   Toilet Transfer   Type of Assistance Needed Incidental touching   Comment CGA with use of QC  pt to use BSC on first floor   Toilet Transfer CARE Score 4   Toilet Transfer   Surface Assessed Standard Toilet   Transfer Technique Standard   Limitations Noted In Balance; Endurance;UE Strength;LE Strength   Adaptive Equipment Grab Bar;Quad Cane   Positioning Concerns LE Support   Cognition   Overall Cognitive Status WFL   Arousal/Participation Alert; Cooperative   Attention Within functional limits   Orientation Level Oriented X4   Memory Within functional limits   Following Commands Follows all commands and directions without difficulty   Activity Tolerance   Activity Tolerance Patient tolerated treatment well   Medical Staff Made Aware /60   Assessment   Treatment Assessment Engaged pt in 90mins of skilled OT services with focus on ADL routine  Upon arrival pt reporting son has purchased toilet ait  Overall pt req Barry for ADLs and modA for footwear 2* to L AFO  Pt rpeorting he spoke with neighbor but she is workign 5x/wk and can assist  Pt reporting sylvia nd grandson can assist  Reports grandson can assist with showers and TEDS, will need to confirm with family  Plan is for pt to have BSC on first floor 2* to steps  Overall pt is making steady gains  However, pt has dec carryover of LUE NWB prec and req constanc vc to no use during bathing/dressing  Cont OT POC with focus on activity tolerance, balance, UE TE, kitchen mobility to inc fx performance and independence and prevent falls  Prognosis Fair   Problem List Decreased strength;Decreased range of motion;Decreased endurance; Impaired balance;Decreased mobility; Impaired sensation;Orthopedic restrictions;Pain   Barriers to Discharge Decreased caregiver support; Inaccessible home environment   Plan   Treatment/Interventions ADL retraining;Functional transfer training; Therapeutic exercise; Endurance training;Patient/family training;Bed mobility; Compensatory technique education   Progress Progressing toward goals   Recommendation   OT Discharge Recommendation Home with home health rehabilitation   Equipment Recommended Bedside commode  (toilet aid, reacher, [place] [address])   Commode Type Standard   OT Equipment ordered Pt has BSC, reacher and [place] [address] at home  Reports son has purchased toielt aid and will bring in when arrived   OT Therapy Minutes   OT Time In 0830   OT Time Out 1000   OT Total Time (minutes) 90   OT Mode of treatment - Individual (minutes) 90   OT Mode of treatment - Concurrent (minutes) 0   OT Mode of treatment - Group (minutes) 0   OT Mode of treatment - Co-treat (minutes) 0   OT Mode of Treatment - Total time(minutes) 90 minutes   OT Cumulative Minutes 420   Therapy Time missed   Time missed?  No

## 2023-01-25 NOTE — PLAN OF CARE
Problem: GASTROINTESTINAL - ADULT  Goal: Maintains or returns to baseline bowel function  Description: INTERVENTIONS:  - Assess bowel function  - Encourage oral fluids to ensure adequate hydration  - Administer IV fluids if ordered to ensure adequate hydration  - Administer ordered medications as needed  - Encourage mobilization and activity  - Consider nutritional services referral to assist patient with adequate nutrition and appropriate food choices  Outcome: Progressing     Problem: SKIN/TISSUE INTEGRITY - ADULT  Goal: Skin Integrity remains intact(Skin Breakdown Prevention)  Description: Assess:  -Perform Bert assessment every shift  -Clean and moisturize skin every day  -Inspect skin when repositioning, toileting, and assisting with ADLS  -Assess extremities for adequate circulation and sensation     Bed Management:  -Have minimal linens on bed & keep smooth, unwrinkled  -Change linens as needed when moist or perspiring  -Avoid sitting or lying in one position for more than  hours while in bed  -Keep HOB at degrees     Toileting:  -Offer bedside commode  -Assess for incontinence every   -Use incontinent care products after each incontinent episode such as     Activity:  -Mobilize patient 3 times a day  -Encourage activity and walks on unit  -Encourage or provide ROM exercises   -Turn and reposition patient every Hours  -Use appropriate equipment to lift or move patient in bed  -Instruct/ Assist with weight shifting every  when out of bed in chair  -Consider limitation of chair time  hour intervals    Skin Care:  -Avoid use of baby powder, tape, friction and shearing, hot water or constrictive clothing  -Relieve pressure over bony prominences using   -Do not massage red bony areas    Next Step  -Teach patient strategies to minimize risks such as RW  -Consider consults to  interdisciplinary teams such as   Outcome: Progressing

## 2023-01-25 NOTE — ASSESSMENT & PLAN NOTE
Diagnosed in 2007 with metastatic disease in 2013  Hx of L nephrectomy in 2007 and prostatectomy  Progressive disease  Newly started on Nivolumab in 12/022  Follows with Dr Roberto Carlos Kaur (Oncology) as outpatient - follow-up scheduled for February

## 2023-01-25 NOTE — PCC PHYSICAL THERAPY
Pt  Is a 68 y/o male who was admitted to [facility] on 1/12 with L shoulder pain after a fall with arm abducted  XRs of left shoulder, humerus, scapula, and clavicle showed lytic metastatic tumor mass within the scapular neck and glenoid with no acute fractures or dislocations  CT was ordered and he was made NWB by Ortho  CT showed large pathologic scapula fracture with minimal displacement and a large lytic lesion in glenoid vault  Ortho discussed with Antoni De Dios - Dr Kelley Salter who recommended sling for comfort and non-surgical management  PMHx that can affect progress includes localized RCC with METS on 2013 , L nephrectomoy and protatectomy , chronic diarrhea and CKD and chronic low back pain and bone METS (see H&P)  PT evaluation completed on 1/20 and pt  Demonstrate good progress since then  Pt  Currently CGA with functional mobility using WBQC and ambulate up to 150 feet and min A with steps management up to 8 steps  Pt  Was fully indep PTA and was working par time  He lives in a [address] [facility] but can be on fist floor set up using BSC at d/c  Pt  Has 1 curb like step + 4 steps for NATALIYA with R HR  Pt  Has a son and grandson that live close by that can assist prn  Pt  Current barrier is NATALIYA, L UE NWB, L chronic foot drop which is address with AFO trial and dec caregiver support  Pt  Will benefit from skillled PT to maximized functional independence and be able to reach mod I level for mobility with LRAD  Planning for Tentative d/c next week  2/1/23  Pt made good progress towards mod I goals, granted IRPs with [facility] AND [place]  AFO to be delivered for DC home Friday  Son to participate in FT prior to DC  [facility]  May need A on curb step outside of home pending progress and confidence  Plan for DC home Friday with in home out pt PT recommended to follow

## 2023-01-25 NOTE — ASSESSMENT & PLAN NOTE
Hx of SBRT to T6, T10, and T4 between 1641-9832  Encourage adequate pain control  Follows with Dr Luisito Stallings (Oncology) as outpatient

## 2023-01-25 NOTE — PROGRESS NOTES
[facility]  Progress Note - Kevin Rojas 1950, 67 y o  male MRN: [id number]  Unit/Bed#: Gladis Garcia 048-10 Encounter: [id number]  Primary Care Provider: Chaim Serrato MD   Date and time admitted to hospital: 1/19/2023  3:45 PM    Nocturnal hypoxemia  Assessment & Plan  States that he was told he had sleep apnea in the past during hospitalizations but never had formal work-up  Has been requiring O2 at night  Overnight noc ox performed on 1/24 and showed desat <89% for 8 hours and 12 minutes, as low as 74%  Apply 2L O2 at HS  Repeat study prior to discharge for DME evaluation  Would benefit from Sleep Medicine as outpatient  Neuropathy  Assessment & Plan  Multifactorial  Continue home gabapentin 300mg TID  HTN (hypertension)  Assessment & Plan  Home regimen: amlodipine 5mg daily and lisinopril/HCTZ 20-12 5mg BID  Currently receiving Lasix 40mg daily  Discontinue amlodipine on 1/23 due to not receiving Lasix due to holding parameters not being met  Lisinopril and HCTZ held after receiving CTA on 1/17  Restart as able  Monitor BP with routine VS     Pleural effusion  Assessment & Plan  Likely secondary to metastatic disease  CTA PE study on 1/17: New moderate L pleural effusion with moderate compressive atelectasis of left lower lobe  Currently maintaining on RA  Monitor spot pulse ox  Encourage pulmonary toileting  Continue Lasix 40mg daily  Chronic diastolic (congestive) heart failure (HCC)  Assessment & Plan  Wt Readings from Last 3 Encounters:   01/25/23 111 kg (245 lb 6 oz)   01/19/23 110 kg (243 lb 6 2 oz)   12/30/22 121 kg (265 lb 10 5 oz)     ECHO on 1/16 showed EF 65%, no RWMA, G1DD  Treated with Lasix in acute setting for volume overload  BNP 4475 on admission  Continue Lasix 40mg daily  Monitor daily weights and I&Os  Continue 1500FR  Chronic diarrhea  Assessment & Plan  Continue home PRN Imodium  Monitor skin for breakdown      Stage 3a chronic kidney disease [facility])  Assessment & Plan  Lab Results   Component Value Date    EGFR 74 01/23/2023    EGFR 59 01/19/2023    EGFR 61 01/18/2023    CREATININE 1 00 01/23/2023    CREATININE 1 21 01/19/2023    CREATININE 1 17 01/18/2023     Creatinine currently 1 00  Baseline creatinine 1 0-1 3  Hx of L nephrectomy  Developed ARIANNA in acute setting due to autoregulatory failure/volume overload/congestion  Currently on Lasix 40mg daily  Avoid nephrotoxins as able  Encourage adequate hydration  Continue to trend BMP  Metastatic renal cell carcinoma ([facility] [address] )  Assessment & Plan  Diagnosed in 2007 with metastatic disease in 2013  Hx of L nephrectomy in 2007 and prostatectomy  Progressive disease  Newly started on Nivolumab in 12/022  Follows with Dr Shaorn Pierce (Oncology) as outpatient - follow-up scheduled for February  [facility] )  Assessment & Plan  Hx of SBRT to T6, T10, and T4 between 3142-1777  Encourage adequate pain control  Follows with Dr Sharon Pierce (Oncology) as outpatient  Lung metastases [facility])  Assessment & Plan  CTA PE study on 1/17: Enlargement of R lower lobe metastases  Developed hypoxia in acute setting - receiving diuretics with improvement  Currently maintaining on RA  Monitor spot pulse ox  Continue Lasix 40mg daily (held over the weekend due to parameters)  Discontinue amlodipine at this time to continue Lasix  Encourage pulmonary toileting and IS use  Follows with Dr Sharon Pierce as outpatient  Bone metastases [facility])  Assessment & Plan  Diagnosed with metastatic renal cell carcinoma in 2007  Progressive disease  Bone scan in 2020 showed lytic lesions to L scapula, R posterior and lateral ribs, L posterior iliac bone, and R iliac wing  Most recently started on Nivolumab in 12/2022  Ensure adequate pain control  Continue home Vitamin D supplementation  Follows with Dr Sharon Pierce (Oncology) as outpatient      * 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  Assessment & Plan  Presented on  after a mechanical fall with L shoulder pain  CT LUE: lytic metastasis int he scapular glenoid process measuring 2 9 x 2 4 x 2 0cm with cortical breakthrough and extraosseous extension anteriorly without pathologic fracture  New 2 4 x 2 1 x 1 9cm lytic metastasis in the L scapular body with acute pathologic fracture  New 1 9x0 8x1 4cm lytic metastasis in the inferior scapular angle with pathologic fracture  Non-surgical intervention per Ortho  NWB to LUE  Sling for comfort  Neurovascular checks Q shift  Ensure adequate pain control  Follow-up with Dr Lisseth Barton (Orthopedics) in 4 weeks  Primary team following  PT/OT  VTE Pharmacologic Prophylaxis:   Pharmacologic: Heparin  Mechanical VTE Prophylaxis in Place: Yes - sequential compression devices  Current Length of Stay: 6 day(s)    Current Patient Status: Inpatient Rehab     Discharge Plan: As per primary team     Code Status: Level 1 - Full Code    Subjective:   Pt examined while pt sitting in recliner in pt room  Complaints of burning sensation from the L scapula area to axillary area  Started yesterday evening and continued through today  Does not occur constantly but is intermittent  States that he had a similar sensation prior to his fall which felt like "cramping" in between his neck and L shoulder  Will order Lidoderm patch at this time  Primary team aware and considering imaging  Does admit to decreased sensation in the L scapula  Denies any R sided hip pain today  Did not sleep well last night due to the L shoulder pain  Denies any lightheadedness, dizziness, SOB, or palpitations  Discussed applying O2 at HS and needed to follow-up with Sleep Medicine as outpatient and pt is slightly hesitant about CPAP in the future      Objective:     Vitals:   Temp (24hrs), Av 2 °F (36 8 °C), Min:98 °F (36 7 °C), Max:98 7 °F (37 1 °C)    Temp:  [98 °F (36 7 °C)-98 7 °F (37 1 °C)] 98 7 °F (37 1 °C)  HR:  [] 99  Resp:  [18] 18  BP: (117-133)/(60-67) 117/60  SpO2:  [90 %] 90 %  Body mass index is 35 21 kg/m²  Review of Systems   Constitutional: Negative for appetite change, chills, fatigue and fever  HENT: Negative for trouble swallowing  Eyes: Negative for visual disturbance  Respiratory: Negative for cough, chest tightness, shortness of breath, wheezing and stridor  Cardiovascular: Negative for chest pain, palpitations and leg swelling  Gastrointestinal: Negative for abdominal distention, abdominal pain, constipation, diarrhea, nausea and vomiting  LBM 1/23   Genitourinary: Negative for difficulty urinating  Musculoskeletal: Negative for arthralgias and back pain  Burning sensation from L scapula to axillary area  Started yesterday evening, intermittent  Worse with lying down  Had cramping sensation in the past prior to his fall  Decreased sensation of L shoulder area  Neurological: Negative for dizziness, weakness, light-headedness, numbness and headaches  Psychiatric/Behavioral: Negative for dysphoric mood and sleep disturbance  The patient is not nervous/anxious  All other systems reviewed and are negative  Input and Output Summary (last 24 hours): Intake/Output Summary (Last 24 hours) at 1/25/2023 0945  Last data filed at 1/25/2023 5953  Gross per 24 hour   Intake 650 ml   Output 800 ml   Net -150 ml       Physical Exam:     Physical Exam  Vitals and nursing note reviewed  Constitutional:       General: He is not in acute distress  Appearance: Normal appearance  He is not ill-appearing  HENT:      Head: Normocephalic and atraumatic  Cardiovascular:      Rate and Rhythm: Normal rate and regular rhythm  Pulses: Normal pulses  Heart sounds: Murmur heard  Systolic murmur is present with a grade of 1/6  No friction rub  Pulmonary:      Effort: Pulmonary effort is normal  No respiratory distress  Breath sounds: Normal breath sounds  No wheezing or rhonchi  Abdominal:      General: Abdomen is flat  Bowel sounds are normal  There is no distension  Palpations: Abdomen is soft  There is no mass  Tenderness: There is no abdominal tenderness  There is no guarding or rebound  Hernia: No hernia is present  Musculoskeletal:      Cervical back: Normal range of motion and neck supple  No tenderness  Right lower leg: Edema (lymphedema) present  Left lower leg: Edema (lymphedema) present  Comments: LUE in sling   Skin:     General: Skin is warm and dry  Capillary Refill: Capillary refill takes less than 2 seconds  Neurological:      Mental Status: He is alert and oriented to person, place, and time     Psychiatric:         Mood and Affect: Mood normal          Behavior: Behavior normal          Additional Data:     Labs:    Results from last 7 days   Lab Units 01/23/23  0637   WBC Thousand/uL 6 46   HEMOGLOBIN g/dL 11 5*   HEMATOCRIT % 38 5   PLATELETS Thousands/uL 240   NEUTROS PCT % 69   LYMPHS PCT % 21   MONOS PCT % 8   EOS PCT % 2     Results from last 7 days   Lab Units 01/23/23  0637   SODIUM mmol/L 136   POTASSIUM mmol/L 4 5   CHLORIDE mmol/L 100   CO2 mmol/L 35*   BUN mg/dL 19   CREATININE mg/dL 1 00   ANION GAP mmol/L 1*   CALCIUM mg/dL 8 7   GLUCOSE RANDOM mg/dL 94         Results from last 7 days   Lab Units 01/21/23  0600   POC GLUCOSE mg/dl 97               Labs reviewed    Imaging:    Imaging reviewed    Recent Cultures (last 7 days):           Last 24 Hours Medication List:   Current Facility-Administered Medications   Medication Dose Route Frequency Provider Last Rate   • acetaminophen  975 mg Oral 4x Daily PRN Rossy Plunkett MD     • aluminum-magnesium hydroxide-simethicone  30 mL Oral Q6H PRN Rossy Plunkett MD     • cholecalciferol  1,000 Units Oral Daily Rossy Plunkett MD     • diphenhydrAMINE  25 mg Oral HS PRN Rossy Plunkett MD     • furosemide  40 mg Oral Daily Etelvina Yepez MD     • gabapentin  300 mg Oral TID Etelvina Yepez MD     • heparin (porcine)  5,000 Units Subcutaneous [facility] Etelvina Yepez MD     • lidocaine  1 patch Topical Daily Claudell Gu[place], CRNP     • loperamide  2 mg Oral 4x Daily PRN Etelvina Yepez MD     • multivitamin stress formula  1 tablet Oral Daily Etelvina Yepez MD     • nystatin   Topical BID Etelvina Yepez MD     • ondansetron  4 mg Oral Q6H PRN Etelvina Yepez MD     • oxyCODONE  5 mg Oral 4x Daily PRN Aaron Gold MD     • oxyCODONE  2 5 mg Oral 4x Daily PRN Aaron Gold MD     • oxyCODONE  7 5 mg Oral Q4H PRN Aaron Gold MD     • simethicone  80 mg Oral 4x Daily PRN Etelvina Yepez MD     • white petrolatum-mineral oil   Topical BID Claudell [place], CRNP          M*Modal software was used to dictate this note  It may contain errors with dictating incorrect words or incorrect spelling  Please contact the provider directly with any questions

## 2023-01-25 NOTE — TELEPHONE ENCOUNTER
Called and left voicemail  Patient to schedule ARIANNA follow up with first available provider once patient discharged  Patient should have BMP prior to office visit which has already been ordered

## 2023-01-25 NOTE — PROGRESS NOTES
01/25/23 1300   Pain Assessment   Pain Score 5   Pain Location/Orientation Orientation: Left; Location: Shoulder   Hospital Pain Intervention(s) Rest   Restrictions/Precautions   Precautions Bed/chair alarms; Fall Risk;Pain   LUE Weight Bearing Per Order NWB   LLE Weight Bearing Per Order   (pendulum only on shoulder, elbow and wirst ok for ROM)   Braces or Orthoses Other (Comment)  (AFO)   Cognition   Arousal/Participation Alert; Cooperative   Attention Within functional limits   Memory Within functional limits   Following Commands Follows all commands and directions without difficulty   Subjective   Subjective Pt  has no new complaints  WOuld like to try skechers sneakers this session with AFO   Sit to Stand   Type of Assistance Needed Incidental touching; Adaptive equipment;Supervision   Comment CS/ CGA with WBQC   Sit to Stand CARE Score 4   Transfer Bed/Chair/Wheelchair   Adaptive Equipment [employer]   Walk 10 Feet   Type of Assistance Needed Incidental touching; Adaptive equipment   Comment using WBQC   Walk 10 Feet CARE Score 4   Walk 50 Feet with Two Turns   Type of Assistance Needed Incidental touching; Adaptive equipment   Comment using WBQC   Walk 50 Feet with Two Turns CARE Score 4   Walk 150 Feet   Type of Assistance Needed Incidental touching; Adaptive equipment   Comment using WBQC   Walk 150 Feet CARE Score 4   Ambulation   Primary Mode of Locomotion Prior to Admission Walk   Distance Walked (feet) 150 ft  (and short distances in room from toilet and from bedrrom door)   Assist Device Sander Arguello   Gait Pattern Antalgic;Decreased foot clearance; Improper weight shift; Step through  (dec stride length)   Limitations Noted In Endurance;Balance   Provided Assistance with: Balance   Walk Assist Level Contact Guard   Does the patient walk? 2  Yes   Curb or Single Stair   Style negotiated Single stair   Type of Assistance Needed Incidental touching; Adaptive equipment   Comment using R HR up and same handrail down, descended backwards   1 Step (Curb) CARE Score 4   4 Steps   Type of Assistance Needed Incidental touching; Adaptive equipment   Comment using R HR up and same handrail down, descended backwards   4 Steps CARE Score 4   12 Steps   Reason if not Attempted Safety concerns   12 Steps CARE Score 88   Stairs   Type Stairs   # of Steps 8   Weight Bearing Precautions NWB;LUE   Assist Devices Single Rail   Findings using R HR up and same handrail down, descended backwards, much smoother this session   Toilet Transfer   Type of Assistance Needed Incidental touching; Adaptive equipment   Toilet Transfer CARE Score 4   Toilet Transfer   Surface Assessed Standard Toilet   Adaptive Equipment Grab Bar   Findings max A with hygiene and min A with pants management   Therapeutic Interventions   Flexibility seated add squeeze and hip abd with yellow TB   Equipment Use   NuStep Level 1 X 10 mins B LE and R UE   Assessment   Treatment Assessment Pt  engaged in 60 mins session and was able to participate well in above activities  Pt  had a mild LOB taking a step after getting up from recliner needing min A to recover balance  Pt  wanted to do steps again this afternoon and did much better vs did morning with only CGA  Pt  expressed that he might be upstairs most of the days and will be downstairs some parts of the day  Reminded pt  that he must have somebody with him on the steps, and for him to be on first floor when he is all alone from a fire anila perspective  Pt  able to demonstrate understanding  Skechers sneakers (slip on) fitted well with L AFO and pt  would prefer to use this because it is easy to slip on  Pt  back to recliner at end of session with alarms on and call bell in place  Problem List Decreased strength;Decreased endurance; Impaired balance;Pain;Orthopedic restrictions;Decreased coordination   Barriers to Discharge Inaccessible home environment;Decreased caregiver support   PT Barriers   Functional Limitation Car transfers;Stair negotiation;Standing;Transfers; Walking   Recommendation   PT Discharge Recommendation Home with home health rehabilitation   Equipment Recommended   [employer] which pt  will personally purchase  Please follow up prior to d/c )   PT Therapy Minutes   PT Time In 1300   PT Time Out 1400   PT Total Time (minutes) 60   PT Mode of treatment - Individual (minutes) 60   PT Mode of treatment - Concurrent (minutes) 0   PT Mode of treatment - Group (minutes) 0   PT Mode of treatment - Co-treat (minutes) 0   PT Mode of Treatment - Total time(minutes) 60 minutes   PT Cumulative Minutes 510   Therapy Time missed   Time missed?  No

## 2023-01-26 ENCOUNTER — APPOINTMENT (OUTPATIENT)
Dept: MRI IMAGING | Facility: HOSPITAL | Age: 73
End: 2023-01-26

## 2023-01-26 PROBLEM — M48.02 FORAMINAL STENOSIS OF CERVICAL REGION: Status: ACTIVE | Noted: 2023-01-26

## 2023-01-26 LAB
ANION GAP SERPL CALCULATED.3IONS-SCNC: 4 MMOL/L (ref 5–14)
BASOPHILS # BLD AUTO: 0.01 THOUSANDS/ÂΜL (ref 0–0.1)
BASOPHILS NFR BLD AUTO: 0 % (ref 0–1)
BUN SERPL-MCNC: 18 MG/DL (ref 5–25)
CALCIUM SERPL-MCNC: 9 MG/DL (ref 8.4–10.2)
CHLORIDE SERPL-SCNC: 101 MMOL/L (ref 96–108)
CO2 SERPL-SCNC: 34 MMOL/L (ref 21–32)
CREAT SERPL-MCNC: 0.97 MG/DL (ref 0.7–1.5)
EOSINOPHIL # BLD AUTO: 0.13 THOUSAND/ÂΜL (ref 0–0.61)
EOSINOPHIL NFR BLD AUTO: 2 % (ref 0–6)
ERYTHROCYTE [DISTWIDTH] IN BLOOD BY AUTOMATED COUNT: 17.2 % (ref 11.6–15.1)
GFR SERPL CREATININE-BSD FRML MDRD: 77 ML/MIN/1.73SQ M
GLUCOSE P FAST SERPL-MCNC: 85 MG/DL (ref 70–99)
GLUCOSE SERPL-MCNC: 85 MG/DL (ref 70–99)
HCT VFR BLD AUTO: 37.1 % (ref 36.5–49.3)
HGB BLD-MCNC: 11.1 G/DL (ref 12–17)
IMM GRANULOCYTES # BLD AUTO: 0.01 THOUSAND/UL (ref 0–0.2)
IMM GRANULOCYTES NFR BLD AUTO: 0 % (ref 0–2)
LYMPHOCYTES # BLD AUTO: 1.3 THOUSANDS/ÂΜL (ref 0.6–4.47)
LYMPHOCYTES NFR BLD AUTO: 22 % (ref 14–44)
MCH RBC QN AUTO: 27.9 PG (ref 26.8–34.3)
MCHC RBC AUTO-ENTMCNC: 29.9 G/DL (ref 31.4–37.4)
MCV RBC AUTO: 93 FL (ref 82–98)
MONOCYTES # BLD AUTO: 0.47 THOUSAND/ÂΜL (ref 0.17–1.22)
MONOCYTES NFR BLD AUTO: 8 % (ref 4–12)
NEUTROPHILS # BLD AUTO: 4.11 THOUSANDS/ÂΜL (ref 1.85–7.62)
NEUTS SEG NFR BLD AUTO: 68 % (ref 43–75)
NRBC BLD AUTO-RTO: 0 /100 WBCS
PLATELET # BLD AUTO: 274 THOUSANDS/UL (ref 149–390)
PMV BLD AUTO: 10 FL (ref 8.9–12.7)
POTASSIUM SERPL-SCNC: 4.1 MMOL/L (ref 3.5–5.3)
RBC # BLD AUTO: 3.98 MILLION/UL (ref 3.88–5.62)
SODIUM SERPL-SCNC: 139 MMOL/L (ref 135–147)
WBC # BLD AUTO: 6.03 THOUSAND/UL (ref 4.31–10.16)

## 2023-01-26 RX ADMIN — B-COMPLEX W/ C & FOLIC ACID TAB 1 TABLET: TAB at 08:38

## 2023-01-26 RX ADMIN — ACETAMINOPHEN 975 MG: 325 TABLET ORAL at 15:11

## 2023-01-26 RX ADMIN — CHOLECALCIFEROL TAB 25 MCG (1000 UNIT) 1000 UNITS: 25 TAB at 08:38

## 2023-01-26 RX ADMIN — FUROSEMIDE 40 MG: 40 TABLET ORAL at 08:39

## 2023-01-26 RX ADMIN — GADOBUTROL 10 ML: 604.72 INJECTION INTRAVENOUS at 12:11

## 2023-01-26 RX ADMIN — GABAPENTIN 300 MG: 300 CAPSULE ORAL at 15:11

## 2023-01-26 RX ADMIN — LIDOCAINE 2 PATCH: 50 PATCH CUTANEOUS at 08:39

## 2023-01-26 RX ADMIN — OXYCODONE HYDROCHLORIDE 5 MG: 5 TABLET ORAL at 21:26

## 2023-01-26 RX ADMIN — Medication: at 08:40

## 2023-01-26 RX ADMIN — GABAPENTIN 300 MG: 300 CAPSULE ORAL at 21:22

## 2023-01-26 RX ADMIN — LORAZEPAM 1 MG: 0.5 TABLET ORAL at 09:45

## 2023-01-26 RX ADMIN — NYSTATIN: 100000 POWDER TOPICAL at 18:11

## 2023-01-26 RX ADMIN — NYSTATIN: 100000 POWDER TOPICAL at 08:41

## 2023-01-26 RX ADMIN — HEPARIN SODIUM 5000 UNITS: 5000 INJECTION INTRAVENOUS; SUBCUTANEOUS at 15:07

## 2023-01-26 RX ADMIN — Medication: at 21:22

## 2023-01-26 RX ADMIN — HEPARIN SODIUM 5000 UNITS: 5000 INJECTION INTRAVENOUS; SUBCUTANEOUS at 21:22

## 2023-01-26 RX ADMIN — HEPARIN SODIUM 5000 UNITS: 5000 INJECTION INTRAVENOUS; SUBCUTANEOUS at 06:05

## 2023-01-26 RX ADMIN — OXYCODONE HYDROCHLORIDE 5 MG: 5 TABLET ORAL at 08:56

## 2023-01-26 RX ADMIN — GABAPENTIN 300 MG: 300 CAPSULE ORAL at 08:38

## 2023-01-26 NOTE — ASSESSMENT & PLAN NOTE
Lab Results   Component Value Date    EGFR 77 01/26/2023    EGFR 74 01/23/2023    EGFR 59 01/19/2023    CREATININE 0 97 01/26/2023    CREATININE 1 00 01/23/2023    CREATININE 1 21 01/19/2023     Creatinine currently 0 97  Baseline creatinine 1 0-1 3  Hx of L nephrectomy  Developed ARIANNA in acute setting due to autoregulatory failure/volume overload/congestion  Currently on Lasix 40mg daily  Avoid nephrotoxins as able  Encourage adequate hydration  Continue to trend BMP

## 2023-01-26 NOTE — PROGRESS NOTES
[facility]  Progress Note - Cem Standard 1950, 67 y o  male MRN: [id number]  Unit/Bed#: [facility] 119-40 Encounter: [id number]  Primary Care Provider: William Kline MD   Date and time admitted to hospital: 1/19/2023  3:45 PM    Nocturnal hypoxemia  Assessment & Plan  States that he was told he had sleep apnea in the past during hospitalizations but never had formal work-up  Has been requiring O2 at night  Overnight noc ox performed on 1/24 and showed desat <89% for 8 hours and 12 minutes, as low as 74%  Apply 2L O2 at HS  Repeat study prior to discharge for DME evaluation  Would benefit from Sleep Medicine as outpatient  Neuropathy  Assessment & Plan  Multifactorial  Continue home gabapentin 300mg TID  HTN (hypertension)  Assessment & Plan  Home regimen: amlodipine 5mg daily and lisinopril/HCTZ 20-12 5mg BID  Currently receiving Lasix 40mg daily  Discontinue amlodipine on 1/23 due to not receiving Lasix due to holding parameters not being met  Lisinopril and HCTZ held after receiving CTA on 1/17  Restart as able  Monitor BP with routine VS     Pleural effusion  Assessment & Plan  Likely secondary to metastatic disease  CTA PE study on 1/17: New moderate L pleural effusion with moderate compressive atelectasis of left lower lobe  Currently maintaining on RA  Monitor spot pulse ox  Encourage pulmonary toileting  Continue Lasix 40mg daily  Chronic diastolic (congestive) heart failure (HCC)  Assessment & Plan  Wt Readings from Last 3 Encounters:   01/26/23 110 kg (242 lb 12 8 oz)   01/19/23 110 kg (243 lb 6 2 oz)   12/30/22 121 kg (265 lb 10 5 oz)     ECHO on 1/16 showed EF 65%, no RWMA, G1DD  Treated with Lasix in acute setting for volume overload  BNP 4475 on admission  Continue Lasix 40mg daily  Monitor daily weights and I&Os  Continue 1500FR  Chronic diarrhea  Assessment & Plan  Continue home PRN Imodium  Monitor skin for breakdown      Stage 3a chronic kidney disease [facility])  Assessment & Plan  Lab Results   Component Value Date    EGFR 77 01/26/2023    EGFR 74 01/23/2023    EGFR 59 01/19/2023    CREATININE 0 97 01/26/2023    CREATININE 1 00 01/23/2023    CREATININE 1 21 01/19/2023     Creatinine currently 0 97  Baseline creatinine 1 0-1 3  Hx of L nephrectomy  Developed ARIANNA in acute setting due to autoregulatory failure/volume overload/congestion  Currently on Lasix 40mg daily  Avoid nephrotoxins as able  Encourage adequate hydration  Continue to trend BMP  Metastatic renal cell carcinoma ([facility] [address] )  Assessment & Plan  Diagnosed in 2007 with metastatic disease in 2013  Hx of L nephrectomy in 2007 and prostatectomy  Progressive disease  Newly started on Nivolumab in 12/022  Follows with Dr Lencho Mathews (Oncology) as outpatient - follow-up scheduled for February  [facility]  Assessment & Plan  Hx of SBRT to T6, T10, and T4 between 0211-9889  Encourage adequate pain control  Follows with Dr Lencho Mathews (Oncology) as outpatient  Lung metastases [facility])  Assessment & Plan  CTA PE study on 1/17: Enlargement of R lower lobe metastases  Developed hypoxia in acute setting - receiving diuretics with improvement  Currently maintaining on RA  Monitor spot pulse ox  Continue Lasix 40mg daily (held over the weekend due to parameters)  Discontinue amlodipine at this time to continue Lasix  Encourage pulmonary toileting and IS use  Follows with Dr Lencho Mathews as outpatient  Bone metastases [facility])  Assessment & Plan  Diagnosed with metastatic renal cell carcinoma in 2007  Progressive disease  Bone scan in 2020 showed lytic lesions to L scapula, R posterior and lateral ribs, L posterior iliac bone, and R iliac wing  Most recently started on Nivolumab in 12/2022  Ensure adequate pain control  Continue home Vitamin D supplementation  Follows with Dr Lencho Mathews (Oncology) as outpatient      * 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  Assessment & Plan  Presented on  after a mechanical fall with L shoulder pain  CT LUE: lytic metastasis int he scapular glenoid process measuring 2 9 x 2 4 x 2 0cm with cortical breakthrough and extraosseous extension anteriorly without pathologic fracture  New 2 4 x 2 1 x 1 9cm lytic metastasis in the L scapular body with acute pathologic fracture  New 1 9x0 8x1 4cm lytic metastasis in the inferior scapular angle with pathologic fracture  Non-surgical intervention per Ortho  NWB to LUE  Sling for comfort  Neurovascular checks Q shift  Ensure adequate pain control  Follow-up with Dr Romana Peacemaker (Orthopedics) in 4 weeks  Primary team following  PT/OT  VTE Pharmacologic Prophylaxis:   Pharmacologic: Heparin  Mechanical VTE Prophylaxis in Place: Yes - sequential compression devices  Current Length of Stay: 7 day(s)    Current Patient Status: Inpatient Rehab     Discharge Plan: As per primary team     Code Status: Level 1 - Full Code    Subjective:   Pt examined while pt sitting in recliner in pt room  Currently denies any lightheadedness, dizziness, SOB, or palpitations  Currently denies any L shoulder pain or R hip pain  Denies any further burning sensations  Denies any numbness/tingling  Asking about his lab results and medications - reviewed without any further questions  Had a BM last night without any issues  Has no other concerns or complaints at this time  Objective:     Vitals:   Temp (24hrs), Av 8 °F (37 1 °C), Min:98 °F (36 7 °C), Max:99 4 °F (37 4 °C)    Temp:  [98 °F (36 7 °C)-99 4 °F (37 4 °C)] 99 °F (37 2 °C)  HR:  [] 94  Resp:  [18] 18  BP: (108-120)/(56-60) 108/59  SpO2:  [90 %-95 %] 90 %  Body mass index is 34 84 kg/m²  Review of Systems   Constitutional: Negative for appetite change, chills, fatigue and fever  HENT: Negative for trouble swallowing  Eyes: Negative for visual disturbance     Respiratory: Negative for cough, shortness of breath, wheezing and stridor  Cardiovascular: Negative for chest pain, palpitations and leg swelling  Gastrointestinal: Negative for abdominal distention, abdominal pain, constipation, diarrhea, nausea and vomiting  LBM 1/25   Genitourinary: Negative for difficulty urinating  Musculoskeletal: Negative for arthralgias, back pain and gait problem  Neurological: Negative for dizziness, weakness, light-headedness, numbness and headaches  Psychiatric/Behavioral: Negative for dysphoric mood and sleep disturbance  The patient is not nervous/anxious  All other systems reviewed and are negative  Input and Output Summary (last 24 hours): Intake/Output Summary (Last 24 hours) at 1/26/2023 0896  Last data filed at 1/26/2023 0745  Gross per 24 hour   Intake 710 ml   Output 1960 ml   Net -1250 ml       Physical Exam:     Physical Exam  Vitals and nursing note reviewed  Constitutional:       General: He is not in acute distress  Appearance: Normal appearance  He is obese  He is not ill-appearing  HENT:      Head: Normocephalic and atraumatic  Cardiovascular:      Rate and Rhythm: Normal rate and regular rhythm  Pulses: Normal pulses  Heart sounds: Murmur heard  Systolic murmur is present with a grade of 1/6  No friction rub  Pulmonary:      Effort: Pulmonary effort is normal  No respiratory distress  Breath sounds: Normal breath sounds  No wheezing or rhonchi  Abdominal:      General: Abdomen is flat  Bowel sounds are normal  There is no distension  Palpations: Abdomen is soft  There is no mass  Tenderness: There is no abdominal tenderness  There is no guarding or rebound  Hernia: No hernia is present  Musculoskeletal:      Cervical back: Normal range of motion and neck supple  No tenderness  Right lower leg: Edema (lymphedema) present  Left lower leg: Edema (lymphedema) present  Comments: LUE in sling     Skin: General: Skin is warm and dry  Capillary Refill: Capillary refill takes less than 2 seconds  Neurological:      Mental Status: He is alert and oriented to person, place, and time     Psychiatric:         Mood and Affect: Mood normal          Behavior: Behavior normal          Additional Data:     Labs:    Results from last 7 days   Lab Units 01/26/23  0458   WBC Thousand/uL 6 03   HEMOGLOBIN g/dL 11 1*   HEMATOCRIT % 37 1   PLATELETS Thousands/uL 274   NEUTROS PCT % 68   LYMPHS PCT % 22   MONOS PCT % 8   EOS PCT % 2     Results from last 7 days   Lab Units 01/26/23  0458   SODIUM mmol/L 139   POTASSIUM mmol/L 4 1   CHLORIDE mmol/L 101   CO2 mmol/L 34*   BUN mg/dL 18   CREATININE mg/dL 0 97   ANION GAP mmol/L 4*   CALCIUM mg/dL 9 0   GLUCOSE RANDOM mg/dL 85         Results from last 7 days   Lab Units 01/21/23  0600   POC GLUCOSE mg/dl 97               Labs reviewed    Imaging:    Imaging reviewed    Recent Cultures (last 7 days):           Last 24 Hours Medication List:   Current Facility-Administered Medications   Medication Dose Route Frequency Provider Last Rate   • acetaminophen  975 mg Oral 4x Daily PRN Kecia London MD     • aluminum-magnesium hydroxide-simethicone  30 mL Oral Q6H PRN Kecia London MD     • cholecalciferol  1,000 Units Oral Daily Kecia London MD     • diphenhydrAMINE  25 mg Oral HS PRN Kecia London MD     • furosemide  40 mg Oral Daily Kecia London MD     • gabapentin  300 mg Oral TID Kecia London MD     • heparin (porcine)  5,000 Units Subcutaneous [facility] Kecia London MD     • lidocaine  2 patch Topical Daily Kecia London MD     • loperamide  2 mg Oral 4x Daily PRN Kecia London MD     • LORazepam  1 mg Oral Once PRN Kecia London MD     • multivitamin stress formula  1 tablet Oral Daily Kecia London MD     • nystatin   Topical BID Kecia London MD     • ondansetron  4 mg Oral Q6H PRN Kecia London MD     • oxyCODONE  5 mg Oral 4x Daily PRN [facility] Marlen Gonzalez MD     • oxyCODONE  2 5 mg Oral 4x Daily PRN Sisi Grace MD     • oxyCODONE  7 5 mg Oral Q4H PRN Sisi Grace MD     • simethicone  80 mg Oral 4x Daily PRN Rubia Gasca MD     • white petrolatum-mineral oil   Topical BID IRENE Feliz          M*Modal software was used to dictate this note  It may contain errors with dictating incorrect words or incorrect spelling  Please contact the provider directly with any questions

## 2023-01-26 NOTE — ASSESSMENT & PLAN NOTE
Possibly symptomatic left C3 radiculopathy with decreased sensation and pain at C3-4 distribution  MRI showed severe L C3-4 foraminal stenosis  Moderate central stenosis at C4-5  Outpatient f/u with PCP     - If symptoms worsen recommend EMG to localize C3-4 vs  Supraclavicular

## 2023-01-26 NOTE — ASSESSMENT & PLAN NOTE
Wt Readings from Last 3 Encounters:   01/26/23 110 kg (242 lb 12 8 oz)   01/19/23 110 kg (243 lb 6 2 oz)   12/30/22 121 kg (265 lb 10 5 oz)     ECHO on 1/16 showed EF 65%, no RWMA, G1DD  Treated with Lasix in acute setting for volume overload  BNP 4475 on admission  Continue Lasix 40mg daily  Monitor daily weights and I&Os  Continue 1500FR

## 2023-01-26 NOTE — ASSESSMENT & PLAN NOTE
Hx of SBRT to T6, T10, and T4 between 7017-3328  Encourage adequate pain control  Follows with Dr Johnny Medina (Oncology) as outpatient

## 2023-01-26 NOTE — ASSESSMENT & PLAN NOTE
Diagnosed in 2007 with metastatic disease in 2013  Hx of L nephrectomy in 2007 and prostatectomy  Progressive disease  Newly started on Nivolumab in 12/022  Follows with Dr Landy Boss (Oncology) as outpatient - follow-up scheduled for February

## 2023-01-26 NOTE — ASSESSMENT & PLAN NOTE
CTA PE study on 1/17: Enlargement of R lower lobe metastases  Developed hypoxia in acute setting - receiving diuretics with improvement  Currently maintaining on RA  Monitor spot pulse ox  Continue Lasix 40mg daily (held over the weekend due to parameters)  Discontinue amlodipine at this time to continue Lasix  Encourage pulmonary toileting and IS use  Follows with Dr Esther Lane as outpatient

## 2023-01-26 NOTE — TEAM CONFERENCE
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  Principal Problem: 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    Patient Active Problem List    Diagnosis Date Noted   • Nocturnal hypoxemia 01/25/2023   • Pleural effusion 01/20/2023   • HTN (hypertension) 01/20/2023   • Neuropathy 01/20/2023   • Dermatitis associated with moisture 01/19/2023   • Chronic diastolic (congestive) heart failure ([facility] ) 01/18/2023   • Stage 3a chronic kidney disease ([facility] ) 01/12/2023   • H/O left nephrectomy 01/12/2023   • Fall at home, initial encounter 01/12/2023   • 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 01/12/2023   • Acute respiratory failure with hypoxia ([facility] ) 01/12/2023   • Elevated brain natriuretic peptide (BNP) level 01/12/2023   • Elevated d-dimer 01/12/2023   • Chronic diarrhea 01/12/2023   • COVID-19 09/14/2022   • Azotemia 10/31/2019   • Hx of prostatectomy 09/20/2019   • History of prostate cancer 09/20/2019   • Metastatic renal cell carcinoma ([facility] ) 09/26/2018   • Spine metastasis ([facility] ) 07/11/2018   • Clear cell adenocarcinoma of kidney, left ([facility] ) 03/02/2018   • Bone metastases ([facility] ) 03/02/2018   • Lung metastases ([facility] ) 03/02/2018       Physical Therapy:    Weight Bearing Status: Non-weight bearing (L UE)  Transfers: Incidental Touching  Bed Mobility: Moderate Assistance, Minimal Assistance (on level bed with no rails)  Amulation Distance (ft): 150 feet  Ambulation: Incidental Touching  Assistive Device for Ambulation: Wide Based [place]  Number of Stairs: 8  Assistive Device for Stairs: Right Hand Rail  Stair Assistance: Minimal Assistance  Ramp:  (to be assessed next session)  Discharge Recommendations: Home with:  [address] with[de-identified] Home Physical Therapy, First Floor Setup    Pt  Is a 66 y/o male who was admitted to [address] [address] on 1/12 with L shoulder pain after a fall with arm abducted  XRs of left shoulder, humerus, scapula, and clavicle showed lytic metastatic tumor mass within the scapular neck and glenoid with no acute fractures or dislocations  CT was ordered and he was made NWB by Ortho  CT showed large pathologic scapula fracture with minimal displacement and a large lytic lesion in glenoid vault  Ortho discussed with Clayton Chávez - Dr Price Aly who recommended sling for comfort and non-surgical management  PMHx that can affect progress includes localized RCC with METS on 2013 , L nephrectomoy and protatectomy , chronic diarrhea and CKD and chronic low back pain and bone METS (see H&P)  PT evaluation completed on 1/20 and pt  Demonstrate good progress since then  Pt  Currently CGA with functional mobility using WBQC and ambulate up to 150 feet and min A with steps management up to 8 steps  Pt  Was fully indep PTA and was working par time  He lives in a [address] [place] [address] but can be on fist floor set up using BSC at d/c  Pt  Has 1 curb like step + 4 steps for NATALIYA with R HR  Pt  Has a son and grandson that live close by that can assist prn  Pt  Current barrier is NATALIYA, L UE NWB, L chronic foot drop which is address with AFO trial and dec caregiver support  Pt  Will benefit from skillled PT to maximized functional independence and be able to reach mod I level for mobility with LRAD  Planning for Tentative d/c next week        Occupational Therapy:  Eating:  (setup)  Grooming: Supervision  Bathing: Minimal Assistance  Bathing: Minimal Assistance  Upper Body Dressing: Supervision  Lower Body Dressing: Minimal Assistance  Toileting: Minimal Assistance  Tub/Shower Transfer: Minimal Assistance  Toilet Transfer: Incidental Touching  Cognition: Within Defined Limits  Orientation: Person, Place, Time, Situation  Discharge Recommendations: Home with:  [address] with[de-identified] Family Support, Home Occupational Therapy       1/25/23  Pt is making steady gains towards OT goals  Pt at this time req Barry for toielting and bathing 2* to LUE NWB  However, pt reporting he has son ordered toilet aid and will bring to trial to inc overall indep  Pt cont to have difficulty with L AFO/footwear which pt reports grandson and son can assist  Therapist to clarify with family what times they are able to come in and assist with bathing and LB dressing  Pt cont to req vc to not use LUE during bathing/dressing  Pending progress and performance with steps anticipate DC later next week with home OT services  Pt reports he has BSC, son purchased toilet aid, [facility] and [facility]  Will focus on FT and clarification with family when assisting  Speech Therapy:           No notes on file    Nursing Notes:  Appetite: Good  Diet Type: Cardiac                                                                     Pain Location/Orientation: Orientation: Left, Location: Shoulder  Pain Score: 6                       Hospital Pain Intervention(s): Medication (See MAR)          Akosua Albert is a 67 y o  male with medical history of localized RCC in 2007 with mets in 2013 , on systemic therapy s/p L nephrectomy and prostatectomy, chronic diarrhea, CKD (Crea 1-1 3) who presented to the [address] [place] [address] on 1/12 with L shoulder pain after a fall with arm abducted  XRs of left shoulder, humerus, scapula, and clavicle showed lytic metastatic tumor mass within the scapular neck and glenoid with no acute fractures or dislocations   CT was ordered and he was made NWB by Ortho  CT showed large pathologic scapula fracture with minimal displacement and a large lytic lesion in glenoid vault  Ortho discussed with Jazmine Zaidi - Dr Romy Brooks who recommended sling for comfort and non-surgical management  They will follow-up outpatient  On admission also noted to have ARIANNA and Nephro was consulted who felt likely 2/2 autoregulatory failure  They started him on Lasix and help his Lisinopril and HCTZ  Echo was ordered which showed normal EF with G2DD  ARIANNA resolve with diuresis  Lasix 40mg oral daily  Matt Linda He also was newly requiring O2 on admission - but that improved diuresis  Given his cancer, D-dimer elevated, NM study was ordered which was indeterminant  LE dopplers were negative  CTA PE was ordered once renal function improved - this was negative  Heparin gtt discontinued It did however show enlargement of his RLL mets and bone mets concerning for progression  He will follow-up with his Oncologist as an outpatient to discuss resuming therapy  Admitted to the [facility] on 1/19  Overnight noc ox performed on 1/24 and showed desat <89% for 8 hours and 12 minutes, as low as 74%  Apply 2L O2 at HS  Pt on Gabapentin 300mg TID for neuropathy , on Lasix 40mg daily for HTN and pleural effusion, monitor daily weights and I&Os, 1500FR for CHF  Pt is NWB to CHRISTYE, Sling for comfort  We will monitor pt's vital signs & lab results  Pt will have adequate pain control to fully participate in therapies  Pt will have safe transfers with  the call bell & hourly rounding to prevent falls  Pt will be educated on importance of T/R & offloading weight while in bed/chair to prevent pressure injury  Pt will be educated on energy conservation & encouraged independence with ADL's  Case Management:     Discharge Planning  Living Arrangements: Lives Alone  Support Systems: Son, Self  Type of Current Residence: Private residence  Current Home Care Services: No  1/25- Pt lives alone in 2 story home w/ 5 NATALIYA   Pt reports bedroom is on 2nd floor  Pt was independent with ADL IADLs prior to admission, pt has cane, commode, walker, shower seat and grab bars in home  Pt has family that are local and assist with things as needed  Prior to admission, pt was working part time at [employer] in [employer]  Pt reports hx of acute rehab at [facility] and had [facility] in the past but does not recall which agency  Pt reports he receives his medication from the [place], PCP is Dr Buchanan Prom  Is the patient actively participating in therapies? yes  List any modifications to the treatment plan: None    Barriers Interventions   Chronic diarrhea Monitoring   Volume overload Monitoring   Premorbid Neuropathy Med management   Pain Monitoring   LUE NWB Compensatory strategies, cuing   Foot drop AFO             Is the patient making expected progress toward goals? yes  List any update or changes to goals: None    Medical Goals: Patient will be medically stable for discharge to [facility] upon completion of rehab program and Patient will be able to manage medical conditions and comorbid conditions with medications and follow up upon completion of rehab program    Weekly Team Goals:   Rehab Team Goals  ADL Team Goal: Patient will be independent with ADLs with least restrictive device upon completion of rehab program  Bowel/Bladder Team Goal: Patient will return to premorbid level for bladder/bowel management upon completion of rehab program  Transfer Team Goal: Patient will be independent with transfers with least restrictive device upon completion of rehab program  Locomotion Team Goal: Patient will be independent with locomotion with least restrictive device upon completion of rehab program    Discussion: Pt contact guard w/ ADL IADLs w/ quad cane  Team recommending further work up and time spent in rehab to focus on functional and mobility goals  Team recommending Friday 2/3, w/ [facility] PT OT      Anticipated Discharge Date:  D/c Friday 2/3  [facility] Team Members Present: The following team members are supervising care for this patient and were present during this Weekly Team Conference      Physician: Dr Cali Thayer MD  : COLEMAN Caro  Registered Nurse: Latesha Lundberg, DAVID  Physical Therapist: Slime Blankenship, PT  Occupational Therapist: Myra Rodriguez MS, OTR/L  Speech Therapist:

## 2023-01-26 NOTE — ASSESSMENT & PLAN NOTE
Presented on 1/12 after a mechanical fall with L shoulder pain  CT LUE: lytic metastasis int he scapular glenoid process measuring 2 9 x 2 4 x 2 0cm with cortical breakthrough and extraosseous extension anteriorly without pathologic fracture  New 2 4 x 2 1 x 1 9cm lytic metastasis in the L scapular body with acute pathologic fracture  New 1 9x0 8x1 4cm lytic metastasis in the inferior scapular angle with pathologic fracture  Non-surgical intervention per Ortho  NWB to LUE  Sling for comfort  Neurovascular checks Q shift  Ensure adequate pain control  Follow-up with Dr Karlos Ramsey (Orthopedics) in 4 weeks  Primary team following  PT/OT

## 2023-01-26 NOTE — ASSESSMENT & PLAN NOTE
Diagnosed with metastatic renal cell carcinoma in 2007  Progressive disease  Bone scan in 2020 showed lytic lesions to L scapula, R posterior and lateral ribs, L posterior iliac bone, and R iliac wing  Most recently started on Nivolumab in 12/2022  Ensure adequate pain control  Continue home Vitamin D supplementation  Follows with Dr Lorraine Mccallum (Oncology) as outpatient

## 2023-01-26 NOTE — PROGRESS NOTES
01/26/23 1400   Pain Assessment   Pain Assessment Tool 0-10   Pain Score 6   Pain Location/Orientation Orientation: Left; Location: Shoulder   Pain Onset/Description Onset: Ongoing; Descriptor: Aching;Descriptor: Discomfort   Restrictions/Precautions   Precautions Fall Risk;Pain   LUE Weight Bearing Per Order NWB   Braces or Orthoses Sling; Other (Comment)  (AFO L )   Cognition   Overall Cognitive Status WFL   Arousal/Participation Alert; Cooperative   Attention Within functional limits   Orientation Level Oriented X4   Memory Within functional limits   Following Commands Follows all commands and directions without difficulty   Subjective   Subjective "Im groggy from the meds" pt given ativan for MRI   Sit to Lying   Type of Assistance Needed Supervision   Physical Assistance Level No physical assistance   Comment use of R railing  Sit to Lying CARE Score 4   Sit to Stand   Type of Assistance Needed Incidental touching   Sit to Stand CARE Score 4   Bed-Chair Transfer   Type of Assistance Needed Incidental touching   Chair/Bed-to-Chair Transfer CARE Score 4   Walk 10 Feet   Type of Assistance Needed Incidental touching   Physical Assistance Level No physical assistance   Comment WBQC/ L AFO   Walk 10 Feet CARE Score 4   Walk 50 Feet with Two Turns   Type of Assistance Needed Physical assistance   Physical Assistance Level 25% or less   Comment x1 incident with +R LOB, min A for balance/safety, otherwise CGA with WBQC  Walk 50 Feet with Two Turns CARE Score 3   Ambulation   Primary Mode of Locomotion Prior to Admission Walk   Distance Walked (feet) 30 ft  (125)   Assist Device Quad Cane  [place])   Gait Pattern Antalgic;Decreased foot clearance;L foot drag;Shuffle   Limitations Noted In Balance; Endurance; Sequencing;Speed;Strength;Swing   Walk Assist Level Contact Guard   Findings reduced distance due to somewhat lethargic at start of session, + L AFO/PLS   Does the patient walk? 2   Yes   Wheelchair mobility   Does the patient use a wheelchair? 0  No   Toilet Transfer   Type of Assistance Needed Incidental touching; Adaptive equipment   Physical Assistance Level No physical assistance   Comment reach across for grab bar on L side to stand  Toilet Transfer CARE Score 4   Toilet Transfer   Surface Assessed Standard Toilet   Findings max A for hygiene post BM  Amt charted  Therapeutic Interventions   Strengthening seated R LE red tband DF/PF x20; R APs, x30; x20 quad sets, 3# seated heel slides 3x1min with 30 sec rest breaks 3#; 3# LAQ 2x10; red tband abd x30; ball squeeze x30  Flexibility manaul seated DF and knee ext 5x20 sec each  Assessment   Treatment Assessment Pt engaged in 90 min skilled PT despite feeling lethargic from meds given for MRI of cspine and L shoulder this AM  Pt c/o not having feeling to L shoulder, MD concern for compression  Continues to await results  Pt denies concern and is agreeable to PT depsite being sleepy  Ongoing B LE edema, +teds in place  Time spent discussing stair glide and potential options for financial support however pt will need to assess his finances, and needs  Handout provided for stair glide options  Pt currently ambulatory, and able to complete steps however given progressive dx pt may require inc A in the future  Limited amb this session due to ftg, x1 mild LOB to R with longer amb trial, Barry for balance correction  Pt now agreeable to home services as explaining to him benefits, CM notified  Ongoing use of AFO/PLS, with good results, needs AFO order for planned DC home 2/3/23  [facility]  Returned pt to supine at end of session, needs in reach  Family/Caregiver Present no   Barriers to Discharge Comments lives alone, 2 SH  PT Barriers   Physical Impairment Decreased strength;Decreased endurance; Impaired balance;Decreased mobility; Decreased coordination;Decreased safety awareness;Orthopedic restrictions   Functional Limitation Car transfers;Stair negotiation;Standing;Transfers; Walking   Plan   Treatment/Interventions Functional transfer training;LE strengthening/ROM; Therapeutic exercise; Endurance training;Gait training   Progress Progressing toward goals   Recommendation   PT Discharge Recommendation Home with home health rehabilitation   PT Therapy Minutes   PT Time In 1400   PT Time Out 1530   PT Total Time (minutes) 90   PT Mode of treatment - Individual (minutes) 90   PT Mode of treatment - Concurrent (minutes) 0   PT Mode of treatment - Group (minutes) 0   PT Mode of treatment - Co-treat (minutes) 0   PT Mode of Treatment - Total time(minutes) 90 minutes   PT Cumulative Minutes 600   Therapy Time missed   Time missed?  No

## 2023-01-26 NOTE — CASE MANAGEMENT
Cm met with pt at bedside to discuss team update  Pt agreeable to d/c date of Friday 2/3, pt prefers OP PT OT and confirmed he would be able to find a ride, pt believes OP PT OT would be more beneficial for him then home therapies  Pt also reports he is looking into hiring [facility] to assist him in the mornings with dressing etc  Pt had no further questions or concerns

## 2023-01-26 NOTE — PLAN OF CARE
Problem: GENITOURINARY - ADULT  Goal: Maintains or returns to baseline urinary function  Description: INTERVENTIONS:  - Assess urinary function  - Encourage oral fluids to ensure adequate hydration if ordered  - Administer IV fluids as ordered to ensure adequate hydration  - Administer ordered medications as needed  - Offer frequent toileting  - Follow urinary retention protocol if ordered  Outcome: Progressing     Problem: SKIN/TISSUE INTEGRITY - ADULT  Goal: Skin Integrity remains intact(Skin Breakdown Prevention)  Description: Assess:  -Perform Bert assessment every  shift  -Clean and moisturize skin every   -Inspect skin when repositioning, toileting, and assisting with ADLS  -Assess extremities for adequate circulation and sensation     Bed Management:  -Have minimal linens on bed & keep smooth, unwrinkled  -Change linens as needed when moist or perspiring  -Avoid sitting or lying in one position for more than 2 hours while in bed  -Keep HOB at degrees     Toileting:  -Offer bedside commode  -Assess for incontinence every   -Use incontinent care products after each incontinent episode such as     Activity:  -Mobilize patient 4 times a day  -Encourage activity and walks on unit  -Encourage or provide ROM exercises   -Turn and reposition patient every 2Hours  -Use appropriate equipment to lift or move patient in bed  -Instruct/ Assist with weight shifting every 2 when out of bed in chair  -Consider limitation of chair time 2 hour intervals    Skin Care:  -Avoid use of baby powder, tape, friction and shearing, hot water or constrictive clothing  -Relieve pressure over bony prominences using   -Do not massage red bony areas    Next Steps:  -Teach patient strategies to minimize risks such as   -Consider consults to  interdisciplinary teams such as   Outcome: Progressing

## 2023-01-26 NOTE — PROGRESS NOTES
Physical Medicine and Rehabilitation Progress Note  Kevin Rojas 67 y o  male MRN: [id number]  Unit/Bed#: Gladis Garcia 905-43 Encounter: [id number]    To Review: Kevin Rojas is a 67 y o  male with medical history of localized RCC in 2007 with mets in 2013 , on systemic therapy s/p L nephrectomy and prostatectomy, chronic diarrhea, CKD (Crea 1-1 3) who presented to the [address] [place] [address] on 1/12 with L shoulder pain after a fall with arm abducted  XRs of left shoulder, humerus, scapula, and clavicle showed lytic metastatic tumor mass within the scapular neck and glenoid with no acute fractures or dislocations  CT was ordered and he was made NWB by Ortho  CT showed large pathologic scapula fracture with minimal displacement and a large lytic lesion in glenoid vault  Ortho discussed with [facility] - Dr Dinesh Patrick who recommended sling for comfort and non-surgical management  They will follow-up outpatient  On admission also noted to have ARIANNA and Nephro was consulted who felt likely 2/2 autoregulatory failure  They started him on Lasix and help his Lisinopril and HCTZ  Echo was ordered which showed normal EF with G2DD  ARIANNA resolve with diuresis  Lasix 40mg oral daily  Stephenazar Patel He also was newly requiring O2 on admission - but that improved diuresis  Given his cancer, D-dimer elevated, NM study was ordered which was indeterminant  LE dopplers were negative  CTA PE was ordered once renal function improved - this was negative  Heparin gtt discontinued It did however show enlargement of his RLL mets and bone mets concerning for progression  He will follow-up with his Oncologist as an outpatient to discuss resuming therapy  Admitted to the [facility] on 1/19  Chief Complaint: Tolerated his MRI today  Interval History/Subjective:  No acute events overnight  Feeling better in terms of his pain in his L shoulder back, now it's mostly just radiating around his neck  He denies new numbness/tingling   Last BM was 1/25  No new CP, SOB, fevers, chills, N/V, abdominal pain  He tolerated his MRI today with Lorazepam      ROS:  A 10 point review of systems was negative except for what is noted in the HPI  Today's Changes:  1  Sensation still altered in C4/supraclavicular, but not absent today  2  Will follow-up MRI results   - C-Spine with C4-5 severe R, and moderate L foraminal stenosis and moderate central stenosis  C3-4 with severe L foraminal stenosis  3  Reviewed labs, stable  4  Will need an AFO prior to discharge  5  Team Conference, see separate note and dispo below  Total time spent:  35 minutes, with more than 50% spent counseling/coordinating care  Counseling includes discussion with patient re: progress in therapies, functional issues observed by therapy staff, and discussion with patient his/her current medical state/wellbeing  Coordination of patient's care was performed in conjunction with Internal Medicine service to monitor patient's labs, vitals, and management of their comorbidities  In addition, this patient was discussed by the interdisciplinary team in weekly case conference today  The care of the patient was extensively discussed with all care providers and an appropriate rehabilitation plan was formulated unique for this patient  Barriers were identified preventing progression of therapy and appropriate interventions were discussed with each discipline  Please see the team note for input from all disciplines regarding barriers, intervention, and discharge planning  Assessment/Plan:    * 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  Assessment & Plan  Large pathologic scapular fracture with minimal displacement  Large lytic lesion in glenoid vault  Management non-operatively  Sling for comfort  NWB  Ok for ROM in elbow, wrist, hand  Pendulums daily for now  Pain control as below  Outpatient f/u with [facility] Dr Dinesh Patrick in 4 weeks       Foraminal stenosis of cervical region  Assessment & Plan  Possibly symptomatic left C3 radiculopathy with decreased sensation and pain at C3-4 distribution  MRI showed severe L C3-4 foraminal stenosis  Moderate central stenosis at C4-5  Outpatient f/u with spine  Nocturnal hypoxemia  Assessment & Plan  1/24 Noc ox showed significant desats  Will add night time oxygen  Check noc ox 48 hours prior to discharge   Referral to sleep medicine at discharge  Neuropathy  Assessment & Plan  Continue gabapentin 300mg Q8hr  - TSH WNL more recently  - No recent A1C, last done was 5 7   - Could recheck  - No B12 checked  He also has cancer - could be paraneoplastic in etiology  Depending on what chemotherapy he has had this could also be a contributing factor  In the future, can consider an EMG to more likely describe the type of neuropathy he has to help narrow differential  For now compensatory strategies and control with medication for his discomfort  HTN (hypertension)  Assessment & Plan  Home: Amlodipine 5mg daily Lasix 40mg daily, was on Lisinopril/HCTZ 20-12 5mg daily  Here: Same without Lisinopril/HCTZ, and now amlodipine at night discontinued for hypotension   Monitor and adjust as appropriate  IM following and assisting with management  Pleural effusion  Assessment & Plan  Likely secondary to metastatic disease  CTA PE study on 1/17: New moderate L pleural effusion with moderate compressive atelectasis of left lower lobe  Currently maintaining on RA  Monitor spot pulse ox  Encourage pulmonary toileting  Continue Lasix 40mg daily      Dermatitis associated with moisture  Assessment & Plan  Soap, water to buttocks TID and PRN followed by barrier cream   Nystatin powder R groin    Chronic diastolic (congestive) heart failure (HCC)  Assessment & Plan  Wt Readings from Last 3 Encounters:   01/26/23 110 kg (242 lb 12 8 oz)   01/19/23 110 kg (243 lb 6 2 oz)   12/30/22 121 kg (265 lb 10 5 oz)     Seen on Echo during this hospitalization  S/P IV diuresis for exacerbation  Now back on room air   Closely monitor I/O, daily weights, volume status  Cardiac diet with fluid restriction  Continue: Lasix 40mg daily   - Previously on ACE, may be able to restart   - Was not on BB  Outpatient f/u with PCP  Chronic diarrhea  Assessment & Plan  Chronically on Lomotil  Monitor  Close continence care  Stage 3a chronic kidney disease [facility])  Assessment & Plan  Lab Results   Component Value Date    EGFR 77 01/26/2023    EGFR 74 01/23/2023    EGFR 59 01/19/2023    CREATININE 0 97 01/26/2023    CREATININE 1 00 01/23/2023    CREATININE 1 21 01/19/2023     Stage 2-3A  Crea baseline 1-1 3  Improved recently after ARIANNA 2/2 autoregulatory issues/congestion/volume overload  Now on Lasix daily monitor  Was on Lisinopril and HCTZ at home, currently being held  - Nephro says these can be restarted as necessary  Monitoring BP  Avoid nephrotoxic meds  Outpatient f/u     Metastatic renal cell carcinoma [facility])  Assessment & Plan  Originally 2007  Mets in 2013  On systemic therapy  S/p L nephrectomy and prostatectomy  Will need outpatient f/u with Oncology     Spine metastasis [facility])  Assessment & Plan  In most recent CT CAP in 2022 showed lytic/sclerotic lesions T4, T6, and scattered lesions in lumbar spine   NM bone scan in 2020 showed:  Scattered exophytic foci of radiotracer uptake in the spine corresponding to degenerative changes on CT  Ct C-Spine in 6/2022 showed no lesions  He has pelvic/iliac lytic lesions  Monitor neuro status and for worsening back pain  Pain management as below  Lung metastases ([address] )  Assessment & Plan  With recent AHRF in setting of volume overload and acute diastolic heart failure and ARIANNA  Now on room air  Has pleural effusion in addition  Closely monitor respiratory status  Pulmonary toilet  Outpatient f/u with Oncology    Bone metastases [facility])  Assessment & Plan  Monitor performance in therapy   If he develops new pain, particular in long bones, would image    - Since his fall has had new R hip pain primarily with weight bearing   - 2022 CT CAP showed lytic lesion superior to the right hip are identified as well as the posterior column of the acetabulum  - XR of R hip without significant changes  Better visualized on CT   2020 Bone Scan showed lytic lesions:   left lateral scapula inferior to the glenoid     right posterior and lateral ribs corresponding to expansile lytic lesions on CT  (recent Ct with expansive R 7th rib met)   left posterior iliac bone and right iliac wing corresponding to lytic lesions on CT    r nonspecific tibial finding    Outpatient f/u with Dr Samson Mendez with Ortho onc  Health Maintenance  #Delirium/Sleep: At risk  Environmental interventions  Optimize pain, bowel, bladder, sleep-wake cycle management  #Pain: Tylenol PRN, Gabapentin 300mg TID, Oxycodone 5mg PRN severe pain, tramadol 50mg PRN moderate pain  #Bowel: Last BM 1/25/2023, incontinent and watery  Chronic diarrhea, see above  #Bladder: Voiding and continent  #Skin/Pressure Injury Prevention: Turn Q2hr in bed, with weight shifts [id number] in wheelchair  Float heels in bed  #DVT Prophylaxis: HSQ,SCDs  #GI Prophylaxis: Not indicated  #Code Status:  Full Code  #FEN: Cardiac diet with 1500cc fluid restriction  #Dispo:  Team 1/26: ADD 2/3 with Home PT/OT  He would prefer outpatient  Working on [facility] for him with his family given his restrictions in that arm    - Outpatient f/u with Ortho 2 weeks after discharge    - Outpatient f/u with Oncology  - Referral to Pain/Spine team if symptoms still persistent    - Outpatient f/u with PCP  Objective:    Functional Update:  PT: CGA transfers, min-modA bed mobility, CGA ambulation 150', Barry RHR 8 stairs  OT:  Setup eating, Sup grooming, Barry bathing, Sup UB dressing, Barry LB dressing, Barry toileting, Barry tub/shower transfer, CGA toilet transfers    Allergies per EMR    Physical Exam:  Temp:  [98 °F (36 7 °C)-99 4 °F (37 4 °C)] 99 °F (37 2 °C)  HR:  [] 101  Resp:  [18] 18  BP: (108-120)/(56-61) 117/61  Oxygen Therapy  SpO2: 90 %  O2 Flow Rate (L/min): 2 L/min    Gen: No acute distress, Well-nourished, well-appearing  HEENT: Moist mucus membranes, Normocephalic/Atraumatic  Cardiovascular: Regular rate, rhythm, S1/S2  Distal pulses palpable  Heme/Extr: Stable edema in LE and UE  Pulmonary: Non-labored breathing  Lungs CTAB  : No williamson  GI: Soft, non-tender, non-distended  BS+  MSK: L arm in sling  Distal strength and sensation intact  Integumentary: Skin is warm, dry  Neuro: AAOx3, Sensation altered but present to light touch in C3/4, shoulder/supraclavicular distribution  Speech is intact  Appropriate to questioning  Distal strength appropriate  Psych: Normal mood and affect  Diagnostic Studies: Reviewed  1/26 MRI C-Spine:  1  Exam is somewhat compromised by motion artifact  No definite suspicious lesion to suggest metastatic disease      2   Multilevel degenerative canal stenosis worse at level C4-5 with moderate stenosis      3   Multilevel foraminal narrowing as detailed; severe at right C2-3 and left C3-4, moderate to severe at right C4-5      1/26 MRI Shoulder final read pending       Laboratory:  Reviewed   Results from last 7 days   Lab Units 01/26/23  0458 01/23/23  0637   HEMOGLOBIN g/dL 11 1* 11 5*   HEMATOCRIT % 37 1 38 5   WBC Thousand/uL 6 03 6 46     Results from last 7 days   Lab Units 01/26/23  0458 01/23/23  0637   BUN mg/dL 18 19   POTASSIUM mmol/L 4 1 4 5   CHLORIDE mmol/L 101 100   CREATININE mg/dL 0 97 1 00            Patient Active Problem List   Diagnosis   • Clear cell adenocarcinoma of kidney, left (HCC)   • Bone metastases (HCC)   • Lung metastases (HCC)   • Spine metastasis ([facility] )   • Metastatic renal cell carcinoma (HCC)   • Hx of prostatectomy   • History of prostate cancer   • Azotemia   • COVID-19   • Stage 3a chronic kidney disease ([facility] ) • H/O left nephrectomy   • Fall at home, initial encounter   • 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   • Acute respiratory failure with hypoxia (HCC)   • Elevated brain natriuretic peptide (BNP) level   • Elevated d-dimer   • Chronic diarrhea   • Chronic diastolic (congestive) heart failure (HCC)   • Dermatitis associated with moisture   • Pleural effusion   • HTN (hypertension)   • Neuropathy   • Nocturnal hypoxemia         Medications  Current Facility-Administered Medications   Medication Dose Route Frequency Provider Last Rate   • acetaminophen  975 mg Oral 4x Daily PRN Jose Alcantara MD     • aluminum-magnesium hydroxide-simethicone  30 mL Oral Q6H PRN Jose Alcantara MD     • cholecalciferol  1,000 Units Oral Daily Jose Alcantara MD     • diphenhydrAMINE  25 mg Oral HS PRN Jose Alcantara MD     • furosemide  40 mg Oral Daily Jose Alcantara MD     • gabapentin  300 mg Oral TID Jose Alcantara MD     • heparin (porcine)  5,000 Units Subcutaneous [facility] Jose Alcantara MD     • lidocaine  2 patch Topical Daily Jose Alcantara MD     • loperamide  2 mg Oral 4x Daily PRN Jose Alcantara MD     • LORazepam  1 mg Oral Once PRN Jose Alcantara MD     • multivitamin stress formula  1 tablet Oral Daily Jose Alcantara MD     • nystatin   Topical BID Jose Alcantara MD     • ondansetron  4 mg Oral Q6H PRN Jose Alcantara MD     • oxyCODONE  5 mg Oral 4x Daily PRN Hellen Huynh MD     • oxyCODONE  2 5 mg Oral 4x Daily PRN Hellen Huynh MD     • oxyCODONE  7 5 mg Oral Q4H PRN Hellen Huynh MD     • simethicone  80 mg Oral 4x Daily PRN Jose Alcantara MD     • white petrolatum-mineral oil   Topical BID IRENE Umaña            ** Please Note: Fluency Direct voice to text software may have been used in the creation of this document   **

## 2023-01-26 NOTE — PROGRESS NOTES
01/26/23 0830   Pain Assessment   Pain Assessment Tool 0-10   Pain Score 6   Pain Location/Orientation Orientation: Left; Location: Shoulder   Patient's Stated Pain Goal No pain   Hospital Pain Intervention(s) Medication (See MAR)   Restrictions/Precautions   Precautions Bed/chair alarms; Fall Risk;Pain   Weight Bearing Restrictions Yes   LUE Weight Bearing Per Order NWB   Braces or Orthoses Sling  (LUE)   Putting On/Taking Off Footwear   Type of Assistance Needed Physical assistance   Physical Assistance Level 51%-75%   Comment A for TEDS and L AFO/sneaker  Putting On/Taking Off Footwear CARE Score 2   Sit to Stand   Type of Assistance Needed Incidental touching; Adaptive equipment   Comment CGA with QC   Sit to Stand CARE Score 4   Bed-Chair Transfer   Type of Assistance Needed Incidental touching   Comment CGA with QC   Chair/Bed-to-Chair Transfer CARE Score 4   Health Management   Health Management Level of Assistance Independent   Health Management pt reports PTA he was indep with meds and did no use pill organizer and placed all meds in kitchen  Pt currenlty on 4 meds  Pt able to verbalize 3 meds which he was on PTA with purpose and frequency  Therapist educ pt on lasix and current freq  Pt asking why he was not on BP meds as he was on before  Therapist discussed with NP Tiffanie All who reported PTA pt had high BP and was on meds  However, during rehab admission BP was beginning to get too low and then they were no ablet o give lasix  Therapist communicated above to pt but NP to talk to pt as wel  Provided reference sheet  Pt Miguelito with meds   Functional Standing Tolerance   Comments Therapist placed various items around room ad had retrieve with [facility] [place] and use of bag to inc safety and transporting items  Engaged to challenge ANDRÉS, endurance, strength, coord and weight shifting  Pt tolerated well at CGA leve     Therapeutic Excerise-Strength   UE Strength Yes   Right Upper Extremity- Strength   R Shoulder Flexion; Horizontal ABduction; Extension   R Elbow Elbow flexion;Elbow extension   R Position Seated   Equipment Theraband  (red)   R Weight/Reps/Sets 2x15   RUE Strength Comment provided pt with RUE HEP red tband to con to inc RUE strength  Pt tolerated well with rest break in between   Left Upper Extremity-Strength   LUE Strength Comment N/A NWB   Cognition   Overall Cognitive Status WFL   Arousal/Participation Alert; Cooperative   Attention Within functional limits   Orientation Level Oriented X4   Memory Within functional limits   Following Commands Follows all commands and directions without difficulty   Additional Activities   Additional Activities Comments Therapist had discussion with pt about speakign with son about assistance at DC  Pt reporting he is not sure if son can assist  Therapist provided pt with HHA list and encouraged pt to call today which he verbalized understanding  Will [place] back with pt tomorrow to determine progress   Activity Tolerance   Activity Tolerance Patient tolerated treatment well   Medical Staff Made Aware /61   Assessment   Treatment Assessment Engaged pt in 90mins of skilled OT services with focus on footwear, fx mobility, RUE TUE, balance, med mngmnt  Beginning of session therapist had extensive conversation with pt on assistance at home as pt is not sure how much son can help as he works  Provided HHA list which pt reports he will call  In addition, pt Miguelito for med mngmnt  Will communicate [facility] pt concerned about delivery  Pt can cont to benefit from further skilled OT services with focus on kitchen mobility, fx mobility, transfer, activity tolerance to inc fx performance na ddec CG burden  Prognosis Fair   Problem List Decreased strength;Decreased endurance; Impaired balance;Pain;Orthopedic restrictions;Decreased coordination   Barriers to Discharge Inaccessible home environment;Decreased caregiver support   Plan   Treatment/Interventions Functional transfer training;ADL retraining; Therapeutic exercise; Endurance training;Patient/family training;Bed mobility; Compensatory technique education   Progress Progressing toward goals   Recommendation   OT Discharge Recommendation Home with home health rehabilitation   Equipment Recommended Bedside commode   Commode Type Standard   OT Equipment ordered pt has BSC  Reports son ordered raised toilet seat for upstairs and has purchased toilet aid   OT Therapy Minutes   OT Time In 0830   OT Time Out 1000   OT Total Time (minutes) 90   OT Mode of treatment - Individual (minutes) 90   OT Mode of treatment - Concurrent (minutes) 0   OT Mode of treatment - Group (minutes) 0   OT Mode of treatment - Co-treat (minutes) 0   OT Mode of Treatment - Total time(minutes) 90 minutes   OT Cumulative Minutes 510   Therapy Time missed   Time missed?  No

## 2023-01-27 ENCOUNTER — HOSPITAL ENCOUNTER (OUTPATIENT)
Dept: INFUSION CENTER | Facility: CLINIC | Age: 73
Discharge: HOME | End: 2023-01-27

## 2023-01-27 RX ORDER — ASPIRIN 81 MG/1
81 TABLET ORAL DAILY
Status: DISCONTINUED | OUTPATIENT
Start: 2023-01-27 | End: 2023-02-03 | Stop reason: HOSPADM

## 2023-01-27 RX ADMIN — NYSTATIN: 100000 POWDER TOPICAL at 09:43

## 2023-01-27 RX ADMIN — OXYCODONE HYDROCHLORIDE 5 MG: 5 TABLET ORAL at 12:00

## 2023-01-27 RX ADMIN — B-COMPLEX W/ C & FOLIC ACID TAB 1 TABLET: TAB at 09:43

## 2023-01-27 RX ADMIN — OXYCODONE HYDROCHLORIDE 5 MG: 5 TABLET ORAL at 17:24

## 2023-01-27 RX ADMIN — CHOLECALCIFEROL TAB 25 MCG (1000 UNIT) 1000 UNITS: 25 TAB at 09:43

## 2023-01-27 RX ADMIN — LOPERAMIDE HYDROCHLORIDE 2 MG: 2 CAPSULE ORAL at 10:25

## 2023-01-27 RX ADMIN — OXYCODONE HYDROCHLORIDE 5 MG: 5 TABLET ORAL at 21:21

## 2023-01-27 RX ADMIN — GABAPENTIN 300 MG: 300 CAPSULE ORAL at 09:43

## 2023-01-27 RX ADMIN — GABAPENTIN 300 MG: 300 CAPSULE ORAL at 21:09

## 2023-01-27 RX ADMIN — FUROSEMIDE 40 MG: 40 TABLET ORAL at 09:43

## 2023-01-27 RX ADMIN — Medication: at 09:44

## 2023-01-27 RX ADMIN — HEPARIN SODIUM 5000 UNITS: 5000 INJECTION INTRAVENOUS; SUBCUTANEOUS at 13:32

## 2023-01-27 RX ADMIN — ASPIRIN 81 MG: 81 TABLET, COATED ORAL at 11:59

## 2023-01-27 RX ADMIN — NYSTATIN: 100000 POWDER TOPICAL at 17:20

## 2023-01-27 RX ADMIN — Medication: at 21:09

## 2023-01-27 RX ADMIN — LIDOCAINE 2 PATCH: 50 PATCH CUTANEOUS at 09:43

## 2023-01-27 RX ADMIN — HEPARIN SODIUM 5000 UNITS: 5000 INJECTION INTRAVENOUS; SUBCUTANEOUS at 21:09

## 2023-01-27 RX ADMIN — HEPARIN SODIUM 5000 UNITS: 5000 INJECTION INTRAVENOUS; SUBCUTANEOUS at 05:32

## 2023-01-27 RX ADMIN — GABAPENTIN 300 MG: 300 CAPSULE ORAL at 17:21

## 2023-01-27 NOTE — PROGRESS NOTES
Physical Medicine and Rehabilitation Progress Note  Akosua Albert 67 y o  male MRN: [id number]  Unit/Bed#: [facility] 279-24 Encounter: [id number]    To Review: Akosua Albert is a 67 y o  male with medical history of localized RCC in 2007 with mets in 2013 , on systemic therapy s/p L nephrectomy and prostatectomy, chronic diarrhea, CKD (Crea 1-1 3) who presented to the [address] [place], [place] [address] on 1/12 with L shoulder pain after a fall with arm abducted  XRs of left shoulder, humerus, scapula, and clavicle showed lytic metastatic tumor mass within the scapular neck and glenoid with no acute fractures or dislocations  CT was ordered and he was made NWB by Ortho  CT showed large pathologic scapula fracture with minimal displacement and a large lytic lesion in glenoid vault  Ortho discussed with Cata Gonzáles - Dr Romana Peacemaker who recommended sling for comfort and non-surgical management  They will follow-up outpatient  On admission also noted to have ARIANNA and Nephro was consulted who felt likely 2/2 autoregulatory failure  They started him on Lasix and help his Lisinopril and HCTZ  Echo was ordered which showed normal EF with G2DD  ARIANNA resolve with diuresis  Lasix 40mg oral daily  Regina Poole He also was newly requiring O2 on admission - but that improved diuresis  Given his cancer, D-dimer elevated, NM study was ordered which was indeterminant  LE dopplers were negative  CTA PE was ordered once renal function improved - this was negative  Heparin gtt discontinued It did however show enlargement of his RLL mets and bone mets concerning for progression  He will follow-up with his Oncologist as an outpatient to discuss resuming therapy  Admitted to the [facility] on 1/19  Chief Complaint: No new issues    Interval History/Subjective:  No acute events overnight  Pain is much improved  No new numbness/tingling  Last BM 1/26  No new CP, SOB, fevers, chills, N/V, abdominal pain  Son is present here today       ROS:  A 10 point review of systems was negative except for what is noted in the HPI  Today's Changes:  1  Reviewed results of MRI with him   - He can discuss plan with Ortho Onc re L shoulder   - Will have a better idea of localizing the issue once a better shoulder exam can be done   - If concern for L foraminal stenosis (of note this has been noted on previous CT scans without symptoms for the patient), could refer to Pain/Spine outpatient  2  Reviewed course and discharge plan with patient and family today  All questions answered to their satisfaction  3  Would like his medications faxed to the [place]  4  Adding A1C and B12 and Anti-hu profile for neuropathy screen on Monday    Total time spent:  35 minutes, with more than 50% spent counseling/coordinating care  Counseling includes discussion with patient re: progress in therapies, functional issues observed by therapy staff, and discussion with patient his/her current medical state/wellbeing  Coordination of patient's care was performed in conjunction with Internal Medicine service to monitor patient's labs, vitals, and management of their comorbidities  Assessment/Plan:    * 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  Assessment & Plan  Large pathologic scapular fracture with minimal displacement  Large lytic lesion in glenoid vault  Management non-operatively  Sling for comfort  NWB  Ok for ROM in elbow, wrist, hand  Pendulums daily for now  Pain control as below  Outpatient f/u with Dorota Matthew in 4 weeks  1/25 - acute change in sensation in supraclavicular distribution  Unable to check strength  1/26 MRI  Shoulder:   ROTATOR CUFF: Full-thickness partial width tear anterior supraspinatus insertion measuring 1 1 cm AP by 0 7 cm medial lateral superimposed upon moderate tendinosis  Moderate subscapularis insertional tendinosis with low-grade articular sided fraying  Mild infraspinatus insertional tendinosis    Edema within the teres minor muscle, likely related to denervation from mass effect of tumor      SUBACROMIAL/SUBDELTOID BURSA: There is mild subacromial/subdeltoid bursitis       LONG HEAD OF BICEPS TENDON: Mild tendinosis and tenosynovitis      GLENOID LABRUM: Complex tearing anterior inferior labrum      GLENOHUMERAL JOINT: Mild osteoarthritis     ACROMIOCLAVICULAR JOINT:  There is moderate osteoarthritis      BONES: As seen on multiple prior examinations, there is pathologic fracture of the scapular body   Multiple lesions again noted arising from the scapular body and glenoid, consistent with metastatic disease  The largest component of the tumor with   extraosseous extension measures approximately 5 9 x 4 7 x 5 7 cm (series 901, image 13)  This is located along the mid to inferior scapula  A 2nd component of tumor extends posteriorly along the superior aspect of the scapula measuring approximately   3 2 x 2 5 x 3 7 cm (series 401, image 15)  These tumors result in mass effect on the subscapularis, teres minor, and infraspinatus  Foraminal stenosis of cervical region  Assessment & Plan  Possibly symptomatic left C3 radiculopathy with decreased sensation and pain at C3-4 distribution  MRI showed severe L C3-4 foraminal stenosis  Moderate central stenosis at C4-5  Outpatient f/u with spine  Nocturnal hypoxemia  Assessment & Plan  1/24 Noc ox showed significant desats  Will add night time oxygen  Check noc ox 48 hours prior to discharge   Referral to sleep medicine at discharge  Neuropathy  Assessment & Plan  Continue gabapentin 300mg Q8hr  - TSH WNL more recently  - No recent A1C, last done was 5 7 - ordered  - No B12 checked - ordered  He also has cancer - could be paraneoplastic in etiology  - ordered Anti-hu Ab  Depending on what chemotherapy he has had this could also be a contributing factor      In the future, can consider an EMG to more likely describe the type of neuropathy he has to help narrow differential  For now compensatory strategies and control with medication for his discomfort  HTN (hypertension)  Assessment & Plan  Home: Amlodipine 5mg daily Lasix 40mg daily, was on Lisinopril/HCTZ 20-12 5mg daily  Here: Same without Lisinopril/HCTZ, and now amlodipine at night discontinued for hypotension   Monitor and adjust as appropriate  IM following and assisting with management  Pleural effusion  Assessment & Plan  Likely secondary to metastatic disease  CTA PE study on 1/17: New moderate L pleural effusion with moderate compressive atelectasis of left lower lobe  Currently maintaining on RA  Monitor spot pulse ox  Encourage pulmonary toileting  Continue Lasix 40mg daily  Dermatitis associated with moisture  Assessment & Plan  Soap, water to buttocks TID and PRN followed by barrier cream   Nystatin powder R groin    Chronic diastolic (congestive) heart failure (HCC)  Assessment & Plan  Wt Readings from Last 3 Encounters:   01/27/23 110 kg (243 lb 3 2 oz)   01/19/23 110 kg (243 lb 6 2 oz)   12/30/22 121 kg (265 lb 10 5 oz)     Seen on Echo during this hospitalization  S/P IV diuresis for exacerbation  Now back on room air   Closely monitor I/O, daily weights, volume status  Cardiac diet with fluid restriction  Continue: Lasix 40mg daily   - Previously on ACE, may be able to restart   - Was not on BB  Outpatient f/u with PCP  Chronic diarrhea  Assessment & Plan  Chronically on Lomotil  Monitor  Close continence care  Stage 3a chronic kidney disease [facility])  Assessment & Plan  Lab Results   Component Value Date    EGFR 77 01/26/2023    EGFR 74 01/23/2023    EGFR 59 01/19/2023    CREATININE 0 97 01/26/2023    CREATININE 1 00 01/23/2023    CREATININE 1 21 01/19/2023     Stage 2-3A  Crea baseline 1-1 3  Improved recently after ARIANNA 2/2 autoregulatory issues/congestion/volume overload  Now on Lasix daily monitor  Was on Lisinopril and HCTZ at home, currently being held     - Nephro says these can be restarted as necessary  Monitoring BP  Avoid nephrotoxic meds  Outpatient f/u     Metastatic renal cell carcinoma [facility])  Assessment & Plan  Originally 2007  Mets in 2013  On systemic therapy  S/p L nephrectomy and prostatectomy  Will need outpatient f/u with Oncology     Spine metastasis [facility])  Assessment & Plan  In most recent CT CAP in 2022 showed lytic/sclerotic lesions T4, T6, and scattered lesions in lumbar spine   NM bone scan in 2020 showed:  Scattered exophytic foci of radiotracer uptake in the spine corresponding to degenerative changes on CT  Ct C-Spine in 6/2022 showed no lesions  He has pelvic/iliac lytic lesions  Monitor neuro status and for worsening back pain  Pain management as below  Lung metastases ([facility] [address] )  Assessment & Plan  With recent AHRF in setting of volume overload and acute diastolic heart failure and ARIANNA  Now on room air  Has pleural effusion in addition  Closely monitor respiratory status  Pulmonary toilet  Outpatient f/u with Oncology    Bone metastases [facility])  Assessment & Plan  Monitor performance in therapy  If he develops new pain, particular in long bones, would image    - Since his fall has had new R hip pain primarily with weight bearing   - 2022 CT CAP showed lytic lesion superior to the right hip are identified as well as the posterior column of the acetabulum  - XR of R hip without significant changes  Better visualized on CT   2020 Bone Scan showed lytic lesions:   left lateral scapula inferior to the glenoid     right posterior and lateral ribs corresponding to expansile lytic lesions on CT  (recent Ct with expansive R 7th rib met)   left posterior iliac bone and right iliac wing corresponding to lytic lesions on CT    r nonspecific tibial finding    Outpatient f/u with Dr Romy Brooks with Ortho onc  Health Maintenance  #Delirium/Sleep: At risk  Environmental interventions  Optimize pain, bowel, bladder, sleep-wake cycle management  #Pain: Tylenol PRN, Gabapentin 300mg TID, Oxycodone 5mg PRN severe pain, tramadol 50mg PRN moderate pain  #Bowel: Last BM 1/26/2023, incontinent and watery  Chronic diarrhea, see above  #Bladder: Voiding and continent  #Skin/Pressure Injury Prevention: Turn Q2hr in bed, with weight shifts [id number] in wheelchair  Float heels in bed  #DVT Prophylaxis: HSQ,SCDs  #GI Prophylaxis: Not indicated  #Code Status:  Full Code  #FEN: Cardiac diet with 1500cc fluid restriction  #Dispo:  Team 1/26: ADD 2/3 with Home PT/OT  He would prefer outpatient  Working on [facility] for him with his family given his restrictions in that arm    - Outpatient f/u with Ortho 2 weeks after discharge  - Outpatient f/u with Oncology  - Referral to Pain/Spine team if symptoms still persistent    - Outpatient f/u with PCP  - Scripts to be sent to the [place]  Objective:    Functional Update:  PT: CGA transfers, min-modA bed mobility, CGA ambulation 150', Barry RHR 8 stairs  OT:  Setup eating, Sup grooming, Barry bathing, Sup UB dressing, Barry LB dressing, Barry toileting, Barry tub/shower transfer, CGA toilet transfers    Allergies per EMR    Physical Exam:  Temp:  [98 2 °F (36 8 °C)-98 4 °F (36 9 °C)] 98 4 °F (36 9 °C)  HR:  [] 96  Resp:  [18] 18  BP: (108-126)/(61-66) 124/66  Oxygen Therapy  SpO2: 93 %  O2 Flow Rate (L/min): 2 L/min    Gen: No acute distress, Well-nourished, well-appearing  HEENT: Moist mucus membranes, Normocephalic/Atraumatic  Cardiovascular: Regular rate, rhythm, S1/S2  Distal pulses palpable  Heme/Extr: Stable BL LE edema  Pulmonary: Non-labored breathing  Lungs CTAB  : No williamson  GI: Soft, non-tender, non-distended  BS+  Integumentary: Skin is warm, dry  Neuro: AAOx3, Speech is intact  Appropriate to questioning  Psych: Normal mood and affect  Diagnostic Studies: Reviewed  1/26 MRI C-Spine:  1  Exam is somewhat compromised by motion artifact  No definite suspicious lesion to suggest metastatic disease      2  Multilevel degenerative canal stenosis worse at level C4-5 with moderate stenosis      3   Multilevel foraminal narrowing as detailed; severe at right C2-3 and left C3-4, moderate to severe at right C4-5      1/26 MRI Shoulder:  ROTATOR CUFF: Full-thickness partial width tear anterior supraspinatus insertion measuring 1 1 cm AP by 0 7 cm medial lateral superimposed upon moderate tendinosis  Moderate subscapularis insertional tendinosis with low-grade articular sided fraying  Mild infraspinatus insertional tendinosis  Edema within the teres minor muscle, likely related to denervation from mass effect of tumor      SUBACROMIAL/SUBDELTOID BURSA: There is mild subacromial/subdeltoid bursitis       LONG HEAD OF BICEPS TENDON: Mild tendinosis and tenosynovitis      GLENOID LABRUM: Complex tearing anterior inferior labrum      GLENOHUMERAL JOINT: Mild osteoarthritis     ACROMIOCLAVICULAR JOINT:  There is moderate osteoarthritis      BONES: As seen on multiple prior examinations, there is pathologic fracture of the scapular body   Multiple lesions again noted arising from the scapular body and glenoid, consistent with metastatic disease  The largest component of the tumor with   extraosseous extension measures approximately 5 9 x 4 7 x 5 7 cm (series 901, image 13)  This is located along the mid to inferior scapula  A 2nd component of tumor extends posteriorly along the superior aspect of the scapula measuring approximately   3 2 x 2 5 x 3 7 cm (series 401, image 15)  These tumors result in mass effect on the subscapularis, teres minor, and infraspinatus      Laboratory:  Reviewed   Results from last 7 days   Lab Units 01/26/23  0458 01/23/23  0637   HEMOGLOBIN g/dL 11 1* 11 5*   HEMATOCRIT % 37 1 38 5   WBC Thousand/uL 6 03 6 46     Results from last 7 days   Lab Units 01/26/23  0458 01/23/23  0637   BUN mg/dL 18 19   POTASSIUM mmol/L 4 1 4 5   CHLORIDE mmol/L 101 100   CREATININE mg/dL 0 97 1 00 Patient Active Problem List   Diagnosis   • Clear cell adenocarcinoma of kidney, left ([facility] [address] )   • [facility]   • Lung metastases ([facility] [address] )   • Spine metastasis ([facility] )   • Metastatic renal cell carcinoma ([facility] )   • Hx of prostatectomy   • History of prostate cancer   • Azotemia   • COVID-19   • Stage 3a chronic kidney disease ([facility] [address] )   • H/O left nephrectomy   • Fall at home, initial encounter   • 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   • Acute respiratory failure with hypoxia (HCC)   • Elevated brain natriuretic peptide (BNP) level   • Elevated d-dimer   • Chronic diarrhea   • Chronic diastolic (congestive) heart failure (HCC)   • Dermatitis associated with moisture   • Pleural effusion   • HTN (hypertension)   • Neuropathy   • Nocturnal hypoxemia   • Foraminal stenosis of cervical region         Medications  Current Facility-Administered Medications   Medication Dose Route Frequency Provider Last Rate   • acetaminophen  975 mg Oral 4x Daily PRN Yaquelin Ingram MD     • aluminum-magnesium hydroxide-simethicone  30 mL Oral Q6H PRN Yaquelin Ingram MD     • cholecalciferol  1,000 Units Oral Daily Yaquelin Ingram MD     • diphenhydrAMINE  25 mg Oral HS PRN Yaquelin Ingram MD     • furosemide  40 mg Oral Daily Yaquelin Ingram MD     • gabapentin  300 mg Oral TID Yaquelin Ingram MD     • heparin (porcine)  5,000 Units Subcutaneous [facility] Yaquelin Ingram MD     • lidocaine  2 patch Topical Daily Yaquelin Inrgam MD     • loperamide  2 mg Oral 4x Daily PRN Yaquelin Ingram MD     • multivitamin stress formula  1 tablet Oral Daily Yaquelin Ingram MD     • nystatin   Topical BID Yaquelin Ingram MD     • ondansetron  4 mg Oral Q6H PRN Yaquelin Ingram MD     • oxyCODONE  5 mg Oral 4x Daily PRN Alirio Solano MD     • oxyCODONE  2 5 mg Oral 4x Daily PRN Alirio Solano MD     • oxyCODONE  7 5 mg Oral Q4H PRN Alirio Solano MD     • simethicone  80 mg Oral 4x Daily PRN Yves Mendenhall Leila Xiong MD     • white petrolatum-mineral oil   Topical BID IRENE Proctor            ** Please Note: Fluency Direct voice to text software may have been used in the creation of this document   **

## 2023-01-27 NOTE — ASSESSMENT & PLAN NOTE
Hx of SBRT to T6, T10, and T4 between 0390-1181  Encourage adequate pain control  Follows with Dr Ulysses Lyons (Oncology) as outpatient

## 2023-01-27 NOTE — PROGRESS NOTES
01/27/23 1230   Pain Assessment   Pain Assessment Tool 0-10   Pain Score No Pain   Restrictions/Precautions   Precautions Fall Risk   LUE Weight Bearing Per Order NWB   Braces or Orthoses Sling  (using AFO on left foot)   Cognition   Overall Cognitive Status WFL   Subjective   Subjective reports being "sluggish" most of the day, felt it was from medication he was given for his MRI yesterday   Sit to Stand   Type of Assistance Needed Supervision   Sit to Stand CARE Score 4   Bed-Chair Transfer   Type of Assistance Needed Supervision; Adaptive equipment   Comment with [facility] AND [place]   Chair/Bed-to-Chair Transfer CARE Score 4   Walk 10 Feet   Type of Assistance Needed Supervision; Adaptive equipment   Comment WBQC with CS   Walk 10 Feet CARE Score 4   Walk 50 Feet with Two Turns   Type of Assistance Needed Supervision; Adaptive equipment   Comment CS with [facility] AND [place]   Walk 50 Feet with Two Turns CARE Score 4   Walk 150 Feet   Type of Assistance Needed Incidental touching; Adaptive equipment   Comment CGA with WBQC, fatigued at end with increased antalgic gait   Walk 150 Feet CARE Score 4   Walking 10 Feet on Uneven Surfaces   Type of Assistance Needed Incidental touching; Adaptive equipment   Physical Assistance Level No physical assistance   Comment CS/CGA w/ WBQC across mat on floor   Walking 10 Feet on Uneven Surfaces CARE Score 4   Ambulation   Primary Mode of Locomotion Prior to Admission Walk   Assist Device Quad Cane   Gait Pattern Antalgic; Inconsistant Dilma;Decreased R stance; Improper weight shift   Walk Assist Level Close Supervision   Does the patient walk? 2   Yes   Curb or Single Stair   Style negotiated Single stair   Type of Assistance Needed Incidental touching   Comment CGA with using R HR Fwd up and Bkwd down   1 Step (Curb) CARE Score 4   4 Steps   Type of Assistance Needed Incidental touching   Comment CGA with using R HR Fwd up and Bkwd down   4 Steps CARE Score 4   12 Steps   Type of Assistance Needed Incidental touching   Comment CGA with using R HR Fwd up and Bkwd down   12 Steps CARE Score 4   Stairs   # of Steps 12   Findings leading with left leg ascending   Therapeutic Interventions   Balance walking with [facility] AND [employer] throughout gym navigating obstaces, tight spaces, sidestepping and multiple turns   Other Comments   Comments Spoke with Dr Allen Florence regarding order for AFO needed to address left drop foot for home  Discussed Carbon fiber vs custom fitted  Will contact  prosthetics and have them out beginning of week to assess  Assessment   Treatment Assessment Pt participating very well, but requires frequent seated rest breaks due to fatigue, slight SOB and right hip "soreness"  Determined he will need to enter the home via the front door to utilize R handrail  He is talking with friends to have Right handrail installed for back entrance  Still guarding closely with gait for fall prevention  AFO to be ordered and fitted  Plan   Progress Progressing toward goals   PT Therapy Minutes   PT Time In 1230   PT Time Out 1330   PT Total Time (minutes) 60   PT Mode of treatment - Individual (minutes) 60   PT Mode of treatment - Concurrent (minutes) 0   PT Mode of treatment - Group (minutes) 0   PT Mode of treatment - Co-treat (minutes) 0   PT Mode of Treatment - Total time(minutes) 60 minutes   PT Cumulative Minutes 660   Therapy Time missed   Time missed?  No

## 2023-01-27 NOTE — PROGRESS NOTES
01/27/23 0830   Pain Assessment   Pain Assessment Tool 0-10   Pain Score 6   Pain Location/Orientation Orientation: Left; Location: Shoulder   Pain Onset/Description Onset: Ongoing   Hospital Pain Intervention(s) Shower/Bath   Restrictions/Precautions   Precautions Fall Risk;Pain   Weight Bearing Restrictions Yes   LUE Weight Bearing Per Order NWB   LUE ROM Restriction   (LUE pendulums OK, No shoulder AROM or AAROM, or PROM beyond pendulums)   Braces or Orthoses Sling  (AFO LLE, Sling LUE)   Shower/Bathe Self   Type of Assistance Needed Physical assistance;Verbal cues; Adaptive equipment   Physical Assistance Level 25% or less   Comment Pt engaged in showering, able to wash 8/10 parts, washing UB and BLEs while seated on tub bench w/Sup and frequent vc's to avoid AROM of L shoulder as well as prevent WBing through LUE  Pt washed LEs seated using dynamic fxl reach w/RUE, then washed feet by placing soapy washcloth on floor and rubbing feet over it  CGA in stance while pt completed ramiro bathing, pt cont to require A to wash rear 2* decreased RUE posterior reach  A required to wash RUE 2* LUE ROM limitations  Shower/Bathe Self CARE Score 3   Bathing   Assessed Bath Style Shower   Anticipated D/C Bath Style Sponge Bath   Able to [place] Andrzej No   Able to [employer] Temperature No   Able to Wash/Rinse/Dry (body part) Left Arm;L Upper Leg;R Upper Leg;L Lower Leg/Foot;R Lower Leg/Foot;Chest;Abdomen;Perineal Area   Limitations Noted in Balance; Endurance;ROM; Timeliness;Strength; Safety   Positioning Seated;Standing   Adaptive Equipment Tub Bench; Shower [employer]   Limitations Noted In [employer] Strength;LE Strength   Adaptive Equipment Grab Bars;Transfer Bench   Assessed Shower   Findings CGA side-stepping in/out of walk-in shower to tub bench, transitioning R hand fro QC to shower bar, no LOB   Upper Body Dressing   Type of Assistance Needed Supervision   Physical Assistance Level No physical assistance   Comment seated, increased time to thread LUE to avoid ROM at L shoulder  Pt able to doff/don sling with increased time   Upper Body Dressing CARE Score 4   Lower Body Dressing   Type of Assistance Needed Physical assistance;Verbal cues; Adaptive equipment   Physical Assistance Level 25% or less   Comment Pt continues to require A for CM up/down over L hip in stance 2* LUE NWB and ROM limitations  Seated to doff pants/underwear over feet using dressing stick, then seated using LHR to thread LEs with increased time for coordination   Lower Body Dressing CARE Score 3   Putting On/Taking Off Footwear   Type of Assistance Needed Physical assistance; Adaptive equipment   Physical Assistance Level 51%-75%   Comment seated w/Sup and dressing stick to doff B/L socks, totalA to don TEDs, can don R sneaker w/RLE lifted on bed, TotalA to don L AFO and L sneaker, dependent to tie laces   Putting On/Taking Off Footwear CARE Score 2   Dressing/Undressing Clothing   Remove UB [address]; Undergarment;Socks   Don LB Clothes Pants; Undergarment;TEDs; Shoes   Limitations Noted In Balance; Coordination; Endurance;Strength;ROM; Timeliness; Safety   Adaptive Equipment Reacher;Dressing Stick;LH [place]   Positioning Supported Sit;Standing   Sit to Stand   Type of Assistance Needed Incidental touching; Adaptive equipment   Physical Assistance Level No physical assistance   Comment CGA w/QC   Sit to Stand CARE Score 4   Bed-Chair Transfer   Type of Assistance Needed Incidental touching; Adaptive equipment   Physical Assistance Level No physical assistance   Comment CGA fxl mob w/QC   Chair/Bed-to-Chair Transfer CARE Score 4   Toileting Hygiene   Type of Assistance Needed Physical assistance   Physical Assistance Level 26%-50%   Comment pt continues to require A in stance for CM up/down over L hip 2* LUE NWB and ROM restrictions   A for rear hygiene after BM   Toileting Hygiene CARE Score 3   Toilet Transfer   Type of Assistance Needed Incidental touching; Adaptive equipment   Physical Assistance Level No physical assistance   Comment CGA fxl mob w/QC, no LOB   Toilet Transfer CARE Score 4   Toilet Transfer   Surface Assessed Standard Toilet   Transfer Technique Standard   Limitations Noted In Balance; Endurance;ROM;UE Strength;LE Strength   Adaptive Equipment Grab [employer]   Cognition   Overall Cognitive Status [facility]   Arousal/Participation Alert; Cooperative   Attention Within functional limits   Orientation Level Oriented X4   Memory Within functional limits   Following Commands Follows all commands and directions without difficulty   Activity Tolerance   Activity Tolerance Patient tolerated treatment well   Medical Staff Made Aware notified nsg Gearline Clause that valencia applied calazime to pt's sacral area, and pt applied nystatin powder to groin to maintain skin integrity   Assessment   Treatment Assessment Pt seen for 90min skilled OT session focused on ADL skills retraining (shower, LB dressing w/LHAE, and toileting hygiene and CM), short-distance fxl mob w/QC, and carryover of LUE NWB and ROM restrictions, for increased independence w/ADLs/IADLs and decreased caregiver burden  See detailed descriptions of fxl performance above  Pt tolerated session well, although continues to be limited by LUE NWB restriction, decreased LUE AROM, L drop foot, decreased standing balance, LUE pain, BLE swelling, decreased strength, and endurance  Pt continues to demo decreased carryover of LUE NWB restriction, requiring frequent vc's during showering to maintain  Pt would benefit from continued skilled OT focused on ADL retraining (jose armando LB bathing/dressing), toileting (CM over L hip), med mgmt, fxl mobility w/WBQC, community mobility, IADL retraining (cleaning, meal prep, household maintenance)  Anticipated D/C 2/3     Prognosis Fair   Problem List Decreased strength;Decreased range of motion;Decreased endurance; Impaired balance;Decreased mobility; Decreased coordination;Decreased safety awareness;Pain;Orthopedic restrictions   Barriers to Discharge Inaccessible home environment;Decreased caregiver support   Plan   Treatment/Interventions ADL retraining;Functional transfer training; Therapeutic exercise; Endurance training;Patient/family training;Equipment eval/education; Bed mobility; Compensatory technique education;Spoke to nursing   Progress Progressing toward goals   Recommendation   OT Discharge Recommendation Home with home health rehabilitation   OT Therapy Minutes   OT Time In 0830   OT Time Out 1000   OT Total Time (minutes) 90   OT Mode of treatment - Individual (minutes) 90   OT Mode of treatment - Concurrent (minutes) 0   OT Mode of treatment - Group (minutes) 0   OT Mode of treatment - Co-treat (minutes) 0   OT Mode of Treatment - Total time(minutes) 90 minutes   OT Cumulative Minutes 600   Therapy Time missed   Time missed?  No

## 2023-01-27 NOTE — PROGRESS NOTES
[facility]  Progress Note - Nathaly De Luna 1950, 67 y o  male MRN: [id number]  Unit/Bed#: Shravan Patton 889-45 Encounter: [id number]  Primary Care Provider: Wendy George MD   Date and time admitted to hospital: 1/19/2023  3:45 PM    Nocturnal hypoxemia  Assessment & Plan  States that he was told he had sleep apnea in the past during hospitalizations but never had formal work-up  Has been requiring O2 at night  Overnight noc ox performed on 1/24 and showed desat <89% for 8 hours and 12 minutes, as low as 74%  Apply 2L O2 at HS  Repeat study prior to discharge for DME evaluation  Would benefit from Sleep Medicine as outpatient  Neuropathy  Assessment & Plan  Multifactorial  Continue home gabapentin 300mg TID  HTN (hypertension)  Assessment & Plan  Home regimen: amlodipine 5mg daily and lisinopril/HCTZ 20-12 5mg BID  Currently receiving Lasix 40mg daily  Discontinue amlodipine on 1/23 due to not receiving Lasix due to holding parameters not being met  Lisinopril and HCTZ held after receiving CTA on 1/17  Restart as able  Monitor BP with routine VS     Pleural effusion  Assessment & Plan  Likely secondary to metastatic disease  CTA PE study on 1/17: New moderate L pleural effusion with moderate compressive atelectasis of left lower lobe  Currently maintaining on RA  Monitor spot pulse ox  Encourage pulmonary toileting  Continue Lasix 40mg daily  Chronic diastolic (congestive) heart failure (HCC)  Assessment & Plan  Wt Readings from Last 3 Encounters:   01/27/23 110 kg (243 lb 3 2 oz)   01/19/23 110 kg (243 lb 6 2 oz)   12/30/22 121 kg (265 lb 10 5 oz)     ECHO on 1/16 showed EF 65%, no RWMA, G1DD  Treated with Lasix in acute setting for volume overload  BNP 4475 on admission  Continue Lasix 40mg daily  Monitor daily weights and I&Os  Continue 1500FR  Chronic diarrhea  Assessment & Plan  Continue home PRN Imodium  Monitor skin for breakdown      Stage 3a chronic kidney disease [facility])  Assessment & Plan  Lab Results   Component Value Date    EGFR 77 01/26/2023    EGFR 74 01/23/2023    EGFR 59 01/19/2023    CREATININE 0 97 01/26/2023    CREATININE 1 00 01/23/2023    CREATININE 1 21 01/19/2023     Creatinine currently 0 97  Baseline creatinine 1 0-1 3  Hx of L nephrectomy  Developed ARIANNA in acute setting due to autoregulatory failure/volume overload/congestion  Currently on Lasix 40mg daily  Avoid nephrotoxins as able  Encourage adequate hydration  Continue to trend BMP  Metastatic renal cell carcinoma ([facility] [address] )  Assessment & Plan  Diagnosed in 2007 with metastatic disease in 2013  Hx of L nephrectomy in 2007 and prostatectomy  Progressive disease  Newly started on Nivolumab in 12/022  Follows with Dr Maryjane Woo (Oncology) as outpatient - follow-up scheduled for February  Spine metastasis ([facility] )  Assessment & Plan  Hx of SBRT to T6, T10, and T4 between 3286-8540  Encourage adequate pain control  Follows with Dr Maryjane Woo (Oncology) as outpatient  Lung metastases [facility])  Assessment & Plan  CTA PE study on 1/17: Enlargement of R lower lobe metastases  Developed hypoxia in acute setting - receiving diuretics with improvement  Currently maintaining on RA  Monitor spot pulse ox  Continue Lasix 40mg daily (held over the weekend due to parameters)  Discontinue amlodipine at this time to continue Lasix  Encourage pulmonary toileting and IS use  Follows with Dr Maryjane Woo as outpatient  Bone metastases [facility])  Assessment & Plan  Diagnosed with metastatic renal cell carcinoma in 2007  Progressive disease  Bone scan in 2020 showed lytic lesions to L scapula, R posterior and lateral ribs, L posterior iliac bone, and R iliac wing  Most recently started on Nivolumab in 12/2022  Ensure adequate pain control  Continue home Vitamin D supplementation  Follows with Dr Maryjane Woo (Oncology) as outpatient      * 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  Assessment & Plan  Presented on  after a mechanical fall with L shoulder pain  CT LUE: lytic metastasis int he scapular glenoid process measuring 2 9 x 2 4 x 2 0cm with cortical breakthrough and extraosseous extension anteriorly without pathologic fracture  New 2 4 x 2 1 x 1 9cm lytic metastasis in the L scapular body with acute pathologic fracture  New 1 9x0 8x1 4cm lytic metastasis in the inferior scapular angle with pathologic fracture  Non-surgical intervention per Ortho  NWB to LUE  Sling for comfort  Neurovascular checks Q shift  Ensure adequate pain control  Follow-up with Dr Buzz Virk (Orthopedics) in 4 weeks  Primary team following  PT/OT  MRI L shoulder due to burning sensation/chronic ongoing pain on : metastatic disease of the scapula with pathologic fracture, full thickness partial width tear anterior supraspinatus superimposed upon moderate tendinosis, moderate subscapularis tendinosis, mild infraspinatus tendinosis  VTE Pharmacologic Prophylaxis:   Pharmacologic: Heparin  Mechanical VTE Prophylaxis in Place: Yes - sequential compression devices  Current Length of Stay: 8 day(s)    Current Patient Status: Inpatient Rehab     Discharge Plan: As per primary team     Code Status: Level 1 - Full Code    Subjective:   Pt examined while pt sitting in recliner in pt room  Currently denies any pain  Denies any further burning sensation to L scapula  Slept well last night  Tolerating O2 at HS  Denies any fevers, chills, lightheadedness, or dizziness  LBM on   Tolerating therapy well  Has no concerns or complaints currently  Objective:     Vitals:   Temp (24hrs), Av 3 °F (36 8 °C), Min:98 2 °F (36 8 °C), Max:98 4 °F (36 9 °C)    Temp:  [98 2 °F (36 8 °C)-98 4 °F (36 9 °C)] 98 4 °F (36 9 °C)  HR:  [] 96  Resp:  [18] 18  BP: (108-126)/(61-66) 124/66  SpO2:  [93 %-95 %] 93 %  Body mass index is 34 9 kg/m²       Review of Systems Constitutional: Negative for appetite change, chills, fatigue and fever  HENT: Negative for trouble swallowing  Eyes: Negative for visual disturbance  Respiratory: Negative for cough, chest tightness, shortness of breath, wheezing and stridor  Cardiovascular: Negative for chest pain, palpitations and leg swelling  Gastrointestinal: Negative for abdominal distention, abdominal pain, constipation, diarrhea, nausea and vomiting  LBM 1/26   Genitourinary: Negative for difficulty urinating  Musculoskeletal: Negative for arthralgias and back pain  Neurological: Negative for dizziness, weakness, light-headedness, numbness and headaches  Psychiatric/Behavioral: Negative for dysphoric mood and sleep disturbance  The patient is not nervous/anxious  All other systems reviewed and are negative  Input and Output Summary (last 24 hours): Intake/Output Summary (Last 24 hours) at 1/27/2023 0912  Last data filed at 1/27/2023 0525  Gross per 24 hour   Intake 620 ml   Output 800 ml   Net -180 ml       Physical Exam:     Physical Exam  Vitals and nursing note reviewed  Constitutional:       General: He is not in acute distress  Appearance: Normal appearance  He is obese  He is not ill-appearing  HENT:      Head: Normocephalic and atraumatic  Cardiovascular:      Rate and Rhythm: Normal rate and regular rhythm  Pulses: Normal pulses  Heart sounds: Murmur heard  Systolic murmur is present with a grade of 1/6  No friction rub  Pulmonary:      Effort: Pulmonary effort is normal  No respiratory distress  Breath sounds: Normal breath sounds  No wheezing or rhonchi  Abdominal:      General: Abdomen is flat  Bowel sounds are normal  There is no distension  Palpations: Abdomen is soft  There is no mass  Tenderness: There is no abdominal tenderness  There is no guarding or rebound  Hernia: No hernia is present     Musculoskeletal:      Cervical back: Normal range of motion and neck supple  No tenderness  Right lower leg: Edema (lymphedema) present  Left lower leg: Edema (lymphedema) present  Comments: Wearing sling to LUE  Skin:     General: Skin is warm and dry  Capillary Refill: Capillary refill takes less than 2 seconds  Neurological:      Mental Status: He is alert and oriented to person, place, and time     Psychiatric:         Mood and Affect: Mood normal          Behavior: Behavior normal          Additional Data:     Labs:    Results from last 7 days   Lab Units 01/26/23  0458   WBC Thousand/uL 6 03   HEMOGLOBIN g/dL 11 1*   HEMATOCRIT % 37 1   PLATELETS Thousands/uL 274   NEUTROS PCT % 68   LYMPHS PCT % 22   MONOS PCT % 8   EOS PCT % 2     Results from last 7 days   Lab Units 01/26/23  0458   SODIUM mmol/L 139   POTASSIUM mmol/L 4 1   CHLORIDE mmol/L 101   CO2 mmol/L 34*   BUN mg/dL 18   CREATININE mg/dL 0 97   ANION GAP mmol/L 4*   CALCIUM mg/dL 9 0   GLUCOSE RANDOM mg/dL 85         Results from last 7 days   Lab Units 01/21/23  0600   POC GLUCOSE mg/dl 97               Labs reviewed    Imaging:    Imaging reviewed    Recent Cultures (last 7 days):           Last 24 Hours Medication List:   Current Facility-Administered Medications   Medication Dose Route Frequency Provider Last Rate   • acetaminophen  975 mg Oral 4x Daily PRN Margo Gomez MD     • aluminum-magnesium hydroxide-simethicone  30 mL Oral Q6H PRN Margo Gomez MD     • cholecalciferol  1,000 Units Oral Daily Margo Gomez MD     • diphenhydrAMINE  25 mg Oral HS PRN Margo Gomez MD     • furosemide  40 mg Oral Daily Margo Gomez MD     • gabapentin  300 mg Oral TID Margo Gomez MD     • heparin (porcine)  5,000 Units Subcutaneous [facility] Margo Gomez MD     • lidocaine  2 patch Topical Daily Margo Gomez MD     • loperamide  2 mg Oral 4x Daily PRN Margo Gomez MD     • multivitamin stress formula  1 tablet Oral Daily Margo Gomez MD     • nystatin   Topical BID Akosua Sun MD     • ondansetron  4 mg Oral Q6H PRN Akosua Sun MD     • oxyCODONE  5 mg Oral 4x Daily PRN Dolores Scheuermann, MD     • oxyCODONE  2 5 mg Oral 4x Daily PRN Dolores Scheuermann, MD     • oxyCODONE  7 5 mg Oral Q4H PRN Dolores Scheuermann, MD     • simethicone  80 mg Oral 4x Daily PRN Akosua Sun MD     • white petrolatum-mineral oil   Topical BID IRENE Florez          M*Modal software was used to dictate this note  It may contain errors with dictating incorrect words or incorrect spelling  Please contact the provider directly with any questions

## 2023-01-27 NOTE — ASSESSMENT & PLAN NOTE
CTA PE study on 1/17: Enlargement of R lower lobe metastases  Developed hypoxia in acute setting - receiving diuretics with improvement  Currently maintaining on RA  Monitor spot pulse ox  Continue Lasix 40mg daily (held over the weekend due to parameters)  Discontinue amlodipine at this time to continue Lasix  Encourage pulmonary toileting and IS use  Follows with Dr Perla Winter as outpatient

## 2023-01-27 NOTE — TELEPHONE ENCOUNTER
Called and left voicemail  Patient to schedule ARIANNA follow up with first available provider once patient discharged  Patient should have BMP prior to office visit which has already been ordered  2nd attempt

## 2023-01-27 NOTE — ASSESSMENT & PLAN NOTE
Diagnosed in 2007 with metastatic disease in 2013  Hx of L nephrectomy in 2007 and prostatectomy  Progressive disease  Newly started on Nivolumab in 12/022  Follows with Dr Naomi Galvan (Oncology) as outpatient - follow-up scheduled for February

## 2023-01-27 NOTE — PLAN OF CARE
Problem: GASTROINTESTINAL - ADULT  Goal: Maintains or returns to baseline bowel function  Description: INTERVENTIONS:  - Assess bowel function  - Encourage oral fluids to ensure adequate hydration  - Administer IV fluids if ordered to ensure adequate hydration  - Administer ordered medications as needed  - Encourage mobilization and activity  - Consider nutritional services referral to assist patient with adequate nutrition and appropriate food choices  Outcome: Progressing  Goal: Maintains adequate nutritional intake  Description: INTERVENTIONS:  - Monitor percentage of each meal consumed  - Identify factors contributing to decreased intake, treat as appropriate  - Assist with meals as needed  - Monitor I&O, weight, and lab values if indicated  - Obtain nutrition services referral as needed  Outcome: Progressing     Problem: GENITOURINARY - ADULT  Goal: Maintains or returns to baseline urinary function  Description: INTERVENTIONS:  - Assess urinary function  - Encourage oral fluids to ensure adequate hydration if ordered  - Administer IV fluids as ordered to ensure adequate hydration  - Administer ordered medications as needed  - Offer frequent toileting  - Follow urinary retention protocol if ordered  Outcome: Progressing     Problem: METABOLIC, FLUID AND ELECTROLYTES - ADULT  Goal: Fluid balance maintained  Description: INTERVENTIONS:  - Monitor labs   - Monitor I/O and WT  - Instruct patient on fluid and nutrition as appropriate  - Assess for signs & symptoms of volume excess or deficit  Outcome: Progressing

## 2023-01-27 NOTE — ASSESSMENT & PLAN NOTE
Presented on 1/12 after a mechanical fall with L shoulder pain  CT LUE: lytic metastasis int he scapular glenoid process measuring 2 9 x 2 4 x 2 0cm with cortical breakthrough and extraosseous extension anteriorly without pathologic fracture  New 2 4 x 2 1 x 1 9cm lytic metastasis in the L scapular body with acute pathologic fracture  New 1 9x0 8x1 4cm lytic metastasis in the inferior scapular angle with pathologic fracture  Non-surgical intervention per Ortho  NWB to LUE  Sling for comfort  Neurovascular checks Q shift  Ensure adequate pain control  Follow-up with Dr Buzz Virk (Orthopedics) in 4 weeks  Primary team following  PT/OT  MRI L shoulder due to burning sensation/chronic ongoing pain on 1/26: metastatic disease of the scapula with pathologic fracture, full thickness partial width tear anterior supraspinatus superimposed upon moderate tendinosis, moderate subscapularis tendinosis, mild infraspinatus tendinosis

## 2023-01-27 NOTE — ASSESSMENT & PLAN NOTE
Diagnosed with metastatic renal cell carcinoma in 2007  Progressive disease  Bone scan in 2020 showed lytic lesions to L scapula, R posterior and lateral ribs, L posterior iliac bone, and R iliac wing  Most recently started on Nivolumab in 12/2022  Ensure adequate pain control  Continue home Vitamin D supplementation  Follows with Dr Sagar Cheung (Oncology) as outpatient

## 2023-01-27 NOTE — ASSESSMENT & PLAN NOTE
Wt Readings from Last 3 Encounters:   01/27/23 110 kg (243 lb 3 2 oz)   01/19/23 110 kg (243 lb 6 2 oz)   12/30/22 121 kg (265 lb 10 5 oz)     ECHO on 1/16 showed EF 65%, no RWMA, G1DD  Treated with Lasix in acute setting for volume overload  BNP 4475 on admission  Continue Lasix 40mg daily  Monitor daily weights and I&Os  Continue 1500FR

## 2023-01-27 NOTE — PROGRESS NOTES
01/27/23 1400   Pain Assessment   Pain Assessment Tool 0-10   Pain Score No Pain   Restrictions/Precautions   Precautions Fall Risk   LUE Weight Bearing Per Order NWB   Braces or Orthoses Sling; Other (Comment)  (AFO L foot)   Cognition   Overall Cognitive Status [facility]   Arousal/Participation Alert; Cooperative   Attention Within functional limits   Orientation Level Oriented X4   Memory Within functional limits   Following Commands Follows all commands and directions without difficulty   Subjective   Subjective "I have more? ok lets go!"   Sit to Stand   Type of Assistance Needed Supervision   Physical Assistance Level No physical assistance   Comment CS with WBQC   Sit to Stand CARE Score 4   Bed-Chair Transfer   Type of Assistance Needed Supervision   Physical Assistance Level No physical assistance   Comment S with [facility] AND [place]   Chair/Bed-to-Chair Transfer CARE Score 4   Walk 10 Feet   Type of Assistance Needed Supervision   Physical Assistance Level No physical assistance   Walk 10 Feet CARE Score 4   Walk 50 Feet with Two Turns   Type of Assistance Needed Supervision   Physical Assistance Level No physical assistance   Walk 50 Feet with Two Turns CARE Score 4   Ambulation   Primary Mode of Locomotion Prior to Admission Walk   Distance Walked (feet) 125 ft  (x2)   Assist Device [facility]   Gait Pattern Antalgic; Inconsistant Dilma; Slow Dilma; Wide ANDRÉS;Shuffle;Decreased R stance; Improper weight shift   Limitations Noted In Balance; Endurance; Heel Strike;Speed;Strength;Swing   Walk Assist Level Close Supervision   Findings +AFO, Shuffled gait, hurried at times, cues to pace, variable balance in appearance however no LOB today with longer distance  Does the patient walk? 2  Yes   Equipment Use   NuStep L2 x10 min R UE and B LE for conditioning  Assessment   Treatment Assessment Pt engaged in breif 30 min skilled PT intervention with focus on endurance building   Use of WBQC to/from pts room to therapy gym, completed x10 min on Nustep for conditioning  No overt LOB this session, education for pacing when ftgd  Will f/u with AFO on monday, plan remains for DC home next friday, as pt lives alone and needs to be mod I for DC home  Problem List Decreased strength;Decreased range of motion;Decreased endurance; Impaired balance;Decreased mobility; Decreased coordination;Decreased safety awareness;Pain;Orthopedic restrictions   Barriers to Discharge Inaccessible home environment;Decreased caregiver support   Recommendation   PT Discharge Recommendation Home with home health rehabilitation   PT Therapy Minutes   PT Time In 1400   PT Time Out 1430   PT Total Time (minutes) 30   PT Mode of treatment - Individual (minutes) 30   PT Mode of treatment - Concurrent (minutes) 0   PT Mode of treatment - Group (minutes) 0   PT Mode of treatment - Co-treat (minutes) 0   PT Mode of Treatment - Total time(minutes) 30 minutes   PT Cumulative Minutes 690   Therapy Time missed   Time missed?  No

## 2023-01-28 RX ADMIN — FUROSEMIDE 40 MG: 40 TABLET ORAL at 09:14

## 2023-01-28 RX ADMIN — OXYCODONE HYDROCHLORIDE 5 MG: 5 TABLET ORAL at 09:27

## 2023-01-28 RX ADMIN — HEPARIN SODIUM 5000 UNITS: 5000 INJECTION INTRAVENOUS; SUBCUTANEOUS at 21:20

## 2023-01-28 RX ADMIN — HEPARIN SODIUM 5000 UNITS: 5000 INJECTION INTRAVENOUS; SUBCUTANEOUS at 14:16

## 2023-01-28 RX ADMIN — OXYCODONE HYDROCHLORIDE 5 MG: 5 TABLET ORAL at 21:21

## 2023-01-28 RX ADMIN — HEPARIN SODIUM 5000 UNITS: 5000 INJECTION INTRAVENOUS; SUBCUTANEOUS at 05:23

## 2023-01-28 RX ADMIN — GABAPENTIN 300 MG: 300 CAPSULE ORAL at 21:20

## 2023-01-28 RX ADMIN — LIDOCAINE 2 PATCH: 50 PATCH CUTANEOUS at 09:14

## 2023-01-28 RX ADMIN — CHOLECALCIFEROL TAB 25 MCG (1000 UNIT) 1000 UNITS: 25 TAB at 09:14

## 2023-01-28 RX ADMIN — NYSTATIN: 100000 POWDER TOPICAL at 21:20

## 2023-01-28 RX ADMIN — GABAPENTIN 300 MG: 300 CAPSULE ORAL at 09:14

## 2023-01-28 RX ADMIN — B-COMPLEX W/ C & FOLIC ACID TAB 1 TABLET: TAB at 09:14

## 2023-01-28 RX ADMIN — Medication: at 09:23

## 2023-01-28 RX ADMIN — OXYCODONE HYDROCHLORIDE 5 MG: 5 TABLET ORAL at 03:24

## 2023-01-28 RX ADMIN — GABAPENTIN 300 MG: 300 CAPSULE ORAL at 15:34

## 2023-01-28 RX ADMIN — NYSTATIN: 100000 POWDER TOPICAL at 09:14

## 2023-01-28 RX ADMIN — ASPIRIN 81 MG: 81 TABLET, COATED ORAL at 09:14

## 2023-01-28 RX ADMIN — Medication: at 21:20

## 2023-01-28 NOTE — PROGRESS NOTES
01/28/23 0645   Pain Assessment   Pain Assessment Tool 0-10   Pain Score 5   Pain Location/Orientation Orientation: Left; Location: Shoulder   Pain Radiating Towards none   Pain Onset/Description Onset: Ongoing   Patient's Stated Pain Goal No pain   Hospital Pain Intervention(s) Rest   Multiple Pain Sites No   Restrictions/Precautions   Precautions Fall Risk   Weight Bearing Restrictions Yes   LUE Weight Bearing Per Order NWB   Braces or Orthoses Sling  (sling LUE, L AFO)   Lifestyle   Autonomy "I can go for a shower"   Eating   Type of Assistance Needed Set-up / clean-up   Eating CARE Score 5   Oral Hygiene   Type of Assistance Needed Supervision   Comment completed in stance at sink   Oral Hygiene CARE Score 4   Shower/Bathe Self   Type of Assistance Needed Physical assistance   Physical Assistance Level 25% or less   Comment completed shower this session on tub bench  Pt able to use RUE to wash UB, upper thighs  Placed wash cloth on floor for feet  Req A to wash RUE 2* to LUE NWB  TA for bottom   Shower/Bathe Self CARE Score 3   Bathing   Assessed Bath Style Shower   Anticipated D/C Bath Style Sponge Bath; Shower  (TBD)   Able to [place] No   Able to [employer] Temperature No   Able to Wash/Rinse/Dry (body part) Left Arm;L Upper Leg;R Upper Leg;L Lower Leg/Foot;R Lower Leg/Foot;Chest;Abdomen;Perineal Area   Limitations Noted in Balance; Endurance;Strength;Timeliness   Positioning Seated;Standing   Adaptive Equipment Tub Bench; Shower [employer]   Findings Sup for side stepping into shower  pt has shower with lip at home   Upper Body Dressing   Type of Assistance Needed Supervision   Comment seated inc time to thread shirt and sling   Upper Body Dressing CARE Score 4   Lower Body Dressing   Type of Assistance Needed Supervision; Adaptive equipment   Comment utilized LHR to thread undergarment   Able to thread pants, Sup in stance for CM, able to mainly get CM on L side Lower Body Dressing CARE Score 4   Putting On/Taking Off Footwear   Type of Assistance Needed Physical assistance   Physical Assistance Level 51%-75%   Comment TA for TEDS, able to don most of R sneaker  TA for L AFO and sneaker   Putting On/Taking Off Footwear CARE Score 2   Sit to Lying   Type of Assistance Needed Supervision   Sit to Lying CARE Score 4   Lying to Sitting on Side of Bed   Type of Assistance Needed Supervision   Lying to Sitting on Side of Bed CARE Score 4   Sit to Stand   Type of Assistance Needed Supervision; Adaptive equipment   Comment S with WBQC   Sit to Stand CARE Score 4   Bed-Chair Transfer   Type of Assistance Needed Supervision; Adaptive equipment   Comment S with [facility]   Chair/Bed-to-Chair Transfer CARE Score 4   Toilet Transfer   Type of Assistance Needed Supervision; Adaptive equipment   Comment S with [facility]   Toilet Transfer CARE Score 4   Commmunity Re-entry   Community Re-entry (S)  Pt requesting to wheel around unit this PM  Pt ablet o wheel around nurses station  Educ to cont to ring to get into wc or back ot bed/recliner  Pt verbalized understanding  Communicated with RN Ken Wong and PCA Martha Owusu   Overall Cognitive Status [facility]   Arousal/Participation Alert; Cooperative   Attention Within functional limits   Orientation Level Oriented X4   Memory Within functional limits   Following Commands Follows all commands and directions without difficulty   Activity Tolerance   Activity Tolerance Patient tolerated treatment well   Assessment   Treatment Assessment Engaged pt in 90mins of skilled OT services with focus on ADL routine  Pt is making steady gains and completing at SUp level  Does cont to req A for buttock hygiene, washing RUE 2* to LUE NWB  Pt reporting he has not contacted [facility], therapist highly encouraged as DC is [place]   Pt can cont to benefit from further skilled OT services with focus on  toilet wand once it arrives, activity tolerance, balance, UE TE, transfers, mobility  Prognosis Fair   Problem List Decreased strength;Decreased range of motion;Decreased endurance; Impaired balance;Decreased mobility; Decreased coordination;Decreased safety awareness;Pain;Orthopedic restrictions   Barriers to Discharge Inaccessible home environment;Decreased caregiver support   Barriers to Discharge Comments (S)  Therapits circled back with pt, reports he has not contacted [facility] yet  Therapist highly encouraged pt to complete as DC is on Friday   Plan   Treatment/Interventions ADL retraining;Functional transfer training; Therapeutic exercise; Endurance training;Patient/family training;Bed mobility; Compensatory technique education   Progress Progressing toward goals   Recommendation   OT Discharge Recommendation Home with home health rehabilitation   Equipment Recommended Bedside commode   Commode Type Standard   OT Equipment ordered has BSC  Son ordered toilet wand, did not arrive yet   OT Therapy Minutes   OT Time In 0645   OT Time Out 0815   OT Total Time (minutes) 90   OT Mode of treatment - Individual (minutes) 90   OT Mode of treatment - Concurrent (minutes) 0   OT Mode of treatment - Group (minutes) 0   OT Mode of treatment - Co-treat (minutes) 0   OT Mode of Treatment - Total time(minutes) 90 minutes   OT Cumulative Minutes 690   Therapy Time missed   Time missed?  No

## 2023-01-28 NOTE — PLAN OF CARE
Problem: NEUROSENSORY - ADULT  Goal: Achieves maximal functionality and self care  Description: INTERVENTIONS  - Monitor swallowing and airway patency with patient fatigue and changes in neurological status  - Encourage and assist patient to increase activity and self care     - Encourage visually impaired, hearing impaired and aphasic patients to use assistive/communication devices  Outcome: Progressing     Problem: RESPIRATORY - ADULT  Goal: Achieves optimal ventilation and oxygenation  Description: INTERVENTIONS:  - Assess for changes in respiratory status  - Assess for changes in mentation and behavior  - Position to facilitate oxygenation and minimize respiratory effort  - Oxygen administered by appropriate delivery if ordered  - Initiate smoking cessation education as indicated  - Encourage broncho-pulmonary hygiene including cough, deep breathe, Incentive Spirometry  - Assess the need for suctioning and aspirate as needed  - Assess and instruct to report SOB or any respiratory difficulty  - Respiratory Therapy support as indicated  Outcome: Progressing     Problem: GASTROINTESTINAL - ADULT  Goal: Maintains or returns to baseline bowel function  Description: INTERVENTIONS:  - Assess bowel function  - Encourage oral fluids to ensure adequate hydration  - Administer IV fluids if ordered to ensure adequate hydration  - Administer ordered medications as needed  - Encourage mobilization and activity  - Consider nutritional services referral to assist patient with adequate nutrition and appropriate food choices  Outcome: Progressing  Goal: Maintains adequate nutritional intake  Description: INTERVENTIONS:  - Monitor percentage of each meal consumed  - Identify factors contributing to decreased intake, treat as appropriate  - Assist with meals as needed  - Monitor I&O, weight, and lab values if indicated  - Obtain nutrition services referral as needed  Outcome: Progressing     Problem: GENITOURINARY - ADULT  Goal: Maintains or returns to baseline urinary function  Description: INTERVENTIONS:  - Assess urinary function  - Encourage oral fluids to ensure adequate hydration if ordered  - Administer IV fluids as ordered to ensure adequate hydration  - Administer ordered medications as needed  - Offer frequent toileting  - Follow urinary retention protocol if ordered  Outcome: Progressing

## 2023-01-29 RX ADMIN — CHOLECALCIFEROL TAB 25 MCG (1000 UNIT) 1000 UNITS: 25 TAB at 08:36

## 2023-01-29 RX ADMIN — OXYCODONE HYDROCHLORIDE 5 MG: 5 TABLET ORAL at 11:03

## 2023-01-29 RX ADMIN — GABAPENTIN 300 MG: 300 CAPSULE ORAL at 21:29

## 2023-01-29 RX ADMIN — LIDOCAINE 2 PATCH: 50 PATCH CUTANEOUS at 08:35

## 2023-01-29 RX ADMIN — OXYCODONE HYDROCHLORIDE 5 MG: 5 TABLET ORAL at 21:25

## 2023-01-29 RX ADMIN — Medication: at 21:27

## 2023-01-29 RX ADMIN — HEPARIN SODIUM 5000 UNITS: 5000 INJECTION INTRAVENOUS; SUBCUTANEOUS at 06:32

## 2023-01-29 RX ADMIN — Medication: at 08:43

## 2023-01-29 RX ADMIN — NYSTATIN: 100000 POWDER TOPICAL at 08:35

## 2023-01-29 RX ADMIN — FUROSEMIDE 40 MG: 40 TABLET ORAL at 08:36

## 2023-01-29 RX ADMIN — HEPARIN SODIUM 5000 UNITS: 5000 INJECTION INTRAVENOUS; SUBCUTANEOUS at 21:25

## 2023-01-29 RX ADMIN — ASPIRIN 81 MG: 81 TABLET, COATED ORAL at 08:36

## 2023-01-29 RX ADMIN — B-COMPLEX W/ C & FOLIC ACID TAB 1 TABLET: TAB at 08:36

## 2023-01-29 RX ADMIN — HEPARIN SODIUM 5000 UNITS: 5000 INJECTION INTRAVENOUS; SUBCUTANEOUS at 14:09

## 2023-01-29 RX ADMIN — ACETAMINOPHEN 975 MG: 325 TABLET ORAL at 02:17

## 2023-01-29 RX ADMIN — ACETAMINOPHEN 975 MG: 325 TABLET ORAL at 17:09

## 2023-01-29 RX ADMIN — NYSTATIN: 100000 POWDER TOPICAL at 21:27

## 2023-01-29 RX ADMIN — GABAPENTIN 300 MG: 300 CAPSULE ORAL at 08:36

## 2023-01-29 RX ADMIN — GABAPENTIN 300 MG: 300 CAPSULE ORAL at 17:06

## 2023-01-29 NOTE — PROGRESS NOTES
01/29/23 0830   Pain Assessment   Pain Assessment Tool 0-10   Pain Score 5   Pain Location/Orientation Location: Shoulder;Orientation: Left   Pain Radiating Towards none   Pain Onset/Description Onset: Ongoing   Patient's Stated Pain Goal 1   Hospital Pain Intervention(s) Medication (See MAR)   Multiple Pain Sites No   Restrictions/Precautions   Precautions Fall Risk   Weight Bearing Restrictions Yes   LUE Weight Bearing Per Order NWB   Braces or Orthoses Sling  (L UE, L AFO)   Cognition   Overall Cognitive Status WFL   Arousal/Participation Alert; Cooperative   Attention Within functional limits   Orientation Level Oriented X4   Following Commands Follows all commands and directions without difficulty   Subjective   Subjective Patient seated in chair, agreeable to PT  FRANCESCA stocking and L AFO applied at start of session   Roll Left and Right   Reason if not Attempted Safety concerns   Roll Left and Right CARE Score 88   Sit to Lying   Type of Assistance Needed Independent   Physical Assistance Level No physical assistance   Comment bed flat, no rails   Sit to Lying CARE Score 6   Lying to Sitting on Side of Bed   Type of Assistance Needed Independent   Physical Assistance Level No physical assistance   Lying to Sitting on Side of Bed CARE Score 6   Sit to Stand   Type of Assistance Needed Supervision; Adaptive equipment   Physical Assistance Level No physical assistance   Comment WBQC   Sit to Stand CARE Score 4   Bed-Chair Transfer   Type of Assistance Needed Supervision; Adaptive equipment   Physical Assistance Level No physical assistance   Chair/Bed-to-Chair Transfer CARE Score 4   Transfer Bed/Chair/Wheelchair   Adaptive Equipment [facility]   Walk 10 Feet   Type of Assistance Needed Supervision; Adaptive equipment;Verbal cues   Physical Assistance Level No physical assistance   Comment WBQC   Walk 10 Feet CARE Score 4   Walk 50 Feet with Two Turns   Type of Assistance Needed Supervision; Adaptive equipment;Verbal cues   Physical Assistance Level No physical assistance   Comment CS   Walk 50 Feet with Two Turns CARE Score 4   Walk 150 Feet   Type of Assistance Needed Supervision; Incidental touching;Verbal cues; Adaptive equipment   Physical Assistance Level No physical assistance   Comment CG/CS with WBQC   Walk 150 Feet CARE Score 4   Walking 10 Feet on Uneven Surfaces   Type of Assistance Needed Incidental touching;Verbal cues; Adaptive equipment   Physical Assistance Level No physical assistance   Comment CG/CS with WBQC   Walking 10 Feet on Uneven Surfaces CARE Score 4   Ambulation   Primary Mode of Locomotion Prior to Admission Walk   Distance Walked (feet) 150 ft  (125)   Assist Device [facility]   Gait Pattern Inconsistant Dilma;Decreased foot clearance; Antalgic;Decreased R stance; Improper weight shift; Step through   Limitations Noted In Balance; Endurance;Strength;Swing   Provided Assistance with: Balance   Walk Assist Level Close Supervision   Findings L AFO, cueing to inc R lat wt bearing to improve L step length and foot clearance   Does the patient walk? 2  Yes   Curb or Single Stair   Style negotiated Single stair   Type of Assistance Needed Supervision; Adaptive equipment   Physical Assistance Level No physical assistance   Comment S with R HR   1 Step (Curb) CARE Score 4   4 Steps   Type of Assistance Needed Supervision; Adaptive equipment   Physical Assistance Level No physical assistance   4 Steps CARE Score 4   12 Steps   Type of Assistance Needed Supervision; Adaptive equipment   Physical Assistance Level No physical assistance   Comment S with RHR   12 Steps CARE Score 4   Stairs   Type Stairs   # of Steps 12   Weight Bearing Precautions LUE;NWB   Assist Devices Single Rail   Findings leading with L LE, descend backward   Picking Up Object   Type of Assistance Needed Supervision; Adaptive equipment   Physical Assistance Level No physical assistance   Comment reacher, marker   Picking Up Object CARE Score 4 Toilet Transfer   Type of Assistance Needed Supervision; Adaptive equipment   Physical Assistance Level No physical assistance   Comment S with WBQC and grab bar   Toilet Transfer CARE Score 4   Toilet Transfer   Surface Assessed Standard Toilet   Limitations Noted In Balance   Adaptive Equipment Grab Bar   Findings max assist for hygiene post BM   Therapeutic Interventions   Strengthening Seated: GTB hip abd and hamstring curls, hip flex and LAQ c 2#, ankle pumps x 10 reps x 2 sets   Equipment Use   NuStep L2 x 10 mins R UE and B LE   Assessment   Treatment Assessment Patient participated in skilled PT focused on strengthening and mobility training  Patient cont to demonstrate improvement with functional mobility  He able to compete supine<>sit , bed flat and no railing, ind  Patient able to transfer with [facility], S and good hand placement  He amb c WBQC x 125ft and 150ft, CG/CS  Present with antalgic gait  Provided cueing to improve R wt shifting, L step lengt and foot clearance and fair carry over  Cont PT as per POC to maximized functional mobility recovery with [facility] and allow safe d/c to home   Family/Caregiver Present no   PT Family training done with: no   Problem List Decreased strength;Decreased range of motion;Decreased endurance; Impaired balance;Decreased mobility; Decreased coordination;Decreased safety awareness;Pain;Orthopedic restrictions   Barriers to Discharge Inaccessible home environment;Decreased caregiver support   PT Barriers   Physical Impairment Decreased strength;Decreased endurance; Impaired balance;Decreased mobility; Decreased coordination;Decreased safety awareness;Orthopedic restrictions   Functional Limitation Car transfers;Stair negotiation;Standing;Transfers; Walking   Plan   Treatment/Interventions Functional transfer training;LE strengthening/ROM; Elevations; Therapeutic exercise; Endurance training;Patient/family training;Gait training   Progress Progressing toward goals   Recommendation PT Discharge Recommendation Home with home health rehabilitation   PT Therapy Minutes   PT Time In 0830   PT Time Out 1000   PT Total Time (minutes) 90   PT Mode of treatment - Individual (minutes) 90   PT Mode of treatment - Concurrent (minutes) 0   PT Mode of treatment - Group (minutes) 0   PT Mode of treatment - Co-treat (minutes) 0   PT Mode of Treatment - Total time(minutes) 90 minutes   PT Cumulative Minutes 780   Therapy Time missed   Time missed?  No

## 2023-01-29 NOTE — PROGRESS NOTES
01/29/23 0830   Pain Assessment   Pain Assessment Tool 0-10   Pain Score 5   Pain Location/Orientation Location: Shoulder;Orientation: Left   Pain Radiating Towards none   Pain Onset/Description Onset: Ongoing   Patient's Stated Pain Goal 1   Hospital Pain Intervention(s) Medication (See MAR)   Multiple Pain Sites No   Restrictions/Precautions   Precautions Fall Risk   Weight Bearing Restrictions Yes   LUE Weight Bearing Per Order NWB   Braces or Orthoses Sling  (L UE, L AFO)   Cognition   Overall Cognitive Status WFL   Arousal/Participation Alert; Cooperative   Attention Within functional limits   Orientation Level Oriented X4   Following Commands Follows all commands and directions without difficulty   Subjective   Subjective Patient seated in chair, agreeable to PT  FRANCESCA stocking and L AFO applied at start of session   Roll Left and Right   Reason if not Attempted Safety concerns   Roll Left and Right CARE Score 88   Sit to Lying   Type of Assistance Needed Independent   Physical Assistance Level No physical assistance   Comment bed flat, no rails   Sit to Lying CARE Score 6   Lying to Sitting on Side of Bed   Type of Assistance Needed Independent   Physical Assistance Level No physical assistance   Lying to Sitting on Side of Bed CARE Score 6   Sit to Stand   Type of Assistance Needed Supervision; Adaptive equipment   Physical Assistance Level No physical assistance   Comment WBQC   Sit to Stand CARE Score 4   Bed-Chair Transfer   Type of Assistance Needed Supervision; Adaptive equipment   Physical Assistance Level No physical assistance   Chair/Bed-to-Chair Transfer CARE Score 4   Transfer Bed/Chair/Wheelchair   Adaptive Equipment [facility]   Walk 10 Feet   Type of Assistance Needed Supervision; Adaptive equipment;Verbal cues   Physical Assistance Level No physical assistance   Comment WBQC   Walk 10 Feet CARE Score 4   Walk 50 Feet with Two Turns   Type of Assistance Needed Supervision; Adaptive equipment;Verbal cues   Physical Assistance Level No physical assistance   Comment CS   Walk 50 Feet with Two Turns CARE Score 4   Walk 150 Feet   Type of Assistance Needed Supervision; Incidental touching;Verbal cues; Adaptive equipment   Physical Assistance Level No physical assistance   Comment CG/CS with WBQC   Walk 150 Feet CARE Score 4   Walking 10 Feet on Uneven Surfaces   Type of Assistance Needed Incidental touching;Verbal cues; Adaptive equipment   Physical Assistance Level No physical assistance   Comment CG/CS with WBQC   Walking 10 Feet on Uneven Surfaces CARE Score 4   Ambulation   Primary Mode of Locomotion Prior to Admission Walk   Distance Walked (feet) 150 ft  (125)   Assist Device [facility]   Gait Pattern Inconsistant Dilma;Decreased foot clearance; Antalgic;Decreased R stance; Improper weight shift; Step through   Limitations Noted In Balance; Endurance;Strength;Swing   Provided Assistance with: Balance   Walk Assist Level Close Supervision   Findings L AFO, cueing to inc R lat wt bearing to improve L step length and foot clearance   Does the patient walk? 2  Yes   Curb or Single Stair   Style negotiated Single stair   Type of Assistance Needed Supervision; Adaptive equipment   Physical Assistance Level No physical assistance   Comment S with R HR   1 Step (Curb) CARE Score 4   4 Steps   Type of Assistance Needed Supervision; Adaptive equipment   Physical Assistance Level No physical assistance   4 Steps CARE Score 4   12 Steps   Type of Assistance Needed Supervision; Adaptive equipment   Physical Assistance Level No physical assistance   Comment S with RHR   12 Steps CARE Score 4   Stairs   Type Stairs   # of Steps 12   Weight Bearing Precautions LUE;NWB   Assist Devices Single Rail   Findings leading with L LE, descend backward   Toilet Transfer   Type of Assistance Needed Supervision; Adaptive equipment   Physical Assistance Level No physical assistance   Comment S with WBQC and grab bar   Toilet Transfer CARE Score 4 Toilet Transfer   Surface Assessed Standard Toilet   Limitations Noted In Balance   Adaptive Equipment Grab Bar   Findings max assist for hygiene post BM   Equipment Use   NuStep L2 x 10 mins R UE and B LE   Assessment   Treatment Assessment Patient participated in skilled PT focused on strengthening and mobility training  Patient cont to demonstrate improvement with functional mobility  He able to compete supine<>sit , bed flat and no railing, ind  Patient able to transfer with [facility] AND [place], S and good hand placement  He amb c WBQC x 125ft and 150ft, CG/CS  Present with antalgic gait  Provided cueing to improve R wt shifting, L step lengt and foot clearance and fair carry over  Cont PT as per POC to maximized functional mobility recovery with [facility] and allow safe d/c to home   Family/Caregiver Present no   PT Family training done with: no   Problem List Decreased strength;Decreased range of motion;Decreased endurance; Impaired balance;Decreased mobility; Decreased coordination;Decreased safety awareness;Pain;Orthopedic restrictions   Barriers to Discharge Inaccessible home environment;Decreased caregiver support   PT Barriers   Physical Impairment Decreased strength;Decreased endurance; Impaired balance;Decreased mobility; Decreased coordination;Decreased safety awareness;Orthopedic restrictions   Functional Limitation Car transfers;Stair negotiation;Standing;Transfers; Walking   Plan   Treatment/Interventions Functional transfer training;LE strengthening/ROM; Elevations; Therapeutic exercise; Endurance training;Patient/family training;Gait training   Progress Progressing toward goals   Recommendation   PT Discharge Recommendation Home with home health rehabilitation   PT Therapy Minutes   PT Time In 0830   PT Time Out 1000   PT Total Time (minutes) 90   PT Mode of treatment - Individual (minutes) 90   PT Mode of treatment - Concurrent (minutes) 0   PT Mode of treatment - Group (minutes) 0   PT Mode of treatment - Co-treat (minutes) 0   PT Mode of Treatment - Total time(minutes) 90 minutes   PT Cumulative Minutes 780   Therapy Time missed   Time missed?  No

## 2023-01-29 NOTE — PLAN OF CARE
Problem: NEUROSENSORY - ADULT  Goal: Achieves stable or improved neurological status  Description: INTERVENTIONS  - Monitor and report changes in neurological status  - Monitor vital signs such as temperature, blood pressure, glucose, and any other labs ordered   - Initiate measures to prevent increased intracranial pressure  - Monitor for seizure activity and implement precautions if appropriate      Outcome: Progressing  Goal: Achieves maximal functionality and self care  Description: INTERVENTIONS  - Monitor swallowing and airway patency with patient fatigue and changes in neurological status  - Encourage and assist patient to increase activity and self care     - Encourage visually impaired, hearing impaired and aphasic patients to use assistive/communication devices  Outcome: Progressing     Problem: RESPIRATORY - ADULT  Goal: Achieves optimal ventilation and oxygenation  Description: INTERVENTIONS:  - Assess for changes in respiratory status  - Assess for changes in mentation and behavior  - Position to facilitate oxygenation and minimize respiratory effort  - Oxygen administered by appropriate delivery if ordered  - Initiate smoking cessation education as indicated  - Encourage broncho-pulmonary hygiene including cough, deep breathe, Incentive Spirometry  - Assess the need for suctioning and aspirate as needed  - Assess and instruct to report SOB or any respiratory difficulty  - Respiratory Therapy support as indicated  Outcome: Progressing     Problem: GASTROINTESTINAL - ADULT  Goal: Maintains adequate nutritional intake  Description: INTERVENTIONS:  - Monitor percentage of each meal consumed  - Identify factors contributing to decreased intake, treat as appropriate  - Assist with meals as needed  - Monitor I&O, weight, and lab values if indicated  - Obtain nutrition services referral as needed  Outcome: Progressing

## 2023-01-30 ENCOUNTER — APPOINTMENT (OUTPATIENT)
Dept: RADIOLOGY | Facility: HOSPITAL | Age: 73
End: 2023-01-30

## 2023-01-30 PROBLEM — Z91.89 CARDIOVASCULAR RISK FACTOR: Status: ACTIVE | Noted: 2023-01-30

## 2023-01-30 PROBLEM — M79.671 FOOT PAIN, RIGHT: Status: ACTIVE | Noted: 2023-01-30

## 2023-01-30 LAB
ALBUMIN SERPL BCP-MCNC: 3 G/DL (ref 3.5–5)
ALP SERPL-CCNC: 72 U/L (ref 43–122)
ALT SERPL W P-5'-P-CCNC: 17 U/L
ANION GAP SERPL CALCULATED.3IONS-SCNC: 4 MMOL/L (ref 5–14)
AST SERPL W P-5'-P-CCNC: 33 U/L (ref 17–59)
BASOPHILS # BLD AUTO: 0.01 THOUSANDS/ÂΜL (ref 0–0.1)
BASOPHILS NFR BLD AUTO: 0 % (ref 0–1)
BILIRUB SERPL-MCNC: 0.5 MG/DL (ref 0.2–1)
BUN SERPL-MCNC: 19 MG/DL (ref 5–25)
CALCIUM ALBUM COR SERPL-MCNC: 10 MG/DL (ref 8.3–10.1)
CALCIUM SERPL-MCNC: 9.2 MG/DL (ref 8.4–10.2)
CHLORIDE SERPL-SCNC: 100 MMOL/L (ref 96–108)
CHOLEST SERPL-MCNC: 158 MG/DL
CO2 SERPL-SCNC: 34 MMOL/L (ref 21–32)
CREAT SERPL-MCNC: 0.92 MG/DL (ref 0.7–1.5)
EOSINOPHIL # BLD AUTO: 0.1 THOUSAND/ÂΜL (ref 0–0.61)
EOSINOPHIL NFR BLD AUTO: 2 % (ref 0–6)
ERYTHROCYTE [DISTWIDTH] IN BLOOD BY AUTOMATED COUNT: 16.5 % (ref 11.6–15.1)
EST. AVERAGE GLUCOSE BLD GHB EST-MCNC: 105 MG/DL
GFR SERPL CREATININE-BSD FRML MDRD: 82 ML/MIN/1.73SQ M
GLUCOSE SERPL-MCNC: 87 MG/DL (ref 70–99)
HBA1C MFR BLD: 5.3 %
HCT VFR BLD AUTO: 38.7 % (ref 36.5–49.3)
HDLC SERPL-MCNC: 38 MG/DL
HGB BLD-MCNC: 11.2 G/DL (ref 12–17)
IMM GRANULOCYTES # BLD AUTO: 0.02 THOUSAND/UL (ref 0–0.2)
IMM GRANULOCYTES NFR BLD AUTO: 0 % (ref 0–2)
LDLC SERPL CALC-MCNC: 90 MG/DL
LYMPHOCYTES # BLD AUTO: 1.34 THOUSANDS/ÂΜL (ref 0.6–4.47)
LYMPHOCYTES NFR BLD AUTO: 23 % (ref 14–44)
MCH RBC QN AUTO: 27.4 PG (ref 26.8–34.3)
MCHC RBC AUTO-ENTMCNC: 28.9 G/DL (ref 31.4–37.4)
MCV RBC AUTO: 95 FL (ref 82–98)
MONOCYTES # BLD AUTO: 0.38 THOUSAND/ÂΜL (ref 0.17–1.22)
MONOCYTES NFR BLD AUTO: 7 % (ref 4–12)
NEUTROPHILS # BLD AUTO: 3.95 THOUSANDS/ÂΜL (ref 1.85–7.62)
NEUTS SEG NFR BLD AUTO: 68 % (ref 43–75)
NONHDLC SERPL-MCNC: 120 MG/DL
NRBC BLD AUTO-RTO: 0 /100 WBCS
PLATELET # BLD AUTO: 249 THOUSANDS/UL (ref 149–390)
PMV BLD AUTO: 9.7 FL (ref 8.9–12.7)
POTASSIUM SERPL-SCNC: 4.1 MMOL/L (ref 3.5–5.3)
PROT SERPL-MCNC: 6.2 G/DL (ref 6.4–8.4)
RBC # BLD AUTO: 4.09 MILLION/UL (ref 3.88–5.62)
SODIUM SERPL-SCNC: 138 MMOL/L (ref 135–147)
TRIGL SERPL-MCNC: 149 MG/DL
URATE SERPL-MCNC: 7.7 MG/DL (ref 3.5–8.5)
VIT B12 SERPL-MCNC: 206 PG/ML (ref 100–900)
WBC # BLD AUTO: 5.8 THOUSAND/UL (ref 4.31–10.16)

## 2023-01-30 RX ORDER — ATORVASTATIN CALCIUM 20 MG/1
20 TABLET, FILM COATED ORAL
Status: DISCONTINUED | OUTPATIENT
Start: 2023-01-30 | End: 2023-02-03 | Stop reason: HOSPADM

## 2023-01-30 RX ADMIN — FUROSEMIDE 40 MG: 40 TABLET ORAL at 09:10

## 2023-01-30 RX ADMIN — Medication 7.5 MG: at 21:28

## 2023-01-30 RX ADMIN — Medication: at 09:21

## 2023-01-30 RX ADMIN — ACETAMINOPHEN 975 MG: 325 TABLET ORAL at 00:16

## 2023-01-30 RX ADMIN — ATORVASTATIN CALCIUM 20 MG: 20 TABLET, FILM COATED ORAL at 17:20

## 2023-01-30 RX ADMIN — ASPIRIN 81 MG: 81 TABLET, COATED ORAL at 09:09

## 2023-01-30 RX ADMIN — Medication: at 21:27

## 2023-01-30 RX ADMIN — CHOLECALCIFEROL TAB 25 MCG (1000 UNIT) 1000 UNITS: 25 TAB at 09:10

## 2023-01-30 RX ADMIN — NYSTATIN: 100000 POWDER TOPICAL at 21:27

## 2023-01-30 RX ADMIN — LIDOCAINE 2 PATCH: 50 PATCH CUTANEOUS at 09:09

## 2023-01-30 RX ADMIN — B-COMPLEX W/ C & FOLIC ACID TAB 1 TABLET: TAB at 09:09

## 2023-01-30 RX ADMIN — CYANOCOBALAMIN TAB 1000 MCG 1000 MCG: 1000 TAB at 13:56

## 2023-01-30 RX ADMIN — Medication 7.5 MG: at 09:28

## 2023-01-30 RX ADMIN — HEPARIN SODIUM 5000 UNITS: 5000 INJECTION INTRAVENOUS; SUBCUTANEOUS at 21:27

## 2023-01-30 RX ADMIN — HEPARIN SODIUM 5000 UNITS: 5000 INJECTION INTRAVENOUS; SUBCUTANEOUS at 13:56

## 2023-01-30 RX ADMIN — GABAPENTIN 300 MG: 300 CAPSULE ORAL at 17:20

## 2023-01-30 RX ADMIN — NYSTATIN: 100000 POWDER TOPICAL at 09:21

## 2023-01-30 RX ADMIN — GABAPENTIN 300 MG: 300 CAPSULE ORAL at 21:29

## 2023-01-30 RX ADMIN — GABAPENTIN 300 MG: 300 CAPSULE ORAL at 09:10

## 2023-01-30 RX ADMIN — HEPARIN SODIUM 5000 UNITS: 5000 INJECTION INTRAVENOUS; SUBCUTANEOUS at 05:19

## 2023-01-30 RX ADMIN — OXYCODONE HYDROCHLORIDE 5 MG: 5 TABLET ORAL at 17:22

## 2023-01-30 NOTE — ASSESSMENT & PLAN NOTE
Diagnosed in 2007 with metastatic disease in 2013  Hx of L nephrectomy in 2007 and prostatectomy  Progressive disease  Newly started on Nivolumab in 12/022  Follows with Dr Emir Raya (Oncology) as outpatient - follow-up scheduled for February

## 2023-01-30 NOTE — ASSESSMENT & PLAN NOTE
ASCVD risk 25 4%  Started on ASA 81mg daily  Lipid panel pending - will start statin today pending panel

## 2023-01-30 NOTE — PROGRESS NOTES
[facility]  Progress Note - Steve Lutz 1950, 67 y o  male MRN: [id number]  Unit/Bed#: [facility] [address] Encounter: [id number]  Primary Care Provider: Jose Mcclellan MD   Date and time admitted to hospital: 1/19/2023  3:45 PM    Cardiovascular risk factor  Assessment & Plan  ASCVD risk 25 4%  Started on ASA 81mg daily  Lipid panel pending - will start statin today pending panel  Foraminal stenosis of cervical region  Assessment & Plan  MRI on 1/26 showed severe left C3-C4 foraminal stenosis and moderate central stenosis of C4-C5  Ensure adequate pain control  Continue PT/OT  Nocturnal hypoxemia  Assessment & Plan  States that he was told he had sleep apnea in the past during hospitalizations but never had formal work-up  Has been requiring O2 at night  Overnight noc ox performed on 1/24 and showed desat <89% for 8 hours and 12 minutes, as low as 74%  Apply 2L O2 at HS  Repeat study prior to discharge for DME evaluation  Would benefit from Sleep Medicine as outpatient  Neuropathy  Assessment & Plan  Multifactorial  Continue home gabapentin 300mg TID  HTN (hypertension)  Assessment & Plan  Home regimen: amlodipine 5mg daily and lisinopril/HCTZ 20-12 5mg BID  Currently receiving Lasix 40mg daily  Lisinopril and HCTZ held after receiving CTA on 1/17  Amlodipine discontinued on 1/23  Restart as able  Monitor BP with routine VS     Pleural effusion  Assessment & Plan  Likely secondary to metastatic disease  CTA PE study on 1/17: New moderate L pleural effusion with moderate compressive atelectasis of left lower lobe  Currently maintaining on RA  Monitor spot pulse ox  Encourage pulmonary toileting  Continue Lasix 40mg daily      Chronic diastolic (congestive) heart failure [facility])  Assessment & Plan  Wt Readings from Last 3 Encounters:   01/30/23 109 kg (241 lb 3 2 oz)   01/19/23 110 kg (243 lb 6 2 oz)   12/30/22 121 kg (265 lb 10 5 oz)     ECHO on 1/16 showed EF 65%, no RWMA, G1DD  Treated with Lasix in acute setting for volume overload  BNP 4475 on admission  Continue Lasix 40mg daily  Monitor daily weights and I&Os  Continue 1500FR  Chronic diarrhea  Assessment & Plan  Continue home PRN Imodium  Monitor skin for breakdown  Stage 3a chronic kidney disease [facility])  Assessment & Plan  Lab Results   Component Value Date    EGFR 82 01/30/2023    EGFR 77 01/26/2023    EGFR 74 01/23/2023    CREATININE 0 92 01/30/2023    CREATININE 0 97 01/26/2023    CREATININE 1 00 01/23/2023     Creatinine currently 0 92  Baseline creatinine 1 0-1 3  Hx of L nephrectomy  Developed ARIANNA in acute setting due to autoregulatory failure/volume overload/congestion  Currently on Lasix 40mg daily  Avoid nephrotoxins as able  Encourage adequate hydration  Continue to trend BMP  Metastatic renal cell carcinoma ([facility] [address] )  Assessment & Plan  Diagnosed in 2007 with metastatic disease in 2013  Hx of L nephrectomy in 2007 and prostatectomy  Progressive disease  Newly started on Nivolumab in 12/022  Follows with Dr Janette Bass (Oncology) as outpatient - follow-up scheduled for February  Spine metastasis ([facility] )  Assessment & Plan  Hx of SBRT to T6, T10, and T4 between 3402-4431  Encourage adequate pain control  Follows with Dr Janette Bass (Oncology) as outpatient  Lung metastases [facility])  Assessment & Plan  CTA PE study on 1/17: Enlargement of R lower lobe metastases  Developed hypoxia in acute setting - receiving diuretics with improvement  Currently maintaining on RA  Monitor spot pulse ox  Continue Lasix 40mg daily  Encourage pulmonary toileting and IS use  Follows with Dr Janette Bass as outpatient  Bone metastases [facility])  Assessment & Plan  Diagnosed with metastatic renal cell carcinoma in 2007  Progressive disease  Bone scan in 2020 showed lytic lesions to L scapula, R posterior and lateral ribs, L posterior iliac bone, and R iliac wing    Most recently started on Nivolumab in 12/2022  Ensure adequate pain control  Continue home Vitamin D supplementation  Follows with Dr Yvette Vargas (Oncology) as outpatient  * 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  Assessment & Plan  Presented on 1/12 after a mechanical fall with L shoulder pain  CT LUE: lytic metastasis int he scapular glenoid process measuring 2 9 x 2 4 x 2 0cm with cortical breakthrough and extraosseous extension anteriorly without pathologic fracture  New 2 4 x 2 1 x 1 9cm lytic metastasis in the L scapular body with acute pathologic fracture  New 1 9x0 8x1 4cm lytic metastasis in the inferior scapular angle with pathologic fracture  Non-surgical intervention per Ortho  NWB to LUE  Sling for comfort  Neurovascular checks Q shift  Ensure adequate pain control  Follow-up with Dr Pramod Quiñonez (Orthopedics) in 4 weeks  Primary team following  PT/OT  MRI L shoulder due to burning sensation/chronic ongoing pain on 1/26: metastatic disease of the scapula with pathologic fracture, full thickness partial width tear anterior supraspinatus superimposed upon moderate tendinosis, moderate subscapularis tendinosis, mild infraspinatus tendinosis  VTE Pharmacologic Prophylaxis:   Pharmacologic: Heparin  Mechanical VTE Prophylaxis in Place: Yes - sequential compression devices  Current Length of Stay: 11 day(s)    Current Patient Status: Inpatient Rehab     Discharge Plan: As per primary team     Code Status: Level 1 - Full Code    Subjective:   Pt examined while pt sitting in recliner in pt room  Complaints of aching R great toe pain, medial aspect, non-tender, worse with activity, improves with rest   States that it feels "like a bruise "  Denies any trauma  Feels that he has been using his R side more with therapy and that has been causing discomfort  Denies any hx of gout  Has a hx of arthritis  Will order PRN Voltaren gel  Pt does not care to have an XR done at this time  Discussed ASCVD risk factors and pt is agreeable to starting low dose Lipitor  Had taken simvastatin in the past but developed muscle aches, had been on Lipitor in the past with no issues  Objective:     Vitals:   Temp (24hrs), Av 9 °F (36 6 °C), Min:97 °F (36 1 °C), Max:98 8 °F (37 1 °C)    Temp:  [97 °F (36 1 °C)-98 8 °F (37 1 °C)] 97 °F (36 1 °C)  HR:  [] 92  Resp:  [18] 18  BP: (120-128)/(57-70) 128/70  SpO2:  [90 %-93 %] 90 %  Body mass index is 34 61 kg/m²  Review of Systems   Constitutional: Negative for appetite change, chills, fatigue and fever  HENT: Negative for trouble swallowing  Eyes: Negative for visual disturbance  Respiratory: Negative for cough, shortness of breath, wheezing and stridor  Cardiovascular: Negative for chest pain, palpitations and leg swelling  Gastrointestinal: Negative for abdominal distention, abdominal pain, constipation, diarrhea, nausea and vomiting  LBM    Genitourinary: Negative for difficulty urinating  Musculoskeletal: Positive for arthralgias (R great toe pain, minimal, aching, feels like a "bruise" started yesterday, worse with ambulating)  Negative for back pain  Neurological: Negative for dizziness, weakness, light-headedness, numbness and headaches  Psychiatric/Behavioral: Negative for dysphoric mood and sleep disturbance  The patient is not nervous/anxious  All other systems reviewed and are negative  Input and Output Summary (last 24 hours): Intake/Output Summary (Last 24 hours) at 2023 0859  Last data filed at 2023 0601  Gross per 24 hour   Intake 1040 ml   Output 1150 ml   Net -110 ml       Physical Exam:     Physical Exam  Vitals and nursing note reviewed  Constitutional:       General: He is not in acute distress  Appearance: Normal appearance  He is obese  He is not ill-appearing  HENT:      Head: Normocephalic and atraumatic     Cardiovascular:      Rate and Rhythm: Normal rate and regular rhythm  Pulses: Normal pulses  Heart sounds: Murmur heard  Systolic murmur is present with a grade of 1/6  No friction rub  Pulmonary:      Effort: Pulmonary effort is normal  No respiratory distress  Breath sounds: Normal breath sounds  No wheezing or rhonchi  Abdominal:      General: Abdomen is flat  Bowel sounds are normal  There is no distension  Palpations: Abdomen is soft  There is no mass  Tenderness: There is no abdominal tenderness  There is no guarding or rebound  Hernia: No hernia is present  Musculoskeletal:      Cervical back: Normal range of motion and neck supple  No tenderness  Right lower leg: Edema (lymphedema) present  Left lower leg: Edema (lymphedema) present  Comments: LUE in sling  Skin:     General: Skin is warm and dry  Capillary Refill: Capillary refill takes less than 2 seconds  Neurological:      Mental Status: He is alert and oriented to person, place, and time     Psychiatric:         Mood and Affect: Mood normal          Behavior: Behavior normal          Additional Data:     Labs:    Results from last 7 days   Lab Units 01/30/23  0603   WBC Thousand/uL 5 80   HEMOGLOBIN g/dL 11 2*   HEMATOCRIT % 38 7   PLATELETS Thousands/uL 249   NEUTROS PCT % 68   LYMPHS PCT % 23   MONOS PCT % 7   EOS PCT % 2     Results from last 7 days   Lab Units 01/30/23  0514   SODIUM mmol/L 138   POTASSIUM mmol/L 4 1   CHLORIDE mmol/L 100   CO2 mmol/L 34*   BUN mg/dL 19   CREATININE mg/dL 0 92   ANION GAP mmol/L 4*   CALCIUM mg/dL 9 2   ALBUMIN g/dL 3 0*   TOTAL BILIRUBIN mg/dL 0 50   ALK PHOS U/L 72   ALT U/L 17   AST U/L 33   GLUCOSE RANDOM mg/dL 87                       Labs reviewed    Imaging:    Imaging reviewed    Recent Cultures (last 7 days):           Last 24 Hours Medication List:   Current Facility-Administered Medications   Medication Dose Route Frequency Provider Last Rate   • acetaminophen  975 mg Oral 4x Daily PRN Johnson Leon Jeremiah Dumont MD     • aluminum-magnesium hydroxide-simethicone  30 mL Oral Q6H PRN Garth Vance MD     • aspirin  81 mg Oral Daily IRENE Xiong     • cholecalciferol  1,000 Units Oral Daily Garth Vance MD     • diphenhydrAMINE  25 mg Oral HS PRN Garth Vance MD     • furosemide  40 mg Oral Daily Garth Vance MD     • gabapentin  300 mg Oral TID Garth Vance MD     • heparin (porcine)  5,000 Units Subcutaneous [facility] Garth Vance MD     • lidocaine  2 patch Topical Daily Garth Vance MD     • loperamide  2 mg Oral 4x Daily PRN Garth Vance MD     • multivitamin stress formula  1 tablet Oral Daily Garth Vance MD     • nystatin   Topical BID Garth Vance MD     • ondansetron  4 mg Oral Q6H PRN Garth Vance MD     • oxyCODONE  5 mg Oral 4x Daily PRN Richard Lo MD     • oxyCODONE  2 5 mg Oral 4x Daily PRN Richard Lo MD     • oxyCODONE  7 5 mg Oral Q4H PRN Richard Lo MD     • simethicone  80 mg Oral 4x Daily PRN Garth Vance MD     • white petrolatum-mineral oil   Topical BID IRENE Xiong          M*Modal software was used to dictate this note  It may contain errors with dictating incorrect words or incorrect spelling  Please contact the provider directly with any questions

## 2023-01-30 NOTE — ASSESSMENT & PLAN NOTE
Wt Readings from Last 3 Encounters:   01/30/23 109 kg (241 lb 3 2 oz)   01/19/23 110 kg (243 lb 6 2 oz)   12/30/22 121 kg (265 lb 10 5 oz)     ECHO on 1/16 showed EF 65%, no RWMA, G1DD  Treated with Lasix in acute setting for volume overload  BNP 4475 on admission  Continue Lasix 40mg daily  Monitor daily weights and I&Os  Continue 1500FR

## 2023-01-30 NOTE — ASSESSMENT & PLAN NOTE
Lab Results   Component Value Date    EGFR 82 01/30/2023    EGFR 77 01/26/2023    EGFR 74 01/23/2023    CREATININE 0 92 01/30/2023    CREATININE 0 97 01/26/2023    CREATININE 1 00 01/23/2023     Creatinine currently 0 92  Baseline creatinine 1 0-1 3  Hx of L nephrectomy  Developed ARIANNA in acute setting due to autoregulatory failure/volume overload/congestion  Currently on Lasix 40mg daily  Avoid nephrotoxins as able  Encourage adequate hydration  Continue to trend BMP

## 2023-01-30 NOTE — ASSESSMENT & PLAN NOTE
Started on ASA by IM  Lipid Panel ordered 1/30   - He reports tolerating Atorvastatin, but getting proximal leg pain/burning once transitioned to simvastatin  - Symptoms stopped after stopping simvastatin  IM starting low dose Lipitor and monitoring  Outpatient f/u with PCP

## 2023-01-30 NOTE — ASSESSMENT & PLAN NOTE
Home regimen: amlodipine 5mg daily and lisinopril/HCTZ 20-12 5mg BID  Currently receiving Lasix 40mg daily  Lisinopril and HCTZ held after receiving CTA on 1/17  Amlodipine discontinued on 1/23  Restart as able    Monitor BP with routine VS

## 2023-01-30 NOTE — PROGRESS NOTES
Physical Medicine and Rehabilitation Progress Note  Kevin Rojas 67 y o  male MRN: [id number]  Unit/Bed#: [facility] 242-25 Encounter: [id number]    To Review: Kevin Rojas is a 67 y o  male with medical history of localized RCC in 2007 with mets in 2013 , on systemic therapy s/p L nephrectomy and prostatectomy, chronic diarrhea, CKD (Crea 1-1 3) who presented to the [address] [place], [place] [address] on 1/12 with L shoulder pain after a fall with arm abducted  XRs of left shoulder, humerus, scapula, and clavicle showed lytic metastatic tumor mass within the scapular neck and glenoid with no acute fractures or dislocations  CT was ordered and he was made NWB by Ortho  CT showed large pathologic scapula fracture with minimal displacement and a large lytic lesion in glenoid vault  Ortho discussed with [place] - Dr Dinesh Patrick who recommended sling for comfort and non-surgical management  They will follow-up outpatient  On admission also noted to have ARIANNA and Nephro was consulted who felt likely 2/2 autoregulatory failure  They started him on Lasix and help his Lisinopril and HCTZ  Echo was ordered which showed normal EF with G2DD  ARIANNA resolve with diuresis  Lasix 40mg oral daily  Liliana Jorge He also was newly requiring O2 on admission - but that improved diuresis  Given his cancer, D-dimer elevated, NM study was ordered which was indeterminant  LE dopplers were negative  CTA PE was ordered once renal function improved - this was negative  Heparin gtt discontinued It did however show enlargement of his RLL mets and bone mets concerning for progression  He will follow-up with his Oncologist as an outpatient to discuss resuming therapy  Admitted to the [facility] on 1/19  Chief Complaint: R toe pain/swelling  Interval History/Subjective:  No acute events overnight  Last BM was 1/29  He reports R toe pain that began yesterday, worse with weight bearing  He says it seemed a bit swollen, but was not warm or hot   He denies any history of gout, and denies any fevers/chills  Has not gotten any better or any worse and is moderate  He denies any new numbness/tingling/weakness  Team is working on getting him an AFO  He denies any new CP, SOB, fevers, chills, N/V, abdominal pain  ROS:  A 10 point review of systems was negative except for what is noted in the HPI  Today's Changes:  1  Reviewed labs - no white count  2  A1C and Neuropathy labs pending will follow-up  3  Added on Uric Acid  Will examine toe  Today  Given history of lytic lesions, may check a foot XR  Suspect midfoot arthritis, but really on 1st metatarsal shaft, not on joint     - Diclofenac gel for now  4  Lipid Panel reviewed - defer to IM re: statin  - He reports tolerating Atorvastatin, but getting proximal leg pain/burning once transitioned to simvastatin  - Symptoms stopped after stopping simvastatin  5  Reached out to Dr Lisseth Barton with Ortho Onc to make him aware of upcoming discharge to arrange follow-up next week  Total visit time: 25 minutes, with more than 50% spent counseling/coordinating care  Counseling includes discussion with patient re: progress in therapies, functional issues observed by therapy staff, and discussion with patient regarding their current medical state and wellbeing  Coordination of patient's care was performed in conjunction with Internal Medicine service to monitor patient's labs, vitals, and management of their comorbidities  Assessment/Plan:    * 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  Assessment & Plan  Large pathologic scapular fracture with minimal displacement  Large lytic lesion in glenoid vault  Management non-operatively  Sling for comfort  NWB  Ok for ROM in elbow, wrist, hand  Pendulums daily for now  Pain control as below  Outpatient f/u with Mitzi Barton in 4 weeks from injury (next week)  1/25 - acute change in sensation in supraclavicular distribution   Unable to check strength  1/26 MRI  Shoulder:   ROTATOR CUFF: Full-thickness partial width tear anterior supraspinatus insertion measuring 1 1 cm AP by 0 7 cm medial lateral superimposed upon moderate tendinosis  Moderate subscapularis insertional tendinosis with low-grade articular sided fraying  Mild infraspinatus insertional tendinosis  Edema within the teres minor muscle, likely related to denervation from mass effect of tumor      SUBACROMIAL/SUBDELTOID BURSA: There is mild subacromial/subdeltoid bursitis       LONG HEAD OF BICEPS TENDON: Mild tendinosis and tenosynovitis      GLENOID LABRUM: Complex tearing anterior inferior labrum      GLENOHUMERAL JOINT: Mild osteoarthritis     ACROMIOCLAVICULAR JOINT:  There is moderate osteoarthritis      BONES: As seen on multiple prior examinations, there is pathologic fracture of the scapular body   Multiple lesions again noted arising from the scapular body and glenoid, consistent with metastatic disease  The largest component of the tumor with   extraosseous extension measures approximately 5 9 x 4 7 x 5 7 cm (series 901, image 13)  This is located along the mid to inferior scapula  A 2nd component of tumor extends posteriorly along the superior aspect of the scapula measuring approximately   3 2 x 2 5 x 3 7 cm (series 401, image 15)  These tumors result in mass effect on the subscapularis, teres minor, and infraspinatus  Foot pain, right  Assessment & Plan  Began 1/29  R metatarsal   No erythema, but some swelling  Worse with weight bearing  Feels like a "bruised bone"  Checking XR given bony mets  Denies history of gout/pseudogout, will check uric acid, but lower suspicion Not at MTP joint  No erythema, warmth, pain with moving his toe     Suspect arthritis - could be midfoot    - Follow-up workup     - Continue WBAT for now   - Diclofenac gel     Cardiovascular risk factor  Assessment & Plan  Started on ASA by IM  Lipid Panel ordered 1/30   - He reports tolerating Atorvastatin, but getting proximal leg pain/burning once transitioned to simvastatin  - Symptoms stopped after stopping simvastatin  IM starting low dose Lipitor and monitoring  Outpatient f/u with PCP  Foraminal stenosis of cervical region  Assessment & Plan  Possibly symptomatic left C3 radiculopathy with decreased sensation and pain at C3-4 distribution  MRI showed severe L C3-4 foraminal stenosis  Moderate central stenosis at C4-5  Outpatient f/u with spine  Nocturnal hypoxemia  Assessment & Plan  1/24 Noc ox showed significant desats  Will add night time oxygen  Check noc ox 48 hours prior to discharge   Referral to sleep medicine at discharge  Neuropathy  Assessment & Plan  Continue gabapentin 300mg Q8hr  - TSH WNL more recently  - No recent A1C, last done was 5 7 - ordered  - No B12 checked - ordered  He also has cancer - could be paraneoplastic in etiology  - ordered Anti-hu Ab  Depending on what chemotherapy he has had this could also be a contributing factor  In the future, can consider an EMG to more likely describe the type of neuropathy he has to help narrow differential  For now compensatory strategies and control with medication for his discomfort  HTN (hypertension)  Assessment & Plan  Home: Amlodipine 5mg daily Lasix 40mg daily, was on Lisinopril/HCTZ 20-12 5mg daily  Here: Same without Lisinopril/HCTZ, and now amlodipine at night discontinued for hypotension   Monitor and adjust as appropriate  IM following and assisting with management  Pleural effusion  Assessment & Plan  Likely secondary to metastatic disease  CTA PE study on 1/17: New moderate L pleural effusion with moderate compressive atelectasis of left lower lobe  Currently maintaining on RA  Monitor spot pulse ox  Encourage pulmonary toileting, incentive spirometry  Continue Lasix 40mg daily      Dermatitis associated with moisture  Assessment & Plan  Soap, water to buttocks TID and PRN followed by barrier cream   Nystatin powder R groin    Chronic diastolic (congestive) heart failure (HCC)  Assessment & Plan  Wt Readings from Last 3 Encounters:   01/27/23 110 kg (243 lb 3 2 oz)   01/19/23 110 kg (243 lb 6 2 oz)   12/30/22 121 kg (265 lb 10 5 oz)     Seen on Echo during this hospitalization  S/P IV diuresis for exacerbation  Now back on room air   Closely monitor I/O, daily weights, volume status  Cardiac diet with fluid restriction  Continue: Lasix 40mg daily   - Previously on ACE, may be able to restart   - Was not on BB  Outpatient f/u with PCP  Chronic diarrhea  Assessment & Plan  Chronically on Lomotil  Monitor  Close continence care  Stage 3a chronic kidney disease [facility])  Assessment & Plan  Lab Results   Component Value Date    EGFR 82 01/30/2023    EGFR 77 01/26/2023    EGFR 74 01/23/2023    CREATININE 0 92 01/30/2023    CREATININE 0 97 01/26/2023    CREATININE 1 00 01/23/2023     Stage 2-3A  Crea baseline 1-1 3  Improved recently after ARIANNA 2/2 autoregulatory issues/congestion/volume overload  Now on Lasix daily monitor  Was on Lisinopril and HCTZ at home, currently being held  - Nephro says these can be restarted as necessary  Monitoring BP  Avoid nephrotoxic meds  Outpatient f/u     Metastatic renal cell carcinoma [facility])  Assessment & Plan  Originally 2007  Mets in 2013  On systemic therapy  S/p L nephrectomy and prostatectomy  Will need outpatient f/u with Oncology     Spine metastasis [facility])  Assessment & Plan  In most recent CT CAP in 2022 showed lytic/sclerotic lesions T4, T6, and scattered lesions in lumbar spine   NM bone scan in 2020 showed:  Scattered exophytic foci of radiotracer uptake in the spine corresponding to degenerative changes on CT  Ct C-Spine in 6/2022 showed no lesions  He has pelvic/iliac lytic lesions  Monitor neuro status and for worsening back pain  Pain management as below       Lung metastases ([address] )  Assessment & Plan  With recent AHRF in setting of volume overload and acute diastolic heart failure and ARIANNA  Now on room air  Has pleural effusion in addition  Closely monitor respiratory status  Pulmonary toilet  Outpatient f/u with Oncology    Bone metastases [facility])  Assessment & Plan  Monitor performance in therapy  If he develops new pain, particular in long bones, would image    - Since his fall has had new R hip pain primarily with weight bearing   - 2022 CT CAP showed lytic lesion superior to the right hip are identified as well as the posterior column of the acetabulum  - XR of R hip without significant changes  Better visualized on CT   2020 Bone Scan showed lytic lesions:   left lateral scapula inferior to the glenoid     right posterior and lateral ribs corresponding to expansile lytic lesions on CT  (recent Ct with expansive R 7th rib met)   left posterior iliac bone and right iliac wing corresponding to lytic lesions on CT    r nonspecific tibial finding    Outpatient f/u with Dr Price Aly with Ortho onc  Health Maintenance  #Delirium/Sleep: At risk  Environmental interventions  Optimize pain, bowel, bladder, sleep-wake cycle management  #Pain: Tylenol PRN, Gabapentin 300mg TID, Oxycodone 5mg PRN severe pain, tramadol 50mg PRN moderate pain  #Bowel: Last BM 1/29/2023, incontinent and watery  Chronic diarrhea, see above  #Bladder: Voiding and continent  #Skin/Pressure Injury Prevention: Turn Q2hr in bed, with weight shifts [id number] in wheelchair  Float heels in bed  #DVT Prophylaxis: HSQ,SCDs  #GI Prophylaxis: Not indicated  #Code Status:  Full Code  #FEN: Cardiac diet with 1500cc fluid restriction  #Dispo:  Team 1/26: ADD 2/3 with Lucero Velazquez PT/OT  Working on [facility] for him with his family given his restrictions in that arm    - Outpatient f/u with Ortho 1 weeks after discharge    - Outpatient f/u with Oncology  - Referral to Pain/Spine team if symptoms still persistent    - Outpatient f/u with PCP  - [facility]     Objective:    Functional Update:  PT: CGA transfers, min-modA bed mobility, CGA ambulation 150', Barry RHR 8 stairs  OT:  Setup eating, Sup grooming, Barry bathing, Sup UB dressing, Barry LB dressing, Barry toileting, Barry tub/shower transfer, CGA toilet transfers    Allergies per EMR    Physical Exam:  Temp:  [97 °F (36 1 °C)-98 8 °F (37 1 °C)] 97 °F (36 1 °C)  HR:  [] 92  Resp:  [18] 18  BP: (120-128)/(57-70) 128/70  Oxygen Therapy  SpO2: 90 %  O2 Flow Rate (L/min): 2 L/min    Gen: No acute distress, Well-nourished, well-appearing  HEENT: Moist mucus membranes, Normocephalic/Atraumatic  Cardiovascular: Regular rate, rhythm, S1/S2  Distal pulses palpable  Heme/Extr: Stable edema BL LE  Pulmonary: Non-labored breathing  Lungs CTAB  : No williamson  GI: Soft, non-tender, non-distended  BS+  MSK: L arm in sling  R toe without erythema, pain with grinding toe, pain with moving at MTP joint  No swelling, no warmth  Pain to palpation along shaft of 1st metatarsal    Integumentary: Skin is warm, dry  Neuro: AAOx3,  Speech is intact  Appropriate to questioning  Psych: Normal mood and affect       Diagnostic Studies: Reviewed, no new imaging    Laboratory:  Reviewed   Results from last 7 days   Lab Units 01/30/23  0603 01/26/23  0458   HEMOGLOBIN g/dL 11 2* 11 1*   HEMATOCRIT % 38 7 37 1   WBC Thousand/uL 5 80 6 03     Results from last 7 days   Lab Units 01/30/23  0514 01/26/23  0458   BUN mg/dL 19 18   POTASSIUM mmol/L 4 1 4 1   CHLORIDE mmol/L 100 101   CREATININE mg/dL 0 92 0 97   AST U/L 33  --    ALT U/L 17  --             Patient Active Problem List   Diagnosis   • Clear cell adenocarcinoma of kidney, left (HCC)   • Bone metastases (HCC)   • Lung metastases (HCC)   • Spine metastasis ([facility] )   • Metastatic renal cell carcinoma (HCC)   • Hx of prostatectomy   • History of prostate cancer   • Azotemia   • COVID-19   • Stage 3a chronic kidney disease ([address] )   • H/O left nephrectomy   • Fall at home, initial encounter   • 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   • Acute respiratory failure with hypoxia (HCC)   • Elevated brain natriuretic peptide (BNP) level   • Elevated d-dimer   • Chronic diarrhea   • Chronic diastolic (congestive) heart failure (HCC)   • Dermatitis associated with moisture   • Pleural effusion   • HTN (hypertension)   • Neuropathy   • Nocturnal hypoxemia   • Foraminal stenosis of cervical region         Medications  Current Facility-Administered Medications   Medication Dose Route Frequency Provider Last Rate   • acetaminophen  975 mg Oral 4x Daily PRN Samira Sanon MD     • aluminum-magnesium hydroxide-simethicone  30 mL Oral Q6H PRN Samira Sanon MD     • aspirin  81 mg Oral Daily Tivis Augustina CRNP     • cholecalciferol  1,000 Units Oral Daily Samira Sanon MD     • diphenhydrAMINE  25 mg Oral HS PRN Samira Sanon MD     • furosemide  40 mg Oral Daily Samira Sanon MD     • gabapentin  300 mg Oral TID Samira Sanon MD     • heparin (porcine)  5,000 Units [facility]     • lidocaine  2 patch Topical Daily Samira Sanon MD     • loperamide  2 mg Oral 4x Daily PRN Samira Sanon MD     • multivitamin stress formula  1 tablet Oral Daily Samira Sanon MD     • nystatin   Topical BID Samira Sanon MD     • ondansetron  4 mg Oral Q6H PRN Samira Sanon MD     • oxyCODONE  5 mg Oral 4x Daily PRN Sharif Mcqueen MD     • oxyCODONE  2 5 mg Oral 4x Daily PRN Sharif Mcqueen MD     • oxyCODONE  7 5 mg Oral Q4H PRN Sharif Mcqueen MD     • simethicone  80 mg Oral 4x Daily PRN Samira Sanon MD     • white petrolatum-mineral oil   Topical BID IRENE Reynoso            ** Please Note: Fluency Direct voice to text software may have been used in the creation of this document   **

## 2023-01-30 NOTE — ASSESSMENT & PLAN NOTE
Began 1/29  R metatarsal   No erythema, but some swelling  Worse with weight bearing  Feels like a "bruised bone"  1/30 XR shows midfoot mild degenerative changes and no acute osseous abnormality  Denies history of gout/pseudogout  Uric Acid normal  Suspect arthritis   - Continue WBAT for now   - Diclofenac gel while here  Can use Aspercreme at home

## 2023-01-30 NOTE — PLAN OF CARE
Problem: RESPIRATORY - ADULT  Goal: Achieves optimal ventilation and oxygenation  Description: INTERVENTIONS:  - Assess for changes in respiratory status  - Assess for changes in mentation and behavior  - Position to facilitate oxygenation and minimize respiratory effort  - Oxygen administered by appropriate delivery if ordered  - Initiate smoking cessation education as indicated  - Encourage broncho-pulmonary hygiene including cough, deep breathe, Incentive Spirometry  - Assess the need for suctioning and aspirate as needed  - Assess and instruct to report SOB or any respiratory difficulty  - Respiratory Therapy support as indicated  Outcome: Progressing     Problem: MUSCULOSKELETAL - ADULT  Goal: Maintain or return mobility to safest level of function  Description: INTERVENTIONS:  - Assess patient's ability to carry out ADLs; assess patient's baseline for ADL function and identify physical deficits which impact ability to perform ADLs (bathing, care of mouth/teeth, toileting, grooming, dressing, etc )  - Assess/evaluate cause of self-care deficits   - Assess range of motion  - Assess patient's mobility  - Assess patient's need for assistive devices and provide as appropriate  - Encourage maximum independence but intervene and supervise when necessary  - Involve family in performance of ADLs  - Assess for home care needs following discharge   - Consider OT consult to assist with ADL evaluation and planning for discharge  - Provide patient education as appropriate  Outcome: Progressing     Problem: PAIN - ADULT  Goal: Verbalizes/displays adequate comfort level or baseline comfort level  Description: Interventions:  - Encourage patient to monitor pain and request assistance  - Assess pain using appropriate pain scale  - Administer analgesics based on type and severity of pain and evaluate response  - Implement non-pharmacological measures as appropriate and evaluate response  - Consider cultural and social influences on pain and pain management  - Notify physician/advanced practitioner if interventions unsuccessful or patient reports new pain  Outcome: Progressing

## 2023-01-30 NOTE — ASSESSMENT & PLAN NOTE
CTA PE study on 1/17: Enlargement of R lower lobe metastases  Developed hypoxia in acute setting - receiving diuretics with improvement  Currently maintaining on RA  Monitor spot pulse ox  Continue Lasix 40mg daily  Encourage pulmonary toileting and IS use  Follows with Dr Diego Pak as outpatient

## 2023-01-30 NOTE — ASSESSMENT & PLAN NOTE
Hx of SBRT to T6, T10, and T4 between [id number]  Encourage adequate pain control  Follows with Dr Stacy Chun (Oncology) as outpatient

## 2023-01-30 NOTE — ASSESSMENT & PLAN NOTE
Presented on 1/12 after a mechanical fall with L shoulder pain  CT LUE: lytic metastasis int he scapular glenoid process measuring 2 9 x 2 4 x 2 0cm with cortical breakthrough and extraosseous extension anteriorly without pathologic fracture  New 2 4 x 2 1 x 1 9cm lytic metastasis in the L scapular body with acute pathologic fracture  New 1 9x0 8x1 4cm lytic metastasis in the inferior scapular angle with pathologic fracture  Non-surgical intervention per Ortho  NWB to LUE  Sling for comfort  Neurovascular checks Q shift  Ensure adequate pain control  Follow-up with Dr Samira Sparks (Orthopedics) in 4 weeks  Primary team following  PT/OT  MRI L shoulder due to burning sensation/chronic ongoing pain on 1/26: metastatic disease of the scapula with pathologic fracture, full thickness partial width tear anterior supraspinatus superimposed upon moderate tendinosis, moderate subscapularis tendinosis, mild infraspinatus tendinosis

## 2023-01-30 NOTE — PROGRESS NOTES
01/30/23 1000   Pain Assessment   Pain Assessment Tool 0-10   Pain Score No Pain   Restrictions/Precautions   Precautions Fall Risk;Pain   LUE Weight Bearing Per Order NWB   Braces or Orthoses Sling   Cognition   Overall Cognitive Status WFL   Arousal/Participation Alert; Cooperative   Attention Within functional limits   Orientation Level Oriented X4   Memory Within functional limits   Following Commands Follows all commands and directions without difficulty   Subjective   Subjective denies pain t/o session, also denies concerns for DC home friday "it will be an adjustment period"   Sit to Stand   Type of Assistance Needed Supervision   Sit to Stand CARE Score 4   Bed-Chair Transfer   Type of Assistance Needed Supervision   Physical Assistance Level No physical assistance   Chair/Bed-to-Chair Transfer CARE Score 4   Car Transfer   Comment plans to Dc home in sedan type vehicle- unsure who is picking him up yet  Walk 10 Feet   Type of Assistance Needed Supervision   Physical Assistance Level No physical assistance   Walk 10 Feet CARE Score 4   Walk 50 Feet with Two Turns   Type of Assistance Needed Supervision   Physical Assistance Level No physical assistance   Walk 50 Feet with Two Turns CARE Score 4   Walk 150 Feet   Type of Assistance Needed Supervision   Physical Assistance Level No physical assistance   Comment CS provided due to potential ftg  Walk 150 Feet CARE Score 4   Walking 10 Feet on Uneven Surfaces   Type of Assistance Needed Supervision   Physical Assistance Level No physical assistance   Comment CS with WBQC on indoor 10 ft ramp  Walking 10 Feet on Uneven Surfaces CARE Score 4   Ambulation   Primary Mode of Locomotion Prior to Admission Walk   Distance Walked (feet) 125 ft  (x2, 150, 280ft)   Assist Device Quad Cane  [place])   Gait Pattern Inconsistant Dilma; Slow Dilma; Step to; Step through;Trendelenburg; Improper weight shift   Limitations Noted In Endurance;Balance;Speed;Strength   Walk Assist Level Close Supervision   Findings L AFO, inc trunk sway, with evident R hip drop during swing  Uneven step lengths, x1 stand rest breaks with inc amb distance  Hamill no overt LOB with WBQC  Does the patient walk? 2  Yes   Wheelchair mobility   Does the patient use a wheelchair? 0  No   Curb or Single Stair   Style negotiated Curb   Type of Assistance Needed Supervision;Verbal cues   Physical Assistance Level No physical assistance   Comment cues for sequence and QC use on short curb style step  1 Step (Curb) CARE Score 4   4 Steps   Type of Assistance Needed Supervision   Physical Assistance Level No physical assistance   Comment use of R HR side stepping as needed  4 Steps CARE Score 4   Stairs   Type Stairs   # of Steps 4   Assist Devices Single Rail   Findings nonreciprocally  Toilet Transfer   Findings pt stood to void with urinal- output charted  Therapeutic Interventions   Strengthening seated L Ankle red tband 3 way x20 each; B LE 3# LAQ x30, red tband hip abd and HS curls x20 , ball squeeze x30   Flexibility manual seated L DF/knee ext 5x20 sec  Equipment Use   NuStep L2 x12 min completed 0 5miles  seat 13, B LE and R UE  Assessment   Treatment Assessment Pt engaged in 90 min skilled PT intervention with ongoing focus on strength and endurance training to maximize functional independence  Use of WBQC and L UE sling t/o session  Gait remains altered however no overt LOB or buckling observed  Required stand rest break during longer amb trial including ramp, Sats 92% on RA after , evident mildly labored breathing however pt pleased he was able to inc distance  Reports he likely with use 4 NATALIYA with R handrail from front entrance and plans to stay on first level as able, sleeping in recliner (lift chair)  Pt demonstrating progress towards mod i goals, to self purchase QC  Plan remains for DC home this friday 2/3 with in home out pt PT/[facility]   Continue endurance and balance training  Info faxed to  for AFO, awaiting call back  Family/Caregiver Present no   Problem List Decreased strength;Decreased range of motion;Decreased endurance; Impaired balance;Decreased mobility; Decreased coordination;Decreased safety awareness;Pain;Orthopedic restrictions   Barriers to Discharge Inaccessible home environment;Decreased caregiver support   PT Barriers   Physical Impairment Decreased strength;Decreased endurance; Impaired balance;Decreased mobility; Decreased coordination;Decreased safety awareness;Orthopedic restrictions   Functional Limitation Car transfers;Stair negotiation;Standing;Transfers; Walking   Plan   Treatment/Interventions Functional transfer training;LE strengthening/ROM; Elevations; Therapeutic exercise; Endurance training;Patient/family training;Gait training   Progress Progressing toward goals   Recommendation   PT Discharge Recommendation Home with outpatient rehabilitation  (in home out pt)   PT Therapy Minutes   PT Time In 1000   PT Time Out 1130   PT Total Time (minutes) 90   PT Mode of treatment - Individual (minutes) 90   PT Mode of treatment - Concurrent (minutes) 0   PT Mode of treatment - Group (minutes) 0   PT Mode of treatment - Co-treat (minutes) 0   PT Mode of Treatment - Total time(minutes) 90 minutes   PT Cumulative Minutes 870   Therapy Time missed   Time missed?  No

## 2023-01-30 NOTE — PROGRESS NOTES
01/30/23 0930   Pain Assessment   Pain Assessment Tool 0-10   Pain Score 6   Pain Location/Orientation Orientation: Left; Location: Shoulder   Pain Radiating Towards none   Pain Onset/Description Onset: Ongoing   Patient's Stated Pain Goal No pain   Hospital Pain Intervention(s) Rest   Multiple Pain Sites No   Restrictions/Precautions   Precautions Fall Risk;Pain   Weight Bearing Restrictions Yes   LUE Weight Bearing Per Order NWB   Braces or Orthoses Sling   Putting On/Taking Off Footwear   Type of Assistance Needed Physical assistance   Physical Assistance Level 26%-50%   Comment A for L AFO and sneakers   Putting On/Taking Off Footwear CARE Score 3   Sit to Stand   Type of Assistance Needed Supervision; Adaptive equipment   Comment S with WBQC   Sit to Stand CARE Score 4   Bed-Chair Transfer   Type of Assistance Needed Supervision; Adaptive equipment   Comment S with [facility]   Chair/Bed-to-Chair Transfer CARE Score 4   Toileting Hygiene   Type of Assistance Needed Physical assistance   Physical Assistance Level 26%-50%   Comment req TA for buttock hygiene after loos BM  able to complete CM   Toileting Hygiene CARE Score 3   Toileting   Findings Pt son reporting he qwill bring toilet want, trial when available   Toilet Transfer   Type of Assistance Needed Supervision; Adaptive equipment   Comment S with [facility]   Toilet Transfer CARE Score 4   Cognition   Overall Cognitive Status WFL   Arousal/Participation Alert; Cooperative   Attention Within functional limits   Orientation Level Oriented X4   Memory Within functional limits   Following Commands Follows all commands and directions without difficulty   Additional Activities   Additional Activities Comments During session therapist contacted pts son David Chaudhari via pts home to discuss LOF and assistance for DC  Educ son that recommend pt to stay on 1st flr as PT recommend sup on steps  Educ on maybe having grandson come in the AM before work as pt will req A with TEDS and AFO  Educ that pt does req Assistance to wash RUE 2* to LUE NWB  Activity Tolerance   Activity Tolerance Patient tolerated treatment well   Assessment   Treatment Assessment Engaged pt in brief 30mins of skilled oT services with focus on discussing DC plan and toileting  Pt reporting he has not contacted [facility] and believes he can figure it out with son when at home  Thrapist recommend for pt to stay on 1st flr as PT recommend Sup with steps which pt was receptive  Pt does have BSC on first floor  During session, therapist contacted pt juliane Stoner via pts phone who is able to attend FT Thurs 2/2/23 10am  Briefly discussed organizing in AM to assist pt with dressing as pt does req A with AFO, bathing, dressing  Will cont to educ and communicate with son during FT  Pt can cont to benefit from further skilled OT services with focus on transfers, mobility, RUE TE, kitchen mobility to inc fx performance and independence and ensure safe DC home  OT Family training done with: (S)  FT with juliane Stoner 2/2 10am   Prognosis Fair   Problem List Decreased strength;Decreased range of motion;Decreased endurance; Impaired balance;Decreased mobility; Decreased coordination;Decreased safety awareness;Pain;Orthopedic restrictions   Plan   Treatment/Interventions ADL retraining;Functional transfer training; Therapeutic exercise; Endurance training;Patient/family training;Bed mobility; Compensatory technique education   Progress Progressing toward goals   Recommendation   OT Discharge Recommendation Home with outpatient rehabilitation  ([place])   Equipment Recommended Bedside commode  (toilet aid)   Commode Type Standard   OT Equipment ordered (S)  pt has BSC   Pt reports he will bring toilet aid today   OT Therapy Minutes   OT Time In 0930   OT Time Out 1000   OT Total Time (minutes) 30   OT Mode of treatment - Individual (minutes) 30   OT Mode of treatment - Concurrent (minutes) 0   OT Mode of treatment - Group (minutes) 0   OT Mode of treatment - Co-treat (minutes) 0   OT Mode of Treatment - Total time(minutes) 30 minutes   OT Cumulative Minutes 720   Therapy Time missed   Time missed?  No

## 2023-01-30 NOTE — ASSESSMENT & PLAN NOTE
MRI on 1/26 showed severe left C3-C4 foraminal stenosis and moderate central stenosis of C4-C5  Ensure adequate pain control  Continue PT/OT

## 2023-01-30 NOTE — PROGRESS NOTES
01/30/23 1300   Pain Assessment   Pain Assessment Tool 0-10   Pain Score 5   Pain Location/Orientation Orientation: Left; Location: Shoulder   Pain Radiating Towards none   Pain Onset/Description Onset: Ongoing   Patient's Stated Pain Goal No pain   Hospital Pain Intervention(s) Rest   Multiple Pain Sites No   Restrictions/Precautions   Precautions Fall Risk;Pain   Weight Bearing Restrictions Yes   LUE Weight Bearing Per Order NWB   Braces or Orthoses Sling   Sit to Stand   Type of Assistance Needed Supervision; Adaptive equipment   Comment S with WBQC   Sit to Stand CARE Score 4   Bed-Chair Transfer   Type of Assistance Needed Supervision; Adaptive equipment   Comment S with [facility]   Chair/Bed-to-Chair Transfer CARE Score 4   Toileting Hygiene   Comment (S)  voided in stance, son to bring in toilet wand, trial next session   Kitchen Mobility   Kitchen-Mobility Level Cane  [facility] [place]   Kitchen Activity Retrieve items;Transport items   Kitchen Mobility Comments completed item retrieval from kitchen  Pts son and DIL to assist with meal prep  Pt reports he mainly eats TV dinners  Engaged pt in retrieving TV dinner from refrigerator and transport to microwave  Pt able to complete at Miguelito level  Pt reports table is close to microwave to place plate  Pt overall tolerated well, able to complete at [address] Management Level of [facility] Management Therapist reviewed again meds as pt is currently on 3 new medications  Therapist added to reference sheet pt able to verbalize overall 7/7 meds with focus on name, purpose and frequency  Right Upper Extremity- Strength   R Shoulder Flexion; Horizontal ABduction; Extension   R Elbow Elbow flexion;Elbow extension   R Position Seated   Equipment Dumbbell  (3#)   R Weight/Reps/Sets 2x15   RUE Strength Comment To maximize functional strength, reduce deconditioning, and improve stability with functional mobiltiy, patient engaged in 2x15 RUE bicep curls, chest press, and shoulder flexion exercises  He tolerated well  Left Upper Extremity-Strength   LUE Strength Comment NA NWB   Cognition   Overall Cognitive Status WFL   Arousal/Participation Alert; Cooperative   Attention Within functional limits   Orientation Level Oriented X4   Memory Within functional limits   Following Commands Follows all commands and directions without difficulty   Activity Tolerance   Activity Tolerance Patient tolerated treatment well   Assessment   Treatment Assessment Engaged pt in 60mins of skilled OT services with focus on toileting, transfers, med mngmnt, kitchen mobility, RUE TE  Pt overall tolerated tx session well  Pt overall fx at Sup with [facility] AND [facility] OT POC with focus on activity tolerance, balance, transfers, to inc fx performance  OT Family training done with: FT with juliane Goode 2/2 10am   Prognosis Fair   Problem List Decreased strength;Decreased range of motion;Decreased endurance; Impaired balance;Decreased mobility; Decreased coordination;Decreased safety awareness;Pain;Orthopedic restrictions   Barriers to Discharge Inaccessible home environment;Decreased caregiver support   Plan   Treatment/Interventions ADL retraining;Functional transfer training; Therapeutic exercise; Endurance training;Patient/family training;Bed mobility; Compensatory technique education   Progress Progressing toward goals   Recommendation   OT Discharge Recommendation Home with outpatient rehabilitation  ([place])   Equipment Recommended Bedside commode   Commode Type Standard   OT Equipment ordered pt has BSC   Pt reports he will bring toilet aid today   OT Therapy Minutes   OT Time In 1300   OT Time Out 1400   OT Total Time (minutes) 60   OT Mode of treatment - Individual (minutes) 60   OT Mode of treatment - Concurrent (minutes) 0   OT Mode of treatment - Group (minutes) 0   OT Mode of treatment - Co-treat (minutes) 0   OT Mode of Treatment - Total time(minutes) 60 minutes   OT Cumulative Minutes 780   Therapy Time missed   Time missed?  No

## 2023-01-31 PROBLEM — E53.8 VITAMIN B12 DEFICIENCY: Status: ACTIVE | Noted: 2023-01-31

## 2023-01-31 RX ORDER — FUROSEMIDE 20 MG/1
20 TABLET ORAL ONCE
Status: COMPLETED | OUTPATIENT
Start: 2023-01-31 | End: 2023-01-31

## 2023-01-31 RX ORDER — FUROSEMIDE 40 MG/1
40 TABLET ORAL DAILY
Status: DISCONTINUED | OUTPATIENT
Start: 2023-02-01 | End: 2023-02-03 | Stop reason: HOSPADM

## 2023-01-31 RX ADMIN — GABAPENTIN 300 MG: 300 CAPSULE ORAL at 08:46

## 2023-01-31 RX ADMIN — HEPARIN SODIUM 5000 UNITS: 5000 INJECTION INTRAVENOUS; SUBCUTANEOUS at 13:41

## 2023-01-31 RX ADMIN — ASPIRIN 81 MG: 81 TABLET, COATED ORAL at 08:46

## 2023-01-31 RX ADMIN — FUROSEMIDE 40 MG: 40 TABLET ORAL at 08:47

## 2023-01-31 RX ADMIN — FUROSEMIDE 20 MG: 20 TABLET ORAL at 12:14

## 2023-01-31 RX ADMIN — CHOLECALCIFEROL TAB 25 MCG (1000 UNIT) 1000 UNITS: 25 TAB at 08:46

## 2023-01-31 RX ADMIN — DICLOFENAC SODIUM 2 G: 10 GEL TOPICAL at 21:03

## 2023-01-31 RX ADMIN — OXYCODONE HYDROCHLORIDE 5 MG: 5 TABLET ORAL at 21:10

## 2023-01-31 RX ADMIN — GABAPENTIN 300 MG: 300 CAPSULE ORAL at 21:03

## 2023-01-31 RX ADMIN — Medication: at 08:47

## 2023-01-31 RX ADMIN — GABAPENTIN 300 MG: 300 CAPSULE ORAL at 17:23

## 2023-01-31 RX ADMIN — DICLOFENAC SODIUM 2 G: 10 GEL TOPICAL at 11:47

## 2023-01-31 RX ADMIN — CYANOCOBALAMIN TAB 1000 MCG 1000 MCG: 1000 TAB at 08:46

## 2023-01-31 RX ADMIN — LIDOCAINE 2 PATCH: 50 PATCH CUTANEOUS at 08:46

## 2023-01-31 RX ADMIN — Medication: at 21:04

## 2023-01-31 RX ADMIN — ACETAMINOPHEN 975 MG: 325 TABLET ORAL at 11:47

## 2023-01-31 RX ADMIN — B-COMPLEX W/ C & FOLIC ACID TAB 1 TABLET: TAB at 08:46

## 2023-01-31 RX ADMIN — NYSTATIN 1 APPLICATION.: 100000 POWDER TOPICAL at 21:03

## 2023-01-31 RX ADMIN — ATORVASTATIN CALCIUM 20 MG: 20 TABLET, FILM COATED ORAL at 17:23

## 2023-01-31 RX ADMIN — HEPARIN SODIUM 5000 UNITS: 5000 INJECTION INTRAVENOUS; SUBCUTANEOUS at 21:03

## 2023-01-31 RX ADMIN — HEPARIN SODIUM 5000 UNITS: 5000 INJECTION INTRAVENOUS; SUBCUTANEOUS at 06:04

## 2023-01-31 RX ADMIN — NYSTATIN: 100000 POWDER TOPICAL at 08:47

## 2023-01-31 RX ADMIN — OXYCODONE HYDROCHLORIDE 5 MG: 5 TABLET ORAL at 08:46

## 2023-01-31 NOTE — PLAN OF CARE
Problem: RESPIRATORY - ADULT  Goal: Achieves optimal ventilation and oxygenation  Description: INTERVENTIONS:  - Assess for changes in respiratory status  - Assess for changes in mentation and behavior  - Position to facilitate oxygenation and minimize respiratory effort  - Oxygen administered by appropriate delivery if ordered  - Initiate smoking cessation education as indicated  - Encourage broncho-pulmonary hygiene including cough, deep breathe, Incentive Spirometry  - Assess the need for suctioning and aspirate as needed  - Assess and instruct to report SOB or any respiratory difficulty  - Respiratory Therapy support as indicated  Outcome: Progressing     Problem: PAIN - ADULT  Goal: Verbalizes/displays adequate comfort level or baseline comfort level  Description: Interventions:  - Encourage patient to monitor pain and request assistance  - Assess pain using appropriate pain scale  - Administer analgesics based on type and severity of pain and evaluate response  - Implement non-pharmacological measures as appropriate and evaluate response  - Consider cultural and social influences on pain and pain management  - Notify physician/advanced practitioner if interventions unsuccessful or patient reports new pain  Outcome: Progressing     Problem: MOBILITY - ADULT  Goal: Maintain or return to baseline ADL function  Description: INTERVENTIONS:  -  Assess patient's ability to carry out ADLs; assess patient's baseline for ADL function and identify physical deficits which impact ability to perform ADLs (bathing, care of mouth/teeth, toileting, grooming, dressing, etc )  - Assess/evaluate cause of self-care deficits   - Assess range of motion  - Assess patient's mobility; develop plan if impaired  - Assess patient's need for assistive devices and provide as appropriate  - Encourage maximum independence but intervene and supervise when necessary  - Involve family in performance of ADLs  - Assess for home care needs following discharge   - Consider OT consult to assist with ADL evaluation and planning for discharge  - Provide patient education as appropriate  Outcome: Progressing

## 2023-01-31 NOTE — PROGRESS NOTES
Physical Medicine and Rehabilitation Progress Note  Denise Cook 67 y o  male MRN: [id number]  Unit/Bed#: [facility] 792-79 Encounter: [id number]    To Review: Denise Cook is a 67 y o  male with medical history of localized RCC in 2007 with mets in 2013 , on systemic therapy s/p L nephrectomy and prostatectomy, chronic diarrhea, CKD (Crea 1-1 3) who presented to the [address] [place], [place] [address] on 1/12 with L shoulder pain after a fall with arm abducted  XRs of left shoulder, humerus, scapula, and clavicle showed lytic metastatic tumor mass within the scapular neck and glenoid with no acute fractures or dislocations  CT was ordered and he was made NWB by Ortho  CT showed large pathologic scapula fracture with minimal displacement and a large lytic lesion in glenoid vault  Ortho discussed with Anirudh Carpenter - Dr Mendoza Mendez who recommended sling for comfort and non-surgical management  They will follow-up outpatient  On admission also noted to have ARIANNA and Nephro was consulted who felt likely 2/2 autoregulatory failure  They started him on Lasix and help his Lisinopril and HCTZ  Echo was ordered which showed normal EF with G2DD  ARIANNA resolve with diuresis  Lasix 40mg oral daily  Loretta Blase He also was newly requiring O2 on admission - but that improved diuresis  Given his cancer, D-dimer elevated, NM study was ordered which was indeterminant  LE dopplers were negative  CTA PE was ordered once renal function improved - this was negative  Heparin gtt discontinued It did however show enlargement of his RLL mets and bone mets concerning for progression  He will follow-up with his Oncologist as an outpatient to discuss resuming therapy  Admitted to the [facility] on 1/19  Chief Complaint: No new issues, still some R foot pain  Interval History/Subjective:  No acute events overnight  Sleep is fine  Would like scripts sent to Simi Collado at [facility] for now  He denies any new CP, SOB, fevers, chills, N/V, abdominal pain   R foot pain still present with weight bearing, but tolerable  He denies any new numbness/tingling/weakness  Last BM 1/30  ROS:  A 10 point review of systems was negative except for what is noted in the HPI  Today's Changes:  1  Uric Acid normal  Added diclofenac topical for midfoot arthritis  Reviewed imaging with patient  2  Reviewed Neuropathy labs with patient  B12 low-normal, IM started B12 supplement  3  A1C normal, reviewed with patient  4  We discussed dispo, he would like meds sent to [facility] instead so he can pick them up as he's not sure how long it will take the [place] to process and send his medications to him  He does not need refills of home meds  Total visit time: 25 minutes, with more than 50% spent counseling/coordinating care  Counseling includes discussion with patient re: progress in therapies, functional issues observed by therapy staff, and discussion with patient regarding their current medical state and wellbeing  Coordination of patient's care was performed in conjunction with Internal Medicine service to monitor patient's labs, vitals, and management of their comorbidities  Assessment/Plan:    * 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  Assessment & Plan  Large pathologic scapular fracture with minimal displacement  Large lytic lesion in glenoid vault  Management non-operatively  Sling for comfort  NWB  Ok for ROM in elbow, wrist, hand  Pendulums daily for now  Pain control as below  Outpatient f/u with Steffi Cross Dr Saintclair Maxim in 4 weeks from injury (next week)  1/25 - acute change in sensation in supraclavicular distribution  Unable to check strength  1/26 MRI  Shoulder:   ROTATOR CUFF: Full-thickness partial width tear anterior supraspinatus insertion measuring 1 1 cm AP by 0 7 cm medial lateral superimposed upon moderate tendinosis  Moderate subscapularis insertional tendinosis with low-grade articular sided fraying      Mild infraspinatus insertional tendinosis  Edema within the teres minor muscle, likely related to denervation from mass effect of tumor      SUBACROMIAL/SUBDELTOID BURSA: There is mild subacromial/subdeltoid bursitis       LONG HEAD OF BICEPS TENDON: Mild tendinosis and tenosynovitis      GLENOID LABRUM: Complex tearing anterior inferior labrum      GLENOHUMERAL JOINT: Mild osteoarthritis     ACROMIOCLAVICULAR JOINT:  There is moderate osteoarthritis      BONES: As seen on multiple prior examinations, there is pathologic fracture of the scapular body   Multiple lesions again noted arising from the scapular body and glenoid, consistent with metastatic disease  The largest component of the tumor with   extraosseous extension measures approximately 5 9 x 4 7 x 5 7 cm (series 901, image 13)  This is located along the mid to inferior scapula  A 2nd component of tumor extends posteriorly along the superior aspect of the scapula measuring approximately   3 2 x 2 5 x 3 7 cm (series 401, image 15)  These tumors result in mass effect on the subscapularis, teres minor, and infraspinatus  Foot pain, right  Assessment & Plan  Began 1/29  R metatarsal   No erythema, but some swelling  Worse with weight bearing  Feels like a "bruised bone"  1/30 XR shows midfoot mild degenerative changes and no acute osseous abnormality  Denies history of gout/pseudogout  Uric Acid normal  Suspect arthritis   - Continue WBAT for now   - Diclofenac gel     Cardiovascular risk factor  Assessment & Plan  Started on ASA by IM  Lipid Panel ordered 1/30   - He reports tolerating Atorvastatin, but getting proximal leg pain/burning once transitioned to simvastatin  - Symptoms stopped after stopping simvastatin  IM starting low dose Lipitor and monitoring  Outpatient f/u with PCP  Foraminal stenosis of cervical region  Assessment & Plan  Possibly symptomatic left C3 radiculopathy with decreased sensation and pain at C3-4 distribution    MRI showed severe L C3-4 foraminal stenosis  Moderate central stenosis at C4-5  Outpatient f/u with spine  Nocturnal hypoxemia  Assessment & Plan  1/24 Noc ox showed significant desats  Will add night time oxygen  Check noc ox 48 hours prior to discharge   Referral to sleep medicine at discharge  Neuropathy  Assessment & Plan  Continue gabapentin 300mg Q8hr  - TSH WNL more recently  - No recent A1C - 1/30 5 3    - No B12 checked previously - 1/30 206 - started on oral supplement  He also has cancer - could be paraneoplastic in etiology  - ordered Anti-hu Ab  Pending  Depending on what chemotherapy he has had this could also be a contributing factor  In the future, can consider an EMG to more likely describe the type of neuropathy he has to help narrow differential  For now compensatory strategies and control with medication for his discomfort  HTN (hypertension)  Assessment & Plan  Home: Amlodipine 5mg daily Lasix 40mg daily, was on Lisinopril/HCTZ 20-12 5mg daily  Here: Same without Lisinopril/HCTZ, and now amlodipine at night discontinued for hypotension   Monitor and adjust as appropriate  IM following and assisting with management  Pleural effusion  Assessment & Plan  Likely secondary to metastatic disease  CTA PE study on 1/17: New moderate L pleural effusion with moderate compressive atelectasis of left lower lobe  Currently maintaining on RA  Monitor spot pulse ox  Encourage pulmonary toileting, incentive spirometry  Continue Lasix 40mg daily  Dermatitis associated with moisture  Assessment & Plan  Soap, water to buttocks TID and PRN followed by barrier cream   Nystatin powder R groin    Chronic diastolic (congestive) heart failure (HCC)  Assessment & Plan  Wt Readings from Last 3 Encounters:   01/31/23 109 kg (240 lb 12 8 oz)   01/19/23 110 kg (243 lb 6 2 oz)   12/30/22 121 kg (265 lb 10 5 oz)     Seen on Echo during this hospitalization  S/P IV diuresis for exacerbation    Now back on room air Closely monitor I/O, daily weights, volume status  Cardiac diet with fluid restriction  Continue: Lasix 40mg daily   - Previously on ACE, may be able to restart   - Was not on BB  Outpatient f/u with PCP  Chronic diarrhea  Assessment & Plan  Chronically on Lomotil  Monitor  Close continence care  Stage 3a chronic kidney disease [facility])  Assessment & Plan  Lab Results   Component Value Date    EGFR 82 01/30/2023    EGFR 77 01/26/2023    EGFR 74 01/23/2023    CREATININE 0 92 01/30/2023    CREATININE 0 97 01/26/2023    CREATININE 1 00 01/23/2023     Stage 2-3A  Crea baseline 1-1 3  Improved recently after ARIANNA 2/2 autoregulatory issues/congestion/volume overload  Now on Lasix daily monitor  Was on Lisinopril and HCTZ at home, currently being held  - Nephro says these can be restarted as necessary  Monitoring BP  Avoid nephrotoxic meds  Outpatient f/u     Metastatic renal cell carcinoma [facility])  Assessment & Plan  Originally 2007  Mets in 2013  On systemic therapy  S/p L nephrectomy and prostatectomy  Will need outpatient f/u with Oncology     Spine metastasis [facility])  Assessment & Plan  In most recent CT CAP in 2022 showed lytic/sclerotic lesions T4, T6, and scattered lesions in lumbar spine   NM bone scan in 2020 showed:  Scattered exophytic foci of radiotracer uptake in the spine corresponding to degenerative changes on CT  Ct C-Spine in 6/2022 showed no lesions  He has pelvic/iliac lytic lesions  Monitor neuro status and for worsening back pain  Pain management as below  Lung metastases ([address] )  Assessment & Plan  With recent AHRF in setting of volume overload and acute diastolic heart failure and ARIANNA  Now on room air  Has pleural effusion in addition  Closely monitor respiratory status  Pulmonary toilet  Outpatient f/u with Oncology    Bone metastases [facility])  Assessment & Plan  Monitor performance in therapy   If he develops new pain, particular in long bones, would image    - Since his fall has had new R hip pain primarily with weight bearing   - 2022 CT CAP showed lytic lesion superior to the right hip are identified as well as the posterior column of the acetabulum  - XR of R hip without significant changes  Better visualized on CT   2020 Bone Scan showed lytic lesions:   left lateral scapula inferior to the glenoid     right posterior and lateral ribs corresponding to expansile lytic lesions on CT  (recent Ct with expansive R 7th rib met)   left posterior iliac bone and right iliac wing corresponding to lytic lesions on CT    r nonspecific tibial finding    Outpatient f/u with Dr Tani Scott with Ortho onc  Health Maintenance  #Delirium/Sleep: At risk  Environmental interventions  Optimize pain, bowel, bladder, sleep-wake cycle management  #Pain: Tylenol PRN, Gabapentin 300mg TID, Oxycodone 5mg PRN severe pain, tramadol 50mg PRN moderate pain  #Bowel: Last BM 1/30/2023, incontinent and watery  Chronic diarrhea, see above  #Bladder: Voiding and continent  #Skin/Pressure Injury Prevention: Turn Q2hr in bed, with weight shifts [id number] in wheelchair  Float heels in bed  #DVT Prophylaxis: HSQ,SCDs  #GI Prophylaxis: Not indicated  #Code Status:  Full Code  #FEN: Cardiac diet with 1500cc fluid restriction  #Dispo:  Team 1/26: ADD 2/3 with Farhan Haro PT/OT  Working on [facility] for him with his family given his restrictions in that arm    - Outpatient f/u with Ortho 1 weeks after discharge  - Outpatient f/u with Oncology  - Referral to Pain/Spine team if symptoms still persistent    - Outpatient f/u with PCP  - Scripts to be sent to the [address] [facility]      Objective:    Functional Update:  PT: CGA transfers, min-modA bed mobility, CGA ambulation 150', Barry RHR 8 stairs  OT:  Setup eating, Sup grooming, Barry bathing, Sup UB dressing, Barry LB dressing, Barry toileting, Barry tub/shower transfer, CGA toilet transfers    Allergies per EMR    Physical Exam:  Temp:  [97 2 °F (36 2 °C)-98 2 °F (36 8 °C)] 97 2 °F (36 2 °C)  HR: [] 101  Resp:  [18] 18  BP: (105-123)/(55-65) 105/55  Oxygen Therapy  SpO2: 92 %  O2 Flow Rate (L/min): 2 L/min    Gen: No acute distress, Well-nourished, well-appearing  HEENT: Moist mucus membranes, Normocephalic/Atraumatic  Cardiovascular: Regular rate, rhythm, S1/S2  Distal pulses palpable  Heme/Extr: Stable BL LE edema  Pulmonary: Non-labored breathing  Lungs CTAB  : No williamson  GI: Soft, non-tender, non-distended  BS+  Integumentary: Skin is warm, dry  Neuro: AAOx3, Speech is intact  Appropriate to questioning  Psych: Normal mood and affect  Diagnostic Studies: Reviewed  1/30 XR R Foot: No acute osseous abnormalities, mid foot degenerative changes    Laboratory:  Reviewed  A1C 5 3, Uric Acid WNL, B12 206     Results from last 7 days   Lab Units 01/30/23  0603 01/26/23  0458   HEMOGLOBIN g/dL 11 2* 11 1*   HEMATOCRIT % 38 7 37 1   WBC Thousand/uL 5 80 6 03     Results from last 7 days   Lab Units 01/30/23  0514 01/26/23  0458   BUN mg/dL 19 18   POTASSIUM mmol/L 4 1 4 1   CHLORIDE mmol/L 100 101   CREATININE mg/dL 0 92 0 97   AST U/L 33  --    ALT U/L 17  --             Patient Active Problem List   Diagnosis   • Clear cell adenocarcinoma of kidney, left (HCC)   • Bone metastases (HCC)   • Lung metastases (HCC)   • Spine metastasis ([facility] [address] )   • Metastatic renal cell carcinoma (HCC)   • Hx of prostatectomy   • History of prostate cancer   • Azotemia   • COVID-19   • Stage 3a chronic kidney disease (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   • Acute respiratory failure with hypoxia (HCC)   • Elevated brain natriuretic peptide (BNP) level   • Elevated d-dimer   • Chronic diarrhea   • Chronic diastolic (congestive) heart failure (HCC)   • Dermatitis associated with moisture   • Pleural effusion   • HTN (hypertension)   • Neuropathy   • Nocturnal hypoxemia   • Foraminal stenosis of cervical region   • Cardiovascular risk factor   • Foot pain, right         Medications  Current Facility-Administered Medications   Medication Dose Route Frequency Provider Last Rate   • acetaminophen  975 mg Oral 4x Daily PRN Giovana Griffin MD     • aluminum-magnesium hydroxide-simethicone  30 mL Oral Q6H PRN Giovana Griffin MD     • aspirin  81 mg Oral Daily IRENE Herman     • atorvastatin  20 mg Oral Daily With [address], CRNP     • cholecalciferol  1,000 Units Oral Daily Giovana Griffin MD     • cyanocobalamin  1,000 mcg Oral Daily IRENE Herman     • diphenhydrAMINE  25 mg Oral HS PRN Giovana Griffin MD     • furosemide  40 mg Oral Daily Giovana Griffin MD     • gabapentin  300 mg Oral TID Giovana Griffin MD     • heparin (porcine)  5,000 Units Subcutaneous [facility] [address] Giovana Griffin MD     • lidocaine  2 patch Topical Daily Giovana Griffin MD     • loperamide  2 mg Oral 4x Daily PRN Giovana Griffin MD     • multivitamin stress formula  1 tablet Oral Daily Giovana Griffin MD     • nystatin   Topical BID Giovana Griffin MD     • ondansetron  4 mg Oral Q6H PRN Giovana Griffin MD     • oxyCODONE  5 mg Oral 4x Daily PRN Ismael Brown MD     • oxyCODONE  2 5 mg Oral 4x Daily PRN Ismael Brown MD     • oxyCODONE  7 5 mg Oral Q4H PRN Ismael Brown MD     • simethicone  80 mg Oral 4x Daily PRN Giovana Griffin MD     • white petrolatum-mineral oil   Topical BID IRENE Herman            ** Please Note: Fluency Direct voice to text software may have been used in the creation of this document   **

## 2023-01-31 NOTE — PROGRESS NOTES
[facility]  Progress Note - Claude Fontana 1950, 67 y o  male MRN: [id number]  Unit/Bed#: [place] [address] Encounter: [id number]  Primary Care Provider: Marcelina Rangel MD   Date and time admitted to hospital: 1/19/2023  3:45 PM    Vitamin B12 deficiency  Assessment & Plan  Vitamin B12 206 on 1/30  Started on cyanocobalamin 1000mcg daily  Follow-up with PCP as outpatient  Foot pain, right  Assessment & Plan  Complaints of R metatarsal pain on 1/29  XR obtained which showed midfoot mild degenerative changes and no acute osseous abnormality  Uric acid WNL  Likely arthritis flare-up  Started on PRN Voltaren gel  Considerations with PT/OT  Cardiovascular risk factor  Assessment & Plan  ASCVD risk 25 4%  Started on ASA 81mg daily  Lipid panel on 1/30: Cholesterol 158, Triglycerides 149, HDL 38, and LDL 90  Started on Lipitor 20mg daily (reports hx of myalgias with simvastatin)  Foraminal stenosis of cervical region  Assessment & Plan  MRI on 1/26 showed severe left C3-C4 foraminal stenosis and moderate central stenosis of C4-C5  Ensure adequate pain control  Continue PT/OT  Nocturnal hypoxemia  Assessment & Plan  States that he was told he had sleep apnea in the past during hospitalizations but never had formal work-up  Has been requiring O2 at night  Overnight noc ox performed on 1/24 and showed desat <89% for 8 hours and 12 minutes, as low as 74%  Apply 2L O2 at HS  Repeat study prior to discharge for DME evaluation  Would benefit from Sleep Medicine as outpatient  Neuropathy  Assessment & Plan  Multifactorial  Continue home gabapentin 300mg TID  HTN (hypertension)  Assessment & Plan  Home regimen: amlodipine 5mg daily and lisinopril/HCTZ 20-12 5mg BID  Currently receiving Lasix 40mg daily  Lisinopril and HCTZ held after receiving CTA on 1/17  Amlodipine discontinued on 1/23  Restart as able    Monitor BP with routine VS     Pleural effusion  Assessment & Plan  Likely secondary to metastatic disease  CTA PE study on 1/17: New moderate L pleural effusion with moderate compressive atelectasis of left lower lobe  Currently maintaining on RA  Monitor spot pulse ox  Encourage pulmonary toileting  Continue Lasix 40mg daily  Rales auscultated on exam today  Give 1 time dose of 20mg Lasix  Chronic diastolic (congestive) heart failure (HCC)  Assessment & Plan  Wt Readings from Last 3 Encounters:   01/31/23 109 kg (240 lb 12 8 oz)   01/19/23 110 kg (243 lb 6 2 oz)   12/30/22 121 kg (265 lb 10 5 oz)     ECHO on 1/16 showed EF 65%, no RWMA, G1DD  Treated with Lasix in acute setting for volume overload  BNP 4475 on admission  Continue Lasix 40mg daily  Monitor daily weights and I&Os  Continue 1500FR  Chronic diarrhea  Assessment & Plan  Continue home PRN Imodium  Monitor skin for breakdown  Stage 3a chronic kidney disease [facility])  Assessment & Plan  Lab Results   Component Value Date    EGFR 82 01/30/2023    EGFR 77 01/26/2023    EGFR 74 01/23/2023    CREATININE 0 92 01/30/2023    CREATININE 0 97 01/26/2023    CREATININE 1 00 01/23/2023     Creatinine currently 0 92  Baseline creatinine 1 0-1 3  Hx of L nephrectomy  Developed ARIANNA in acute setting due to autoregulatory failure/volume overload/congestion  Currently on Lasix 40mg daily  Avoid nephrotoxins as able  Encourage adequate hydration  Continue to trend BMP  Metastatic renal cell carcinoma ([facility] [address] )  Assessment & Plan  Diagnosed in 2007 with metastatic disease in 2013  Hx of L nephrectomy in 2007 and prostatectomy  Progressive disease  Newly started on Nivolumab in 12/022  Follows with Dr Lencho Mathews (Oncology) as outpatient - follow-up scheduled for February  Spine metastasis ([facility] [address] )  Assessment & Plan  Hx of SBRT to T6, T10, and T4 between 1439-5639  Encourage adequate pain control  Follows with Dr Lencho Mathews (Oncology) as outpatient      Lung metastases [facility])  Assessment & Plan  CTA PE study on 1/17: Enlargement of R lower lobe metastases  Developed hypoxia in acute setting - receiving diuretics with improvement  Currently maintaining on RA  Monitor spot pulse ox  Continue Lasix 40mg daily  Encourage pulmonary toileting and IS use  Follows with Dr Sagar Cheung as outpatient  Bone metastases [facility])  Assessment & Plan  Diagnosed with metastatic renal cell carcinoma in 2007  Progressive disease  Bone scan in 2020 showed lytic lesions to L scapula, R posterior and lateral ribs, L posterior iliac bone, and R iliac wing  Most recently started on Nivolumab in 12/2022  Ensure adequate pain control  Continue home Vitamin D supplementation  Follows with Dr Sagar Cheung (Oncology) as outpatient  * 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  Assessment & Plan  Presented on 1/12 after a mechanical fall with L shoulder pain  CT LUE: lytic metastasis int he scapular glenoid process measuring 2 9 x 2 4 x 2 0cm with cortical breakthrough and extraosseous extension anteriorly without pathologic fracture  New 2 4 x 2 1 x 1 9cm lytic metastasis in the L scapular body with acute pathologic fracture  New 1 9x0 8x1 4cm lytic metastasis in the inferior scapular angle with pathologic fracture  Non-surgical intervention per Ortho  NWB to LUE  Sling for comfort  Neurovascular checks Q shift  Ensure adequate pain control  Follow-up with Dr Xiao Peñaloza (Orthopedics) in 4 weeks  Primary team following  PT/OT  MRI L shoulder due to burning sensation/chronic ongoing pain on 1/26: metastatic disease of the scapula with pathologic fracture, full thickness partial width tear anterior supraspinatus superimposed upon moderate tendinosis, moderate subscapularis tendinosis, mild infraspinatus tendinosis  VTE Pharmacologic Prophylaxis:   Pharmacologic: Heparin  Mechanical VTE Prophylaxis in Place: Yes - sequential compression devices      Current Length of Stay: 12 day(s)    Current Patient Status: Inpatient Rehab     Discharge Plan: As per primary team     Code Status: Level 1 - Full Code    Subjective:   Pt examined while pt sitting in recliner in pt room  Complaints of mild L shoulder pain, aching, improves with rest   Feels that his R great toe pain is improving today  Denies any lightheadedness, dizziness, or palpitations  Complaints of dyspnea with exertion in therapy  Feels that it has been the same and denies it becoming worse  Crackles noted on exam - will give extra 20mg of Lasix today  Encouraged to use IS more frequently during the day  Objective:     Vitals:   Temp (24hrs), Av 8 °F (36 6 °C), Min:97 2 °F (36 2 °C), Max:98 2 °F (36 8 °C)    Temp:  [97 2 °F (36 2 °C)-98 2 °F (36 8 °C)] 97 2 °F (36 2 °C)  HR:  [] 97  Resp:  [18] 18  BP: (105-123)/(55-65) 120/59  SpO2:  [90 %-93 %] 92 %  Body mass index is 34 55 kg/m²  Review of Systems   Constitutional: Negative for appetite change, chills, fatigue and fever  HENT: Negative for trouble swallowing  Eyes: Negative for visual disturbance  Respiratory: Negative for cough, chest tightness, shortness of breath, wheezing and stridor  Cardiovascular: Negative for chest pain, palpitations and leg swelling  Gastrointestinal: Negative for abdominal distention, abdominal pain, constipation, diarrhea, nausea and vomiting  LBM 1/30   Genitourinary: Negative for difficulty urinating  Musculoskeletal: Positive for arthralgias (Mild L shoulder pain, worse after therapy, improves with rest)  Negative for back pain and gait problem  Neurological: Negative for dizziness, weakness, light-headedness, numbness and headaches  Psychiatric/Behavioral: Negative for dysphoric mood and sleep disturbance  The patient is not nervous/anxious  All other systems reviewed and are negative  Input and Output Summary (last 24 hours):        Intake/Output Summary (Last 24 hours) at 1/31/2023 1205  Last data filed at 1/31/2023 1035  Gross per 24 hour   Intake 520 ml   Output 850 ml   Net -330 ml       Physical Exam:     Physical Exam  Vitals and nursing note reviewed  Constitutional:       General: He is not in acute distress  Appearance: Normal appearance  He is obese  He is not ill-appearing  HENT:      Head: Normocephalic and atraumatic  Cardiovascular:      Rate and Rhythm: Normal rate and regular rhythm  Pulses: Normal pulses  Heart sounds: Normal heart sounds  No murmur heard  No friction rub  Pulmonary:      Effort: Pulmonary effort is normal  No respiratory distress  Breath sounds: Examination of the right-lower field reveals rales  Examination of the left-lower field reveals rales  Rales present  No wheezing or rhonchi  Abdominal:      General: Abdomen is flat  Bowel sounds are normal  There is no distension  Palpations: Abdomen is soft  There is no mass  Tenderness: There is no abdominal tenderness  There is no guarding or rebound  Hernia: No hernia is present  Musculoskeletal:      Cervical back: Normal range of motion and neck supple  No tenderness  Right lower leg: Edema (lymphedema) present  Left lower leg: Edema (lymphedema) present  Comments: LUE in sling  Skin:     General: Skin is warm and dry  Capillary Refill: Capillary refill takes less than 2 seconds  Neurological:      Mental Status: He is alert and oriented to person, place, and time     Psychiatric:         Mood and Affect: Mood normal          Behavior: Behavior normal          Additional Data:     Labs:    Results from last 7 days   Lab Units 01/30/23  0603   WBC Thousand/uL 5 80   HEMOGLOBIN g/dL 11 2*   HEMATOCRIT % 38 7   PLATELETS Thousands/uL 249   NEUTROS PCT % 68   LYMPHS PCT % 23   MONOS PCT % 7   EOS PCT % 2     Results from last 7 days   Lab Units 01/30/23  0514   SODIUM mmol/L 138   POTASSIUM mmol/L 4 1   CHLORIDE mmol/L 100   CO2 mmol/L 34*   BUN mg/dL 19   CREATININE mg/dL 0 92   ANION GAP mmol/L 4*   CALCIUM mg/dL 9 2   ALBUMIN g/dL 3 0*   TOTAL BILIRUBIN mg/dL 0 50   ALK PHOS U/L 72   ALT U/L 17   AST U/L 33   GLUCOSE RANDOM mg/dL 87             Results from last 7 days   Lab Units 01/30/23  0514   HEMOGLOBIN A1C % 5 3           Labs reviewed    Imaging:    Imaging reviewed    Recent Cultures (last 7 days):           Last 24 Hours Medication List:   Current Facility-Administered Medications   Medication Dose Route Frequency Provider Last Rate   • acetaminophen  975 mg Oral 4x Daily PRN Monie Smith MD     • aluminum-magnesium hydroxide-simethicone  30 mL Oral Q6H PRN Monie Smith MD     • aspirin  81 mg Oral Daily IRENE Jackson     • atorvastatin  20 mg Oral Daily With [address]     • cholecalciferol  1,000 Units Oral Daily Monie Smith MD     • cyanocobalamin  1,000 mcg Oral Daily IRENE Jackson     • Diclofenac Sodium  2 g Topical TID Monie Smith MD     • diphenhydrAMINE  25 mg Oral HS PRN Monie Smith MD     • furosemide  20 mg Oral Once IRENE Jackson     • [START ON 2/1/2023] furosemide  40 mg Oral Daily IRENE Jackson     • gabapentin  300 mg Oral TID Monie Smith MD     • heparin (porcine)  5,000 Units Subcutaneous [facility] Monie Smith MD     • lidocaine  2 patch Topical Daily Monie Smith MD     • loperamide  2 mg Oral 4x Daily PRN Monie Smith MD     • multivitamin stress formula  1 tablet Oral Daily Monie Smith MD     • nystatin   Topical BID Monie Smith MD     • ondansetron  4 mg Oral Q6H PRN Monie Smith MD     • oxyCODONE  5 mg Oral 4x Daily PRN Luann Cerna MD     • oxyCODONE  2 5 mg Oral 4x Daily PRN Luann Cerna MD     • oxyCODONE  7 5 mg Oral Q4H PRN Luann Cerna MD     • simethicone  80 mg Oral 4x Daily PRN Monie Smith MD     • white petrolatum-mineral oil   Topical BID IRENE Jackson          M*Modal software was used to dictate this note  It may contain errors with dictating incorrect words or incorrect spelling  Please contact the provider directly with any questions

## 2023-01-31 NOTE — ASSESSMENT & PLAN NOTE
Likely secondary to metastatic disease  CTA PE study on 1/17: New moderate L pleural effusion with moderate compressive atelectasis of left lower lobe  Currently maintaining on RA  Monitor spot pulse ox  Encourage pulmonary toileting  Continue Lasix 40mg daily  Rales auscultated on exam today  Give 1 time dose of 20mg Lasix

## 2023-01-31 NOTE — ASSESSMENT & PLAN NOTE
Hx of SBRT to T6, T10, and T4 between 3243-8236  Encourage adequate pain control  Follows with Dr Praveena Garcia (Oncology) as outpatient

## 2023-01-31 NOTE — ASSESSMENT & PLAN NOTE
Diagnosed in 2007 with metastatic disease in 2013  Hx of L nephrectomy in 2007 and prostatectomy  Progressive disease  Newly started on Nivolumab in 12/022  Follows with Dr Linder Boast (Oncology) as outpatient - follow-up scheduled for February

## 2023-01-31 NOTE — ASSESSMENT & PLAN NOTE
Wt Readings from Last 3 Encounters:   01/31/23 109 kg (240 lb 12 8 oz)   01/19/23 110 kg (243 lb 6 2 oz)   12/30/22 121 kg (265 lb 10 5 oz)     ECHO on 1/16 showed EF 65%, no RWMA, G1DD  Treated with Lasix in acute setting for volume overload  BNP 4475 on admission  Continue Lasix 40mg daily  Monitor daily weights and I&Os  Continue 1500FR

## 2023-01-31 NOTE — ASSESSMENT & PLAN NOTE
ASCVD risk 25 4%  Started on ASA 81mg daily  Lipid panel on 1/30: Cholesterol 158, Triglycerides 149, HDL 38, and LDL 90  Started on Lipitor 20mg daily (reports hx of myalgias with simvastatin)

## 2023-01-31 NOTE — PROGRESS NOTES
01/31/23 1030   Pain Assessment   Pain Assessment Tool 0-10   Pain Score 5   Pain Location/Orientation Orientation: Left; Location: Shoulder   Pain Onset/Description Onset: Ongoing; Descriptor: Aching;Descriptor: Discomfort; Descriptor: Cramping   Restrictions/Precautions   Precautions Fall Risk;Pain   LUE Weight Bearing Per Order NWB   Braces or Orthoses Sling   Cognition   Overall Cognitive Status WFL   Arousal/Participation Alert; Cooperative   Attention Within functional limits   Orientation Level Oriented X4   Memory Within functional limits   Following Commands Follows all commands and directions without difficulty   Subjective   Subjective "its just the usual pain"   Sit to Stand   Type of Assistance Needed Supervision   Sit to Stand CARE Score 4   Bed-Chair Transfer   Type of Assistance Needed Supervision   Chair/Bed-to-Chair Transfer CARE Score 4   Car Transfer   Comment (S)  suggest reattempt simulated trial next day  Walk 10 Feet   Type of Assistance Needed Supervision   Walk 10 Feet CARE Score 4   Walk 50 Feet with Two Turns   Type of Assistance Needed Supervision   Walk 50 Feet with Two Turns CARE Score 4   Walk 150 Feet   Type of Assistance Needed Supervision   Walk 150 Feet CARE Score 4   Walking 10 Feet on Uneven Surfaces   Type of Assistance Needed Supervision   Walking 10 Feet on Uneven Surfaces CARE Score 4   Ambulation   Primary Mode of Locomotion Prior to Admission Walk   Distance Walked (feet) 125 ft  (x2, [id number])   Assist Device [facility]   Gait Pattern Inconsistant Dilma; Wide ANDRÉS;Step to;Shuffle;Step through   Limitations Noted In Endurance;Speed;Strength   Walk Assist Level Supervision   Findings Use of WBQC and AFO on L  trialed pts velcro closure shoes as pt plans to use these at [facility] contacted planned to come this PM for AFO assessment and fitting  Does the patient walk? 2  Yes   Wheelchair mobility   Does the patient use a wheelchair? 0   No   12 Steps   Type of Assistance Needed Incidental touching   Physical Assistance Level No physical assistance   Comment R HR, for FF in hallway, CGA for safety, inc ftg last 3-4 steps  Side stepping descending  12 Steps CARE Score 4   Stairs   Type Stairs   # of Steps 12   Weight Bearing Precautions LUE;NWB   Assist Devices Single Rail   Toilet Transfer   Findings pt stood to void over toilet with urinal use for measurement, amt charted in I&O   Therapeutic Interventions   Strengthening L ankle grn tband DF/PF x20; AROM eversion no resistance x20- needing A to isolate ankle and reduce knee/hip rotation  Flexibility manual seated L DF /knee ext passive stretch 5 x 20 sec  Assessment   Treatment Assessment Pt engaged in 60 min skilled PT intervention with focus on gait and shoe trial  Pt wearing different shoes compared to previously, now slip ons, does have new velcro closure shoes  Trialed new shoes with L AFO, reported feeling these shoes are more supportive  Able to amb 1x150, [id number], with WBQC  Pt also completed FF in hallway with R HR, evident ftg last 3-4 steps  CGA for safety  Mildly labored breathing after however recovered well with seated rest break  Pt does plan on first floor setup with lift chair however shower is on second floor   contacted confirmed visit this PM for AFO fitting and assesment, suggest velcro shoe use, OT notified to work on donning  Plan remains for [place] home friday 2/3, in home out pt PT to follow  Anticipate IRPs next day  Family/Caregiver Present no   Problem List Decreased strength;Decreased range of motion;Decreased endurance; Impaired balance;Decreased mobility; Decreased coordination;Decreased safety awareness;Pain;Orthopedic restrictions   Barriers to Discharge Inaccessible home environment;Decreased caregiver support   PT Barriers   Physical Impairment Decreased strength;Decreased endurance; Impaired balance;Decreased mobility; Decreased safety awareness;Orthopedic restrictions   Plan Treatment/Interventions Functional transfer training;Elevations;LE strengthening/ROM; Endurance training;Patient/family training;Gait training   Progress Progressing toward goals   Recommendation   PT Discharge Recommendation Home with outpatient rehabilitation  (in home out pt)   PT Therapy Minutes   PT Time In 1030   PT Time Out 1130   PT Total Time (minutes) 60   PT Mode of treatment - Individual (minutes) 60   PT Mode of treatment - Concurrent (minutes) 0   PT Mode of treatment - Group (minutes) 0   PT Mode of treatment - Co-treat (minutes) 0   PT Mode of Treatment - Total time(minutes) 60 minutes   PT Cumulative Minutes 930

## 2023-01-31 NOTE — PLAN OF CARE
Problem: NEUROSENSORY - ADULT  Goal: Achieves stable or improved neurological status  Description: INTERVENTIONS  - Monitor and report changes in neurological status  - Monitor vital signs such as temperature, blood pressure, glucose, and any other labs ordered   - Initiate measures to prevent increased intracranial pressure  - Monitor for seizure activity and implement precautions if appropriate      Outcome: Progressing     Problem: RESPIRATORY - ADULT  Goal: Achieves optimal ventilation and oxygenation  Description: INTERVENTIONS:  - Assess for changes in respiratory status  - Assess for changes in mentation and behavior  - Position to facilitate oxygenation and minimize respiratory effort  - Oxygen administered by appropriate delivery if ordered  - Initiate smoking cessation education as indicated  - Encourage broncho-pulmonary hygiene including cough, deep breathe, Incentive Spirometry  - Assess the need for suctioning and aspirate as needed  - Assess and instruct to report SOB or any respiratory difficulty  - Respiratory Therapy support as indicated  Outcome: Progressing

## 2023-01-31 NOTE — ASSESSMENT & PLAN NOTE
Presented on 1/12 after a mechanical fall with L shoulder pain  CT LUE: lytic metastasis int he scapular glenoid process measuring 2 9 x 2 4 x 2 0cm with cortical breakthrough and extraosseous extension anteriorly without pathologic fracture  New 2 4 x 2 1 x 1 9cm lytic metastasis in the L scapular body with acute pathologic fracture  New 1 9x0 8x1 4cm lytic metastasis in the inferior scapular angle with pathologic fracture  Non-surgical intervention per Ortho  NWB to LUE  Sling for comfort  Neurovascular checks Q shift  Ensure adequate pain control  Follow-up with Dr Katelynn Cassidy (Orthopedics) in 4 weeks  Primary team following  PT/OT  MRI L shoulder due to burning sensation/chronic ongoing pain on 1/26: metastatic disease of the scapula with pathologic fracture, full thickness partial width tear anterior supraspinatus superimposed upon moderate tendinosis, moderate subscapularis tendinosis, mild infraspinatus tendinosis

## 2023-01-31 NOTE — ASSESSMENT & PLAN NOTE
CTA PE study on 1/17: Enlargement of R lower lobe metastases  Developed hypoxia in acute setting - receiving diuretics with improvement  Currently maintaining on RA  Monitor spot pulse ox  Continue Lasix 40mg daily  Encourage pulmonary toileting and IS use  Follows with Dr Roberto Carlos Kaur as outpatient

## 2023-01-31 NOTE — PROGRESS NOTES
01/31/23 1230   Pain Assessment   Pain Assessment Tool 0-10   Pain Score 5   Restrictions/Precautions   Precautions Fall Risk;Pain   LUE Weight Bearing Per Order NWB   Braces or Orthoses Sling   Cognition   Overall Cognitive Status WFL   Arousal/Participation Alert; Cooperative   Attention Within functional limits   Orientation Level Oriented X4   Memory Within functional limits   Following Commands Follows all commands and directions without difficulty   Subjective   Subjective sleeping upon entry, arousable, agreeable to PT  Sit to Stand   Type of Assistance Needed Supervision   Physical Assistance Level No physical assistance   Sit to Stand CARE Score 4   Bed-Chair Transfer   Type of Assistance Needed Supervision   Physical Assistance Level No physical assistance   Chair/Bed-to-Chair Transfer CARE Score 4   Walk 10 Feet   Type of Assistance Needed Supervision   Physical Assistance Level No physical assistance   Walk 10 Feet CARE Score 4   Ambulation   Findings AFO trials with Med Kuo from [facility]  Assessment   Treatment Assessment Pt engaged in 30 min skilled PT with Med Kuo from [place] at bedside for carbonfiber AFO trials and fitting  Appeared to zoe and felt more comfortable without shoe insert ontop of AFO  Med Kuo reports will trial next size up when he returns for AFO delivery anticipated thrusday in PM prior to DC home friday  Would benefit from donning and doffing trials as well as family training on use and weartime at DC  Recommendation   PT Discharge Recommendation Home with outpatient rehabilitation  (in home out pt)   PT Therapy Minutes   PT Time In 1230   PT Time Out 1300   PT Total Time (minutes) 30   PT Mode of treatment - Individual (minutes) 30   PT Mode of treatment - Concurrent (minutes) 0   PT Mode of treatment - Group (minutes) 0   PT Mode of treatment - Co-treat (minutes) 0   PT Mode of Treatment - Total time(minutes) 30 minutes   PT Cumulative Minutes 960   Therapy Time missed   Time missed?  No

## 2023-01-31 NOTE — PROGRESS NOTES
01/31/23 0701   Pain Assessment   Pain Assessment Tool 0-10   Pain Score 5   Pain Location/Orientation Orientation: Left; Location: Shoulder   Pain Onset/Description Onset: Ongoing   Restrictions/Precautions   Precautions Fall Risk;Pain   Weight Bearing Restrictions Yes   LUE Weight Bearing Per Order (S)  NWB   LUE ROM Restriction   (LUE pendulums approved; NO shoulder AROM/AAROM, or PROM beyond pendulums)   Braces or Orthoses Sling  (LUE)   Lifestyle   Autonomy "I feel I'm ready to go home, I want to get there and see how I do with things"   Eating   Type of Assistance Needed Set-up / clean-up   Physical Assistance Level No physical assistance   Comment Setup 2* LUE restrictions - sling donned   Eating CARE Score 5   Oral Hygiene   Comment Pt donned dentures prior to eating breakfast; requesting to perform oral care following eating - informed RN Wendy Marrero of same   Grooming   Able To Initiate Tasks   Limitation Noted In Timeliness;Strength; Coordination   Findings CS/CGA for lite grooming tasks in unsupported stance at sink; no LOB, fair tolerance  Cues to keep quad cane in close proximity   Shower/Bathe Self   Type of Assistance Needed Physical assistance; Adaptive equipment   Physical Assistance Level 25% or less   Comment Completed shower this AM; required overall Min A; able to wash 8/10 body parts in unsupported sit and support stance at GB  Min prompting to adhere to NWB status w/ LUE  Required A to wash RUE due to LUE restrictions  A to wash buttocks  Educated on use of reacher using wash cloth for ease and independence to wash feet  Pt stated "I have a better method" and proceeded to put the wash cloth on the floor and rub his feet back and forth  Provided w/ education on recommendation for LHS or to utilize compensatory method w/ reacher (which pt has) to maximize ease, safety, and ensure proper hygiene  Pt able to tolerate brief unsupported standing when performing ramiro bathing in stance    Continues to benefit from unilateral support for steadying and safety   Shower/Bathe Self CARE Score 3   Bathing   Assessed Bath Style Shower   Anticipated D/C Bath Style Shower;Sponge Bath  (TBD; may benefit from A with showering  When prompted regarding shower schedule, pt stated "it will depend when someone is available")   Able to Gather/Transport No  (pt able to obtain clothes from closet; required A to transport)   Able to [address] [place] No   Able to Wash/Rinse/Dry (body part) Left Arm;L Upper Leg;R Upper Leg;Chest;Abdomen;Perineal Area;L Lower Leg/Foot;R Lower Leg/Foot   Limitations Noted in Balance; Endurance; Coordination; Safety;Timeliness;Strength   Positioning Seated;Standing   Adaptive Equipment Tub Bench; Shower Bars;Hand Held JUWAN Langley   Findings  Prompted pt regarding preferred time of day for bathing  Pt stated "it will depend when someone is available", demonstrating G insight regarding therapist recommendation to have someone present for supervision during showering routine  Pt appears receptive to sponge bathe as needed throughout the week  Pt was previously provided w/ [facility]HA information and was instructed to call and setup, however pt has yet to do so  Communicated same w/ Jose Maria Maguire  Will continue to assess functional independence w/ showering routine in order to appropriately plan for upcoming discharge and offer recommendations    Anticipate need or family support during showering at this time   Tub/Shower Transfer   Limitations Noted In Endurance;UE Strength;LE Strength;ROM;Safety;Balance   Adaptive Equipment Transfer Bench;Grab Bars   Assessed Shower   Findings CS for side stepping into walk-in shower using GBs for steadying; pt reports walk-in shower at home w/ small threshold   Upper Body Dressing   Type of Assistance Needed Supervision   Physical Assistance Level No physical assistance   Comment seated to don OH and sling; min prompting for compensatory technique for ease in donning OH shirt due to increased time and difficulty orienting to sleeve vs  opening   Upper Body Dressing CARE Score 4   Lower Body Dressing   Type of Assistance Needed Adaptive equipment;Physical assistance   Physical Assistance Level 25% or less   Comment Able to thread underwear and pants using LHR when provided w/ increased time  Min A in stance for steadying and full management of underwear/pants in back   Lower Body Dressing CARE Score 3   Putting On/Taking Off Footwear   Type of Assistance Needed Physical assistance; Adaptive equipment   Physical Assistance Level 26%-50%   Comment TA to don BL TEDs  Min/Mod A to don AFO and slip on sneakers w/ back on LLE  Pt utilized [facility] which he has at home - continues w/ decreased dynamic reach   Putting On/Taking Off Footwear CARE Score 3   Lying to Sitting on Side of Bed   Type of Assistance Needed Independent   Physical Assistance Level No physical assistance   Lying to Sitting on Side of Bed CARE Score 6   Sit to Stand   Type of Assistance Needed Supervision   Physical Assistance Level No physical assistance   Comment S using WBQC - impulsive at times requiring redirection for safety   Sit to Stand CARE Score 4   Bed-Chair Transfer   Type of Assistance Needed Supervision   Physical Assistance Level No physical assistance   Comment S using [facility] [place]   Chair/Bed-to-Chair Transfer CARE Score 4   Transfer Bed/Chair/Wheelchair   Limitations Noted In Balance; Endurance   Adaptive Equipment Quad Cane;Bright [employer]    Findings Pts son purchased toilet aide which was present during session  Pt declined toileting needs  Therapist provided hands on education and demonstration regarding setup and proper use  Pt stated "I don't think that is the right thing"  Pt was apprehensive regarding overall use; however, verbalized understanding  Pt stated he preferred to use wipes vs  toilet paper; confirmed toilet aide would support same    Pt receptive to further training and use when appropriate  Pt would benefit from use due to being L handed dominant and having difficulty coordinating and reaching to perform hygiene w/ RUE   Toilet Transfer   Type of Assistance Needed Supervision; Adaptive equipment   Physical Assistance Level No physical assistance   Comment S using WBQC; upon sitting, pt did not void   Toilet Transfer CARE Score 4   Toilet Transfer   Surface Assessed Standard Toilet   Transfer Technique Standard   Limitations Noted In Balance; Endurance;ROM;UE Strength;LE Strength   Adaptive Equipment Grab Bar;Quad Cane   Positioning Concerns Grab Bars;LE Support   Cognition   Overall Cognitive Status WFL   Arousal/Participation Alert; Cooperative   Attention Within functional limits   Orientation Level Oriented X4   Memory Within functional limits   Following Commands Follows all commands and directions without difficulty   Activity Tolerance   Activity Tolerance Patient tolerated treatment well   Assessment   Treatment Assessment Pt participated in 90 minute skilled OT session w/ focus on ADL re-training, bed mob , functional transfers/mob , endurance training, safety training, and continued education regarding compensatory techniques  Details regarding functional performance noted above  Pt did require frequent cueing/prompting regarding LUE precautions in place when sling was doffed during shower routine; noted w/ intermittent attempt to use  Pt observed to intermittently process same stating "I know I'm not suppose to use it, I want to follow the doctors orders"  Pt effortful and cooperative throughout tx session; continues to make progress w/ current OT POC  Required overall Min A during ADL routine due to LUE restrictions  Pt slightly impulsive throughout functional tasks and required intermittent redirection to maximize insight into safety  Pt w/ tentative d/c plans for Friday 2/3    Anticipate need for increased support/assistance w/ BADL tasks due to LUE NWB status, need for AFO, and FRANCESCA stockings  OT to continue w/ current POC w/ continued focus on activity tolerance, balance, functional transfers/mob , carryover of compensatory education techniques, and repetitive safety training to maximize functional independence w/ I/ADL tasks and ease burden of care at d/c    OT Family training done with: FT scheduled with son 2/2 10am   Prognosis Fair   Problem List Decreased strength;Decreased range of motion;Decreased endurance; Impaired balance;Decreased mobility; Decreased coordination;Decreased safety awareness;Pain;Orthopedic restrictions   Barriers to Discharge Inaccessible home environment;Decreased caregiver support   Plan   Treatment/Interventions ADL retraining;Functional transfer training; Endurance training;Patient/family training;Equipment eval/education; Compensatory technique education; Bed mobility; Therapeutic exercise   Progress Progressing toward goals   Recommendation   Recommendation Other (comment)  (FT w/ son scheduled for 2/2)   OT Discharge Recommendation Home with outpatient rehabilitation  ([place])   Equipment Recommended Bedside commode  (pt reports having shower chair, x2 reachers, shoe horn, and dressing stick)   Commode Type Standard   OT Equipment ordered has C  Purchased toilet aide and provided w/ education on this date - will benefit from further training   OT Therapy Minutes   OT Time In 0700   OT Time Out 0830   OT Total Time (minutes) 90   OT Mode of treatment - Individual (minutes) 90   OT Mode of treatment - Concurrent (minutes) 0   OT Mode of treatment - Group (minutes) 0   OT Mode of treatment - Co-treat (minutes) 0   OT Mode of Treatment - Total time(minutes) 90 minutes   OT Cumulative Minutes 870   Therapy Time missed   Time missed?  No

## 2023-01-31 NOTE — ASSESSMENT & PLAN NOTE
Complaints of R metatarsal pain on 1/29  XR obtained which showed midfoot mild degenerative changes and no acute osseous abnormality  Uric acid WNL  Likely arthritis flare-up  Started on PRN Voltaren gel  Considerations with PT/OT

## 2023-01-31 NOTE — ASSESSMENT & PLAN NOTE
Diagnosed with metastatic renal cell carcinoma in 2007  Progressive disease  Bone scan in 2020 showed lytic lesions to L scapula, R posterior and lateral ribs, L posterior iliac bone, and R iliac wing  Most recently started on Nivolumab in 12/2022  Ensure adequate pain control  Continue home Vitamin D supplementation  Follows with Dr Richard Acevedo (Oncology) as outpatient

## 2023-02-01 ENCOUNTER — APPOINTMENT (OUTPATIENT)
Dept: RADIOLOGY | Facility: HOSPITAL | Age: 73
End: 2023-02-01

## 2023-02-01 RX ADMIN — CHOLECALCIFEROL TAB 25 MCG (1000 UNIT) 1000 UNITS: 25 TAB at 08:32

## 2023-02-01 RX ADMIN — OXYCODONE HYDROCHLORIDE 5 MG: 5 TABLET ORAL at 08:36

## 2023-02-01 RX ADMIN — GABAPENTIN 300 MG: 300 CAPSULE ORAL at 21:07

## 2023-02-01 RX ADMIN — ASPIRIN 81 MG: 81 TABLET, COATED ORAL at 08:32

## 2023-02-01 RX ADMIN — Medication 7.5 MG: at 15:02

## 2023-02-01 RX ADMIN — DICLOFENAC SODIUM 2 G: 10 GEL TOPICAL at 08:32

## 2023-02-01 RX ADMIN — CYANOCOBALAMIN TAB 1000 MCG 1000 MCG: 1000 TAB at 08:32

## 2023-02-01 RX ADMIN — GABAPENTIN 300 MG: 300 CAPSULE ORAL at 08:32

## 2023-02-01 RX ADMIN — LIDOCAINE 2 PATCH: 50 PATCH CUTANEOUS at 08:32

## 2023-02-01 RX ADMIN — HEPARIN SODIUM 5000 UNITS: 5000 INJECTION INTRAVENOUS; SUBCUTANEOUS at 06:45

## 2023-02-01 RX ADMIN — HEPARIN SODIUM 5000 UNITS: 5000 INJECTION INTRAVENOUS; SUBCUTANEOUS at 15:02

## 2023-02-01 RX ADMIN — NYSTATIN: 100000 POWDER TOPICAL at 08:37

## 2023-02-01 RX ADMIN — Medication 7.5 MG: at 21:14

## 2023-02-01 RX ADMIN — ATORVASTATIN CALCIUM 20 MG: 20 TABLET, FILM COATED ORAL at 17:01

## 2023-02-01 RX ADMIN — HEPARIN SODIUM 5000 UNITS: 5000 INJECTION INTRAVENOUS; SUBCUTANEOUS at 21:07

## 2023-02-01 RX ADMIN — DICLOFENAC SODIUM 2 G: 10 GEL TOPICAL at 21:07

## 2023-02-01 RX ADMIN — FUROSEMIDE 40 MG: 40 TABLET ORAL at 06:45

## 2023-02-01 RX ADMIN — Medication: at 21:06

## 2023-02-01 RX ADMIN — GABAPENTIN 300 MG: 300 CAPSULE ORAL at 17:01

## 2023-02-01 RX ADMIN — Medication: at 08:37

## 2023-02-01 RX ADMIN — B-COMPLEX W/ C & FOLIC ACID TAB 1 TABLET: TAB at 08:32

## 2023-02-01 RX ADMIN — NYSTATIN: 100000 POWDER TOPICAL at 21:06

## 2023-02-01 NOTE — ASSESSMENT & PLAN NOTE
Likely secondary to metastatic disease  CTA PE study on 1/17: New moderate L pleural effusion with moderate compressive atelectasis of left lower lobe  Currently maintaining on RA  Monitor spot pulse ox  Encourage pulmonary toileting  Continue Lasix 40mg daily  Given additional 20mg Lasix on 1/31

## 2023-02-01 NOTE — PLAN OF CARE
Problem: NEUROSENSORY - ADULT  Goal: Achieves stable or improved neurological status  Description: INTERVENTIONS  - Monitor and report changes in neurological status  - Monitor vital signs such as temperature, blood pressure, glucose, and any other labs ordered   - Initiate measures to prevent increased intracranial pressure  - Monitor for seizure activity and implement precautions if appropriate      Outcome: Progressing  Goal: Achieves maximal functionality and self care  Description: INTERVENTIONS  - Monitor swallowing and airway patency with patient fatigue and changes in neurological status  - Encourage and assist patient to increase activity and self care     - Encourage visually impaired, hearing impaired and aphasic patients to use assistive/communication devices  Outcome: Progressing     Problem: GASTROINTESTINAL - ADULT  Goal: Maintains or returns to baseline bowel function  Description: INTERVENTIONS:  - Assess bowel function  - Encourage oral fluids to ensure adequate hydration  - Administer IV fluids if ordered to ensure adequate hydration  - Administer ordered medications as needed  - Encourage mobilization and activity  - Consider nutritional services referral to assist patient with adequate nutrition and appropriate food choices  Outcome: Progressing  Goal: Maintains adequate nutritional intake  Description: INTERVENTIONS:  - Monitor percentage of each meal consumed  - Identify factors contributing to decreased intake, treat as appropriate  - Assist with meals as needed  - Monitor I&O, weight, and lab values if indicated  - Obtain nutrition services referral as needed  Outcome: Progressing     Problem: GENITOURINARY - ADULT  Goal: Maintains or returns to baseline urinary function  Description: INTERVENTIONS:  - Assess urinary function  - Encourage oral fluids to ensure adequate hydration if ordered  - Administer IV fluids as ordered to ensure adequate hydration  - Administer ordered medications as needed  - Offer frequent toileting  - Follow urinary retention protocol if ordered  Outcome: Progressing

## 2023-02-01 NOTE — ASSESSMENT & PLAN NOTE
Presented on 1/12 after a mechanical fall with L shoulder pain  CT LUE: lytic metastasis int he scapular glenoid process measuring 2 9 x 2 4 x 2 0cm with cortical breakthrough and extraosseous extension anteriorly without pathologic fracture  New 2 4 x 2 1 x 1 9cm lytic metastasis in the L scapular body with acute pathologic fracture  New 1 9x0 8x1 4cm lytic metastasis in the inferior scapular angle with pathologic fracture  Non-surgical intervention per Ortho  NWB to LUE  Sling for comfort  Neurovascular checks Q shift  Ensure adequate pain control  Follow-up with Dr Ramona Prince (Orthopedics) in 4 weeks  Primary team following  PT/OT  MRI L shoulder due to burning sensation/chronic ongoing pain on 1/26: metastatic disease of the scapula with pathologic fracture, full thickness partial width tear anterior supraspinatus superimposed upon moderate tendinosis, moderate subscapularis tendinosis, mild infraspinatus tendinosis

## 2023-02-01 NOTE — PROGRESS NOTES
02/01/23 1330   Pain Assessment   Pain Assessment Tool 0-10   Pain Score 7   Pain Location/Orientation Orientation: Left; Location: Shoulder   Pain Onset/Description Onset: Ongoing; Descriptor: Cramping;Descriptor: Dull;Descriptor: Sore   Restrictions/Precautions   Precautions Fall Risk;Pain   Weight Bearing Restrictions Yes   LUE Weight Bearing Per Order NWB   Braces or Orthoses Sling   Cognition   Overall Cognitive Status WFL   Arousal/Participation Alert; Cooperative   Attention Within functional limits   Orientation Level Oriented X4   Memory Within functional limits   Following Commands Follows all commands and directions without difficulty   Subjective   Subjective pt seemed flat today, but denies issues with completing therapy   Sit to Stand   Type of Assistance Needed Independent   Physical Assistance Level No physical assistance   Comment w/ WBQC on R   Sit to Stand CARE Score 6   Bed-Chair Transfer   Type of Assistance Needed Independent   Physical Assistance Level No physical assistance   Chair/Bed-to-Chair Transfer CARE Score 6   Car Transfer   Type of Assistance Needed Independent   Physical Assistance Level No physical assistance   Comment simulated passenger seat transfer with NuStep, pt problem-solved to complete independently   Car Transfer CARE Score 6   Walk 10 Feet   Type of Assistance Needed Independent   Physical Assistance Level No physical assistance   Comment WBQC   Walk 10 Feet CARE Score 6   Walk 50 Feet with Two Turns   Type of Assistance Needed Independent   Physical Assistance Level No physical assistance   Walk 50 Feet with Two Turns CARE Score 6   Walk 150 Feet   Type of Assistance Needed Supervision   Physical Assistance Level No physical assistance   Comment Short standing break around 135 ft given longer distance trial at end of therapy session     Walk 150 Feet CARE Score 4   Ambulation   Primary Mode of Locomotion Prior to Admission Walk   Distance Walked (feet) 175 ft   Assist Device [facility]; Slow Dilma; Inconsistant Dilma; Step to;Decreased R stance   Limitations Noted In Endurance;Strength;Speed   Walk Assist Level Distant Supervision   Findings uses AFO on L LE for ambulation   Does the patient walk? 2  Yes   Wheelchair mobility   Does the patient use a wheelchair? 0  No   Curb or Single Stair   Style negotiated Curb   Comment trailed but not attempted due to pt fear on 8inch curb step without HR and with use of WBQC     Reason if not Attempted Safety concerns   1 Step (Curb) CARE Score 88   4 Steps   Type of Assistance Needed Supervision   Physical Assistance Level No physical assistance   Comment R HR, side/retro descend   4 Steps CARE Score 4   12 Steps   Type of Assistance Needed Supervision   Physical Assistance Level No physical assistance   Comment short rest break around 8 steps, R HR, 3x4 steps, side/retro for descend, nonreciprocal ascend and descend   12 Steps CARE Score 4   Stairs   Type Stairs   # of Steps 12   Weight Bearing Precautions LUE;NWB   Assist Devices Single Rail;Roller Jaquita Riedel   Findings nonreciprocal ascend and descend   Therapeutic Interventions   Strengthening L ankle DF, PF w/ grn tband x20, L ankle eversion x20 w/ manual cues to isolate ankle joint, hip abd w/ blue tband x30, hip add w/ 6in ball x30   Flexibility seated manual L calf stretch w/ overpressure into knee extension 5x20 sec   Balance Side stepping L and R in // bars w/ R UE support and S 10ftx5, retro walking in // bars w/ R UE support and S 10ftx4, walking over canes (2 straight 2 QC on side alternating) in // bars with R UE support and CG 10ftx4 (2 with nonreciprocal gait pattern, 2 with reciprocal gait pattern)   Other endurance is limiting factor for pt while ambulating and completing balance activities   Equipment Use   NuStep L1 x12 5 min, 0 5 miles, seat 13, B LE R UE   Assessment   Treatment Assessment Pt participated in 90 min of skilled PT today with focus on gait and balance activities  Pt completed side stepping and retro walking in // bars to improve balance and tolerated well but required seated rest breaks throughout due to limitations in endurance  Stepping over canes done as 2 trials with nonreciprocal gait pattern followed by 2 trials with reciprocal gait pattern which was challenging to the pt at first, but improved with repetition  Attempted 8 in curb, however pt was unable to complete due to fear of not being able to clear L foot on ascension depsite x2 A present, and efforts problem solve safest method  Pt has curb outside of home, may need A at DC  Reports will try to have family pull close enough to curb  L Ankle DF and eversion strength continues to be a limitation for this pt  Orthotist to come tomorrow to fit for carbon fiber AFO to take home upon DC on Friday  Pt is fatigued by longer ambulation distances and uses slower kirti as a result  Plan to attempt 8 in curb step next session in // bars or with handrail to increase pt confidence in prep for DC home friday  Pt lives alone however will have on/off family support, son coming tomorrow for family training  Plan remains for Dc home friday with in home out pt PT to follow  [facility] to [facility], which has not arrived yet, per pt  Barriers to Discharge Decreased caregiver support; Inaccessible home environment   PT Barriers   Physical Impairment Decreased strength;Decreased endurance; Impaired balance   Functional Limitation Stair negotiation   Comment unable to complete 8 in curb step   Plan   Progress Improving as expected   Recommendation   PT Discharge Recommendation Home with outpatient rehabilitation  (in home)   PT Therapy Minutes   PT Time In 1330   PT Time Out 1500   PT Total Time (minutes) 90   PT Mode of treatment - Individual (minutes) 90   PT Mode of treatment - Concurrent (minutes) 0   PT Mode of treatment - Group (minutes) 0   PT Mode of treatment - Co-treat (minutes) 0   PT Mode of Treatment - Total time(minutes) 90 minutes   PT Cumulative Minutes 1050   Therapy Time missed   Time missed?  No

## 2023-02-01 NOTE — ASSESSMENT & PLAN NOTE
CTA PE study on 1/17: Enlargement of R lower lobe metastases  Developed hypoxia in acute setting - receiving diuretics with improvement  Currently maintaining on RA  Monitor spot pulse ox  Continue Lasix 40mg daily  Given additional 20mg on 1/31  Encourage pulmonary toileting and IS use  Follows with Dr Kane Alex as outpatient

## 2023-02-01 NOTE — ASSESSMENT & PLAN NOTE
Hx of SBRT to T6, T10, and T4 between [id number]  Encourage adequate pain control  Follows with Dr Reuben Johnson (Oncology) as outpatient

## 2023-02-01 NOTE — PROGRESS NOTES
[facility]  Progress Note - Ronda Burnett 1950, 67 y o  male MRN: [id number]  Unit/Bed#: Wil [facility] 250-94 Encounter: [id number]  Primary Care Provider: Renetta Lloyd MD   Date and time admitted to hospital: 1/19/2023  3:45 PM    Vitamin B12 deficiency  Assessment & Plan  Vitamin B12 206 on 1/30  Started on cyanocobalamin 1000mcg daily  Follow-up with PCP as outpatient  Foot pain, right  Assessment & Plan  Complaints of R metatarsal pain on 1/29  XR obtained which showed midfoot mild degenerative changes and no acute osseous abnormality  Uric acid WNL  Likely arthritis flare-up  Started on PRN Voltaren gel  Considerations with PT/OT  Cardiovascular risk factor  Assessment & Plan  ASCVD risk 25 4%  Started on ASA 81mg daily  Lipid panel on 1/30: Cholesterol 158, Triglycerides 149, HDL 38, and LDL 90  Started on Lipitor 20mg daily (reports hx of myalgias with simvastatin)  Foraminal stenosis of cervical region  Assessment & Plan  MRI on 1/26 showed severe left C3-C4 foraminal stenosis and moderate central stenosis of C4-C5  Ensure adequate pain control  Continue PT/OT  Nocturnal hypoxemia  Assessment & Plan  States that he was told he had sleep apnea in the past during hospitalizations but never had formal work-up  Has been requiring O2 at night  Overnight noc ox performed on 1/24 and showed desat <89% for 8 hours and 12 minutes, as low as 74%  Apply 2L O2 at HS  Repeat study prior to discharge for DME evaluation  Would benefit from Sleep Medicine as outpatient  Neuropathy  Assessment & Plan  Multifactorial  Continue home gabapentin 300mg TID  HTN (hypertension)  Assessment & Plan  Home regimen: amlodipine 5mg daily and lisinopril/HCTZ 20-12 5mg BID  Currently receiving Lasix 40mg daily  Lisinopril and HCTZ held after receiving CTA on 1/17  Amlodipine discontinued on 1/23  Restart as able    Monitor BP with routine VS     Pleural effusion  Assessment & Plan  Likely secondary to metastatic disease  CTA PE study on 1/17: New moderate L pleural effusion with moderate compressive atelectasis of left lower lobe  Currently maintaining on RA  Monitor spot pulse ox  Encourage pulmonary toileting  Continue Lasix 40mg daily  Given additional 20mg Lasix on 1/31  Chronic diastolic (congestive) heart failure (HCC)  Assessment & Plan  Wt Readings from Last 3 Encounters:   02/01/23 107 kg (236 lb 3 2 oz)   01/19/23 110 kg (243 lb 6 2 oz)   12/30/22 121 kg (265 lb 10 5 oz)     ECHO on 1/16 showed EF 65%, no RWMA, G1DD  Treated with Lasix in acute setting for volume overload  BNP 4475 on admission  Continue Lasix 40mg daily  Monitor daily weights and I&Os  Continue 1500FR  Chronic diarrhea  Assessment & Plan  Continue home PRN Imodium  Monitor skin for breakdown  Stage 3a chronic kidney disease [facility])  Assessment & Plan  Lab Results   Component Value Date    EGFR 82 01/30/2023    EGFR 77 01/26/2023    EGFR 74 01/23/2023    CREATININE 0 92 01/30/2023    CREATININE 0 97 01/26/2023    CREATININE 1 00 01/23/2023     Creatinine currently 0 92  Baseline creatinine 1 0-1 3  Hx of L nephrectomy  Developed ARIANNA in acute setting due to autoregulatory failure/volume overload/congestion  Currently on Lasix 40mg daily  Avoid nephrotoxins as able  Encourage adequate hydration  Continue to trend BMP  Metastatic renal cell carcinoma ([facility] [address] )  Assessment & Plan  Diagnosed in 2007 with metastatic disease in 2013  Hx of L nephrectomy in 2007 and prostatectomy  Progressive disease  Newly started on Nivolumab in 12/022  Follows with Dr Yvette Vargas (Oncology) as outpatient - follow-up scheduled for February  Spine metastasis ([facility] [address] )  Assessment & Plan  Hx of SBRT to T6, T10, and T4 between 1257-3035  Encourage adequate pain control  Follows with Dr Yvette Vargas (Oncology) as outpatient      Lung metastases [facility])  Assessment & Plan  CTA PE study on 1/17: Enlargement of R lower lobe metastases  Developed hypoxia in acute setting - receiving diuretics with improvement  Currently maintaining on RA  Monitor spot pulse ox  Continue Lasix 40mg daily  Given additional 20mg on 1/31  Encourage pulmonary toileting and IS use  Follows with Dr Jessica Khan as outpatient  Bone metastases [facility])  Assessment & Plan  Diagnosed with metastatic renal cell carcinoma in 2007  Progressive disease  Bone scan in 2020 showed lytic lesions to L scapula, R posterior and lateral ribs, L posterior iliac bone, and R iliac wing  Most recently started on Nivolumab in 12/2022  Ensure adequate pain control  Continue home Vitamin D supplementation  Follows with Dr Jessica Khan (Oncology) as outpatient  * 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  Assessment & Plan  Presented on 1/12 after a mechanical fall with L shoulder pain  CT LUE: lytic metastasis int he scapular glenoid process measuring 2 9 x 2 4 x 2 0cm with cortical breakthrough and extraosseous extension anteriorly without pathologic fracture  New 2 4 x 2 1 x 1 9cm lytic metastasis in the L scapular body with acute pathologic fracture  New 1 9x0 8x1 4cm lytic metastasis in the inferior scapular angle with pathologic fracture  Non-surgical intervention per Ortho  NWB to LUE  Sling for comfort  Neurovascular checks Q shift  Ensure adequate pain control  Follow-up with Dr Arturo Bernal (Orthopedics) in 4 weeks  Primary team following  PT/OT  MRI L shoulder due to burning sensation/chronic ongoing pain on 1/26: metastatic disease of the scapula with pathologic fracture, full thickness partial width tear anterior supraspinatus superimposed upon moderate tendinosis, moderate subscapularis tendinosis, mild infraspinatus tendinosis  VTE Pharmacologic Prophylaxis:   Pharmacologic: Heparin  Mechanical VTE Prophylaxis in Place: Yes - sequential compression devices      Current Length of Stay: 13 day(s)    Current Patient Status: Inpatient Rehab     Discharge Plan: As per primary team     Code Status: Level 1 - Full Code    Subjective:   Pt examined while pt sitting in recliner in pt room  Complaints of minimal lower back pain, chronic, improves with rest   Denies any other complaints of pain  Denies any SOB besides on exertion and feels that this is unchanged  Noted to have saturations between 88-90% despite extra dose of Lasix yesterday evening  Will obtain CXR to r/o worsening effusion  Has been using IS more frequently but encouraged again to use 10x an hour  Doing well with therapy and has in-room privileges  Had a loose BM yesterday but this is chronic for him  Has no other concerns or complaints at this time  Objective:     Vitals:   Temp (24hrs), Av 1 °F (36 7 °C), Min:97 8 °F (36 6 °C), Max:98 3 °F (36 8 °C)    Temp:  [97 8 °F (36 6 °C)-98 3 °F (36 8 °C)] 98 3 °F (36 8 °C)  HR:  [] 99  Resp:  [18-20] 20  BP: (120-129)/(60-62) 124/61  SpO2:  [88 %-91 %] 90 %  Body mass index is 33 89 kg/m²  Review of Systems   Constitutional: Negative for appetite change, chills, fatigue and fever  HENT: Negative for trouble swallowing  Eyes: Negative for visual disturbance  Respiratory: Positive for shortness of breath (dyspnea on exertion, unchanged, denies dyspnea at rest)  Negative for cough, chest tightness, wheezing and stridor  Cardiovascular: Negative for chest pain, palpitations and leg swelling  Gastrointestinal: Negative for abdominal distention, abdominal pain, constipation, diarrhea, nausea and vomiting  LBM , loose, chronic   Genitourinary: Negative for difficulty urinating  Musculoskeletal: Positive for back pain (lower back pain, mild, aching, improves with rest)  Negative for arthralgias  Neurological: Negative for dizziness, weakness, light-headedness, numbness and headaches     Psychiatric/Behavioral: Negative for hallucinations and sleep disturbance  The patient is not nervous/anxious  All other systems reviewed and are negative  Input and Output Summary (last 24 hours): Intake/Output Summary (Last 24 hours) at 2/1/2023 0856  Last data filed at 2/1/2023 0354  Gross per 24 hour   Intake 300 ml   Output 895 ml   Net -595 ml       Physical Exam:     Physical Exam  Vitals and nursing note reviewed  Constitutional:       General: He is not in acute distress  Appearance: Normal appearance  He is obese  He is not ill-appearing  HENT:      Head: Normocephalic and atraumatic  Cardiovascular:      Rate and Rhythm: Normal rate and regular rhythm  Pulses: Normal pulses  Heart sounds: Normal heart sounds  No murmur heard  No friction rub  Pulmonary:      Effort: Pulmonary effort is normal  No respiratory distress  Breath sounds: Examination of the right-lower field reveals rales  Examination of the left-lower field reveals rales  Rales present  No wheezing or rhonchi  Abdominal:      General: Abdomen is flat  Bowel sounds are normal  There is no distension  Palpations: Abdomen is soft  There is no mass  Tenderness: There is no abdominal tenderness  There is no guarding or rebound  Hernia: No hernia is present  Musculoskeletal:      Cervical back: Normal range of motion and neck supple  No tenderness  Right lower leg: Edema (lymphedema ) present  Left lower leg: Edema (lymphedema) present  Comments: LUE in sling  Skin:     General: Skin is warm and dry  Capillary Refill: Capillary refill takes less than 2 seconds  Neurological:      Mental Status: He is alert and oriented to person, place, and time     Psychiatric:         Mood and Affect: Mood normal          Behavior: Behavior normal          Additional Data:     Labs:    Results from last 7 days   Lab Units 01/30/23  0603   WBC Thousand/uL 5 80   HEMOGLOBIN g/dL 11 2*   HEMATOCRIT % 38 7   PLATELETS Thousands/uL 249 NEUTROS PCT % 68   LYMPHS PCT % 23   MONOS PCT % 7   EOS PCT % 2     Results from last 7 days   Lab Units 01/30/23  0514   SODIUM mmol/L 138   POTASSIUM mmol/L 4 1   CHLORIDE mmol/L 100   CO2 mmol/L 34*   BUN mg/dL 19   CREATININE mg/dL 0 92   ANION GAP mmol/L 4*   CALCIUM mg/dL 9 2   ALBUMIN g/dL 3 0*   TOTAL BILIRUBIN mg/dL 0 50   ALK PHOS U/L 72   ALT U/L 17   AST U/L 33   GLUCOSE RANDOM mg/dL 87             Results from last 7 days   Lab Units 01/30/23  0514   HEMOGLOBIN A1C % 5 3           Labs reviewed    Imaging:    Imaging reviewed    Recent Cultures (last 7 days):           Last 24 Hours Medication List:   Current Facility-Administered Medications   Medication Dose Route Frequency Provider Last Rate   • acetaminophen  975 mg Oral 4x Daily PRN Pascal Kawasaki, MD     • aluminum-magnesium hydroxide-simethicone  30 mL Oral Q6H PRN Pascal Kawasaki, MD     • aspirin  81 mg Oral Daily IRENE Gerard     • atorvastatin  20 mg Oral Daily With [address]     • cholecalciferol  1,000 Units Oral Daily Pascal Kawasaki, MD     • cyanocobalamin  1,000 mcg Oral Daily IRENE Gerard     • Diclofenac Sodium  2 g Topical TID Pascal Kawasaki, MD     • diphenhydrAMINE  25 mg Oral HS PRN Pascal Kawasaki, MD     • furosemide  40 mg Oral Daily IRENE Gerard     • gabapentin  300 mg Oral TID Pascal Kawasaki, MD     • heparin (porcine)  5,000 Units [facility]cutaneous [facility] Pascal Kawasaki, MD     • lidocaine  2 patch Topical Daily Pascal Kawasaki, MD     • loperamide  2 mg Oral 4x Daily PRN Pascal Kawasaki, MD     • multivitamin stress formula  1 tablet Oral Daily Pascal Kawasaki, MD     • nystatin   Topical BID Pascal Kawasaki, MD     • ondansetron  4 mg Oral Q6H PRN Pascal Kawasaki, MD     • oxyCODONE  5 mg Oral 4x Daily PRN Carina Brown MD     • oxyCODONE  2 5 mg Oral 4x Daily PRN Carina Brown MD     • oxyCODONE  7 5 mg Oral Q4H PRN Carina Brown MD     • simethicone  80 mg Oral 4x Daily PRN Jose Alcantara MD     • white petrolatum-mineral oil   Topical BID IRENE Umaña          M*Modal software was used to dictate this note  It may contain errors with dictating incorrect words or incorrect spelling  Please contact the provider directly with any questions

## 2023-02-01 NOTE — ASSESSMENT & PLAN NOTE
Wt Readings from Last 3 Encounters:   02/01/23 107 kg (236 lb 3 2 oz)   01/19/23 110 kg (243 lb 6 2 oz)   12/30/22 121 kg (265 lb 10 5 oz)     ECHO on 1/16 showed EF 65%, no RWMA, G1DD  Treated with Lasix in acute setting for volume overload  BNP 4475 on admission  Continue Lasix 40mg daily  Monitor daily weights and I&Os  Continue 1500FR

## 2023-02-01 NOTE — PROGRESS NOTES
Physical Medicine and Rehabilitation Progress Note  Sunny Gottlieb 67 y o  male MRN: [id number]  Unit/Bed#: [facility] 007-75 Encounter: [id number]    To Review: Sunny Gottlieb is a 67 y o  male with medical history of localized RCC in 2007 with mets in 2013 , on systemic therapy s/p L nephrectomy and prostatectomy, chronic diarrhea, CKD (Crea 1-1 3) who presented to the [address] [place], [place] [address] on 1/12 with L shoulder pain after a fall with arm abducted  XRs of left shoulder, humerus, scapula, and clavicle showed lytic metastatic tumor mass within the scapular neck and glenoid with no acute fractures or dislocations  CT was ordered and he was made NWB by Ortho  CT showed large pathologic scapula fracture with minimal displacement and a large lytic lesion in glenoid vault  Ortho discussed with Dorota Hammer - Dr Itzel Matthew who recommended sling for comfort and non-surgical management  They will follow-up outpatient  On admission also noted to have ARIANNA and Nephro was consulted who felt likely 2/2 autoregulatory failure  They started him on Lasix and help his Lisinopril and HCTZ  Echo was ordered which showed normal EF with G2DD  ARIANNA resolve with diuresis  Lasix 40mg oral daily  Ira Marcelo He also was newly requiring O2 on admission - but that improved diuresis  Given his cancer, D-dimer elevated, NM study was ordered which was indeterminant  LE dopplers were negative  CTA PE was ordered once renal function improved - this was negative  Heparin gtt discontinued It did however show enlargement of his RLL mets and bone mets concerning for progression  He will follow-up with his Oncologist as an outpatient to discuss resuming therapy  Admitted to the [facility] on 1/19  Chief Complaint: Incontinent BM today  Interval History/Subjective:  No acute events overnight  Had trouble sleeping due to frequent urination after increased lasix yesterday   He does not feel short of breath at rest  But it tends to happen during therapy  He feels like his breathing is ok  Overall feeling better and more comfortable today  R foot pain stabletolerable  No new numbness/tingling/weakness  Last BM 12/1 and incontinent today - could tell it was coming and couldn't get assistance to the bathroom in time  States this happens to him on occasion at home and he manages his own hygiene and takes a shower  ROS:  A 10 point review of systems was negative except for what is noted in the HPI  Today's Changes:  1  Had extra dose of Lasix yesterday  O2 has been low 90s at times  Only requiring NC at night    - IM would like to check a CXR to monitor his pleural effusion  2  Monitor O2 sats with therapies today  3  Noc Ox tonight in anticipation of Friday discharge  4  BMP AM to monitor after extra dose Lasix  Total visit time: 25 minutes, with more than 50% spent counseling/coordinating care  Counseling includes discussion with patient re: progress in therapies, functional issues observed by therapy staff, and discussion with patient regarding their current medical state and wellbeing  Coordination of patient's care was performed in conjunction with Internal Medicine service to monitor patient's labs, vitals, and management of their comorbidities  Assessment/Plan:    * 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  Assessment & Plan  Large pathologic scapular fracture with minimal displacement  Large lytic lesion in glenoid vault  Management non-operatively  Sling for comfort  NWB  Ok for ROM in elbow, wrist, hand  Pendulums daily for now  Pain control as below  Outpatient f/u with Cathalene Stains Dr Giovanny Ranlde in 4 weeks from injury (next week)  - Dr Giovanny Randle aware of discharge date and will get him scheduled  1/25 - acute change in sensation in supraclavicular distribution  Unable to check strength       1/26 MRI  Shoulder:   ROTATOR CUFF: Full-thickness partial width tear anterior supraspinatus insertion measuring 1 1 cm AP by 0 7 cm medial lateral superimposed upon moderate tendinosis  Moderate subscapularis insertional tendinosis with low-grade articular sided fraying  Mild infraspinatus insertional tendinosis  Edema within the teres minor muscle, likely related to denervation from mass effect of tumor      SUBACROMIAL/SUBDELTOID BURSA: There is mild subacromial/subdeltoid bursitis       LONG HEAD OF BICEPS TENDON: Mild tendinosis and tenosynovitis      GLENOID LABRUM: Complex tearing anterior inferior labrum      GLENOHUMERAL JOINT: Mild osteoarthritis     ACROMIOCLAVICULAR JOINT:  There is moderate osteoarthritis      BONES: As seen on multiple prior examinations, there is pathologic fracture of the scapular body   Multiple lesions again noted arising from the scapular body and glenoid, consistent with metastatic disease  The largest component of the tumor with   extraosseous extension measures approximately 5 9 x 4 7 x 5 7 cm (series 901, image 13)  This is located along the mid to inferior scapula  A 2nd component of tumor extends posteriorly along the superior aspect of the scapula measuring approximately   3 2 x 2 5 x 3 7 cm (series 401, image 15)  These tumors result in mass effect on the subscapularis, teres minor, and infraspinatus  Low serum vitamin B12  Assessment & Plan  1/30 Low normal 206  Started on supplementation  Foot pain, right  Assessment & Plan  Began 1/29  R metatarsal   No erythema, but some swelling  Worse with weight bearing  Feels like a "bruised bone"  1/30 XR shows midfoot mild degenerative changes and no acute osseous abnormality  Denies history of gout/pseudogout  Uric Acid normal  Suspect arthritis   - Continue WBAT for now   - Diclofenac gel     Cardiovascular risk factor  Assessment & Plan  Started on ASA by IM  Lipid Panel ordered 1/30   - He reports tolerating Atorvastatin, but getting proximal leg pain/burning once transitioned to simvastatin     - Symptoms stopped after stopping simvastatin  IM starting low dose Lipitor and monitoring  Outpatient f/u with PCP  Foraminal stenosis of cervical region  Assessment & Plan  Possibly symptomatic left C3 radiculopathy with decreased sensation and pain at C3-4 distribution  MRI showed severe L C3-4 foraminal stenosis  Moderate central stenosis at C4-5  Outpatient f/u with spine  Nocturnal hypoxemia  Assessment & Plan  1/24 Noc ox showed significant desats  Will add night time oxygen  Check noc ox 48 hours prior to discharge   Referral to sleep medicine at discharge  Neuropathy  Assessment & Plan  Continue gabapentin 300mg Q8hr  - TSH WNL more recently  - No recent A1C - 1/30 5 3    - No B12 checked previously - 1/30 206 - started on oral supplement  He also has cancer - could be paraneoplastic in etiology  - ordered Anti-hu Ab  Pending  Depending on what chemotherapy he has had this could also be a contributing factor  In the future, can consider an EMG to more likely describe the type of neuropathy he has to help narrow differential  For now compensatory strategies and control with medication for his discomfort  HTN (hypertension)  Assessment & Plan  Home: Amlodipine 5mg daily Lasix 40mg daily, was on Lisinopril/HCTZ 20-12 5mg daily  Here: Same without Lisinopril/HCTZ, and now amlodipine at night discontinued for hypotension   Monitor and adjust as appropriate  IM following and assisting with management  Pleural effusion  Assessment & Plan  Likely secondary to metastatic disease  CTA PE study on 1/17: New moderate L pleural effusion with moderate compressive atelectasis of left lower lobe  Currently maintaining on RA  Monitor spot pulse ox  Encourage pulmonary toileting, incentive spirometry  Continue Lasix 40mg daily  - 1/31 required extra dose  - 2/1 IM checking CXR prior to discharge        Dermatitis associated with moisture  Assessment & Plan  Soap, water to buttocks TID and PRN followed by barrier cream   Nystatin powder R groin    Chronic diastolic (congestive) heart failure (HCC)  Assessment & Plan  Wt Readings from Last 3 Encounters:   02/01/23 107 kg (236 lb 3 2 oz)   01/19/23 110 kg (243 lb 6 2 oz)   12/30/22 121 kg (265 lb 10 5 oz)     Seen on Echo during this hospitalization  S/P IV diuresis for exacerbation  Now back on room air   1/31 with increased LAYNE and ? 88% sat in therapy  Crackles at base   - IM gave extra Lasix with improvement   - CXR ordered 2/1 to eval prior to discharge  Closely monitor I/O, daily weights, volume status  Cardiac diet with fluid restriction  Continue: Lasix 40mg daily   - Previously on ACE, may be able to restart   - Was not on BB  Outpatient f/u with PCP  Chronic diarrhea  Assessment & Plan  Chronically on Lomotil  Monitor  Close continence care  Stage 3a chronic kidney disease [facility])  Assessment & Plan  Lab Results   Component Value Date    EGFR 82 01/30/2023    EGFR 77 01/26/2023    EGFR 74 01/23/2023    CREATININE 0 92 01/30/2023    CREATININE 0 97 01/26/2023    CREATININE 1 00 01/23/2023     Stage 2-3A  Crea baseline 1-1 3  Improved recently after ARIANNA 2/2 autoregulatory issues/congestion/volume overload  Now on Lasix daily monitor  Was on Lisinopril and HCTZ at home, currently being held  - Nephro says these can be restarted as necessary  Monitoring BP  Avoid nephrotoxic meds  Outpatient f/u     Metastatic renal cell carcinoma [facility])  Assessment & Plan  Originally 2007  Mets in 2013  On systemic therapy  S/p L nephrectomy and prostatectomy  Will need outpatient f/u with Oncology     Spine metastasis [facility])  Assessment & Plan  In most recent CT CAP in 2022 showed lytic/sclerotic lesions T4, T6, and scattered lesions in lumbar spine   NM bone scan in 2020 showed:  Scattered exophytic foci of radiotracer uptake in the spine corresponding to degenerative changes on CT  Ct C-Spine in 6/2022 showed no lesions    He has pelvic/iliac lytic lesions  Monitor neuro status and for worsening back pain  Pain management as below  Lung metastases ([address] )  Assessment & Plan  With recent AHRF in setting of volume overload and acute diastolic heart failure and ARIANNA  Now on room air  Has pleural effusion in addition  Closely monitor respiratory status  Pulmonary toilet  Outpatient f/u with Oncology    Bone metastases [facility])  Assessment & Plan  Monitor performance in therapy  If he develops new pain, particular in long bones, would image    - Since his fall has had new R hip pain primarily with weight bearing   - 2022 CT CAP showed lytic lesion superior to the right hip are identified as well as the posterior column of the acetabulum  - XR of R hip without significant changes  Better visualized on CT   2020 Bone Scan showed lytic lesions:   left lateral scapula inferior to the glenoid     right posterior and lateral ribs corresponding to expansile lytic lesions on CT  (recent Ct with expansive R 7th rib met)   left posterior iliac bone and right iliac wing corresponding to lytic lesions on CT    r nonspecific tibial finding    Outpatient f/u with Dr Irena Aburto with Ortho onc  Health Maintenance  #Delirium/Sleep: At risk  Environmental interventions  Optimize pain, bowel, bladder, sleep-wake cycle management  #Pain: Tylenol PRN, Gabapentin 300mg TID, Oxycodone 5-7 5mg PRN  #Bowel: Last BM 2/1  Chronic diarrhea with occasional incontinence, see above  #Bladder: Voiding and continent  #Skin/Pressure Injury Prevention: Turn Q2hr in bed, with weight shifts [id number] in wheelchair  Float heels in bed  #DVT Prophylaxis: HSQ,SCDs  #GI Prophylaxis: Not indicated  #Code Status:  Full Code  #FEN: Cardiac diet with 1500cc fluid restriction  #Dispo:  Team 1/26: ADD 2/3 with Jia Leo PT/OT  Working on [facility] for him with his family given his restrictions in that arm    - Outpatient f/u with Ortho 1 weeks after discharge    - Outpatient f/u with Oncology  - Referral to Pain/Spine team if symptoms still persistent    - Outpatient f/u with PCP  - Scripts to be sent to the [place]  Objective:    Functional Update:  PT: CGA transfers, min-modA bed mobility, CGA ambulation 150', Barry RHR 8 stairs  OT:  Setup eating, Sup grooming, Barry bathing, Sup UB dressing, Barry LB dressing, Barry toileting, Barry tub/shower transfer, CGA toilet transfers    Allergies per EMR    Physical Exam:  Temp:  [97 8 °F (36 6 °C)-98 3 °F (36 8 °C)] 98 3 °F (36 8 °C)  HR:  [] 99  Resp:  [18-20] 20  BP: (120-129)/(59-62) 124/61  Oxygen Therapy  SpO2: 90 %  O2 Flow Rate (L/min): 2 L/min    Gen: No acute distress, Well-nourished, well-appearing  HEENT: Moist mucus membranes, Normocephalic/Atraumatic  Cardiovascular: Regular rate, rhythm, S1/S2  Distal pulses palpable  Heme/Extr: Stable BL LE edema  Pulmonary: Non-labored breathing  Lungs with mild crackles at bases  Good air entry  : No williamson  GI: Soft, non-tender, non-distended  BS+  Integumentary: Skin is warm, dry  Neuro: AAOx3, Speech is intact  Appropriate to questioning  Psych: Normal mood and affect  Diagnostic Studies: Reviewed, no new imaging       Laboratory:  Reviewed  Results from last 7 days   Lab Units 01/30/23  0603 01/26/23  0458   HEMOGLOBIN g/dL 11 2* 11 1*   HEMATOCRIT % 38 7 37 1   WBC Thousand/uL 5 80 6 03     Results from last 7 days   Lab Units 01/30/23  0514 01/26/23  0458   BUN mg/dL 19 18   POTASSIUM mmol/L 4 1 4 1   CHLORIDE mmol/L 100 101   CREATININE mg/dL 0 92 0 97   AST U/L 33  --    ALT U/L 17  --             Patient Active Problem List   Diagnosis   • Clear cell adenocarcinoma of kidney, left (HCC)   • Bone metastases (HCC)   • Lung metastases (HCC)   • Sp[facility] )   • Metastatic renal cell carcinoma (HCC)   • Hx of prostatectomy   • History of prostate cancer   • Azotemia   • COVID-19   • Stage 3a chronic kidney disease ([facility] [address] [address] )   • H/O left nephrectomy   • Fall at home, initial encounter   • 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   • Acute respiratory failure with hypoxia (HCC)   • Elevated brain natriuretic peptide (BNP) level   • Elevated d-dimer   • Chronic diarrhea   • Chronic diastolic (congestive) heart failure (HCC)   • Dermatitis associated with moisture   • Pleural effusion   • HTN (hypertension)   • Neuropathy   • Nocturnal hypoxemia   • Foraminal stenosis of cervical region   • Cardiovascular risk factor   • Foot pain, right   • Vitamin B12 deficiency         Medications  Current Facility-Administered Medications   Medication Dose Route Frequency Provider Last Rate   • acetaminophen  975 mg Oral 4x Daily PRN Boogie Jolly MD     • aluminum-magnesium hydroxide-simethicone  30 mL Oral Q6H PRN Boogie Jolly MD     • aspirin  81 mg Oral Daily IRENE Okeefe     • atorvastatin  20 mg Oral Daily With [address]     • cholecalciferol  1,000 Units Oral Daily Boogie Jolly MD     • cyanocobalamin  1,000 mcg Oral Daily IRENE Okeefe     • Diclofenac Sodium  2 g Topical TID Boogie Jolly MD     • diphenhydrAMINE  25 mg Oral HS PRN Boogie Jolly MD     • furosemide  40 mg Oral Daily IRENE Okeefe     • gabapentin  300 mg Oral TID Boogie Jolly MD     • heparin (porcine)  5,000 Units Subcutaneous [facility] Boogie Jolly MD     • lidocaine  2 patch Topical Daily Boogie Jolly MD     • loperamide  2 mg Oral 4x Daily PRN Boogie Jolly MD     • multivitamin stress formula  1 tablet Oral Daily Boogie Jolly MD     • nystatin   Topical BID Boogie Jolly MD     • ondansetron  4 mg Oral Q6H PRN Boogie Jolly MD     • oxyCODONE  5 mg Oral 4x Daily PRN Hanna Corral MD     • oxyCODONE  2 5 mg Oral 4x Daily PRN Hanna Corral MD     • oxyCODONE  7 5 mg Oral Q4H PRN Hanna Corral MD     • simethicone  80 mg Oral 4x Daily PRN Boogie Jolly MD     • white petrolatum-mineral oil   Topical BID IRENE Okeefe            ** Please Note: Fluency Direct voice to text software may have been used in the creation of this document   **

## 2023-02-01 NOTE — ASSESSMENT & PLAN NOTE
Diagnosed with metastatic renal cell carcinoma in 2007  Progressive disease  Bone scan in 2020 showed lytic lesions to L scapula, R posterior and lateral ribs, L posterior iliac bone, and R iliac wing  Most recently started on Nivolumab in 12/2022  Ensure adequate pain control  Continue home Vitamin D supplementation  Follows with Dr Linder Boast (Oncology) as outpatient

## 2023-02-01 NOTE — ASSESSMENT & PLAN NOTE
Diagnosed in 2007 with metastatic disease in 2013  Hx of L nephrectomy in 2007 and prostatectomy  Progressive disease  Newly started on Nivolumab in 12/022  Follows with Dr Mckenna Drake (Oncology) as outpatient - follow-up scheduled for February

## 2023-02-01 NOTE — PROGRESS NOTES
02/01/23 1230   Pain Assessment   Pain Assessment Tool 0-10   Pain Score 5   Pain Location/Orientation Orientation: Left; Location: Shoulder   Pain Radiating Towards none   Pain Onset/Description Onset: Ongoing   Effect of Pain on Daily Activities inc when moving   Patient's Stated Pain Goal No pain   Hospital Pain Intervention(s) Rest   Multiple Pain Sites No   Restrictions/Precautions   Precautions Fall Risk;Pain   Weight Bearing Restrictions Yes   LUE Weight Bearing Per Order NWB  (no AROm at shoulder)   Braces or Orthoses Sling   Sit to Stand   Type of Assistance Needed Independent; Adaptive equipment   Comment Miguelito with [facility]   Sit to Stand CARE Score 6   Bed-Chair Transfer   Type of Assistance Needed Independent; Adaptive equipment   Comment Miguelito with [facility] [place]   Chair/Bed-to-Chair Transfer CARE Score 6   Toileting Hygiene   Type of Assistance Needed Independent; Adaptive equipment   Comment pt reports he took self to bathroom prior to therapist arrival   Everardo Bennett [address] Score 6   Toilet Transfer   Type of Assistance Needed Independent; Adaptive equipment   Comment pt reports he took self to bathroom prior to therapist arrival   Toilet Transfer CARE Score 6   Therapeutic Excerise-Strength   UE Strength Yes   Right Upper Extremity- Strength   R Shoulder Flexion; Horizontal ABduction; Extension   R Elbow Elbow flexion;Elbow extension   R Position Seated   Equipment Theraband  (red)   R Weight/Reps/Sets 2x15   RUE Strength Comment therapist reviewed RUE HEP  Pt able to complete 2x15 with rest break  Provided with simple instructions to inc carryover  Left Upper Extremity-Strength   LUE Strength Comment NA NWB   Cognition   Overall Cognitive Status WFL   Arousal/Participation Alert; Cooperative   Attention Within functional limits   Orientation Level Oriented X4   Memory Within functional limits   Following Commands Follows all commands and directions without difficulty   Activity Tolerance   Activity Tolerance Patient tolerated treatment well   Assessment   Treatment Assessment Engaged pt in brief 30mins of skilled OT services with focus on reviewing UE HEP  Pt toelrated overall well  Cont to provide educ on LUE NWb as well as no AROM at shoulder  Pt verbalized understanding  However, at times cont to req vc for restrictions during ADL tasks  Cont OT POC with focus on DC ADL and FT on 2/2  DC with home OT services  Prognosis Fair   Problem List Decreased strength;Decreased range of motion;Decreased endurance; Impaired balance;Decreased mobility; Decreased coordination;Decreased safety awareness;Pain;Orthopedic restrictions   Barriers to Discharge Decreased caregiver support; Inaccessible home environment   Barriers to Discharge Comments will discuss during FT   Plan   Treatment/Interventions ADL retraining;Patient/family training   Progress Progressing toward goals   Recommendation   OT Discharge Recommendation Home with home health rehabilitation   Equipment Recommended Bedside commode;Reacher ($)  (toilet wand)   Commode Type Standard   OT Equipment ordered pt has all DME   OT Therapy Minutes   OT Time In 1230   OT Time Out 1300   OT Total Time (minutes) 30   OT Mode of treatment - Individual (minutes) 30   OT Mode of treatment - Concurrent (minutes) 0   OT Mode of treatment - Group (minutes) 0   OT Mode of treatment - Co-treat (minutes) 0   OT Mode of Treatment - Total time(minutes) 30 minutes   OT Cumulative Minutes 960   Therapy Time missed   Time missed?  No

## 2023-02-01 NOTE — PROGRESS NOTES
02/01/23 0830   Pain Assessment   Pain Assessment Tool 0-10   Pain Score 5   Pain Location/Orientation Orientation: Left; Location: Shoulder   Pain Radiating Towards none   Pain Onset/Description Onset: Ongoing   Patient's Stated Pain Goal No pain   Hospital Pain Intervention(s) Rest   Multiple Pain Sites No   Restrictions/Precautions   Precautions Fall Risk;Pain   Weight Bearing Restrictions Yes   LUE Weight Bearing Per Order NWB   Braces or Orthoses Sling   Putting On/Taking Off Footwear   Type of Assistance Needed Physical assistance   Physical Assistance Level 26%-50%   Comment TA for TEDS  Improved use of [place] [address] to thread R sneaker, G fx reach for velcro  Cont ot Req A for L AAFO at least placing in shoe  Pt able to slide and manage velcro   Putting On/Taking Off Footwear CARE Score 3   Sit to Stand   Type of Assistance Needed Independent; Adaptive equipment   Comment [facility]ito with [facility]   Sit to Stand CARE Score 6   Bed-Chair Transfer   Type of Assistance Needed Independent; Adaptive equipment   Comment [facility]ito with [facility]   Chair/Bed-to-Chair Transfer CARE Score 6   Toileting Hygiene   Type of Assistance Needed Independent; Adaptive equipment   Comment [facility] with [facility]  Therapist reviewed use of toilet wand to inc indep with buttock hygiene  Pt simulated technique and able to complete and Miguelito for CM   Toileting Hygiene CARE Score 6   Toilet Transfer   Type of Assistance Needed Independent; Adaptive equipment   Comment [facility] with [facility]   Toilet Transfer CARE Score 6   Cognition   Overall Cognitive Status WFL   Arousal/Participation Alert; Cooperative   Attention Within functional limits   Orientation Level Oriented X4   Memory Within functional limits   Following Commands Follows all commands and directions without difficulty   Activity Tolerance   Activity Tolerance Patient tolerated treatment well   Assessment   Treatment Assessment Engaged pt in 60mins of skilled OT services with foucs on progressing to IRP, [facility] and discussed pice mealing family assistance at DC  Pt able to perform mobility at [facility] AND [facility] LOB observed  Pt with G understanding of toilet dayana  Pt reporting plan is for him to sleep upstairs and have grandson come in AM to assist with steps, and then son assist with steps in the evening  Educ on use of urinal at night ot ensure safety and not have him walk to BR which pt verbalized udnerstanding  In addition, therapist educ pt on use of life alert as pt will be home alone to ensure safety  Pt reports he has a medical alert system at home, therapist highly recommend he wear at home  Cont OT POC with focus on reviewing UE HEP, DC ADL and FT to ensure safe DC home  Prognosis Fair   Problem List Decreased strength;Decreased range of motion;Decreased endurance; Impaired balance;Decreased mobility; Decreased coordination;Decreased safety awareness;Pain;Orthopedic restrictions   Barriers to Discharge Decreased caregiver support; Inaccessible home environment   Plan   Treatment/Interventions ADL retraining;Functional transfer training; Therapeutic exercise; Endurance training;Patient/family training;Bed mobility; Compensatory technique education   Progress Progressing toward goals   Recommendation   OT Discharge Recommendation Home with home health rehabilitation   Equipment Recommended Bedside commode; Shower/Tub chair with back ($)  (reachers, shoe horn, dressing stick)   Commode Type Standard   OT Therapy Minutes   OT Time In 0830   OT Time Out 0930   OT Total Time (minutes) 60   OT Mode of treatment - Individual (minutes) 60   OT Mode of treatment - Concurrent (minutes) 0   OT Mode of treatment - Group (minutes) 0   OT Mode of treatment - Co-treat (minutes) 0   OT Mode of Treatment - Total time(minutes) 60 minutes   OT Cumulative Minutes 930   Therapy Time missed   Time missed?  No

## 2023-02-02 PROBLEM — L30.8 DERMATITIS ASSOCIATED WITH MOISTURE: Status: RESOLVED | Noted: 2023-01-19 | Resolved: 2023-02-02

## 2023-02-02 LAB
ANION GAP SERPL CALCULATED.3IONS-SCNC: 2 MMOL/L (ref 5–14)
BUN SERPL-MCNC: 18 MG/DL (ref 5–25)
CALCIUM SERPL-MCNC: 9.3 MG/DL (ref 8.4–10.2)
CHLORIDE SERPL-SCNC: 97 MMOL/L (ref 96–108)
CO2 SERPL-SCNC: 38 MMOL/L (ref 21–32)
CREAT SERPL-MCNC: 0.98 MG/DL (ref 0.7–1.5)
GFR SERPL CREATININE-BSD FRML MDRD: 76 ML/MIN/1.73SQ M
GLUCOSE P FAST SERPL-MCNC: 99 MG/DL (ref 70–99)
GLUCOSE SERPL-MCNC: 99 MG/DL (ref 70–99)
POTASSIUM SERPL-SCNC: 4.2 MMOL/L (ref 3.5–5.3)
SODIUM SERPL-SCNC: 137 MMOL/L (ref 135–147)

## 2023-02-02 RX ORDER — ATORVASTATIN CALCIUM 20 MG/1
20 TABLET, FILM COATED ORAL
Qty: 30 TABLET | Refills: 0 | Status: SHIPPED | OUTPATIENT
Start: 2023-02-02

## 2023-02-02 RX ORDER — OXYCODONE HYDROCHLORIDE 5 MG/1
TABLET ORAL
Qty: 30 TABLET | Refills: 0 | Status: SHIPPED | OUTPATIENT
Start: 2023-02-02

## 2023-02-02 RX ORDER — ASPIRIN 81 MG/1
81 TABLET ORAL DAILY
Qty: 30 TABLET | Refills: 0 | Status: SHIPPED | OUTPATIENT
Start: 2023-02-03

## 2023-02-02 RX ORDER — FUROSEMIDE 40 MG/1
40 TABLET ORAL DAILY
Qty: 30 TABLET | Refills: 0 | Status: SHIPPED | OUTPATIENT
Start: 2023-02-02

## 2023-02-02 RX ORDER — ACETAMINOPHEN 325 MG/1
650 TABLET ORAL EVERY 6 HOURS PRN
Refills: 0
Start: 2023-02-02

## 2023-02-02 RX ADMIN — Medication: at 09:19

## 2023-02-02 RX ADMIN — ASPIRIN 81 MG: 81 TABLET, COATED ORAL at 09:18

## 2023-02-02 RX ADMIN — LIDOCAINE 2 PATCH: 50 PATCH CUTANEOUS at 09:18

## 2023-02-02 RX ADMIN — B-COMPLEX W/ C & FOLIC ACID TAB 1 TABLET: TAB at 09:18

## 2023-02-02 RX ADMIN — HEPARIN SODIUM 5000 UNITS: 5000 INJECTION INTRAVENOUS; SUBCUTANEOUS at 21:07

## 2023-02-02 RX ADMIN — DICLOFENAC SODIUM 2 G: 10 GEL TOPICAL at 21:06

## 2023-02-02 RX ADMIN — DICLOFENAC SODIUM 2 G: 10 GEL TOPICAL at 16:21

## 2023-02-02 RX ADMIN — LOPERAMIDE HYDROCHLORIDE 2 MG: 2 CAPSULE ORAL at 16:24

## 2023-02-02 RX ADMIN — OXYCODONE HYDROCHLORIDE 5 MG: 5 TABLET ORAL at 21:07

## 2023-02-02 RX ADMIN — GABAPENTIN 300 MG: 300 CAPSULE ORAL at 21:07

## 2023-02-02 RX ADMIN — GABAPENTIN 300 MG: 300 CAPSULE ORAL at 16:21

## 2023-02-02 RX ADMIN — DICLOFENAC SODIUM 2 G: 10 GEL TOPICAL at 09:19

## 2023-02-02 RX ADMIN — NYSTATIN: 100000 POWDER TOPICAL at 09:19

## 2023-02-02 RX ADMIN — HEPARIN SODIUM 5000 UNITS: 5000 INJECTION INTRAVENOUS; SUBCUTANEOUS at 06:05

## 2023-02-02 RX ADMIN — CHOLECALCIFEROL TAB 25 MCG (1000 UNIT) 1000 UNITS: 25 TAB at 09:18

## 2023-02-02 RX ADMIN — NYSTATIN 1 APPLICATION.: 100000 POWDER TOPICAL at 21:06

## 2023-02-02 RX ADMIN — Medication 1 APPLICATION.: at 21:06

## 2023-02-02 RX ADMIN — ATORVASTATIN CALCIUM 20 MG: 20 TABLET, FILM COATED ORAL at 16:21

## 2023-02-02 RX ADMIN — ACETAMINOPHEN 975 MG: 325 TABLET ORAL at 09:37

## 2023-02-02 RX ADMIN — GABAPENTIN 300 MG: 300 CAPSULE ORAL at 09:18

## 2023-02-02 RX ADMIN — HEPARIN SODIUM 5000 UNITS: 5000 INJECTION INTRAVENOUS; SUBCUTANEOUS at 14:09

## 2023-02-02 RX ADMIN — FUROSEMIDE 40 MG: 40 TABLET ORAL at 06:05

## 2023-02-02 RX ADMIN — CYANOCOBALAMIN TAB 1000 MCG 1000 MCG: 1000 TAB at 09:18

## 2023-02-02 NOTE — PROGRESS NOTES
02/02/23 1115   Pain Assessment   Pain Assessment Tool 0-10   Pain Score 4   Pain Location/Orientation Orientation: Left; Location: Shoulder   Pain Radiating Towards none   Pain Onset/Description Onset: Ongoing   Effect of Pain on Daily Activities inc with movt   Patient's Stated Pain Goal No pain   Hospital Pain Intervention(s) Rest   Multiple Pain Sites No   Restrictions/Precautions   Precautions Fall Risk;Pain   Weight Bearing Restrictions Yes   LUE Weight Bearing Per Order NWB   Braces or Orthoses Sling   Sit to Stand   Type of Assistance Needed Independent; Adaptive equipment   Comment [facility]   Sit to Stand CARE Score 6   Bed-Chair Transfer   Type of Assistance Needed Independent; Adaptive equipment   Comment [facility] AND [place]   Chair/Bed-to-Chair Transfer CARE Score 6   Cognition   Overall Cognitive Status WFL   Arousal/Participation Alert; Cooperative   Attention Within functional limits   Orientation Level Oriented X4   Memory Within functional limits   Following Commands Follows all commands and directions without difficulty   Activity Tolerance   Activity Tolerance Patient tolerated treatment well   Assessment   Treatment Assessment Engaged pt in brief 15mins of skilled OT services with focus on FT with Son Stanley Pierce and DIL  Therapist reviewed with son pts CLOF  Educ at DC pt will mostly be Miguelito for dressinga nd mobility  Recommend that pt will req A to wash RUE 2* to LUE NWB/shoulder restrictions and assistance with TEDS, brace and sneakers which verbalized family can assist  Therapist provided options of having grandson assist in AM with steps and footwear which son verbalized understanding  In addition, recommend to complete bathing in evening when son back from work  Recommend GB in shower to ensure safety  [employer] d[employer]ion, recommend cont use of BSC on 1st flr during the day and urinal use at night so pt doesnt walk into bathroom and prevent falls   DIL and son report that family to assist with meals and laundry at DC  In addition, recommend use of safety/medical alert system as pt rpors he has one  Therapist rpovided worksheet with other vendors if needed  Overall, family in agreement with above mentioned recommendations  No further questions at this time  In agreement with home OT services and DC tomorrow 2/3/23   Prognosis Fair   Problem List Decreased strength;Decreased range of motion;Decreased endurance; Impaired balance;Decreased mobility; Decreased coordination;Decreased safety awareness;Pain;Orthopedic restrictions   Barriers to Discharge None   Plan   Treatment/Interventions ADL retraining;Functional transfer training; Therapeutic exercise; Endurance training;Patient/family training;Bed mobility; Compensatory technique education   Progress Improving as expected   Recommendation   OT Discharge Recommendation Home with home health rehabilitation   Equipment Recommended Bedside commode;Reacher ($)  (dressing stick, toilet wand)   Commode Type Standard   OT Equipment ordered pt has all DME   OT Therapy Minutes   OT Time In 1115   OT Time Out 1130   OT Total Time (minutes) 15   OT Mode of treatment - Individual (minutes) 15   OT Mode of treatment - Concurrent (minutes) 0   OT Mode of treatment - Group (minutes) 0   OT Mode of treatment - Co-treat (minutes) 0   OT Mode of Treatment - Total time(minutes) 15 minutes   OT Cumulative Minutes 1065   Therapy Time missed   Time missed?  No

## 2023-02-02 NOTE — DISCHARGE INSTR - AVS FIRST PAGE
DISCHARGE INSTRUCTIONS: Mell Argueta 64 22    Bring these instructions with you to your Outpatient Physician appointments so they can order and follow-up any additional lab work or imaging recommended at time of discharge  Resume follow-up with all your prior providers that you have established care prior to this hospitalization  Discuss with primary care physician (PCP) if you have additional questions  If you (or your health care proxy) have any questions or concerns regarding your acute rehabilitation stay including issues with medications, rehabilitation, and follow-up plan, please call:          [facility]'s Acute Rehabilitation Unit at [facility] at [phone]    Should you develop fevers, chills, new weakness, changes in sensation, difficulty speaking, facial weakness, confusion, shortness of breath, chest pain, or other concerning symptoms please call 911 and/or obtain transportation to nearest ER immediately  PHYSICIANS to see:  Please see your doctors listed in the follow up providers section of your discharge paperwork, and take the discharge paperwork with you to your appointments  Outpatient Therapies at Home have been ordered for you: Your home health agency should reach out to you or your family soon to arrange follow-up  If you are unable to get in touch with home health you may contact your  at [phone]  LAB WORK recommended after discharge: Follow-up lab work at discretion of your outpatient physicians to be determined at time of your future appointments  Excessive delay in labs and appropriate follow-up could potentially increase your risk of complications which could be severe and even life-threatening  The following labs were drawn and results remain pending at time of discharge    It is you or your caregivers responsibility to contact your primary care physician and/or other outpatient specialists regarding final results of this lab and follow-up management:    Anti-hu Ab for part of your neuropathy work-up    IMAGING to follow-up:  Follow-up imaging as discussed with your recent physicians or at discretion of your outpatient physicians to be determined at time of your appointments  IMAGING, ADDITIONAL FINDINGS and ISSUES to follow-up:  Check under the "DISCHARGE PROVIDER" section of these DISCHARGE INSTRUCTIONS for provider contact information and specific issues as well  Pleural effusion: likely due to your lung metastases  Monitor your breathing status  If it is getting more difficult to breathe, or your oxygen is dropping below 90% frequently, please go to the ER or contact your primary care doctor for further management  Night time low oxygen, and low oxygen with strenuous activity: Please follow-up with pulmonology for further work-up  Excessive delay or lack of appropriate follow-up could potentially increase your risk of complications which could be severe and even life-threatening  It is you or your caregiver's responsibility to ensure these tests are ordered by your outpatient providers and followed up with accordingly  SKIN CARE INSTRUCTIONS to follow:  Monitor skin for increased redness or breadown and promptly notify your physician should these develop    Can use barrier type cream such as calazime, or ashkan which has some antifungal, and as needed  WEIGHTBEARING/ACTIVITY PRECAUTIONS to follow:  Maintain NONWEIGHTBEARING in left arm/upper extremity  You may do gentle pendulum exercises with left arm shoulder to your tolerance as shown during rehab  Do not perform if causing excess discomfort  Do not do other range of motion exercises of left shoulder unless cleared by orthopedics or other specialist after discharge  Driving restrictions: You are recommended against driving until cleared by an outpatient physician        **You should NOT operate a motor vehicle while under the influence of an opiate medication, muscle relaxant, or other sedative  Doing so may lead to an accident resulting in serious injury or death to yourself or others  You have agreed to avoid driving when under the influence of this medication  Work restrictions: You should NOT return to work (if working) until cleared by an outpatient physician  You should not operate heavy machinery (if applicable) until cleared by an outpatient physician  Alcohol restrictions: You are recommended to not drink alcohol at this time unless cleared by an outpatient physician  Drinking alcohol in your current functional condition can increase your risk of injury which could be severe  Drinking alcohol given your current health problems can lead to increased medical complications which could be severe  Combining alcohol with your current medications can increase your risk of injury which could be severe  Smoking restrictions: You are recommended to not smoke nicotine  Smoking increases your risk of heart attack, stroke, emphysema/COPD, and lung cancer  MEDICATIONS:  Please see a full list of your medications outlined in the After Visit Summary that is attached to these Discharge Instructions  Please note changes may have been made to your medications please refer to your discharge paperwork for your current medications and take this list with you to all your doctors appointments for your doctors to review  Please do not resume a home medication unless the medication reconciliation sheet indicates to do so, please do not assume that a medication that you were given a prescription for is the same as a medication you have at home based on both medications having the same name as dosages and frequency may have changed        Unless specifically noted in your medication list provided to you in your discharge paper work do not resume prior vitamins, minerals, or supplements you may have been taking prior to your hospitalization unless instructed by an outpatient physician in the future  Discuss with your primary care at next visit if applicable  Blood thinners prescribed to optimize long and short term health and decrease risk of complications but with risks related to bleeding and other complications  Aspirin instructions  TAKE aspirin daily unless instructed otherwise by a doctor  This medication will need to be managed by your outpatient physician(s) after discharge  Follow-up with the appropriate provider as soon as possible to ensure appropriate use  This is a blood thinner  It is being recommended for use by you (or your family) to decrease the risk of clotting which can be severely disabling and even life-threatening  Even when provided as recommended it can cause severe disabling and even life-threatening bleeding  Too much or too little of this blood thinner further increases your risk of this medication causing serious and even life-threatening complications such as severe bleeding, clots, strokes, and death  With that said, at this time based on best available evidence and consensus agreement, your physicians recommend you take aspirin based on your overall risks and benefits in your specific medical situation  If you (or your family/caregiver) notice black stools, bloody stools, vomit blood, develop new weakness, slurred speech, confusion or have any other concerning symptoms call 911 or obtain transportation to nearest emergency room immediately  Sedating Medications with increased risk of complications: These medications were started prior to your acute rehabilitation course as recommended by your prior physicians  It was continued during your acute rehabilitation course  This(these) medication(s) will need to be managed by your outpatient physician(s) after discharge    Follow-up with the appropriate provider as soon as possible to ensure appropriate use     Oxycodone (opiate) pain medication has been used to help your acute pain  You tolerated this medication adequately during your recent hospital stay  You will be given a limited supply of opiate pain medications on discharge; should you require additional refills/medications, your PCP and/or surgeon may prescribe at his/her discretion  This can be quite helpful particularly for severe acute pain  There are risks associated with opioid medications  They can increase your risk of falling, injury, and breathing difficulties which can be severe and even life-threatening  Note it is advisable to limit use of opiate pain medications as they can become habit forming and lead to addiction  Other potential side effects of the medication include, but are not limited to, constipation, drowsiness, impaired judgment and risk of fatal overdose if not taken as prescribed  You should not drive while taking this medication  The patient was warned against driving while taking sedation medications  Sharing medications is a felony  At this point in time, the patient is showing no signs of addiction, abuse, diversion or suicidal ideation  The patient (you/or relevant caregiver) understands and agrees to use this medication only as prescribed  Gabapentin has been used to help pain  You tolerated this medication adequately during your recent hospital stay  - Take 300 mg 3 times per day  - Do not stop this medication abruptly  - This medication can increase risk of confusion, fall, and injury although you did tolerate adequately during rehab course  - Follow-up with your primary care physician within 1 week as well as your oncologist for additional management of your medical conditions, potential refills, or adjustments of this medication  MEDICAL MANAGEMENT AT HOME specific to you:    Heart Failure Management:  Follow-up management by your PCP    Please set-up follow-up appointment as soon as possible after your discharge  Take your medications as prescribed at time of discharge unless changed by another physician in future  You will need to obtain follow-up lab work as an outpatient to monitor effectiveness and safety of these medications  Obtain follow-up lab orders from your outpatient providers at their discretion  You should maintain a low sodium and low fat diet  You should weigh yourself every day or every other day and keep a log that you bring to your physicians office  Notify your physician if you start to gain more than 2-4 lbs in a few days  These recommendations are meant to prevent build up of excessive fluid inside you which can make it harder to breath  Follow-up with your outpatient physicians who may change these recommendations in the future  Notify your physicians should you develop increased swelling, weight gain, or shortness of breath  If you develop significant shortness of breath, chest pain, confusion, or other concerning signs or symptoms call 911 or obtain transportation to nearest emergency room right away  Leg edema (swelling):   Notify your physician if you develop increased swelling, pain, or redness in legs or feet  Nighttime (sleeping) low oxygen and low oxygen with activity :  You were co-managed by internal medicine in hospital and recommended to follow-up with PCP after discharge  You are recommended to use 2-3 L of home oxygen when sleeping for now and with extended activity  You will need an outpatient sleep study to be set-up  after discharge  Notify your PCP at next visit within 1 week if possible  I recommend follow-up with Pulmonology as well  Case management has set-up home health vendor to arrange home oxygen    Direct calls to vendor as related to issues obtaining oxygen or with the equipment itself  - If unable to obtain adequate assistance or are unable to reach the vendor contact   - [place]  at [phone]  - If you are still unable to obtain adequate assistance you may call the rehab unit below until your are established back with your outpatient providers  - [facility]'s Acute Rehabilitation Unit at [facility] at [phone]  - If you have increased shortness of breath, chest pain, increased confusion, or unable to keep oxygen adequately elevated call 911 or obtain immediate transport to ED  Acetaminophen (Tylenol) Dosing Warning: You may have up to 3000 mg of acetaminophen (Tylenol) from all sources spread out over a 24 hours period  Do not have more than that, as this can increase your risk of liver injury which can be serious  Please note a summary of your hospital stay with relevant information for your doctors will try to be sent to them  Please confirm with your doctors at your follow up visits that they have received this summary and have them contact [address] [place] [address] if they have not received them along with any other medical records they may require       Luis Osborn Phone Number:  [phone]

## 2023-02-02 NOTE — PROGRESS NOTES
02/02/23 1300   Restrictions/Precautions   Precautions Fall Risk;Pain   LUE Weight Bearing Per Order NWB   Braces or Orthoses Sling   Roll Left and Right   Reason if not Attempted Safety concerns   Roll Left and Right CARE Score 88   Sit to Lying   Type of Assistance Needed Independent   Comment with a few pillows as pt would perform at home-  pt states has difficulty breathing when flat   Sit to Lying CARE Score 6   Lying to Sitting on Side of Bed   Type of Assistance Needed Independent   Lying to Sitting on Side of Bed CARE Score 6   Sit to Stand   Type of Assistance Needed Independent   Comment [facility]   Sit to Stand CARE Score 6   Bed-Chair Transfer   Type of Assistance Needed Independent   Comment [facility]   Chair/Bed-to-Chair Transfer CARE Score 6   Car Transfer   Type of Assistance Needed Independent   Car Transfer CARE Score 6   Walk 10 Feet   Type of Assistance Needed Independent   Comment AFO and [facility]   Walk 10 Feet CARE Score 6   Walk 50 Feet with Two Turns   Type of Assistance Needed Independent   Comment [facility] and AFO   Walk 50 Feet with Two Turns CARE Score 6   Walk 150 Feet   Type of Assistance Needed Supervision   Comment [facility]   Walk 150 Feet CARE Score 4   Walking 10 Feet on Uneven Surfaces   Type of Assistance Needed Supervision   Comment on outside sidewalk and ramp   Walking 10 Feet on Uneven Surfaces CARE Score 4   Ambulation   Primary Mode of Locomotion Prior to Admission Walk   Distance Walked (feet) 150 ft   Assist Device [facility]   Gait Pattern Antalgic; Slow Dilma;Trendelenburg   Findings Pt MI for household distances, but due to fatigue component should have S for longer distance,  Pt does have excessive lateral lean , antalgic  and weakness in general but despite gt deviations no LOB   Does the patient walk? 2   Yes   Wheel 50 Feet with Two Turns   Reason if not Attempted Activity not applicable   Wheel 50 Feet with Two Turns CARE Score 9   Wheel 150 Feet   Reason if not Attempted Activity not applicable   Wheel 739 Feet CARE Score 9   Wheelchair mobility   Does the patient use a wheelchair? 0  No   Curb or Single Stair   Style negotiated Curb   Type of Assistance Needed Supervision   Comment Close S on outside curb step to simulate home step - used QC   no miss step or overt LOB   1 Step (Curb) CARE Score 4   Picking Up Object   Type of Assistance Needed Independent; Adaptive equipment   Comment Pt picked up multiple gloves off floor , encouraged pt to use reacher   Picking Up Object CARE Score 6   Equipment Use   NuStep 2 laps about 12 mins-  took intermittent rest break and checked 02  88 %  but inc back to 90s within 30 sec of deep breathing   Assessment   Treatment Assessment 60 min session focused on care score capture/ assessment  Pt is independent with household level walking but should have S with outside terrain and distances  Skin check performed with AFO and no redness noted  Pt did perform curb step today at Close S level (recommend family be present to enter home)  Pt states he doesnt have any concerns for D/C home tomorrow  Recommendation   PT Discharge Recommendation Home with home health rehabilitation  (Nikita Fitch)   PT Therapy Minutes   PT Time In 1300   PT Time Out 1400   PT Total Time (minutes) 60   PT Mode of treatment - Individual (minutes) 60   PT Mode of treatment - Concurrent (minutes) 0   PT Mode of treatment - Group (minutes) 0   PT Mode of treatment - Co-treat (minutes) 0   PT Mode of Treatment - Total time(minutes) 60 minutes   PT Cumulative Minutes 1110   Therapy Time missed   Time missed?  No

## 2023-02-02 NOTE — ASSESSMENT & PLAN NOTE
States that he was told he had sleep apnea in the past during hospitalizations but never had formal work-up  Has been requiring O2 at night  Overnight noc ox performed on 1/24 and showed desat <89% for 8 hours and 12 minutes, as low as 74%  Apply 2L O2 at HS  Repeat overnight noc ox performed last night for DME - desat <89% for 1 hour 40 minutes prior to restarting O2 and sat as low as 70%  Would benefit from Sleep Medicine as outpatient

## 2023-02-02 NOTE — ASSESSMENT & PLAN NOTE
Diagnosed in 2007 with metastatic disease in 2013  Hx of L nephrectomy in 2007 and prostatectomy  Progressive disease  Newly started on Nivolumab in 12/022  Follows with Dr Diego Pak (Oncology) as outpatient - follow-up scheduled for February

## 2023-02-02 NOTE — PROGRESS NOTES
[facility]  Progress Note - Daphney Head 1950, 67 y o  male MRN: [id number]  Unit/Bed#: Lesley Carlson 941-96 Encounter: [id number]  Primary Care Provider: Yimi Cavazos MD   Date and time admitted to hospital: 1/19/2023  3:45 PM    Low serum vitamin B12  Assessment & Plan  Vitamin B12 206 on 1/30  Started on cyanocobalamin 1000mcg daily  Follow-up with PCP as outpatient  Foot pain, right  Assessment & Plan  Complaints of R metatarsal pain on 1/29  XR obtained which showed midfoot mild degenerative changes and no acute osseous abnormality  Uric acid WNL  Likely arthritis flare-up  Started on PRN Voltaren gel  Considerations with PT/OT  Cardiovascular risk factor  Assessment & Plan  ASCVD risk 25 4%  Started on ASA 81mg daily  Lipid panel on 1/30: Cholesterol 158, Triglycerides 149, HDL 38, and LDL 90  Started on Lipitor 20mg daily (reports hx of myalgias with simvastatin)  Foraminal stenosis of cervical region  Assessment & Plan  MRI on 1/26 showed severe left C3-C4 foraminal stenosis and moderate central stenosis of C4-C5  Ensure adequate pain control  Continue PT/OT  Nocturnal hypoxemia  Assessment & Plan  States that he was told he had sleep apnea in the past during hospitalizations but never had formal work-up  Has been requiring O2 at night  Overnight noc ox performed on 1/24 and showed desat <89% for 8 hours and 12 minutes, as low as 74%  Apply 2L O2 at HS  Repeat overnight noc ox performed last night for DME - desat <89% for 1 hour 40 minutes prior to restarting O2 and sat as low as 70%  Would benefit from Sleep Medicine as outpatient  Neuropathy  Assessment & Plan  Multifactorial  Continue home gabapentin 300mg TID  HTN (hypertension)  Assessment & Plan  Home regimen: amlodipine 5mg daily and lisinopril/HCTZ 20-12 5mg BID  Currently receiving Lasix 40mg daily  Lisinopril and HCTZ held after receiving CTA on 1/17    Amlodipine discontinued on 1/23  Restart as able  Monitor BP with routine VS     Pleural effusion  Assessment & Plan  Likely secondary to metastatic disease  CTA PE study on 1/17: New moderate L pleural effusion with moderate compressive atelectasis of left lower lobe  Currently maintaining on RA  Monitor spot pulse ox  Encourage pulmonary toileting  Continue Lasix 40mg daily  Given additional 20mg Lasix on 1/31  Chronic diastolic (congestive) heart failure (HCC)  Assessment & Plan  Wt Readings from Last 3 Encounters:   02/02/23 108 kg (238 lb 11 2 oz)   01/19/23 110 kg (243 lb 6 2 oz)   12/30/22 121 kg (265 lb 10 5 oz)     ECHO on 1/16 showed EF 65%, no RWMA, G1DD  Treated with Lasix in acute setting for volume overload  BNP 4475 on admission  Continue Lasix 40mg daily  Monitor daily weights and I&Os  Continue 1500FR  Chronic diarrhea  Assessment & Plan  Continue home PRN Imodium  Monitor skin for breakdown  Stage 3a chronic kidney disease [facility])  Assessment & Plan  Lab Results   Component Value Date    EGFR 76 02/02/2023    EGFR 82 01/30/2023    EGFR 77 01/26/2023    CREATININE 0 98 02/02/2023    CREATININE 0 92 01/30/2023    CREATININE 0 97 01/26/2023     Creatinine currently 0 98  Baseline creatinine 1 0-1 3  Hx of L nephrectomy  Developed ARIANNA in acute setting due to autoregulatory failure/volume overload/congestion  Currently on Lasix 40mg daily  Avoid nephrotoxins as able  Encourage adequate hydration  Continue to trend BMP  Metastatic renal cell carcinoma ([address] )  Assessment & Plan  Diagnosed in 2007 with metastatic disease in 2013  Hx of L nephrectomy in 2007 and prostatectomy  Progressive disease  Newly started on Nivolumab in 12/022  Follows with Dr Kaykay Gotti (Oncology) as outpatient - follow-up scheduled for February  Spine metastasis ([facility] [address] )  Assessment & Plan  Hx of SBRT to T6, T10, and T4 between 8308-7274  Encourage adequate pain control    Follows with Dr Kaykay Gotti (Oncology) as outpatient  Lung metastases [facility])  Assessment & Plan  CTA PE study on 1/17: Enlargement of R lower lobe metastases  Developed hypoxia in acute setting - receiving diuretics with improvement  Currently maintaining on RA  Monitor spot pulse ox  Continue Lasix 40mg daily  Given additional 20mg on 1/31  Encourage pulmonary toileting and IS use  Follows with Dr Dante Schultz as outpatient  Bone metastases [facility])  Assessment & Plan  Diagnosed with metastatic renal cell carcinoma in 2007  Progressive disease  Bone scan in 2020 showed lytic lesions to L scapula, R posterior and lateral ribs, L posterior iliac bone, and R iliac wing  Most recently started on Nivolumab in 12/2022  Ensure adequate pain control  Continue home Vitamin D supplementation  Follows with Dr Dante Schultz (Oncology) as outpatient  * 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  Assessment & Plan  Presented on 1/12 after a mechanical fall with L shoulder pain  CT LUE: lytic metastasis int he scapular glenoid process measuring 2 9 x 2 4 x 2 0cm with cortical breakthrough and extraosseous extension anteriorly without pathologic fracture  New 2 4 x 2 1 x 1 9cm lytic metastasis in the L scapular body with acute pathologic fracture  New 1 9x0 8x1 4cm lytic metastasis in the inferior scapular angle with pathologic fracture  Non-surgical intervention per Ortho  NWB to LUE  Sling for comfort  Neurovascular checks Q shift  Ensure adequate pain control  Follow-up with Dr Ysabel Kirk (Orthopedics) in 4 weeks  Primary team following  PT/OT  MRI L shoulder due to burning sensation/chronic ongoing pain on 1/26: metastatic disease of the scapula with pathologic fracture, full thickness partial width tear anterior supraspinatus superimposed upon moderate tendinosis, moderate subscapularis tendinosis, mild infraspinatus tendinosis      VTE Pharmacologic Prophylaxis:   Pharmacologic: Heparin  Mechanical VTE Prophylaxis in Place: Yes - sequential compression devices  Current Length of Stay: 14 day(s)    Current Patient Status: Inpatient Rehab     Discharge Plan: As per primary team     Code Status: Level 1 - Full Code    Subjective:   Pt examined while pt sitting in recliner in pt room  Currently denies any SOB, cough, fevers, or chills  O2 sat noted to be lower yesterday evening around  but pt does not recall what he was doing at that time  States that he may have been standing at the side of the bed to urinate or lying flat  Fingers and toes are cool and difficult to obtain accurate sat at this time, but is currently 90% while sitting in the chair  Plans for pulse ox with activity today during therapy  Admits that he does get dyspneic with activity but feels that this is unchanged  Denies any increasing lower extremity edema  Encouraged to continue with IS use  O2 is approved for home use for overnight  Complaints of mild L shoulder pain, currently receiving Tylenol  Has no other concerns or complaints at this time  Objective:     Vitals:   Temp (24hrs), Av 1 °F (36 7 °C), Min:97 9 °F (36 6 °C), Max:98 3 °F (36 8 °C)    Temp:  [97 9 °F (36 6 °C)-98 3 °F (36 8 °C)] 97 9 °F (36 6 °C)  HR:  [100-102] 102  Resp:  [16-18] 18  BP: (129-140)/(63-77) 129/66  SpO2:  [84 %-93 %] 90 %  Body mass index is 34 25 kg/m²  Review of Systems   Constitutional: Negative for appetite change, chills, fatigue and fever  HENT: Negative for trouble swallowing  Eyes: Negative for visual disturbance  Respiratory: Positive for shortness of breath (dyspnea with exertion, improves with rest, unchanged)  Negative for cough, chest tightness, wheezing and stridor  Cardiovascular: Negative for chest pain, palpitations and leg swelling  Gastrointestinal: Negative for abdominal distention, abdominal pain, constipation, diarrhea, nausea and vomiting  LBM 2/1   Genitourinary: Negative for difficulty urinating  Musculoskeletal: Positive for arthralgias (L shoulder pain, minimal, improves with Tylenol)  Negative for back pain and gait problem  Neurological: Negative for dizziness, weakness, light-headedness, numbness and headaches  Psychiatric/Behavioral: Negative for dysphoric mood and sleep disturbance  The patient is not nervous/anxious  All other systems reviewed and are negative  Input and Output Summary (last 24 hours): Intake/Output Summary (Last 24 hours) at 2/2/2023 1044  Last data filed at 2/2/2023 0834  Gross per 24 hour   Intake 770 ml   Output 1040 ml   Net -270 ml       Physical Exam:     Physical Exam  Vitals and nursing note reviewed  Constitutional:       General: He is not in acute distress  Appearance: Normal appearance  He is obese  He is not ill-appearing  HENT:      Head: Normocephalic and atraumatic  Cardiovascular:      Rate and Rhythm: Normal rate and regular rhythm  Pulses: Normal pulses  Heart sounds: Normal heart sounds  No murmur heard  No friction rub  Pulmonary:      Effort: Pulmonary effort is normal  No respiratory distress  Breath sounds: Examination of the right-lower field reveals rales  Examination of the left-lower field reveals rales  Rales present  No wheezing or rhonchi  Abdominal:      General: Abdomen is flat  Bowel sounds are normal  There is no distension  Palpations: Abdomen is soft  There is no mass  Tenderness: There is no abdominal tenderness  There is no guarding or rebound  Hernia: No hernia is present  Musculoskeletal:      Cervical back: Normal range of motion and neck supple  No tenderness  Right lower leg: Edema (lymphedema) present  Left lower leg: Edema (lymphedema) present  Comments: LUE in sling  Skin:     General: Skin is warm and dry  Capillary Refill: Capillary refill takes less than 2 seconds  Comments: Fingers/toes cool to touch     Neurological:      Mental Status: He is alert and oriented to person, place, and time     Psychiatric:         Mood and Affect: Mood normal          Behavior: Behavior normal          Additional Data:     Labs:    Results from last 7 days   Lab Units 01/30/23  0603   WBC Thousand/uL 5 80   HEMOGLOBIN g/dL 11 2*   HEMATOCRIT % 38 7   PLATELETS Thousands/uL 249   NEUTROS PCT % 68   LYMPHS PCT % 23   MONOS PCT % 7   EOS PCT % 2     Results from last 7 days   Lab Units 02/02/23  0524 01/30/23  0514   SODIUM mmol/L 137 138   POTASSIUM mmol/L 4 2 4 1   CHLORIDE mmol/L 97 100   CO2 mmol/L 38* 34*   BUN mg/dL 18 19   CREATININE mg/dL 0 98 0 92   ANION GAP mmol/L 2* 4*   CALCIUM mg/dL 9 3 9 2   ALBUMIN g/dL  --  3 0*   TOTAL BILIRUBIN mg/dL  --  0 50   ALK PHOS U/L  --  72   ALT U/L  --  17   AST U/L  --  33   GLUCOSE RANDOM mg/dL 99 87             Results from last 7 days   Lab Units 01/30/23  0514   HEMOGLOBIN A1C % 5 3           Labs reviewed    Imaging:    Imaging reviewed    Recent Cultures (last 7 days):           Last 24 Hours Medication List:   Current Facility-Administered Medications   Medication Dose Route Frequency Provider Last Rate   • acetaminophen  975 mg Oral 4x Daily PRN Akosua Sun MD     • aluminum-magnesium hydroxide-simethicone  30 mL Oral Q6H PRN Akosua Sun MD     • aspirin  81 mg Oral Daily IRENE Florez     • atorvastatin  20 mg Oral Daily With [address], CRNP     • cholecalciferol  1,000 Units Oral Daily Akosua Sun MD     • cyanocobalamin  1,000 mcg Oral Daily IRENE Florez     • Diclofenac Sodium  2 g Topical TID Akosua Sun MD     • diphenhydrAMINE  25 mg Oral HS PRN Akosua Sun MD     • furosemide  40 mg Oral Daily IRENE Florez     • gabapentin  300 mg Oral TID Akosua Sun MD     • heparin (porcine)  5,000 Units Subcutaneous [facility] Akosua Sun MD     • lidocaine  2 patch Topical Daily Akosua Sun MD     • loperamide  2 mg Oral 4x Daily PRN Akosua Sun MD     • multivitamin stress formula  1 tablet Oral Daily Garth Vance MD     • nystatin   Topical BID Garth Vance MD     • ondansetron  4 mg Oral Q6H PRN Garth Vance MD     • oxyCODONE  5 mg Oral 4x Daily PRN Richard Lo MD     • oxyCODONE  2 5 mg Oral 4x Daily PRN Richard Lo MD     • oxyCODONE  7 5 mg Oral Q4H PRN Richard Lo MD     • simethicone  80 mg Oral 4x Daily PRN Garth Vance MD     • white petrolatum-mineral oil   Topical BID IRENE Xiong          M*Modal software was used to dictate this note  It may contain errors with dictating incorrect words or incorrect spelling  Please contact the provider directly with any questions

## 2023-02-02 NOTE — TEAM CONFERENCE
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  Principal Problem: 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    Patient Active Problem List    Diagnosis Date Noted   • Low serum vitamin B12 01/31/2023   • Cardiovascular risk factor 01/30/2023   • Foot pain, right 01/30/2023   • Foraminal stenosis of cervical region 01/26/2023   • Nocturnal hypoxemia 01/25/2023   • Pleural effusion 01/20/2023   • HTN (hypertension) 01/20/2023   • Neuropathy 01/20/2023   • Dermatitis associated with moisture 01/19/2023   • Chronic diastolic (congestive) heart failure ([facility] ) 01/18/2023   • Stage 3a chronic kidney disease ([facility] ) 01/12/2023   • H/O left nephrectomy 01/12/2023   • Fall at home, initial encounter 01/12/2023   • 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 01/12/2023   • Acute respiratory failure with hypoxia ([facility] ) 01/12/2023   • Elevated brain natriuretic peptide (BNP) level 01/12/2023   • Elevated d-dimer 01/12/2023   • Chronic diarrhea 01/12/2023   • COVID-19 09/14/2022   • Azotemia 10/31/2019   • Hx of prostatectomy 09/20/2019   • History of prostate cancer 09/20/2019   • Metastatic renal cell carcinoma ([address] ) 09/26/2018   • Spine metastasis ([facility] ) 07/11/2018   • Clear cell adenocarcinoma of kidney, left ([facility] ) 03/02/2018   • Bone metastases ([facility] ) 03/02/2018   • Lung metastases ([facility] ) 03/02/2018       Physical Therapy:    Weight Bearing Status: Non-weight bearing (L UE)  Transfers: Independent  Bed Mobility: Independent  Amulation Distance (ft): 175 feet  Ambulation: Independent  Assistive Device for Ambulation: Wide Based [place]  Number of Stairs: 12  Assistive Device for Stairs: Right Hand Rail  Stair Assistance: Supervision  Ramp: Supervision  Assistive Device for Ramp: Wide Based [facility]  Discharge Recommendations: Home with:  [address] with[de-identified] Family Support, Outpatient Physical Therapy (in home out pt)    Pt  Is a 66 y/o male who was admitted to [address] [place] [address] on 1/12 with L shoulder pain after a fall with arm abducted  XRs of left shoulder, humerus, scapula, and clavicle showed lytic metastatic tumor mass within the scapular neck and glenoid with no acute fractures or dislocations  CT was ordered and he was made NWB by Ortho  CT showed large pathologic scapula fracture with minimal displacement and a large lytic lesion in glenoid vault  Ortho discussed with Hallie Membreno - Dr Jonathan Keller who recommended sling for comfort and non-surgical management  PMHx that can affect progress includes localized RCC with METS on 2013 , L nephrectomoy and protatectomy , chronic diarrhea and CKD and chronic low back pain and bone METS (see H&P)  PT evaluation completed on 1/20 and pt  Demonstrate good progress since then  Pt  Currently CGA with functional mobility using WBQC and ambulate up to 150 feet and min A with steps management up to 8 steps  Pt  Was fully indep PTA and was working par time  He lives in a [address] [place] [address] but can be on fist floor set up using BSC at d/c  Pt  Has 1 curb like step + 4 steps for NATALIYA with R HR  Pt  Has a son and grandson that live close by that can assist prn  Pt  Current barrier is NATALIYA, L UE NWB, L chronic foot drop which is address with AFO trial and dec caregiver support  Pt  Will benefit from skillled PT to maximized functional independence and be able to reach mod I level for mobility with LRAD   Planning for Tentative d/c next week  2/1/23  Pt made good progress towards mod I goals, granted IRPs with [facility]  AFO to be delivered for DC home Friday  Son to participate in FT prior to DC  Pt to self purchase [facility] AND KR[facility]  May need A on curb step outside of home pending progress and confidence  Plan for DC home Friday with in home out pt PT recommended to follow  Occupational Therapy:  Eating:  (setup)  Grooming: Supervision  Bathing: Minimal Assistance  Bathing: Minimal Assistance  Upper Body Dressing: Supervision  Lower Body Dressing: Minimal Assistance  Toileting: Minimal Assistance (cont to trial toilet wand)  Tub/Shower Transfer: Minimal Assistance  Toilet Transfer: Supervision  Cognition: Within Defined Limits  Orientation: Person, Place, Time, Situation  Discharge Recommendations: Home with:  [address] with[de-identified] Family Support, First Floor Setup, Home Occupational Therapy       1/25/23  Pt is making steady gains towards OT goals  Pt at this time req Barry for toileting and bathing 2* to LUE NWB  However, this week we have been trialing toilet wand to inc independence  Anticipate pt to be indep with toilet when using toilet wand  Pt cont to have difficulty with L AFO/footwear which pt reports grandson and son can assist  Pt son to attend FT on Thurs 1230 2/2  Therapist to communicate with son and discuss plan for when family can assist with bathing and don L AFO as pt req assistance  Therapist did provide pt with HHA list, however, pt reports that he would like to trial at home first and will contact if needed  It is anticipated pt will mainly be Miguelito at DC  Progressed to IRP with [facility] AND [facility] on 2/1/23  Anticipate pt to req Barry for bathing and don of AFO 2* to LUE NWB  Pt with anticipated dc on 2/3/23 with home PT/OT services  By DC all barriers resolved   Will cont with LUE NWB until cleared from MD       Speech Therapy:           No notes on file    Nursing Notes:  Appetite: Good  Diet Type: Cardiac, Thin Liquids Pain Location/Orientation: Orientation: Left, Location: Shoulder  Pain Score: 4                       Hospital Pain Intervention(s): Medication (See MAR), Repositioned          Mer Cevallos is a 67 y o  male with medical history of localized RCC in 2007 with mets in 2013 , on systemic therapy s/p L nephrectomy and prostatectomy, chronic diarrhea, CKD (Crea 1-1 3) who presented to the [address] [place] [address] on 1/12 with L shoulder pain after a fall with arm abducted  XRs of left shoulder, humerus, scapula, and clavicle showed lytic metastatic tumor mass within the scapular neck and glenoid with no acute fractures or dislocations  CT was ordered and he was made NWB by Ortho  CT showed large pathologic scapula fracture with minimal displacement and a large lytic lesion in glenoid vault  Ortho discussed with Norm Salas - Dr Arturo Bernal who recommended sling for comfort and non-surgical management  They will follow-up outpatient  On admission also noted to have ARIANNA and Nephro was consulted who felt likely 2/2 autoregulatory failure  They started him on Lasix and held his Lisinopril and HCTZ  Echo was ordered which showed normal EF with G2DD  ARIANNA resolve with diuresis  Lasix 40mg oral daily  Antionette Tao He also was newly requiring O2 on admission - but that improved diuresis  Given his cancer, D-dimer elevated, NM study was ordered which was indeterminant  LE dopplers were negative  CTA PE was ordered once renal function improved - this was negative  Heparin gtt discontinued It did however show enlargement of his RLL mets and bone mets concerning for progression  He will follow-up with his Oncologist as an outpatient to discuss resuming therapy  Admitted to the [facility] on 1/19  Overnight noc ox performed on 1/24 and showed desat <89% for 8 hours and 12 minutes, as low as 74%  Overnight pulse oximetry study again on 2/2/23 before discharge  Apply 2L O2 at HS   Pt on Gabapentin 300mg TID for neuropathy, on Lasix 40mg daily for HTN and pleural effusion, monitor daily weights and I&Os, 1500FR for CHF  Pt is NWB to LUE, sling for comfort  We will monitor pt's vital signs & lab results  Pt will have adequate pain control to fully participate in therapies  Pt will have safe transfers with  the call bell & hourly rounding to prevent falls  Pt will be educated on importance of T/R & offloading weight while in bed/chair to prevent pressure injury  Pt will be educated on energy conservation & encouraged independence with ADL's  Case Management:     Discharge Planning  Living Arrangements: Lives Alone  Support Systems: Son, Self  Type of Current Residence: Private residence  Current Home Care Services: No  1/25- Pt lives alone in 2 story home w/ 5 STE  Pt reports bedroom is on 2nd floor  Pt was independent with ADL IADLs prior to admission, pt has cane, commode, walker, shower seat and grab bars in home  Pt has family that are local and assist with things as needed  Prior to admission, pt was working part time at [employer] in [place] services  Pt reports hx of acute rehab at [facility] and had [address] in the past but does not recall which agency  Pt reports he receives his medication from the [place], PCP is Dr Gila Melchor  2/2- Pt anticipated dc Friday 2/3, w/ [facility] in home  Cm following for any further dc needs  Is the patient actively participating in therapies? yes  List any modifications to the treatment plan: None    Barriers Interventions   Chronic diarrhea Monitoring   Volume overload Monitoring   Premorbid neuropathy Med management   LUE NWB Compensatory strategies, cuing   Pain Monitoring, med management   Hypoxia Possible oxygen overnight needed at dc    Foot drop AFO     Is the patient making expected progress toward goals?  yes  List any update or changes to goals: None    Medical Goals: Patient will be medically stable for discharge to [facility] restrictive envrionment upon completion of rehab program and Patient will be able to manage medical conditions and comorbid conditions with medications and follow up upon completion of rehab program    Weekly Team Goals:   Rehab Team Goals  ADL Team Goal: Patient will be independent with ADLs with least restrictive device upon completion of rehab program  Bowel/Bladder Team Goal: Patient will return to premorbid level for bladder/bowel management upon completion of rehab program  Transfer Team Goal: Patient will be independent with transfers with least restrictive device upon completion of rehab program  Locomotion Team Goal: Patient will be independent with locomotion with least restrictive device upon completion of rehab program    Discussion: Pt indpt w/ ADL IADLs w/ quad cane  Team recommending Friday 2/3, w/ OP PT OT w/ [facility]  Anticipated Discharge Date:  D/c Friday 2/3 w/ [facility] Team Members Present: The following team members are supervising care for this patient and were present during this Weekly Team Conference      Physician: Dr Akila Mtz MD   : COLEMAN Hung  Registered Nurse: Ricardo Casarez RN  Physical Therapist: Miky Ryan, PT  Occupational Therapist: Isadora Pedersen MS, OTR/L  Speech Therapist:

## 2023-02-02 NOTE — PLAN OF CARE
Problem: GENITOURINARY - ADULT  Goal: Urinary catheter remains patent  Description: INTERVENTIONS:  - Assess patency of urinary catheter  - If patient has a chronic williamson, consider changing catheter if non-functioning  - Follow guidelines for intermittent irrigation of non-functioning urinary catheter  Outcome: Completed    Pt not using catheter

## 2023-02-02 NOTE — PROGRESS NOTES
02/02/23 1045   Pain Assessment   Pain Assessment Tool 0-10   Pain Score 4   Pain Location/Orientation Orientation: Left; Location: Shoulder   Pain Onset/Description Onset: Ongoing; Descriptor: Sore;Descriptor: Aching   Restrictions/Precautions   Precautions Fall Risk;Pain   LUE Weight Bearing Per Order NWB   Braces or Orthoses Sling   Cognition   Overall Cognitive Status WFL   Arousal/Participation Alert; Cooperative   Attention Within functional limits   Orientation Level Oriented X4   Memory Within functional limits   Following Commands Follows all commands and directions without difficulty   Subjective   Subjective "dont tell my family bad things about me"   Roll Left and Right   Reason if not Attempted Safety concerns   Roll Left and Right CARE Score 88   Sit to Stand   Type of Assistance Needed Independent   Comment discussed with family to defer pt to use lift chair to stand to maximize use of LE  Sit to Stand CARE Score 6   Bed-Chair Transfer   Type of Assistance Needed Independent   Physical Assistance Level No physical assistance   Chair/Bed-to-Chair Transfer CARE Score 6   Walk 10 Feet   Type of Assistance Needed Independent   Physical Assistance Level No physical assistance   Comment WBQC   Walk 10 Feet CARE Score 6   Walk 50 Feet with Two Turns   Type of Assistance Needed Independent   Physical Assistance Level No physical assistance   Comment WBQC   Walk 50 Feet with Two Turns CARE Score 6   Ambulation   Primary Mode of Locomotion Prior to Admission Walk   Distance Walked (feet) 125 ft   Assist Device [facility]   Gait Pattern Antalgic; Slow Dilma;Decreased foot clearance;Trendelenburg   Limitations Noted In Endurance;Strength;Posture;Swing;Speed   Walk Assist Level Modified Independent   Findings use of L carbon fiber AFO delivered by Nikole this AM in prep for DC home tomorrow  Advised to keep an eye on skin  OT to review donning and doffing  Does the patient walk? 2   Yes   Wheelchair mobility Does the patient use a wheelchair? 0  No   Assessment   Treatment Assessment Pt, son, daughter in law, and family friend present for FT in prep for DC home tomorrow  Long discussion with family with pt out of room stating, they cleaned and cleared  him home and now there is plenty of room to move around as pt was a horder of sorts  Reports family and friends will be taking turns visiting and following up with pt on regular basis  WBQC delivered to home  Discussed home O2 use and need for use overnight  As well on varied O2 sats on RA pending activity and length of activity  Advised to encourage mobility and functional I as well as not to use lift chair to stand to maintain functional strength in LEs  Discussed AFO provided, wear time, need for skin chesks and f/u with John Mack from [place] if any issues arrive  Family observed pt ambulate to therapy gym and complete x12 steps with R HR  O2 sats 86% on RA after steps, with 2-3min seated rest, pt able to recover to baseline 90-91%  Discussed pts challenge with curb step and need to attend to where pt may need to step up a curb style step, to trial again with PT later today, pending pt confidence  Generally all questions answered at this time and family feels ready for DC home next day  Resendiz rehab planned, family aware  Recommendation   PT Discharge Recommendation Home with outpatient rehabilitation  (in home out pt)   PT Therapy Minutes   PT Time In 1045   PT Time Out 1115   PT Total Time (minutes) 30   PT Mode of treatment - Individual (minutes) 30   PT Mode of treatment - Concurrent (minutes) 0   PT Mode of treatment - Group (minutes) 0   PT Mode of treatment - Co-treat (minutes) 0   PT Mode of Treatment - Total time(minutes) 30 minutes   PT Cumulative Minutes 1080   Therapy Time missed   Time missed?  No

## 2023-02-02 NOTE — ASSESSMENT & PLAN NOTE
Wt Readings from Last 3 Encounters:   02/02/23 108 kg (238 lb 11 2 oz)   01/19/23 110 kg (243 lb 6 2 oz)   12/30/22 121 kg (265 lb 10 5 oz)     ECHO on 1/16 showed EF 65%, no RWMA, G1DD  Treated with Lasix in acute setting for volume overload  BNP 4475 on admission  Continue Lasix 40mg daily  Monitor daily weights and I&Os  Continue 1500FR

## 2023-02-02 NOTE — RESPIRATORY THERAPY NOTE
Patient placed on Nocturnal Pulsox Study at 1702 on room air    Nurse reports placing patint on 2L nasal Cannula at [phone]

## 2023-02-02 NOTE — ASSESSMENT & PLAN NOTE
Diagnosed with metastatic renal cell carcinoma in 2007  Progressive disease  Bone scan in 2020 showed lytic lesions to L scapula, R posterior and lateral ribs, L posterior iliac bone, and R iliac wing  Most recently started on Nivolumab in 12/2022  Ensure adequate pain control  Continue home Vitamin D supplementation  Follows with Dr Adrian Greene (Oncology) as outpatient

## 2023-02-02 NOTE — ASSESSMENT & PLAN NOTE
Hx of SBRT to T6, T10, and T4 between [id number]  Encourage adequate pain control  Follows with Dr Gerson Torres (Oncology) as outpatient

## 2023-02-02 NOTE — CASE MANAGEMENT
Team/Case Management Update:  Kelvin met with pt during therapy to discuss team update  Pt reported he was excited to discharge tomorrow and his son will be picking him up at 12pm, cm made RN aware of dc time  Cm also sent oxygen DME order through [place] to [facility] to be delivered tomorrow prior to dc

## 2023-02-02 NOTE — ASSESSMENT & PLAN NOTE
Presented on 1/12 after a mechanical fall with L shoulder pain  CT LUE: lytic metastasis int he scapular glenoid process measuring 2 9 x 2 4 x 2 0cm with cortical breakthrough and extraosseous extension anteriorly without pathologic fracture  New 2 4 x 2 1 x 1 9cm lytic metastasis in the L scapular body with acute pathologic fracture  New 1 9x0 8x1 4cm lytic metastasis in the inferior scapular angle with pathologic fracture  Non-surgical intervention per Ortho  NWB to LUE  Sling for comfort  Neurovascular checks Q shift  Ensure adequate pain control  Follow-up with Dr Lisseth Barton (Orthopedics) in 4 weeks  Primary team following  PT/OT  MRI L shoulder due to burning sensation/chronic ongoing pain on 1/26: metastatic disease of the scapula with pathologic fracture, full thickness partial width tear anterior supraspinatus superimposed upon moderate tendinosis, moderate subscapularis tendinosis, mild infraspinatus tendinosis

## 2023-02-02 NOTE — PROGRESS NOTES
Physical Medicine and Rehabilitation Progress Note  Ronda Burnett 67 y o  male MRN: [id number]  Unit/Bed#: [facility] 149-16 Encounter: [id number]    To Review: Ronda Burnett is a 67 y o  male with medical history of localized RCC in 2007 with mets in 2013 , on systemic therapy s/p L nephrectomy and prostatectomy, chronic diarrhea, CKD (Crea 1-1 3) who presented to the [address] [place], [place] [address] on 1/12 with L shoulder pain after a fall with arm abducted  XRs of left shoulder, humerus, scapula, and clavicle showed lytic metastatic tumor mass within the scapular neck and glenoid with no acute fractures or dislocations  CT was ordered and he was made NWB by Ortho  CT showed large pathologic scapula fracture with minimal displacement and a large lytic lesion in glenoid vault  Ortho discussed with Tiara Apple - Dr Kashif Tubbs who recommended sling for comfort and non-surgical management  They will follow-up outpatient  On admission also noted to have ARIANNA and Nephro was consulted who felt likely 2/2 autoregulatory failure  They started him on Lasix and help his Lisinopril and HCTZ  Echo was ordered which showed normal EF with G2DD  ARIANNA resolve with diuresis  Lasix 40mg oral daily  Franklyn Dock He also was newly requiring O2 on admission - but that improved diuresis  Given his cancer, D-dimer elevated, NM study was ordered which was indeterminant  LE dopplers were negative  CTA PE was ordered once renal function improved - this was negative  Heparin gtt discontinued It did however show enlargement of his RLL mets and bone mets concerning for progression  He will follow-up with his Oncologist as an outpatient to discuss resuming therapy  Admitted to the [facility] on 1/19  Chief Complaint: No new issues  Interval History/Subjective:   No acute events overnight  Last BM 2/1  No new CP, SOB, fevers, chills, N/V, abdominal pain  6 minute walk completed with therapies with desaturations into 80s on room air   He has some stable LAYNE  No PND/orthopnea  Pain is controlled taking Oyxcodone 5-7 5mg about 2-3x a day  ROS:  A 10 point review of systems was negative except for what is noted in the HPI  Today's Changes:  1  Ambulatory sats qualify for oxygen PRN with activity or if SOB  Updated CM  Will order equipment  2  Nocturnal ox qualifies for night time oxygen  CM ordering DME  3  Prepped discharge today  4  Team Conference, see separate note and dispo below for more detail  5  CXR reviewed presonally and stable  Total time spent:  35 minutes, with more than 50% spent counseling/coordinating care  Counseling includes discussion with patient re: progress in therapies, functional issues observed by therapy staff, and discussion with patient his/her current medical state/wellbeing  Coordination of patient's care was performed in conjunction with Internal Medicine service to monitor patient's labs, vitals, and management of their comorbidities  In addition, this patient was discussed by the interdisciplinary team in weekly case conference today  The care of the patient was extensively discussed with all care providers and an appropriate rehabilitation plan was formulated unique for this patient  Barriers were identified preventing progression of therapy and appropriate interventions were discussed with each discipline  Please see the team note for input from all disciplines regarding barriers, intervention, and discharge planning  Assessment/Plan:    * 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  Assessment & Plan  Large pathologic scapular fracture with minimal displacement  Large lytic lesion in glenoid vault  Management non-operatively  Sling for comfort  NWB  Ok for ROM in elbow, wrist, hand  Pendulums daily for now  Pain control as below  Outpatient f/u with Kirstin Scott in 4 weeks from injury (next week)  - Dr Tani Scott aware of discharge date and will get him scheduled      1/25 - acute change in sensation in supraclavicular distribution  Unable to check strength  1/26 MRI  Shoulder:   ROTATOR CUFF: Full-thickness partial width tear anterior supraspinatus insertion measuring 1 1 cm AP by 0 7 cm medial lateral superimposed upon moderate tendinosis  Moderate subscapularis insertional tendinosis with low-grade articular sided fraying  Mild infraspinatus insertional tendinosis  Edema within the teres minor muscle, likely related to denervation from mass effect of tumor      SUBACROMIAL/SUBDELTOID BURSA: There is mild subacromial/subdeltoid bursitis       LONG HEAD OF BICEPS TENDON: Mild tendinosis and tenosynovitis      GLENOID LABRUM: Complex tearing anterior inferior labrum      GLENOHUMERAL JOINT: Mild osteoarthritis     ACROMIOCLAVICULAR JOINT:  There is moderate osteoarthritis      BONES: As seen on multiple prior examinations, there is pathologic fracture of the scapular body   Multiple lesions again noted arising from the scapular body and glenoid, consistent with metastatic disease  The largest component of the tumor with   extraosseous extension measures approximately 5 9 x 4 7 x 5 7 cm (series 901, image 13)  This is located along the mid to inferior scapula  A 2nd component of tumor extends posteriorly along the superior aspect of the scapula measuring approximately   3 2 x 2 5 x 3 7 cm (series 401, image 15)  These tumors result in mass effect on the subscapularis, teres minor, and infraspinatus  Low serum vitamin B12  Assessment & Plan  1/30 Low normal 206  Started on supplementation  Foot pain, right  Assessment & Plan  Began 1/29  R metatarsal   No erythema, but some swelling  Worse with weight bearing  Feels like a "bruised bone"  1/30 XR shows midfoot mild degenerative changes and no acute osseous abnormality  Denies history of gout/pseudogout  Uric Acid normal  Suspect arthritis   - Continue WBAT for now   - Diclofenac gel while here   Can use Aspercreme at home  Cardiovascular risk factor  Assessment & Plan  Started on ASA by IM  Lipid Panel ordered 1/30   - He reports tolerating Atorvastatin, but getting proximal leg pain/burning once transitioned to simvastatin  - Symptoms stopped after stopping simvastatin  IM starting low dose Lipitor and monitoring  Outpatient f/u with PCP  Foraminal stenosis of cervical region  Assessment & Plan  Possibly symptomatic left C3 radiculopathy with decreased sensation and pain at C3-4 distribution  MRI showed severe L C3-4 foraminal stenosis  Moderate central stenosis at C4-5  Outpatient f/u with PCP  - If symptoms worsen recommend EMG to localize C3-4 vs  Supraclavicular     Nocturnal hypoxemia  Assessment & Plan  1/24 Noc ox showed significant desats  Will add night time oxygen  2/2 Noc Ox completed with significant desat   - Will need 2-3L NC overnight  Referral to Pulmonology at discharge    Neuropathy  Assessment & Plan  Continue gabapentin 300mg Q8hr  - TSH WNL more recently  - No recent A1C - 1/30 5 3    - No B12 checked previously - 1/30 206 - started on oral supplement  He also has cancer - could be paraneoplastic in etiology  - ordered Anti-hu Ab  Pending     - Follow-up this lab with PCP after discharge  Depending on what chemotherapy he has had this could also be a contributing factor  In the future, can consider an EMG to more likely describe the type of neuropathy he has to help narrow differential  For now compensatory strategies and control with medication for his discomfort  HTN (hypertension)  Assessment & Plan  Home: Amlodipine 5mg daily Lasix 40mg daily, was on Lisinopril/HCTZ 20-12 5mg daily  Here: Same without Lisinopril/HCTZ, and now amlodipine at night discontinued for hypotension   Monitor and adjust as appropriate  IM following and assisting with management  Pleural effusion  Assessment & Plan  Likely secondary to metastatic disease    CTA PE study on 1/17: New moderate L pleural effusion with moderate compressive atelectasis of left lower lobe  Does need oxygen intermittently during the day with activity    - Ambulatory sats with desaturation on 2/2  Encourage pulmonary toileting, incentive spirometry  Continue Lasix 40mg daily  - 1/31 required extra dose  - 2/1C CXR: 1   Small left pleural effusion with associated atelectasis  2   Redemonstration of multiple metastatic pulmonary nodules as seen on prior cross-sectional imaging  3   Stable appearance of metastatic osseous lesions involving the right posterior 7th rib and left scapula  Pathological left scapular fracture is better demonstrate on recent CT  Chronic diastolic (congestive) heart failure (HCC)  Assessment & Plan  Wt Readings from Last 3 Encounters:   02/02/23 108 kg (238 lb 11 2 oz)   01/19/23 110 kg (243 lb 6 2 oz)   12/30/22 121 kg (265 lb 10 5 oz)     Seen on Echo during this hospitalization  S/P IV diuresis for exacerbation  Now back on room air   1/31 with increased LAYNE and ? 88% sat in therapy  Crackles at base   - IM gave extra Lasix with improvement   - CXR ordered 2/1 to eval prior to discharge - stable L pleural effusion with atelectasis  Closely monitor I/O, daily weights, volume status  Cardiac diet with fluid restriction  Continue: Lasix 40mg daily   - Previously on ACE, HCTZ - holding for now and f/u with PCP at discharge   - Was not on BB  Outpatient f/u with PCP  Chronic diarrhea  Assessment & Plan  Chronically on Lomotil and Imodium  Monitor  Close continence care  - Barrier cream  Had MASD with fungal component on admission which is improving with EMILIA  Stage 3a chronic kidney disease [facility])  Assessment & Plan  Lab Results   Component Value Date    EGFR 76 02/02/2023    EGFR 82 01/30/2023    EGFR 77 01/26/2023    CREATININE 0 98 02/02/2023    CREATININE 0 92 01/30/2023    CREATININE 0 97 01/26/2023     Stage 2-3A    Crea baseline 1-1 3  Improved recently after ARIANNA 2/2 autoregulatory issues/congestion/volume overload  Now on Lasix daily monitor  Was on Lisinopril and HCTZ at home, currently being held  - Nephro says these can be restarted as necessary  Monitoring BP    - Would follow-up PCP  Continue to hold for now  Avoid nephrotoxic meds  Outpatient f/u     Metastatic renal cell carcinoma [facility])  Assessment & Plan  Originally 2007  Mets in 2013  On systemic therapy  S/p L nephrectomy and prostatectomy  Will need outpatient f/u with Oncology     Spine metastasis [facility])  Assessment & Plan  In most recent CT CAP in 2022 showed lytic/sclerotic lesions T4, T6, and scattered lesions in lumbar spine   NM bone scan in 2020 showed:  Scattered exophytic foci of radiotracer uptake in the spine corresponding to degenerative changes on CT  Ct C-Spine in 6/2022 showed no lesions  He has pelvic/iliac lytic lesions  Monitor neuro status and for worsening back pain  Pain management as below  - Oncologist prescribes pain medication   - Going home on oxycodone as tramadol did not provide adequate relief  Giving 10 day supply 5-7 5mg Q6hr PRN  - May benefit from outpatient eval for Palliative  He wanted to hold off after a brief discussion while here  Lung metastases ([facility] [address] )  Assessment & Plan  With recent AHRF in setting of volume overload and acute diastolic heart failure and ARIANNA  Now on room air, except does require O2 occasionally with activity/exertion up to 2L  Has pleural effusion in addition - stable on 2/1 imaging  Closely monitor respiratory status  Pulmonary toilet  Outpatient f/u with Oncology and Pulmonology    Bone metastases [facility])  Assessment & Plan  Monitor performance in therapy  If he develops new pain, particular in long bones, would image    - Since his fall has had new R hip pain primarily with weight bearing   - 2022 CT CAP showed lytic lesion superior to the right hip are identified as well as the posterior column of the acetabulum     - XR of R hip without significant changes  Better visualized on CT   2020 Bone Scan showed lytic lesions:   left lateral scapula inferior to the glenoid     right posterior and lateral ribs corresponding to expansile lytic lesions on CT  (recent Ct with expansive R 7th rib met)   left posterior iliac bone and right iliac wing corresponding to lytic lesions on CT    r nonspecific tibial finding    Outpatient f/u with Dr Hue Anguiano with Ortho onc  Dermatitis associated with moisture-resolved as of 2/2/2023  Assessment & Plan  Soap, water to buttocks TID and PRN followed by barrier cream   Nystatin powder R groin      Health Maintenance  #Delirium/Sleep: At risk  Environmental interventions  Optimize pain, bowel, bladder, sleep-wake cycle management  #Pain: Tylenol PRN, Gabapentin 300mg TID, Oxycodone 5-7 5mg PRN  #Bowel: Last BM 2/1  Chronic diarrhea with occasional incontinence, see above  #Bladder: Voiding and continent  #Skin/Pressure Injury Prevention: Turn Q2hr in bed, with weight shifts [id number] in wheelchair  Float heels in bed  #DVT Prophylaxis: HSQ,SCDs  #GI Prophylaxis: Not indicated  #Code Status:  Full Code  #FEN: Cardiac diet with 1500cc fluid restriction  #Dispo:  Team 2/2: ADD 2/3 with Faye Escalante PT/OT  Goals are modified Ind, may need Sup with bathing given L arm restrictions    - Outpatient f/u with Ortho 1 weeks after discharge  - Outpatient f/u with Oncology  - Outpatient f/u with Pulmonology    - Outpatient f/u with PCP  - Scripts to be sent to the [facility]      Objective:    Functional Update:  PT: Ind transfers, bed mobility, Ind ambulation, Sup stairs - 12, Sup ramp  OT:  Setup eating, Sup grooming, Barry bathing, Sup UB dressing, Barry LB dressing, Barry tub/shower transfer, Barry toileting, Sup toilet transfers    Allergies per EMR    Physical Exam:  Temp:  [97 9 °F (36 6 °C)-98 3 °F (36 8 °C)] 97 9 °F (36 6 °C)  HR:  [100-102] 102  Resp:  [16-18] 18  BP: (129-140)/(63-77) 129/66  Oxygen Therapy  SpO2: 91 %  O2 Flow Rate (L/min): 2 L/min    Gen: No acute distress, Well-nourished, well-appearing  HEENT: Moist mucus membranes, Normocephalic/Atraumatic  Cardiovascular: Regular rate, rhythm, S1/S2  Distal pulses palpable  Heme/Extr: BL Le edema stable/improved  Pulmonary: Non-labored breathing  Lungs with good air entry and mild bibasilar rales  : No williamson  GI: Soft, non-tender, non-distended  BS+  MSK: L arm in sling  Integumentary: Cool extremities  Refill < 2s  Neuro: AAOx3,  Speech is intact  Appropriate to questioning  Psych: Normal mood and affect  Diagnostic Studies: Reviewed  2/1 CXR:  1   Small left pleural effusion with associated atelectasis  2   Redemonstration of multiple metastatic pulmonary nodules as seen on prior cross-sectional imaging  3   Stable appearance of metastatic osseous lesions involving the right posterior 7th rib and left scapula  Pathological left scapular fracture is better demonstrate on recent CT      Laboratory:  Reviewed  Results from last 7 days   Lab Units 01/30/23  0603   HEMOGLOBIN g/dL 11 2*   HEMATOCRIT % 38 7   WBC Thousand/uL 5 80     Results from last 7 days   Lab Units 02/02/23  0524 01/30/23  0514   BUN mg/dL 18 19   POTASSIUM mmol/L 4 2 4 1   CHLORIDE mmol/L 97 100   CREATININE mg/dL 0 98 0 92   AST U/L  --  33   ALT U/L  --  17            Patient Active Problem List   Diagnosis   • Clear cell adenocarcinoma of kidney, left (HCC)   • Bone metastases (HCC)   • Lung metastases (HCC)   • Spine metastasis ([facility] [address] )   • Metastatic renal cell carcinoma (HCC)   • Hx of prostatectomy   • History of prostate cancer   • Azotemia   • COVID-19   • Stage 3a chronic kidney disease ([facility] [address] )   • H/O left nephrectomy   • Fall at home, initial encounter   • 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   • Acute respiratory failure with hypoxia (HCC)   • Elevated brain natriuretic peptide (BNP) level   • Elevated d-dimer   • Chronic diarrhea   • Chronic diastolic (congestive) heart failure (HCC)   • Dermatitis associated with moisture   • Pleural effusion   • HTN (hypertension)   • Neuropathy   • Nocturnal hypoxemia   • Foraminal stenosis of cervical region   • Cardiovascular risk factor   • Foot pain, right   • Low serum vitamin B12         Medications  Current Facility-Administered Medications   Medication Dose Route Frequency Provider Last Rate   • acetaminophen  975 mg Oral 4x Daily PRN Bryant Segura MD     • aluminum-magnesium hydroxide-simethicone  30 mL Oral Q6H PRN Bryant Segura MD     • aspirin  81 mg Oral Daily Caitlin Blaze, CRNP     • atorvastatin  20 mg Oral Daily With [address], CRNP     • cholecalciferol  1,000 Units Oral Daily Bryant Segura MD     • cyanocobalamin  1,000 mcg Oral Daily Caitlin Blaely CRNP     • Diclofenac Sodium  2 g Topical TID Bryant Segura MD     • diphenhydrAMINE  25 mg Oral HS PRN Bryant Segura MD     • furosemide  40 mg Oral Daily Caitlin Blaely CRNP     • gabapentin  300 mg Oral TID Bryant Segura MD     • heparin (porcine)  5,000 Units Subcutaneous [facility] Bryant Segura MD     • lidocaine  2 patch Topical Daily Bryant Segura MD     • loperamide  2 mg Oral 4x Daily PRN Bryant Segura MD     • multivitamin stress formula  1 tablet Oral Daily Bryant Segura MD     • nystatin   Topical BID Bryant Segura MD     • ondansetron  4 mg Oral Q6H PRN Bryant Segura MD     • oxyCODONE  5 mg Oral 4x Daily PRN Jose Juan Law MD     • oxyCODONE  2 5 mg Oral 4x Daily PRN Jose Juan Law MD     • oxyCODONE  7 5 mg Oral Q4H PRN Jose Juan Law MD     • simethicone  80 mg Oral 4x Daily PRN Bryant Segura MD     • white petrolatum-mineral oil   Topical BID IRENE Olmos            ** Please Note: Fluency Direct voice to text software may have been used in the creation of this document   **

## 2023-02-02 NOTE — NURSING NOTE
Overnight pulse oximetry study begun @ 2200  Machine alarming pulse oxy 74%  Pt denies SOB but O2 put on pt @ 2L/min via nasal cannula  Respiratory therapy alerted  Will continue to monitor pt

## 2023-02-02 NOTE — PLAN OF CARE
Problem: NEUROSENSORY - ADULT  Goal: Achieves maximal functionality and self care  Description: INTERVENTIONS  - Monitor swallowing and airway patency with patient fatigue and changes in neurological status  - Encourage and assist patient to increase activity and self care     - Encourage visually impaired, hearing impaired and aphasic patients to use assistive/communication devices  Outcome: Progressing     Problem: GASTROINTESTINAL - ADULT  Goal: Maintains adequate nutritional intake  Description: INTERVENTIONS:  - Monitor percentage of each meal consumed  - Identify factors contributing to decreased intake, treat as appropriate  - Assist with meals as needed  - Monitor I&O, weight, and lab values if indicated  - Obtain nutrition services referral as needed  Outcome: Progressing     Problem: GENITOURINARY - ADULT  Goal: Maintains or returns to baseline urinary function  Description: INTERVENTIONS:  - Assess urinary function  - Encourage oral fluids to ensure adequate hydration if ordered  - Administer IV fluids as ordered to ensure adequate hydration  - Administer ordered medications as needed  - Offer frequent toileting  - Follow urinary retention protocol if ordered  Outcome: Progressing

## 2023-02-02 NOTE — PROGRESS NOTES
02/02/23 0730   Pain Assessment   Pain Assessment Tool 0-10   Pain Score 5   Pain Location/Orientation Orientation: Left; Location: Shoulder   Pain Radiating Towards none   Pain Onset/Description Onset: Ongoing   Effect of Pain on Daily Activities inc with movt'   Patient's Stated Pain Goal No pain   Hospital Pain Intervention(s) Rest   Restrictions/Precautions   Precautions Fall Risk;Pain   Weight Bearing Restrictions Yes   LUE Weight Bearing Per Order NWB  (vc barnett for prec)   Braces or Orthoses Sling   Eating   Type of Assistance Needed Set-up / clean-up   Comment A to openc ontainers and cut food   Eating CARE Score 5   Oral Hygiene   Type of Assistance Needed Independent   Comment completed seated at sink   Oral Hygiene CARE Score 6   Shower/Bathe Self   Type of Assistance Needed Physical assistance   Physical Assistance Level 25% or less   Comment completed shower this session  Pt able to wash 9/10 parts  2* to LUE restrictions and shoulder ROM  Pt req assistance to wash RUE  Pt utilized toielt wand to wash bottom  placed wash cloth on floor to wash feet   Shower/Bathe Self CARE Score 3   Bathing   Assessed Bath Style Shower   Anticipated D/C Bath Style Shower   Able to [place] No   Able to [employer] Temperature No   Able to Wash/Rinse/Dry (body part) Left Arm;L Upper Leg;R Upper Leg;L Lower Leg/Foot;R Lower Leg/Foot;Chest;Abdomen;Perineal Area; Buttocks   Tub/Shower Transfer   Limitations Noted In Balance; Endurance;LE Strength   Adaptive Equipment Grab Bars;Seat with Back   Assessed Shower   Findings CS for shower transfer   Upper Body Dressing   Type of Assistance Needed Independent   Comment pt able to doff/don shirt and sling   Upper Body Dressing CARE Score 6   Lower Body Dressing   Type of Assistance Needed Independent; Adaptive equipment   Comment pt able to thread undergarment/pants with use of LHR   In stance with inc time able to complete CM   Lower Body Dressing CARE Score 6 Putting On/Taking Off Footwear   Type of Assistance Needed Physical assistance   Physical Assistance Level 26%-50%   Comment TA for TEDS  With use of [place] [address] pt able to don R sneaker and G fx reach for velcro  Req A to thread L toes in sneaker and AFO  Pt able to manage velcro   Putting On/Taking Off Footwear CARE Score 3   Lying to Sitting on Side of Bed   Type of Assistance Needed Independent   Lying to Sitting on Side of Bed CARE Score 6   Sit to Stand   Type of Assistance Needed Independent; Adaptive equipment   Comment Liban with [facility]   Sit to Stand CARE Score 6   Bed-Chair Transfer   Type of Assistance Needed Independent; Adaptive equipment   Comment [place] with [facility] [place]   Chair/Bed-to-Chair Transfer CARE Score 6   Toileting Hygiene   Type of Assistance Needed Independent; Adaptive equipment   Comment reports taking sef to bathroom and using toilet wand   Toileting Hygiene CARE Score 6   Toilet Transfer   Type of Assistance Needed Independent; Adaptive equipment   Toilet Transfer CARE Score 6   Cognition   Overall Cognitive Status WFL   Arousal/Participation Alert; Cooperative   Attention Within functional limits   Orientation Level Oriented X4   Memory Within functional limits   Following Commands Follows all commands and directions without difficulty   Activity Tolerance   Activity Tolerance Patient tolerated treatment well   Assessment   Treatment Assessment Engaged pt in 90mins of skilled OT services with focus on ADL routine  Pt overall fx at liban level for UB/LB dressing  In regards to bathing pt will req Barry when washing RUE 2* to LUE NWB/shoulder restrictions  Pt reports at home he has shower chair, recommend GB in shower to ensure safety  Pt rpeorts he has medical alert system, therapist encouraged pt to wear at home to ensure safety which pt verbalized understanding  Samuel DC 2/3 with home OT/PT services   FT today 11am    Prognosis Fair   Barriers to Discharge None   Plan   Treatment/Interventions Patient/family training   Progress Improving as expected   Recommendation   OT Discharge Recommendation Home with home health rehabilitation   Equipment Recommended Bedside commode;Reacher ($)  (dressing stick, LH[facility])   Commode Type Standard   OT Equipment ordered pt ahs all DME   OT Therapy Minutes   OT Time In 0730   OT Time Out 0900   OT Total Time (minutes) 90   OT Mode of treatment - Individual (minutes) 90   OT Mode of treatment - Concurrent (minutes) 0   OT Mode of treatment - Group (minutes) 0   OT Mode of treatment - Co-treat (minutes) 0   OT Mode of Treatment - Total time(minutes) 90 minutes   OT Cumulative Minutes 1050   Therapy Time missed   Time missed?  No

## 2023-02-02 NOTE — ASSESSMENT & PLAN NOTE
Lab Results   Component Value Date    EGFR 76 02/02/2023    EGFR 82 01/30/2023    EGFR 77 01/26/2023    CREATININE 0 98 02/02/2023    CREATININE 0 92 01/30/2023    CREATININE 0 97 01/26/2023     Creatinine currently 0 98  Baseline creatinine 1 0-1 3  Hx of L nephrectomy  Developed ARIANNA in acute setting due to autoregulatory failure/volume overload/congestion  Currently on Lasix 40mg daily  Avoid nephrotoxins as able  Encourage adequate hydration  Continue to trend BMP

## 2023-02-02 NOTE — PLAN OF CARE
Problem: SKIN/TISSUE INTEGRITY - ADULT  Goal: Skin Integrity remains intact(Skin Breakdown Prevention)  Description: Assess:  -Perform Bert assessment every shift  -Clean and moisturize skin every   -Inspect skin when repositioning, toileting, and assisting with ADLS  -Assess extremities for adequate circulation and sensation     Bed Management:  -Have minimal linens on bed & keep smooth, unwrinkled  -Change linens as needed when moist or perspiring  -Avoid sitting or lying in one position for more than  hours while in bed  -Keep HOB at degrees     Toileting:  -Offer bedside commode  -Assess for incontinence every shift  -Use incontinent care products after each incontinent episode such as     Activity:  -Mobilize patient 6 times a day  -Encourage activity and walks on unit  -Encourage or provide ROM exercises   -Turn and reposition patient every 2 Hours  -Use appropriate equipment to lift or move patient in bed  -Instruct/ Assist with weight shifting every 2 when out of bed in chair  -Consider limitation of chair time 2 hour intervals    Skin Care:  -Avoid use of baby powder, tape, friction and shearing, hot water or constrictive clothing  -Relieve pressure over bony prominences using   -Do not massage red bony areas    Next Steps:  -Teach patient strategies to minimize risks such as    -Consider consults to  interdisciplinary teams such as  Outcome: Progressing     Problem: MUSCULOSKELETAL - ADULT  Goal: Maintain or return mobility to safest level of function  Description: INTERVENTIONS:  - Assess patient's ability to carry out ADLs; assess patient's baseline for ADL function and identify physical deficits which impact ability to perform ADLs (bathing, care of mouth/teeth, toileting, grooming, dressing, etc )  - Assess/evaluate cause of self-care deficits   - Assess range of motion  - Assess patient's mobility  - Assess patient's need for assistive devices and provide as appropriate  - Encourage maximum independence but intervene and supervise when necessary  - Involve family in performance of ADLs  - Assess for home care needs following discharge   - Consider OT consult to assist with ADL evaluation and planning for discharge  - Provide patient education as appropriate  Outcome: Progressing     Problem: PAIN - ADULT  Goal: Verbalizes/displays adequate comfort level or baseline comfort level  Description: Interventions:  - Encourage patient to monitor pain and request assistance  - Assess pain using appropriate pain scale  - Administer analgesics based on type and severity of pain and evaluate response  - Implement non-pharmacological measures as appropriate and evaluate response  - Consider cultural and social influences on pain and pain management  - Notify physician/advanced practitioner if interventions unsuccessful or patient reports new pain  Outcome: Progressing

## 2023-02-02 NOTE — ASSESSMENT & PLAN NOTE
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

## 2023-02-02 NOTE — DISCHARGE SUMMARY
Discharge Summary - PMR   Amari Roth 67 y o  male MRN: [id number]  Unit/Bed#: [facility] 610-15 Encounter: [id number]    Admission Date: 1/19/2023     Discharge Date: 2/3/2023    Etiologic/Rehabilitation Diagnosis: Impairment of mobility, safety and Activities of Daily Living (ADLs) due to Orthopedic Disorders:  08 9  Other Orthopedic L scapular fracture - pathologic 2/2 lytic mets with fall    HPI:   Amari Roth is a 67 y o  male with medical history of localized RCC in 2007 with mets in 2013 , on systemic therapy s/p L nephrectomy and prostatectomy, chronic diarrhea, CKD (Crea 1-1 3) who presented to the [address] [place], [place] [address] on 1/12 with L shoulder pain after a fall with arm abducted  XRs of left shoulder, humerus, scapula, and clavicle showed lytic metastatic tumor mass within the scapular neck and glenoid with no acute fractures or dislocations  CT was ordered and he was made NWB by Ortho  CT showed large pathologic scapula fracture with minimal displacement and a large lytic lesion in glenoid vault  Ortho discussed with Lucy Miller - Dr Saintclair Maxim who recommended sling for comfort and non-surgical management  They will follow-up outpatient  On admission also noted to have ARIANNA and Nephro was consulted who felt likely 2/2 autoregulatory failure  They started him on Lasix and help his Lisinopril and HCTZ  Echo was ordered which showed normal EF with G2DD  ARIANNA resolve with diuresis  Lasix 40mg oral daily  Roanna Schlatter He also was newly requiring O2 on admission - but that improved diuresis  Given his cancer, D-dimer elevated, NM study was ordered which was indeterminant  LE dopplers were negative  CTA PE was ordered once renal function improved - this was negative  Heparin gtt discontinued It did however show enlargement of his RLL mets and bone mets concerning for progression  He will follow-up with his Oncologist as an outpatient to discuss resuming therapy  Admitted to the [facility] on 1/19  Procedures Performed During [facility]C Admission: None    Acute Rehabilitation Center Course: Patient participated in a comprehensive interdisciplinary inpatient rehabilitation program which included involvment of MD, therapies (PT, OT, and/or SLP), RN, CM, SW, dietary, and psychology services  He was able to be advanced to a modified independent level of assist and considered safe for discharge home  Please see below for patient's day to day management of rehabilitation needs  Please refer to Internal Medicine notes during [facility] stay for day to day management of patient's medical co-morbidities  * 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  Assessment & Plan  Large pathologic scapular fracture with minimal displacement  Large lytic lesion in glenoid vault  Management non-operatively  Sling for comfort  NWB  Ok for ROM in elbow, wrist, hand  Pendulums daily for now  Pain control as below  Outpatient f/u with Seleta Kussmaul Dr Hubbard Asp in 4 weeks from injury (next week)  - Dr Katelynn Cassidy aware of discharge date and will get him scheduled  1/25 - acute change in sensation in supraclavicular distribution  Unable to check strength  1/26 MRI  Shoulder:   ROTATOR CUFF: Full-thickness partial width tear anterior supraspinatus insertion measuring 1 1 cm AP by 0 7 cm medial lateral superimposed upon moderate tendinosis  Moderate subscapularis insertional tendinosis with low-grade articular sided fraying  Mild infraspinatus insertional tendinosis    Edema within the teres minor muscle, likely related to denervation from mass effect of tumor      SUBACROMIAL/SUBDELTOID BURSA: There is mild subacromial/subdeltoid bursitis       LONG HEAD OF BICEPS TENDON: Mild tendinosis and tenosynovitis      GLENOID LABRUM: Complex tearing anterior inferior labrum      GLENOHUMERAL JOINT: Mild osteoarthritis     ACROMIOCLAVICULAR JOINT:  There is moderate osteoarthritis      BONES: As seen on multiple prior examinations, there is pathologic fracture of the scapular body   Multiple lesions again noted arising from the scapular body and glenoid, consistent with metastatic disease  The largest component of the tumor with   extraosseous extension measures approximately 5 9 x 4 7 x 5 7 cm (series 901, image 13)  This is located along the mid to inferior scapula  A 2nd component of tumor extends posteriorly along the superior aspect of the scapula measuring approximately   3 2 x 2 5 x 3 7 cm (series 401, image 15)  These tumors result in mass effect on the subscapularis, teres minor, and infraspinatus  Low serum vitamin B12  Assessment & Plan  1/30 Low normal 206  Started on supplementation  Foot pain, right  Assessment & Plan  Began 1/29  R metatarsal   No erythema, but some swelling  Worse with weight bearing  Feels like a "bruised bone"  1/30 XR shows midfoot mild degenerative changes and no acute osseous abnormality  Denies history of gout/pseudogout  Uric Acid normal  Suspect arthritis   - Continue WBAT for now   - Diclofenac gel     Cardiovascular risk factor  Assessment & Plan  Started on ASA by IM  Lipid Panel ordered 1/30   - He reports tolerating Atorvastatin, but getting proximal leg pain/burning once transitioned to simvastatin  - Symptoms stopped after stopping simvastatin  IM starting low dose Lipitor and monitoring  Outpatient f/u with PCP  Foraminal stenosis of cervical region  Assessment & Plan  Possibly symptomatic left C3 radiculopathy with decreased sensation and pain at C3-4 distribution  MRI showed severe L C3-4 foraminal stenosis  Moderate central stenosis at C4-5  Outpatient f/u with spine  Nocturnal hypoxemia  Assessment & Plan  1/24 Noc ox showed significant desats  Will add night time oxygen  Check noc ox 48 hours prior to discharge   Referral to sleep medicine at discharge       Neuropathy  Assessment & Plan  Continue gabapentin 300mg Q8hr  - TSH WNL more recently  - No recent A1C - 1/30 5 3    - No B12 checked previously - 1/30 206 - started on oral supplement  He also has cancer - could be paraneoplastic in etiology  - ordered Anti-hu Ab  Pending  Depending on what chemotherapy he has had this could also be a contributing factor  In the future, can consider an EMG to more likely describe the type of neuropathy he has to help narrow differential  For now compensatory strategies and control with medication for his discomfort  HTN (hypertension)  Assessment & Plan  Home: Amlodipine 5mg daily Lasix 40mg daily, was on Lisinopril/HCTZ 20-12 5mg daily  Here: Same without Lisinopril/HCTZ, and now amlodipine at night discontinued for hypotension   Monitor and adjust as appropriate  IM following and assisting with management  Pleural effusion  Assessment & Plan  Likely secondary to metastatic disease  CTA PE study on 1/17: New moderate L pleural effusion with moderate compressive atelectasis of left lower lobe  Currently maintaining on RA  Monitor spot pulse ox  Encourage pulmonary toileting, incentive spirometry  Continue Lasix 40mg daily  - 1/31 required extra dose  - 2/1 IM checking CXR prior to discharge  Dermatitis associated with moisture  Assessment & Plan  Soap, water to buttocks TID and PRN followed by barrier cream   Nystatin powder R groin    Chronic diastolic (congestive) heart failure (HCC)  Assessment & Plan  Wt Readings from Last 3 Encounters:   02/01/23 107 kg (236 lb 3 2 oz)   01/19/23 110 kg (243 lb 6 2 oz)   12/30/22 121 kg (265 lb 10 5 oz)     Seen on Echo during this hospitalization  S/P IV diuresis for exacerbation  Now back on room air   1/31 with increased LAYNE and ? 88% sat in therapy  Crackles at base   - IM gave extra Lasix with improvement   - CXR ordered 2/1 to eval prior to discharge     Closely monitor I/O, daily weights, volume status  Cardiac diet with fluid restriction  Continue: Lasix 40mg daily   - Previously on ACE, may be able to restart   - Was not on BB  Outpatient f/u with PCP  Chronic diarrhea  Assessment & Plan  Chronically on Lomotil  Monitor  Close continence care  Stage 3a chronic kidney disease [facility])  Assessment & Plan  Lab Results   Component Value Date    EGFR 82 01/30/2023    EGFR 77 01/26/2023    EGFR 74 01/23/2023    CREATININE 0 92 01/30/2023    CREATININE 0 97 01/26/2023    CREATININE 1 00 01/23/2023     Stage 2-3A  Crea baseline 1-1 3  Improved recently after ARIANNA 2/2 autoregulatory issues/congestion/volume overload  Now on Lasix daily monitor  Was on Lisinopril and HCTZ at home, currently being held  - Nephro says these can be restarted as necessary  Monitoring BP  Avoid nephrotoxic meds  Outpatient f/u     Metastatic renal cell carcinoma [facility])  Assessment & Plan  Originally 2007  Mets in 2013  On systemic therapy  S/p L nephrectomy and prostatectomy  Will need outpatient f/u with Oncology     Spine metastasis [facility])  Assessment & Plan  In most recent CT CAP in 2022 showed lytic/sclerotic lesions T4, T6, and scattered lesions in lumbar spine   NM bone scan in 2020 showed:  Scattered exophytic foci of radiotracer uptake in the spine corresponding to degenerative changes on CT  Ct C-Spine in 6/2022 showed no lesions  He has pelvic/iliac lytic lesions  Monitor neuro status and for worsening back pain  Pain management as below  Lung metastases ([address] )  Assessment & Plan  With recent AHRF in setting of volume overload and acute diastolic heart failure and ARIANNA  Now on room air  Has pleural effusion in addition  Closely monitor respiratory status  Pulmonary toilet  Outpatient f/u with Oncology    Bone metastases [facility])  Assessment & Plan  Monitor performance in therapy   If he develops new pain, particular in long bones, would image    - Since his fall has had new R hip pain primarily with weight bearing   - 2022 CT CAP showed lytic lesion superior to the right hip are identified as well as the posterior column of the acetabulum  - XR of R hip without significant changes  Better visualized on CT   2020 Bone Scan showed lytic lesions:   left lateral scapula inferior to the glenoid     right posterior and lateral ribs corresponding to expansile lytic lesions on CT  (recent Ct with expansive R 7th rib met)   left posterior iliac bone and right iliac wing corresponding to lytic lesions on CT    r nonspecific tibial finding    Outpatient f/u with Dr Romana Peacemaker with Ortho onc  Discharge Physical Examination:  /62 (BP Location: Right arm)   Pulse 99   Temp 97 7 °F (36 5 °C) (Tympanic)   Resp 18   Ht 5' 10" (1 778 m)   Wt 108 kg (237 lb)   SpO2 92%   BMI 34 01 kg/m²     Gen: No acute distress, Well-nourished, well-appearing  HEENT: Moist mucus membranes, Normocephalic/Atraumatic  Cardiovascular: Regular rate, rhythm, S1/S2  Distal pulses palpable  Heme/Extr: Stable BL mod pitting edema  Pulmonary: Non-labored breathing  Lungs with good air entry, faint crackles at bases  : No williamson  GI: Soft, non-tender, non-distended  BS+  MSK: ROM WFL in all extremities - did not check L shoulder  Integumentary: Skin is warm, dry  Neuro: AAOx3, Decreased sensation in C3,4 vs  Supraclavicular distribution on the L with light touch  Distally intact  MMT:   Strength:   Right  Left  Site  Right  Left  Site    5 NT  S Ab: Shoulder Abductors  5  5  HF: Hip Flexors    5 5  EF: Elbow Flexors  5  5 KF: Knee Flexors    5  5  EE: Elbow Extensors  5  5  KE: Knee Extensors    5  5  WE: Wrist Extensors  5  4 DR: Dorsi Flexors    5  5  FF: Finger Flexors  5  4  PF: Plantar Flexors    5  5  HI: Hand Intrinsics  5  4  EHL: Extensor Hallucis Longus   Psych: Normal mood and affect       Significant Findings, Care, Treatment and Services Provided: Acute comprehensive interdisciplinary inpatient rehabilitation including PT, OT, SLP, RN, CM, SW, dietary, psychology, etc     Complications: None    Functional Status Upon Admission to [facility]:  Mobility: Barry 39' with straight cane ambulation  Transfers: min-modA  ADLs: Barry UB, modA LB  Functional Status Upon Discharge from [facility]:   PT: Independent transfers, bed moblity, ambulation 175' with WBQC, Sup-mod Ind stairs - full flight and ramp  OT: Set-up eating, Ind oral hygiene/grooming, CGA bathing, Ind UB/LB dressing, Barry footwear, Ind toileting hygiene/transfers  Discharge Diagnosis: Impairment of mobility, safety and Activities of Daily Living (ADLs) due to Orthopedic Disorders:  08 9  Other Orthopedic see above    Discharge Medications:   See after visit summary for reconciled discharge medications provided to patient and family  Condition at Discharge: good     Discharge instructions/Information to patient and family:   See after visit summary for information provided to patient and family  Provisions for Follow-Up Care:  See after visit summary for information related to follow-up care and any pertinent home health orders  Future Appointments   Date Time Provider Mono Coleman   2/20/2023  8:00 AM Richard Patterson MD HEM ONC [facility]   2/24/2023  9:00 AM AL INF CHAIR 9 [facility]   3/24/2023  9:00 AM AL INF CHAIR 9 [facility]     I reached out to Ortho and Pulm to contact patient to arrange f/u  Disposition: Home with outpatient rehab at home via [facility] (OUTPATIENT [facility]) (PT/OT)    Planned Readmission: No    Discharge Statement   I spent 45 minutes discharging the patient  This time was spent on the day of discharge  I had direct contact with the patient on the day of discharge  Greater than 50% of the total time was spent examining patient, answering all patient questions, arranging and discussing plan of care with patient as well as directly providing post-discharge instructions  Additional time then spent on discharge activities      Discharge Medications:  See after visit summary for reconciled discharge medications provided to patient and family        Facility Administered Medications Prior to Discharge:    Current Facility-Administered Medications   Medication Dose Route Frequency Provider Last Rate   • acetaminophen  975 mg Oral 4x Daily PRN Demarco Quiroz MD     • aluminum-magnesium hydroxide-simethicone  30 mL Oral Q6H PRN Demarco Quiroz MD     • aspirin  81 mg Oral Daily IRENE Workman     • atorvastatin  20 mg Oral Daily With [address], CRNP     • cholecalciferol  1,000 Units Oral Daily Demarco Quiroz MD     • cyanocobalamin  1,000 mcg Oral Daily IRENE Workman     • Diclofenac Sodium  2 g Topical TID Demarco Quiroz MD     • diphenhydrAMINE  25 mg Oral HS PRN Demarco Quiroz MD     • furosemide  40 mg Oral Daily IRENE Workman     • gabapentin  300 mg Oral TID Demarco Quiroz MD     • heparin (porcine)  5,000 Units Subcutaneous [facility] Demarco Quiroz MD     • lidocaine  2 patch Topical Daily Demarco Quiroz MD     • loperamide  2 mg Oral 4x Daily PRN Demarco Quiroz MD     • multivitamin stress formula  1 tablet Oral Daily Demarco Quiroz MD     • nystatin   Topical BID Demarco Quiroz MD     • ondansetron  4 mg Oral Q6H PRN Demarco Quiroz MD     • oxyCODONE  5 mg Oral 4x Daily PRN Karishma Eisenberg MD     • oxyCODONE  2 5 mg Oral 4x Daily PRN Karishma Eisenberg MD     • oxyCODONE  7 5 mg Oral Q4H PRN Karishma Eisenberg MD     • simethicone  80 mg Oral 4x Daily PRN Demarco Quiroz MD     • white petrolatum-mineral oil   Topical BID IRENE Workman

## 2023-02-03 VITALS
RESPIRATION RATE: 18 BRPM | WEIGHT: 237 LBS | SYSTOLIC BLOOD PRESSURE: 117 MMHG | TEMPERATURE: 97.7 F | OXYGEN SATURATION: 92 % | BODY MASS INDEX: 33.93 KG/M2 | HEIGHT: 70 IN | HEART RATE: 99 BPM | DIASTOLIC BLOOD PRESSURE: 62 MMHG

## 2023-02-03 LAB

## 2023-02-03 RX ADMIN — GABAPENTIN 300 MG: 300 CAPSULE ORAL at 08:11

## 2023-02-03 RX ADMIN — LIDOCAINE 2 PATCH: 50 PATCH CUTANEOUS at 08:12

## 2023-02-03 RX ADMIN — CYANOCOBALAMIN TAB 1000 MCG 1000 MCG: 1000 TAB at 08:11

## 2023-02-03 RX ADMIN — OXYCODONE HYDROCHLORIDE 5 MG: 5 TABLET ORAL at 08:12

## 2023-02-03 RX ADMIN — CHOLECALCIFEROL TAB 25 MCG (1000 UNIT) 1000 UNITS: 25 TAB at 08:11

## 2023-02-03 RX ADMIN — Medication: at 09:24

## 2023-02-03 RX ADMIN — FUROSEMIDE 40 MG: 40 TABLET ORAL at 06:17

## 2023-02-03 RX ADMIN — ASPIRIN 81 MG: 81 TABLET, COATED ORAL at 08:11

## 2023-02-03 RX ADMIN — HEPARIN SODIUM 5000 UNITS: 5000 INJECTION INTRAVENOUS; SUBCUTANEOUS at 06:17

## 2023-02-03 RX ADMIN — ACETAMINOPHEN 975 MG: 325 TABLET ORAL at 02:20

## 2023-02-03 RX ADMIN — NYSTATIN: 100000 POWDER TOPICAL at 09:24

## 2023-02-03 RX ADMIN — B-COMPLEX W/ C & FOLIC ACID TAB 1 TABLET: TAB at 08:11

## 2023-02-03 NOTE — PROGRESS NOTES
02/03/23 0830   Restrictions/Precautions   Precautions Fall Risk   LUE Weight Bearing Per Order NWB   Braces or Orthoses Sling   Lying to Sitting on Side of Bed   Type of Assistance Needed Independent   Lying to Sitting on Side of Bed CARE Score 6   Sit to Stand   Type of Assistance Needed Independent   Comment QC   Sit to Stand CARE Score 6   Bed-Chair Transfer   Type of Assistance Needed Independent   Comment QC   Chair/Bed-to-Chair Transfer CARE Score 6   Car Transfer   Type of Assistance Needed Independent   Car Transfer CARE Score 6   Walk 10 Feet   Type of Assistance Needed Independent   Comment QC   Walk 10 Feet CARE Score 6   Walk 50 Feet with Two Turns   Type of Assistance Needed Independent   Comment QC   Walk 50 Feet with Two Turns CARE Score 6   Walk 150 Feet   Type of Assistance Needed Supervision   Comment QC- cues for PLB   Walk 150 Feet CARE Score 4   Walking 10 Feet on Uneven Surfaces   Type of Assistance Needed Supervision   Comment on floor mat with wts underneath to challenge balance (no overt LOB)   Walking 10 Feet on Uneven Surfaces CARE Score 4   Ambulation   Primary Mode of Locomotion Prior to Admission Walk   Distance Walked (feet) 150 ft   Findings post 02 at 150feet pt was at 85 but jumped back into 90s within seconds  /70   Does the patient walk? 2   Yes   Wheel 50 Feet with Two Turns   Reason if not Attempted Activity not applicable   Wheel 50 Feet with Two Turns CARE Score 9   Wheel 150 Feet   Reason if not Attempted Activity not applicable   Wheel 505 Feet CARE Score 9   Therapeutic Interventions   Balance Amb with floor mat and wts under to challenge uneven and un noticable terrain,  Amb with QC stepping over canes on floor ( no trip or mis step)   Assessment   Treatment Assessment 60 min session of skilled PT started with having pt try to adri AFO-  he was close to being able to perform but needed some assistance,  recommend getting top velcro strap out to pull shoe tounge back further  Pts 02 dropped with longer distance walking again but quickly rebounded back into 90s within 10 seconds  Pt will not be amb 150 feet at home  No LOB with balance challenges of uneven terrain and step overs  Pt asked about massaging upper trap on L - recommended to get cleared by ortho due to Fx befor therapy would massage  D/C home today and continue therapy via [address] [place] [address] rehab  Recommendation   PT Discharge Recommendation Home with home health rehabilitation   PT Therapy Minutes   PT Time In 0830   PT Time Out 0930   PT Total Time (minutes) 60   PT Mode of treatment - Individual (minutes) 60   PT Mode of treatment - Concurrent (minutes) 0   PT Mode of treatment - Group (minutes) 0   PT Mode of treatment - Co-treat (minutes) 0   PT Mode of Treatment - Total time(minutes) 60 minutes   PT Cumulative Minutes 1200   Therapy Time missed   Time missed?  No

## 2023-02-03 NOTE — PLAN OF CARE
Problem: NEUROSENSORY - ADULT  Goal: Achieves stable or improved neurological status  Description: INTERVENTIONS  - Monitor and report changes in neurological status  - Monitor vital signs such as temperature, blood pressure, glucose, and any other labs ordered   - Initiate measures to prevent increased intracranial pressure  - Monitor for seizure activity and implement precautions if appropriate      Outcome: Completed  Goal: Remains free of injury related to seizures activity  Description: INTERVENTIONS  - Maintain airway, patient safety  and administer oxygen as ordered  - Monitor patient for seizure activity, document and report duration and description of seizure to physician/advanced practitioner  - If seizure occurs,  ensure patient safety during seizure  - Reorient patient post seizure  - Seizure pads on all 4 side rails  - Instruct patient/family to notify RN of any seizure activity including if an aura is experienced  - Instruct patient/family to call for assistance with activity based on nursing assessment  - Administer anti-seizure medications if ordered    Outcome: Completed  Goal: Achieves maximal functionality and self care  Description: INTERVENTIONS  - Monitor swallowing and airway patency with patient fatigue and changes in neurological status  - Encourage and assist patient to increase activity and self care     - Encourage visually impaired, hearing impaired and aphasic patients to use assistive/communication devices  2/3/2023 0826 by Cliff Bella RN  Outcome: Completed  2/3/2023 0823 by Cliff Bella RN  Outcome: Progressing     Problem: CARDIOVASCULAR - ADULT  Goal: Maintains optimal cardiac output and hemodynamic stability  Description: INTERVENTIONS:  - Monitor I/O, vital signs and rhythm  - Monitor for S/S and trends of decreased cardiac output  - Administer and titrate ordered vasoactive medications to optimize hemodynamic stability  - Assess quality of pulses, skin color and temperature  - Assess for signs of decreased coronary artery perfusion  - Instruct patient to report change in severity of symptoms  Outcome: Completed  Goal: Absence of cardiac dysrhythmias or at baseline rhythm  Description: INTERVENTIONS:  - Continuous cardiac monitoring, vital signs, obtain 12 lead EKG if ordered  - Administer antiarrhythmic and heart rate control medications as ordered  - Monitor electrolytes and administer replacement therapy as ordered  Outcome: Completed     Problem: RESPIRATORY - ADULT  Goal: Achieves optimal ventilation and oxygenation  Description: INTERVENTIONS:  - Assess for changes in respiratory status  - Assess for changes in mentation and behavior  - Position to facilitate oxygenation and minimize respiratory effort  - Oxygen administered by appropriate delivery if ordered  - Initiate smoking cessation education as indicated  - Encourage broncho-pulmonary hygiene including cough, deep breathe, Incentive Spirometry  - Assess the need for suctioning and aspirate as needed  - Assess and instruct to report SOB or any respiratory difficulty  - Respiratory Therapy support as indicated  2/3/2023 0826 by Johnson Salazar RN  Outcome: Completed  2/3/2023 0823 by Johnson Salazar RN  Outcome: Progressing     Problem: GASTROINTESTINAL - ADULT  Goal: Minimal or absence of nausea and/or vomiting  Description: INTERVENTIONS:  - Administer IV fluids if ordered to ensure adequate hydration  - Maintain NPO status until nausea and vomiting are resolved  - Nasogastric tube if ordered  - Administer ordered antiemetic medications as needed  - Provide nonpharmacologic comfort measures as appropriate  - Advance diet as tolerated, if ordered  - Consider nutrition services referral to assist patient with adequate nutrition and appropriate food choices  Outcome: Completed  Goal: Maintains or returns to baseline bowel function  Description: INTERVENTIONS:  - Assess bowel function  - Encourage oral fluids to ensure adequate hydration  - Administer IV fluids if ordered to ensure adequate hydration  - Administer ordered medications as needed  - Encourage mobilization and activity  - Consider nutritional services referral to assist patient with adequate nutrition and appropriate food choices  Outcome: Completed  Goal: Maintains adequate nutritional intake  Description: INTERVENTIONS:  - Monitor percentage of each meal consumed  - Identify factors contributing to decreased intake, treat as appropriate  - Assist with meals as needed  - Monitor I&O, weight, and lab values if indicated  - Obtain nutrition services referral as needed  Outcome: Completed  Goal: Establish and maintain optimal ostomy function  Description: INTERVENTIONS:  - Assess bowel function  - Encourage oral fluids to ensure adequate hydration  - Administer IV fluids if ordered to ensure adequate hydration   - Administer ordered medications as needed  - Encourage mobilization and activity  - Nutrition services referral to assist patient with appropriate food choices  - Assess stoma site  - Consider wound care consult   Outcome: Completed  Goal: Oral mucous membranes remain intact  Description: INTERVENTIONS  - Assess oral mucosa and hygiene practices  - Implement preventative oral hygiene regimen  - Implement oral medicated treatments as ordered  - Initiate Nutrition services referral as needed  Outcome: Completed     Problem: GENITOURINARY - ADULT  Goal: Maintains or returns to baseline urinary function  Description: INTERVENTIONS:  - Assess urinary function  - Encourage oral fluids to ensure adequate hydration if ordered  - Administer IV fluids as ordered to ensure adequate hydration  - Administer ordered medications as needed  - Offer frequent toileting  - Follow urinary retention protocol if ordered  Outcome: Completed  Goal: Absence of urinary retention  Description: INTERVENTIONS:  - Assess patient’s ability to void and empty bladder  - Monitor I/O  - Bladder scan as needed  - Discuss with physician/AP medications to alleviate retention as needed  - Discuss catheterization for long term situations as appropriate  Outcome: Completed     Problem: METABOLIC, FLUID AND ELECTROLYTES - ADULT  Goal: Electrolytes maintained within normal limits  Description: INTERVENTIONS:  - Monitor labs and assess patient for signs and symptoms of electrolyte imbalances  - Administer electrolyte replacement as ordered  - Monitor response to electrolyte replacements, including repeat lab results as appropriate  - Instruct patient on fluid and nutrition as appropriate  Outcome: Completed  Goal: Fluid balance maintained  Description: INTERVENTIONS:  - Monitor labs   - Monitor I/O and WT  - Instruct patient on fluid and nutrition as appropriate  - Assess for signs & symptoms of volume excess or deficit  Outcome: Completed  Goal: Glucose maintained within target range  Description: INTERVENTIONS:  - Monitor Blood Glucose as ordered  - Assess for signs and symptoms of hyperglycemia and hypoglycemia  - Administer ordered medications to maintain glucose within target range  - Assess nutritional intake and initiate nutrition service referral as needed  Outcome: Completed     Problem: SKIN/TISSUE INTEGRITY - ADULT  Goal: Skin Integrity remains intact(Skin Breakdown Prevention)  Description: Assess:  -Perform Bert assessment every shift  -Clean and moisturize skin every   -Inspect skin when repositioning, toileting, and assisting with ADLS  -Assess under medical devices such as  every   -Assess extremities for adequate circulation and sensation     Bed Management:  -Have minimal linens on bed & keep smooth, unwrinkled  -Change linens as needed when moist or perspiring  -Avoid sitting or lying in one position for more than  hours while in bed  -Keep HOB at degrees     Toileting:  -Offer bedside commode  -Assess for incontinence every   -Use incontinent care products after each incontinent episode such as Activity:  -Mobilize patient  times a day  -Encourage activity and walks on unit  -Encourage or provide ROM exercises   -Turn and reposition patient every  Hours  -Use appropriate equipment to lift or move patient in bed  -Instruct/ Assist with weight shifting every  when out of bed in chair  -Consider limitation of chair time  hour intervals    Skin Care:  -Avoid use of baby powder, tape, friction and shearing, hot water or constrictive clothing  -Relieve pressure over bony prominences using   -Do not massage red bony areas    Next Steps:  -Teach patient strategies to minimize risks such as    -Consider consults to  interdisciplinary teams such as   Outcome: Completed  Goal: Pressure injury heals and does not worsen  Description: Interventions:  - Implement low air loss mattress or specialty surface (Criteria met)  - Apply silicone foam dressing  - Instruct/assist with weight shifting every  minutes when in chair   - Limit chair time to  hour intervals  - Use special pressure reducing interventions such as  when in chair   - Apply fecal or urinary incontinence containment device   - Perform passive or active ROM every   - Turn and reposition patient & offload bony prominences every  hours   - Utilize friction reducing device or surface for transfers   - Consider consults to  interdisciplinary teams such as   - Use incontinent care products after each incontinent episode such as   - Consider nutrition services referral as needed  Outcome: Completed     Problem: HEMATOLOGIC - ADULT  Goal: Maintains hematologic stability  Description: INTERVENTIONS  - Assess for signs and symptoms of bleeding or hemorrhage  - Monitor labs  - Administer supportive blood products/factors as ordered and appropriate  Outcome: Completed     Problem: MUSCULOSKELETAL - ADULT  Goal: Maintain or return mobility to safest level of function  Description: INTERVENTIONS:  - Assess patient's ability to carry out ADLs; assess patient's baseline for ADL function and identify physical deficits which impact ability to perform ADLs (bathing, care of mouth/teeth, toileting, grooming, dressing, etc )  - Assess/evaluate cause of self-care deficits   - Assess range of motion  - Assess patient's mobility  - Assess patient's need for assistive devices and provide as appropriate  - Encourage maximum independence but intervene and supervise when necessary  - Involve family in performance of ADLs  - Assess for home care needs following discharge   - Consider OT consult to assist with ADL evaluation and planning for discharge  - Provide patient education as appropriate  Outcome: Completed  Goal: Maintain proper alignment of affected body part  Description: INTERVENTIONS:  - Support, maintain and protect limb and body alignment  - Provide patient/ family with appropriate education  Outcome: Completed     Problem: PAIN - ADULT  Goal: Verbalizes/displays adequate comfort level or baseline comfort level  Description: Interventions:  - Encourage patient to monitor pain and request assistance  - Assess pain using appropriate pain scale  - Administer analgesics based on type and severity of pain and evaluate response  - Implement non-pharmacological measures as appropriate and evaluate response  - Consider cultural and social influences on pain and pain management  - Notify physician/advanced practitioner if interventions unsuccessful or patient reports new pain  Outcome: Completed     Problem: MOBILITY - ADULT  Goal: Maintain or return to baseline ADL function  Description: INTERVENTIONS:  -  Assess patient's ability to carry out ADLs; assess patient's baseline for ADL function and identify physical deficits which impact ability to perform ADLs (bathing, care of mouth/teeth, toileting, grooming, dressing, etc )  - Assess/evaluate cause of self-care deficits   - Assess range of motion  - Assess patient's mobility; develop plan if impaired  - Assess patient's need for assistive devices and provide as appropriate  - Encourage maximum independence but intervene and supervise when necessary  - Involve family in performance of ADLs  - Assess for home care needs following discharge   - Consider OT consult to assist with ADL evaluation and planning for discharge  - Provide patient education as appropriate  Outcome: Completed  Goal: Maintains/Returns to pre admission functional level  Description: INTERVENTIONS:  - Perform BMAT or MOVE assessment daily    - Set and communicate daily mobility goal to care team and patient/family/caregiver  - Collaborate with rehabilitation services on mobility goals if consulted  - Perform Range of Motion  times a day  - Reposition patient every  hours    - Dangle patient  times a day  - Stand patient  times a day  - Ambulate patient  times a day  - Out of bed to chair  times a day   - Out of bed for meal times a day  - Out of bed for toileting  - Record patient progress and toleration of activity level   Outcome: Completed     Problem: Prexisting or High Potential for Compromised Skin Integrity  Goal: Skin integrity is maintained or improved  Description: INTERVENTIONS:  - Identify patients at risk for skin breakdown  - Assess and monitor skin integrity  - Assess and monitor nutrition and hydration status  - Monitor labs   - Assess for incontinence   - Turn and reposition patient  - Assist with mobility/ambulation  - Relieve pressure over bony prominences  - Avoid friction and shearing  - Provide appropriate hygiene as needed including keeping skin clean and dry  - Evaluate need for skin moisturizer/barrier cream  - Collaborate with interdisciplinary team   - Patient/family teaching  - Consider wound care consult   Outcome: Completed

## 2023-02-03 NOTE — ASSESSMENT & PLAN NOTE
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

## 2023-02-03 NOTE — NURSING NOTE
Pt discharged to home, all follow up appointments reviewed, all medications reviewed  All belongings accounted for  Pt has unit phone number and was informed to call us should any questions arise  Pt wheeled out with PCA  Son is transporting home

## 2023-02-03 NOTE — ASSESSMENT & PLAN NOTE
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

## 2023-02-03 NOTE — PLAN OF CARE
Problem: NEUROSENSORY - ADULT  Goal: Achieves maximal functionality and self care  Description: INTERVENTIONS  - Monitor swallowing and airway patency with patient fatigue and changes in neurological status  - Encourage and assist patient to increase activity and self care     - Encourage visually impaired, hearing impaired and aphasic patients to use assistive/communication devices  Outcome: Progressing     Problem: RESPIRATORY - ADULT  Goal: Achieves optimal ventilation and oxygenation  Description: INTERVENTIONS:  - Assess for changes in respiratory status  - Assess for changes in mentation and behavior  - Position to facilitate oxygenation and minimize respiratory effort  - Oxygen administered by appropriate delivery if ordered  - Initiate smoking cessation education as indicated  - Encourage broncho-pulmonary hygiene including cough, deep breathe, Incentive Spirometry  - Assess the need for suctioning and aspirate as needed  - Assess and instruct to report SOB or any respiratory difficulty  - Respiratory Therapy support as indicated  Outcome: Progressing

## 2023-02-03 NOTE — PROGRESS NOTES
[address] [facility]  Progress Note - Gustavo Aguilera 1950, 67 y o  male MRN: [id number]  Unit/Bed#: [facility] 514-40 Encounter: [id number]  Primary Care Provider: Tahir Ramey MD   Date and time admitted to hospital: 1/19/2023  3:45 PM    Low serum vitamin B12  Assessment & Plan  Vitamin B12 206 on 1/30  Started on cyanocobalamin 1000mcg daily  Follow-up with PCP as outpatient  Foot pain, right  Assessment & Plan  Complaints of R metatarsal pain on 1/29  XR obtained which showed midfoot mild degenerative changes and no acute osseous abnormality  Uric acid WNL  Likely arthritis flare-up  Started on PRN Voltaren gel  Considerations with PT/OT  Cardiovascular risk factor  Assessment & Plan  ASCVD risk 25 4%  Started on ASA 81mg daily  Lipid panel on 1/30: Cholesterol 158, Triglycerides 149, HDL 38, and LDL 90  Started on Lipitor 20mg daily (reports hx of myalgias with simvastatin)  Foraminal stenosis of cervical region  Assessment & Plan  MRI on 1/26 showed severe left C3-C4 foraminal stenosis and moderate central stenosis of C4-C5  Ensure adequate pain control  Continue PT/OT  Nocturnal hypoxemia  Assessment & Plan  States that he was told he had sleep apnea in the past during hospitalizations but never had formal work-up  Has been requiring O2 at night  Overnight noc ox performed on 1/24 and showed desat <89% for 8 hours and 12 minutes, as low as 74%  Apply 2L O2 at HS  Repeat overnight noc ox performed last night for DME - desat <89% for 1 hour 40 minutes prior to restarting O2 and sat as low as 70%  Would benefit from Sleep Medicine as outpatient  Neuropathy  Assessment & Plan  Multifactorial  Continue home gabapentin 300mg TID  HTN (hypertension)  Assessment & Plan  Home regimen: amlodipine 5mg daily and lisinopril/HCTZ 20-12 5mg BID  Currently receiving Lasix 40mg daily  Lisinopril and HCTZ held after receiving CTA on 1/17    Amlodipine discontinued on 1/23  Restart as able  Monitor BP with routine VS     Pleural effusion  Assessment & Plan  Likely secondary to metastatic disease  CTA PE study on 1/17: New moderate L pleural effusion with moderate compressive atelectasis of left lower lobe  Currently maintaining on RA  Monitor spot pulse ox  Encourage pulmonary toileting  Continue Lasix 40mg daily  Given additional 20mg Lasix on 1/31  Chronic diastolic (congestive) heart failure (HCC)  Assessment & Plan  Wt Readings from Last 3 Encounters:   02/03/23 108 kg (237 lb)   01/19/23 110 kg (243 lb 6 2 oz)   12/30/22 121 kg (265 lb 10 5 oz)     ECHO on 1/16 showed EF 65%, no RWMA, G1DD  Treated with Lasix in acute setting for volume overload  BNP 4475 on admission  Continue Lasix 40mg daily  Monitor daily weights and I&Os  Continue 1500FR  Chronic diarrhea  Assessment & Plan  Continue home PRN Imodium  Monitor skin for breakdown  Stage 3a chronic kidney disease [facility])  Assessment & Plan  Lab Results   Component Value Date    EGFR 76 02/02/2023    EGFR 82 01/30/2023    EGFR 77 01/26/2023    CREATININE 0 98 02/02/2023    CREATININE 0 92 01/30/2023    CREATININE 0 97 01/26/2023     Creatinine currently 0 98  Baseline creatinine 1 0-1 3  Hx of L nephrectomy  Developed ARIANNA in acute setting due to autoregulatory failure/volume overload/congestion  Currently on Lasix 40mg daily  Avoid nephrotoxins as able  Encourage adequate hydration  Continue to trend BMP  Metastatic renal cell carcinoma ([facility] [address] )  Assessment & Plan  Diagnosed in 2007 with metastatic disease in 2013  Hx of L nephrectomy in 2007 and prostatectomy  Progressive disease  Newly started on Nivolumab in 12/022  Follows with Dr Marcelo Holley (Oncology) as outpatient - follow-up scheduled for February  Spine metastasis ([facility] [address] )  Assessment & Plan  Hx of SBRT to T6, T10, and T4 between [id number]  Encourage adequate pain control    Follows with Dr Marcelo Holley (Oncology) as outpatient  Lung metastases [facility])  Assessment & Plan  CTA PE study on 1/17: Enlargement of R lower lobe metastases  Developed hypoxia in acute setting - receiving diuretics with improvement  Currently maintaining on RA  Monitor spot pulse ox  Continue Lasix 40mg daily  Given additional 20mg on 1/31  Encourage pulmonary toileting and IS use  Follows with Dr Ana Patel as outpatient  Bone metastases [facility])  Assessment & Plan  Diagnosed with metastatic renal cell carcinoma in 2007  Progressive disease  Bone scan in 2020 showed lytic lesions to L scapula, R posterior and lateral ribs, L posterior iliac bone, and R iliac wing  Most recently started on Nivolumab in 12/2022  Ensure adequate pain control  Continue home Vitamin D supplementation  Follows with Dr Ana Patel (Oncology) as outpatient  * 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  Assessment & Plan  Presented on 1/12 after a mechanical fall with L shoulder pain  CT LUE: lytic metastasis int he scapular glenoid process measuring 2 9 x 2 4 x 2 0cm with cortical breakthrough and extraosseous extension anteriorly without pathologic fracture  New 2 4 x 2 1 x 1 9cm lytic metastasis in the L scapular body with acute pathologic fracture  New 1 9x0 8x1 4cm lytic metastasis in the inferior scapular angle with pathologic fracture  Non-surgical intervention per Ortho  NWB to LUE  Sling for comfort  Neurovascular checks Q shift  Ensure adequate pain control  Follow-up with Dr Ramona Prince (Orthopedics) in 4 weeks  Primary team following  PT/OT  MRI L shoulder due to burning sensation/chronic ongoing pain on 1/26: metastatic disease of the scapula with pathologic fracture, full thickness partial width tear anterior supraspinatus superimposed upon moderate tendinosis, moderate subscapularis tendinosis, mild infraspinatus tendinosis        VTE Pharmacologic Prophylaxis:   Pharmacologic: Heparin  Mechanical VTE Prophylaxis in Place: Yes - sequential compression devices  Current Length of Stay: 15 day(s)    Current Patient Status: Inpatient Rehab     Discharge Plan: As per primary team     Code Status: Level 1 - Full Code    Subjective:   Pt examined while pt sitting in recliner in pt room  Complaints of minimal L shoulder pain, aching, improves with pain medication  Feels that it improves each day  Currently has no other complaints  Plans for discharge later today  Currently has no questions in regards to his medications  Discussed home O2 use  Will follow-up with PCP and Pulmonary as outpatient  Objective:     Vitals:   Temp (24hrs), Av 8 °F (36 6 °C), Min:97 7 °F (36 5 °C), Max:97 9 °F (36 6 °C)    Temp:  [97 7 °F (36 5 °C)-97 9 °F (36 6 °C)] 97 7 °F (36 5 °C)  HR:  [] 99  Resp:  [18] 18  BP: (117-131)/(60-62) 117/62  SpO2:  [88 %-93 %] 92 %  Body mass index is 34 01 kg/m²  Review of Systems   Constitutional: Negative for appetite change, chills, fatigue and fever  HENT: Negative for trouble swallowing  Eyes: Negative for visual disturbance  Respiratory: Negative for cough, shortness of breath, wheezing and stridor  Cardiovascular: Negative for chest pain, palpitations and leg swelling  Gastrointestinal: Negative for abdominal distention, abdominal pain, constipation, diarrhea, nausea and vomiting  LBM 2/2   Genitourinary: Negative for difficulty urinating  Musculoskeletal: Positive for arthralgias (Minimal L shoulder pain, improves with pain medication and rest)  Negative for back pain  Neurological: Negative for dizziness, weakness, light-headedness, numbness and headaches  Psychiatric/Behavioral: Negative for dysphoric mood and sleep disturbance  The patient is not nervous/anxious  All other systems reviewed and are negative  Input and Output Summary (last 24 hours):        Intake/Output Summary (Last 24 hours) at 2/3/2023 0905  Last data filed at 2/3/2023 0830  Gross per 24 hour   Intake 520 ml   Output 575 ml   Net -55 ml       Physical Exam:     Physical Exam  Vitals and nursing note reviewed  Constitutional:       General: He is not in acute distress  Appearance: Normal appearance  He is obese  He is not ill-appearing  HENT:      Head: Normocephalic and atraumatic  Cardiovascular:      Rate and Rhythm: Normal rate and regular rhythm  Pulses: Normal pulses  Heart sounds: Normal heart sounds  No murmur heard  No friction rub  Pulmonary:      Effort: Pulmonary effort is normal  No respiratory distress  Breath sounds: Normal breath sounds  No wheezing or rhonchi  Abdominal:      General: Abdomen is flat  Bowel sounds are normal  There is no distension  Palpations: Abdomen is soft  There is no mass  Tenderness: There is no abdominal tenderness  There is no guarding or rebound  Hernia: No hernia is present  Musculoskeletal:      Cervical back: Normal range of motion and neck supple  No tenderness  Right lower leg: Edema (lymphedema) present  Left lower leg: Edema (lymphedema) present  Comments: LUE in sling  Skin:     General: Skin is warm and dry  Capillary Refill: Capillary refill takes less than 2 seconds  Neurological:      Mental Status: He is alert and oriented to person, place, and time     Psychiatric:         Mood and Affect: Mood normal          Behavior: Behavior normal          Additional Data:     Labs:    Results from last 7 days   Lab Units 01/30/23  0603   WBC Thousand/uL 5 80   HEMOGLOBIN g/dL 11 2*   HEMATOCRIT % 38 7   PLATELETS Thousands/uL 249   NEUTROS PCT % 68   LYMPHS PCT % 23   MONOS PCT % 7   EOS PCT % 2     Results from last 7 days   Lab Units 02/02/23  0524 01/30/23  0514   SODIUM mmol/L 137 138   POTASSIUM mmol/L 4 2 4 1   CHLORIDE mmol/L 97 100   CO2 mmol/L 38* 34*   BUN mg/dL 18 19   CREATININE mg/dL 0 98 0 92   ANION GAP mmol/L 2* 4*   CALCIUM mg/dL 9 3 9 2   ALBUMIN g/dL  -- 3 0*   TOTAL BILIRUBIN mg/dL  --  0 50   ALK PHOS U/L  --  72   ALT U/L  --  17   AST U/L  --  33   GLUCOSE RANDOM mg/dL 99 87             Results from last 7 days   Lab Units 01/30/23  0514   HEMOGLOBIN A1C % 5 3           Labs reviewed    Imaging:    Imaging reviewed    Recent Cultures (last 7 days):           Last 24 Hours Medication List:   Current Facility-Administered Medications   Medication Dose Route Frequency Provider Last Rate   • acetaminophen  975 mg Oral 4x Daily PRN Agustin Easley MD     • aluminum-magnesium hydroxide-simethicone  30 mL Oral Q6H PRN Agustin Easley MD     • aspirin  81 mg Oral Daily IRENE Higuera     • atorvastatin  20 mg Oral Daily [address]     • cholecalciferol  1,000 Units Oral Daily Agustin Easley MD     • cyanocobalamin  1,000 mcg Oral Daily IRENE Higuera     • Diclofenac Sodium  2 g Topical TID Agustin Easley MD     • diphenhydrAMINE  25 mg Oral HS PRN Agustin Easley MD     • furosemide  40 mg Oral Daily IRENE Higuera     • gabapentin  300 mg Oral TID Agustin Easley MD     • heparin (porcine)  5,000 Units Subcutaneous [facility] Agustin Easley MD     • lidocaine  2 patch Topical Daily Agustin Easley MD     • loperamide  2 mg Oral 4x Daily PRN Agustin Easley MD     • multivitamin stress formula  1 tablet Oral Daily Agustin Easley MD     • nystatin   Topical BID Agustin Easley MD     • ondansetron  4 mg Oral Q6H PRN Agustin Easley MD     • oxyCODONE  5 mg Oral 4x Daily PRN Hudson Payne MD     • oxyCODONE  2 5 mg Oral 4x Daily PRN Hudson Payne MD     • oxyCODONE  7 5 mg Oral Q4H PRN Hudson Payne MD     • simethicone  80 mg Oral 4x Daily PRN Agustin Easley MD     • white petrolatum-mineral oil   Topical BID IRENE Higuera          M*Modal software was used to dictate this note  It may contain errors with dictating incorrect words or incorrect spelling  Please contact the provider directly with any questions

## 2023-02-03 NOTE — ASSESSMENT & PLAN NOTE
Diagnosed in 2007 with metastatic disease in 2013  Hx of L nephrectomy in 2007 and prostatectomy  Progressive disease  Newly started on Nivolumab in 12/022  Follows with Dr Ulysses Lyons (Oncology) as outpatient - follow-up scheduled for February

## 2023-02-03 NOTE — ASSESSMENT & PLAN NOTE
Wt Readings from Last 3 Encounters:   02/03/23 108 kg (237 lb)   01/19/23 110 kg (243 lb 6 2 oz)   12/30/22 121 kg (265 lb 10 5 oz)     ECHO on 1/16 showed EF 65%, no RWMA, G1DD  Treated with Lasix in acute setting for volume overload  BNP 4475 on admission  Continue Lasix 40mg daily  Monitor daily weights and I&Os  Continue 1500FR

## 2023-02-03 NOTE — PHYSICAL THERAPY NOTE
Family arrived for pt  to DC home  Came with [facility]  Recommendation is for pt to use [facility]  Education provided to pt and son, [facility]  Pt to borrow ours and return it when [facility] obtained  DC home

## 2023-02-03 NOTE — ASSESSMENT & PLAN NOTE
Presented on 1/12 after a mechanical fall with L shoulder pain  CT LUE: lytic metastasis int he scapular glenoid process measuring 2 9 x 2 4 x 2 0cm with cortical breakthrough and extraosseous extension anteriorly without pathologic fracture  New 2 4 x 2 1 x 1 9cm lytic metastasis in the L scapular body with acute pathologic fracture  New 1 9x0 8x1 4cm lytic metastasis in the inferior scapular angle with pathologic fracture  Non-surgical intervention per Ortho  NWB to LUE  Sling for comfort  Neurovascular checks Q shift  Ensure adequate pain control  Follow-up with Dr Hue Anguiano (Orthopedics) in 4 weeks  Primary team following  PT/OT  MRI L shoulder due to burning sensation/chronic ongoing pain on 1/26: metastatic disease of the scapula with pathologic fracture, full thickness partial width tear anterior supraspinatus superimposed upon moderate tendinosis, moderate subscapularis tendinosis, mild infraspinatus tendinosis

## 2023-02-04 LAB
ANTI-YO ANTIBODIES: NEGATIVE
HU1 AB TITR SER: NEGATIVE {TITER}
HU2 AB TITR SER IF: NEGATIVE {TITER}

## 2023-02-06 NOTE — PROGRESS NOTES
02/06/23 5302   Hello, [Guardian’s Name / Patient’s Tiffany , this is [Caller Tiffany  from [facility], and our clinical care team wanted to check on you / your child after your recent visit to the hospital  It will only take 3-5 minutes  Is this a good time? Discharge Call Type/ Specific Diagnosis: General Call   Call Complete   Attempted Number of Calls 1   Discharge phone call complete?  Left Message

## 2023-02-07 NOTE — PROGRESS NOTES
02/07/23 1159   Hello, [Guardian’s Name / Patient’s Toño Graves, this is [Caller Toño Graves from [facility], and our clinical care team wanted to check on you / your child after your recent visit to the hospital  It will only take 3-5 minutes  Is this a good time? Discharge Call Type/ Specific Diagnosis: General Call   Call Complete   Attempted Number of Calls 3   Discharge phone call complete?  Complete

## 2023-02-08 LAB
DME PARACHUTE DELIVERY DATE ACTUAL: NORMAL
DME PARACHUTE DELIVERY DATE REQUESTED: NORMAL
DME PARACHUTE ITEM DESCRIPTION: NORMAL
DME PARACHUTE ORDER STATUS: NORMAL
DME PARACHUTE SUPPLIER NAME: NORMAL
DME PARACHUTE SUPPLIER PHONE: NORMAL

## 2023-02-08 NOTE — OCCUPATIONAL THERAPY NOTE
OT DISCHARGE SUMMARY    Pt made good progress during stay on the [facility] following admission for pathologic fracture of Left shoudler due to neoplastic disease  Pt presented upon IE with generalized weakness, decreased endurance, activity tolerance, and functional mobility  On evaluation, pt required maxA to complete all ADLs and functional transfers  Pt was discharged to home with family support  Pt currently functioning at Miguelito/Barry level for ADL, Miguelito level for transfers with [facility] [place]  The following DME was recommended toilet wand, BSC, reacher, dressing stick, shower chair  Family training occurred with son and DIL on 2/2/23  Engaged pt in brief 15mins of skilled OT services with focus on FT with Son Favio Pérez and DIL  Therapist reviewed with son pts CLOF  Educ at DC pt will mostly be Miguelito for dressinga nd mobility  Recommend that pt will req A to wash RUE 2* to LUE NWB/shoulder restrictions and assistance with TEDS, brace and sneakers which verbalized family can assist  Therapist provided options of having grandson assist in AM with steps and footwear which son verbalized understanding  In addition, recommend to complete bathing in evening when son back from work  Recommend GB in shower to ensure safety  Alok Cooley ddtion, recommend cont use of BSC on 1st flr during the day and urinal use at night so pt doesnt walk into bathroom and prevent falls  DIL and son report that family to assist with meals and laundry at [place]  In addition, recommend use of safety/medical alert system as pt rpors he has one  Therapist rpovided worksheet with other vendors if needed  Overall, family in agreement with above mentioned recommendations  No further questions at this time  In agreement with home OT services and DC tomorrow 2/3/23 OT recommended pt continue to receive home OT services       -Costa Singh MS, OTR/L, CSRS

## 2023-02-10 ENCOUNTER — HOSPITAL ENCOUNTER (OUTPATIENT)
Dept: INFUSION CENTER | Facility: CLINIC | Age: 73
Discharge: HOME | End: 2023-02-10

## 2023-02-12 NOTE — PHYSICAL THERAPY NOTE
[facility] PT DC SUMMARY    Pt  Is a 68 y/o male who was admitted to [address] [place] [place] [address] on 1/12 with L shoulder pain after a fall with arm abducted  XRs of left shoulder, humerus, scapula, and clavicle showed lytic metastatic tumor mass within the scapular neck and glenoid with no acute fractures or dislocations  CT was ordered and he was made NWB by Ortho  CT showed large pathologic scapula fracture with minimal displacement and a large lytic lesion in glenoid vault  Ortho discussed with Seleta Kussmaul - Dr Pace Asp who recommended sling for comfort and non-surgical management  PMHx that can affect progress includes localized RCC with METS on 2013 , L nephrectomoy and protatectomy , chronic diarrhea and CKD and chronic low back pain and bone METS (see H&P)  PT evaluation completed on 1/20 and pt  Demonstrate good progress since then  Pt  Currently CGA with functional mobility using [facility] and ambulate up to 150 feet and min A with steps management up to 8 steps  Pt  Was fully indep PTA and was working par time  He lives in a [address] [place] [address] but can be on fist floor set up using BSC at d/c  Pt  Has 1 curb like step + 4 steps for NATALIYA with R HR  Pt  Has a son and grandson that live close by that can assist prn  Pt  Current barrier is NATALIYA, L UE NWB, L chronic foot drop which is address with AFO trial and dec caregiver support  Pt  Will benefit from skillled PT to maximized functional independence and be able to reach mod I level for mobility with LRAD  Planning for T[facility]ative d/c next week  Pt made good progress to reach mod I goals with [facility] [place] and sling to L UE  Carbon fiber AFO fit by Nikole due to ongoing L foot drop  Family participated in [address] prior to DC  May need A on curb step outside of home pending progress and confidence, Family aware  Clear for DC home with in home out pt PT to follow

## 2023-02-13 ENCOUNTER — TELEPHONE (OUTPATIENT)
Dept: NEPHROLOGY | Facility: CLINIC | Age: 73
End: 2023-02-13

## 2023-02-15 ENCOUNTER — HOSPITAL ENCOUNTER (OUTPATIENT)
Dept: RADIOLOGY | Facility: HOSPITAL | Age: 73
Discharge: HOME | End: 2023-02-15

## 2023-02-15 ENCOUNTER — TELEPHONE (OUTPATIENT)
Dept: NEPHROLOGY | Facility: CLINIC | Age: 73
End: 2023-02-15

## 2023-02-15 ENCOUNTER — CONSULT (OUTPATIENT)
Dept: PULMONOLOGY | Facility: CLINIC | Age: 73
End: 2023-02-15

## 2023-02-15 VITALS
BODY MASS INDEX: 35.07 KG/M2 | WEIGHT: 245 LBS | OXYGEN SATURATION: 93 % | DIASTOLIC BLOOD PRESSURE: 62 MMHG | HEART RATE: 105 BPM | HEIGHT: 70 IN | TEMPERATURE: 98.2 F | SYSTOLIC BLOOD PRESSURE: 112 MMHG | RESPIRATION RATE: 14 BRPM

## 2023-02-15 DIAGNOSIS — R05.3 CHRONIC COUGH: ICD-10-CM

## 2023-02-15 DIAGNOSIS — J96.11 CHRONIC HYPOXEMIC RESPIRATORY FAILURE (HCC): ICD-10-CM

## 2023-02-15 DIAGNOSIS — J90 PLEURAL EFFUSION: ICD-10-CM

## 2023-02-15 DIAGNOSIS — I50.32 CHRONIC DIASTOLIC (CONGESTIVE) HEART FAILURE (HCC): ICD-10-CM

## 2023-02-15 DIAGNOSIS — C78.01 MALIGNANT NEOPLASM METASTATIC TO RIGHT LUNG (HCC): ICD-10-CM

## 2023-02-15 DIAGNOSIS — R06.02 SHORTNESS OF BREATH: Primary | ICD-10-CM

## 2023-02-15 RX ORDER — BENZONATATE 200 MG/1
200 CAPSULE ORAL 3 TIMES DAILY PRN
Qty: 20 CAPSULE | Refills: 0 | Status: SHIPPED | OUTPATIENT
Start: 2023-02-15

## 2023-02-15 NOTE — PROGRESS NOTES
Pulmonary Outpatient Note   Ronda Burnett 67 y o  male MRN: [id number]  2/15/2023      Referring Physician: Reuben Dutton MD    Reason for Consultation:    Chief Complaint   Patient presents with   • Shortness of Breath         Assessment/Plan:    1  Shortness of breath  Assessment & Plan:  Patient with multifactorial worsening of his chronic dyspnea and chronic cough  He has progression of his metastatic renal cell cancer with lung and osseous mets in the chest   He has orthopnea and LE edema  He has pleural effusion on left new in January- never had a thoracentesis  Most recent chest x-ray with small left effusion 2 weeks ago  He has chronic cough that is worsened  No history of asthma or COPD  Not on inhalers  No post-nasal drip or heartburn currently  Plan  Check Chest x-ray today- if pleural efffusion has increased will order diagnostic/therapeutic thoracentesis  Advised patient to take lasix BID next 3 days to see if this helps his symptoms  Discuss further with PCP  He has follow-up with oncology next week to discuss if there are further options  Discussed palliative care referral- declined for now  Trial of tessalon perles for cough  RTC in 2 months  Advised him to get another chest xray around 1 week before that visit as well  2  Pleural effusion  -     Ambulatory Referral to Pulmonology  -     XR chest pa & lateral; Future; Expected date: 02/15/2023  -     XR chest pa & lateral; Future; Expected date: 03/31/2023    3  Malignant neoplasm metastatic to right lung [facility])  -     Ambulatory Referral to Pulmonology    4  Chronic hypoxemic respiratory failure ([address] )  -     Ambulatory Referral to Pulmonology    5  Chronic cough  -     benzonatate (TESSALON) 200 MG capsule; Take 1 capsule (200 mg total) by mouth 3 (three) times a day as needed for cough    6   Chronic diastolic (congestive) heart failure [facility])        Health Maintenance  Immunization History   Administered Date(s) Administered   • COVID-19 MODERNA VACC 0 5 ML IM 03/15/2021, 04/12/2021, 01/05/2022   • INFLUENZA 10/01/2007, 10/21/2008, 09/30/2009, 10/01/2012, 10/01/2013, 11/09/2013, 11/08/2014, 09/01/2017, 10/13/2017, 10/01/2020, 02/01/2021, 11/07/2021, 11/05/2022   • Influenza Split High Dose Preservative Free IM 10/18/2021   • Influenza, high dose seasonal 0 7 mL 12/09/2020   • Pneumococcal Conjugate 13-Valent 09/17/2015   • Pneumococcal Polysaccharide PPV23 08/15/2016, 09/25/2020   • Tdap 03/04/2020, 07/14/2020        Return in about 2 months (around 4/15/2023)  History of Present Illness   HPI:  Steven Sharp is a 67 y o  male who has metastatic renal cell carcinoma- initially diagnosed 2007- metastatic since 2013  Multiple chemotherapy treatments as well as checkpoint inhibitors  Most recently on tivozanib- disease progressed  More recently with metastatic disease to lung and abdominal lymph nodes  In December the plan with Dr Shanita Meng was to get Nivolumab- got 1 dose  January 12-19 was hospitalized at [facility]  Had a fall with fracture of left scapula related to metastatic disease  Had an oxygen requirement briefly during hospital stay  Had diuresis  Discharged with home oxygen with ambulation and at night  Currently 93% on room air  He wears oxygen occasionally, monitors pulse oximetry  Wears up to 3 lpm of oxygen  He reports he has some shortness of breath with activities- walking in from the parking lot  The shortness of breath has been around 6 months  Maybe a little bit worse  He occasionally gets light-headed  Occasion cough  No hemoptysis  No wheeze  Not on inhalers  No history of asthma or COPD  No family history of lung disease  Smoked for 40 years quit in 2003  Smoked up to 1 PPD x 40 years  He has 3 pillows orthopnea  This has been going on since being home from the hospital  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  Wearing oxygen at night 3 lpm   No history of sleep apnea  Snores at night  He does not wake up short of breath  Sometimes wakes up thirsty, has to urinate  Tired during the day  He naps most days  He takes lasix daily  Has been losing weight unintentionally- 305 down to 245 lbs  This is over a year or so  Prior rheumatologic diagnosis: No    Dysphagia/Reflux: No    Leg swelling: Yes    Exposure history:    Exposure to pets/birds/farm animals: No      Mold/Roaches:No    Social history:    Smokin pack years quit 5672    Alcohol, ilicit drugs (inhalational/ IV): No    Worked as: Retired- worked   Worked as a   Review of Systems   Constitutional: Positive for fatigue  Negative for chills and fever  HENT: Negative for ear pain and sore throat  Eyes: Negative for pain and visual disturbance  Respiratory: Positive for cough and shortness of breath  Negative for wheezing  Cardiovascular: Negative for chest pain and palpitations  Gastrointestinal: Negative for abdominal pain and vomiting  Genitourinary: Negative for dysuria and hematuria  Musculoskeletal: Negative for arthralgias and back pain  Skin: Negative for color change and rash  Neurological: Negative for seizures and syncope  All other systems reviewed and are negative            Historical Information   Past Medical History:   Diagnosis Date   • Arthritis    • Cancer ([facility] [address] )     prostate - mets to bone and lung   • History of radiation therapy 2018    SBRT to T10 -2018   • Hyperlipidemia    • Hypertension      Past Surgical History:   Procedure Laterality Date   • DISTAL ULNA EXCISION Right     excision of tumor and orif with cement and screws   • JOINT REPLACEMENT Bilateral     tkr's   • LUNG SURGERY Right     exc of nodules   • NEPHRECTOMY Left      Family History   Problem Relation Age of Onset   • No Known Problems Mother    • No Known Problems Father          Meds/Allergies     Current Outpatient Medications:   •  acetaminophen (TYLENOL) 325 mg tablet, Take 2 tablets (650 mg total) by mouth every 6 (six) hours as needed for headaches, fever or mild pain (mild pain, moderate pain), Disp: , Rfl: 0  •  aspirin (ECOTRIN LOW STRENGTH) 81 mg EC tablet, Take 1 tablet (81 mg total) by mouth daily Do not start before February 3, 2023 , Disp: 30 tablet, Rfl: 0  •  benzonatate (TESSALON) 200 MG capsule, Take 1 capsule (200 mg total) by mouth 3 (three) times a day as needed for cough, Disp: 20 capsule, Rfl: 0  •  cholecalciferol (VITAMIN D3) 1,000 units tablet, Take 1,000 Units by mouth daily, Disp: , Rfl:   •  cyanocobalamin (VITAMIN B-12) 1000 MCG tablet, Take 1 tablet (1,000 mcg total) by mouth daily Do not start before February 3, 2023 , Disp: 30 tablet, Rfl: 0  •  diphenhydrAMINE (BENADRYL) 25 mg tablet, Take 25 mg by mouth daily at bedtime as needed for itching, Disp: , Rfl:   •  diphenoxylate-atropine (LOMOTIL) 2 5-0 025 mg per tablet, Take 1 tablet by mouth 4 (four) times a day as needed for diarrhea, Disp: 30 tablet, Rfl: 0  •  fluorouracil (EFUDEX) 5 % cream, , Disp: , Rfl:   •  furosemide (LASIX) 40 mg tablet, Take 1 tablet (40 mg total) by mouth daily, Disp: 30 tablet, Rfl: 0  •  gabapentin (NEURONTIN) 100 mg capsule, Take 300 mg by mouth 3 (three) times a day , Disp: , Rfl:   •  loperamide (IMODIUM) 2 mg capsule, Take 2 mg by mouth 4 (four) times a day as needed for diarrhea, Disp: , Rfl:   •  Multiple Vitamin (multivitamin) tablet, Take 1 tablet by mouth daily, Disp: , Rfl:   •  oxyCODONE (ROXICODONE) 5 immediate release tablet, Take 1 (5mg) to 1 5 (7 5mg) tablets by mouth every 6 hours as needed for moderate to severe pain , Disp: 30 tablet, Rfl: 0  •  trolamine salicylate (ASPERCREME) 10 % cream, Apply 1 application topically as needed for muscle/joint pain, Disp: , Rfl:   •  atorvastatin (LIPITOR) 20 mg tablet, Take 1 tablet (20 mg total) by mouth daily with dinner (Patient not taking: Reported on 2/15/2023), Disp: 30 tablet, Rfl: 0  No Known Allergies    Vitals: Blood pressure 112/62, pulse 105, temperature 98 2 °F (36 8 °C), temperature source Tympanic, resp  rate 14, height 5' 10" (1 778 m), weight 111 kg (245 lb), SpO2 93 %  Body mass index is 35 15 kg/m²  Oxygen Therapy  SpO2: 93 %  Oxygen Therapy: None (Room air)      Physical Exam  Physical Exam  Vitals and nursing note reviewed  Constitutional:       General: He is not in acute distress  Appearance: He is well-developed  HENT:      Head: Normocephalic and atraumatic  Nose: Nose normal  No congestion  Mouth/Throat:      Mouth: Mucous membranes are moist       Pharynx: No oropharyngeal exudate  Eyes:      Conjunctiva/sclera: Conjunctivae normal    Cardiovascular:      Rate and Rhythm: Normal rate and regular rhythm  Heart sounds: No murmur heard  Pulmonary:      Effort: Pulmonary effort is normal  No respiratory distress  Comments: Diminished at bases  Abdominal:      Palpations: Abdomen is soft  Tenderness: There is no abdominal tenderness  Musculoskeletal:         General: No swelling  Cervical back: Neck supple  Right lower leg: Edema present  Left lower leg: Edema present  Skin:     General: Skin is warm and dry  Capillary Refill: Capillary refill takes less than 2 seconds  Neurological:      General: No focal deficit present  Mental Status: He is alert and oriented to person, place, and time  Psychiatric:         Mood and Affect: Mood normal          Labs: I have personally reviewed pertinent lab results      ABG: No results found for: PHART, UCS5RHE, PO2ART, VIF4QSI, L3HQUBAL, BEART, SOURCE,   BNP: No results found for: BNP,   CBC:  Lab Results   Component Value Date    WBC 5 80 01/30/2023    HGB 11 2 (L) 01/30/2023    HCT 38 7 01/30/2023    MCV 95 01/30/2023     01/30/2023    ADJUSTEDWBC 11 30 (H) 05/24/2017    EOSPCT 2 01/30/2023    EOSABS 0 10 01/30/2023    NEUTOPHILPCT 68 01/30/2023    LYMPHOPCT 23 01/30/2023   ,   CMP:   Lab Results   Component Value Date    SODIUM 137 02/02/2023    K 4 2 02/02/2023    CL 97 02/02/2023    CO2 38 (H) 02/02/2023    ANIONGAP 7 12/10/2015    BUN 18 02/02/2023    CREATININE 0 98 02/02/2023    GLUCOSE 102 12/10/2015    CALCIUM 9 3 02/02/2023    AST 33 01/30/2023    ALT 17 01/30/2023    ALKPHOS 72 01/30/2023    PROT 6 9 12/10/2015    BILITOT 0 71 12/10/2015    EGFR 76 02/02/2023   ,   PT/INR:   Lab Results   Component Value Date    INR 1 05 01/13/2023   ,   Troponin: No results found for: TROPONINI      Imaging and other studies: I have personally reviewed pertinent reports  and I have personally reviewed pertinent films in PACS       CXR 2/1/23  IMPRESSION:  1  Small left pleural effusion with associated atelectasis  2   Redemonstration of multiple metastatic pulmonary nodules as seen on prior cross-sectional imaging  3   Stable appearance of metastatic osseous lesions involving the right posterior 7th rib and left scapula  Pathological left scapular fracture is better demonstrate on recent CT  CTA Chest 1/17/23  No pulmonary embolus      New moderate left pleural effusion with moderate compressive atelectasis of the left lower lobe      Enlargement of one right lower lobe metastasis with no change in multiple additional lung metastases      Redemonstration of multiple bone metastases with enlarging soft tissue metastases about the left scapula measuring up to 6 1 cm with new pathologic fracture of the left scapula      Redemonstration of metastatic nodes in the upper abdomen  CTA Chest 11/14/23  Significant worsening of metastatic disease including lung metastases, adenopathy osseous metastases  No pleural effusion    Pulmonary function testing:   Pulmonary Functions Testing Results:    No results found for: FEV1, FVC, PKU2UTK, TLC, DLCO        EKG, Pathology, and Other Studies: I have personally reviewed pertinent reports         TTE 1/16/23   Left Ventricle: Left ventricular cavity size is normal  Wall thickness is mildly increased  There is mild concentric hypertrophy  The left ventricular ejection fraction is 65%  Systolic function is normal  Wall motion is normal  Diastolic function is mildly abnormal, consistent with grade I (abnormal) relaxation  •  Right Ventricle: Right ventricle is not well visualized  Right ventricular cavity size appears mildly dilated  Systolic function appears low normal   •  Left Atrium: The atrium is mildly dilated  •  Mitral Valve: There is mild annular calcification  [facility]

## 2023-02-15 NOTE — ASSESSMENT & PLAN NOTE
Patient with multifactorial worsening of his chronic dyspnea and chronic cough  He has progression of his metastatic renal cell cancer with lung and osseous mets in the chest   He has orthopnea and LE edema  He has pleural effusion on left new in January- never had a thoracentesis  Most recent chest x-ray with small left effusion 2 weeks ago  He has chronic cough that is worsened  No history of asthma or COPD  Not on inhalers  No post-nasal drip or heartburn currently  Plan  Check Chest x-ray today- if pleural efffusion has increased will order diagnostic/therapeutic thoracentesis  Advised patient to take lasix BID next 3 days to see if this helps his symptoms  Discuss further with PCP  He has follow-up with oncology next week to discuss if there are further options  Discussed palliative care referral- declined for now  Trial of tessalon perles for cough  RTC in 2 months  Advised him to get another chest xray around 1 week before that visit as well

## 2023-02-16 ENCOUNTER — OFFICE VISIT (OUTPATIENT)
Dept: OBGYN CLINIC | Facility: MEDICAL CENTER | Age: 73
End: 2023-02-16

## 2023-02-16 VITALS
SYSTOLIC BLOOD PRESSURE: 111 MMHG | HEART RATE: 99 BPM | DIASTOLIC BLOOD PRESSURE: 72 MMHG | BODY MASS INDEX: 35.07 KG/M2 | WEIGHT: 245 LBS | HEIGHT: 70 IN

## 2023-02-16 DIAGNOSIS — M84.512S: Primary | ICD-10-CM

## 2023-02-16 NOTE — PROGRESS NOTES
ORTHOPEDIC ONCOLOGY NEW PATIENT NOTE    Patient: Marnie Alfaro   Age: 67 y o  Sex: male  Gender: male  Date of visit: 2/16/2023   Referring provider: Hanny Delgado MD  Patient Care Team:  Carlo Pham MD as PCP - General (Geriatric Medicine)  Jeanmarie Chatterjee MD (Neurosurgery)  Loco Grubbs MD (Radiation Oncology)  Keren Garcia MD (Urology)  Celestina Carpio MD (Radiation Oncology)  Hiram Montenegro MD as Medical Oncologist (Hematology and Oncology)   Fax# [phone]   Chief Complaint   Patient presents with   • Left Shoulder - Pain        Marnie Alfrao is a 67 y o  male with history of metastatic renal cell cancer who presents for consultation at the request of Dr Kem Henderson regarding scapular fracture and tumor  He describes pain in his left shoulder, known metastasis and having cancer in the shoulder  He has had RCC for 12 years  He learned about the metastasis 10 years ago, moving from kidney to lungs and prostate, then to the bones and continued progression  Hes had knee surgery done and prostate surgery, with no one mentioning anything different  Dr Olga Rowe did his knees, but has since retired  He has done formal chemotherapy, mostly pills  A few infusions most recently  Gelene Angst and olvia, immunotherapies  Last appointment with Dr Daniel Vasquez, he said he would be out of options due to side effects and fall due to medication side effects  Upcoming appointment would determine next treatment plan  Fall was a few weeks ago, when admitted 1/12/23 for pathologic fracture to [address] [place] [address] after discharge  He has had pathologic fracture to wrist and back, with radiation to both locations after surgery  At baseline patient gaits with a quad cane for assistance  Denies constitutional symptoms such as fever, chills, night sweats, fatigue, weight gains/losses  Denies chest pain/shortness of breath  Patient denies personal history of cancer        Past Medical History:   Diagnosis Date   • Arthritis    • Cancer [facility])     prostate - mets to bone and lung   • History of radiation therapy 07/27/2018    SBRT to T10 7/18-7/27/2018   • Hyperlipidemia    • Hypertension      Past Surgical History:   Procedure Laterality Date   • DISTAL ULNA EXCISION Right     excision of tumor and orif with cement and screws   • JOINT REPLACEMENT Bilateral     tkr's   • LUNG SURGERY Right     exc of nodules   • NEPHRECTOMY Left        Current Outpatient Medications:   •  acetaminophen (TYLENOL) 325 mg tablet, Take 2 tablets (650 mg total) by mouth every 6 (six) hours as needed for headaches, fever or mild pain (mild pain, moderate pain), Disp: , Rfl: 0  •  aspirin (ECOTRIN LOW STRENGTH) 81 mg EC tablet, Take 1 tablet (81 mg total) by mouth daily Do not start before February 3, 2023 , Disp: 30 tablet, Rfl: 0  •  benzonatate (TESSALON) 200 MG capsule, Take 1 capsule (200 mg total) by mouth 3 (three) times a day as needed for cough, Disp: 20 capsule, Rfl: 0  •  cholecalciferol (VITAMIN D3) 1,000 units tablet, Take 1,000 Units by mouth daily, Disp: , Rfl:   •  cyanocobalamin (VITAMIN B-12) 1000 MCG tablet, Take 1 tablet (1,000 mcg total) by mouth daily Do not start before February 3, 2023 , Disp: 30 tablet, Rfl: 0  •  diphenhydrAMINE (BENADRYL) 25 mg tablet, Take 25 mg by mouth daily at bedtime as needed for itching, Disp: , Rfl:   •  diphenoxylate-atropine (LOMOTIL) 2 5-0 025 mg per tablet, Take 1 tablet by mouth 4 (four) times a day as needed for diarrhea, Disp: 30 tablet, Rfl: 0  •  fluorouracil (EFUDEX) 5 % cream, , Disp: , Rfl:   •  furosemide (LASIX) 40 mg tablet, Take 1 tablet (40 mg total) by mouth daily, Disp: 30 tablet, Rfl: 0  •  gabapentin (NEURONTIN) 100 mg capsule, Take 300 mg by mouth 3 (three) times a day , Disp: , Rfl:   •  loperamide (IMODIUM) 2 mg capsule, Take 2 mg by mouth 4 (four) times a day as needed for diarrhea, Disp: , Rfl:   •  Multiple Vitamin (multivitamin) tablet, Take 1 tablet by mouth daily, Disp: , Rfl:   •  oxyCODONE (ROXICODONE) 5 immediate release tablet, Take 1 (5mg) to 1 5 (7 5mg) tablets by mouth every 6 hours as needed for moderate to severe pain , Disp: 30 tablet, Rfl: 0  •  trolamine salicylate (ASPERCREME) 10 % cream, Apply 1 application topically as needed for muscle/joint pain, Disp: , Rfl:   •  atorvastatin (LIPITOR) 20 mg tablet, Take 1 tablet (20 mg total) by mouth daily with dinner (Patient not taking: Reported on 2/15/2023), Disp: 30 tablet, Rfl: 0  No Known Allergies  Family History   Problem Relation Age of Onset   • No Known Problems Mother    • No Known Problems Father      Social History     Socioeconomic History   • Marital status:      Spouse name: Not on file   • Number of children: 1   • Years of education: 15   • Highest education level: Not on file   Occupational History   • Occupation: retired     Comment: P/T    Tobacco Use   • Smoking status: Former     Packs/day: 1 00     Years: 41 00     Pack years: 41 00     Types: Cigarettes     Start date:      Quit date: 3/28/2003     Years since quittin 9   • Smokeless tobacco: Never   Vaping Use   • Vaping Use: Never used   Substance and Sexual Activity   • Alcohol use: Not Currently     Comment: occasional use   • Drug use: Never   • Sexual activity: Not Currently   Other Topics Concern   • Not on file   Social History Narrative    Lives at home alone     Social Determinants of Health     Financial Resource Strain: Not on file   Food Insecurity: No Food Insecurity   • Worried About Running Out of Food in the Last Year: Never true   • Ran Out of Food in the Last Year: Never true   Transportation Needs: No Transportation Needs   • Lack of Transportation (Medical): No   • Lack of Transportation (Non-Medical):  No   Physical Activity: Not on file   Stress: Not on file   Social Connections: Not on file   Intimate Partner Violence: Not on file   Housing Stability: Unknown   • Unable to Pay for Housing in the Last Year: No   • Number of Places Lived in the Last Year: Not on file   • Unstable Housing in the Last Year: No         REVIEW OF SYSTEMS  Allergies, medications, past medical/surgical/family/social history have been reviewed  Complete 12 system review performed and found to be negative except: except as per mentioned in HPI  PHYSICAL EXAMINATION  Height: 5' 10" (177 8 cm)  Weight - Scale: 111 kg (245 lb)  BMI (Calculated): 35 2  BSA (Calculated - m2): 2 28 sq meters     Vitals:    02/16/23 1546   BP: 111/72   Pulse: 99     Body mass index is 35 15 kg/m²  General: alert and oriented; well nourished/well developed; no apparent distress  Present with son  Psychiatric: normal mood and affect  HEENT: NCAT  Head/neck - full range of motion  Lungs: conversational dyspnea; equal symmetric chest expansion  Abdomen: soft, non-tender, non-distended  Skin: warm; dry; no lesions, rashes, petechiae or purpura; no clubbing, no cyanosis, no edema, no palpable masses  Extremities:   LUE   Inspection: no edema, skin abnormalities throughout   Palpation: no palpable masses or lesions   Range of motion of joints: good range of motion all extremities  Motor strength: WNL all extremities   intact   Sensation: grossly intact to all extremities  Pulses: 2+ radial and pedal pulses  Lymphatics: (-) lymphadenopathy    Left shoulder:  IR/ER at 0 intact   Pain at extreme ranges of motion  Fingers warm and well perfused  Mild swelling of bilateral wrists  Red, scaling rash on dorsum of hands and forearms      IMAGING RESULTS, All images personally review today by Dr Jonathan Keller  Study: MRI wo  Area: left shoulder  Date: 1/26/23  Impression: Metastatic disease of the scapula with pathologic fracture, mass protrudes both posteriorly and anteriorly  Full thickness partial width tear anterior supraspinatus superimposed upon moderate tendinosis   Moderate subscapularis tendinosis  Mild infraspinatus tendinosis         Laboratory:  Lab Results   Component Value Date/Time    WBC 5 80 01/30/2023 06:03 AM    WBC 8 90 12/10/2015 02:58 PM    HGB 11 2 (L) 01/30/2023 06:03 AM    HGB 13 6 12/10/2015 02:58 PM    HCT 38 7 01/30/2023 06:03 AM    HCT 41 6 12/10/2015 02:58 PM     01/30/2023 06:03 AM     12/10/2015 02:58 PM     Lab Results   Component Value Date/Time     12/10/2015 02:58 PM    K 4 2 02/02/2023 05:24 AM    K 3 7 12/10/2015 02:58 PM    CO2 38 (H) 02/02/2023 05:24 AM    CO2 28 12/10/2015 02:58 PM    CL 97 02/02/2023 05:24 AM     (H) 12/10/2015 02:58 PM    BUN 18 02/02/2023 05:24 AM    BUN 19 12/10/2015 02:58 PM    CREATININE 0 98 02/02/2023 05:24 AM    CREATININE 1 25 12/10/2015 02:58 PM    GLUCOSE 102 12/10/2015 02:58 PM    CALCIUM 9 3 02/02/2023 05:24 AM    CALCIUM 9 9 12/10/2015 02:58 PM       Referring provider's records:  Ermelinda Marcos MD  Date: 12/9/22      IMPRESSION/PLAN: Tobin Pérez is a 67 y o  male with left shoulder pain    1  Pathologic fracture left scapula  - discussed difficulty of tumor resection and fixation due to location on scapula  - recommend radiation to scapula  Discussed risk of limited response with renal cell, but with pain at scapula, radiation could be beneficial due to previous success with radiation  -reviewed MRI with patient and discussed that no mass or problem in the humerus, localized to scapula at this time  - discussed that the risks outweigh the potential benefits of removal of the tumor or entire scapula   - refer to have radiation locally   - recommended platform walker attachment  if want to use assistive device  - discussed discomfort as the effect on rotator cuff muscle compression by tumor, and pain only at extreme ROM  - healing could continue, given improvement since fall    2   RCC  - continue current treatment plan with Dr Guzman Hodge- currently receiving     FOLLOW UP: 3 months after radiation to scapula    45 minutes was spent in the coordination of care, reviewing of imaging and with the patient on the date of service    Scribe Attestation    I,:  Stephan Moss PA-C am acting as a scribe while in the presence of the attending physician :       I,:  Rajni Milan DO personally performed the services described in this documentation    as scribed in my presence :            Stephan Moss PA-C   2/16/2023 4:21 PM

## 2023-02-17 ENCOUNTER — TELEPHONE (OUTPATIENT)
Dept: NEPHROLOGY | Facility: CLINIC | Age: 73
End: 2023-02-17

## 2023-02-20 ENCOUNTER — OFFICE VISIT (OUTPATIENT)
Dept: HEMATOLOGY ONCOLOGY | Facility: CLINIC | Age: 73
End: 2023-02-20

## 2023-02-20 VITALS
SYSTOLIC BLOOD PRESSURE: 124 MMHG | DIASTOLIC BLOOD PRESSURE: 70 MMHG | OXYGEN SATURATION: 87 % | RESPIRATION RATE: 18 BRPM | BODY MASS INDEX: 34.93 KG/M2 | WEIGHT: 244 LBS | HEIGHT: 70 IN | HEART RATE: 102 BPM | TEMPERATURE: 96.6 F

## 2023-02-20 DIAGNOSIS — C78.00 MALIGNANT NEOPLASM METASTATIC TO LUNG, UNSPECIFIED LATERALITY (HCC): Primary | ICD-10-CM

## 2023-02-20 DIAGNOSIS — C64.2 CLEAR CELL ADENOCARCINOMA OF KIDNEY, LEFT (HCC): ICD-10-CM

## 2023-02-20 DIAGNOSIS — M84.512A: ICD-10-CM

## 2023-02-20 NOTE — PROGRESS NOTES
Hematology / Oncology Outpatient Follow Up Note    Mushtaq Li 67 y o  male :1950 RRE:[id number]         Date:  2023    Assessment / Plan:  A 23-year-old gentleman who was diagnosed with localized renal cell carcinoma in 2007  He developed metastatic disease in   Since then, he has been on extensive treatment with multiple lines of anti VEGF TKI as well as checkpoint inhibitors  His disease progressed on all of the previous treatments  He recently suffered pathologic fractures of left scapula  He also has progression of pulmonary metastasis as well as new pleural effusion which I assumed malignant  His performance status has significantly decreased in the last few months  We discussed further management with patient as well as his son and daughter  With his poor performance status as well as the fact that he was heavily pretreated, I do not have any other systemic therapy for metastatic renal cell carcinoma to offer  I recommended him to be on hospice care  The concept and practical side of hospice care was thoroughly discussed  He is unable to make a decision at this moment  I instructed him to give us a call once he has a decision on hospice care  We discussed the life expectancy which I think is 3 to 6 months  They understood  All the patient and his family members questions were answered to their satisfaction         Subjective:      HPI:             Interval History:  A 23-year-old gentleman who has history of left nephrectomy for renal cell carcinoma in 2007  He was well until  when he was found to have lung metastasis   He was watched until 2015 when he started sunitinib until 2016   Since then, he has been on axitinib which he is still on   In 2019, CT scan showed progression of tumor in the renal bed as well as some mixed response was lung metastasis   Therefore, Dr Lamont Cheatham at added pembrolizumab   He had no immune related toxicity with pembrolizumab   However, he had progressive disease in May 2021 his systemic therapy was changed to cabozantinib   Because of the initial toxicity, he was on cabozantinib 40 mg every other day with excellent tolerance    He had initial partial response on cabozantinib  However, he had progressive disease with lung metastasis as well as intra-abdominal lymphadenopathy in August 2021  Therefore, systemic therapy was changed to lenvatinib with pembrolizumab, resulting in partial response  Unfortunately, he had another progressive disease in late March 2022  Therefore, his systemic therapy was changed to tivozanib, resulting in progressive disease in August 2022  We discussed several treatment options, including clinical trial at [facility] center, pazopanib, nivolumab with ipilimumab  He wished to continue with tivozanib  In December 2022, systemic therapy was changed to nivolumab which he received 1 dose  Subsequently, he developed severe body shake  He developed left scapular pathologic fracture for which he was hospitalized  During the hospital stay, CT scan of chest showed new pleural effusion as well as progressive pulmonary metastasis  His pathologic fracture was managed conservatively without surgical procedure  He went home with oxygen  He presented today with his son and daughter to discuss further management  He has been anorexic  He lost some weight  Has chronic shortness of breath  His left shoulder pain is mild    His performance status is 3 out of 4 on ECOG scale      Objective:      Primary Diagnosis:     1  Localized renal cell carcinoma T1 NX M0 grade 2-3 diagnosed in August 2007      2  Metastatic renal cell carcinoma, diagnosed in 2013       Cancer Staging:  Cancer Staging  No matching staging information was found for the patient         Previous Hematologic/ Oncologic Treatment:      1  Sunitinib from March 2015 through December 2016    2  Axitinib monotherapy from December 2016 through November 2019    3  Axitinib with pembrolizumab from November 2019 through April 2020    4   Cabozantinib from May 2020 through August 2021   5  Lenvatinib with pembrolizumab from September 2021 through March 2022   6  Tivozanib from April 2022 through December 2022   7   1 dose of nivolumab in November 2022       Current Hematologic/ Oncologic Treatment:       Hospice care was offered      Disease Status:      Progressive disease      Test Results:     Pathology:           Radiology:     CT scan of the lung showed a progressive pulmonary metastasis as well as new pleural effusion       Laboratory:     See below   Normal TSH      Physical Exam:        General Appearance:    Alert, oriented          Eyes:    PERRL   Ears:    Normal external ear canals, both ears   Nose:   Nares normal, septum midline   Throat:   Mucosa moist  Pharynx without injection  Neck:   Supple         Lungs:     Clear to auscultation bilaterally   Chest Wall:    No tenderness or deformity    Heart:    Regular rate and rhythm         Abdomen:     Soft, non-tender, bowel sounds +, no organomegaly               Extremities:   Extremities no cyanosis or edema         Skin:   no rash or icterus  Lymph nodes:   Cervical, supraclavicular, and axillary nodes normal   Neurologic:   CNII-XII intact, normal strength, sensation and reflexes     Throughout             Breast exam:   NA           ROS: Review of Systems   All other systems reviewed and are negative  Imaging: CT chest abdomen pelvis w contrast    Result Date: 8/2/2022  Narrative: CT CHEST, ABDOMEN AND PELVIS WITH IV CONTRAST INDICATION:   C64 2: Malignant neoplasm of left kidney, except renal pelvis C79 51: Secondary malignant neoplasm of bone C78 00: Secondary malignant neoplasm of unspecified lung  History of bone metastasis  Left renal carcinoma  Prostate cancer  Prostatectomy  Lung metastasis  Spine metastasis  Radiation therapy SBRT to T10  Left nephrectomy  Right lung surgery  COMPARISON:  CT chest abdomen pelvis 03/17/2022  TECHNIQUE: CT examination of the chest, abdomen and pelvis was performed  Axial, sagittal, and coronal 2D reformatted images were created from the source data and submitted for interpretation  Radiation dose length product (DLP) for this visit:  2264 mGy-cm   This examination, like all CT scans performed in the [facility], was performed utilizing techniques to minimize radiation dose exposure, including the use of iterative reconstruction and automated exposure control  IV Contrast:  75 mL of iohexol (OMNIPAQUE) Enteric Contrast: Enteric contrast was administered  FINDINGS: CHEST LUNGS:  8mm nodular density along the right lateral tracheal wall 3/36, new from prior may represent small amounts of mucus  Mild emphysema  Probable scarring in the superior posterior right upper lobe with associated bronchiolectasis without significant change from prior  2 mm nodule right upper lobe 3/35, in retrospect this was punctate  2 mm nodule right middle lobe 3/61, also punctate in retrospect on prior examination  26 x 13 mm pleural-based nodule in the right middle lobe 3/80, previously 10 x 21 mm  17 x 15 mm peripherally calcified nodule in the right lower lobe 3/68, previously 13 x 11 mm  Subpleural consolidation in the right lower lobe subjacent to bony metastasis redemonstrated  6 mm left upper lobe nodule 3/17, previously 8 mm  5 mm subpleural nodule left upper lobe 3/20, new from prior  3 mm left upper lobe nodule, 3/24, also new  increased size of additional left lung nodules  15 x 12 mm left upper lobe nodule 3/43 previously 15 x 10 mm  20 x 11 mm subpleural nodule left lower lobe 3/62, previously 17 x 10 mm  PLEURA:  Unremarkable  HEART/GREAT VESSELS: Thoracic aortic, annular, and coronary artery calcification  No thoracic aortic aneurysm   There is enlargement of the central pulmonary arterial tree suggesting some element of pulmonary artery hypertension  MEDIASTINUM AND CHERRIE:  Unremarkable  CHEST WALL AND LOWER NECK:    Stable multinodular thyroid gland  ABDOMEN LIVER/BILIARY TREE:  Unremarkable  GALLBLADDER:  No calcified gallstones  No pericholecystic inflammatory change  SPLEEN:  Unremarkable  PANCREAS:  Unremarkable  ADRENAL GLANDS:  31 x 40 mm left adrenal mass is stable  KIDNEYS/URETERS:  Large right likely minimally complicated right renal cyst   No right hydronephrosis  Status post left nephrectomy  Increased size and number of soft tissue density in the left renal fossa may represent lymphadenopathy or localized recurrence, for example 23 x 19 mm soft tissue density, series 2 image 71 previously 14 x 13 mm  Curvilinear soft tissue density measuring 32 x 12 mm series 2 image 68, new from prior  STOMACH AND BOWEL:  Small lipoma in the duodenal bulb  Colonic diverticulosis, without findings for diverticulitis  APPENDIX:  No findings to suggest appendicitis  ABDOMINOPELVIC CAVITY:  38 x 35 mm centrally calcified mass in the left retroperitoneum 2/78 previously 27 x 26 mm  Ill-defined soft tissue density mass right mesentery maximum axial measurements of 10 6 x 6 0 cm, previously 9 5 x 5 5  Retroperitoneal lymphadenopathy redemonstrated, gastrohepatic and celiac axis lymphadenopathy, increased in size from prior, 37 x 20 mm previously 31 x 19 mm and 53 x 22 mm previously 49 x 22 mm  VESSELS:  Atherosclerotic changes are present  No evidence of aneurysm  PELVIS REPRODUCTIVE ORGANS:  Absent  URINARY BLADDER:  Collapsed ABDOMINAL WALL/INGUINAL REGIONS:  Postsurgical changes anteriorly  Small lipoma in the right external oblique  Bilateral fat-containing inguinal hernia  There are incisional hernias   OSSEOUS STRUCTURES:  Osseous metastasis redemonstrated, most notably involving right ribs 9, lytic expansile lesion with associated soft tissue component and similar but more significant involving right rib 7 posteriorly  Expansile metastatic lesion involving the right ilium with increased soft tissue component compared to prior with greatest axial measurement of 76 mm previously 68 mm  Extension along the psoas muscle  Metastatic lesions involving pelvic bones with soft tissue component involving  the left ilium adjacent to sacroiliac joint with greatest axial measurements of 55 mm previously 48 mm  Postsurgical changes of the lumbar spine  Additional vertebral body metastasis do not appear significantly changed from prior  Osseous and soft tissue component metastasis involving the left scapula  Impression: Findings of disease progression as described above   Increased size of pulmonary metastasis  Increased size of retroperitoneal and left renal fossa metastatic disease  Increased size of mesenteric mass  Additional findings as above  The study was marked in EPIC for significant notification  Workstation performed: [id number]         Labs:   Lab Results   Component Value Date    WBC 5 80 01/30/2023    HGB 11 2 (L) 01/30/2023    HCT 38 7 01/30/2023    MCV 95 01/30/2023     01/30/2023     Lab Results   Component Value Date     12/10/2015    K 4 2 02/02/2023    CL 97 02/02/2023    CO2 38 (H) 02/02/2023    ANIONGAP 7 12/10/2015    BUN 18 02/02/2023    CREATININE 0 98 02/02/2023    GLUCOSE 102 12/10/2015    GLUF 99 02/02/2023    CALCIUM 9 3 02/02/2023    CORRECTEDCA 10 0 01/30/2023    AST 33 01/30/2023    ALT 17 01/30/2023    ALKPHOS 72 01/30/2023    PROT 6 9 12/10/2015    BILITOT 0 71 12/10/2015    EGFR 76 02/02/2023         Lab Results   Component Value Date     01/29/2020          Lab Results   Component Value Date    PSA <0 1 09/23/2019    PSA <0 1 12/22/2017    PSA <0 1 12/10/2015         Current Medications: Reviewed  Allergies: Reviewed  PMH/FH/SH:  Reviewed      Vital Sign:    Body surface area is 2 27 meters squared      Wt Readings from Last 3 Encounters:   02/20/23 111 kg (244 lb)   02/16/23 111 kg (245 lb)   02/15/23 111 kg (245 lb)        Temp Readings from Last 3 Encounters:   02/20/23 (!) 96 6 °F (35 9 °C)   02/15/23 98 2 °F (36 8 °C) (Tympanic)   02/03/23 97 7 °F (36 5 °C) (Tympanic)        BP Readings from Last 3 Encounters:   02/20/23 124/70   02/16/23 111/72   02/15/23 112/62         Pulse Readings from Last 3 Encounters:   02/20/23 102   02/16/23 99   02/15/23 105     @LASTSAO2(3)@

## 2023-02-22 ENCOUNTER — APPOINTMENT (OUTPATIENT)
Dept: LAB | Facility: MEDICAL CENTER | Age: 73
End: 2023-02-22

## 2023-02-22 DIAGNOSIS — N17.9 AKI (ACUTE KIDNEY INJURY) (HCC): ICD-10-CM

## 2023-02-22 LAB
ANION GAP SERPL CALCULATED.3IONS-SCNC: 2 MMOL/L (ref 4–13)
BUN SERPL-MCNC: 15 MG/DL (ref 5–25)
CALCIUM SERPL-MCNC: 9.9 MG/DL (ref 8.3–10.1)
CHLORIDE SERPL-SCNC: 99 MMOL/L (ref 96–108)
CO2 SERPL-SCNC: 38 MMOL/L (ref 21–32)
CREAT SERPL-MCNC: 1 MG/DL (ref 0.6–1.3)
GFR SERPL CREATININE-BSD FRML MDRD: 74 ML/MIN/1.73SQ M
GLUCOSE P FAST SERPL-MCNC: 128 MG/DL (ref 65–99)
POTASSIUM SERPL-SCNC: 3.4 MMOL/L (ref 3.5–5.3)
SODIUM SERPL-SCNC: 139 MMOL/L (ref 135–147)

## 2023-02-23 ENCOUNTER — TELEPHONE (OUTPATIENT)
Dept: HEMATOLOGY ONCOLOGY | Facility: CLINIC | Age: 73
End: 2023-02-23

## 2023-02-23 NOTE — TELEPHONE ENCOUNTER
CALL RETURN FORM   Reason for patient call? Patient calling in regarding questions he has about Hospice as previously discussed  Patient's primary oncologist? Dr Edmundo Coffey    Name of person the patient was calling for? Brittany    Any additional information to add, if applicable? Best call back number is [phone]   Informed patient that the message will be forwarded to the team and someone will get back to them as soon as possible    Did you relay this information to the patient?  Yes

## 2023-02-24 ENCOUNTER — HOSPITAL ENCOUNTER (OUTPATIENT)
Dept: INFUSION CENTER | Facility: CLINIC | Age: 73
End: 2023-02-24

## 2023-02-24 ENCOUNTER — OFFICE VISIT (OUTPATIENT)
Dept: NEPHROLOGY | Facility: CLINIC | Age: 73
End: 2023-02-24

## 2023-02-24 ENCOUNTER — HOME CARE VISIT (OUTPATIENT)
Dept: HOME HOSPICE | Facility: HOSPICE | Age: 73
End: 2023-02-24

## 2023-02-24 VITALS
SYSTOLIC BLOOD PRESSURE: 126 MMHG | HEIGHT: 70 IN | DIASTOLIC BLOOD PRESSURE: 68 MMHG | WEIGHT: 244 LBS | BODY MASS INDEX: 34.93 KG/M2

## 2023-02-24 DIAGNOSIS — N18.31 STAGE 3A CHRONIC KIDNEY DISEASE (HCC): Primary | ICD-10-CM

## 2023-02-24 DIAGNOSIS — C64.2 CLEAR CELL ADENOCARCINOMA OF KIDNEY, LEFT (HCC): ICD-10-CM

## 2023-02-24 DIAGNOSIS — I50.32 CHRONIC DIASTOLIC (CONGESTIVE) HEART FAILURE (HCC): ICD-10-CM

## 2023-02-24 DIAGNOSIS — Z85.46 HISTORY OF PROSTATE CANCER: ICD-10-CM

## 2023-02-24 DIAGNOSIS — C78.00 MALIGNANT NEOPLASM METASTATIC TO LUNG, UNSPECIFIED LATERALITY (HCC): Primary | ICD-10-CM

## 2023-02-24 DIAGNOSIS — K52.9 CHRONIC DIARRHEA: Chronic | ICD-10-CM

## 2023-02-24 DIAGNOSIS — Z90.5 H/O LEFT NEPHRECTOMY: Chronic | ICD-10-CM

## 2023-02-24 DIAGNOSIS — C78.00 MALIGNANT NEOPLASM METASTATIC TO LUNG, UNSPECIFIED LATERALITY (HCC): ICD-10-CM

## 2023-02-24 DIAGNOSIS — I15.9 SECONDARY HYPERTENSION: ICD-10-CM

## 2023-02-24 DIAGNOSIS — C79.51 SPINE METASTASIS (HCC): ICD-10-CM

## 2023-02-24 NOTE — PROGRESS NOTES
OFFICE FOLLOW UP - Nephrology   Linda Wilcox 67 y o  male MRN: [id number]       ASSESSMENT and PLAN:  Batsheva Delgado was seen today for follow-up and chronic kidney disease  Diagnoses and all orders for this visit:    Stage 3a chronic kidney disease ([facility] [address] )    Malignant neoplasm metastatic to lung, unspecified laterality ([facility] [address] )    Secondary hypertension    Chronic diastolic (congestive) heart failure (HCC)    Spine metastasis (HCC)    Clear cell adenocarcinoma of kidney, left ([address] )    H/O left nephrectomy    History of prostate cancer    Chronic diarrhea        This is a 78-year-old gentleman with history of chronic kidney disease stage II/III with previous creatinine fluctuating around 1-1 3, history of renal cell carcinoma status post left nephrectomy 2007, developed metastatic disease in 2013, since then he has been on extensive treatment with multiple lines of anti-VEGF TK1 as well as checkpoint inhibitors follow-up with hematology oncology, unfortunately his disease progressed, recently suffered a pathologic fracture of the left scapula as well as progression of pulmonary metastasis as well as new pleural effusion that is assumed to be malignant  Patient was hospitalized last month acute kidney injury, at that time nephrology was consulted, creatinine peak at 2 26, during that admission lisinopril/HCTZ were discontinued, kidney function eventually improved back to baseline  Today presents to the office for hospital follow-up, he is accompanied with his son Joe Guzman     1 recent episode of acute kidney injury on top of CKD stage II/III resolved  Baseline creatinine 1-1 3  Recent blood test with a creatinine of 1 0 on 2/22/2023  Discussed with patient and with his son about importance of following a low-salt diet, recommend to increase potassium intake use also status, discussed about importance to avoid NSAIDs  Continue follow-up with his primary care doctor    I will be more than happy to see him back in the office in 1 year if needed    2 metastatic renal cell carcinoma diagnosed in 2013 follow-up with hematology oncology  Noted patient received extensive treatment with multiple lines in the past, unfortunately his disease progressed  As per last hematology oncology note they recommend hospice care    3 shortness of breath, multifactorial in the setting of pleural effusion, patient saw pulmonology  Continue with diuretics as per pulmonology, follow chest x-ray    4 chronic diastolic congestive heart failure, continue with diuretics and low-salt diet    5 hemodynamics, no pressure currently well controlled, continue current regimen    6 mild hypokalemia in the setting of diuretics, patient was recently advised to increase his diuretic regimen by pulmonology, discussed about importance of following a high potassium diet    7 mild anemia, last hemoglobin 11 2      Patient Instructions   As we discussed in the office visit, based on the most recent blood test your kidney function improved back to your baseline  Continue following a low-salt diet  Recommend to increase the potassium in your diet as well as use salt substitutes  Recommend to continue close follow-up with your primary care doctor and your hematologist oncologist   We will be more than happy to see you back in the office in 1 year if needed  Remember to avoid NSAIDs (no ibuprofen, Motrin, Advil, Aleve, naproxen)  Okay to take Tylenol or paracetamol or acetaminophen or oxycodone as needed for pain          HPI: Diego Mcburney is a 67 y o  male who is here for Follow-up [facility] follow up ARIANNA) and Chronic Kidney Disease        Patient with history of chronic kidney disease a stage II/III, history of localized renal cell carcinoma history of left nephrectomy 2007, patient developed metastatic disease in 2013, being on extensive treatment with multiple lines since then, recently hospitalized at [address] [place] [address] with acute kidney injury on top of CKD patient was seen by one of my colleagues during that hospitalization, renal function improving with back to baseline  Patient today returns to the office for hospital follow-up with his son Alicia Bocanegra    Patient currently is feeling overall okay, continues complaining of dyspnea on exertion as well as chronic leg swelling as well as chronic diarrhea for several years  Patient denies any chest pain  Denies any abdominal pain, no nausea, vomiting, no constipation  Denies any urinary problems, no burning sensation or gross hematuria  The last blood work was done on 2/22/2023, which we have reviewed together  Recent creatinine 1 0, potassium 3 4, sodium 139    ROS:   All the systems were reviewed and were negative except as documented on the HPI  Allergies: Patient has no known allergies      Medications:   Current Outpatient Medications:   •  acetaminophen (TYLENOL) 325 mg tablet, Take 2 tablets (650 mg total) by mouth every 6 (six) hours as needed for headaches, fever or mild pain (mild pain, moderate pain), Disp: , Rfl: 0  •  aspirin (ECOTRIN LOW STRENGTH) 81 mg EC tablet, Take 1 tablet (81 mg total) by mouth daily Do not start before February 3, 2023 , Disp: 30 tablet, Rfl: 0  •  atorvastatin (LIPITOR) 20 mg tablet, Take 1 tablet (20 mg total) by mouth daily with dinner, Disp: 30 tablet, Rfl: 0  •  benzonatate (TESSALON) 200 MG capsule, Take 1 capsule (200 mg total) by mouth 3 (three) times a day as needed for cough, Disp: 20 capsule, Rfl: 0  •  cholecalciferol (VITAMIN D3) 1,000 units tablet, Take 1,000 Units by mouth daily, Disp: , Rfl:   •  cyanocobalamin (VITAMIN B-12) 1000 MCG tablet, Take 1 tablet (1,000 mcg total) by mouth daily Do not start before February 3, 2023 , Disp: 30 tablet, Rfl: 0  •  diphenhydrAMINE (BENADRYL) 25 mg tablet, Take 25 mg by mouth daily at bedtime as needed for itching, Disp: , Rfl:   •  fluorouracil (EFUDEX) 5 % cream, , Disp: , Rfl:   •  furosemide (LASIX) 40 mg tablet, Take 1 tablet (40 mg total) by mouth daily, Disp: 30 tablet, Rfl: 0  •  gabapentin (NEURONTIN) 100 mg capsule, Take 300 mg by mouth 3 (three) times a day , Disp: , Rfl:   •  loperamide (IMODIUM) 2 mg capsule, Take 2 mg by mouth 4 (four) times a day as needed for diarrhea, Disp: , Rfl:   •  Multiple Vitamin (multivitamin) tablet, Take 1 tablet by mouth daily, Disp: , Rfl:   •  oxyCODONE (ROXICODONE) 5 immediate release tablet, Take 1 (5mg) to 1 5 (7 5mg) tablets by mouth every 6 hours as needed for moderate to severe pain , Disp: 30 tablet, Rfl: 0  •  trolamine salicylate (ASPERCREME) 10 % cream, Apply 1 application topically as needed for muscle/joint pain, Disp: , Rfl:   •  diphenoxylate-atropine (LOMOTIL) 2 5-0 025 mg per tablet, Take 1 tablet by mouth 4 (four) times a day as needed for diarrhea, Disp: 30 tablet, Rfl: 0    Past Medical History:   Diagnosis Date   • Arthritis    • Cancer ([facility] [address] )     prostate - mets to bone and lung   • History of radiation therapy 07/27/2018    SBRT to T10 7/18-7/27/2018   • Hyperlipidemia    • Hypertension      Past Surgical History:   Procedure Laterality Date   • DISTAL ULNA EXCISION Right     excision of tumor and orif with cement and screws   • JOINT REPLACEMENT Bilateral     tkr's   • LUNG SURGERY Right     exc of nodules   • NEPHRECTOMY Left      Family History   Problem Relation Age of Onset   • No Known Problems Mother    • No Known Problems Father       reports that he quit smoking about 19 years ago  His smoking use included cigarettes  He started smoking about 61 years ago  He has a 41 00 pack-year smoking history  He has never used smokeless tobacco  He reports that he does not currently use alcohol  He reports that he does not use drugs  Physical Exam:   Vitals:    02/24/23 1327   BP: 126/68   BP Location: Right arm   Patient Position: Sitting   Cuff Size: Standard   Weight: 111 kg (244 lb)   Height: 5' 10" (1 778 m)     Body mass index is 35 01 kg/m²      General: cooperative, in not acute distress  Eyes: conjunctivae pink, anicteric sclerae  ENT: lips and mucous membranes moist  Neck: supple, no JVD  Chest: clear breath sounds bilateral, no crackles, ronchus or wheezings  CVS: distinct S1 & S2, normal rate, regular rhythm  Abdomen: obese, non-tender, non-distended, normoactive bowel sounds  Back: no CVA tenderness  Extremities: 1+ edema of both legs  Skin: no rash  Neuro: awake, alert, oriented      Lab Results:  Results for orders placed or performed in visit on 67/14/99   Basic metabolic panel   Result Value Ref Range    Sodium 139 135 - 147 mmol/L    Potassium 3 4 (L) 3 5 - 5 3 mmol/L    Chloride 99 96 - 108 mmol/L    CO2 38 (H) 21 - 32 mmol/L    ANION GAP 2 (L) 4 - 13 mmol/L    BUN 15 5 - 25 mg/dL    Creatinine 1 00 0 60 - 1 30 mg/dL    Glucose, Fasting 128 (H) 65 - 99 mg/dL    Calcium 9 9 8 3 - 10 1 mg/dL    eGFR 74 ml/min/1 73sq m     *Note: Due to a large number of results and/or encounters for the requested time period, some results have not been displayed  A complete set of results can be found in Results Review  Results from last 7 days   Lab Units 02/22/23  1437   SODIUM mmol/L 139   POTASSIUM mmol/L 3 4*   CHLORIDE mmol/L 99   CO2 mmol/L 38*   BUN mg/dL 15   CREATININE mg/dL 1 00   CALCIUM mg/dL 9 9           Portions of the record may have been created with voice recognition software  Occasional wrong word or "sound a like" substitutions may have occurred due to the inherent limitations of voice recognition software  Read the chart carefully and recognize, using context, where substitutions have occurred  If you have any questions, please contact the dictating provider

## 2023-02-24 NOTE — Clinical Note
Kevni Frankel 1950  Patient of Dr Sharyn Estrada   Dx with localized renal cell carcinoma in 2007 developed metastatic dx in 2013  Extensive Rx but Dx progression and recent pathological FX of Left scapula  Pulmonary metastasis with new pleural effusion   Patient is also physically declining   No other RX available   ??  Routine level on care

## 2023-02-24 NOTE — LETTER
February 24, 2023     Msua Richmond MD  [address]    Patient: Filomena Kelley   YOB: 1950   Date of Visit: 2/24/2023       Dear Dr Emily Gomez: Thank you for referring Brittni Client to me for evaluation  Below are my notes for this consultation  If you have questions, please do not hesitate to call me  I look forward to following your patient along with you  Sincerely,        Jose Luis Briceno MD        CC: MD Jose Luis Scott MD  2/24/2023  2:14 PM  Sign when Signing Visit  OFFICE FOLLOW UP - Nephrology   Filomena Kelley 67 y o  male MRN: [id number]       ASSESSMENT and PLAN:  Blank Gonzalez was seen today for follow-up and chronic kidney disease  Diagnoses and all orders for this visit:    Stage 3a chronic kidney disease ([address] )    Malignant neoplasm metastatic to lung, unspecified laterality ([address] )    Secondary hypertension    Chronic diastolic (congestive) heart failure (HCC)    Spine metastasis (HCC)    Clear cell adenocarcinoma of kidney, left ([address] )    H/O left nephrectomy    History of prostate cancer    Chronic diarrhea        This is a 21-year-old gentleman with history of chronic kidney disease stage II/III with previous creatinine fluctuating around 1-1 3, history of renal cell carcinoma status post left nephrectomy 2007, developed metastatic disease in 2013, since then he has been on extensive treatment with multiple lines of anti-VEGF TK1 as well as checkpoint inhibitors follow-up with hematology oncology, unfortunately his disease progressed, recently suffered a pathologic fracture of the left scapula as well as progression of pulmonary metastasis as well as new pleural effusion that is assumed to be malignant    Patient was hospitalized last month acute kidney injury, at that time nephrology was consulted, creatinine peak at 2 26, during that admission lisinopril/HCTZ were discontinued, kidney function eventually improved back to baseline  Today presents to the office for hospital follow-up, he is accompanied with his son Justin Rodriguez     1 recent episode of acute kidney injury on top of CKD stage II/III resolved  Baseline creatinine 1-1 3  Recent blood test with a creatinine of 1 0 on 2/22/2023  Discussed with patient and with his son about importance of following a low-salt diet, recommend to increase potassium intake use also status, discussed about importance to avoid NSAIDs  Continue follow-up with his primary care doctor  I will be more than happy to see him back in the office in 1 year if needed    2 metastatic renal cell carcinoma diagnosed in 2013 follow-up with hematology oncology  Noted patient received extensive treatment with multiple lines in the past, unfortunately his disease progressed  As per last hematology oncology note they recommend hospice care    3 shortness of breath, multifactorial in the setting of pleural effusion, patient saw pulmonology  Continue with diuretics as per pulmonology, follow chest x-ray    4 chronic diastolic congestive heart failure, continue with diuretics and low-salt diet    5 hemodynamics, no pressure currently well controlled, continue current regimen    6 mild hypokalemia in the setting of diuretics, patient was recently advised to increase his diuretic regimen by pulmonology, discussed about importance of following a high potassium diet    7 mild anemia, last hemoglobin 11 2      Patient Instructions   As we discussed in the office visit, based on the most recent blood test your kidney function improved back to your baseline  Continue following a low-salt diet  Recommend to increase the potassium in your diet as well as use salt substitutes  Recommend to continue close follow-up with your primary care doctor and your hematologist oncologist   We will be more than happy to see you back in the office in 1 year if needed    Remember to avoid NSAIDs (no ibuprofen, Motrin, Advil, Aleve, naproxen)  Okay to take Tylenol or paracetamol or acetaminophen or oxycodone as needed for pain          HPI: Merrill White is a 67 y o  male who is here for Follow-up [facility] follow up ARIANNA) and Chronic Kidney Disease    Patient with history of chronic kidney disease a stage II/III, history of localized renal cell carcinoma history of left nephrectomy 2007, patient developed metastatic disease in 2013, being on extensive treatment with multiple lines since then, recently hospitalized at [address] [place] [address] with acute kidney injury on top of CKD patient was seen by one of my colleagues during that hospitalization, renal function improving with back to baseline  Patient today returns to the office for hospital follow-up with his son Severa Muff    Patient currently is feeling overall okay, continues complaining of dyspnea on exertion as well as chronic leg swelling as well as chronic diarrhea for several years  Patient denies any chest pain  Denies any abdominal pain, no nausea, vomiting, no constipation  Denies any urinary problems, no burning sensation or gross hematuria  The last blood work was done on 2/22/2023, which we have reviewed together  Recent creatinine 1 0, potassium 3 4, sodium 139    ROS:   All the systems were reviewed and were negative except as documented on the HPI  Allergies: Patient has no known allergies      Medications:   Current Outpatient Medications:   •  acetaminophen (TYLENOL) 325 mg tablet, Take 2 tablets (650 mg total) by mouth every 6 (six) hours as needed for headaches, fever or mild pain (mild pain, moderate pain), Disp: , Rfl: 0  •  aspirin (ECOTRIN LOW STRENGTH) 81 mg EC tablet, Take 1 tablet (81 mg total) by mouth daily Do not start before February 3, 2023 , Disp: 30 tablet, Rfl: 0  •  atorvastatin (LIPITOR) 20 mg tablet, Take 1 tablet (20 mg total) by mouth daily with dinner, Disp: 30 tablet, Rfl: 0  •  benzonatate (TESSALON) 200 MG capsule, Take 1 capsule (200 mg total) by mouth 3 (three) times a day as needed for cough, Disp: 20 capsule, Rfl: 0  •  cholecalciferol (VITAMIN D3) 1,000 units tablet, Take 1,000 Units by mouth daily, Disp: , Rfl:   •  cyanocobalamin (VITAMIN B-12) 1000 MCG tablet, Take 1 tablet (1,000 mcg total) by mouth daily Do not start before February 3, 2023 , Disp: 30 tablet, Rfl: 0  •  diphenhydrAMINE (BENADRYL) 25 mg tablet, Take 25 mg by mouth daily at bedtime as needed for itching, Disp: , Rfl:   •  fluorouracil (EFUDEX) 5 % cream, , Disp: , Rfl:   •  furosemide (LASIX) 40 mg tablet, Take 1 tablet (40 mg total) by mouth daily, Disp: 30 tablet, Rfl: 0  •  gabapentin (NEURONTIN) 100 mg capsule, Take 300 mg by mouth 3 (three) times a day , Disp: , Rfl:   •  loperamide (IMODIUM) 2 mg capsule, Take 2 mg by mouth 4 (four) times a day as needed for diarrhea, Disp: , Rfl:   •  Multiple Vitamin (multivitamin) tablet, Take 1 tablet by mouth daily, Disp: , Rfl:   •  oxyCODONE (ROXICODONE) 5 immediate release tablet, Take 1 (5mg) to 1 5 (7 5mg) tablets by mouth every 6 hours as needed for moderate to severe pain , Disp: 30 tablet, Rfl: 0  •  trolamine salicylate (ASPERCREME) 10 % cream, Apply 1 application topically as needed for muscle/joint pain, Disp: , Rfl:   •  diphenoxylate-atropine (LOMOTIL) 2 5-0 025 mg per tablet, Take 1 tablet by mouth 4 (four) times a day as needed for diarrhea, Disp: 30 tablet, Rfl: 0    Past Medical History:   Diagnosis Date   • Arthritis    • Cancer ([address] )     prostate - mets to bone and lung   • History of radiation therapy 07/27/2018    SBRT to T10 7/18-7/27/2018   • Hyperlipidemia    • Hypertension      Past Surgical History:   Procedure Laterality Date   • DISTAL ULNA EXCISION Right     excision of tumor and orif with cement and screws   • JOINT REPLACEMENT Bilateral     tkr's   • LUNG SURGERY Right     exc of nodules   • NEPHRECTOMY Left      Family History   Problem Relation Age of Onset   • No Known Problems Mother    • No Known Problems Father       reports that he quit smoking about 19 years ago  His smoking use included cigarettes  He started smoking about 61 years ago  He has a 41 00 pack-year smoking history  He has never used smokeless tobacco  He reports that he does not currently use alcohol  He reports that he does not use drugs  Physical Exam:   Vitals:    02/24/23 1327   BP: 126/68   BP Location: Right arm   Patient Position: Sitting   Cuff Size: Standard   Weight: 111 kg (244 lb)   Height: 5' 10" (1 778 m)     Body mass index is 35 01 kg/m²  General: cooperative, in not acute distress  Eyes: conjunctivae pink, anicteric sclerae  ENT: lips and mucous membranes moist  Neck: supple, no JVD  Chest: clear breath sounds bilateral, no crackles, ronchus or wheezings  CVS: distinct S1 & S2, normal rate, regular rhythm  Abdomen: obese, non-tender, non-distended, normoactive bowel sounds  Back: no CVA tenderness  Extremities: 1+ edema of both legs  Skin: no rash  Neuro: awake, alert, oriented      Lab Results:  Results for orders placed or performed in visit on 33/44/74   Basic metabolic panel   Result Value Ref Range    Sodium 139 135 - 147 mmol/L    Potassium 3 4 (L) 3 5 - 5 3 mmol/L    Chloride 99 96 - 108 mmol/L    CO2 38 (H) 21 - 32 mmol/L    ANION GAP 2 (L) 4 - 13 mmol/L    BUN 15 5 - 25 mg/dL    Creatinine 1 00 0 60 - 1 30 mg/dL    Glucose, Fasting 128 (H) 65 - 99 mg/dL    Calcium 9 9 8 3 - 10 1 mg/dL    eGFR 74 ml/min/1 73sq m     *Note: Due to a large number of results and/or encounters for the requested time period, some results have not been displayed  A complete set of results can be found in Results Review  Results from last 7 days   Lab Units 02/22/23  1437   SODIUM mmol/L 139   POTASSIUM mmol/L 3 4*   CHLORIDE mmol/L 99   CO2 mmol/L 38*   BUN mg/dL 15   CREATININE mg/dL 1 00   CALCIUM mg/dL 9 9           Portions of the record may have been created with voice recognition software  Occasional wrong word or "sound a like" substitutions may have occurred due to the inherent limitations of voice recognition software  Read the chart carefully and recognize, using context, where substitutions have occurred  If you have any questions, please contact the dictating provider

## 2023-02-24 NOTE — TELEPHONE ENCOUNTER
Spoke with patient  Referral for hospice made  Explained the process  He verbalized understanding and agreed to plan

## 2023-02-24 NOTE — PATIENT INSTRUCTIONS
As we discussed in the office visit, based on the most recent blood test your kidney function improved back to your baseline  Continue following a low-salt diet  Recommend to increase the potassium in your diet as well as use salt substitutes  Recommend to continue close follow-up with your primary care doctor and your hematologist oncologist   We will be more than happy to see you back in the office in 1 year if needed  Remember to avoid NSAIDs (no ibuprofen, Motrin, Advil, Aleve, naproxen)    Okay to take Tylenol or paracetamol or acetaminophen or oxycodone as needed for pain

## 2023-02-27 NOTE — TELEPHONE ENCOUNTER
Received VM transcription:    Good morning, this is Ali calling from [facility]. I am calling you in regards to one of our mutual patient of Dr. Ashley Depadua. I have a mutual patient name is call is Dieter Lindseyky. Well actually for this patient, we have faxed over to two plan of cares in total. They are initial evaluation plan of care for physical and occupational therapy that require doctor signatures. I just wanted to know if the care plan has been received or not. To ensure our care plan has been received, we are refaxing these forms again at [phone]. And in case if you have any questions or concerns regarding the plan of care or regarding the patient, you can reach us back. You can give us a call back at [phone] extension  number [phone]. Again, it's [phone] extension 2. Thank you so much for your time. You have a great day. Take care good bye.

## 2023-03-01 ENCOUNTER — TELEPHONE (OUTPATIENT)
Dept: NEUROLOGY | Facility: CLINIC | Age: 73
End: 2023-03-01

## 2023-03-02 ENCOUNTER — HOSPITAL ENCOUNTER (OUTPATIENT)
Dept: RADIOLOGY | Facility: HOSPITAL | Age: 73
Discharge: HOME | End: 2023-03-02

## 2023-03-02 DIAGNOSIS — J90 PLEURAL EFFUSION: ICD-10-CM

## 2023-03-07 ENCOUNTER — TELEPHONE (OUTPATIENT)
Dept: NEUROLOGY | Facility: CLINIC | Age: 73
End: 2023-03-07

## 2023-03-09 ENCOUNTER — HOSPITAL ENCOUNTER (INPATIENT)
Facility: HOSPITAL | Age: 73
LOS: 4 days | Discharge: HOSPICE | End: 2023-03-15
Attending: EMERGENCY MEDICINE | Admitting: HOSPITALIST

## 2023-03-09 ENCOUNTER — APPOINTMENT (OUTPATIENT)
Dept: RADIOLOGY | Facility: HOSPITAL | Age: 73
End: 2023-03-09

## 2023-03-09 ENCOUNTER — APPOINTMENT (EMERGENCY)
Dept: CT IMAGING | Facility: HOSPITAL | Age: 73
End: 2023-03-09

## 2023-03-09 DIAGNOSIS — C64.9 METASTATIC RENAL CELL CARCINOMA (HCC): ICD-10-CM

## 2023-03-09 DIAGNOSIS — R53.1 WEAKNESS: Primary | ICD-10-CM

## 2023-03-09 DIAGNOSIS — C79.51 SPINE METASTASIS (HCC): ICD-10-CM

## 2023-03-09 DIAGNOSIS — C64.2 RENAL CELL CARCINOMA OF LEFT KIDNEY METASTATIC TO OTHER SITE (HCC): ICD-10-CM

## 2023-03-09 DIAGNOSIS — Z51.5 HOSPICE CARE: ICD-10-CM

## 2023-03-09 DIAGNOSIS — C78.00 MALIGNANT NEOPLASM METASTATIC TO LUNG, UNSPECIFIED LATERALITY (HCC): ICD-10-CM

## 2023-03-09 DIAGNOSIS — R26.2 AMBULATORY DYSFUNCTION: ICD-10-CM

## 2023-03-09 PROBLEM — E83.52 HYPERCALCEMIA: Status: ACTIVE | Noted: 2023-03-09

## 2023-03-09 LAB
2HR DELTA HS TROPONIN: -1 NG/L
ALBUMIN SERPL BCP-MCNC: 3.2 G/DL (ref 3.5–5)
ALP SERPL-CCNC: 64 U/L (ref 34–104)
ALT SERPL W P-5'-P-CCNC: 13 U/L (ref 7–52)
ANION GAP SERPL CALCULATED.3IONS-SCNC: 4 MMOL/L (ref 4–13)
AST SERPL W P-5'-P-CCNC: 22 U/L (ref 13–39)
BASOPHILS # BLD AUTO: 0.01 THOUSANDS/ÂΜL (ref 0–0.1)
BASOPHILS NFR BLD AUTO: 0 % (ref 0–1)
BILIRUB SERPL-MCNC: 0.66 MG/DL (ref 0.2–1)
BILIRUB UR QL STRIP: NEGATIVE
BUN SERPL-MCNC: 22 MG/DL (ref 5–25)
CALCIUM ALBUM COR SERPL-MCNC: 11.8 MG/DL (ref 8.3–10.1)
CALCIUM SERPL-MCNC: 11.2 MG/DL (ref 8.4–10.2)
CARDIAC TROPONIN I PNL SERPL HS: 20 NG/L
CARDIAC TROPONIN I PNL SERPL HS: 21 NG/L
CHLORIDE SERPL-SCNC: 96 MMOL/L (ref 96–108)
CLARITY UR: CLEAR
CO2 SERPL-SCNC: 41 MMOL/L (ref 21–32)
COLOR UR: YELLOW
CREAT SERPL-MCNC: 0.96 MG/DL (ref 0.6–1.3)
EOSINOPHIL # BLD AUTO: 0.04 THOUSAND/ÂΜL (ref 0–0.61)
EOSINOPHIL NFR BLD AUTO: 1 % (ref 0–6)
ERYTHROCYTE [DISTWIDTH] IN BLOOD BY AUTOMATED COUNT: 15.9 % (ref 11.6–15.1)
FLUAV RNA RESP QL NAA+PROBE: NEGATIVE
FLUBV RNA RESP QL NAA+PROBE: NEGATIVE
GFR SERPL CREATININE-BSD FRML MDRD: 78 ML/MIN/1.73SQ M
GLUCOSE SERPL-MCNC: 89 MG/DL (ref 65–140)
GLUCOSE UR STRIP-MCNC: NEGATIVE MG/DL
HCT VFR BLD AUTO: 38.6 % (ref 36.5–49.3)
HGB BLD-MCNC: 11 G/DL (ref 12–17)
HGB UR QL STRIP.AUTO: NEGATIVE
IMM GRANULOCYTES # BLD AUTO: 0.02 THOUSAND/UL (ref 0–0.2)
IMM GRANULOCYTES NFR BLD AUTO: 0 % (ref 0–2)
KETONES UR STRIP-MCNC: NEGATIVE MG/DL
LEUKOCYTE ESTERASE UR QL STRIP: NEGATIVE
LYMPHOCYTES # BLD AUTO: 1.37 THOUSANDS/ÂΜL (ref 0.6–4.47)
LYMPHOCYTES NFR BLD AUTO: 20 % (ref 14–44)
MCH RBC QN AUTO: 27.6 PG (ref 26.8–34.3)
MCHC RBC AUTO-ENTMCNC: 28.5 G/DL (ref 31.4–37.4)
MCV RBC AUTO: 97 FL (ref 82–98)
MONOCYTES # BLD AUTO: 0.47 THOUSAND/ÂΜL (ref 0.17–1.22)
MONOCYTES NFR BLD AUTO: 7 % (ref 4–12)
NEUTROPHILS # BLD AUTO: 5.01 THOUSANDS/ÂΜL (ref 1.85–7.62)
NEUTS SEG NFR BLD AUTO: 72 % (ref 43–75)
NITRITE UR QL STRIP: NEGATIVE
NRBC BLD AUTO-RTO: 0 /100 WBCS
PH UR STRIP.AUTO: 5.5 [PH]
PLATELET # BLD AUTO: 196 THOUSANDS/UL (ref 149–390)
PMV BLD AUTO: 9.9 FL (ref 8.9–12.7)
POTASSIUM SERPL-SCNC: 3.8 MMOL/L (ref 3.5–5.3)
PROT SERPL-MCNC: 6.5 G/DL (ref 6.4–8.4)
PROT UR STRIP-MCNC: NEGATIVE MG/DL
RBC # BLD AUTO: 3.99 MILLION/UL (ref 3.88–5.62)
RSV RNA RESP QL NAA+PROBE: NEGATIVE
SARS-COV-2 RNA RESP QL NAA+PROBE: NEGATIVE
SODIUM SERPL-SCNC: 141 MMOL/L (ref 135–147)
SP GR UR STRIP.AUTO: 1.02 (ref 1–1.03)
UROBILINOGEN UR QL STRIP.AUTO: 0.2 E.U./DL
WBC # BLD AUTO: 6.92 THOUSAND/UL (ref 4.31–10.16)

## 2023-03-09 RX ORDER — FUROSEMIDE 10 MG/ML
40 INJECTION INTRAMUSCULAR; INTRAVENOUS ONCE
Status: COMPLETED | OUTPATIENT
Start: 2023-03-09 | End: 2023-03-09

## 2023-03-09 RX ORDER — CALCIUM CARBONATE 200(500)MG
1000 TABLET,CHEWABLE ORAL DAILY PRN
Status: CANCELLED | OUTPATIENT
Start: 2023-03-09

## 2023-03-09 RX ADMIN — FUROSEMIDE 40 MG: 10 INJECTION, SOLUTION INTRAVENOUS at 22:47

## 2023-03-10 ENCOUNTER — APPOINTMENT (OUTPATIENT)
Dept: NON INVASIVE DIAGNOSTICS | Facility: HOSPITAL | Age: 73
End: 2023-03-10

## 2023-03-10 ENCOUNTER — HOME CARE VISIT (OUTPATIENT)
Dept: HOME HEALTH SERVICES | Facility: HOME HEALTHCARE | Age: 73
End: 2023-03-10

## 2023-03-10 ENCOUNTER — APPOINTMENT (OUTPATIENT)
Dept: RADIOLOGY | Facility: HOSPITAL | Age: 73
End: 2023-03-10
Attending: INTERNAL MEDICINE

## 2023-03-10 LAB
4HR DELTA HS TROPONIN: -2 NG/L
ALBUMIN SERPL BCP-MCNC: 3 G/DL (ref 3.5–5)
ALP SERPL-CCNC: 57 U/L (ref 34–104)
ALT SERPL W P-5'-P-CCNC: 12 U/L (ref 7–52)
ANION GAP SERPL CALCULATED.3IONS-SCNC: 4 MMOL/L (ref 4–13)
APPEARANCE FLD: ABNORMAL
AST SERPL W P-5'-P-CCNC: 20 U/L (ref 13–39)
ATRIAL RATE: 100 BPM
ATRIAL RATE: 97 BPM
ATRIAL RATE: 97 BPM
BILIRUB SERPL-MCNC: 0.58 MG/DL (ref 0.2–1)
BUN SERPL-MCNC: 23 MG/DL (ref 5–25)
CA-I BLD-SCNC: 1.36 MMOL/L (ref 1.12–1.32)
CALCIUM ALBUM COR SERPL-MCNC: 11.6 MG/DL (ref 8.3–10.1)
CALCIUM SERPL-MCNC: 10.8 MG/DL (ref 8.4–10.2)
CARDIAC TROPONIN I PNL SERPL HS: 19 NG/L
CHLORIDE SERPL-SCNC: 97 MMOL/L (ref 96–108)
CO2 SERPL-SCNC: 42 MMOL/L (ref 21–32)
COLOR FLD: ABNORMAL
CREAT SERPL-MCNC: 0.93 MG/DL (ref 0.6–1.3)
ERYTHROCYTE [DISTWIDTH] IN BLOOD BY AUTOMATED COUNT: 16 % (ref 11.6–15.1)
GFR SERPL CREATININE-BSD FRML MDRD: 81 ML/MIN/1.73SQ M
GLUCOSE FLD-MCNC: 102 MG/DL
GLUCOSE SERPL-MCNC: 89 MG/DL (ref 65–140)
HCT VFR BLD AUTO: 35.4 % (ref 36.5–49.3)
HGB BLD-MCNC: 10.1 G/DL (ref 12–17)
HISTIOCYTES NFR FLD: 33 %
LDH FLD L TO P-CCNC: 428 U/L
LYMPHOCYTES NFR BLD AUTO: 56 %
MCH RBC QN AUTO: 27.7 PG (ref 26.8–34.3)
MCHC RBC AUTO-ENTMCNC: 28.5 G/DL (ref 31.4–37.4)
MCV RBC AUTO: 97 FL (ref 82–98)
MONOCYTES NFR BLD AUTO: 9 %
NEUTS SEG NFR BLD AUTO: 2 %
P AXIS: 25 DEGREES
P AXIS: 29 DEGREES
P AXIS: 38 DEGREES
PH BODY FLUID: 7.6
PLATELET # BLD AUTO: 178 THOUSANDS/UL (ref 149–390)
PMV BLD AUTO: 10.2 FL (ref 8.9–12.7)
POTASSIUM SERPL-SCNC: 3.7 MMOL/L (ref 3.5–5.3)
PR INTERVAL: 158 MS
PR INTERVAL: 172 MS
PR INTERVAL: 176 MS
PROT FLD-MCNC: 3.2 G/DL
PROT SERPL-MCNC: 5.8 G/DL (ref 6.4–8.4)
QRS AXIS: -23 DEGREES
QRS AXIS: -3 DEGREES
QRS AXIS: -49 DEGREES
QRSD INTERVAL: 112 MS
QRSD INTERVAL: 116 MS
QRSD INTERVAL: 116 MS
QT INTERVAL: 326 MS
QT INTERVAL: 332 MS
QT INTERVAL: 362 MS
QTC INTERVAL: 420 MS
QTC INTERVAL: 421 MS
QTC INTERVAL: 459 MS
RBC # BLD AUTO: 3.65 MILLION/UL (ref 3.88–5.62)
SITE: ABNORMAL
SODIUM SERPL-SCNC: 143 MMOL/L (ref 135–147)
T WAVE AXIS: 11 DEGREES
T WAVE AXIS: 14 DEGREES
T WAVE AXIS: 21 DEGREES
TOTAL CELLS COUNTED SPEC: 100
VENTRICULAR RATE: 100 BPM
VENTRICULAR RATE: 97 BPM
VENTRICULAR RATE: 97 BPM
WBC # BLD AUTO: 6.72 THOUSAND/UL (ref 4.31–10.16)
WBC # FLD MANUAL: 1527 /UL

## 2023-03-10 PROCEDURE — 0W9B3ZZ DRAINAGE OF LEFT PLEURAL CAVITY, PERCUTANEOUS APPROACH: ICD-10-PCS | Performed by: RADIOLOGY

## 2023-03-10 RX ORDER — ENOXAPARIN SODIUM 100 MG/ML
40 INJECTION SUBCUTANEOUS DAILY
Status: DISCONTINUED | OUTPATIENT
Start: 2023-03-10 | End: 2023-03-15 | Stop reason: HOSPADM

## 2023-03-10 RX ORDER — LOPERAMIDE HYDROCHLORIDE 2 MG/1
2 CAPSULE ORAL 4 TIMES DAILY PRN
Status: DISCONTINUED | OUTPATIENT
Start: 2023-03-10 | End: 2023-03-15 | Stop reason: HOSPADM

## 2023-03-10 RX ORDER — SIMETHICONE 80 MG
80 TABLET,CHEWABLE ORAL 4 TIMES DAILY PRN
Status: DISCONTINUED | OUTPATIENT
Start: 2023-03-10 | End: 2023-03-15 | Stop reason: HOSPADM

## 2023-03-10 RX ORDER — ONDANSETRON 2 MG/ML
4 INJECTION INTRAMUSCULAR; INTRAVENOUS EVERY 6 HOURS PRN
Status: DISCONTINUED | OUTPATIENT
Start: 2023-03-10 | End: 2023-03-15 | Stop reason: HOSPADM

## 2023-03-10 RX ORDER — LIDOCAINE HYDROCHLORIDE 10 MG/ML
INJECTION, SOLUTION EPIDURAL; INFILTRATION; INTRACAUDAL; PERINEURAL AS NEEDED
Status: COMPLETED | OUTPATIENT
Start: 2023-03-10 | End: 2023-03-10

## 2023-03-10 RX ORDER — ASPIRIN 81 MG/1
81 TABLET ORAL DAILY
Status: DISCONTINUED | OUTPATIENT
Start: 2023-03-10 | End: 2023-03-15 | Stop reason: HOSPADM

## 2023-03-10 RX ORDER — MAGNESIUM HYDROXIDE/ALUMINUM HYDROXICE/SIMETHICONE 120; 1200; 1200 MG/30ML; MG/30ML; MG/30ML
30 SUSPENSION ORAL EVERY 6 HOURS PRN
Status: DISCONTINUED | OUTPATIENT
Start: 2023-03-10 | End: 2023-03-15 | Stop reason: HOSPADM

## 2023-03-10 RX ORDER — ACETAMINOPHEN 325 MG/1
650 TABLET ORAL EVERY 6 HOURS PRN
Status: DISCONTINUED | OUTPATIENT
Start: 2023-03-10 | End: 2023-03-15 | Stop reason: HOSPADM

## 2023-03-10 RX ORDER — MICONAZOLE NITRATE 20 MG/G
CREAM TOPICAL 2 TIMES DAILY
Status: DISCONTINUED | OUTPATIENT
Start: 2023-03-10 | End: 2023-03-15 | Stop reason: HOSPADM

## 2023-03-10 RX ORDER — FUROSEMIDE 40 MG/1
40 TABLET ORAL DAILY
Status: DISCONTINUED | OUTPATIENT
Start: 2023-03-10 | End: 2023-03-15 | Stop reason: HOSPADM

## 2023-03-10 RX ADMIN — LIDOCAINE HYDROCHLORIDE 10 ML: 10 INJECTION, SOLUTION EPIDURAL; INFILTRATION; INTRACAUDAL; PERINEURAL at 13:45

## 2023-03-10 RX ADMIN — MICONAZOLE NITRATE: 20 CREAM TOPICAL at 14:19

## 2023-03-10 RX ADMIN — FUROSEMIDE 40 MG: 40 TABLET ORAL at 08:17

## 2023-03-10 RX ADMIN — ASPIRIN 81 MG: 81 TABLET, COATED ORAL at 08:17

## 2023-03-10 RX ADMIN — ENOXAPARIN SODIUM 40 MG: 100 INJECTION SUBCUTANEOUS at 08:17

## 2023-03-10 RX ADMIN — LOPERAMIDE HYDROCHLORIDE 2 MG: 2 CAPSULE ORAL at 11:40

## 2023-03-10 NOTE — ED ATTENDING ATTESTATION
3/9/2023  IDevon MD, saw and evaluated the patient  I have discussed the patient with the resident/non-physician practitioner and agree with the resident's/non-physician practitioner's findings, Plan of Care, and MDM as documented in the resident's/non-physician practitioner's note, except where noted  All available labs and Radiology studies were reviewed  I was present for key portions of any procedure(s) performed by the resident/non-physician practitioner and I was immediately available to provide assistance  At this point I agree with the current assessment done in the Emergency Department  I have conducted an independent evaluation of this patient a history and physical is as follows:    ED Course         Critical Care Time  Procedures    68 y/o male with a hx of metastatic lung cancer presents with intermittent diplopia for a short period of time , intermittent for the past 2 days  He also has weakness  He's doing home PT/OT at home but feels weaker over the past 2 days  He feels more confused lately , like he has "brain fog"  Not on chemo currently  Well-appearing, no distress, PERRL, tachy MM,  RRR, CTA b/l, abd  Nontender, ext   No edema, neuro wnl

## 2023-03-10 NOTE — MALNUTRITION/BMI
This medical record reflects one or more clinical indicators suggestive of malnutrition and/or morbid obesity  Malnutrition Findings:   Adult Malnutrition type: Chronic illness  Adult Degree of Malnutrition: Malnutrition of moderate degree  Malnutrition Characteristics: Inadequate energy, Weight loss                  360 Statement: Moderate malnutrition r/t catabolic illness as evidenced by 5% unplanned wt loss since 2/15/23, consuming < 75% energy intake compared to estimated energy needs > 1 month  Treated with Ensure Compact tid        See Nutrition note dated 3/10/23 for additional details  Completed nutrition assessment is viewable in the nutrition documentation

## 2023-03-10 NOTE — ASSESSMENT & PLAN NOTE
History of lung metastasis complicated by pleural effusion  Radiograph ordered for further characterization  If becomes significantly symptomatic may consider ASAP catheter  Chest x-ray pending

## 2023-03-10 NOTE — ASSESSMENT & PLAN NOTE
Lab Results   Component Value Date    EGFR 78 03/09/2023    EGFR 74 02/22/2023    EGFR 76 02/02/2023    CREATININE 0 96 03/09/2023    CREATININE 1 00 02/22/2023    CREATININE 0 98 02/02/2023   Renal function appears baseline  Continued diuretic

## 2023-03-10 NOTE — PROGRESS NOTES
[address] [facility]  Progress Note - Santo Andrew 1950, 67 y o  male MRN: [id number]  Unit/Bed#: [facility] [address] 224-02 Encounter: [id number]  Primary Care Provider: Senait Laws MD   Date and time admitted to hospital: 3/9/2023  7:00 PM    Hypercalcemia  Assessment & Plan  Hypercalcemia of malignancy, mild  Hold Vit D supplements, and Calcium supplements  IV fluids held given LE edema  Consider Zometa if worsens  IV lasix x 1  Recheck in the am  Corrected to 11 8  Lab Results   Component Value Date    CALCIUM 11 2 (H) 03/09/2023    CALCIUM 9 9 02/22/2023         Ambulatory dysfunction  Assessment & Plan  Patient with worsening ambulatory dysfunction in the setting of advanced cancer    Was at rehabilitation for pathological fracture of the scapula, at that time he was at acute rehab and now presents with worsening weakness  She has been on extensive treatment multiple lines of anti-VEGF, checkpoint inhibitors unfortunately as degrees has progressed to the point that they have recommended  Statin therapy be held as potentially may be contributing to myalgias    PT OT consultation  Palliative Care -to discuss whether rehab versus hospice would be appropriate    Chronic diastolic (congestive) heart failure (HCC)  Assessment & Plan  Wt Readings from Last 3 Encounters:   02/24/23 111 kg (244 lb)   02/20/23 111 kg (244 lb)   02/16/23 111 kg (245 lb)     Patient is a dry weight although with lower extremity edema  Given history of cancer we will check vascular ultrasound  Continue diuretics as previously prescribed  Most recent echo with EF of 65% and grade 1 diastolic dysfunction January 2023        Stage 3a chronic kidney disease [facility])  Assessment & Plan  Lab Results   Component Value Date    EGFR 78 03/09/2023    EGFR 74 02/22/2023    EGFR 76 02/02/2023    CREATININE 0 96 03/09/2023    CREATININE 1 00 02/22/2023    CREATININE 0 98 02/02/2023   Renal function appears baseline  Continued diuretic    Lung metastases [facility])  Assessment & Plan  Had thoracentesis today  This was his first thoracentesis ever  If he reaccumulates quickly, we could consider a Pleurx catheter    * Metastatic renal cell carcinoma [facility])  Assessment & Plan  Seen by palliative care  Patient wishes for home with hospice  Case management is going to set that up  We need to work on getting 24-hour caregivers at home as he lives by himself          Subjective:   Feels better after thoracentsis  Less sob  On 3 liters oxygen      Objective:     Vitals:   Temp (24hrs), Av 6 °F (36 4 °C), Min:97 5 °F (36 4 °C), Max:97 7 °F (36 5 °C)    Temp:  [97 5 °F (36 4 °C)-97 7 °F (36 5 °C)] 97 7 °F (36 5 °C)  HR:  [] 104  Resp:  [18-20] 18  BP: (112-131)/(57-69) 131/62  SpO2:  [88 %-98 %] 98 %  Body mass index is 33 18 kg/m²  Input and Output Summary (last 24 hours): Intake/Output Summary (Last 24 hours) at 3/10/2023 1626  Last data filed at 3/10/2023 1358  Gross per 24 hour   Intake --   Output 2020 ml   Net -2020 ml       Physical Exam:     Physical Exam  Vitals and nursing note reviewed  HENT:      Head: Normocephalic and atraumatic  Eyes:      Pupils: Pupils are equal, round, and reactive to light  Cardiovascular:      Rate and Rhythm: Normal rate and regular rhythm  Heart sounds: No murmur heard  No friction rub  No gallop  Pulmonary:      Effort: Pulmonary effort is normal       Breath sounds: Normal breath sounds  No wheezing or rales  Abdominal:      General: Bowel sounds are normal       Palpations: Abdomen is soft  Tenderness: There is no abdominal tenderness  Musculoskeletal:         General: No deformity  Right lower leg: No edema  Left lower leg: No edema                 Additional Data:     Labs:    Results from last 7 days   Lab Units 03/10/23  0533 23   WBC Thousand/uL 6 72 6 92   HEMOGLOBIN g/dL 10 1* 11 0*   HEMATOCRIT % 35 4* 38 6   PLATELETS Thousands/uL 178 196   NEUTROS PCT %  --  72   LYMPHS PCT %  --  20   MONOS PCT %  --  7   EOS PCT %  --  1     Results from last 7 days   Lab Units 03/10/23  0533   POTASSIUM mmol/L 3 7   CHLORIDE mmol/L 97   CO2 mmol/L 42*   BUN mg/dL 23   CREATININE mg/dL 0 93   CALCIUM mg/dL 10 8*   ALK PHOS U/L 57   ALT U/L 12   AST U/L 20                       * I Have Reviewed All Lab Data     Recent Cultures (last 7 days):             Last 24 Hours Medication List:   Current Facility-Administered Medications   Medication Dose Route Frequency Provider Last Rate   • acetaminophen  650 mg Oral Q6H PRN Yuly Pencil, DO     • aluminum-magnesium hydroxide-simethicone  30 mL Oral Q6H PRN Yuly Pencil, DO     • aspirin  81 mg Oral Daily Ozzy Moreno DO     • enoxaparin  40 mg Subcutaneous Daily Ozzy Moreno, DO     • furosemide  40 mg Oral Daily Ozzy Moreno, DO     • loperamide  2 mg Oral 4x Daily PRN Justus Clarke DO     • EMILIA ANTIFUNGAL   Topical BID Nicol Ny, DO     • ondansetron  4 mg Intravenous Q6H PRN Yuly Pencil, DO     • simethicone  80 mg Oral 4x Daily PRN Ozzy Moreno DO           VTE Pharmacologic Prophylaxis:   Pharmacologic: Enoxaparin (Lovenox)      Current Length of Stay: 0 day(s)    Current Patient Status: Observation       Discharge Plan: working on home with hospice    Code Status: Level 3 - DNAR and DNI           Today, Patient Was Seen By: Justus Clarke DO    ** Please Note: Dictation voice to text software may have been used in the creation of this document   **

## 2023-03-10 NOTE — ACP (ADVANCE CARE PLANNING)
Serious Illness Conversation    1  What is your understanding now of where you are with your illness? Prognostic Understanding: no understanding of prognosis  Patient has been informed of 3-6 month life expectancy  Declines hospice, not currently on treatment     2  How much information about what is likely to be ahead with your illness would you like to have? Information: patient wants to be fully informed     3  What did you (clinician) communicate to the patient? Prognostic Communication: Uncertain - It can be difficult to predict what will happen with your illness  I hope you will continue to live well for a long time but I’m worried that you could get sick quickly, and I think it is important to prepare for that possibility  4  If your health situation worsens, what are your most important goals? Goals: be mentally aware, be physically comfortable     5  What are the biggest fears and worries about the future and your health? Fears/Worries: loss of mobility     6  What abilities are so critical to your life that you cannot imagine living without them? Unacceptable Function: being in pain or very uncomfortable     7  What gives you strength as you think about the future with your illness? Patient has a son who is making his decision  He is hospice appropiate  8  If you become sicker, how much are you willing to go through for the possibility of gaining more time? Be in the hospital: Yes Have a feeding tube: No   Be in the ICU: Yes Live in a nursing home: Yes   Be on a ventilator: No Be uncomfortable: No    Undergo aggressive test and/or procedures: No   9  How much does your proxy and family know about your priorities and wishes? Discussion Discussion: extensive discussion with family about goals and wishes     Angie Coleman heard you say that being mobile is really important to you  Keeping that in mind, and what we know about your illness, I recommend that we continue to address goals of care   This will help us make sure that your treatment plans reflect what’s important to you  How does this plan sound to you? I will do everything I can to help you through this       I have spent 15 minutes speaking with my patient on advanced care planning today or during this visit     Advanced directives  Five Wishes: Patient does not have Five Wishes- would not like information

## 2023-03-10 NOTE — PLAN OF CARE
Problem: OCCUPATIONAL THERAPY ADULT  Goal: Performs self-care activities at highest level of function for planned discharge setting  See evaluation for individualized goals  Description: Treatment Interventions: ADL retraining, Visual perceptual retraining, Functional transfer training, UE strengthening/ROM, Endurance training, Cognitive reorientation, Patient/family training, Equipment evaluation/education, Continued evaluation, Energy conservation, Activityengagement          See flowsheet documentation for full assessment, interventions and recommendations  Note: Limitation: Decreased ADL status, Decreased UE strength, Decreased UE ROM, Decreased Safe judgement during ADL, Decreased endurance, Decreased cognition, Decreased self-care trans, Decreased high-level ADLs, Visual deficit  Prognosis: Fair  Assessment: Pt is a 67 y o , male, seen for OT Eval on 3/10/2023 admitted to [facility] with Altered Mental Status and c/o double vision and increased SOB  Head CT (-) acute intracranial abnormality  IR thoracentesis and VAS LE venous duplex study ordered 3/10/2023  Pt w/ recent hospital admission (January 2023) 2* pathological fx of L shldr due to neoplastic disease  Pt w/ recent d/c from [facility] on 2/3/23  Although pt reporting WBAT in LUE, most recent active order from 2/2/23 reports NWB in LUE  Pt maintained NWB throughout evaluation w/ increased VC  Re-consult ortho for updated WBS  Relevant comorbidities and PMH which may impact occupational performance include: metastatic renal cell carcinoma, CKD, hypercalcemia, neuropathy, ambulatory dysfunction, acute respiratory failure w/ hypoxia, HTN  Active OT and activity orders include Activity as tolerated  Pt lives alone in a 2 story home w/ 4 NATALIYA  (+) home alone  PTA, pt was Independent in ADLs, assistance for IADLs, and Independent in functional mobility/transfers w/ [facility]  (-)    Pt reports (-) falls, however per chart pt w/ (+) falls  Upon evaluation, pt is Min A in UB ADLs, Mod A in LB ADLs, Min A in toileting, Supervision in bed mobility, and Min A x2 in functional mobility/transfers w/ HHA 2* the following deficits: weakness, decreased endurance, limited ROM, decreased strength , limited functional reach , decreased cognition, decreased balance and SOB, visual impairment  Other personal factors impacting pt performance in occupation include: fall risk, multiple lines , decreased functional mobility/transfers, (+) NATALIYA, limited home support, home environment, difficulty performing ADLs, limited insight into deficits and decreased initiation or engagement, O2 requirements  Overall, these impairments act as barrier for the pt to safely and effectively perform in the following occupational areas: bathing, dressing, toileting, grooming and functional transfers/mobility  OT to continue to follow pt for 3-5x/week to address the following goals to  in 10-14 days   Recommend STR vs  Home w/  care and HHOT/HHPT upon d/c      OT Discharge Recommendation: Post acute rehabilitation services (vs  Home w/  care and HHOT/HHPT)

## 2023-03-10 NOTE — PHYSICAL THERAPY NOTE
PHYSICAL THERAPY EVALUATION          Patient Name: Ebelayne Sep  [id number]'[id number] Date: 3/10/2023  PT EVALUATION    67 y o     [id number]    Altered mental state [R41 82]  Weakness [R53 1]  Ambulatory dysfunction [R26 2]  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 to other site [facility]) [C64 2]  Malignant neoplasm metastatic to lung, unspecified laterality ([facility] ) [C78 00]    Past Medical History:   Diagnosis Date   • Arthritis    • Cancer ([facility] [address] )     prostate - mets to bone and lung   • History of radiation therapy 07/27/2018    SBRT to T10 7/18-7/27/2018   • Hyperlipidemia    • Hypertension      Past Surgical History:   Procedure Laterality Date   • DISTAL ULNA EXCISION Right     excision of tumor and orif with cement and screws   • JOINT REPLACEMENT Bilateral     tkr's   • LUNG SURGERY Right     exc of nodules   • NEPHRECTOMY Left         03/10/23 0942   PT Last Visit   PT Visit Date 03/10/23   Note Type   Note type Evaluation   Pain Assessment   Pain Assessment Tool 0-10   Pain Score No Pain   Restrictions/Precautions   Weight Bearing Precautions Per Order No   LUE Weight Bearing Per Order NWB   RLE Weight Bearing Per Order WBAT   Braces or Orthoses Other (Comment)  (sling for comfort LUE  AFO LLE)   Other Precautions Cognitive; Chair Alarm; Bed Alarm;WBS;Multiple lines;O2;Fall Risk  (3L)   Home Living   Type of [place] [address] to live on main level with bedroom/bathroom;Stairs to enter with rails;Stairs to enter without rails   [facility] Walker;Cane;Quad cane; Other (Comment)  (3L, AFO)   Additional Comments per chart 4 NATALIYA w unilateral HR  FOS to second floor w HR however staying on first floor and using BSC?    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, working part time, driving  unclear LOF upon dc from rehab, pt reports indep, wb to LUE, amb w quad cane   General   Additional Pertinent History pt admitted 3/9/23 for metastatic RCC  complaints of visual changes described as inability to see when looking down  ambulate patient orders  was dc from [address] [place] [address] on 2/3  Per discharge summary from [facility]: "sling for comfort  NWB  Ok for ROM in elbow, wrist, hand  Pendulums daily " Per OP ortho note from 2/16: "recommended platform walker attachment if want to use assistive device"  PMHx significant for arthritis, metastatic CA, CHF, CKD, known pathological fx   Cognition   Overall Cognitive Status Impaired   Arousal/Participation Cooperative   Orientation Level Oriented to person;Oriented to place;Oriented to situation;Disoriented to time;Disoriented to situation   Memory Decreased recall of recent events;Decreased recall of precautions;Decreased short term memory   Following Commands Follows one step commands with increased time or repetition   RLE Assessment   RLE Assessment WFL  (4/5)   LLE Assessment   LLE Assessment WFL  (4/5)   Coordination   Sensation WFL  (denies n/t  noted swelling)   Bed Mobility   Supine to Sit 5  Supervision   Additional items HOB elevated; Bedrails; Increased time required;Verbal cues   Additional Comments exit to R   Transfers   Sit to Stand 4  Minimal assistance   Additional items Assist x 2; Increased time required;Verbal cues   Stand to Sit 4  Minimal assistance   Additional items Assist x 1; Armrests; Increased time required;Verbal cues   Additional Comments cues to reinforce nwb   Ambulation/Elevation   Gait pattern Improper Weight shift; Wide ANDRÉS; Decreased foot clearance; Short stride; Ataxia; Excessively slow   Gait Assistance 4  Minimal assist   Additional items Assist x 2;Verbal cues   Assistive Device Other (Comment)  (HHA on R)   Distance 10'   Balance   Static Standing Poor +   Dynamic Standing Poor   Ambulatory Poor   Endurance Deficit   Endurance Deficit No  (vitals during session 117/69, 94% on 3L)   Activity Tolerance   Activity Tolerance Treatment limited secondary to medical complications (Comment)  (cognition, ?wbs)   Medical Staff Made Aware Lolly/[facility] OT   Nurse Made Aware Faith Kinsey RN   Assessment   Prognosis Fair   Problem List Impaired balance;Decreased mobility; Decreased cognition; Impaired judgement;Decreased safety awareness;Decreased strength;Obesity; Decreased skin integrity;Orthopedic restrictions   Assessment Rosalia Marinelli is a 67 y o  male admitted to [address] on 3/9/2023 for Metastatic renal cell carcinoma ([facility] [address] )  PT was consulted and pt was seen on 3/10/2023 for mobility assessment and d/c planning  Pt presents w low fall risk, multiple lines  Pt reports he has been WBAT to LUE at home however is acutely confused and there is no indication from chart review that pt has been cleared to WB; maintain NWB during session for safety and would recommend ortho consult to confirm WBS as mobility and safety likely to improve if pt cleared to WB  Pt is currently functioning at a supervision assistance x1 level for bed mobility, minimum assistance x2 level for transfers and ambulation w HHA on R  Pt demonstrated deficits of cognition, ROM, skin integrity, strength, balance, joint integrity  At risk for falls given prev mentioned factors  Impaired balance ambulating w HHA; would recommend trial of QC next session as pt was dc home from rehab w this AD  Pt will benefit from continued skilled IP PT to address the above mentioned impairments  in order to maximize recovery and increase functional independence when completing mobility and ADLs  At this time PT recommendations for d/c are STR vs home w 24/7 S and [place] pending progress, cognitive improvements, wbs of LUE  Barriers to Discharge Decreased caregiver support   Barriers to Discharge Comments unclear level of support from family  would not dc home alone w current cognitive deficits     Goals   Patient Goals improve vision   STG Expiration Date 03/24/23   Short Term Goal #1 1)  Pt will perform bed mobility Miguelito demonstrating appropriate technique 100% of the time in order to improve function  2)  Perform all transfers with S demonstrating safe and appropriate technique 100% of the time in order to improve ability to negotiate safely in home environment  3) Amb with least restrictive AD > 50'x1 with S in order to demonstrate ability to negotiate in home environment  4)  Improve overall strength and balance 1/2 grade in order to optimize ability to perform functional tasks and reduce fall risk  5) Increase activity tolerance to 45 minutes in order to improve endurance to functional tasks  6)  Negotiate stairs using most appropriate technique and CGA in order to be able to negotiate safely in home environment  7) PT for ongoing patient and family/caregiver education, DME needs and d/c planning in order to promote highest level of function in least restrictive environment  Plan   Treatment/Interventions Functional transfer training;Elevations;LE strengthening/ROM; Therapeutic exercise;Cognitive reorientation;Patient/family training;Equipment eval/education; Bed mobility;Gait training; Compensatory technique education;Continued evaluation;Spoke to nursing;OT   PT Frequency 3-5x/wk   Recommendation   PT Discharge Recommendation Post acute rehabilitation services  (vs 24/7 S and HHPT)   AM-PAC Basic Mobility Inpatient   Turning in Flat Bed Without Bedrails 2   Lying on Back to Sitting on Edge of Flat Bed Without Bedrails 2   Moving Bed to Chair 2   Standing Up From Chair Using Arms 2   Walk in Room 2   Climb 3-5 Stairs With Railing 2   Basic Mobility Inpatient Raw Score 12   Basic Mobility Standardized Score 32 23   Highest Level Of Mobility   JH-HLM Goal 4: Move to chair/commode   JH-HLM Achieved 6: Walk 10 steps or more   End of Consult   Patient Position at End of Consult Bedside chair;Bed/Chair alarm activated; All needs within reach   History: co - morbidities, social background, past experience, fall risk, ?use of assistive device, ?assist for adl's, cognition, multiple lines  Exam: impairments in systems including musculoskeletal (strength), neuromuscular (balance, gait, transfers, motor function and sensation), joint integrity, am-pac, cardiopulmonary, cognition  Clinical: unstable/unpredictable  Complexity:high      Solo Posada, PT

## 2023-03-10 NOTE — PLAN OF CARE
Problem: Potential for Falls  Goal: Patient will remain free of falls  Description: INTERVENTIONS:  - Educate patient/family on patient safety including physical limitations  - Instruct patient to call for assistance with activity   - Consult OT/PT to assist with strengthening/mobility   - Keep Call bell within reach  - Keep bed low and locked with side rails adjusted as appropriate  - Keep care items and personal belongings within reach  - Initiate and maintain comfort rounds  - Make Fall Risk Sign visible to staff  - Offer Toileting every  Hours, in advance of need  - Initiate/Maintain alarm  - Obtain necessary fall risk management equipment:   - Apply yellow socks and bracelet for high fall risk patients  - Consider moving patient to room near nurses station  3/10/2023 0921 by Kurt Boothe RN  Outcome: Progressing  3/10/2023 0920 by Kurt Boothe RN  Outcome: Progressing  3/10/2023 0920 by Kurt Boothe RN  Outcome: Progressing     Problem: Nutrition/Hydration-ADULT  Goal: Nutrient/Hydration intake appropriate for improving, restoring or maintaining nutritional needs  Description: Monitor and assess patient's nutrition/hydration status for malnutrition  Collaborate with interdisciplinary team and initiate plan and interventions as ordered  Monitor patient's weight and dietary intake as ordered or per policy  Utilize nutrition screening tool and intervene as necessary  Determine patient's food preferences and provide high-protein, high-caloric foods as appropriate       INTERVENTIONS:  - Monitor oral intake, urinary output, labs, and treatment plans  - Assess nutrition and hydration status and recommend course of action  - Evaluate amount of meals eaten  - Assist patient with eating if necessary   - Allow adequate time for meals  - Recommend/ encourage appropriate diets, oral nutritional supplements, and vitamin/mineral supplements  - Order, calculate, and assess calorie counts as needed  - Recommend, monitor, and adjust tube feedings and TPN/PPN based on assessed needs  - Assess need for intravenous fluids  - Provide specific nutrition/hydration education as appropriate  - Include patient/family/caregiver in decisions related to nutrition  3/10/2023 0921 by Sherrill Marie RN  Outcome: Progressing  3/10/2023 0920 by Sherrill Marie RN  Outcome: Progressing  3/10/2023 0920 by Sherrill Marie RN  Outcome: Progressing     Problem: MOBILITY - ADULT  Goal: Maintain or return to baseline ADL function  Description: INTERVENTIONS:  -  Assess patient's ability to carry out ADLs; assess patient's baseline for ADL function and identify physical deficits which impact ability to perform ADLs (bathing, care of mouth/teeth, toileting, grooming, dressing, etc )  - Assess/evaluate cause of self-care deficits   - Assess range of motion  - Assess patient's mobility; develop plan if impaired  - Assess patient's need for assistive devices and provide as appropriate  - Encourage maximum independence but intervene and supervise when necessary  - Involve family in performance of ADLs  - Assess for home care needs following discharge   - Consider OT consult to assist with ADL evaluation and planning for discharge  - Provide patient education as appropriate  3/10/2023 0921 by Sherrill Marie RN  Outcome: Progressing  3/10/2023 0920 by Sherrill Marie RN  Outcome: Progressing  3/10/2023 0920 by Sherrill Marie RN  Outcome: Progressing  Goal: Maintains/Returns to pre admission functional level  Description: INTERVENTIONS:  - Perform BMAT or MOVE assessment daily    - Set and communicate daily mobility goal to care team and patient/family/caregiver  - Collaborate with rehabilitation services on mobility goals if consulted  - Perform Range of Motion  times a day  - Reposition patient every hours    - Dangle patient  times a day  - Stand patient times a day  - Ambulate patient times a day  - Out of bed to chair  times a day   - Out of bed for meals  times a day  - Out of bed for toileting  - Record patient progress and toleration of activity level   3/10/2023 0921 by Yoshi Avelar RN  Outcome: Progressing  3/10/2023 0920 by Yoshi Avelar RN  Outcome: Progressing  3/10/2023 0920 by Yoshi Avelar RN  Outcome: Progressing     Problem: PAIN - ADULT  Goal: Verbalizes/displays adequate comfort level or baseline comfort level  Description: Interventions:  - Encourage patient to monitor pain and request assistance  - Assess pain using appropriate pain scale  - Administer analgesics based on type and severity of pain and evaluate response  - Implement non-pharmacological measures as appropriate and evaluate response  - Consider cultural and social influences on pain and pain management  - Notify physician/advanced practitioner if interventions unsuccessful or patient reports new pain  Outcome: Progressing     Problem: INFECTION - ADULT  Goal: Absence or prevention of progression during hospitalization  Description: INTERVENTIONS:  - Assess and monitor for signs and symptoms of infection  - Monitor lab/diagnostic results  - Monitor all insertion sites, i e  indwelling lines, tubes, and drains  - Monitor endotracheal if appropriate and nasal secretions for changes in amount and color  - [place] appropriate cooling/warming therapies per order  - Administer medications as ordered  - Instruct and encourage patient and family to use good hand hygiene technique  - Identify and instruct in appropriate isolation precautions for identified infection/condition  Outcome: Progressing     Problem: SAFETY ADULT  Goal: Patient will remain free of falls  Description: INTERVENTIONS:  - Educate patient/family on patient safety including physical limitations  - Instruct patient to call for assistance with activity   - Consult OT/PT to assist with strengthening/mobility   - Keep Call bell within reach  - Keep bed low and locked with side rails adjusted as appropriate  - Keep care items and personal belongings within reach  - Initiate and maintain comfort rounds  - Make Fall Risk Sign visible to staff  - Offer Toileting ev Hours, in advance of need  - Initiate/Maintain alarm  - Obtain necessary fall risk management equipment:   - Apply yellow socks and bracelet for high fall risk patients  - Consider moving patient to room near nurses station  3/10/2023 0921 by Susan Chowdhury RN  Outcome: Progressing  3/10/2023 0920 by Susan Chowdhury RN  Outcome: Progressing  3/10/2023 0920 by Susan Chowdhury RN  Outcome: Progressing  Goal: Maintain or return to baseline ADL function  Description: INTERVENTIONS:  -  Assess patient's ability to carry out ADLs; assess patient's baseline for ADL function and identify physical deficits which impact ability to perform ADLs (bathing, care of mouth/teeth, toileting, grooming, dressing, etc )  - Assess/evaluate cause of self-care deficits   - Assess range of motion  - Assess patient's mobility; develop plan if impaired  - Assess patient's need for assistive devices and provide as appropriate  - Encourage maximum independence but intervene and supervise when necessary  - Involve family in performance of ADLs  - Assess for home care needs following discharge   - Consider OT consult to assist with ADL evaluation and planning for discharge  - Provide patient education as appropriate  3/10/2023 0921 by Susan Chowdhury RN  Outcome: Progressing  3/10/2023 0920 by Susan Chowdhruy RN  Outcome: Progressing  3/10/2023 0920 by Susan Chowdhury RN  Outcome: Progressing  Goal: Maintains/Returns to pre admission functional level  Description: INTERVENTIONS:  - Perform BMAT or MOVE assessment daily    - Set and communicate daily mobility goal to care team and patient/family/caregiver  - Collaborate with rehabilitation services on mobility goals if consulted  - Perform Range of Motion  times a day  - Reposition patient every  hours    - Dangle patient  times a day  - Stand patient  times a day  - Ambulate patient  times a day  - Out of bed to chair  times a day   - Out of bed for meals  times a day  - Out of bed for toileting  - Record patient progress and toleration of activity level   3/10/2023 0921 by Boby Banegas RN  Outcome: Progressing  3/10/2023 0920 by Boby Banegas RN  Outcome: Progressing  3/10/2023 0920 by Boby Banegas RN  Outcome: Progressing     Problem: DISCHARGE PLANNING  Goal: Discharge to home or other facility with appropriate resources  Description: INTERVENTIONS:  - Identify barriers to discharge w/patient and caregiver  - Arrange for needed discharge resources and transportation as appropriate  - Identify discharge learning needs (meds, wound care, etc )  - Arrange for interpretive services to assist at discharge as needed  - Refer to Case Management Department for coordinating discharge planning if the patient needs post-hospital services based on physician/advanced practitioner order or complex needs related to functional status, cognitive ability, or social support system  Outcome: Progressing     Problem: Knowledge Deficit  Goal: Patient/family/caregiver demonstrates understanding of disease process, treatment plan, medications, and discharge instructions  Description: Complete learning assessment and assess knowledge base    Interventions:  - Provide teaching at level of understanding  - Provide teaching via preferred learning methods  Outcome: Progressing

## 2023-03-10 NOTE — ASSESSMENT & PLAN NOTE
Patient with history of metastatic stage IV cancer, history of renal cell carcinoma status post left nephrectomy 2007, subsequently developed metastatic disease in 2013  Per patient's most recent oncology note, not currently on any treatment  And poor performance status and no other systemic therapy for metastatic renal cancer to offer his oncology group as recommended hospice care  Unable to make a decision on hospice care, would be reasonable for family to discuss this with palliative care    If expectancy is suspected to be 3 to 6 months    DNR status appropriate

## 2023-03-10 NOTE — CASE MANAGEMENT
Case Management Assessment & Discharge Planning Note    Patient name Steve Lutz  Location [facility] 68 2 /South 2 Rocio Marie* MRN [id number]  : 1950 Date 3/10/2023       Current Admission Date: 3/9/2023  Current Admission Diagnosis:Metastatic renal cell carcinoma [facility])   Patient Active Problem List    Diagnosis Date Noted   • Ambulatory dysfunction 2023   • Hypercalcemia 2023   • Shortness of breath 02/15/2023   • Low serum vitamin B12 2023   • Cardiovascular risk factor 2023   • Foot pain, right 2023   • Foraminal stenosis of cervical region 2023   • Nocturnal hypoxemia 2023   • Pleural effusion 2023   • HTN (hypertension) 2023   • Neuropathy 2023   • Chronic diastolic (congestive) heart failure ([facility] ) 2023   • Stage 3a chronic kidney disease ([facility] ) 2023   • H/O left nephrectomy 2023   • Fall at home, initial encounter 2023   • 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 2023   • Acute respiratory failure with hypoxia ([address] ) 2023   • Elevated brain natriuretic peptide (BNP) level 2023   • Elevated d-dimer 2023   • Chronic diarrhea 2023   • COVID-19 2022   • Azotemia 10/31/2019   • Hx of prostatectomy 2019   • History of prostate cancer 2019   • Metastatic renal cell carcinoma ([address] ) 2018   • Spine metastasis ([facility] ) 2018   • Clear cell adenocarcinoma of kidney, 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   • Bone metastases ([facility] ) 2018   • Lung metastases ([facility] ) 2018      LOS (days): 0  Geometric Mean LOS (GMLOS) (days):   Days to GMLOS:     OBJECTIVE:              Current admission status: Observation       Preferred Pharmacy:   [facility]/pharmacy #[id number] [place] [address] [address] [facility]  Phone: [phone] Fax: [address] [place] [address] [address] [address]  Phone: [phone] Fax: [phone]    [facility] [address]  [address] [facility] [address]  Phone: [phone] Fax: [phone]    [address] [facility], [address]  [address] [place] [address]  [address] [facility] [address]  Phone: [phone] Fax: [phone]    [facility], [address]  [address] [facility] [address]  Phone: [phone] Fax: [phone] Laurita Fuller, [address] [facility]  [address] [facility] [address]  Phone: [phone] Fax: [phone] [facility], [address] [facility] [address]  Phone: [phone] Fax: [phone]    Primary Care Provider: Wendy George MD    Primary Insurance: MEDICARE  Secondary Insurance: Bradley ANDRADE    ASSESSMENT:  Active Health Care Proxies    There are no active Health Care Proxies on file                        Patient Information  Admitted from[de-identified] Home  Mental Status: Alert  During Assessment patient was accompanied by: Brother, Sister  Assessment information provided by[de-identified] Patient  Primary Caregiver: Self (becoming more difficult as pt is weaker)  Support Systems: Daughter, Son, Family members  [place] of Residence: [address] [place] [address] do you live in[place]  In the last 12 months, was there a time when you were not able to pay the mortgage or rent on time?: No  In the last 12 months, how many places have you lived?: 1  In the last 12 months, was there a time when you did not have a steady place to sleep or slept in a shelter (including now)?: No  Homeless/housing insecurity resource given?: N/A  Living Arrangements: Lives Alone (family has stated they are to talk to discuss availability to stay with pt during hospice need +/- using OOP HHA)  Is patient a ?: No    Activities of Daily Living Prior to Admission  Functional Status: Independent (more difficult with tasks as of late)  Completes ADLs independently?: Yes (more difficult with tasks as of late)  Ambulates independently?: Yes (more difficult with tasks as of late- has RW if needed)  Does patient use assisted devices?: Yes  Assisted Devices (DME) used: Home Oxygen concentrator, Sander Choudhury Jos  DME Company Name (respiratory supplies):  [employer]  O2 Rate(s): 2l  Does patient currently own DME?: Yes  What DME does the patient currently own?: [employer], Home Oxygen concentrator, Portable Oxygen tanks  Does patient currently have [facility]?: No         Patient Information Continued  Income Source: SSI/SSD  Does patient have prescription coverage?: Yes  Within the past 12 months, you worried that your food would run out before you got the money to buy more : Never true  Within the past 12 months, the food you bought just didn't last and you didn't have money to get more : Never true  Food insecurity resource given?: N/A  Does patient have a history of substance abuse?: No  Does patient have a history of Mental Health Diagnosis?: No         Means of Transportation  Means of Transport to Appts[de-identified] Drives Self  In the past 12 months, has lack of transportation kept you from medical appointments or from getting medications?: No  In the past 12 months, has lack of transportation kept you from meetings, work, or from getting things needed for daily living?: No  Was application for public transport provided?: N/A        DISCHARGE DETAILS:    Discharge planning discussed with[de-identified] pt, ARMANDO, sister, brother  Freedom of Choice: Yes  Comments - Freedom of Choice: Agreeable to Home hospice-  hospice preferred (was out to speak to pt/family 2 Sunday's ago)  CM contacted family/caregiver?: Yes  Were Treatment Team discharge recommendations reviewed with patient/caregiver?: Yes  Did patient/caregiver verbalize understanding of patient care needs?: Yes Contacts  Relationship to Patient[de-identified] Family  Contact Method:  In Person  Reason/Outcome: Referral, Discharge Planning                        Treatment Team Recommendation: Hospice, Home  Discharge Destination Plan[de-identified] Home, Hospice  Transport at Discharge : Family

## 2023-03-10 NOTE — ASSESSMENT & PLAN NOTE
Had thoracentesis today  This was his first thoracentesis ever      If he reaccumulates quickly, we could consider a Pleurx catheter

## 2023-03-10 NOTE — HOSPICE NOTE
Hospice referral received and pt is approved for home hospice care  TPC to son Madie Nascimento and discussed medicare hospice benefit  Liaison had met with pt and family previously to discuss hospice care  Pt is no longer able to be on his own  Madiehardy Nascimento may want to explore STR and is aware that room and board is not covered under hospice medicare at SNF     Liaison will continue to follow

## 2023-03-10 NOTE — ASSESSMENT & PLAN NOTE
Hypercalcemia of malignancy, mild  Hold Vit D supplements, and Calcium supplements  IV fluids held given LE edema  Consider Zometa if worsens  IV lasix x 1    Recheck in the am  Corrected to 11 8  Lab Results   Component Value Date    CALCIUM 11 2 (H) 03/09/2023    CALCIUM 9 9 02/22/2023

## 2023-03-10 NOTE — ASSESSMENT & PLAN NOTE
Seen by palliative care  Patient wishes for home with hospice  Case management is going to set that up    We need to work on getting 24-hour caregivers at home as he lives by himself

## 2023-03-10 NOTE — PLAN OF CARE
Problem: PHYSICAL THERAPY ADULT  Goal: Performs mobility at highest level of function for planned discharge setting  See evaluation for individualized goals  Description: Treatment/Interventions: Functional transfer training, Elevations, LE strengthening/ROM, Therapeutic exercise, Cognitive reorientation, Patient/family training, Equipment eval/education, Bed mobility, Gait training, Compensatory technique education, Continued evaluation, Spoke to nursing, OT          See flowsheet documentation for full assessment, interventions and recommendations  Note: Prognosis: Fair  Problem List: Impaired balance, Decreased mobility, Decreased cognition, Impaired judgement, Decreased safety awareness, Decreased strength, Obesity, Decreased skin integrity, Orthopedic restrictions  Assessment: Gustavo Aguilera is a 67 y o  male admitted to [facility] on 3/9/2023 for Metastatic renal cell carcinoma ([facility] )  PT was consulted and pt was seen on 3/10/2023 for mobility assessment and d/c planning  Pt presents w low fall risk, multiple lines  Pt reports he has been WBAT to LUE at home however is acutely confused and there is no indication from chart review that pt has been cleared to WB; maintain NWB during session for safety and would recommend ortho consult to confirm WBS as mobility and safety likely to improve if pt cleared to WB  Pt is currently functioning at a supervision assistance x1 level for bed mobility, minimum assistance x2 level for transfers and ambulation w HHA on R  Pt demonstrated deficits of cognition, ROM, skin integrity, strength, balance, joint integrity  At risk for falls given prev mentioned factors  Impaired balance ambulating w HHA; would recommend trial of QC next session as pt was dc home from rehab w this AD  Pt will benefit from continued skilled IP PT to address the above mentioned impairments  in order to maximize recovery and increase functional independence when completing mobility and ADLs   At this time PT recommendations for d/c are STR vs home w 24/7 S and [facility] pending progress, cognitive improvements, wbs of LUE  Barriers to Discharge: Decreased caregiver support  Barriers to Discharge Comments: unclear level of support from family  would not dc home alone w current cognitive deficits  PT Discharge Recommendation: Post acute rehabilitation services (vs 24/7 S and HHPT)    See flowsheet documentation for full assessment

## 2023-03-10 NOTE — OCCUPATIONAL THERAPY NOTE
Occupational Therapy Evaluation     Patient Name: Akosua Albert  UTCAJ'O Date: 3/10/2023  Problem List  Principal Problem:    Metastatic renal cell carcinoma ([facility] )  Active Problems:    Lung metastases ([facility] )    Stage 3a chronic kidney disease ([facility] )    Chronic diastolic (congestive) heart failure ([facility] )    Ambulatory dysfunction    Hypercalcemia    Past Medical History  Past Medical History:   Diagnosis Date    Arthritis     Cancer ([facility] )     prostate - mets to bone and lung    History of radiation therapy 07/27/2018    SBRT to T10 7/18-7/27/2018    Hyperlipidemia     Hypertension      Past Surgical History  Past Surgical History:   Procedure Laterality Date    DISTAL ULNA EXCISION Right     excision of tumor and orif with cement and screws    JOINT REPLACEMENT Bilateral     tkr's    LUNG SURGERY Right     exc of nodules    NEPHRECTOMY Left            03/10/23 0944   OT Last Visit   OT Visit Date 03/10/23   Note Type   Note type Evaluation   Pain Assessment   Pain Assessment Tool 0-10   Pain Score No Pain   Restrictions/Precautions   Weight Bearing Precautions Per Order Yes   LUE Weight Bearing Per Order NWB   RLE Weight Bearing Per Order WBAT   Braces or Orthoses Sling; Other (Comment)  (Sling for LUE, AFO LLE  Sling not currently present in hospital room )   Other Precautions Cognitive; Chair Alarm; Bed Alarm;WBS;Multiple lines;O2;Fall Risk;Visual impairment  (3 L O2 NC  pt on 3 L at baseline )   Home Living   Type of [address] [place] [address] Two level;Bed/bath upstairs; Able to live on main level with bedroom/bathroom;Stairs to enter with rails;Stairs to enter without rails   Bathroom Shower/Tub Walk-in shower   Bathroom Toilet Standard   Bathroom Equipment Grab bars in shower; Shower chair   [address] Walker;Cane;Quad cane;Crutches   Additional Comments Per chart, 4 NATALIYA  FOS to 2nd floor bedroom/full bathroom   first floor bedroom set-up   Prior Function   Level of [place] Independent with ADLs; Independent with functional mobility; Needs assistance with IADLS   Lives With Alone   Receives Help From Family;Home health   IADLs Family/Friend/Other provides transportation; Family/Friend/Other provides meals; Family/Friend/Other provides medication management   Falls in the last 6 months 1 to 4  (pt reports (-) falls  Per chart pt (+) falls )   Vocational Part time employment  (working part time prior to fall in January)   Comments Pt reports family was initially assisting with ADLs s/p [facility] d/c  Pt currently recieving HHOT/HHPT  Lifestyle   Autonomy PTA, pt was Independent in ADLs, assistance for IADLs, and Independent in functional mobility/transfers w/ [facility]  (-)   Pt reports (-) falls, however per chart pt w/ (+) falls  Reciprocal Relationships family   Service to Others was working PT prior to fall/hospital admission 1/2023   General   Family/Caregiver Present No   Subjective   Subjective "My vision has been messed up"   ADL   Where Assessed Edge of bed   Eating Assistance [address] 3  Moderate [facility] 200 4  [address] 3  Moderate [address]  4  Minimal Assistance   Bed Mobility   Supine to Sit 5  Supervision   Additional items HOB elevated; Bedrails; Increased time required;Verbal cues   Additional Comments VC for hand placement   Transfers   Sit to Stand 4  Minimal assistance   Additional items Assist x 2; Increased time required;Verbal cues   Stand to Sit 4  Minimal assistance   Additional items Assist x 1; Armrests; Increased time required;Verbal cues   Additional Comments VC for hand placement and safe transfer techniques  VC to maintain NWB in LUE     Functional Mobility   Functional Mobility 4  Minimal assistance   Additional Comments x2 bed>chair   Additional items Hand hold assistance   Balance   Static Sitting Fair   Dynamic Sitting Fair -   Static Standing Fair -   Dynamic Standing Poor +   Ambulatory Poor +   Activity Tolerance   Activity Tolerance Patient tolerated treatment well;Patient limited by fatigue;Treatment limited secondary to medical complications (Comment)  (Able to maintain NWB in LLE w/ increased cues  Pt limited by Poor+ dynamic standing balance  O2 sats 84-88% SpO2 on 4 L NC s/p functional mobility  Improving to 92-94% w/ rest and increased deep breathing exercises  RN aware )   Medical Staff Made Aware [place] PT   Nurse Made Aware Debbie Davis RN   RUE Assessment   RUE Assessment WFL  (5/5 throughout)   LUE Assessment   LUE Assessment WFL  (WFL but proximal limitations 2* scapular fx )   Hand Function   Gross Motor Coordination Functional   Fine Motor Coordination Functional   Sensation   Light Touch No apparent deficits   Proprioception   Proprioception No apparent deficits   Vision-Basic Assessment   Current Vision Wears glasses all the time   Vision - Complex Assessment   Ocular Range of Motion Intact   Tracking Intact   Acuity Able to read employee name badge without difficulty   Additional Comments Pt reports no symptoms of diplopia time of eval and symptoms come and go  Psychosocial   Psychosocial (WDL) WDL   Perception   Inattention/Neglect Appears intact   Cognition   Overall Cognitive Status Impaired   Arousal/Participation Alert; Cooperative   Attention Attends with cues to redirect   Orientation Level Oriented to person;Oriented to place; Disoriented to time   Memory Decreased recall of precautions;Decreased recall of recent events;Decreased short term memory   Following Commands Follows one step commands with increased time or repetition   Comments pt with decreased short term memory and decreased recall of WBS in LUE  pleasant and cooperative  Pt could benefit from further cognitive assessment     Assessment   Limitation Decreased ADL status; Decreased UE strength;Decreased UE ROM; Decreased Safe judgement during ADL;Decreased endurance;Decreased cognition;Decreased self-care trans;Decreased high-level ADLs; Visual deficit   Prognosis Fair   Assessment Pt is a 67 y o , male, seen for OT Eval on 3/10/2023 admitted to [facility] with Altered Mental Status and c/o double vision and increased SOB  Head CT (-) acute intracranial abnormality  IR thoracentesis and VAS LE venous duplex study ordered 3/10/2023  Pt w/ recent hospital admission (January 2023) 2* pathological fx of L shldr due to neoplastic disease  Pt w/ recent d/c from [facility] on 2/3/23  Although pt reporting WBAT in LUE, most recent active order from 2/2/23 reports NWB in LUE  Pt maintained NWB throughout evaluation w/ increased VC  Re-consult ortho for updated WBS  Relevant comorbidities and PMH which may impact occupational performance include: metastatic renal cell carcinoma, CKD, hypercalcemia, neuropathy, ambulatory dysfunction, acute respiratory failure w/ hypoxia, HTN  Active OT and activity orders include Activity as tolerated  Pt lives alone in a 2 story home w/ 4 [address]IYA  (+) home alone  PTA, pt was Independent in ADLs, assistance for IADLs, and Independent in functional mobility/transfers w/ [facility]  (-)   Pt reports (-) falls, however per chart pt w/ (+) falls  Upon evaluation, pt is Min A in UB ADLs, Mod A in LB ADLs, Min A in toileting, Supervision in bed mobility, and Min A x2 in functional mobility/transfers w/ HHA 2* the following deficits: weakness, decreased endurance, limited ROM, decreased strength , limited functional reach , decreased cognition, decreased balance and SOB, visual impairment   Other personal factors impacting pt performance in occupation include: fall risk, multiple lines , decreased functional mobility/transfers, (+) NATALIYA, limited home support, home environment, difficulty performing ADLs, limited insight into deficits and decreased initiation or engagement, O2 requirements  Overall, these impairments act as barrier for the pt to safely and effectively perform in the following occupational areas: bathing, dressing, toileting, grooming and functional transfers/mobility  OT to continue to follow pt for 3-5x/week to address the following goals to  in 10-14 days  Recommend STR vs  Home  care and HHOT/HHPT upon d/c  Goals   Patient Goals to feel better   LTG Time Frame 10-14   Long Term Goal Please refer to LTGs below  Plan   Treatment Interventions ADL retraining;Visual perceptual retraining;Functional transfer training;UE strengthening/ROM; Endurance training;Cognitive reorientation;Patient/family training;Equipment evaluation/education;Continued evaluation; Energy conservation; Activityengagement   Goal Expiration Date 23   OT Treatment Day 0   OT Frequency 3-5x/wk   Recommendation   OT Discharge Recommendation Post acute rehabilitation services  (vs  Home  care and HHOT/HHPT)   Additional Comments  The patient's raw score on the AM-PAC Daily Activity Inpatient Short Form is 17  A raw score of less than 19 suggests the patient may benefit from discharge to post-acute rehabilitation services  Please refer to the recommendation of the Occupational Therapist for safe discharge planning     AM-PAC Daily Activity Inpatient   Lower Body Dressing 2   Bathing 2   Toileting 3   Upper Body Dressing 3   Grooming 3   Eating 4   Daily Activity Raw Score 17   Daily Activity Standardized Score (Calc for Raw Score >=11) 37 26   AM-PAC Applied Cognition Inpatient   Following a Speech/Presentation 4   Understanding Ordinary Conversation 4   Taking Medications 3   Remembering Where Things Are Placed or Put Away 3   Remembering List of 4-5 Errands 2   Taking Care of Complicated Tasks 2   Applied Cognition Raw Score 18   Applied Cognition Standardized Score 38 07   End of Consult   Patient Position at End of Consult Bedside chair;Bed/Chair alarm activated; All needs within reach     Patient Goals    Pt will complete toileting w/ Mod I w/ G hygiene/thoroughness using AE/DME as needed  Pt will complete UB dressing/bathing w/ Mod I w/ use of AE as needed  Pt will complete LB dressing/bathing w/ Mod I w/ use of AE as needed  Pt will complete functional transfers with Mod I while practicing safe transfer techniques and using DME as needed  Pt will complete bed mobility with Mod I w/ safe techniques to increase independence and decrease the need for caregiver assistance  Pt will complete functional mobility during ADL/IADL/leisure tasks at Mod I level using DME as needed  Pt will be able to tolerate 5-8 min of standing functional activity w/ Fair dynamic standing balance  Pt will identify and verbalize 3 fall risks in home environment and identify 3 appropriate compensatory strategies to decrease fall risk at home  Pt will demonstrate G carryover of pt/caregiver education w/o VC and good tolerance in order to increase safety during functional activities  Pt will tolerate continued cognitive assessment to assist with safe d/c recommendation      Pt will demonstrate adherence to NWB in LUE during functional activity 100% of the time w/o VC from therapist      Chris Velasquez, OTS

## 2023-03-10 NOTE — ASSESSMENT & PLAN NOTE
Lab Results   Component Value Date    EGFR 78 03/09/2023    EGFR 74 02/22/2023    EGFR 76 02/02/2023    CREATININE 0 96 03/09/2023    CREATININE 1 00 02/22/2023    CREATININE 0 98 02/02/2023   Function appears baseline  Continued diuretic

## 2023-03-10 NOTE — ASSESSMENT & PLAN NOTE
Wt Readings from Last 3 Encounters:   02/24/23 111 kg (244 lb)   02/20/23 111 kg (244 lb)   02/16/23 111 kg (245 lb)     Patient is a dry weight although with lower extremity edema  Given history of cancer we will check vascular ultrasound  Continue diuretics as previously prescribed  Most recent echo with EF of 65% and grade 1 diastolic dysfunction January 2023

## 2023-03-10 NOTE — ED PROVIDER NOTES
History  Chief Complaint   Patient presents with   • Altered Mental Status     Pt reports double vision for past couple of days "it feels like I'm floating around" Family reports confusion and sob  Wears 3 L nasal cannula chronically  71-year-old male past medical history significant for metastatic lung cancer to the bone, hyperlipidemia, hypertension presenting to the ED today for weakness over the last 2 days  Patient states that over the last 2 days he has been having increasing weakness  Of note patient was recently admitted to the hospital after a fall with a pathological scapular fracture  He stayed in inpatient rehab but has been home for about 1 week  Has been getting home PT and OT however over the last 2 days he has been getting increasingly weak  He is also had intermittent diplopia  Denies any nausea or vomiting  No fevers or chills  No chest pain or shortness of breath  Last CT head done in 2020 did not show any metastases to the brain  Upon chart review patient is no longer on any antineoplastic agents and has been told that he only has 3 to 6 months to live and while there was a hospice consult in place he refused hospice  However I discussed with the family their goals for today and there was concern that the patient is too weak to be at home and he may need inpatient rehab again  No visual diplopia at this time  Prior to Admission Medications   Prescriptions Last Dose Informant Patient Reported? Taking? Multiple Vitamin (multivitamin) tablet   Yes No   Sig: Take 1 tablet by mouth daily   acetaminophen (TYLENOL) 325 mg tablet   No No   Sig: Take 2 tablets (650 mg total) by mouth every 6 (six) hours as needed for headaches, fever or mild pain (mild pain, moderate pain)   aspirin (ECOTRIN LOW STRENGTH) 81 mg EC tablet   No No   Sig: Take 1 tablet (81 mg total) by mouth daily Do not start before February 3, 2023     atorvastatin (LIPITOR) 20 mg tablet   No No   Sig: Take 1 tablet (20 mg total) by mouth daily with dinner   benzonatate (TESSALON) 200 MG capsule   No No   Sig: Take 1 capsule (200 mg total) by mouth 3 (three) times a day as needed for cough   cholecalciferol (VITAMIN D3) 1,000 units tablet   Yes No   Sig: Take 1,000 Units by mouth daily   cyanocobalamin (VITAMIN B-12) 1000 MCG tablet   No No   Sig: Take 1 tablet (1,000 mcg total) by mouth daily Do not start before February 3, 2023  diphenhydrAMINE (BENADRYL) 25 mg tablet   Yes No   Sig: Take 25 mg by mouth daily at bedtime as needed for itching   diphenoxylate-atropine (LOMOTIL) 2 5-0 025 mg per tablet   No No   Sig: Take 1 tablet by mouth 4 (four) times a day as needed for diarrhea   fluorouracil (EFUDEX) 5 % cream   Yes No   furosemide (LASIX) 40 mg tablet   No No   Sig: Take 1 tablet (40 mg total) by mouth daily   gabapentin (NEURONTIN) 100 mg capsule   Yes No   Sig: Take 300 mg by mouth 3 (three) times a day    loperamide (IMODIUM) 2 mg capsule   Yes No   Sig: Take 2 mg by mouth 4 (four) times a day as needed for diarrhea   oxyCODONE (ROXICODONE) 5 immediate release tablet   No No   Sig: Take 1 (5mg) to 1 5 (7 5mg) tablets by mouth every 6 hours as needed for moderate to severe pain     trolamine salicylate (ASPERCREME) 10 % cream   Yes No   Sig: Apply 1 application topically as needed for muscle/joint pain      Facility-Administered Medications: None       Past Medical History:   Diagnosis Date   • Arthritis    • Cancer ([address] )     prostate - mets to bone and lung   • History of radiation therapy 07/27/2018    SBRT to T10 7/18-7/27/2018   • Hyperlipidemia    • Hypertension        Past Surgical History:   Procedure Laterality Date   • DISTAL ULNA EXCISION Right     excision of tumor and orif with cement and screws   • JOINT REPLACEMENT Bilateral     tkr's   • LUNG SURGERY Right     exc of nodules   • NEPHRECTOMY Left        Family History   Problem Relation Age of Onset   • No Known Problems Mother    • No Known Problems Father      I have reviewed and agree with the history as documented  E-Cigarette/Vaping   • E-Cigarette Use Never User      E-Cigarette/Vaping Substances   • Nicotine No    • THC No    • CBD No    • Flavoring No    • Other No    • Unknown No      Social History     Tobacco Use   • Smoking status: Former     Packs/day: 1 00     Years: 41 00     Pack years: 41 00     Types: Cigarettes     Start date: 45     Quit date: 3/28/2003     Years since quittin 9   • Smokeless tobacco: Never   Vaping Use   • Vaping Use: Never used   Substance Use Topics   • Alcohol use: Not Currently     Comment: occasional use   • Drug use: Never        Review of Systems   Constitutional: Negative for chills and fever  HENT: Negative for hearing loss  Eyes: Positive for visual disturbance  Respiratory: Negative for shortness of breath  Cardiovascular: Negative for chest pain  Gastrointestinal: Negative for abdominal pain, constipation, diarrhea, nausea and vomiting  Genitourinary: Negative for difficulty urinating  Musculoskeletal: Negative for myalgias  Skin: Negative for rash  Neurological: Positive for weakness  Negative for dizziness  Psychiatric/Behavioral: Negative for agitation  All other systems reviewed and are negative  Physical Exam  ED Triage Vitals   Temperature Pulse Respirations Blood Pressure SpO2   23   97 5 °F (36 4 °C) 94 20 112/58 98 %      Temp Source Heart Rate Source Patient Position - Orthostatic VS BP Location FiO2 (%)   23 --   Oral Monitor Sitting Right arm       Pain Score       --                    Orthostatic Vital Signs  Vitals:    23 18523   BP: 112/58 127/59 131/62   Pulse: 94 97 99   Patient Position - Orthostatic VS: Sitting Sitting Sitting       Physical Exam  Vitals and nursing note reviewed  Constitutional:       General: He is not in acute distress  Appearance: Normal appearance  He is not ill-appearing  HENT:      Head: Normocephalic and atraumatic  Right Ear: External ear normal       Left Ear: External ear normal       Nose: Nose normal  No congestion  Mouth/Throat:      Mouth: Mucous membranes are moist       Pharynx: Oropharynx is clear  No oropharyngeal exudate  Eyes:      General:         Right eye: No discharge  Left eye: No discharge  Extraocular Movements: Extraocular movements intact  Conjunctiva/sclera: Conjunctivae normal       Pupils: Pupils are equal, round, and reactive to light  Cardiovascular:      Rate and Rhythm: Normal rate and regular rhythm  Pulses: Normal pulses  Heart sounds: Normal heart sounds  No murmur heard  Pulmonary:      Effort: Pulmonary effort is normal  No respiratory distress  Breath sounds: Normal breath sounds  No wheezing  Abdominal:      General: Abdomen is flat  Bowel sounds are normal  There is no distension  Palpations: Abdomen is soft  Tenderness: There is no abdominal tenderness  Musculoskeletal:         General: No swelling  Normal range of motion  Cervical back: Normal range of motion  No rigidity  Skin:     General: Skin is warm and dry  Capillary Refill: Capillary refill takes less than 2 seconds  Neurological:      General: No focal deficit present  Mental Status: He is alert and oriented to person, place, and time  Mental status is at baseline  Cranial Nerves: No cranial nerve deficit  Sensory: No sensory deficit  Motor: No weakness  Comments: Patient had difficulty transferring from wheelchair to stretcher     Psychiatric:         Mood and Affect: Mood normal          Behavior: Behavior normal          ED Medications  Medications   furosemide (LASIX) injection 40 mg (has no administration in time range)       Diagnostic Studies  Results Reviewed     Procedure Component Value Units Date/Time    HS Troponin I 4hr [[id number]]     Lab Status: No result Specimen: Blood     COVID/FLU/RSV [[id number]] Collected: 03/09/23 2151    Lab Status:  In process Specimen: Nares from Nose Updated: 03/09/23 2156    HS Troponin I 2hr [[id number]]     Lab Status: No result Specimen: Blood     HS Troponin 0hr (reflex protocol) [[id number]]  (Normal) Collected: 03/09/23 2018    Lab Status: Final result Specimen: Blood from Arm, Right Updated: 03/09/23 2058     hs TnI 0hr 21 ng/L     Comprehensive metabolic panel [[id number]]  (Abnormal) Collected: 03/09/23 2018    Lab Status: Final result Specimen: Blood from Arm, Right Updated: 03/09/23 2051     Sodium 141 mmol/L      Potassium 3 8 mmol/L      Chloride 96 mmol/L      CO2 41 mmol/L      ANION GAP 4 mmol/L      BUN 22 mg/dL      Creatinine 0 96 mg/dL      Glucose 89 mg/dL      Calcium 11 2 mg/dL      Corrected Calcium 11 8 mg/dL      AST 22 U/L      ALT 13 U/L      Alkaline Phosphatase 64 U/L      Total Protein 6 5 g/dL      Albumin 3 2 g/dL      Total Bilirubin 0 66 mg/dL      eGFR 78 ml/min/1 73sq m     Narrative:      [place] guidelines for Chronic Kidney Disease (CKD):   •  Stage 1 with normal or high GFR (GFR > 90 mL/min/1 73 square meters)  •  Stage 2 Mild CKD (GFR = 60-89 mL/min/1 73 square meters)  •  Stage 3A Moderate CKD (GFR = 45-59 mL/min/1 73 square meters)  •  Stage 3B Moderate CKD (GFR = 30-44 mL/min/1 73 square meters)  •  Stage 4 Severe CKD (GFR = 15-29 mL/min/1 73 square meters)  •  Stage 5 End Stage CKD (GFR <15 mL/min/1 73 square meters)  Note: GFR calculation is accurate only with a steady state creatinine    CBC and differential [[id number]]  (Abnormal) Collected: 03/09/23 2018    Lab Status: Final result Specimen: Blood from Arm, Right Updated: 03/09/23 2035     WBC 6 92 Thousand/uL      RBC 3 99 Million/uL      Hemoglobin 11 0 g/dL      Hematocrit 38 6 %      MCV 97 fL      MCH 27 6 pg      MCHC 28 5 g/dL      RDW 15 9 %      MPV 9 9 fL      Platelets 102 Thousands/uL      nRBC 0 /100 WBCs      Neutrophils Relative 72 %      Immat GRANS % 0 %      Lymphocytes Relative 20 %      Monocytes Relative 7 %      Eosinophils Relative 1 %      Basophils Relative 0 %      Neutrophils Absolute 5 01 Thousands/µL      Immature Grans Absolute 0 02 Thousand/uL      Lymphocytes Absolute 1 37 Thousands/µL      Monocytes Absolute 0 47 Thousand/µL      Eosinophils Absolute 0 04 Thousand/µL      Basophils Absolute 0 01 Thousands/µL     UA w Reflex to Microscopic w Reflex to Culture [[id number]]     Lab Status: No result Specimen: Urine                  CT head without contrast   Final Result by Sami Frankel DO (03/09 2104)      No acute intracranial abnormality  Workstation performed: [id number]         XR chest pa & lateral    (Results Pending)   VAS lower limb venous duplex study, complete bilateral    (Results Pending)         Procedures  Procedures      ED Course  ED Course as of 03/09/23 2221   Thu Mar 09, 2023   2038 Hemoglobin(!): 11 0  Patient baseline is 11 2053 Potassium: 3 8                             SBIRT 22yo+    Flowsheet Row Most Recent Value   SBIRT (25 yo +)    In order to provide better care to our patients, we are screening all of our patients for alcohol and drug use  Would it be okay to ask you these screening questions? No Filed at: 03/09/2023 2012                Medical Decision Making  28-year-old male presenting to the ED today for weakness and intermittent diplopia as well as brain fog  At this time differential diagnosis includes possible metastasis to the brain versus cardiac causes versus anemia versus electrolyte abnormality such as hypokalemia  Will evaluate a CBC, CMP, troponin, twelve-lead EKG, CT head  Lab work-up and imaging grossly unremarkable    I discussed case with [facility]'s internal medicine given the patient was significantly weaker than his baseline and would likely need inpatient rehab  Patient was admitted to their service in good condition  Amount and/or Complexity of Data Reviewed  Labs: ordered  Decision-making details documented in ED Course  Radiology: ordered  Risk  Decision regarding hospitalization  Disposition  Final diagnoses:   Weakness     Time reflects when diagnosis was documented in both MDM as applicable and the Disposition within this note     Time User Action Codes Description Comment    3/9/2023  9:45 PM Kaci Leland Add [R53 1] Weakness     3/9/2023  9:56 PM Jose Granados Add [R26 2] Ambulatory dysfunction     3/9/2023  9:56 PM Ozzy Bruno Add [C78 00] Malignant neoplasm metastatic to lung, unspecified laterality ([facility] [address] )     3/9/2023  9:56 PM Jose Granados Add [C64 2] 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 to other site [facility])       ED Disposition     ED Disposition   Admit    Condition   Stable    Date/Time   Thu Mar 9, 2023  9:45 PM    Comment   Case was discussed with Dr Arie Cerda and the patient's admission status was agreed to be Admission Status: observation status to the service of Dr Arie Cerda   Follow-up Information    None         Patient's Medications   Discharge Prescriptions    No medications on file     No discharge procedures on file  PDMP Review       Value Time User    PDMP Reviewed  Yes 2/2/2023 11:37 AM Shelley Faulkner MD           ED Provider  Attending physically available and evaluated Gavi Buchanan I managed the patient along with the ED Attending      Electronically Signed by         Vilma Quintero MD  03/09/23 2176

## 2023-03-10 NOTE — WOUND OSTOMY CARE
Consult Note - Wound   Nathaly De Luna 67 y o  male MRN: [id number]  Unit/Bed#: [place] 2 -02 Encounter: [id number]      History and Present Illness:  67year old male presented to the hospital with weakness and double vision  Patient's history significant for CKD, renal cell carcinoma status post left nephrectomy, left scapula fracture, former smoker  Assessment Findings:   Patient agreeable to assessment  Ambulatory in the room with walker  Reports occasional urinary leakage, wearing a brief per his request   Bilateral heels intact, lower extremities with moderate edema, dry, flaky  1  MASD/incontinence associated dermatitis to sacrum and groin folds with fungal component--sacrum beefy red/pink, blanchable with maceration, painful per patient  Denisse-wound intact  Left groin fold moist, pink with satellite lesions  No open areas at this time  See flowsheet for wound details  Wound Care Plan:   1-Hydraguard lotion to bilateral heels twice daily and as needed  2-Elevate lower extremities for edema management  Ensure heels are floating off of bed/chair surface to offload pressure  3-Offloading air cushion in chair when out of bed  4-Moisturize skin daily with skin nourishing cream   5-Encourage/remind patient to turn and reposition every 2 hours while in bed and weight shift frequently while in the chair to re-distribute pressure on skin  Provide assistance as needed  6-Sacrum and bilateral groin folds--cleanse with soap and water, pat dry  Apply Stefan cream twice daily and as needed with incontinence care  Wound care team to follow  Plan of care reviewed with primary RN      Wound 01/13/23 MASD Perineum (Active)   Wound Image   03/10/23 1101   Wound Description Beefy red;Pink 03/10/23 1101   Denisse-wound Assessment Intact 03/10/23 1101   Wound Length (cm) 3 5 cm 03/10/23 1102   Wound Width (cm) 1 5 cm 03/10/23 1102   Wound Depth (cm) 0 1 cm 03/10/23 1102   Wound Surface Area (cm^2) 5 25 cm^2 03/10/23 1102   Wound Volume (cm^3) 0 525 cm^3 03/10/23 1102   Calculated Wound Volume (cm^3) 0 53 cm^3 03/10/23 1102   Drainage Amount None 03/10/23 1101   Dressing Protective barrier 03/10/23 1101   Patient Tolerance Tolerated well 03/10/23 55 [facility] BSN, RN, [employer]

## 2023-03-10 NOTE — CONSULTS
Consultation - Palliative & Supportive Care   Charley Perez  67 y o   male  [place] [address] 2 /South 2 M*   MRN: [id number]  Encounter: [id number]    ASSESSMENT:    Patient Active Problem List   Diagnosis   • Clear cell adenocarcinoma of kidney, left ([facility] [address] )   • Bone metastases ([facility] [address] )   • Lung metastases ([facility] [address] )   • Spine metastasis ([facility] [address] )   • Metastatic renal cell carcinoma ([facility] [address] )   • Hx of prostatectomy   • History of prostate cancer   • Azotemia   • COVID-19   • Stage 3a chronic kidney disease ([facility] [address] )   • H/O left nephrectomy   • Fall at home, initial encounter   • 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   • Acute respiratory failure with hypoxia (HCC)   • Elevated brain natriuretic peptide (BNP) level   • Elevated d-dimer   • Chronic diarrhea   • Chronic diastolic (congestive) heart failure (HCC)   • Pleural effusion   • HTN (hypertension)   • Neuropathy   • Nocturnal hypoxemia   • Foraminal stenosis of cervical region   • Cardiovascular risk factor   • Foot pain, right   • Low serum vitamin B12   • Shortness of breath   • Ambulatory dysfunction   • Hypercalcemia       Active problems addressed:  Metastatic renal cell carcinoma  Lung metastases  Malignant pleural effusion  S/p L thoracentesis, 3/10/2023  Bone metastases  Generalized weakness  Ambulatory dysfunction  Chronic HFpEF  CKD 3  Goals of care    Consult is for goals of care    PLAN:    1  Goals:   • Patient appears to have the capacity to make complex medical choices on my visit today  After much discussion on disease progression without active treatments and his overall goals, he made it very clear that he would rather be home and be comfortable rather than come back to the hospital  He understands that the hospital will also be very limited in what they can offer in the absence of cancer-directed therapies  I also discussed rehab and he implied he does not want to go through this anymore  He is agreeable to hospice now     • I discussed above with his son/Omar who also agrees  He does not see the benefit of rehab anymore either  • I discussed with March Dakins that his father, at this time, will likely qualify for routine hospice cares at home or at a SNF  Medicare hospice benefit do not cover room and board at a SNF nor extra/hired help at home  Both of which will be out of pocket expense  We discussed that patient is now unable to live independently any more and will need more assistance at home now  • March Dakins implied that he can either have the patient move in with them at his house or they can move in with him at his house  I will consult with CM and hospice to assist with this further  • I will refer to hospice via CM  They are already known to [facility] and they met with him already  They have approved him for routine cares days ago  • D/w Dr Soni Fitzgerald    Code status: Level 3 - DNAR and DNI   Decisional apparatus:  Patient does have capacity to make medical decisions on my exam today  If such capacity is lost, patient's substitute decision maker would default to son/Omar by [place]  Advance Directive / Living Will / POLST:  None on file    2  Social Support:  • Supportive listening provided  • Emotional supportive and listening provided to patient and his son/Omar over the phone    3  Symptom management:  • He has no symptoms at this time  Would recommend discharging with small amount of comfort meds - oxyIR 2 5mg PO q4H prn and ativan 0 5mg PO q6H prn  I reached out to Dr Soni Fitzgerald to provide on discharge  • I will leave the decision to proceed with a tunneled pleural cath to SLIM or IR if advisable  Today was the first time he went for thoracentesis  At this time, I am not sure if the fluid will re-accumulate fast enough to cause issues and for catheter to be beneficial    Controlled Substance Review    [place] or [place]  reviewed: No red flags were identified; safe to proceed with prescription  Gwenette Carrier     02/02/2023 02/02/2023   1  Oxycodone Hcl (Ir) 5 Mg Tablet 30 00  5  As De  [id number]   Alvaro ([id number])   45 00 MME  [place]     01/03/2023 01/03/2023   1  Tramadol Hcl 50 Mg Tablet 60 00  15  Hi Nak  [id number]   Pen (1007)   20 00 MME  [place]     12/30/2022 12/30/2022   1  Diphenoxylate-Atrop 2 5-0 025 30 00  7  Hi Nak  [id number]   Pen (1007)   0 00 [place]ME  [place]      I have reviewed the patient's controlled substance dispensing history in the Prescription Drug Monitoring Program in compliance with the [facility] regulations before prescribing any controlled substances  We appreciate the opportunity to participate in this patient's care  We will continue to follow  Please do not hesitate to contact our on-call provider through our clinic answering service at [phone] should you have acute symptom control concerns  IDENTIFICATION:  Inpatient consult to Palliative Care  Consult performed by: Elisa Allen MD  Consult ordered by: Ebony Laws DO        Reason for Consult / Principal Problem: goals of care    HISTORY OF PRESENT ILLNESS:    Nba Campbell is a 67 y o  male who is initially diagnosed with localized renal cell cancer  He had disease progression in 2013 and per oncology note, had been tried on multiple lines of therapies with continued progression  Recently, he was found to have worsening lung with effusions and bone mets with pathologic fracture  He last saw his primary oncologist/Dr Naomi Galvan on 2/20/2023 where hospice was discussed and recommended to him and his family  It was noted that he was not ready to make that decision at that visit  However, on 2/23, he agreed to a hospice referral with the [facility] who then placed the consult  He was approved by [facility] for routine hospice cares on 2/24  However, per review of note, he has not signed on  He presented to the hospital on 3/9 with complaints of increasing weakness  He just completed a stint at [facility] and was receiving home PT/OT   The family is contemplating another codi in inpt rehab  PT/OT evaluation today recommended STR versus home with  cares  He is ordered a thoracentesis for   known effusion  They removed 720cc of fluid today from the L  Palliative to continue goals of care discussion  Met with patient in his room, after he returned from IR thoracentesis  He was AAO x 3, comfortable  He denied any pain or SOB  He complained mostly of generalized weakness  Rest of conversation as above  Interview and exam limited by: none    Review of Systems   Constitutional: Positive for activity change and fatigue  Negative for appetite change  HENT: Negative for trouble swallowing  Respiratory: Negative for shortness of breath  Cardiovascular: Negative for chest pain  Gastrointestinal: Negative for abdominal pain  Musculoskeletal: Negative for back pain  Neurological: Positive for weakness  Psychiatric/Behavioral: Negative for sleep disturbance  The patient is not nervous/anxious  All other systems reviewed and are negative  Past Medical History:   Diagnosis Date   • Arthritis    • Cancer [facility])     prostate - mets to bone and lung   • History of radiation therapy 2018    SBRT to T10 -2018   • Hyperlipidemia    • Hypertension      Past Surgical History:   Procedure Laterality Date   • DISTAL ULNA EXCISION Right     excision of tumor and orif with cement and screws   • JOINT REPLACEMENT Bilateral     tkr's   • LUNG SURGERY Right     exc of nodules   • NEPHRECTOMY Left      Social History     Socioeconomic History   • Marital status:       Spouse name: Not on file   • Number of children: 1   • Years of education: 15   • Highest education level: Not on file   Occupational History   • Occupation: retired     Comment: P/T    Tobacco Use   • Smoking status: Former     Packs/day: 1 00     Years: 41 00     Pack years: 41 00     Types: Cigarettes     Start date:      Quit date: 3/28/2003     Years since quittin 9 • Smokeless tobacco: Former     Quit date: 3/5/1980   Vaping Use   • Vaping Use: Never used   Substance and Sexual Activity   • Alcohol use: Not Currently     Comment: occasional use   • Drug use: Never   • Sexual activity: Not Currently   Other Topics Concern   • Not on file   Social History Narrative    Lives at home alone     Social Determinants of Health     Financial Resource Strain: Not on file   Food Insecurity: No Food Insecurity   • Worried About Running Out of Food in the Last Year: Never true   • Ran Out of Food in the Last Year: Never true   Transportation Needs: No Transportation Needs   • Lack of Transportation (Medical): No   • Lack of Transportation (Non-Medical): No   Physical Activity: Not on file   Stress: Not on file   Social Connections: Not on file   Intimate Partner Violence: Not on file   Housing Stability: Unknown   • Unable to Pay for Housing in the Last Year: No   • Number of Places Lived in the Last Year: Not on file   • Unstable Housing in the Last Year: No     Family History   Problem Relation Age of Onset   • No Known Problems Mother    • No Known Problems Father        MEDICATIONS / ALLERGIES:  all current active meds have been reviewed    No Known Allergies    OBJECTIVE:  /62   Pulse 101   Temp 97 7 °F (36 5 °C) (Oral)   Resp 18   Wt 105 kg (231 lb 4 2 oz)   SpO2 96%   BMI 33 18 kg/m²   Physical Exam:  Constitutional: Appears well-developed and well-nourished  Appears as stated age  Appears chronically ill looking and tired looking  But not toxic appearing  Appears comfortable  Demeanor is friendly  In no acute physical or emotional distress  Head: Normocephalic and atraumatic  Eyes: EOM are normal  No ocular discharge  No scleral icterus  Neck: No visible adenopathy or masses  Respiratory: Effort normal  No stridor  No respiratory distress  Gastrointestinal: No abdominal distension  Musculoskeletal: No edema     Neurological: Alert, oriented and appropriately conversant  Skin: Dry, no diaphoresis  Psychiatric: Displays a normal mood and affect  Behavior, judgment and thought content appear normal      Lab Results: I have personally reviewed pertinent labs  Imaging Studies: I have personally reviewed pertinent reports  EKG, Pathology, and Other Studies: I have personally reviewed pertinent reports  Counseling / Coordination of Care:  Counseling / Coordination of Care  Total floor / unit time spent today 60+ minutes  Greater than 50% of total time was spent with the patient and / or family counseling and / or coordination of care  A description of the counseling / coordination of care: provided medical updates, discussed palliative care, discussed hospice care, determined competency, determined goals of care, determined POA, determined social/family support, discussed plans of care, discussed symptom management, provided psychosocial support       Elisa Allen MD  [address] and Supportive Care

## 2023-03-10 NOTE — BRIEF OP NOTE (RAD/CATH)
IR Left THORACENTESIS Procedure Note    PATIENT NAME: Marnie Alfaro  : 1950  MRN: [id number]    Pre-op Diagnosis:   1  Weakness    2  Ambulatory dysfunction    3  Malignant neoplasm metastatic to lung, unspecified laterality ([facility] [address] )    4  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 to other site [facility])      Post-op Diagnosis:   1  Weakness    2  Ambulatory dysfunction    3  Malignant neoplasm metastatic to lung, unspecified laterality ([facility] [address] )    4   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 to other site [facility])        Provider:   Darell Bass PA-C  Assistants:     No qualified resident was available, Resident is only observing    Estimated Blood Loss: None    Findings: 720mL serosanguinous fluid drained from left-sided thoracentesis    Specimens: Labs collected and sent    Complications:  None immediately    Anesthesia: local    Darell Bass PA-C     Date: 3/10/2023  Time: 2:53 PM

## 2023-03-10 NOTE — ASSESSMENT & PLAN NOTE
Patient with worsening ambulatory dysfunction in the setting of advanced cancer    Was at rehabilitation for pathological fracture of the scapula, at that time he was at acute rehab and now presents with worsening weakness  She has been on extensive treatment multiple lines of anti-VEGF, checkpoint inhibitors unfortunately as degrees has progressed to the point that they have recommended  Statin therapy be held as potentially may be contributing to myalgias    PT OT consultation  Palliative Care -to discuss whether rehab versus hospice would be appropriate

## 2023-03-10 NOTE — DISCHARGE INSTR - OTHER ORDERS
Wound Care Plan:   1-Hydraguard lotion to bilateral heels twice daily and as needed  2-Elevate lower extremities for edema management  Ensure heels are floating off of bed/chair surface to offload pressure  3-Offloading air cushion in chair when out of bed  4-Moisturize skin daily with skin nourishing cream   5-Turn and reposition every 2 hours while in bed and weight shift frequently while in the chair to re-distribute pressure on skin  6-Sacrum and bilateral groin folds--cleanse with soap and water, pat dry  Apply Stefan cream twice daily and as needed with incontinence care

## 2023-03-10 NOTE — DISCHARGE INSTRUCTIONS
Thoracentesis   WHAT YOU NEED TO KNOW:   A thoracentesis is a procedure to remove extra fluid or air from between your lungs and your inner chest wall  Air or fluid buildup may make it hard for you to breathe  A thoracentesis allows your lungs to expand fully so you can breathe more easily  DISCHARGE INSTRUCTIONS:     Small amount of shoulder pain and bloody sputum is normal after a Thoracentesis  Rest:  Rest when you feel it is needed  Slowly start to do more each day  Return to your daily activities as directed  Resume your normal diet  Small sips of flat soda will help mild nausea  Do not smoke: If you smoke, it is never too late to quit  Ask for information about how to stop smoking if you need help  Contact Interventional Radiology at [phone] [place] PATIENTS: Contact Interventional Radiology at [phone]) Rosalind Lopez PATIENTS: Contact Interventional Radiology at [phone]) if:   You have a fever  Your puncture site is red, warm, swollen, or draining pus  You have questions or concerns about your procedure, medicine, or care  Seek care immediately or call 911 if:   Severe chest pain with inspiration and shortness of breath    Large amounts of blood in your sputum    Follow up with your healthcare provider as directed

## 2023-03-10 NOTE — H&P
[address] [facility]  H&P- Ebb Sep 1950, 67 y o  male MRN: [id number]  [facility] [id number] Encounter: [id number]  Primary Care Provider: Francisco Javier Tanner MD   Date and time admitted to hospital: 3/9/2023  7:00 PM    Assessment and Plan  * Metastatic renal cell carcinoma [facility])  Assessment & Plan  Patient with history of metastatic stage IV cancer, history of renal cell carcinoma status post left nephrectomy 2007, subsequently developed metastatic disease in 2013  Per patient's most recent oncology note, not currently on any treatment  And poor performance status and no other systemic therapy for metastatic renal cancer to offer his oncology group as recommended hospice care  Unable to make a decision on hospice care, would be reasonable for family to discuss this with palliative care  Per last oncology note, life expectancy is suspected to be 3 to 6 months    DNR status appropriate    Hypercalcemia  Assessment & Plan  Hypercalcemia of malignancy, mild  Hold Vit D supplements, and Calcium supplements  IV fluids held given LE edema  Consider Zometa if worsens  IV lasix x 1  Recheck in the am  Corrected to 11 8  Lab Results   Component Value Date    CALCIUM 11 2 (H) 03/09/2023    CALCIUM 9 9 02/22/2023         Ambulatory dysfunction  Assessment & Plan  Patient with worsening ambulatory dysfunction in the setting of advanced cancer    Was at rehabilitation for pathological fracture of the scapula, at that time he was at acute rehab and now presents with worsening weakness  She has been on extensive treatment multiple lines of anti-VEGF, checkpoint inhibitors unfortunately as degrees has progressed to the point that they have recommended  Statin therapy be held as potentially may be contributing to myalgias    PT OT consultation  Palliative Care -to discuss whether rehab versus hospice would be appropriate    Chronic diastolic (congestive) heart failure ([facility] [address] )  Assessment & Plan  Wt Readings from Last 3 Encounters:   02/24/23 111 kg (244 lb)   02/20/23 111 kg (244 lb)   02/16/23 111 kg (245 lb)     Patient is a dry weight although with lower extremity edema  Given history of cancer we will check vascular ultrasound  Continue diuretics as previously prescribed  Most recent echo with EF of 65% and grade 1 diastolic dysfunction January 2023        Stage 3a chronic kidney disease [facility])  Assessment & Plan  Lab Results   Component Value Date    EGFR 78 03/09/2023    EGFR 74 02/22/2023    EGFR 76 02/02/2023    CREATININE 0 96 03/09/2023    CREATININE 1 00 02/22/2023    CREATININE 0 98 02/02/2023   Renal function appears baseline  Continued diuretic    Lung metastases (HCC)  Assessment & Plan  History of lung metastasis complicated by pleural effusion  Radiograph ordered for further characterization  Thoracentesis ordered  If becomes significantly symptomatic and patient transitions to hospice can consider Ascept/PleurX catheter based on goals of care  Chest x-ray pending offical read      Code Status: Level 3 - DNAR and DNI     VTE Prophylaxis: Enoxaparin (Lovenox)  / sequential compression device     POLST: There is no POLST form on file for this patient (pre-hospital)  Discussion with family: Discussed with patient family at bedside    Anticipated Length of Stay:  Patient will be admitted on an Observation basis with an anticipated length of stay of greater than 2 midnights  Justification for Hospital Stay: Metastatic renal cell carcinoma ([facility] [address] )     Total Time for Visit, including Counseling / Coordination of Care: 1 hour  Greater than 50% of this total time spent on direct patient counseling and coordination of care  Chief Complaint:     Chief Complaint   Patient presents with   • Altered Mental Status     Pt reports double vision for past couple of days "it feels like I'm floating around" Family reports confusion and sob  Wears 3 L nasal cannula chronically         History of Present Illness:    Ronda Burnett is a 67 y o  male who presents with worsening weakness as well as double vision patient has a past medical history of chronic kidney disease stage III with previous creatinines around 1-1 3, he is also history of renal cell carcinoma status post left nephrectomy in 2007 for which he develop metastatic disease in 2013  He has been on extensive treatment including multiple lines of anti-VEGF, as well as checkpoint inhibitors  During his last oncology appointment he unfortunately felt that they exhausted any treatment regimen  Despite medical care his disease had progressed and he recently suffered a pathological fracture of the left scapula  His disease has been complicated by pulmonary metastasis as well as malignant pleural effusion  Due to poor performance status he has been considered a hospice candidate  Since returning home the patient has reported ongoing generalized weakness  Reports some days he has some brain fog  Today he has ongoing vision issues  Not currently in pain    Review of Systems:    A complete and comprehensive 14 point organ system review was performed and all other systems are negative other than stated above in the HPI    Past Medical and Surgical History:     Past Medical History:   Diagnosis Date   • Arthritis    • Cancer ([facility] [address] )     prostate - mets to bone and lung   • History of radiation therapy 07/27/2018    SBRT to T10 7/18-7/27/2018   • Hyperlipidemia    • Hypertension        Past Surgical History:   Procedure Laterality Date   • DISTAL ULNA EXCISION Right     excision of tumor and orif with cement and screws   • JOINT REPLACEMENT Bilateral     tkr's   • LUNG SURGERY Right     exc of nodules   • NEPHRECTOMY Left        Meds/Allergies:    Prior to Admission medications    Medication Sig Start Date End Date Taking?  Authorizing Provider   acetaminophen (TYLENOL) 325 mg tablet Take 2 tablets (650 mg total) by mouth every 6 (six) hours as needed for headaches, fever or mild pain (mild pain, moderate pain) 2/2/23   Shelley Faulkner MD   aspirin (ECOTRIN LOW STRENGTH) 81 mg EC tablet Take 1 tablet (81 mg total) by mouth daily Do not start before February 3, 2023  2/3/23   Shelley Faulkner MD   atorvastatin (LIPITOR) 20 mg tablet Take 1 tablet (20 mg total) by mouth daily with dinner 2/2/23   Shelley Faulkner MD   benzonatate (TESSALON) 200 MG capsule Take 1 capsule (200 mg total) by mouth 3 (three) times a day as needed for cough 2/15/23   Inna Johnson MD   cholecalciferol (VITAMIN D3) 1,000 units tablet Take 1,000 Units by mouth daily    Historical Provider, MD   cyanocobalamin (VITAMIN B-12) 1000 MCG tablet Take 1 tablet (1,000 mcg total) by mouth daily Do not start before February 3, 2023  2/3/23   Shelley Faulkner MD   diphenhydrAMINE (BENADRYL) 25 mg tablet Take 25 mg by mouth daily at bedtime as needed for itching    Historical Provider, MD   diphenoxylate-atropine (LOMOTIL) 2 5-0 025 mg per tablet Take 1 tablet by mouth 4 (four) times a day as needed for diarrhea 12/30/22   Santino Rogers MD   fluorouracil (EFUDEX) 5 % cream  2/15/22   Historical Provider, MD   furosemide (LASIX) 40 mg tablet Take 1 tablet (40 mg total) by mouth daily 2/2/23   Shelley Faulkner MD   gabapentin (NEURONTIN) 100 mg capsule Take 300 mg by mouth 3 (three) times a day     Historical Provider, MD   loperamide (IMODIUM) 2 mg capsule Take 2 mg by mouth 4 (four) times a day as needed for diarrhea    Historical Provider, MD   Multiple Vitamin (multivitamin) tablet Take 1 tablet by mouth daily    Historical Provider, MD   oxyCODONE (ROXICODONE) 5 immediate release tablet Take 1 (5mg) to 1 5 (7 5mg) tablets by mouth every 6 hours as needed for moderate to severe pain   2/2/23   Shelley Faulkner MD   trolamine salicylate (ASPERCREME) 10 % cream Apply 1 application topically as needed for muscle/joint pain    Historical Provider, MD     I have reviewed home medications with patient personally  Allergies: No Known Allergies    Social History:     Marital Status:    Occupation: Retired    Substance Use History:   Social History     Substance and Sexual Activity   Alcohol Use Not Currently    Comment: occasional use     Social History     Tobacco Use   Smoking Status Former   • Packs/day: 1 00   • Years: 41 00   • Pack years: 41 00   • Types: Cigarettes   • Start date: 45   • Quit date: 3/28/2003   • Years since quittin 9   Smokeless Tobacco Former   • Quit date: 3/5/1980     Social History     Substance and Sexual Activity   Drug Use Never       Family History:    Family History   Problem Relation Age of Onset   • No Known Problems Mother    • No Known Problems Father        Physical Exam:     Vitals:   Blood Pressure: 118/57 (03/10/23 0000)  Pulse: 96 (03/10/23 0000)  Temperature: 97 5 °F (36 4 °C) (23)  Temp Source: Oral (23)  Respirations: 18 (03/10/23 0000)  SpO2: 93 % (03/10/23 0000)      General: ill appearing, no acute distress  HEENT: atraumatic, PERRLA, moist mucosa, normal pharynx, normal tonsils and adenoids, normal tongue, no fluid in sinuses  Neck: Trachea midline, no carotid bruit, no masses  Respiratory: normal chest wall expansion, creased breath sound left lower lobe, no r/r/w, no rubs  Cardiovascular: RRR, no m/r/g, Normal S1 and S2  Abdomen: Soft, non-tender, non-distended, normal bowel sounds in all quadrants, no hepatosplenomegaly, no tympany  Rectal: deferred  Musculoskeletal: Moves all, 2+ to 3+ edema of the lower extremities to the knee  Integumentary: warm, dry, and pink, with no rash, purpura, or petechia  Heme/Lymph: no lymphadenopathy, no bruises  Neurological: Cranial Nerves II-XII grossly intact  Psychiatric: cooperative with normal mood, affect, and cognition    Additional Data:     Lab Results: I have personally reviewed pertinent reports        Results from last 7 days   Lab Units 23   WBC Thousand/uL 6 92 HEMOGLOBIN g/dL 11 0*   HEMATOCRIT % 38 6   PLATELETS Thousands/uL 196   NEUTROS PCT % 72   LYMPHS PCT % 20   MONOS PCT % 7   EOS PCT % 1     Results from last 7 days   Lab Units 03/09/23 2018   SODIUM mmol/L 141   POTASSIUM mmol/L 3 8   CHLORIDE mmol/L 96   CO2 mmol/L 41*   BUN mg/dL 22   CREATININE mg/dL 0 96   ANION GAP mmol/L 4   CALCIUM mg/dL 11 2*   ALBUMIN g/dL 3 2*   TOTAL BILIRUBIN mg/dL 0 66   ALK PHOS U/L 64   ALT U/L 13   AST U/L 22   GLUCOSE RANDOM mg/dL 89                     Results from last 7 days   Lab Units 03/10/23  0023 03/09/23  2237 03/09/23 2018   HS TNI 0HR ng/L  --   --  21   HS TNI 2HR ng/L  --  20  --    HS TNI 4HR ng/L 19  --   --        Imaging: I have personally reviewed pertinent reports  CT head without contrast   Final Result by Ama Florence DO (03/09 2104)      No acute intracranial abnormality  Workstation performed: [id number]         XR chest pa & lateral    (Results Pending)   VAS lower limb venous duplex study, complete bilateral    (Results Pending)   IR IN-Patient Thoracentesis    (Results Pending)       EKG, Pathology, and Other Studies Reviewed on Admission:   · CT reviewed, no acute intracranial abnormality  Chest radiograph is pending    Allscripts / Epic Records Reviewed: Yes     ** Please Note: This note was completed in part utilizing M-Modal Fluency Direct Software  Grammatical errors, random word insertions, spelling mistakes, and incomplete sentences may be an occasional consequence of this system secondary to software limitations, ambient noise, and hardware issues  If you have any questions or concerns about the content, text, or information contained within the body of this dictation, please contact the provider for clarification  **

## 2023-03-10 NOTE — ED NOTES
Called and informed floor pt is on telemetry      [address]  03/09/23 4133

## 2023-03-11 LAB
ALBUMIN SERPL BCP-MCNC: 3 G/DL (ref 3.5–5)
ALP SERPL-CCNC: 54 U/L (ref 34–104)
ALT SERPL W P-5'-P-CCNC: 12 U/L (ref 7–52)
ANION GAP SERPL CALCULATED.3IONS-SCNC: 3 MMOL/L (ref 4–13)
AST SERPL W P-5'-P-CCNC: 20 U/L (ref 13–39)
BILIRUB SERPL-MCNC: 0.55 MG/DL (ref 0.2–1)
BUN SERPL-MCNC: 23 MG/DL (ref 5–25)
CALCIUM ALBUM COR SERPL-MCNC: 11.7 MG/DL (ref 8.3–10.1)
CALCIUM SERPL-MCNC: 10.9 MG/DL (ref 8.4–10.2)
CHLORIDE SERPL-SCNC: 97 MMOL/L (ref 96–108)
CO2 SERPL-SCNC: 43 MMOL/L (ref 21–32)
CREAT SERPL-MCNC: 0.94 MG/DL (ref 0.6–1.3)
GFR SERPL CREATININE-BSD FRML MDRD: 80 ML/MIN/1.73SQ M
GLUCOSE P FAST SERPL-MCNC: 96 MG/DL (ref 65–99)
GLUCOSE SERPL-MCNC: 115 MG/DL (ref 65–140)
GLUCOSE SERPL-MCNC: 96 MG/DL (ref 65–140)
POTASSIUM SERPL-SCNC: 3.6 MMOL/L (ref 3.5–5.3)
PROT SERPL-MCNC: 6.1 G/DL (ref 6.4–8.4)
SODIUM SERPL-SCNC: 143 MMOL/L (ref 135–147)

## 2023-03-11 RX ADMIN — ASPIRIN 81 MG: 81 TABLET, COATED ORAL at 08:37

## 2023-03-11 RX ADMIN — LOPERAMIDE HYDROCHLORIDE 2 MG: 2 CAPSULE ORAL at 13:02

## 2023-03-11 RX ADMIN — MICONAZOLE NITRATE: 20 CREAM TOPICAL at 08:37

## 2023-03-11 RX ADMIN — MICONAZOLE NITRATE: 20 CREAM TOPICAL at 18:25

## 2023-03-11 RX ADMIN — ENOXAPARIN SODIUM 40 MG: 100 INJECTION SUBCUTANEOUS at 08:37

## 2023-03-11 RX ADMIN — FUROSEMIDE 40 MG: 40 TABLET ORAL at 08:37

## 2023-03-11 NOTE — PLAN OF CARE
Problem: Potential for Falls  Goal: Patient will remain free of falls  Description: INTERVENTIONS:  - Educate patient/family on patient safety including physical limitations  - Instruct patient to call for assistance with activity   - Consult OT/PT to assist with strengthening/mobility   - Keep Call bell within reach  - Keep bed low and locked with side rails adjusted as appropriate  - Keep care items and personal belongings within reach  - Initiate and maintain comfort rounds  - Make Fall Risk Sign visible to staff  - Offer Toileting every  Hours, in advance of need  - Initiate/Maintain alarm  - Obtain necessary fall risk management equipment:   - Apply yellow socks and bracelet for high fall risk patients  - Consider moving patient to room near nurses station  Outcome: Progressing     Problem: Nutrition/Hydration-ADULT  Goal: Nutrient/Hydration intake appropriate for improving, restoring or maintaining nutritional needs  Description: Monitor and assess patient's nutrition/hydration status for malnutrition  Collaborate with interdisciplinary team and initiate plan and interventions as ordered  Monitor patient's weight and dietary intake as ordered or per policy  Utilize nutrition screening tool and intervene as necessary  Determine patient's food preferences and provide high-protein, high-caloric foods as appropriate       INTERVENTIONS:  - Monitor oral intake, urinary output, labs, and treatment plans  - Assess nutrition and hydration status and recommend course of action  - Evaluate amount of meals eaten  - Assist patient with eating if necessary   - Allow adequate time for meals  - Recommend/ encourage appropriate diets, oral nutritional supplements, and vitamin/mineral supplements  - Order, calculate, and assess calorie counts as needed  - Recommend, monitor, and adjust tube feedings and TPN/PPN based on assessed needs  - Assess need for intravenous fluids  - Provide specific nutrition/hydration education as appropriate  - Include patient/family/caregiver in decisions related to nutrition  Outcome: Progressing     Problem: MOBILITY - ADULT  Goal: Maintain or return to baseline ADL function  Description: INTERVENTIONS:  -  Assess patient's ability to carry out ADLs; assess patient's baseline for ADL function and identify physical deficits which impact ability to perform ADLs (bathing, care of mouth/teeth, toileting, grooming, dressing, etc )  - Assess/evaluate cause of self-care deficits   - Assess range of motion  - Assess patient's mobility; develop plan if impaired  - Assess patient's need for assistive devices and provide as appropriate  - Encourage maximum independence but intervene and supervise when necessary  - Involve family in performance of ADLs  - Assess for home care needs following discharge   - Consider OT consult to assist with ADL evaluation and planning for discharge  - Provide patient education as appropriate  Outcome: Progressing  Goal: Maintains/Returns to pre admission functional level  Description: INTERVENTIONS:  - Perform BMAT or MOVE assessment daily    - Set and communicate daily mobility goal to care team and patient/family/caregiver  - Collaborate with rehabilitation services on mobility goals if consulted  - Perform Range of Motion  times a day  - Reposition patient every  hours    - Dangle patient  times a day  - Stand patient  times a day  - Ambulate patient  times a day  - Out of bed to chair  times a day   - Out of bed for meals  times a day  - Out of bed for toileting  - Record patient progress and toleration of activity level   Outcome: Progressing     Problem: PAIN - ADULT  Goal: Verbalizes/displays adequate comfort level or baseline comfort level  Description: Interventions:  - Encourage patient to monitor pain and request assistance  - Assess pain using appropriate pain scale  - Administer analgesics based on type and severity of pain and evaluate response  - Implement non-pharmacological measures as appropriate and evaluate response  - Consider cultural and social influences on pain and pain management  - Notify physician/advanced practitioner if interventions unsuccessful or patient reports new pain  Outcome: Progressing     Problem: INFECTION - ADULT  Goal: Absence or prevention of progression during hospitalization  Description: INTERVENTIONS:  - Assess and monitor for signs and symptoms of infection  - Monitor lab/diagnostic results  - Monitor all insertion sites, i e  indwelling lines, tubes, and drains  - Monitor endotracheal if appropriate and nasal secretions for changes in amount and color  - [facility] appropriate cooling/warming therapies per order  - Administer medications as ordered  - Instruct and encourage patient and family to use good hand hygiene technique  - Identify and instruct in appropriate isolation precautions for identified infection/condition  Outcome: Progressing     Problem: SAFETY ADULT  Goal: Patient will remain free of falls  Description: INTERVENTIONS:  - Educate patient/family on patient safety including physical limitations  - Instruct patient to call for assistance with activity   - Consult OT/PT to assist with strengthening/mobility   - Keep Call bell within reach  - Keep bed low and locked with side rails adjusted as appropriate  - Keep care items and personal belongings within reach  - Initiate and maintain comfort rounds  - Make Fall Risk Sign visible to staff  - Offer Toileting every Hours, in advance of need  - Initiate/Maintain alarm  - Obtain necessary fall risk management equipment:   - Apply yellow socks and bracelet for high fall risk patients  - Consider moving patient to room near nurses station  Outcome: Progressing  Goal: Maintain or return to baseline ADL function  Description: INTERVENTIONS:  -  Assess patient's ability to carry out ADLs; assess patient's baseline for ADL function and identify physical deficits which impact ability to perform ADLs (bathing, care of mouth/teeth, toileting, grooming, dressing, etc )  - Assess/evaluate cause of self-care deficits   - Assess range of motion  - Assess patient's mobility; develop plan if impaired  - Assess patient's need for assistive devices and provide as appropriate  - Encourage maximum independence but intervene and supervise when necessary  - Involve family in performance of ADLs  - Assess for home care needs following discharge   - Consider OT consult to assist with ADL evaluation and planning for discharge  - Provide patient education as appropriate  Outcome: Progressing  Goal: Maintains/Returns to pre admission functional level  Description: INTERVENTIONS:  - Perform BMAT or MOVE assessment daily    - Set and communicate daily mobility goal to care team and patient/family/caregiver  - Collaborate with rehabilitation services on mobility goals if consulted  - Perform Range of Motion  times a day  - Reposition patient every  hours    - Dangle patient  times a day  - Stand patient  times a day  - Ambulate patient  times a day  - Out of bed to chair  times a day   - Out of bed for meals times a day  - Out of bed for toileting  - Record patient progress and toleration of activity level   Outcome: Progressing     Problem: DISCHARGE PLANNING  Goal: Discharge to home or other facility with appropriate resources  Description: INTERVENTIONS:  - Identify barriers to discharge w/patient and caregiver  - Arrange for needed discharge resources and transportation as appropriate  - Identify discharge learning needs (meds, wound care, etc )  - Arrange for interpretive services to assist at discharge as needed  - Refer to Case Management Department for coordinating discharge planning if the patient needs post-hospital services based on physician/advanced practitioner order or complex needs related to functional status, cognitive ability, or social support system  Outcome: Progressing     Problem: Knowledge Deficit  Goal: Patient/family/caregiver demonstrates understanding of disease process, treatment plan, medications, and discharge instructions  Description: Complete learning assessment and assess knowledge base    Interventions:  - Provide teaching at level of understanding  - Provide teaching via preferred learning methods  Outcome: Progressing

## 2023-03-11 NOTE — PROGRESS NOTES
[facility]  Progress Note - Donna Range 1950, 67 y o  male MRN: [id number]  Unit/Bed#: [facility] [address] [id number] Encounter: [id number]  Primary Care Provider: Isaac Duncan MD   Date and time admitted to hospital: 3/9/2023  7:00 PM    * Metastatic renal cell carcinoma [facility])  Assessment & Plan  Seen by palliative care  Patient wishes for home with hospice  Case management is going to set that up  We need to work on getting 24-hour caregivers at home as he lives by himself    Likely will be able to have this set up until Monday afternoon or Tuesday    Lung metastases [facility])  Assessment & Plan  Had thoracentesis 3/10  This was his first thoracentesis ever  If he reaccumulates quickly, we could consider a Pleurx catheter          Subjective:   Feels a little better since thoracentesis  Comfortably breathing on oxygen  No cough  No pain      Objective:     Vitals:   Temp (24hrs), Av 3 °F (36 8 °C), Min:98 °F (36 7 °C), Max:98 6 °F (37 °C)    Temp:  [98 °F (36 7 °C)-98 6 °F (37 °C)] 98 3 °F (36 8 °C)  HR:  [] 94  Resp:  [18-20] 18  BP: (122-131)/(62-69) 123/69  SpO2:  [93 %-98 %] 95 %  Body mass index is 33 18 kg/m²  Input and Output Summary (last 24 hours): Intake/Output Summary (Last 24 hours) at 3/11/2023 1050  Last data filed at 3/11/2023 0900  Gross per 24 hour   Intake 360 ml   Output 1020 ml   Net -660 ml       Physical Exam:     Physical Exam  Vitals and nursing note reviewed  HENT:      Head: Normocephalic and atraumatic  Eyes:      Pupils: Pupils are equal, round, and reactive to light  Cardiovascular:      Rate and Rhythm: Normal rate and regular rhythm  Heart sounds: No murmur heard  No friction rub  No gallop  Pulmonary:      Effort: Pulmonary effort is normal       Breath sounds: Normal breath sounds  No wheezing or rales  Abdominal:      General: Bowel sounds are normal       Palpations: Abdomen is soft  Tenderness:  There is no abdominal tenderness  Musculoskeletal:      Right lower leg: No edema  Left lower leg: No edema          Additional Data:     Labs:    Results from last 7 days   Lab Units 03/10/23  0533 03/09/23 2018   WBC Thousand/uL 6 72 6 92   HEMOGLOBIN g/dL 10 1* 11 0*   HEMATOCRIT % 35 4* 38 6   PLATELETS Thousands/uL 178 196   NEUTROS PCT %  --  72   LYMPHS PCT %  --  20   MONOS PCT %  --  7   EOS PCT %  --  1     Results from last 7 days   Lab Units 03/11/23  0544   POTASSIUM mmol/L 3 6   CHLORIDE mmol/L 97   CO2 mmol/L 43*   BUN mg/dL 23   CREATININE mg/dL 0 94   CALCIUM mg/dL 10 9*   ALK PHOS U/L 54   ALT U/L 12   AST U/L 20                       * I Have Reviewed All Lab Data     Recent Cultures (last 7 days):     Results from last 7 days   Lab Units 03/10/23  1343   GRAM STAIN RESULT  2+ Polys  No bacteria seen         Last 24 Hours Medication List:   Current Facility-Administered Medications   Medication Dose Route Frequency Provider Last Rate   • acetaminophen  650 mg Oral Q6H PRN Jef Barer, DO     • aluminum-magnesium hydroxide-simethicone  30 mL Oral Q6H PRN Jef Barer, DO     • aspirin  81 mg Oral Daily Ozzy Chung, DO     • enoxaparin  40 mg Subcutaneous Daily Ozzy Chung, DO     • furosemide  40 mg Oral Daily Ozzy Caty Chung, DO     • loperamide  2 mg Oral 4x Daily PRN Eugene Buffalo Prechtel, DO     • EMILIA ANTIFUNGAL   Topical BID Eugene Buffalo Prechtel, DO     • ondansetron  4 mg Intravenous Q6H PRN Jef Barer, DO     • simethicone  80 mg Oral 4x Daily PRN Ozzy Chung, DO           VTE Pharmacologic Prophylaxis:   Pharmacologic: Enoxaparin (Lovenox)      Current Length of Stay: 0 day(s)    Current Patient Status: Inpatient       Discharge Plan: home when home hospice is all set up     Likely Monday or Tuesday    Code Status: Level 3 - DNAR and DNI           Today, Patient Was Seen By: Maria Alejandra Martinez DO    ** Please Note: Dictation voice to text software may have been used in the creation of this document   **

## 2023-03-11 NOTE — PLAN OF CARE
Problem: Potential for Falls  Goal: Patient will remain free of falls  Description: INTERVENTIONS:  - Educate patient/family on patient safety including physical limitations  - Instruct patient to call for assistance with activity   - Consult OT/PT to assist with strengthening/mobility   - Keep Call bell within reach  - Keep bed low and locked with side rails adjusted as appropriate  - Keep care items and personal belongings within reach  - Initiate and maintain comfort rounds  - Make Fall Risk Sign visible to staff  - Offer Toileting every  Hours, in advance of need  - Initiate/Maintain alarm  - Obtain necessary fall risk management equipment:   - Apply yellow socks and bracelet for high fall risk patients  - Consider moving patient to room near nurses station  Outcome: Progressing     Problem: Nutrition/Hydration-ADULT  Goal: Nutrient/Hydration intake appropriate for improving, restoring or maintaining nutritional needs  Description: Monitor and assess patient's nutrition/hydration status for malnutrition  Collaborate with interdisciplinary team and initiate plan and interventions as ordered  Monitor patient's weight and dietary intake as ordered or per policy  Utilize nutrition screening tool and intervene as necessary  Determine patient's food preferences and provide high-protein, high-caloric foods as appropriate       INTERVENTIONS:  - Monitor oral intake, urinary output, labs, and treatment plans  - Assess nutrition and hydration status and recommend course of action  - Evaluate amount of meals eaten  - Assist patient with eating if necessary   - Allow adequate time for meals  - Recommend/ encourage appropriate diets, oral nutritional supplements, and vitamin/mineral supplements  - Order, calculate, and assess calorie counts as needed  - Recommend, monitor, and adjust tube feedings and TPN/PPN based on assessed needs  - Assess need for intravenous fluids  - Provide specific nutrition/hydration education as appropriate  - Include patient/family/caregiver in decisions related to nutrition  Outcome: Progressing     Problem: MOBILITY - ADULT  Goal: Maintain or return to baseline ADL function  Description: INTERVENTIONS:  -  Assess patient's ability to carry out ADLs; assess patient's baseline for ADL function and identify physical deficits which impact ability to perform ADLs (bathing, care of mouth/teeth, toileting, grooming, dressing, etc )  - Assess/evaluate cause of self-care deficits   - Assess range of motion  - Assess patient's mobility; develop plan if impaired  - Assess patient's need for assistive devices and provide as appropriate  - Encourage maximum independence but intervene and supervise when necessary  - Involve family in performance of ADLs  - Assess for home care needs following discharge   - Consider OT consult to assist with ADL evaluation and planning for discharge  - Provide patient education as appropriate  Outcome: Progressing  Goal: Maintains/Returns to pre admission functional level  Description: INTERVENTIONS:  - Perform BMAT or MOVE assessment daily    - Set and communicate daily mobility goal to care team and patient/family/caregiver  - Collaborate with rehabilitation services on mobility goals if consulted  - Perform Range of Motion  times a day  - Reposition patient every  hours    - Dangle patient  times a day  - Stand patient  times a day  - Ambulate patient  times a day  - Out of bed to chair  times a day   - Out of bed for meals  times a day  - Out of bed for toileting  - Record patient progress and toleration of activity level   Outcome: Progressing     Problem: PAIN - ADULT  Goal: Verbalizes/displays adequate comfort level or baseline comfort level  Description: Interventions:  - Encourage patient to monitor pain and request assistance  - Assess pain using appropriate pain scale  - Administer analgesics based on type and severity of pain and evaluate response  - Implement non-pharmacological measures as appropriate and evaluate response  - Consider cultural and social influences on pain and pain management  - Notify physician/advanced practitioner if interventions unsuccessful or patient reports new pain  Outcome: Progressing     Problem: INFECTION - ADULT  Goal: Absence or prevention of progression during hospitalization  Description: INTERVENTIONS:  - Assess and monitor for signs and symptoms of infection  - Monitor lab/diagnostic results  - Monitor all insertion sites, i e  indwelling lines, tubes, and drains  - Monitor endotracheal if appropriate and nasal secretions for changes in amount and color  - [place] appropriate cooling/warming therapies per order  - Administer medications as ordered  - Instruct and encourage patient and family to use good hand hygiene technique  - Identify and instruct in appropriate isolation precautions for identified infection/condition  Outcome: Progressing     Problem: SAFETY ADULT  Goal: Patient will remain free of falls  Description: INTERVENTIONS:  - Educate patient/family on patient safety including physical limitations  - Instruct patient to call for assistance with activity   - Consult OT/PT to assist with strengthening/mobility   - Keep Call bell within reach  - Keep bed low and locked with side rails adjusted as appropriate  - Keep care items and personal belongings within reach  - Initiate and maintain comfort rounds  - Make Fall Risk Sign visible to staff  - Offer Toileting every  Hours, in advance of need  - Initiate/Maintain alarm  - Obtain necessary fall risk management equipment:   - Apply yellow socks and bracelet for high fall risk patients  - Consider moving patient to room near nurses station  Outcome: Progressing  Goal: Maintain or return to baseline ADL function  Description: INTERVENTIONS:  -  Assess patient's ability to carry out ADLs; assess patient's baseline for ADL function and identify physical deficits which impact ability to perform ADLs (bathing, care of mouth/teeth, toileting, grooming, dressing, etc )  - Assess/evaluate cause of self-care deficits   - Assess range of motion  - Assess patient's mobility; develop plan if impaired  - Assess patient's need for assistive devices and provide as appropriate  - Encourage maximum independence but intervene and supervise when necessary  - Involve family in performance of ADLs  - Assess for home care needs following discharge   - Consider OT consult to assist with ADL evaluation and planning for discharge  - Provide patient education as appropriate  Outcome: Progressing  Goal: Maintains/Returns to pre admission functional level  Description: INTERVENTIONS:  - Perform BMAT or MOVE assessment daily    - Set and communicate daily mobility goal to care team and patient/family/caregiver  - Collaborate with rehabilitation services on mobility goals if consulted  - Perform Range of Motion  times a day  - Reposition patient every  hours    - Dangle patient  times a day  - Stand patient  times a day  - Ambulate patient  times a day  - Out of bed to chair  times a day   - Out of bed for meal times a day  - Out of bed for toileting  - Record patient progress and toleration of activity level   Outcome: Progressing     Problem: DISCHARGE PLANNING  Goal: Discharge to home or other facility with appropriate resources  Description: INTERVENTIONS:  - Identify barriers to discharge w/patient and caregiver  - Arrange for needed discharge resources and transportation as appropriate  - Identify discharge learning needs (meds, wound care, etc )  - Arrange for interpretive services to assist at discharge as needed  - Refer to Case Management Department for coordinating discharge planning if the patient needs post-hospital services based on physician/advanced practitioner order or complex needs related to functional status, cognitive ability, or social support system  Outcome: Progressing     Problem: Knowledge Deficit  Goal: Patient/family/caregiver demonstrates understanding of disease process, treatment plan, medications, and discharge instructions  Description: Complete learning assessment and assess knowledge base    Interventions:  - Provide teaching at level of understanding  - Provide teaching via preferred learning methods  Outcome: Progressing

## 2023-03-11 NOTE — ASSESSMENT & PLAN NOTE
Had thoracentesis 3/10  This was his first thoracentesis ever      If he reaccumulates quickly, we could consider a Pleurx catheter

## 2023-03-11 NOTE — ASSESSMENT & PLAN NOTE
Seen by palliative care  Patient wishes for home with hospice  Case management is going to set that up    We need to work on getting 24-hour caregivers at home as he lives by himself    Likely will be able to have this set up until Monday afternoon or Tuesday

## 2023-03-12 LAB
ALBUMIN SERPL BCP-MCNC: 2.8 G/DL (ref 3.5–5)
ALP SERPL-CCNC: 48 U/L (ref 34–104)
ALT SERPL W P-5'-P-CCNC: 10 U/L (ref 7–52)
ANION GAP SERPL CALCULATED.3IONS-SCNC: 4 MMOL/L (ref 4–13)
AST SERPL W P-5'-P-CCNC: 18 U/L (ref 13–39)
BILIRUB SERPL-MCNC: 0.52 MG/DL (ref 0.2–1)
BUN SERPL-MCNC: 20 MG/DL (ref 5–25)
CALCIUM ALBUM COR SERPL-MCNC: 11.5 MG/DL (ref 8.3–10.1)
CALCIUM SERPL-MCNC: 10.5 MG/DL (ref 8.4–10.2)
CHLORIDE SERPL-SCNC: 96 MMOL/L (ref 96–108)
CO2 SERPL-SCNC: 43 MMOL/L (ref 21–32)
CREAT SERPL-MCNC: 0.92 MG/DL (ref 0.6–1.3)
ERYTHROCYTE [DISTWIDTH] IN BLOOD BY AUTOMATED COUNT: 15.9 % (ref 11.6–15.1)
GFR SERPL CREATININE-BSD FRML MDRD: 82 ML/MIN/1.73SQ M
GLUCOSE SERPL-MCNC: 111 MG/DL (ref 65–140)
GLUCOSE SERPL-MCNC: 125 MG/DL (ref 65–140)
GLUCOSE SERPL-MCNC: 87 MG/DL (ref 65–140)
GLUCOSE SERPL-MCNC: 89 MG/DL (ref 65–140)
HCT VFR BLD AUTO: 35.6 % (ref 36.5–49.3)
HGB BLD-MCNC: 10.3 G/DL (ref 12–17)
MAGNESIUM SERPL-MCNC: 1.8 MG/DL (ref 1.9–2.7)
MCH RBC QN AUTO: 28.1 PG (ref 26.8–34.3)
MCHC RBC AUTO-ENTMCNC: 28.9 G/DL (ref 31.4–37.4)
MCV RBC AUTO: 97 FL (ref 82–98)
PLATELET # BLD AUTO: 171 THOUSANDS/UL (ref 149–390)
PMV BLD AUTO: 10.3 FL (ref 8.9–12.7)
POTASSIUM SERPL-SCNC: 3.6 MMOL/L (ref 3.5–5.3)
PROT SERPL-MCNC: 5.6 G/DL (ref 6.4–8.4)
RBC # BLD AUTO: 3.66 MILLION/UL (ref 3.88–5.62)
SODIUM SERPL-SCNC: 143 MMOL/L (ref 135–147)
WBC # BLD AUTO: 6.13 THOUSAND/UL (ref 4.31–10.16)

## 2023-03-12 RX ADMIN — ASPIRIN 81 MG: 81 TABLET, COATED ORAL at 08:27

## 2023-03-12 RX ADMIN — ENOXAPARIN SODIUM 40 MG: 100 INJECTION SUBCUTANEOUS at 08:27

## 2023-03-12 RX ADMIN — MICONAZOLE NITRATE: 20 CREAM TOPICAL at 08:29

## 2023-03-12 RX ADMIN — FUROSEMIDE 40 MG: 40 TABLET ORAL at 08:27

## 2023-03-12 NOTE — ASSESSMENT & PLAN NOTE
On arrival, patient was noted to have a large pleural effusion  This is likely related to his lung metastasis and renal cell carcinoma  He underwent large-volume thoracentesis    He has never had to have a thoracentesis before    I will repeat the chest x-ray tomorrow to see if it has reaccumulated    If it does reaccumulate quickly, he may need a Pleurx catheter

## 2023-03-12 NOTE — ASSESSMENT & PLAN NOTE
Patient has known metastatic renal cell carcinoma with metastasis to lung  He presented with generalized weakness and not being able to function at home by himself    No treatment is offered by oncology that is felt to benefit him    He was seen by palliative care    He wants to go home with hospice    Case management is setting this up  He does need 24-hour caregivers set up, so it may not be until Monday or Tuesday that he gets home

## 2023-03-12 NOTE — PLAN OF CARE
Problem: Potential for Falls  Goal: Patient will remain free of falls  Description: INTERVENTIONS:  - Educate patient/family on patient safety including physical limitations  - Instruct patient to call for assistance with activity   - Consult OT/PT to assist with strengthening/mobility   - Keep Call bell within reach  - Keep bed low and locked with side rails adjusted as appropriate  - Keep care items and personal belongings within reach  - Initiate and maintain comfort rounds  - Make Fall Risk Sign visible to staff  - Offer Toileting every 2 Hours, in advance of need  - Apply yellow socks and bracelet for high fall risk patients  - Consider moving patient to room near nurses station  Outcome: Progressing     Problem: Nutrition/Hydration-ADULT  Goal: Nutrient/Hydration intake appropriate for improving, restoring or maintaining nutritional needs  Description: Monitor and assess patient's nutrition/hydration status for malnutrition  Collaborate with interdisciplinary team and initiate plan and interventions as ordered  Monitor patient's weight and dietary intake as ordered or per policy  Utilize nutrition screening tool and intervene as necessary  Determine patient's food preferences and provide high-protein, high-caloric foods as appropriate       INTERVENTIONS:  - Monitor oral intake, urinary output, labs, and treatment plans  - Assess nutrition and hydration status and recommend course of action  - Evaluate amount of meals eaten  - Assist patient with eating if necessary   - Allow adequate time for meals  - Recommend/ encourage appropriate diets, oral nutritional supplements, and vitamin/mineral supplements  - Order, calculate, and assess calorie counts as needed  - Recommend, monitor, and adjust tube feedings and TPN/PPN based on assessed needs  - Assess need for intravenous fluids  - Provide specific nutrition/hydration education as appropriate  - Include patient/family/caregiver in decisions related to nutrition  Outcome: Progressing     Problem: MOBILITY - ADULT  Goal: Maintain or return to baseline ADL function  Description: INTERVENTIONS:  - Educate patient/family on patient safety including physical limitations  - Instruct patient to call for assistance with activity   - Consult OT/PT to assist with strengthening/mobility   - Keep Call bell within reach  - Keep bed low and locked with side rails adjusted as appropriate  - Keep care items and personal belongings within reach  - Initiate and maintain comfort rounds  - Make Fall Risk Sign visible to staff  - Apply yellow socks and bracelet for high fall risk patients  - Consider moving patient to room near nurses station  Outcome: Progressing  Goal: Maintains/Returns to pre admission functional level  Description: INTERVENTIONS:  - Perform BMAT or MOVE assessment daily    - Set and communicate daily mobility goal to care team and patient/family/caregiver     - Collaborate with rehabilitation services on mobility goals if consulted  - Out of bed for toileting  - Record patient progress and toleration of activity level   Outcome: Progressing     Problem: PAIN - ADULT  Goal: Verbalizes/displays adequate comfort level or baseline comfort level  Description: Interventions:  - Encourage patient to monitor pain and request assistance  - Assess pain using appropriate pain scale  - Administer analgesics based on type and severity of pain and evaluate response  - Implement non-pharmacological measures as appropriate and evaluate response  - Consider cultural and social influences on pain and pain management  - Notify physician/advanced practitioner if interventions unsuccessful or patient reports new pain  Outcome: Progressing     Problem: INFECTION - ADULT  Goal: Absence or prevention of progression during hospitalization  Description: INTERVENTIONS:  - Assess and monitor for signs and symptoms of infection  - Monitor lab/diagnostic results  - Monitor all insertion sites, i e  indwelling lines, tubes, and drains  - Monitor endotracheal if appropriate and nasal secretions for changes in amount and color  - [place] appropriate cooling/warming therapies per order  - Administer medications as ordered  - Instruct and encourage patient and family to use good hand hygiene technique  - Identify and instruct in appropriate isolation precautions for identified infection/condition  Outcome: Progressing     Problem: SAFETY ADULT  Goal: Patient will remain free of falls  Description: INTERVENTIONS:  - Educate patient/family on patient safety including physical limitations  - Instruct patient to call for assistance with activity   - Consult OT/PT to assist with strengthening/mobility   - Keep Call bell within reach  - Keep bed low and locked with side rails adjusted as appropriate  - Keep care items and personal belongings within reach  - Initiate and maintain comfort rounds  - Make Fall Risk Sign visible to staff  - Offer Toileting every 2 Hours, in advance of need  - Apply yellow socks and bracelet for high fall risk patients  - Consider moving patient to room near nurses station  Outcome: Progressing  Goal: Maintain or return to baseline ADL function  Description: INTERVENTIONS:  - Educate patient/family on patient safety including physical limitations  - Instruct patient to call for assistance with activity   - Consult OT/PT to assist with strengthening/mobility   - Keep Call bell within reach  - Keep bed low and locked with side rails adjusted as appropriate  - Keep care items and personal belongings within reach  - Initiate and maintain comfort rounds  - Make Fall Risk Sign visible to staff  - Apply yellow socks and bracelet for high fall risk patients  - Consider moving patient to room near nurses station  Outcome: Progressing  Goal: Maintains/Returns to pre admission functional level  Description: INTERVENTIONS:  - Perform BMAT or MOVE assessment daily    - Set and communicate daily mobility goal to care team and patient/family/caregiver  - Collaborate with rehabilitation services on mobility goals if consulted  - Out of bed for toileting  - Record patient progress and toleration of activity level   Outcome: Progressing     Problem: DISCHARGE PLANNING  Goal: Discharge to home or other facility with appropriate resources  Description: INTERVENTIONS:  - Identify barriers to discharge w/patient and caregiver  - Arrange for needed discharge resources and transportation as appropriate  - Identify discharge learning needs (meds, wound care, etc )  - Arrange for interpretive services to assist at discharge as needed  - Refer to Case Management Department for coordinating discharge planning if the patient needs post-hospital services based on physician/advanced practitioner order or complex needs related to functional status, cognitive ability, or social support system  Outcome: Progressing     Problem: Knowledge Deficit  Goal: Patient/family/caregiver demonstrates understanding of disease process, treatment plan, medications, and discharge instructions  Description: Complete learning assessment and assess knowledge base    Interventions:  - Provide teaching at level of understanding  - Provide teaching via preferred learning methods  Outcome: Progressing

## 2023-03-12 NOTE — CASE MANAGEMENT
Case Management Discharge Planning Note    Patient name Rice Doom  [facility] Ernst Ba* MRN [id number]  : 1950 Date 3/12/2023       Current Admission Date: 3/9/2023  Current Admission Diagnosis:Metastatic renal cell carcinoma [facility])   Patient Active Problem List    Diagnosis Date Noted   • Ambulatory dysfunction 2023   • Hypercalcemia 2023   • Shortness of breath 02/15/2023   • Low serum vitamin B12 2023   • Cardiovascular risk factor 2023   • Foot pain, right 2023   • Foraminal stenosis of cervical region 2023   • Nocturnal hypoxemia 2023   • Large pleural effusion 2023   • HTN (hypertension) 2023   • Neuropathy 2023   • Chronic diastolic (congestive) heart failure ([address] ) 2023   • Stage 3a chronic kidney disease ([address] ) 2023   • H/O left nephrectomy 2023   • Fall at home, initial encounter 2023   • 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 2023   • Acute respiratory failure with hypoxia ([address] ) 2023   • Elevated brain natriuretic peptide (BNP) level 2023   • Elevated d-dimer 2023   • Chronic diarrhea 2023   • COVID-19 2022   • Azotemia 10/31/2019   • Hx of prostatectomy 2019   • History of prostate cancer 2019   • Metastatic renal cell carcinoma ([address] ) 2018   • 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   • Clear cell adenocarcinoma of kidney, 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   • Bone metastases ([address] ) 2018   • Lung metastases ([facility] ) 2018      LOS (days): 1  Geometric Mean LOS (GMLOS) (days):   Days to GMLOS:     OBJECTIVE:  Risk of Unplanned Readmission Score: 19 41         Current admission status: Inpatient   Preferred Pharmacy:   [address] [place] [address]  Phone: [phone] Fax: [address] [place] [address]   [address] [place] [address]  Phone: [phone] Fax: [phone]    [facility] [address] [address]  Phone: [phone] Fax: [phone] [address] [facility], [address]  [address] [place] [address]  [address] [facility] [address]  Phone: [phone] Fax: [phone]    [facility], [address]  [address] [facility] [address]  Phone: [phone] Fax: [phone] Kenregis Ramirez, [address] [place]  [address]   [facility] [address]  Phone: [phone] Fax: [phone] [employer], [address] [place] [address]  [address] [address]  Phone: [phone] Fax: [phone]    Primary Care Provider: Darren Garcia MD    Primary Insurance: MEDICARE  Secondary Insurance: Josette Roge ANDRADE    DISCHARGE DETAILS:    Discharge planning discussed with[de-identified] [facility]) [phone] (M)  Freedom of Choice: Yes  Comments - Freedom of Choice: Pt will discharge home w/ [facility] on Tuesday 3/14/23  CM contacted family/caregiver?: Yes  Were Treatment Team discharge recommendations reviewed with patient/caregiver?: Yes  Did patient/caregiver verbalize understanding of patient care needs?: Yes  Were patient/caregiver advised of the risks associated with not following Treatment Team discharge recommendations?: Yes  Contacts  Patient Contacts: Jessicakimmie Citlali [facility]) [phone] [place]  Relationship to Patient[de-identified] Family  Contact Method: Phone  Phone Number: Ila Godwin [facility]) [phone] [place]  Reason/Outcome: Continuity of Care, Emergency Contact, Referral, Discharge Planning    Discharge Destination Plan[de-identified] Home, Hospice  Transport at Discharge : Automobile  Transfer Mode: Wheelchair  Accompanied by: Family member    IMM Given (Date):: 03/12/23    Family notified[de-identified] Ila Godwin Roberta Nichols) [phone] (M)  Additional Comments: CM spoke with son Stanley Pierce by tc to discuss dcp  Home Hospice discussed  Stanley Pierce states he and the family are prepared to bring Pt home -they will situate Pt in their den where bathroom is available and private entrance is present for family and friends to visit Pt at will  CM connected Hospice liaison w/ son  Existing DME (home O2 set up) will be returned  Liaison ordered home O2 set up and Hospital bed, Over-the-bed-table using Hospice benefit  DME to be delivered to Son's home tomorrow (monday) 3/13/23  Pt will discharge Tuesday and be admitted to [facility] same day  Son reports he will transport Pt home by car, using wheelchair  Liaison to communicate any medication SLIM would order at time of discharge, for purpose of providing comfort medications until Hospice medications are delivered  to Pts home "next day" after admission to Hospice  - Hospice benefit will be covered by Pts medicare  Son Stanley Pierce asked CM about additional assistance, expecting yto pay privately for home health aides -over and above the Hospice HHAs  CM determined Pt is a , and therefore eligible for HHAs through [facility])  CM to call [facility] team [facility] [phone] to request a "Doctor's Consult to [facility] for Home Hospice"  Once the consult is sent, CM to coordinate non-skilled HHAs paid for by the [place]  Weekly benefit is approximately 16hrs /wk in addition to 30 blocks of 8 hour shifts for "respite" that can be layered w/ weekly HHA hours or in blocks of 16-24 hours, pending staffing availabilty  Each 8 hour block is inclusive and cannot be divided into smaller blocks  Each block used is no longer available, even if only part of the 8 blcok was used  CM informed juliane Pierce of this benefit  CM remains available to assist with Pts discahrge to home w/ Hospice

## 2023-03-12 NOTE — PLAN OF CARE
Problem: Potential for Falls  Goal: Patient will remain free of falls  Description: INTERVENTIONS:  - Educate patient/family on patient safety including physical limitations  - Instruct patient to call for assistance with activity   - Consult OT/PT to assist with strengthening/mobility   - Keep Call bell within reach  - Keep bed low and locked with side rails adjusted as appropriate  - Keep care items and personal belongings within reach  - Initiate and maintain comfort rounds  - Make Fall Risk Sign visible to staff  - Offer Toileting every  Hours, in advance of need  - Initiate/Maintain alarm  - Obtain necessary fall risk management equipment:   - Apply yellow socks and bracelet for high fall risk patients  - Consider moving patient to room near nurses station  Outcome: Progressing     Problem: Nutrition/Hydration-ADULT  Goal: Nutrient/Hydration intake appropriate for improving, restoring or maintaining nutritional needs  Description: Monitor and assess patient's nutrition/hydration status for malnutrition  Collaborate with interdisciplinary team and initiate plan and interventions as ordered  Monitor patient's weight and dietary intake as ordered or per policy  Utilize nutrition screening tool and intervene as necessary  Determine patient's food preferences and provide high-protein, high-caloric foods as appropriate       INTERVENTIONS:  - Monitor oral intake, urinary output, labs, and treatment plans  - Assess nutrition and hydration status and recommend course of action  - Evaluate amount of meals eaten  - Assist patient with eating if necessary   - Allow adequate time for meals  - Recommend/ encourage appropriate diets, oral nutritional supplements, and vitamin/mineral supplements  - Order, calculate, and assess calorie counts as needed  - Recommend, monitor, and adjust tube feedings and TPN/PPN based on assessed needs  - Assess need for intravenous fluids  - Provide specific nutrition/hydration education as appropriate  - Include patient/family/caregiver in decisions related to nutrition  Outcome: Progressing     Problem: MOBILITY - ADULT  Goal: Maintain or return to baseline ADL function  Description: INTERVENTIONS:  -  Assess patient's ability to carry out ADLs; assess patient's baseline for ADL function and identify physical deficits which impact ability to perform ADLs (bathing, care of mouth/teeth, toileting, grooming, dressing, etc )  - Assess/evaluate cause of self-care deficits   - Assess range of motion  - Assess patient's mobility; develop plan if impaired  - Assess patient's need for assistive devices and provide as appropriate  - Encourage maximum independence but intervene and supervise when necessary  - Involve family in performance of ADLs  - Assess for home care needs following discharge   - Consider OT consult to assist with ADL evaluation and planning for discharge  - Provide patient education as appropriate  Outcome: Progressing  Goal: Maintains/Returns to pre admission functional level  Description: INTERVENTIONS:  - Perform BMAT or MOVE assessment daily    - Set and communicate daily mobility goal to care team and patient/family/caregiver  - Collaborate with rehabilitation services on mobility goals if consulted  - Perform Range of Motion  times a day  - Reposition patient every  hours    - Dangle patient  times a day  - Stand patient  times a day  - Ambulate patient  times a day  - Out of bed to chair  times a day   - Out of bed for meals  times a day  - Out of bed for toileting  - Record patient progress and toleration of activity level   Outcome: Progressing     Problem: PAIN - ADULT  Goal: Verbalizes/displays adequate comfort level or baseline comfort level  Description: Interventions:  - Encourage patient to monitor pain and request assistance  - Assess pain using appropriate pain scale  - Administer analgesics based on type and severity of pain and evaluate response  - Implement non-pharmacological measures as appropriate and evaluate response  - Consider cultural and social influences on pain and pain management  - Notify physician/advanced practitioner if interventions unsuccessful or patient reports new pain  Outcome: Progressing     Problem: INFECTION - ADULT  Goal: Absence or prevention of progression during hospitalization  Description: INTERVENTIONS:  - Assess and monitor for signs and symptoms of infection  - Monitor lab/diagnostic results  - Monitor all insertion sites, i e  indwelling lines, tubes, and drains  - Monitor endotracheal if appropriate and nasal secretions for changes in amount and color  - [place] appropriate cooling/warming therapies per order  - Administer medications as ordered  - Instruct and encourage patient and family to use good hand hygiene technique  - Identify and instruct in appropriate isolation precautions for identified infection/condition  Outcome: Progressing     Problem: SAFETY ADULT  Goal: Patient will remain free of falls  Description: INTERVENTIONS:  - Educate patient/family on patient safety including physical limitations  - Instruct patient to call for assistance with activity   - Consult OT/PT to assist with strengthening/mobility   - Keep Call bell within reach  - Keep bed low and locked with side rails adjusted as appropriate  - Keep care items and personal belongings within reach  - Initiate and maintain comfort rounds  - Make Fall Risk Sign visible to staff  - Offer Toileting every  Hours, in advance of need  - Initiate/Maintain alarm  - Obtain necessary fall risk management equipment:   - Apply yellow socks and bracelet for high fall risk patients  - Consider moving patient to room near nurses station  Outcome: Progressing  Goal: Maintain or return to baseline ADL function  Description: INTERVENTIONS:  -  Assess patient's ability to carry out ADLs; assess patient's baseline for ADL function and identify physical deficits which impact ability to perform ADLs (bathing, care of mouth/teeth, toileting, grooming, dressing, etc )  - Assess/evaluate cause of self-care deficits   - Assess range of motion  - Assess patient's mobility; develop plan if impaired  - Assess patient's need for assistive devices and provide as appropriate  - Encourage maximum independence but intervene and supervise when necessary  - Involve family in performance of ADLs  - Assess for home care needs following discharge   - Consider OT consult to assist with ADL evaluation and planning for discharge  - Provide patient education as appropriate  Outcome: Progressing  Goal: Maintains/Returns to pre admission functional level  Description: INTERVENTIONS:  - Perform BMAT or MOVE assessment daily    - Set and communicate daily mobility goal to care team and patient/family/caregiver  - Collaborate with rehabilitation services on mobility goals if consulted  - Perform Range of Motion  times a day  - Reposition patient every  hours    - Dangle patient  times a day  - Stand patient  times a day  - Ambulate patient  times a day  - Out of bed to chair  times a day   - Out of bed for meals times a day  - Out of bed for toileting  - Record patient progress and toleration of activity level   Outcome: Progressing     Problem: DISCHARGE PLANNING  Goal: Discharge to home or other facility with appropriate resources  Description: INTERVENTIONS:  - Identify barriers to discharge w/patient and caregiver  - Arrange for needed discharge resources and transportation as appropriate  - Identify discharge learning needs (meds, wound care, etc )  - Arrange for interpretive services to assist at discharge as needed  - Refer to Case Management Department for coordinating discharge planning if the patient needs post-hospital services based on physician/advanced practitioner order or complex needs related to functional status, cognitive ability, or social support system  Outcome: Progressing     Problem: Knowledge Deficit  Goal: Patient/family/caregiver demonstrates understanding of disease process, treatment plan, medications, and discharge instructions  Description: Complete learning assessment and assess knowledge base    Interventions:  - Provide teaching at level of understanding  - Provide teaching via preferred learning methods  Outcome: Progressing

## 2023-03-12 NOTE — PROGRESS NOTES
[address] [place] [address]  Progress Note - Walter Kennedy 1950, 67 y o  male MRN: [id number]  Unit/Bed#: [facility] 68 2 [address] [id number] Encounter: [id number]  Primary Care Provider: Kashif Rodrigez MD   Date and time admitted to hospital: 3/9/2023  7:00 PM    * Metastatic renal cell carcinoma [facility])  Assessment & Plan  Patient has known metastatic renal cell carcinoma with metastasis to lung  He presented with generalized weakness and not being able to function at home by himself    No treatment is offered by oncology that is felt to benefit him    He was seen by palliative care    He wants to go home with hospice    Case management is setting this up  He does need 24-hour caregivers set up, so it may not be until Monday or Tuesday that he gets home    Large pleural effusion  Assessment & Plan  On arrival, patient was noted to have a large pleural effusion  This is likely related to his lung metastasis and renal cell carcinoma  He underwent large-volume thoracentesis    He has never had to have a thoracentesis before    I will repeat the chest x-ray tomorrow to see if it has reaccumulated    If it does reaccumulate quickly, he may need a Pleurx catheter  Subjective:   Feels well  No increased SOB  No increased pain  He is hoping to get home soon  Objective:     Vitals:   Temp (24hrs), Av 7 °F (36 5 °C), Min:97 5 °F (36 4 °C), Max:97 9 °F (36 6 °C)    Temp:  [97 5 °F (36 4 °C)-97 9 °F (36 6 °C)] 97 9 °F (36 6 °C)  HR:  [94-97] 97  Resp:  [16-20] 20  BP: (117-122)/(65-67) 117/65  SpO2:  [96 %-97 %] 96 %  Body mass index is 33 15 kg/m²  Input and Output Summary (last 24 hours): Intake/Output Summary (Last 24 hours) at 3/12/2023 1116  Last data filed at 3/12/2023 0916  Gross per 24 hour   Intake --   Output 175 ml   Net -175 ml       Physical Exam:     Physical Exam  Vitals and nursing note reviewed  HENT:      Head: Normocephalic and atraumatic     Eyes:      Pupils: Pupils are equal, round, and reactive to light  Cardiovascular:      Rate and Rhythm: Normal rate and regular rhythm  Heart sounds: No murmur heard  No friction rub  No gallop  Pulmonary:      Effort: Pulmonary effort is normal       Breath sounds: Normal breath sounds  No wheezing or rales  Abdominal:      General: Bowel sounds are normal       Palpations: Abdomen is soft  Tenderness: There is no abdominal tenderness  Musculoskeletal:      Right lower leg: No edema  Left lower leg: No edema          Additional Data:     Labs:    Results from last 7 days   Lab Units 03/12/23  0521 03/10/23  0533 03/09/23  2018   WBC Thousand/uL 6 13   < > 6 92   HEMOGLOBIN g/dL 10 3*   < > 11 0*   HEMATOCRIT % 35 6*   < > 38 6   PLATELETS Thousands/uL 171   < > 196   NEUTROS PCT %  --   --  72   LYMPHS PCT %  --   --  20   MONOS PCT %  --   --  7   EOS PCT %  --   --  1    < > = values in this interval not displayed       Results from last 7 days   Lab Units 03/12/23  0521   POTASSIUM mmol/L 3 6   CHLORIDE mmol/L 96   CO2 mmol/L 43*   BUN mg/dL 20   CREATININE mg/dL 0 92   CALCIUM mg/dL 10 5*   ALK PHOS U/L 48   ALT U/L 10   AST U/L 18         Results from last 7 days   Lab Units 03/12/23  0735 03/11/23  2058   POC GLUCOSE mg/dl 87 115               * I Have Reviewed All Lab Data     Recent Cultures (last 7 days):     Results from last 7 days   Lab Units 03/10/23  1343   GRAM STAIN RESULT  2+ Polys  No bacteria seen   BODY FLUID CULTURE, STERILE  No growth         Last 24 Hours Medication List:   Current Facility-Administered Medications   Medication Dose Route Frequency Provider Last Rate   • acetaminophen  650 mg Oral Q6H PRN Roe Primrose, DO     • aluminum-magnesium hydroxide-simethicone  30 mL Oral Q6H PRN Roe Primrose, DO     • aspirin  81 mg Oral Daily Ozzy Jewelene Amend, DO     • enoxaparin  40 mg Subcutaneous Daily Ozzy Jewelene Amend, DO     • furosemide  40 mg Oral Daily Ozzy Jewelene Amend, DO • loperamide  2 mg Oral 4x Daily PRN Cloyde Linea Prechtel, DO     • EMILIA ANTIFUNGAL   Topical BID Cloyde Linea Prechtel, DO     • ondansetron  4 mg Intravenous Q6H PRN Ebb Prime, DO     • simethicone  80 mg Oral 4x Daily PRN Ebb Prime, DO           VTE Pharmacologic Prophylaxis:   Pharmacologic: Enoxaparin (Lovenox)      Current Length of Stay: 1 day(s)    Current Patient Status: Inpatient       Discharge Plan: case management is working to set up home hospice  He is ready for discharge whenever this is ready  Code Status: Level 3 - DNAR and DNI           Today, Patient Was Seen By: Paige Murillo DO    ** Please Note: Dictation voice to text software may have been used in the creation of this document   **

## 2023-03-13 ENCOUNTER — APPOINTMENT (INPATIENT)
Dept: RADIOLOGY | Facility: HOSPITAL | Age: 73
End: 2023-03-13

## 2023-03-13 PROBLEM — E44.0 MODERATE PROTEIN-CALORIE MALNUTRITION (HCC): Status: ACTIVE | Noted: 2023-01-01

## 2023-03-13 LAB
BACTERIA SPEC BFLD CULT: NO GROWTH
FLUAV RNA RESP QL NAA+PROBE: NEGATIVE
FLUBV RNA RESP QL NAA+PROBE: NEGATIVE
GRAM STN SPEC: NORMAL
GRAM STN SPEC: NORMAL
RSV RNA RESP QL NAA+PROBE: NEGATIVE
SARS-COV-2 RNA RESP QL NAA+PROBE: NEGATIVE

## 2023-03-13 RX ADMIN — ENOXAPARIN SODIUM 40 MG: 100 INJECTION SUBCUTANEOUS at 08:19

## 2023-03-13 RX ADMIN — FUROSEMIDE 40 MG: 40 TABLET ORAL at 08:19

## 2023-03-13 RX ADMIN — ASPIRIN 81 MG: 81 TABLET, COATED ORAL at 08:19

## 2023-03-13 RX ADMIN — LOPERAMIDE HYDROCHLORIDE 2 MG: 2 CAPSULE ORAL at 10:16

## 2023-03-13 RX ADMIN — MICONAZOLE NITRATE: 20 CREAM TOPICAL at 17:22

## 2023-03-13 RX ADMIN — MICONAZOLE NITRATE: 20 CREAM TOPICAL at 08:21

## 2023-03-13 NOTE — ASSESSMENT & PLAN NOTE
Lab Results   Component Value Date    EGFR 82 03/12/2023    EGFR 80 03/11/2023    EGFR 81 03/10/2023    CREATININE 0 92 03/12/2023    CREATININE 0 94 03/11/2023    CREATININE 0 93 03/10/2023   Renal function appears at baseline  Continue diuretics

## 2023-03-13 NOTE — PLAN OF CARE
Problem: Potential for Falls  Goal: Patient will remain free of falls  Description: INTERVENTIONS:  - Educate patient/family on patient safety including physical limitations  - Instruct patient to call for assistance with activity   - Consult OT/PT to assist with strengthening/mobility   - Keep Call bell within reach  - Keep bed low and locked with side rails adjusted as appropriate  - Keep care items and personal belongings within reach  - Initiate and maintain comfort rounds  - Make Fall Risk Sign visible to staff  - Offer Toileting every 2 Hours, in advance of need  - Apply yellow socks and bracelet for high fall risk patients  - Consider moving patient to room near nurses station  Outcome: Progressing     Problem: Nutrition/Hydration-ADULT  Goal: Nutrient/Hydration intake appropriate for improving, restoring or maintaining nutritional needs  Description: Monitor and assess patient's nutrition/hydration status for malnutrition  Collaborate with interdisciplinary team and initiate plan and interventions as ordered  Monitor patient's weight and dietary intake as ordered or per policy  Utilize nutrition screening tool and intervene as necessary  Determine patient's food preferences and provide high-protein, high-caloric foods as appropriate       INTERVENTIONS:  - Monitor oral intake, urinary output, labs, and treatment plans  - Assess nutrition and hydration status and recommend course of action  - Evaluate amount of meals eaten  - Assist patient with eating if necessary   - Allow adequate time for meals  - Recommend/ encourage appropriate diets, oral nutritional supplements, and vitamin/mineral supplements  - Order, calculate, and assess calorie counts as needed  - Recommend, monitor, and adjust tube feedings and TPN/PPN based on assessed needs  - Assess need for intravenous fluids  - Provide specific nutrition/hydration education as appropriate  - Include patient/family/caregiver in decisions related to nutrition  Outcome: Progressing     Problem: MOBILITY - ADULT  Goal: Maintain or return to baseline ADL function  Description: INTERVENTIONS:  - Educate patient/family on patient safety including physical limitations  - Instruct patient to call for assistance with activity   - Consult OT/PT to assist with strengthening/mobility   - Keep Call bell within reach  - Keep bed low and locked with side rails adjusted as appropriate  - Keep care items and personal belongings within reach  - Initiate and maintain comfort rounds  - Make Fall Risk Sign visible to staff  - Apply yellow socks and bracelet for high fall risk patients  - Consider moving patient to room near nurses station  Outcome: Progressing  Goal: Maintains/Returns to pre admission functional level  Description: INTERVENTIONS:  - Perform BMAT or MOVE assessment daily    - Set and communicate daily mobility goal to care team and patient/family/caregiver  - Collaborate with rehabilitation services on mobility goals if consulted  - Perform Range of Motion 4 times a day  - Reposition patient every 2hours    - Dangle patient 4 times a day  - Stand patient 4 times a day  - Ambulate patient 4 times a day  - Out of bed to chair 4 times a day   - Out of bed for meals 3 times a day  - Out of bed for toileting  - Record patient progress and toleration of activity level   Outcome: Progressing     Problem: PAIN - ADULT  Goal: Verbalizes/displays adequate comfort level or baseline comfort level  Description: Interventions:  - Encourage patient to monitor pain and request assistance  - Assess pain using appropriate pain scale  - Administer analgesics based on type and severity of pain and evaluate response  - Implement non-pharmacological measures as appropriate and evaluate response  - Consider cultural and social influences on pain and pain management  - Notify physician/advanced practitioner if interventions unsuccessful or patient reports new pain  Outcome: Progressing     Problem: INFECTION - ADULT  Goal: Absence or prevention of progression during hospitalization  Description: INTERVENTIONS:  - Assess and monitor for signs and symptoms of infection  - Monitor lab/diagnostic results  - Monitor all insertion sites, i e  indwelling lines, tubes, and drains  - Monitor endotracheal if appropriate and nasal secretions for changes in amount and color  - [place] appropriate cooling/warming therapies per order  - Administer medications as ordered  - Instruct and encourage patient and family to use good hand hygiene technique  - Identify and instruct in appropriate isolation precautions for identified infection/condition  Outcome: Progressing     Problem: SAFETY ADULT  Goal: Patient will remain free of falls  Description: INTERVENTIONS:  - Educate patient/family on patient safety including physical limitations  - Instruct patient to call for assistance with activity   - Consult OT/PT to assist with strengthening/mobility   - Keep Call bell within reach  - Keep bed low and locked with side rails adjusted as appropriate  - Keep care items and personal belongings within reach  - Initiate and maintain comfort rounds  - Make Fall Risk Sign visible to staff  - Offer Toileting every 2 Hours, in advance of need  - Apply yellow socks and bracelet for high fall risk patients  - Consider moving patient to room near nurses station  Outcome: Progressing  Goal: Maintain or return to baseline ADL function  Description: INTERVENTIONS:  - Educate patient/family on patient safety including physical limitations  - Instruct patient to call for assistance with activity   - Consult OT/PT to assist with strengthening/mobility   - Keep Call bell within reach  - Keep bed low and locked with side rails adjusted as appropriate  - Keep care items and personal belongings within reach  - Initiate and maintain comfort rounds  - Make Fall Risk Sign visible to staff  - Apply yellow socks and bracelet for high fall risk patients  - Consider moving patient to room near nurses station  Outcome: Progressing  Goal: Maintains/Returns to pre admission functional level  Description: INTERVENTIONS:  - Perform BMAT or MOVE assessment daily    - Set and communicate daily mobility goal to care team and patient/family/caregiver  - Collaborate with rehabilitation services on mobility goals if consulted  - Perform Range of Motion 4 times a day  - Reposition patient every 2 hours  - Dangle patient 4 times a day  - Stand patient 4 times a day  - Ambulate patient 4 times a day  - Out of bed to chair 4 times a day   - Out of bed for meals 3 times a day  - Out of bed for toileting  - Record patient progress and toleration of activity level   Outcome: Progressing     Problem: DISCHARGE PLANNING  Goal: Discharge to home or other facility with appropriate resources  Description: INTERVENTIONS:  - Identify barriers to discharge w/patient and caregiver  - Arrange for needed discharge resources and transportation as appropriate  - Identify discharge learning needs (meds, wound care, etc )  - Arrange for interpretive services to assist at discharge as needed  - Refer to Case Management Department for coordinating discharge planning if the patient needs post-hospital services based on physician/advanced practitioner order or complex needs related to functional status, cognitive ability, or social support system  Outcome: Progressing     Problem: Knowledge Deficit  Goal: Patient/family/caregiver demonstrates understanding of disease process, treatment plan, medications, and discharge instructions  Description: Complete learning assessment and assess knowledge base    Interventions:  - Provide teaching at level of understanding  - Provide teaching via preferred learning methods  Outcome: Progressing

## 2023-03-13 NOTE — PROGRESS NOTES
[facility]  Progress Note - Akosua Albert 1950, 67 y o  male MRN: [id number]  Unit/Bed#: Lilliana Herrmann [facility] [place] 87 224-02 Encounter: [id number]  Primary Care Provider: Joaquín Mulligan MD   Date and time admitted to hospital: 3/9/2023  7:00 PM    * Metastatic renal cell carcinoma [facility])  Assessment & Plan  Patient has known metastatic renal cell carcinoma with metastasis to lung  He presented with generalized weakness and not being able to function at home by himself    Unfortunately not a candidate for treatments per Oncology     Seen by Palliative care    Plan to be discharged home with hospice, likely Wednesday    Case management is working on arranging 24 hour care     Large pleural effusion  Assessment & Plan  On arrival, patient was noted to have a large pleural effusion, likely related to his lung metastasis and renal cell carcinoma  S/p large-volume thoracentesis 3/10/23  No prior thoracentesis    Repeat chest x-ray pending -if significant amount of fluid reaccumulated considerations for placement of Pleurx catheter    Patient currently on room air, without respiratory symptoms    Stage 3a chronic kidney disease [facility])  Assessment & Plan  Lab Results   Component Value Date    EGFR 82 03/12/2023    EGFR 80 03/11/2023    EGFR 81 03/10/2023    CREATININE 0 92 03/12/2023    CREATININE 0 94 03/11/2023    CREATININE 0 93 03/10/2023   Renal function appears at baseline  Continue diuretics    Lung metastases ([address] )  Assessment & Plan  Status post paracentesis 3/10/2023 with no prior thoracentesis  Repeat chest x-ray pending      VTE Pharmacologic Prophylaxis: VTE Score: 5 High Risk (Score >/= 5) - Pharmacological DVT Prophylaxis Ordered: enoxaparin (Lovenox)  Sequential Compression Devices Ordered  Patient Centered Rounds: I performed bedside rounds with nursing staff today    Discussions with Specialists or Other Care Team Provider: Case management    Education and Discussions with Family / Patient: Patient  Total Time Spent on Date of Encounter in care of patient: 55 minutes This time was spent on one or more of the following: performing physical exam; counseling and coordination of care; obtaining or reviewing history; documenting in the medical record; reviewing/ordering tests, medications or procedures; communicating with other healthcare professionals and discussing with patient's family/caregivers  Current Length of Stay: 2 day(s)  Current Patient Status: Inpatient   Certification Statement: The patient will continue to require additional inpatient hospital stay due to Close monitoring, potential need for additional IR procedures, safe discharge planning  Discharge Plan: Anticipate discharge in 48 hrs to home with home services  Code Status: Level 3 - DNAR and DNI    Subjective:   Patient seen and examined  He is complaining of generalized weakness  He voices no other complaints  Denies pain  No overnight events  Objective:     Vitals:   Temp (24hrs), Av 9 °F (36 6 °C), Min:97 6 °F (36 4 °C), Max:98 3 °F (36 8 °C)    Temp:  [97 6 °F (36 4 °C)-98 3 °F (36 8 °C)] 98 3 °F (36 8 °C)  HR:  [] 97  Resp:  [16-19] 19  BP: (114-130)/(68-76) 114/68  SpO2:  [94 %-96 %] 96 %  Body mass index is 32 77 kg/m²  Input and Output Summary (last 24 hours): Intake/Output Summary (Last 24 hours) at 3/13/2023 1339  Last data filed at 3/13/2023 0701  Gross per 24 hour   Intake 420 ml   Output --   Net 420 ml       Physical Exam:   Physical Exam  Constitutional:       General: He is not in acute distress  HENT:      Head: Normocephalic and atraumatic  Nose: No congestion  Eyes:      Conjunctiva/sclera: Conjunctivae normal    Cardiovascular:      Rate and Rhythm: Normal rate and regular rhythm  Pulmonary:      Effort: No respiratory distress  Abdominal:      General: There is no distension  Musculoskeletal:      Right lower leg: No edema  Left lower leg: No edema  Skin:     General: Skin is warm and dry  Neurological:      Mental Status: He is oriented to person, place, and time  Psychiatric:         Mood and Affect: Mood normal           Additional Data:     Labs:  Results from last 7 days   Lab Units 03/12/23  0521 03/10/23  0533 03/09/23  2018   WBC Thousand/uL 6 13   < > 6 92   HEMOGLOBIN g/dL 10 3*   < > 11 0*   HEMATOCRIT % 35 6*   < > 38 6   PLATELETS Thousands/uL 171   < > 196   NEUTROS PCT %  --   --  72   LYMPHS PCT %  --   --  20   MONOS PCT %  --   --  7   EOS PCT %  --   --  1    < > = values in this interval not displayed       Results from last 7 days   Lab Units 03/12/23  0521   SODIUM mmol/L 143   POTASSIUM mmol/L 3 6   CHLORIDE mmol/L 96   CO2 mmol/L 43*   BUN mg/dL 20   CREATININE mg/dL 0 92   ANION GAP mmol/L 4   CALCIUM mg/dL 10 5*   ALBUMIN g/dL 2 8*   TOTAL BILIRUBIN mg/dL 0 52   ALK PHOS U/L 48   ALT U/L 10   AST U/L 18   GLUCOSE RANDOM mg/dL 89         Results from last 7 days   Lab Units 03/12/23  1618 03/12/23  1117 03/12/23  0735 03/11/23  2058   POC GLUCOSE mg/dl 111 125 87 115               Lines/Drains:  Invasive Devices     Peripheral Intravenous Line  Duration           Peripheral IV 03/09/23 Right Antecubital 3 days                  Imaging: Reviewed radiology reports from this admission including: chest xray    Recent Cultures (last 7 days):   Results from last 7 days   Lab Units 03/10/23  1343   GRAM STAIN RESULT  2+ Polys  No bacteria seen   BODY FLUID CULTURE, STERILE  No growth       Last 24 Hours Medication List:   Current Facility-Administered Medications   Medication Dose Route Frequency Provider Last Rate   • acetaminophen  650 mg Oral Q6H PRN Jeri Erazo, DO     • aluminum-magnesium hydroxide-simethicone  30 mL Oral Q6H PRN Jeri Erazo, DO     • aspirin  81 mg Oral Daily Ozzy Elizabeth Shape, DO     • enoxaparin  40 mg Subcutaneous Daily Ozzy Elizabeth Shape, DO     • furosemide  40 mg Oral Daily Ozzy Elizabeth Shape, DO     • loperamide  2 mg Oral 4x Daily PRN Pretty Ny, DO     • EMILIA ANTIFUNGAL   Topical BID Pretty Ny, DO     • ondansetron  4 mg Intravenous Q6H PRN Jazz Copeland DO     • simethicone  80 mg Oral 4x Daily PRN Ozzy Mobley, DO          Today, Patient Was Seen By: Royal Rupert MD    **Please Note: This note may have been constructed using a voice recognition system  **

## 2023-03-13 NOTE — ASSESSMENT & PLAN NOTE
Patient has known metastatic renal cell carcinoma with metastasis to lung  He presented with generalized weakness and not being able to function at home by himself    Unfortunately not a candidate for treatments per Oncology     Seen by Palliative care    Plan to be discharged home with hospice, likely Wednesday    Case management is working on arranging 24 hour care

## 2023-03-13 NOTE — ASSESSMENT & PLAN NOTE
On arrival, patient was noted to have a large pleural effusion, likely related to his lung metastasis and renal cell carcinoma    S/p large-volume thoracentesis 3/10/23  No prior thoracentesis    Repeat chest x-ray pending -if significant amount of fluid reaccumulated considerations for placement of Pleurx catheter    Patient currently on room air, without respiratory symptoms

## 2023-03-14 LAB
ANION GAP SERPL CALCULATED.3IONS-SCNC: 3 MMOL/L (ref 4–13)
BASOPHILS # BLD AUTO: 0.01 THOUSANDS/ÂΜL (ref 0–0.1)
BASOPHILS NFR BLD AUTO: 0 % (ref 0–1)
BUN SERPL-MCNC: 23 MG/DL (ref 5–25)
CALCIUM SERPL-MCNC: 10.7 MG/DL (ref 8.4–10.2)
CHLORIDE SERPL-SCNC: 95 MMOL/L (ref 96–108)
CO2 SERPL-SCNC: 44 MMOL/L (ref 21–32)
CREAT SERPL-MCNC: 0.85 MG/DL (ref 0.6–1.3)
EOSINOPHIL # BLD AUTO: 0.05 THOUSAND/ÂΜL (ref 0–0.61)
EOSINOPHIL NFR BLD AUTO: 1 % (ref 0–6)
ERYTHROCYTE [DISTWIDTH] IN BLOOD BY AUTOMATED COUNT: 16.2 % (ref 11.6–15.1)
GFR SERPL CREATININE-BSD FRML MDRD: 86 ML/MIN/1.73SQ M
GLUCOSE SERPL-MCNC: 96 MG/DL (ref 65–140)
HCT VFR BLD AUTO: 36.9 % (ref 36.5–49.3)
HGB BLD-MCNC: 10.4 G/DL (ref 12–17)
IMM GRANULOCYTES # BLD AUTO: 0.02 THOUSAND/UL (ref 0–0.2)
IMM GRANULOCYTES NFR BLD AUTO: 0 % (ref 0–2)
LYMPHOCYTES # BLD AUTO: 1.32 THOUSANDS/ÂΜL (ref 0.6–4.47)
LYMPHOCYTES NFR BLD AUTO: 19 % (ref 14–44)
MCH RBC QN AUTO: 26.9 PG (ref 26.8–34.3)
MCHC RBC AUTO-ENTMCNC: 28.2 G/DL (ref 31.4–37.4)
MCV RBC AUTO: 95 FL (ref 82–98)
MONOCYTES # BLD AUTO: 0.52 THOUSAND/ÂΜL (ref 0.17–1.22)
MONOCYTES NFR BLD AUTO: 7 % (ref 4–12)
NEUTROPHILS # BLD AUTO: 5.23 THOUSANDS/ÂΜL (ref 1.85–7.62)
NEUTS SEG NFR BLD AUTO: 73 % (ref 43–75)
NRBC BLD AUTO-RTO: 0 /100 WBCS
PLATELET # BLD AUTO: 197 THOUSANDS/UL (ref 149–390)
PMV BLD AUTO: 10.2 FL (ref 8.9–12.7)
POTASSIUM SERPL-SCNC: 3.9 MMOL/L (ref 3.5–5.3)
RBC # BLD AUTO: 3.87 MILLION/UL (ref 3.88–5.62)
SODIUM SERPL-SCNC: 142 MMOL/L (ref 135–147)
WBC # BLD AUTO: 7.15 THOUSAND/UL (ref 4.31–10.16)

## 2023-03-14 RX ORDER — MORPHINE SULFATE 100 MG/5ML
10 SOLUTION, CONCENTRATE ORAL EVERY 2 HOUR PRN
Qty: 30 ML | Refills: 0 | Status: SHIPPED | OUTPATIENT
Start: 2023-03-14 | End: 2023-03-17

## 2023-03-14 RX ORDER — LORAZEPAM 2 MG/ML
1 CONCENTRATE ORAL EVERY 8 HOURS PRN
Qty: 30 ML | Refills: 0 | Status: SHIPPED | OUTPATIENT
Start: 2023-03-14 | End: 2023-03-17

## 2023-03-14 RX ADMIN — ASPIRIN 81 MG: 81 TABLET, COATED ORAL at 08:23

## 2023-03-14 RX ADMIN — MICONAZOLE NITRATE: 20 CREAM TOPICAL at 08:23

## 2023-03-14 RX ADMIN — LOPERAMIDE HYDROCHLORIDE 2 MG: 2 CAPSULE ORAL at 18:10

## 2023-03-14 RX ADMIN — ENOXAPARIN SODIUM 40 MG: 100 INJECTION SUBCUTANEOUS at 08:23

## 2023-03-14 RX ADMIN — MICONAZOLE NITRATE: 20 CREAM TOPICAL at 17:18

## 2023-03-14 NOTE — ASSESSMENT & PLAN NOTE
Wt Readings from Last 3 Encounters:   03/14/23 104 kg (229 lb 11 5 oz)   02/24/23 111 kg (244 lb)   02/20/23 111 kg (244 lb)     Patient is a dry weight although with lower extremity edema  No evidence of DVT on venous duplex  Continue diuretics as previously prescribed  Most recent echo with EF of 65% and grade 1 diastolic dysfunction January 2023

## 2023-03-14 NOTE — ASSESSMENT & PLAN NOTE
Patient has known metastatic renal cell carcinoma with metastasis to lung  He presented with generalized weakness and not being able to function at home by himself    Unfortunately not a candidate for treatments per Oncology     Seen by Palliative care    Plan to be discharged home with hospice on Wednesday    Case management is working on arranging 24 hour care

## 2023-03-14 NOTE — ASSESSMENT & PLAN NOTE
Lab Results   Component Value Date    EGFR 86 03/14/2023    EGFR 82 03/12/2023    EGFR 80 03/11/2023    CREATININE 0 85 03/14/2023    CREATININE 0 92 03/12/2023    CREATININE 0 94 03/11/2023   Renal function appears at baseline

## 2023-03-14 NOTE — PROGRESS NOTES
[address] [facility]  Progress Note - Carmen [facility] 1950, 67 y o  male MRN: [id number]  Unit/Bed#: Lilliana Hickman [facility] 87 224-02 Encounter: [id number]  Primary Care Provider: Janit Hashimoto , MD   Date and time admitted to hospital: 3/9/2023  7:00 PM    * Metastatic renal cell carcinoma [facility])  Assessment & Plan  Patient has known metastatic renal cell carcinoma with metastasis to lung  He presented with generalized weakness and not being able to function at home by himself    Unfortunately not a candidate for treatments per Oncology     Seen by Palliative care    Plan to be discharged home with hospice on Wednesday    Case management is working on arranging 24 hour care     Moderate protein-calorie malnutrition ([facility] [address] )  Assessment & Plan  Malnutrition Findings:   Adult Malnutrition type: Chronic illness  Adult Degree of Malnutrition: Malnutrition of moderate degree  Malnutrition Characteristics: Inadequate energy, Weight loss                  360 Statement: Moderate malnutrition r/t catabolic illness as evidenced by 5% unplanned wt loss since 2/15/23, consuming < 75% energy intake compared to estimated energy needs > 1 month  Treated with Ensure Compact tid    BMI Findings: Body mass index is 32 96 kg/m²  Ambulatory dysfunction  Assessment & Plan  Patient will be discharged home on hospice care    Large pleural effusion  Assessment & Plan  On arrival, patient was noted to have a large pleural effusion, likely related to his lung metastasis and renal cell carcinoma    S/p large-volume thoracentesis 3/10/23  No prior thoracentesis    Repeat chest x-ray shows decreased size of effusion    Patient remains on room air, without respiratory symptoms    Chronic diastolic (congestive) heart failure (HCC)  Assessment & Plan  Wt Readings from Last 3 Encounters:   03/14/23 104 kg (229 lb 11 5 oz)   02/24/23 111 kg (244 lb)   02/20/23 111 kg (244 lb)     Patient is a dry weight although with lower extremity edema  No evidence of DVT on venous duplex  Continue diuretics as previously prescribed  Most recent echo with EF of 65% and grade 1 diastolic dysfunction 2023        Stage 3a chronic kidney disease [facility])  Assessment & Plan  Lab Results   Component Value Date    EGFR 86 2023    EGFR 82 2023    EGFR 80 2023    CREATININE 0 85 2023    CREATININE 0 92 2023    CREATININE 0 94 2023   Renal function appears at baseline      Lung metastases [facility])  Assessment & Plan  Status post paracentesis 3/10/2023 with no prior thoracentesis          VTE Pharmacologic Prophylaxis: VTE Score: 5 High Risk (Score >/= 5) - Pharmacological DVT Prophylaxis Ordered: enoxaparin (Lovenox)  Sequential Compression Devices Ordered  Patient Centered Rounds: I performed bedside rounds with nursing staff today  Discussions with Specialists or Other Care Team Provider: ELAINE      Total Time Spent on Date of Encounter in care of patient: 35 minutes This time was spent on one or more of the following: performing physical exam; counseling and coordination of care; obtaining or reviewing history; documenting in the medical record; reviewing/ordering tests, medications or procedures; communicating with other healthcare professionals and discussing with patient's family/caregivers  Current Length of Stay: 3 day(s)  Current Patient Status: Inpatient   Certification Statement: The patient will continue to require additional inpatient hospital stay due to safe discharge planning  Discharge Plan: Anticipate discharge tomorrow to home  on hospice    Code Status: Level 3 - DNAR and DNI    Subjective:   No acute events reported overnight      Objective:     Vitals:   Temp (24hrs), Av 3 °F (36 8 °C), Min:97 8 °F (36 6 °C), Max:98 7 °F (37 1 °C)    Temp:  [97 8 °F (36 6 °C)-98 7 °F (37 1 °C)] 98 4 °F (36 9 °C)  HR:  [100-105] 104  Resp:  [18-20] 18  BP: (107-145)/(67-72) 107/71  SpO2:  [91 %-97 %] 94 %  Body mass index is 32 96 kg/m²  Input and Output Summary (last 24 hours): Intake/Output Summary (Last 24 hours) at 3/14/2023 1357  Last data filed at 3/14/2023 0911  Gross per 24 hour   Intake 300 ml   Output 300 ml   Net 0 ml       Physical Exam:   Physical Exam  Constitutional:       General: He is not in acute distress  HENT:      Head: Normocephalic and atraumatic  Cardiovascular:      Rate and Rhythm: Normal rate  Pulmonary:      Effort: No respiratory distress  Abdominal:      General: There is no distension  Musculoskeletal:      Right lower leg: Edema present  Left lower leg: Edema present  Skin:     General: Skin is warm and dry  Neurological:      Mental Status: He is oriented to person, place, and time            Additional Data:     Labs:  Results from last 7 days   Lab Units 03/14/23  0450   WBC Thousand/uL 7 15   HEMOGLOBIN g/dL 10 4*   HEMATOCRIT % 36 9   PLATELETS Thousands/uL 197   NEUTROS PCT % 73   LYMPHS PCT % 19   MONOS PCT % 7   EOS PCT % 1     Results from last 7 days   Lab Units 03/14/23  0450 03/12/23  0521   SODIUM mmol/L 142 143   POTASSIUM mmol/L 3 9 3 6   CHLORIDE mmol/L 95* 96   CO2 mmol/L 44* 43*   BUN mg/dL 23 20   CREATININE mg/dL 0 85 0 92   ANION GAP mmol/L 3* 4   CALCIUM mg/dL 10 7* 10 5*   ALBUMIN g/dL  --  2 8*   TOTAL BILIRUBIN mg/dL  --  0 52   ALK PHOS U/L  --  48   ALT U/L  --  10   AST U/L  --  18   GLUCOSE RANDOM mg/dL 96 89         Results from last 7 days   Lab Units 03/12/23  1618 03/12/23  1117 03/12/23  0735 03/11/23  2058   POC GLUCOSE mg/dl 111 125 87 115               Lines/Drains:  Invasive Devices     None                       Imaging: Reviewed radiology reports from this admission including: chest xray and Personally reviewed the following imaging: chest xray    Recent Cultures (last 7 days):   Results from last 7 days   Lab Units 03/10/23  1343   GRAM STAIN RESULT  2+ Polys  No bacteria seen   BODY FLUID CULTURE, STERILE  No growth Last 24 Hours Medication List:   Current Facility-Administered Medications   Medication Dose Route Frequency Provider Last Rate   • acetaminophen  650 mg Oral Q6H PRN Malcolm Sellian, DO     • aluminum-magnesium hydroxide-simethicone  30 mL Oral Q6H PRN Malcolm Sellian, DO     • aspirin  81 mg Oral Daily Ozzy Art Spare, DO     • enoxaparin  40 mg Subcutaneous Daily Ozzy Art Spare, DO     • furosemide  40 mg Oral Daily Ozzy Art Spare, DO     • loperamide  2 mg Oral 4x Daily PRN Yaya Shetty, DO     • EMILIA ANTIFUNGAL   Topical BID Anuradha K[facility]ero [facility], DO     • ondansetron  4 mg Intravenous Q6H PRN Malcolm Gleason, DO     • simethicone  80 mg Oral 4x Daily PRN Ozzy Paredes, DO          Today, Patient Was Seen By: Suni Gaitan MD    **Please Note: This note may have been constructed using a voice recognition system  **

## 2023-03-14 NOTE — CASE MANAGEMENT
Case Management Discharge Planning Note    Patient name Ronda Burnett  Location [facility] 68 2 /South 2 Haley Anderson* MRN [id number]  : 1950 Date 3/14/2023       Current Admission Date: 3/9/2023  Current Admission Diagnosis:Metastatic renal cell carcinoma [facility])   Patient Active Problem List    Diagnosis Date Noted   • Moderate protein-calorie malnutrition ([facility] ) 2023   • Ambulatory dysfunction 2023   • Hypercalcemia 2023   • Shortness of breath 02/15/2023   • Low serum vitamin B12 2023   • Cardiovascular risk factor 2023   • Foot pain, right 2023   • Foraminal stenosis of cervical region 2023   • Nocturnal hypoxemia 2023   • Large pleural effusion 2023   • HTN (hypertension) 2023   • Neuropathy 2023   • Chronic diastolic (congestive) heart failure ([facility] ) 2023   • Stage 3a chronic kidney disease ([facility] ) 2023   • H/O left nephrectomy 2023   • Fall at home, initial encounter 2023   • 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 2023   • Acute respiratory failure with hypoxia ([facility] ) 2023   • Elevated brain natriuretic peptide (BNP) level 2023   • Elevated d-dimer 2023   • Chronic diarrhea 2023   • COVID-19 2022   • Azotemia 10/31/2019   • Hx of prostatectomy 2019   • History of prostate cancer 2019   • Metastatic renal cell carcinoma ([facility] ) 2018   • 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 Fax: [phone]    [facility] [address]  [address] [address]  Phone: [phone] Fax: [phone] [address] [facility], [address]  [address] [place] [address]  [address] [address]  Phone: [phone] Fax: [phone]    [facility]  [place], [address]  [address] [facility] [address]  Phone: [phone] Fax: [phone] [place], [address] [facility]  [address] [facility] [address]  Phone: [phone] Fax: [address] [place] [address]  [address] [address]  Phone: [phone] Fax: [phone]    Primary Care Provider: Riccardo Pickard MD    Primary Insurance: MEDICARE  Secondary Insurance: Sharlot Royal SHIELD    DISCHARGE DETAILS:                                                                                                 Additional Comments: CM spoke with Domi Adrian from [facility] re: HVP and HHA assistance for pt when he returns home with  Hospice- aware anticipated dc date is Wednesday  Currently on 3Lnc and will need a BLS transport home in the evening with Hospice to open thursday a   [facility] to expedite services for pt if qualifies

## 2023-03-14 NOTE — PLAN OF CARE
Problem: Potential for Falls  Goal: Patient will remain free of falls  Description: INTERVENTIONS:  - Educate patient/family on patient safety including physical limitations  - Instruct patient to call for assistance with activity   - Consult OT/PT to assist with strengthening/mobility   - Keep Call bell within reach  - Keep bed low and locked with side rails adjusted as appropriate  - Keep care items and personal belongings within reach  - Initiate and maintain comfort rounds  - Make Fall Risk Sign visible to staff  - Offer Toileting every 2 Hours, in advance of need  - Apply yellow socks and bracelet for high fall risk patients  - Consider moving patient to room near nurses station  Outcome: Progressing     Problem: Nutrition/Hydration-ADULT  Goal: Nutrient/Hydration intake appropriate for improving, restoring or maintaining nutritional needs  Description: Monitor and assess patient's nutrition/hydration status for malnutrition  Collaborate with interdisciplinary team and initiate plan and interventions as ordered  Monitor patient's weight and dietary intake as ordered or per policy  Utilize nutrition screening tool and intervene as necessary  Determine patient's food preferences and provide high-protein, high-caloric foods as appropriate       INTERVENTIONS:  - Monitor oral intake, urinary output, labs, and treatment plans  - Assess nutrition and hydration status and recommend course of action  - Evaluate amount of meals eaten  - Assist patient with eating if necessary   - Allow adequate time for meals  - Recommend/ encourage appropriate diets, oral nutritional supplements, and vitamin/mineral supplements  - Order, calculate, and assess calorie counts as needed  - Recommend, monitor, and adjust tube feedings and TPN/PPN based on assessed needs  - Assess need for intravenous fluids  - Provide specific nutrition/hydration education as appropriate  - Include patient/family/caregiver in decisions related to nutrition  Outcome: Progressing     Problem: MOBILITY - ADULT  Goal: Maintain or return to baseline ADL function  Description: INTERVENTIONS:  - Educate patient/family on patient safety including physical limitations  - Instruct patient to call for assistance with activity   - Consult OT/PT to assist with strengthening/mobility   - Keep Call bell within reach  - Keep bed low and locked with side rails adjusted as appropriate  - Keep care items and personal belongings within reach  - Initiate and maintain comfort rounds  - Make Fall Risk Sign visible to staff  - Apply yellow socks and bracelet for high fall risk patients  - Consider moving patient to room near nurses station  Outcome: Progressing  Goal: Maintains/Returns to pre admission functional level  Description: INTERVENTIONS:  - Perform BMAT or MOVE assessment daily    - Set and communicate daily mobility goal to care team and patient/family/caregiver     - Collaborate with rehabilitation services on mobility goals if consulted  - Out of bed for toileting  - Record patient progress and toleration of activity level   Outcome: Progressing     Problem: PAIN - ADULT  Goal: Verbalizes/displays adequate comfort level or baseline comfort level  Description: Interventions:  - Encourage patient to monitor pain and request assistance  - Assess pain using appropriate pain scale  - Administer analgesics based on type and severity of pain and evaluate response  - Implement non-pharmacological measures as appropriate and evaluate response  - Consider cultural and social influences on pain and pain management  - Notify physician/advanced practitioner if interventions unsuccessful or patient reports new pain  Outcome: Progressing     Problem: INFECTION - ADULT  Goal: Absence or prevention of progression during hospitalization  Description: INTERVENTIONS:  - Assess and monitor for signs and symptoms of infection  - Monitor lab/diagnostic results  - Monitor all insertion sites, i e  indwelling lines, tubes, and drains  - Monitor endotracheal if appropriate and nasal secretions for changes in amount and color  - [place] appropriate cooling/warming therapies per order  - Administer medications as ordered  - Instruct and encourage patient and family to use good hand hygiene technique  - Identify and instruct in appropriate isolation precautions for identified infection/condition  Outcome: Progressing     Problem: SAFETY ADULT  Goal: Patient will remain free of falls  Description: INTERVENTIONS:  - Educate patient/family on patient safety including physical limitations  - Instruct patient to call for assistance with activity   - Consult OT/PT to assist with strengthening/mobility   - Keep Call bell within reach  - Keep bed low and locked with side rails adjusted as appropriate  - Keep care items and personal belongings within reach  - Initiate and maintain comfort rounds  - Make Fall Risk Sign visible to staff  - Offer Toileting every 2 Hours, in advance of need  - Apply yellow socks and bracelet for high fall risk patients  - Consider moving patient to room near nurses station  Outcome: Progressing  Goal: Maintain or return to baseline ADL function  Description: INTERVENTIONS:  - Educate patient/family on patient safety including physical limitations  - Instruct patient to call for assistance with activity   - Consult OT/PT to assist with strengthening/mobility   - Keep Call bell within reach  - Keep bed low and locked with side rails adjusted as appropriate  - Keep care items and personal belongings within reach  - Initiate and maintain comfort rounds  - Make Fall Risk Sign visible to staff  - Apply yellow socks and bracelet for high fall risk patients  - Consider moving patient to room near nurses station  Outcome: Progressing  Goal: Maintains/Returns to pre admission functional level  Description: INTERVENTIONS:  - Perform BMAT or MOVE assessment daily    - Set and communicate daily mobility goal to care team and patient/family/caregiver  - Collaborate with rehabilitation services on mobility goals if consulted    - Out of bed for toileting  - Record patient progress and toleration of activity level   Outcome: Progressing     Problem: DISCHARGE PLANNING  Goal: Discharge to home or other facility with appropriate resources  Description: INTERVENTIONS:  - Identify barriers to discharge w/patient and caregiver  - Arrange for needed discharge resources and transportation as appropriate  - Identify discharge learning needs (meds, wound care, etc )  - Arrange for interpretive services to assist at discharge as needed  - Refer to Case Management Department for coordinating discharge planning if the patient needs post-hospital services based on physician/advanced practitioner order or complex needs related to functional status, cognitive ability, or social support system  Outcome: Progressing     Problem: Knowledge Deficit  Goal: Patient/family/caregiver demonstrates understanding of disease process, treatment plan, medications, and discharge instructions  Description: Complete learning assessment and assess knowledge base    Interventions:  - Provide teaching at level of understanding  - Provide teaching via preferred learning methods  Outcome: Progressing

## 2023-03-14 NOTE — PLAN OF CARE
Problem: Potential for Falls  Goal: Patient will remain free of falls  Description: INTERVENTIONS:  - Educate patient/family on patient safety including physical limitations  - Instruct patient to call for assistance with activity   - Consult OT/PT to assist with strengthening/mobility   - Keep Call bell within reach  - Keep bed low and locked with side rails adjusted as appropriate  - Keep care items and personal belongings within reach  - Initiate and maintain comfort rounds  - Make Fall Risk Sign visible to staff  - Offer Toileting every 2 Hours, in advance of need  - Apply yellow socks and bracelet for high fall risk patients  - Consider moving patient to room near nurses station  Outcome: Progressing     Problem: Nutrition/Hydration-ADULT  Goal: Nutrient/Hydration intake appropriate for improving, restoring or maintaining nutritional needs  Description: Monitor and assess patient's nutrition/hydration status for malnutrition  Collaborate with interdisciplinary team and initiate plan and interventions as ordered  Monitor patient's weight and dietary intake as ordered or per policy  Utilize nutrition screening tool and intervene as necessary  Determine patient's food preferences and provide high-protein, high-caloric foods as appropriate       INTERVENTIONS:  - Monitor oral intake, urinary output, labs, and treatment plans  - Assess nutrition and hydration status and recommend course of action  - Evaluate amount of meals eaten  - Assist patient with eating if necessary   - Allow adequate time for meals  - Recommend/ encourage appropriate diets, oral nutritional supplements, and vitamin/mineral supplements  - Order, calculate, and assess calorie counts as needed  - Recommend, monitor, and adjust tube feedings and TPN/PPN based on assessed needs  - Assess need for intravenous fluids  - Provide specific nutrition/hydration education as appropriate  - Include patient/family/caregiver in decisions related to nutrition  Outcome: Progressing     Problem: MOBILITY - ADULT  Goal: Maintain or return to baseline ADL function  Description: INTERVENTIONS:  - Educate patient/family on patient safety including physical limitations  - Instruct patient to call for assistance with activity   - Consult OT/PT to assist with strengthening/mobility   - Keep Call bell within reach  - Keep bed low and locked with side rails adjusted as appropriate  - Keep care items and personal belongings within reach  - Initiate and maintain comfort rounds  - Make Fall Risk Sign visible to staff  - Apply yellow socks and bracelet for high fall risk patients  - Consider moving patient to room near nurses station  Outcome: Progressing  Goal: Maintains/Returns to pre admission functional level  Description: INTERVENTIONS:  - Perform BMAT or MOVE assessment daily    - Set and communicate daily mobility goal to care team and patient/family/caregiver     - Collaborate with rehabilitation services on mobility goals if consulted  - Dangle patient 4 times a day  - Stand patient 4 times a day  - Ambulate patient 4 times a day  - Out of bed to chair 2 times a day   - Out of bed for meals 2 times a day  - Out of bed for toileting  - Record patient progress and toleration of activity level   Outcome: Progressing     Problem: PAIN - ADULT  Goal: Verbalizes/displays adequate comfort level or baseline comfort level  Description: Interventions:  - Encourage patient to monitor pain and request assistance  - Assess pain using appropriate pain scale  - Administer analgesics based on type and severity of pain and evaluate response  - Implement non-pharmacological measures as appropriate and evaluate response  - Consider cultural and social influences on pain and pain management  - Notify physician/advanced practitioner if interventions unsuccessful or patient reports new pain  Outcome: Progressing     Problem: INFECTION - ADULT  Goal: Absence or prevention of progression during hospitalization  Description: INTERVENTIONS:  - Assess and monitor for signs and symptoms of infection  - Monitor lab/diagnostic results  - Monitor all insertion sites, i e  indwelling lines, tubes, and drains  - Monitor endotracheal if appropriate and nasal secretions for changes in amount and color  - [place] appropriate cooling/warming therapies per order  - Administer medications as ordered  - Instruct and encourage patient and family to use good hand hygiene technique  - Identify and instruct in appropriate isolation precautions for identified infection/condition  Outcome: Progressing     Problem: SAFETY ADULT  Goal: Patient will remain free of falls  Description: INTERVENTIONS:  - Educate patient/family on patient safety including physical limitations  - Instruct patient to call for assistance with activity   - Consult OT/PT to assist with strengthening/mobility   - Keep Call bell within reach  - Keep bed low and locked with side rails adjusted as appropriate  - Keep care items and personal belongings within reach  - Initiate and maintain comfort rounds  - Make Fall Risk Sign visible to staff  - Offer Toileting every 2 Hours, in advance of need  - Apply yellow socks and bracelet for high fall risk patients  - Consider moving patient to room near nurses station  Outcome: Progressing  Goal: Maintain or return to baseline ADL function  Description: INTERVENTIONS:  - Educate patient/family on patient safety including physical limitations  - Instruct patient to call for assistance with activity   - Consult OT/PT to assist with strengthening/mobility   - Keep Call bell within reach  - Keep bed low and locked with side rails adjusted as appropriate  - Keep care items and personal belongings within reach  - Initiate and maintain comfort rounds  - Make Fall Risk Sign visible to staff  - Apply yellow socks and bracelet for high fall risk patients  - Consider moving patient to room near nurses station  Outcome: Progressing  Goal: Maintains/Returns to pre admission functional level  Description: INTERVENTIONS:  - Perform BMAT or MOVE assessment daily    - Set and communicate daily mobility goal to care team and patient/family/caregiver  - Collaborate with rehabilitation services on mobility goals if consulted  - Dangle patient 4 times a day  - Stand patient 4 times a day  - Ambulate patient 4 times a day  - Out of bed to chair 2 times a day   - Out of bed for meals 2 times a day  - Out of bed for toileting  - Record patient progress and toleration of activity level   Outcome: Progressing     Problem: DISCHARGE PLANNING  Goal: Discharge to home or other facility with appropriate resources  Description: INTERVENTIONS:  - Identify barriers to discharge w/patient and caregiver  - Arrange for needed discharge resources and transportation as appropriate  - Identify discharge learning needs (meds, wound care, etc )  - Arrange for interpretive services to assist at discharge as needed  - Refer to Case Management Department for coordinating discharge planning if the patient needs post-hospital services based on physician/advanced practitioner order or complex needs related to functional status, cognitive ability, or social support system  Outcome: Progressing     Problem: Knowledge Deficit  Goal: Patient/family/caregiver demonstrates understanding of disease process, treatment plan, medications, and discharge instructions  Description: Complete learning assessment and assess knowledge base    Interventions:  - Provide teaching at level of understanding  - Provide teaching via preferred learning methods  Outcome: Progressing

## 2023-03-14 NOTE — ASSESSMENT & PLAN NOTE
On arrival, patient was noted to have a large pleural effusion, likely related to his lung metastasis and renal cell carcinoma    S/p large-volume thoracentesis 3/10/23  No prior thoracentesis    Repeat chest x-ray shows decreased size of effusion    Patient remains on room air, without respiratory symptoms

## 2023-03-14 NOTE — PLAN OF CARE
Problem: Potential for Falls  Goal: Patient will remain free of falls  Description: INTERVENTIONS:  - Educate patient/family on patient safety including physical limitations  - Instruct patient to call for assistance with activity   - Consult OT/PT to assist with strengthening/mobility   - Keep Call bell within reach  - Keep bed low and locked with side rails adjusted as appropriate  - Keep care items and personal belongings within reach  - Initiate and maintain comfort rounds  - Make Fall Risk Sign visible to staff  - Offer Toileting every 2 Hours, in advance of need  - Apply yellow socks and bracelet for high fall risk patients  - Consider moving patient to room near nurses station  Outcome: Progressing     Problem: Nutrition/Hydration-ADULT  Goal: Nutrient/Hydration intake appropriate for improving, restoring or maintaining nutritional needs  Description: Monitor and assess patient's nutrition/hydration status for malnutrition  Collaborate with interdisciplinary team and initiate plan and interventions as ordered  Monitor patient's weight and dietary intake as ordered or per policy  Utilize nutrition screening tool and intervene as necessary  Determine patient's food preferences and provide high-protein, high-caloric foods as appropriate       INTERVENTIONS:  - Monitor oral intake, urinary output, labs, and treatment plans  - Assess nutrition and hydration status and recommend course of action  - Evaluate amount of meals eaten  - Assist patient with eating if necessary   - Allow adequate time for meals  - Recommend/ encourage appropriate diets, oral nutritional supplements, and vitamin/mineral supplements  - Order, calculate, and assess calorie counts as needed  - Recommend, monitor, and adjust tube feedings and TPN/PPN based on assessed needs  - Assess need for intravenous fluids  - Provide specific nutrition/hydration education as appropriate  - Include patient/family/caregiver in decisions related to nutrition  Outcome: Progressing     Problem: MOBILITY - ADULT  Goal: Maintain or return to baseline ADL function  Description: INTERVENTIONS:  - Educate patient/family on patient safety including physical limitations  - Instruct patient to call for assistance with activity   - Consult OT/PT to assist with strengthening/mobility   - Keep Call bell within reach  - Keep bed low and locked with side rails adjusted as appropriate  - Keep care items and personal belongings within reach  - Initiate and maintain comfort rounds  - Make Fall Risk Sign visible to staff  - Apply yellow socks and bracelet for high fall risk patients  - Consider moving patient to room near nurses station  Outcome: Progressing  Goal: Maintains/Returns to pre admission functional level  Description: INTERVENTIONS:  - Perform BMAT or MOVE assessment daily    - Set and communicate daily mobility goal to care team and patient/family/caregiver     - Collaborate with rehabilitation services on mobility goals if consulted  - Dangle patient 4 times a day  - Stand patient 4 times a day  - Ambulate patient 3 times a day  - Out of bed to chair 2 times a day   - Out of bed for meals 2 times a day  - Out of bed for toileting  - Record patient progress and toleration of activity level   Outcome: Progressing     Problem: PAIN - ADULT  Goal: Verbalizes/displays adequate comfort level or baseline comfort level  Description: Interventions:  - Encourage patient to monitor pain and request assistance  - Assess pain using appropriate pain scale  - Administer analgesics based on type and severity of pain and evaluate response  - Implement non-pharmacological measures as appropriate and evaluate response  - Consider cultural and social influences on pain and pain management  - Notify physician/advanced practitioner if interventions unsuccessful or patient reports new pain  Outcome: Progressing     Problem: INFECTION - ADULT  Goal: Absence or prevention of progression during hospitalization  Description: INTERVENTIONS:  - Assess and monitor for signs and symptoms of infection  - Monitor lab/diagnostic results  - Monitor all insertion sites, i e  indwelling lines, tubes, and drains  - Monitor endotracheal if appropriate and nasal secretions for changes in amount and color  - [place] appropriate cooling/warming therapies per order  - Administer medications as ordered  - Instruct and encourage patient and family to use good hand hygiene technique  - Identify and instruct in appropriate isolation precautions for identified infection/condition  Outcome: Progressing     Problem: SAFETY ADULT  Goal: Patient will remain free of falls  Description: INTERVENTIONS:  - Educate patient/family on patient safety including physical limitations  - Instruct patient to call for assistance with activity   - Consult OT/PT to assist with strengthening/mobility   - Keep Call bell within reach  - Keep bed low and locked with side rails adjusted as appropriate  - Keep care items and personal belongings within reach  - Initiate and maintain comfort rounds  - Make Fall Risk Sign visible to staff  - Offer Toileting every 2 Hours, in advance of need  - Apply yellow socks and bracelet for high fall risk patients  - Consider moving patient to room near nurses station  Outcome: Progressing  Goal: Maintain or return to baseline ADL function  Description: INTERVENTIONS:  - Educate patient/family on patient safety including physical limitations  - Instruct patient to call for assistance with activity   - Consult OT/PT to assist with strengthening/mobility   - Keep Call bell within reach  - Keep bed low and locked with side rails adjusted as appropriate  - Keep care items and personal belongings within reach  - Initiate and maintain comfort rounds  - Make Fall Risk Sign visible to staff  - Apply yellow socks and bracelet for high fall risk patients  - Consider moving patient to room near nurses station  Outcome: Progressing  Goal: Maintains/Returns to pre admission functional level  Description: INTERVENTIONS:  - Perform BMAT or MOVE assessment daily    - Set and communicate daily mobility goal to care team and patient/family/caregiver  - Collaborate with rehabilitation services on mobility goals if consulted  - Dangle patient 4 times a day  - Stand patient 4 times a day  - Ambulate patient 3 times a day  - Out of bed to chair 2 times a day   - Out of bed for meals 2 times a day  - Out of bed for toileting    - Record patient progress and toleration of activity level   Outcome: Progressing     Problem: DISCHARGE PLANNING  Goal: Discharge to home or other facility with appropriate resources  Description: INTERVENTIONS:  - Identify barriers to discharge w/patient and caregiver  - Arrange for needed discharge resources and transportation as appropriate  - Identify discharge learning needs (meds, wound care, etc )  - Arrange for interpretive services to assist at discharge as needed  - Refer to Case Management Department for coordinating discharge planning if the patient needs post-hospital services based on physician/advanced practitioner order or complex needs related to functional status, cognitive ability, or social support system  Outcome: Progressing     Problem: Knowledge Deficit  Goal: Patient/family/caregiver demonstrates understanding of disease process, treatment plan, medications, and discharge instructions  Description: Complete learning assessment and assess knowledge base    Interventions:  - Provide teaching at level of understanding  - Provide teaching via preferred learning methods  Outcome: Progressing

## 2023-03-14 NOTE — ASSESSMENT & PLAN NOTE
Malnutrition Findings:   Adult Malnutrition type: Chronic illness  Adult Degree of Malnutrition: Malnutrition of moderate degree  Malnutrition Characteristics: Inadequate energy, Weight loss                  360 Statement: Moderate malnutrition r/t catabolic illness as evidenced by 5% unplanned wt loss since 2/15/23, consuming < 75% energy intake compared to estimated energy needs > 1 month  Treated with Ensure Compact tid    BMI Findings: Body mass index is 32 96 kg/m²

## 2023-03-14 NOTE — OCCUPATIONAL THERAPY NOTE
Occupational Therapy         Patient Name: Kevin Rojas  [id number] Date: 3/14/2023      Pt with plans to be transferred home with hospice care  Will d/c OT services at this time 2* pt being transferred to hospice   Padma Johnston

## 2023-03-14 NOTE — CASE MANAGEMENT
Case Management Discharge Planning Note    Patient name Mari Jacobs  Location [facility] 2 /[facility] 2 Peggy Morton* MRN [id number]  : 1950 Date 3/14/2023       Current Admission Date: 3/9/2023  Current Admission Diagnosis:Metastatic renal cell carcinoma [facility])   Patient Active Problem List    Diagnosis Date Noted   • Moderate protein-calorie malnutrition ([facility] ) 2023   • Ambulatory dysfunction 2023   • Hypercalcemia 2023   • Shortness of breath 02/15/2023   • Low serum vitamin B12 2023   • Cardiovascular risk factor 2023   • Foot pain, right 2023   • Foraminal stenosis of cervical region 2023   • Nocturnal hypoxemia 2023   • Large pleural effusion 2023   • HTN (hypertension) 2023   • Neuropathy 2023   • Chronic diastolic (congestive) heart failure ([facility] ) 2023   • Stage 3a chronic kidney disease ([facility] ) 2023   • H/O left nephrectomy 2023   • Fall at home, initial encounter 2023   • 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 2023   • Acute respiratory failure with hypoxia ([facility] [address] ) 2023   • Elevated brain natriuretic peptide (BNP) level 2023   • Elevated d-dimer 2023   • Chronic diarrhea 2023   • COVID-19 2022   • Azotemia 10/31/2019   • Hx of prostatectomy 2019   • History of prostate cancer 2019   • Metastatic renal cell carcinoma ([facility] [facility] ) 2018   • Spine metastasis ([facility] ) 2018   • Clear cell adenocarcinoma of kidney, left ([facility] ) 2018   • Bone metastases ([facility] [address] ) 2018   • Lung metastases ([facility] [address] ) 2018      LOS (days): 3  Geometric Mean LOS (GMLOS) (days): 3 20  Days to GMLOS:0 1     OBJECTIVE:  Risk of Unplanned Readmission Score: 19 91         Current admission status: Inpatient   Preferred Pharmacy:   [facility], [address] [facility] [address]  Phone: [phone] Fax: [phone]    Primary Care Provider: Zeyad Casarez MD    Primary Insurance: MEDICARE  Secondary Insurance: [address]    DISCHARGE DETAILS:                           Contacts  [facility] Contacts: [facility] [phone] (M)  Relationship to Patient[de-identified] Family  Contact Method: Phone  Phone Number: [phone] Poli Melendez  Reason/Outcome: Discharge Planning                   Would you like to participate in our [facility]  [employer] service program?  : No - Declined    Treatment Team Recommendation: Home, Hospice  Discharge Destination Plan[de-identified] Home, Hospice ([address])  Transport at Discharge : Auto with designated         Transported by [employer] and Unit #): son and grandson- to bring portable O2 w/ on dc  ETA of Transport (Date): 03/15/23 (after son done with work)                 IMM Given (Date):: 03/14/23  IMM Given to[de-identified] Family (Osmani Chase via phone)     Additional Comments: CM called verifying receipt of DME needed for d/c- verified home and portable O2 in the home as well  Attending to send dc meds to [facility] this evening so family can obtain these this evening in preparation to bring pt home tomorrow evening once son is finished with work

## 2023-03-15 VITALS
OXYGEN SATURATION: 94 % | BODY MASS INDEX: 33.02 KG/M2 | DIASTOLIC BLOOD PRESSURE: 68 MMHG | SYSTOLIC BLOOD PRESSURE: 124 MMHG | RESPIRATION RATE: 18 BRPM | TEMPERATURE: 99.5 F | HEART RATE: 104 BPM | WEIGHT: 230.16 LBS

## 2023-03-15 RX ADMIN — ASPIRIN 81 MG: 81 TABLET, COATED ORAL at 08:54

## 2023-03-15 RX ADMIN — FUROSEMIDE 40 MG: 40 TABLET ORAL at 08:54

## 2023-03-15 RX ADMIN — ENOXAPARIN SODIUM 40 MG: 100 INJECTION SUBCUTANEOUS at 08:54

## 2023-03-15 RX ADMIN — MICONAZOLE NITRATE: 20 CREAM TOPICAL at 08:56

## 2023-03-15 RX ADMIN — LOPERAMIDE HYDROCHLORIDE 2 MG: 2 CAPSULE ORAL at 08:55

## 2023-03-15 NOTE — PHYSICAL THERAPY NOTE
Physical Therapy Cancellation Note    PT session cancelled as patient is [facility] home today evening and going home on hospice

## 2023-03-15 NOTE — ASSESSMENT & PLAN NOTE
Hypercalcemia of malignancy, mild  Hold Vit D supplements, and Calcium supplements  Continue Lasix on discharge  Patient transitioning to hospice care  Lab Results   Component Value Date    CALCIUM 10 7 (H) 03/14/2023    CALCIUM 10 5 (H) 03/12/2023

## 2023-03-15 NOTE — PLAN OF CARE
Problem: Potential for Falls  Goal: Patient will remain free of falls  Description: INTERVENTIONS:  - Educate patient/family on patient safety including physical limitations  - Instruct patient to call for assistance with activity   - Consult OT/PT to assist with strengthening/mobility   - Keep Call bell within reach  - Keep bed low and locked with side rails adjusted as appropriate  - Keep care items and personal belongings within reach  - Initiate and maintain comfort rounds  - Make Fall Risk Sign visible to staff  - Offer Toileting every 2 Hours, in advance of need  - Apply yellow socks and bracelet for high fall risk patients  - Consider moving patient to room near nurses station  Outcome: Progressing     Problem: Nutrition/Hydration-ADULT  Goal: Nutrient/Hydration intake appropriate for improving, restoring or maintaining nutritional needs  Description: Monitor and assess patient's nutrition/hydration status for malnutrition  Collaborate with interdisciplinary team and initiate plan and interventions as ordered  Monitor patient's weight and dietary intake as ordered or per policy  Utilize nutrition screening tool and intervene as necessary  Determine patient's food preferences and provide high-protein, high-caloric foods as appropriate       INTERVENTIONS:  - Monitor oral intake, urinary output, labs, and treatment plans  - Assess nutrition and hydration status and recommend course of action  - Evaluate amount of meals eaten  - Assist patient with eating if necessary   - Allow adequate time for meals  - Recommend/ encourage appropriate diets, oral nutritional supplements, and vitamin/mineral supplements  - Order, calculate, and assess calorie counts as needed  - Recommend, monitor, and adjust tube feedings and TPN/PPN based on assessed needs  - Assess need for intravenous fluids  - Provide specific nutrition/hydration education as appropriate  - Include patient/family/caregiver in decisions related to nutrition  Outcome: Progressing     Problem: MOBILITY - ADULT  Goal: Maintain or return to baseline ADL function  Description: INTERVENTIONS:  - Educate patient/family on patient safety including physical limitations  - Instruct patient to call for assistance with activity   - Consult OT/PT to assist with strengthening/mobility   - Keep Call bell within reach  - Keep bed low and locked with side rails adjusted as appropriate  - Keep care items and personal belongings within reach  - Initiate and maintain comfort rounds  - Make Fall Risk Sign visible to staff  - Apply yellow socks and bracelet for high fall risk patients  - Consider moving patient to room near nurses station  Outcome: Progressing     Problem: PAIN - ADULT  Goal: Verbalizes/displays adequate comfort level or baseline comfort level  Description: Interventions:  - Encourage patient to monitor pain and request assistance  - Assess pain using appropriate pain scale  - Administer analgesics based on type and severity of pain and evaluate response  - Implement non-pharmacological measures as appropriate and evaluate response  - Consider cultural and social influences on pain and pain management  - Notify physician/advanced practitioner if interventions unsuccessful or patient reports new pain  Outcome: Progressing     Problem: INFECTION - ADULT  Goal: Absence or prevention of progression during hospitalization  Description: INTERVENTIONS:  - Assess and monitor for signs and symptoms of infection  - Monitor lab/diagnostic results  - Monitor all insertion sites, i e  indwelling lines, tubes, and drains  - Monitor endotracheal if appropriate and nasal secretions for changes in amount and color  - Anderson appropriate cooling/warming therapies per order  - Administer medications as ordered  - Instruct and encourage patient and family to use good hand hygiene technique  - Identify and instruct in appropriate isolation precautions for identified infection/condition  Outcome: Progressing

## 2023-03-15 NOTE — DISCHARGE SUMMARY
[facility]  Discharge- Mari Jacobs 1950, 67 y o  male MRN: [id number]  Unit/Bed#: Lilliana Herrmann michael [facility] 87 224-02 Encounter: [id number]  Primary Care Provider: Minerva Hughes MD   Date and time admitted to hospital: 3/9/2023  7:00 PM    * Metastatic renal cell carcinoma [facility])  Assessment & Plan  Patient has known metastatic renal cell carcinoma with metastasis to lung  He presented with generalized weakness and not being able to function at home by himself    Unfortunately not a candidate for treatments per Oncology     Seen by Palliative care    Discharged home with hospice care    Case management arrangements appreciated      Moderate protein-calorie malnutrition ([address] )  Assessment & Plan  Malnutrition Findings:   Adult Malnutrition type: Chronic illness  Adult Degree of Malnutrition: Malnutrition of moderate degree  Malnutrition Characteristics: Inadequate energy, Weight loss                  360 Statement: Moderate malnutrition r/t catabolic illness as evidenced by 5% unplanned wt loss since 2/15/23, consuming < 75% energy intake compared to estimated energy needs > 1 month  Treated with Ensure Compact tid    BMI Findings: Body mass index is 33 02 kg/m²  Hypercalcemia  Assessment & Plan  Hypercalcemia of malignancy, mild  Hold Vit D supplements, and Calcium supplements  Continue Lasix on discharge  Patient transitioning to hospice care  Lab Results   Component Value Date    CALCIUM 10 7 (H) 03/14/2023    CALCIUM 10 5 (H) 03/12/2023         Ambulatory dysfunction  Assessment & Plan  Patient will be discharged home on hospice care    Large pleural effusion  Assessment & Plan  On arrival, patient was noted to have a large pleural effusion, likely related to his lung metastasis and renal cell carcinoma    S/p large-volume thoracentesis 3/10/23  No prior thoracentesis    Repeat chest x-ray shows decreased size of effusion    Patient remains on room air, without respiratory symptoms    Chronic diastolic (congestive) heart failure (HCC)  Assessment & Plan  Wt Readings from Last 3 Encounters:   03/15/23 104 kg (230 lb 2 6 oz)   02/24/23 111 kg (244 lb)   02/20/23 111 kg (244 lb)     Patient is at dry weight although with nild lower extremity edema  No evidence of DVT on venous duplex  Continue diuretics as previously prescribed  Most recent echo with EF of 65% and grade 1 diastolic dysfunction January 2023        Stage 3a chronic kidney disease [facility])  Assessment & Plan  Lab Results   Component Value Date    EGFR 86 03/14/2023    EGFR 82 03/12/2023    EGFR 80 03/11/2023    CREATININE 0 85 03/14/2023    CREATININE 0 92 03/12/2023    CREATININE 0 94 03/11/2023   Renal function remains at baseline      Lung metastases [facility])  Assessment & Plan  Status post paracentesis 3/10/2023 with no prior thoracentesis          Medical Problems     Resolved Problems  Date Reviewed: 3/15/2023   None       Discharging Physician / Practitioner: Eva Owusu MD  PCP: Erasmo Tran MD  Admission Date:   Admission Orders (From admission, onward)     Ordered        03/11/23 0958  Inpatient Admission  Once            03/09/23 2130  Place in Observation  Once                      Discharge Date: 03/15/23    Consultations During Hospital Stay:  · Palliative care     Procedures Performed:   XR chest pa & lateral   Final Result by Gretel Wallis MD (03/13 1630)      Left basilar opacity with obscuration left Dome of diaphragm likely due to small effusion / atelectasis   No worsening seen   No pneumothorax            Workstation performed: [id number]         VAS lower limb venous duplex study, complete bilateral   Final Result by Brandon Peng DO (03/10 1223)      XR chest pa & lateral   Final Result by Amelia Story MD (03/10 1543)      Slightly increased moderate left and small right pleural effusions and bibasilar atelectasis              Workstation performed: [id number]         CT head without contrast   Final Result by Adam Isidro DO (03/09 2104)      No acute intracranial abnormality  Workstation performed: [id number]         IR IN-Patient Thoracentesis    (Results Pending)         Significant Findings / Test Results:   Results from last 7 days   Lab Units 03/14/23  0450   WBC Thousand/uL 7 15   HEMOGLOBIN g/dL 10 4*   HEMATOCRIT % 36 9   PLATELETS Thousands/uL 197     Results from last 7 days   Lab Units 03/14/23  0450   SODIUM mmol/L 142   POTASSIUM mmol/L 3 9   CHLORIDE mmol/L 95*   CO2 mmol/L 44*   BUN mg/dL 23   CREATININE mg/dL 0 85   CALCIUM mg/dL 10 7*       Test Results Pending at Discharge (will require follow up): · None     Outpatient Tests Requested:  · None    Complications:  None    Reason for Admission: altered mental status, "fog"     Hospital Course:   Daphney Head is a 67 y o  male patient with metastatic renal cell carcinoma who originally presented to the hospital on 3/9/2023 due to reported altered mental status  Patient underwent a thoracentesis remaining at a stable respiratory status following procedure  The patient remained stable and without pain or distress  Due to limited prognosis and no additional cancer treatment options, the patient elected to be discharged home on hospice  He was discharged in a stable condition  Comfort medications provided prior to discharge  Please see above list of diagnoses and related plan for additional information  Condition at Discharge: stable    Discharge Day Visit / Exam:   Subjective: Patient seen and examined  He does not voice complaints  He is awaiting discharge home on hospice    Vitals: Blood Pressure: 124/68 (03/15/23 0737)  Pulse: 104 (03/15/23 0737)  Temperature: 99 5 °F (37 5 °C) (03/15/23 0737)  Temp Source: Oral (03/15/23 0737)  Respirations: 18 (03/15/23 0737)  Weight - Scale: 104 kg (230 lb 2 6 oz) (03/15/23 0600)  SpO2: 94 % (03/15/23 0737)  Exam:   Physical Exam  Constitutional: General: He is not in acute distress  HENT:      Head: Normocephalic and atraumatic  Nose: No congestion  Eyes:      Conjunctiva/sclera: Conjunctivae normal    Cardiovascular:      Rate and Rhythm: Normal rate  Pulmonary:      Effort: No respiratory distress  Breath sounds: No wheezing or rales  Abdominal:      General: There is no distension  Tenderness: There is no abdominal tenderness  There is no guarding  Musculoskeletal:      Right lower leg: Edema (mild b/l) present  Left lower leg: Edema present  Skin:     General: Skin is warm and dry  Neurological:      Mental Status: He is oriented to person, place, and time  Psychiatric:         Mood and Affect: Mood normal           Discussion with Family: will update at bedside    Discharge instructions/Information to patient and family:   See after visit summary for information provided to patient and family  Provisions for Follow-Up Care:  See after visit summary for information related to follow-up care and any pertinent home health orders  Disposition:   Home on hospice    Planned Readmission:      Discharge Statement:  I spent 35 minutes discharging the patient  This time was spent on the day of discharge  I had direct contact with the patient on the day of discharge  Greater than 50% of the total time was spent examining patient, answering all patient questions, arranging and discussing plan of care with patient as well as directly providing post-discharge instructions  Additional time then spent on discharge activities  Discharge Medications:  See after visit summary for reconciled discharge medications provided to patient and/or family        **Please Note: This note may have been constructed using a voice recognition system**

## 2023-03-15 NOTE — ASSESSMENT & PLAN NOTE
Wt Readings from Last 3 Encounters:   03/15/23 104 kg (230 lb 2 6 oz)   02/24/23 111 kg (244 lb)   02/20/23 111 kg (244 lb)     Patient is at dry weight although with nild lower extremity edema  No evidence of DVT on venous duplex  Continue diuretics as previously prescribed  Most recent echo with EF of 65% and grade 1 diastolic dysfunction January 2023

## 2023-03-15 NOTE — ASSESSMENT & PLAN NOTE
Malnutrition Findings:   Adult Malnutrition type: Chronic illness  Adult Degree of Malnutrition: Malnutrition of moderate degree  Malnutrition Characteristics: Inadequate energy, Weight loss                  360 Statement: Moderate malnutrition r/t catabolic illness as evidenced by 5% unplanned wt loss since 2/15/23, consuming < 75% energy intake compared to estimated energy needs > 1 month  Treated with Ensure Compact tid    BMI Findings: Body mass index is 33 02 kg/m²

## 2023-03-15 NOTE — ASSESSMENT & PLAN NOTE
Lab Results   Component Value Date    EGFR 86 03/14/2023    EGFR 82 03/12/2023    EGFR 80 03/11/2023    CREATININE 0 85 03/14/2023    CREATININE 0 92 03/12/2023    CREATININE 0 94 03/11/2023   Renal function remains at baseline

## 2023-03-15 NOTE — ASSESSMENT & PLAN NOTE
Patient has known metastatic renal cell carcinoma with metastasis to lung  He presented with generalized weakness and not being able to function at home by himself    Unfortunately not a candidate for treatments per Oncology     Seen by Palliative care    Discharged home with hospice care    Case management arrangements appreciated

## 2023-03-16 ENCOUNTER — HOME CARE VISIT (OUTPATIENT)
Dept: HOME HEALTH SERVICES | Facility: HOME HEALTHCARE | Age: 73
End: 2023-03-16

## 2023-03-16 VITALS
HEART RATE: 88 BPM | SYSTOLIC BLOOD PRESSURE: 122 MMHG | BODY MASS INDEX: 33.07 KG/M2 | WEIGHT: 231 LBS | TEMPERATURE: 98.3 F | DIASTOLIC BLOOD PRESSURE: 60 MMHG | HEIGHT: 70 IN | RESPIRATION RATE: 20 BRPM

## 2023-03-17 ENCOUNTER — HOME CARE VISIT (OUTPATIENT)
Dept: HOME HOSPICE | Facility: HOSPICE | Age: 73
End: 2023-03-17

## 2023-06-12 NOTE — PROGRESS NOTES
Vilma Gutierrez 1950 is a 69 y o  male       Follow up visit     Vilma Gutierrez is a 69 y o  male with a history of metastatic renal cell cancer, initially diagnosed in 2007  He developed metastatic disease in 2013  He remains on systemic therapy with Medical Oncology  He returns for follow up post right ulnar radiation completed 5/31/17, right posterior 7th rib completed 8/3/17, Stereotactic radiation to T6 completed 10/27/17, SBRT to T10 completed 7/27/18 and SBRT to paraspinal mass at T4 completed 11/23/18  Last seen for radiation telemedicine follow up on 4/22/20  At that time his MRI of the thoracic spine revealed stability  His CT scan from 4/20/20 revealed increased pulmonary metastasis as well as increased soft tissue mass the left nephrectomy bed  He was on Keytruda  7/14/20  CT head without contrast (Indication: fall  laceration/hematoma left fore head R/O ICH, fx)  IMPRESSION:   1  No acute intracranial abnormality  2   Small left frontal scalp laceration/hematoma without a calvarial fracture  7/14/20 CT Cervical spine (Indication: fall  face first on cement, r/o fracture)  IMPRESSION: No cervical spine fracture or traumatic malalignment  7/28/20 CT C/A/P   IMPRESSION:   Widespread metastatic disease is reidentified  However, most tumor masses are decreased in size consistent with treatment response as described above  Some lesions are unchanged in size from prior examination  No new discrete metastatic lesions identified in the chest, abdomen or pelvis  8/3/20 Dr Annetta Waddell follow up - early May 2020 started on cabozantinib with expected side effects  "However, he discontinued cabozantinib 2 weeks ago due to the hand-foot reaction  His recent CT scan showed partial response  We discussed further treatment options  I recommended him to restart cabozantinib 40 mg daily  "  Restart denosumab every 3 months      11/9/20 Sherley Chang MD  Clinically, he has no evidence of progression  I recommended him to continue with cabozantinib (Cometriq)  40 mg every other day    12/11/20 Bone scan  1  Scattered foci of increased radiotracer uptake compatible with osseous metastasis as noted above  Left scapular activity compatible with metastasis  No suspicious right shoulder activity  2   Faint soft tissue focus in the right posterior buttock region where there is a hyperdense soft tissue nodule on CT compatible with soft tissue metastasis  2/6/21 CT chest, abdomen and pelvis  IMPRESSION:     Reidentified, predominantly improved, widespread metastatic disease:   Improved pulmonary/parenchymal metastases  Improved adrenal metastases  Increased size of a nodule located in the left inferior pararenal space  Decreased right gluteal mass  Resolved 7 mm left paraspinal intramuscular and right chest wall lesions  Unchanged osseous metastases  New foci of metastases are not identified         Upcoming  2/12/21 Troy Kilpatrick MD      Oncology History   Clear cell adenocarcinoma of kidney, left ([facility] [address] )   3/2/2018 Initial Diagnosis    Clear cell adenocarcinoma of kidney, left ([facility] [address] )     1/31/2020 - 4/22/2020 Chemotherapy    pembrolizumab (KEYTRUDA) 200 mg in sodium chloride 0 9 % 50 mL IVPB, 200 mg, Intravenous, Once, 4 of 9 cycles  Administration: 200 mg (1/31/2020), 200 mg (2/21/2020), 200 mg (3/13/2020), 200 mg (4/3/2020)     Bone metastases ([address] )   6/22/2016 Initial Diagnosis    Bone metastases ([address] )     3/8/2017 -  Chemotherapy    Axitinib 4 mg b i d        Denosumab 60 mg subcutaneously monthly initiated January 12, 2018 5/17/2017 - 5/31/2017 Radiation    palliative radiation therapy following ORIF of right ulna for metastatic renal cell carcinoma to 3000cGy     7/26/2017 - 8/3/2017 Radiation    palliative radiation therapy for metastatic renal carcinoma to the right posterior seventh rib with bone and soft tissue metastases to 2800cGy     10/18/2017 - 10/27/2017 Radiation    stereotactic radiation therapy to a T6 vertebral body metastasis to 3000cGy     7/18/2018 - 7/27/2018 Radiation    Stereotactic radiation to T10 metastasis to 3000 cGy in 5 fractions     11/14/2018 - 11/23/2018 Radiation    SBRT to paraspinal mass at T4 to 3000 cGy           1/31/2020 - 4/22/2020 Chemotherapy    pembrolizumab (KEYTRUDA) 200 mg in sodium chloride 0 9 % 50 mL IVPB, 200 mg, Intravenous, Once, 4 of 9 cycles  Administration: 200 mg (1/31/2020), 200 mg (2/21/2020), 200 mg (3/13/2020), 200 mg (4/3/2020)     Lung metastases ([facility] [address] )   3/2/2018 Initial Diagnosis    Lung metastases ([facility] [address] )     1/31/2020 - 4/22/2020 Chemotherapy    pembrolizumab (KEYTRUDA) 200 mg in sodium chloride 0 9 % 50 mL IVPB, 200 mg, Intravenous, Once, 4 of 9 cycles  Administration: 200 mg (1/31/2020), 200 mg (2/21/2020), 200 mg (3/13/2020), 200 mg (4/3/2020)     Spine metastasis ([facility] [address] )   7/11/2018 Initial Diagnosis    Spine metastasis ([facility] [address] )     1/31/2020 - 4/22/2020 Chemotherapy    pembrolizumab (KEYTRUDA) 200 mg in sodium chloride 0 9 % 50 mL IVPB, 200 mg, Intravenous, Once, 4 of 9 cycles  Administration: 200 mg (1/31/2020), 200 mg (2/21/2020), 200 mg (3/13/2020), 200 mg (4/3/2020)     Metastatic renal cell carcinoma (HCC)   9/26/2018 Initial Diagnosis    Metastatic renal cell carcinoma (HCC)         Clinical Trial: no      Health Maintenance   Topic Date Due    Medicare Annual Wellness Visit (AWV)  1950    BMI: Followup Plan  10/06/1967    Colorectal Cancer Screening  10/06/1999    Fall Risk  01/30/2020    Influenza Vaccine (1) 09/01/2020    Depression Screening PHQ  04/22/2021    BMI: Adult  11/09/2021    DTaP,Tdap,and Td Vaccines (3 - Td) 07/14/2030    Hepatitis C Screening  Completed    Pneumococcal Vaccine: 65+ Years  Completed    HIB Vaccine  Aged Out    Hepatitis B Vaccine  Aged Out    IPV Vaccine  Aged Out    Hepatitis A Vaccine  Aged Out    Meningococcal ACWY Vaccine  Aged Out    HPV Vaccine  Aged Out       Patient Active Problem List   Diagnosis    Clear cell adenocarcinoma of kidney, left (HCC)    Bone metastases (HCC)    Lung metastases (HCC)    Spine metastasis ([facility] [address] )    Metastatic renal cell carcinoma (HCC)    Hx of prostatectomy    History of prostate cancer    Azotemia     Past Medical History:   Diagnosis Date    Arthritis     Cancer ([facility] [address] )     prostate - mets to bone and lung    History of radiation therapy 2018    SBRT to T10 -2018    Hyperlipidemia     Hypertension      Past Surgical History:   Procedure Laterality Date    DISTAL ULNA EXCISION Right     excision of tumor and orif with cement and screws    JOINT REPLACEMENT Bilateral     tkr's    LUNG SURGERY Right     exc of nodules    NEPHRECTOMY Left      Family History   Problem Relation Age of Onset    No Known Problems Mother     No Known Problems Father      Social History     Socioeconomic History    Marital status:       Spouse name: Not on file    Number of children: 1    Years of education: 15    Highest education level: Not on file   Occupational History    Occupation: retired     Comment: P/T    Social Needs    Financial resource strain: Not on file    Food insecurity     Worry: Not on file     Inability: Not on file   [employer] needs     Medical: Not on file     Non-medical: Not on file   Tobacco Use    Smoking status: Former Smoker     Packs/day: 1 00     Types: Cigarettes     Quit date: 3/28/2003     Years since quittin 8    Smokeless tobacco: Never Used   Substance and Sexual Activity    Alcohol use: Not Currently     Comment: occasional use    Drug use: No    Sexual activity: Not on file   Lifestyle    Physical activity     Days per week: Not on file     Minutes per session: Not on file    Stress: Not on file   Relationships    Social connections     Talks on phone: Not on file     Gets together: Not on file     Attends Anabaptism service: Not on file Active member of club or organization: Not on file     Attends meetings of clubs or organizations: Not on file     Relationship status: Not on file    Intimate partner violence     Fear of current or ex partner: Not on file     Emotionally abused: Not on file     Physically abused: Not on file     Forced sexual activity: Not on file   Other Topics Concern    Not on file   Social History Narrative    Lives at home alone       Current Outpatient Medications:     al mag oxide-diphenhydramine-lidocaine viscous (MAGIC MOUTHWASH) 1:1:1 suspension, Swish and spit 10 mL every 4 (four) hours as needed for mouth pain or discomfort, Disp: 250 mL, Rfl: 0    amLODIPine (NORVASC) 5 mg tablet, Take 5 mg by mouth daily  , Disp: , Rfl:     cabozantinib 40 mg TABS, Take 1 tablet (40 mg total) by mouth every other day, Disp: 15 tablet, Rfl: 5    cholecalciferol (VITAMIN D3) 1,000 units tablet, Take 1,000 Units by mouth daily, Disp: , Rfl:     diphenhydrAMINE (BENADRYL) 25 mg tablet, Take 25 mg by mouth daily at bedtime as needed for itching, Disp: , Rfl:     gabapentin (NEURONTIN) 100 mg capsule, Take 300 mg by mouth 3 (three) times a day , Disp: , Rfl:     lisinopril-hydrochlorothiazide (PRINZIDE,ZESTORETIC) 20-12 5 MG per tablet, Take 1 tablet by mouth 2 (two) times a day  , Disp: , Rfl:     loperamide (IMODIUM) 2 mg capsule, Take 2 mg by mouth 4 (four) times a day as needed for diarrhea, Disp: , Rfl:     naproxen sodium (ALEVE) 220 MG tablet, Take 220 mg by mouth as needed for mild pain, Disp: , Rfl:     traMADol (ULTRAM) 50 mg tablet, Take 1 tablet (50 mg total) by mouth every 6 (six) hours as needed for moderate pain, Disp: 60 tablet, Rfl: 2    trolamine salicylate (ASPERCREME) 10 % cream, Apply 1 application topically as needed for muscle/joint pain, Disp: , Rfl:     LORazepam (ATIVAN) 0 5 mg tablet, Take 1 tablet (0 5 mg total) by mouth every 8 (eight) hours as needed for anxiety (Patient not taking: Reported on 2/10/2021), Disp: 2 tablet, Rfl: 0    simvastatin (ZOCOR) 40 mg tablet, Take 40 mg by mouth, Disp: , Rfl:   No Known Allergies    Review of Systems:  Review of Systems   Constitutional: Negative  Negative for activity change, appetite change, chills, fatigue, fever and unexpected weight change  HENT: Positive for postnasal drip  Eyes:        Wears glasses   Respiratory: Negative  Negative for cough and shortness of breath  Cardiovascular: Negative  Negative for chest pain and leg swelling  Gastrointestinal: Negative for abdominal pain, blood in stool, constipation, diarrhea, nausea and vomiting  Endocrine: Negative  Genitourinary: Negative for difficulty urinating, dysuria and hematuria  Musculoskeletal: Positive for back pain  R shoulder pain   Skin: Negative  Allergic/Immunologic: Positive for environmental allergies  Neurological: Negative for dizziness, weakness, light-headedness and headaches  Hematological: Negative  Psychiatric/Behavioral: Negative  Vitals:    02/10/21 1018   BP: 120/70   BP Location: Left arm   Patient Position: Sitting   Pulse: 92   Resp: 14   Temp: 97 6 °F (36 4 °C)   TempSrc: Temporal   SpO2: 95%   Weight: 127 kg (281 lb)   Height: 5' 6" (1 676 m)               Imaging:Ct Chest Abdomen Pelvis W Contrast    Result Date: 2/9/2021  Narrative: CT CHEST, ABDOMEN AND PELVIS WITH IV CONTRAST INDICATION:   C64 2: Malignant neoplasm of left kidney, except renal pelvis C78 00: Secondary malignant neoplasm of unspecified lung C79 51: Secondary malignant neoplasm of bone  Metastatic left renal cell carcinoma status post left nephrectomy  COMPARISON:  None  TECHNIQUE: CT examination of the chest, abdomen and pelvis was performed  Axial, sagittal, and coronal 2D reformatted images were created from the source data and submitted for interpretation  Radiation dose length product (DLP) for this visit:  3766 mGy-cm     This examination, like all CT scans performed in the [facility], was performed utilizing techniques to minimize radiation dose exposure, including the use of iterative reconstruction and automated exposure control  IV Contrast:  100 mL of iohexol (OMNIPAQUE) Enteric Contrast: Enteric contrast was administered  FINDINGS: CHEST LUNGS:  Reidentified chronic consolidative density in the medial posterior right upper lobe #3/38 unchanged from the prior study with associated pleural disease  Similar right lower lobe consolidation adjacent to an expansile seventh rib lesion  Unchanged 15 x 10 mm left upper lobe nodule #3/45  Unchanged 3 mm left upper lobe nodule #3/32  Decreased size of a 9 x 8 mm left lower lobe nodule previously measured 12 x 9 mm  #3/62  Decreased size of a right lower lobe nodule with coarse calcifications now measuring 14 x 8 mm previously measured 21 x 13 mm  The previously seen 18 x 10 mm right lower lobe nodule adjacent to the right lower lobe consolidative density is no longer well visualized  Unchanged 7 mm right middle lobe nodule  #3/80  PLEURA:  Unchanged pleural-based densities described above in the lung section  No pleural effusion  HEART/GREAT VESSELS: No pericardial effusion  Previously described ascending aortic aneurysm is less well characterized on today's study which is slightly limited by motion artifact through the ascending aorta  MEDIASTINUM AND CHERRIE:  Unremarkable  CHEST WALL AND LOWER NECK:   Unchanged thyroid nodules  Previously seen mass within the intercostal muscles anterior to the right eighth rib is no longer identified  ABDOMEN LIVER/BILIARY TREE:  Unremarkable  GALLBLADDER:  No calcified gallstones  No pericholecystic inflammatory change  SPLEEN:  Unremarkable  PANCREAS:  Unremarkable  ADRENAL GLANDS:  The right adrenal mass is no longer identified  Mild residual thickening of the right adrenal  Decreased size of the known left adrenal mass now measures 28 x 12 mm   I remeasured on the prior study at 32 x 18 mm  KIDNEYS/URETERS:  Status post left nephrectomy  Stable right renal cortical and parapelvic cysts  Increased size of the left inferior pararenal space nodule now measuring 20 x 19 mm previously measured 15 x 12 mm  STOMACH AND BOWEL:  There is colonic diverticulosis without evidence of acute diverticulitis  APPENDIX:  Noninflamed  ABDOMINOPELVIC CAVITY:  No ascites  No pneumoperitoneum  No lymphadenopathy  VESSELS:  Unremarkable for patient's age  PELVIS REPRODUCTIVE ORGANS:  Unremarkable for patient's age  URINARY BLADDER:  Unremarkable  ABDOMINAL WALL/INGUINAL REGIONS:  Unchanged uncomplicated small fat-containing paraumbilical hernia and unchanged bilateral fat-containing inguinal hernias  Decreased size of the right gluteal mass now measures 20 mm and previously measured 35 mm  #2/134  Previously seen 7 mm left paraspinal intramuscular lesion is no longer identified  OSSEOUS STRUCTURES:  Unchanged expansile right seventh and eighth rib metastases  Unchanged expansile right iliac wing lesion  2 lucent lesions of the medial right ilium #2/105 and #2/120 are unchanged  Unchanged lytic left iliac lesion adjacent to the left SI joint  Unchanged T4, T6 and L4 lesions  No acute pathologic fracture  Lower lumbar postsurgical changes  Impression: Reidentified, predominantly improved, widespread metastatic disease: Improved pulmonary/parenchymal metastases  Improved adrenal metastases  Increased size of a nodule located in the left inferior pararenal space  Decreased right gluteal mass  Resolved 7 mm left paraspinal intramuscular and right chest wall lesions  Unchanged osseous metastases  New foci of metastases are not identified   Workstation performed: [id number]